# Patient Record
Sex: FEMALE | Race: WHITE | NOT HISPANIC OR LATINO | URBAN - METROPOLITAN AREA
[De-identification: names, ages, dates, MRNs, and addresses within clinical notes are randomized per-mention and may not be internally consistent; named-entity substitution may affect disease eponyms.]

---

## 2019-08-10 ENCOUNTER — INPATIENT (INPATIENT)
Facility: HOSPITAL | Age: 84
LOS: 33 days | Discharge: NURSING, REHAB OR LONG-TERM CARE | DRG: 853 | End: 2019-09-13
Attending: INTERNAL MEDICINE | Admitting: HOSPITALIST
Payer: MEDICARE

## 2019-08-10 VITALS
WEIGHT: 149.91 LBS | OXYGEN SATURATION: 85 % | RESPIRATION RATE: 18 BRPM | HEIGHT: 60 IN | SYSTOLIC BLOOD PRESSURE: 157 MMHG | TEMPERATURE: 101 F | HEART RATE: 98 BPM | DIASTOLIC BLOOD PRESSURE: 80 MMHG

## 2019-08-10 DIAGNOSIS — Z95.2 PRESENCE OF PROSTHETIC HEART VALVE: Chronic | ICD-10-CM

## 2019-08-10 LAB
ALBUMIN SERPL ELPH-MCNC: 4.2 G/DL — SIGNIFICANT CHANGE UP (ref 3.3–5)
ALP SERPL-CCNC: 52 U/L — SIGNIFICANT CHANGE UP (ref 40–120)
ALT FLD-CCNC: 6 U/L — LOW (ref 10–45)
ANION GAP SERPL CALC-SCNC: 15 MMOL/L — SIGNIFICANT CHANGE UP (ref 5–17)
ANION GAP SERPL CALC-SCNC: 15 MMOL/L — SIGNIFICANT CHANGE UP (ref 5–17)
APPEARANCE UR: CLEAR — SIGNIFICANT CHANGE UP
APTT BLD: 29.8 SEC — SIGNIFICANT CHANGE UP (ref 27.5–36.3)
AST SERPL-CCNC: 51 U/L — HIGH (ref 10–40)
BACTERIA # UR AUTO: ABNORMAL
BASOPHILS # BLD AUTO: 0.1 K/UL — SIGNIFICANT CHANGE UP (ref 0–0.2)
BASOPHILS NFR BLD AUTO: 0.5 % — SIGNIFICANT CHANGE UP (ref 0–2)
BILIRUB SERPL-MCNC: 0.4 MG/DL — SIGNIFICANT CHANGE UP (ref 0.2–1.2)
BILIRUB UR-MCNC: NEGATIVE — SIGNIFICANT CHANGE UP
BUN SERPL-MCNC: 21 MG/DL — SIGNIFICANT CHANGE UP (ref 7–23)
BUN SERPL-MCNC: 21 MG/DL — SIGNIFICANT CHANGE UP (ref 7–23)
CALCIUM SERPL-MCNC: 8.9 MG/DL — SIGNIFICANT CHANGE UP (ref 8.4–10.5)
CALCIUM SERPL-MCNC: 9.4 MG/DL — SIGNIFICANT CHANGE UP (ref 8.4–10.5)
CHLORIDE SERPL-SCNC: 88 MMOL/L — LOW (ref 96–108)
CHLORIDE SERPL-SCNC: 89 MMOL/L — LOW (ref 96–108)
CO2 SERPL-SCNC: 28 MMOL/L — SIGNIFICANT CHANGE UP (ref 22–31)
CO2 SERPL-SCNC: 30 MMOL/L — SIGNIFICANT CHANGE UP (ref 22–31)
COLOR SPEC: YELLOW — SIGNIFICANT CHANGE UP
CREAT SERPL-MCNC: 0.8 MG/DL — SIGNIFICANT CHANGE UP (ref 0.5–1.3)
CREAT SERPL-MCNC: 0.9 MG/DL — SIGNIFICANT CHANGE UP (ref 0.5–1.3)
DIFF PNL FLD: NEGATIVE — SIGNIFICANT CHANGE UP
EOSINOPHIL # BLD AUTO: 0 K/UL — SIGNIFICANT CHANGE UP (ref 0–0.5)
EOSINOPHIL NFR BLD AUTO: 0.2 % — SIGNIFICANT CHANGE UP (ref 0–6)
EPI CELLS # UR: 0 /HPF — SIGNIFICANT CHANGE UP
ERYTHROCYTE [SEDIMENTATION RATE] IN BLOOD: 54 MM/HR — HIGH (ref 0–20)
GLUCOSE SERPL-MCNC: 117 MG/DL — HIGH (ref 70–99)
GLUCOSE SERPL-MCNC: 191 MG/DL — HIGH (ref 70–99)
GLUCOSE UR QL: NEGATIVE — SIGNIFICANT CHANGE UP
HCT VFR BLD CALC: 37.4 % — SIGNIFICANT CHANGE UP (ref 34.5–45)
HGB BLD-MCNC: 12.2 G/DL — SIGNIFICANT CHANGE UP (ref 11.5–15.5)
HYALINE CASTS # UR AUTO: 1 /LPF — SIGNIFICANT CHANGE UP (ref 0–2)
INR BLD: 1.12 RATIO — SIGNIFICANT CHANGE UP (ref 0.88–1.16)
KETONES UR-MCNC: NEGATIVE — SIGNIFICANT CHANGE UP
LACTATE BLDV-MCNC: 1.6 MMOL/L — SIGNIFICANT CHANGE UP (ref 0.7–2)
LEUKOCYTE ESTERASE UR-ACNC: ABNORMAL
LYMPHOCYTES # BLD AUTO: 1.4 K/UL — SIGNIFICANT CHANGE UP (ref 1–3.3)
LYMPHOCYTES # BLD AUTO: 12.3 % — LOW (ref 13–44)
MAGNESIUM SERPL-MCNC: 2.2 MG/DL — SIGNIFICANT CHANGE UP (ref 1.6–2.6)
MCHC RBC-ENTMCNC: 29.2 PG — SIGNIFICANT CHANGE UP (ref 27–34)
MCHC RBC-ENTMCNC: 32.6 GM/DL — SIGNIFICANT CHANGE UP (ref 32–36)
MCV RBC AUTO: 89.8 FL — SIGNIFICANT CHANGE UP (ref 80–100)
MONOCYTES # BLD AUTO: 0.6 K/UL — SIGNIFICANT CHANGE UP (ref 0–0.9)
MONOCYTES NFR BLD AUTO: 5.2 % — SIGNIFICANT CHANGE UP (ref 2–14)
NEUTROPHILS # BLD AUTO: 9.5 K/UL — HIGH (ref 1.8–7.4)
NEUTROPHILS NFR BLD AUTO: 81.8 % — HIGH (ref 43–77)
NITRITE UR-MCNC: NEGATIVE — SIGNIFICANT CHANGE UP
OB PNL STL: NEGATIVE — SIGNIFICANT CHANGE UP
PH UR: 7.5 — SIGNIFICANT CHANGE UP (ref 5–8)
PLATELET # BLD AUTO: 286 K/UL — SIGNIFICANT CHANGE UP (ref 150–400)
POTASSIUM SERPL-MCNC: 3.3 MMOL/L — LOW (ref 3.5–5.3)
POTASSIUM SERPL-MCNC: 6 MMOL/L — HIGH (ref 3.5–5.3)
POTASSIUM SERPL-SCNC: 3.3 MMOL/L — LOW (ref 3.5–5.3)
POTASSIUM SERPL-SCNC: 6 MMOL/L — HIGH (ref 3.5–5.3)
PROT SERPL-MCNC: 7.8 G/DL — SIGNIFICANT CHANGE UP (ref 6–8.3)
PROT UR-MCNC: SIGNIFICANT CHANGE UP
PROTHROM AB SERPL-ACNC: 12.9 SEC — SIGNIFICANT CHANGE UP (ref 10–12.9)
RBC # BLD: 4.16 M/UL — SIGNIFICANT CHANGE UP (ref 3.8–5.2)
RBC # FLD: 14.3 % — SIGNIFICANT CHANGE UP (ref 10.3–14.5)
RBC CASTS # UR COMP ASSIST: 1 /HPF — SIGNIFICANT CHANGE UP (ref 0–4)
SODIUM SERPL-SCNC: 131 MMOL/L — LOW (ref 135–145)
SODIUM SERPL-SCNC: 134 MMOL/L — LOW (ref 135–145)
SP GR SPEC: 1.01 — SIGNIFICANT CHANGE UP (ref 1.01–1.02)
UROBILINOGEN FLD QL: NEGATIVE — SIGNIFICANT CHANGE UP
WBC # BLD: 11.6 K/UL — HIGH (ref 3.8–10.5)
WBC # FLD AUTO: 11.6 K/UL — HIGH (ref 3.8–10.5)
WBC UR QL: 39 /HPF — HIGH (ref 0–5)

## 2019-08-10 PROCEDURE — 93010 ELECTROCARDIOGRAM REPORT: CPT

## 2019-08-10 PROCEDURE — 71045 X-RAY EXAM CHEST 1 VIEW: CPT | Mod: 26

## 2019-08-10 PROCEDURE — 74177 CT ABD & PELVIS W/CONTRAST: CPT | Mod: 26

## 2019-08-10 PROCEDURE — 99285 EMERGENCY DEPT VISIT HI MDM: CPT

## 2019-08-10 PROCEDURE — 70450 CT HEAD/BRAIN W/O DYE: CPT | Mod: 26

## 2019-08-10 RX ORDER — ACETAMINOPHEN 500 MG
1000 TABLET ORAL ONCE
Refills: 0 | Status: COMPLETED | OUTPATIENT
Start: 2019-08-10 | End: 2019-08-10

## 2019-08-10 RX ORDER — IPRATROPIUM/ALBUTEROL SULFATE 18-103MCG
3 AEROSOL WITH ADAPTER (GRAM) INHALATION ONCE
Refills: 0 | Status: COMPLETED | OUTPATIENT
Start: 2019-08-10 | End: 2019-08-10

## 2019-08-10 RX ORDER — ACETAMINOPHEN 500 MG
975 TABLET ORAL ONCE
Refills: 0 | Status: COMPLETED | OUTPATIENT
Start: 2019-08-10 | End: 2019-08-10

## 2019-08-10 RX ORDER — AZITHROMYCIN 500 MG/1
500 TABLET, FILM COATED ORAL ONCE
Refills: 0 | Status: COMPLETED | OUTPATIENT
Start: 2019-08-10 | End: 2019-08-10

## 2019-08-10 RX ORDER — POTASSIUM CHLORIDE 20 MEQ
10 PACKET (EA) ORAL ONCE
Refills: 0 | Status: COMPLETED | OUTPATIENT
Start: 2019-08-10 | End: 2019-08-10

## 2019-08-10 RX ORDER — CEFTRIAXONE 500 MG/1
1000 INJECTION, POWDER, FOR SOLUTION INTRAMUSCULAR; INTRAVENOUS ONCE
Refills: 0 | Status: COMPLETED | OUTPATIENT
Start: 2019-08-10 | End: 2019-08-10

## 2019-08-10 RX ADMIN — Medication 3 MILLILITER(S): at 20:48

## 2019-08-10 RX ADMIN — Medication 3 MILLILITER(S): at 20:40

## 2019-08-10 RX ADMIN — CEFTRIAXONE 100 MILLIGRAM(S): 500 INJECTION, POWDER, FOR SOLUTION INTRAMUSCULAR; INTRAVENOUS at 20:49

## 2019-08-10 RX ADMIN — Medication 400 MILLIGRAM(S): at 22:47

## 2019-08-10 RX ADMIN — AZITHROMYCIN 250 MILLIGRAM(S): 500 TABLET, FILM COATED ORAL at 21:55

## 2019-08-10 NOTE — ED ADULT NURSE NOTE - NSIMPLEMENTINTERV_GEN_ALL_ED
Implemented All Fall Risk Interventions:  [place] to call system. Call bell, personal items and telephone within reach. Instruct patient to call for assistance. Room bathroom lighting operational. Non-slip footwear when patient is off stretcher. Physically safe environment: no spills, clutter or unnecessary equipment. Stretcher in lowest position, wheels locked, appropriate side rails in place. Provide visual cue, wrist band, yellow gown, etc. Monitor gait and stability. Monitor for mental status changes and reorient to person, place, and time. Review medications for side effects contributing to fall risk. Reinforce activity limits and safety measures with patient and family.

## 2019-08-10 NOTE — ED PROVIDER NOTE - OBJECTIVE STATEMENT
84 y/o F with PMHx of GERD, HTN, HLD, DM with neuropathy, Fe deficient anemia, Parkinson dz, anxiety/depression, AS s/p TAVR on plavix, overactive bladder, ?CHF (on lasix 20 BID), PMR on Prednisone, ?COPD (dx with PNA at [facility] vague hx of being told she has COPD given breathing treatments but then stopped taking them), poor historian with son at bedside who is also poor historian presents c/o multiple medical complaints cc of LUQ/L flank pain since this morning. Pt has had frequent UTIs as per son for the past year, had sxs 2 weeks ago and was empirically treated with macrobid (finished 8 days ago), son notes pt had fever this morning and maybe last night, pt does not answer when questioned about urinary sxs. Denies h/o kidney stone and pyelo.      Pt and son also notes R sided HA x 1 week, began as an auricular HA but now is R temporoparietal. Patient's son notes she had normal ESR last week and prednisone dose was further decreased, however this week went for a f/u and was told ESR was elevated again, PCP was concerned for GCA so pt instructed to take prednisone 10mg QD. HA si sharp and stabbing every 5 minutes as per son, has improved since increasing the prednisone. Pt states she has macular degeneration in the R eye and chronically has decreased vision OD, no change in baseline. Of notes pt had similar HA in the past although less severe and had temporal artery bx in 04/2019 which was negative for GCA.      Additionally pt has had increased reflux/ "regurgitation" as per son, wet sounding non-productive cough, and sore throat x 1 week. Was seen by PCP for sore throat and told to take lozenges, no abx prescribed. No breathing treatments. Pt denies CP. Pt also notes ?bloody stool, (will alternate confirming or denying when asked).  **Unsure palliative/provoking factors, radiation, severity, or character of listed sxs unless otherwise noted as pt would not answer questioning. 84 y/o F with PMHx of GERD, HTN, HLD, DM with neuropathy, Fe deficient anemia, Parkinson dz, anxiety/depression, AS s/p TAVR on plavix, overactive bladder, ?CHF (on lasix 20 BID), PMR on Prednisone, ?COPD (dx with PNA at [facility] vague hx of being told she has COPD given breathing treatments but then stopped taking them), poor historian with son at bedside who is also poor historian presents c/o multiple medical complaints cc of LUQ/L flank pain since this morning. Pt has had frequent UTIs as per son for the past year, had sxs 2 weeks ago and was empirically treated with macrobid (finished 8 days ago), son notes pt had fever this morning and maybe last night, pt does not answer when questioned about urinary sxs. Denies h/o kidney stone and pyelo.      Pt and son also notes R sided HA x 1 week, began as an auricular HA but now is R temporoparietal. Patient's son notes she had normal ESR last week and prednisone dose was further decreased, however this week went for a f/u and was told ESR was elevated again, PCP was concerned for GCA so pt instructed to take prednisone 10mg QD. HA si sharp and stabbing every 5 minutes as per son, has improved since increasing the prednisone. Pt states she has macular degeneration in the R eye and chronically has decreased vision OD, no change in baseline. Of notes pt had similar HA in the past although less severe and had temporal artery bx in 04/2019 which was negative for GCA.      Additionally pt has had increased reflux/ "regurgitation" as per son, wet sounding non-productive cough, and sore throat x 1 week. Was seen by PCP for sore throat and told to take lozenges, no abx prescribed. No breathing treatments. Pt denies CP. Pt also notes ?bloody stool, (will alternate confirming or denying when asked).  **Unsure palliative/provoking factors, radiation, severity, or character of listed sxs unless otherwise noted as pt would not answer questioning.  Attn - pt seen in mid24 - agree with above. lancing Right temporal pain and left flank pain with poss fever.  hx of frequent UTIs. 84 y/o F with PMHx of GERD, HTN, HLD, DM with neuropathy, Fe deficient anemia, Parkinson dz, anxiety/depression, AS s/p TAVR on plavix, overactive bladder, ?CHF (on lasix 20 BID), PMR on Prednisone, ?COPD (dx with PNA at [facility] vague hx of being told she has COPD given breathing treatments but then stopped taking them), poor historian with son at bedside who is also poor historian presents c/o multiple medical complaints cc of LUQ/L flank pain since this morning. Pt has had frequent UTIs as per son for the past year, had sxs 2 weeks ago and was empirically treated with macrobid (finished 8 days ago), son notes pt had fever this morning and maybe last night, pt does not answer when questioned about urinary sxs. Denies h/o kidney stone and pyelo.      Pt and son also notes R sided HA x 1 week, began as an auricular HA but now is R temporoparietal. Patient's son notes she had normal ESR last week and prednisone dose was further decreased, however this week went for a f/u and was told ESR was elevated again, PCP was concerned for GCA so pt instructed to take prednisone 10mg QD. HA si sharp and stabbing every 5 minutes as per son, has improved since increasing the prednisone. Pt states she has macular degeneration in the R eye and chronically has decreased vision OD, no change in baseline. Of notes pt had similar HA in the past although less severe and had temporal artery bx in 04/2019 which was negative for GCA.      Additionally pt has had increased reflux/ "regurgitation" as per son, wet sounding non-productive cough, and sore throat x 1 week. Was seen by PCP for sore throat and told to take lozenges, no abx prescribed. No breathing treatments. Pt denies CP. Pt also notes ?bloody stool, (will alternate confirming or denying when asked).  **Unsure palliative/provoking factors, radiation, severity, or character of listed sxs unless otherwise noted as pt would not answer questioning.  Attn - pt seen in mid24 - agree with above. lancing Right temporal pain and left flank pain with poss fever.  hx of frequent UTIs.  PCP: Dr. Yakov Dumas (unattached)

## 2019-08-10 NOTE — ED ADULT NURSE NOTE - OBJECTIVE STATEMENT
85y Female PMH parkinson's, htn, hld, Depression, Anxiety, Diabetes, bilateral cataract surgery, iron deficiency anemia, heart failure?, COPD? presents to the ED c/o abd pain. Pt is poor historian and is only answering some questions at this time. Son at bedside states that patient started to complain of LUQ and L. flank pain today. Son states that patient has been getting frequent UTIs this year. Of note, pt also suffers from headaches and is complaining of R. sided headache at this time; pt has hx of polymyalgia rheumatica and had a L. temporal artery Biopsy in April. Son states that patient has had the chills today and has had a difficult time speaking due to shortness of breath. Son concerned for aspiration and states her face turns "very red" when she swallows. Son states that patient has been coughing frequently and that it sounds "wet" but no production. Pt a&oX3, airway intact, breathing spontaneously and unlabored, SpO2 on room air noted to be 83%, pt placed on 4L initially, now on 2L with SpO2 of %, wheezing and rhonchi heard throughout lung fields, oral temperature of 101.3 noted, abd soft, tender to palpation in LUQ and L. flank, pt moving all extremities, no edema noted, cap refill <3 seconds, +peripheral pulses. Pt denies CP, numbness/tingling, n/v/d. MD at bedside for eval. safety maintained. Son at bedside.

## 2019-08-10 NOTE — ED ADULT NURSE NOTE - NS ED NURSE RECORD ANOTHER VITAL SIGN
buprenorphine-naloxone (SUBOXONE) 8-2 MG SUBL SL tablet Place 1 tablet under the tongue 2 times daily .  ferrous sulfate 325 (65 Fe) MG tablet Take 1 tablet by mouth daily      furosemide (LASIX) 20 MG tablet Take 1 tablet by mouth daily      potassium chloride (KLOR-CON M) 10 MEQ extended release tablet Take 1 tablet by mouth daily      gabapentin (NEURONTIN) 400 MG capsule Take 1 capsule by mouth 3 times daily       No current facility-administered medications for this visit. Allergies:  Review of patient's allergies indicates no known allergies. 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 No weight loss. No visual change, eye pain, eye discharge. No oral lesion, sore throat, dysphagia. Denies cough / sputum. Denies chest pain, palpitations. Denies n / v / abd pain. No diarrhea. Denies dysuria or change in urinary function. Denies joint swelling or pain. No myalgia, arthralgia. Denies skin change, rash, itching  Denies focal weakness, sensory change or other neurologic symptom  Denies new depression or other psychiatric problem. No symptoms endocrine disorder. No symptoms hematologic disorder. PHYSICAL EXAM:    Vitals:    BP (!) 100/56   Temp 98.2 °F (36.8 °C) (Oral)   Wt 199 lb (90.3 kg)   BMI 28.55 kg/m²     GENERAL: No apparent distress. HEENT: Membranes moist, no oral lesion  NECK:  Supple  LYMPH: No adenopathy   LUNGS: Clear b/l, no rales, no dullness - L SC joint swelling, non-tender  CARDIAC: RRR, no murmur appreciated  ABD:  + BS, soft / NT  EXT:  No rash, no edema, no lesions  NEURO: No focal neurologic findings  PSYCH: Orientation, sensorium, mood normal    DATA:    See EPIC    11/27 - BC no growth. WBC 6.8, Hbg 11.8, plt 115.   CRP 3.9.  ESR 35. ,     11/27 CXR:  Significant radiographic improvement with persistent nodular   densities and airspace disease in the lung periphery, right   greater than left. IMPRESSION    Hx IVDU   HIV neg  HCV +  MRSA endocarditis  Pul septic emboli  L sternoclavicular septic arthritis - better off antibiotic  Elevated hepatic transaminases [, ]    RECOMMENDATIONS:      - Cont off any antibiotics  - Encouraged continued drug abstinence  - F/u PCP - appt 12/14    Return - as need     - Spent over 40 minutes on visit (including history, physical exam, review of data, development and implementation of treatment plan and coordination of care. - Over 50% of time spent in pt counseling and education.     Ish Peoples MD Yes

## 2019-08-10 NOTE — ED PROVIDER NOTE - UNABLE TO OBTAIN
Pt A&Ox4 but sparingly responds to further questioning making history taking, ROS, and physical exam difficult Dementia

## 2019-08-10 NOTE — ED PROVIDER NOTE - PROGRESS NOTE DETAILS
c/d/w neuro, will see pt. -Lucas Payton PA-C Attending Valerie Norwood: ct shows no evidence of stone. pt  reports cough. febrile. hypoxic upon arrival which improved with duonebs but still requiring supplemental oxygen. concern for possible pna and UTI. will need admission. given abx c/d/w neuro, will see pt. Pt with h/o TAVR and likely HF on lasix 20mg BID with current cough and hypoxia requiring supplemental O2, will hold IV fluids. -Lucas Payton PA-C

## 2019-08-10 NOTE — ED PROVIDER NOTE - PHYSICAL EXAMINATION
Attn - alert, oriented x 3, nad, no pallor or jaundice, no JVD, lungs - course BS bilat, no rales, cor - rr, 2/6 ISAAC, abdo - obese, soft, tender left upper flank and left CVA. no guarding or rebound.  extremities - no edema or calf tenderness.  skin - warm, dry and pink.  Scalp - no tenderness over temporal area. neuro - non focal exam. Attn - alert, oriented x 3, nad, no pallor or jaundice, no JVD, lungs - course BS bilat, no rales, cor - rr, 2/6 ISAAC, abdo - obese, soft, tender left upper flank and left CVA. no guarding or rebound.  extremities - no edema or calf tenderness.  skin - warm, dry and pink.  Scalp - no tenderness over temporal area. neuro - non focal exam.    Lucas Payton PA-C GEN: Pt in NAD, A&O x3.  PSYCH: Pt anxious, cooperative with exam but only answers target questions. +lip smacking and cogwheeling.  EYES: Sclera white w/o injection, PERRLA, EOMI.   ENT: Head NCAT, tenderness over the temporal radion, as well as the posterior auricular and occipital region, no rash or appreciable LAD. Nose w/o deformity. No auricular TTP. MMM, posteiror pharynx erythematous no exudate, no vesicles, uvula midline, no significant tonsillar enlargement. Pt tolerating secretions. Neck supple FROM. No JVD.  RESP: No chest wall tenderness, decreased breath sound b/l, diffuse inspiratory and expiratory wheezes, and sonorous rhonchi. No appreciable rales.  CARDIAC: RRR, clear distinct S1, S2, +3/6 systolic murmur.  ABD: Abdomen soft, ttp LUQ and L flank. No CVAT b/l.  VASC: 2+ radial and dorsalis pedis pulses b/l. No edema or calf tenderness.  NEURO: CN2-12 grossly intact. No focal motor or sensory deficits.  SKIN: No rashes on the trunk or head. Attn - alert, oriented x 3, nad, no pallor or jaundice, no JVD, lungs - course BS bilat, no rales, cor - rr, 2/6 ISAAC, abdo - obese, soft, tender left upper flank and left CVA. no guarding or rebound.  extremities - no edema or calf tenderness.  skin - warm, dry and pink.  Scalp - no tenderness over temporal area. neuro - non focal exam.    Lucas Payton PA-C GEN: Pt in NAD, A&O x3.  PSYCH: Pt anxious, cooperative with exam but only answers target questions. +lip smacking and cogwheeling.  EYES: Sclera white w/o injection, VA unreliable pt does not participate. PERRLA, EOMI.   ENT: Head NCAT, tenderness over the temporal radion, as well as the posterior auricular and occipital region, no rash or appreciable LAD. Nose w/o deformity. No auricular TTP. MMM, posteiror pharynx erythematous no exudate, no vesicles, uvula midline, no significant tonsillar enlargement. Pt tolerating secretions. Neck supple FROM. No JVD.  RESP: No chest wall tenderness, decreased breath sound b/l, diffuse inspiratory and expiratory wheezes, and sonorous rhonchi. No appreciable rales.  CARDIAC: RRR, clear distinct S1, S2, +3/6 systolic murmur.  ABD: Abdomen soft, ttp LUQ and L flank. No CVAT b/l.  Rectal Exam: Chaperone: Emma Frazier RN. Minimal light brown stool in vault, no visible external hemorrhoids or fissures, good rectal tone, no masses, irregularities or internal hemorrhoids palpated, no pain with exam.   VASC: 2+ radial and dorsalis pedis pulses b/l. No edema or calf tenderness.  NEURO: CN2-12 grossly intact. No focal motor or sensory deficits.  SKIN: No rashes on the trunk or head.

## 2019-08-10 NOTE — ED PROVIDER NOTE - CLINICAL SUMMARY MEDICAL DECISION MAKING FREE TEXT BOX
Attn - pt with sharp left sided headache with neg temporal bx in April 19 - temporal arteritis with those findings and no tenderness very unlikely.  left flank and CVA tenderness - UTI/pyelo --> CT abdo/pelvis, cxr, labs.

## 2019-08-10 NOTE — ED ADULT NURSE REASSESSMENT NOTE - NS ED NURSE REASSESS COMMENT FT1
RN attempted to give patient oral medication as ordered by PADMINI Muñoz; pt noted to have difficult time swallowing, her face became red and she was having a hard time breathing. PADMINI Muñoz aware. Pt to be placed on NPO in ED and given a different route of Tylenol for fever. safety maintained. Oxygen saturation % on 2L NC. Breathing spontaneously and unlabored.

## 2019-08-10 NOTE — ED PROVIDER NOTE - PMH
Anxiety    Aortic stenosis    Diabetes    Hyperlipidemia    Hypertension    Insomnia    Iron deficiency anemia    Neuropathy    Parkinson disease    Polymyalgia rheumatica

## 2019-08-11 DIAGNOSIS — Z29.9 ENCOUNTER FOR PROPHYLACTIC MEASURES, UNSPECIFIED: ICD-10-CM

## 2019-08-11 DIAGNOSIS — A41.9 SEPSIS, UNSPECIFIED ORGANISM: ICD-10-CM

## 2019-08-11 DIAGNOSIS — E11.8 TYPE 2 DIABETES MELLITUS WITH UNSPECIFIED COMPLICATIONS: ICD-10-CM

## 2019-08-11 DIAGNOSIS — N39.0 URINARY TRACT INFECTION, SITE NOT SPECIFIED: ICD-10-CM

## 2019-08-11 DIAGNOSIS — J44.9 CHRONIC OBSTRUCTIVE PULMONARY DISEASE, UNSPECIFIED: ICD-10-CM

## 2019-08-11 DIAGNOSIS — I50.9 HEART FAILURE, UNSPECIFIED: ICD-10-CM

## 2019-08-11 DIAGNOSIS — K59.00 CONSTIPATION, UNSPECIFIED: ICD-10-CM

## 2019-08-11 DIAGNOSIS — I10 ESSENTIAL (PRIMARY) HYPERTENSION: ICD-10-CM

## 2019-08-11 DIAGNOSIS — J18.9 PNEUMONIA, UNSPECIFIED ORGANISM: ICD-10-CM

## 2019-08-11 LAB
ALBUMIN SERPL ELPH-MCNC: 4.2 G/DL — SIGNIFICANT CHANGE UP (ref 3.3–5)
ALP SERPL-CCNC: 54 U/L — SIGNIFICANT CHANGE UP (ref 40–120)
ALT FLD-CCNC: <5 U/L — LOW (ref 10–45)
ANION GAP SERPL CALC-SCNC: 16 MMOL/L — SIGNIFICANT CHANGE UP (ref 5–17)
AST SERPL-CCNC: 16 U/L — SIGNIFICANT CHANGE UP (ref 10–40)
BASE EXCESS BLDV CALC-SCNC: 7.7 MMOL/L — HIGH (ref -2–2)
BASE EXCESS BLDV CALC-SCNC: 7.7 MMOL/L — HIGH (ref -2–2)
BASOPHILS # BLD AUTO: 0 K/UL — SIGNIFICANT CHANGE UP (ref 0–0.2)
BASOPHILS NFR BLD AUTO: 0.2 % — SIGNIFICANT CHANGE UP (ref 0–2)
BILIRUB SERPL-MCNC: 0.6 MG/DL — SIGNIFICANT CHANGE UP (ref 0.2–1.2)
BUN SERPL-MCNC: 17 MG/DL — SIGNIFICANT CHANGE UP (ref 7–23)
CA-I SERPL-SCNC: 1.04 MMOL/L — LOW (ref 1.12–1.3)
CA-I SERPL-SCNC: 1.1 MMOL/L — LOW (ref 1.12–1.3)
CALCIUM SERPL-MCNC: 9.7 MG/DL — SIGNIFICANT CHANGE UP (ref 8.4–10.5)
CHLORIDE BLDV-SCNC: 93 MMOL/L — LOW (ref 96–108)
CHLORIDE BLDV-SCNC: 95 MMOL/L — LOW (ref 96–108)
CHLORIDE SERPL-SCNC: 88 MMOL/L — LOW (ref 96–108)
CO2 BLDV-SCNC: 36 MMOL/L — HIGH (ref 22–30)
CO2 BLDV-SCNC: 37 MMOL/L — HIGH (ref 22–30)
CO2 SERPL-SCNC: 29 MMOL/L — SIGNIFICANT CHANGE UP (ref 22–31)
CREAT SERPL-MCNC: 0.78 MG/DL — SIGNIFICANT CHANGE UP (ref 0.5–1.3)
CRP SERPL-MCNC: 1.13 MG/DL — HIGH (ref 0–0.4)
EOSINOPHIL # BLD AUTO: 0 K/UL — SIGNIFICANT CHANGE UP (ref 0–0.5)
EOSINOPHIL NFR BLD AUTO: 0.3 % — SIGNIFICANT CHANGE UP (ref 0–6)
GAS PNL BLDV: 127 MMOL/L — LOW (ref 135–145)
GAS PNL BLDV: 131 MMOL/L — LOW (ref 135–145)
GAS PNL BLDV: SIGNIFICANT CHANGE UP
GLUCOSE BLDV-MCNC: 105 MG/DL — HIGH (ref 70–99)
GLUCOSE BLDV-MCNC: 121 MG/DL — HIGH (ref 70–99)
GLUCOSE SERPL-MCNC: 152 MG/DL — HIGH (ref 70–99)
HCO3 BLDV-SCNC: 34 MMOL/L — HIGH (ref 21–29)
HCO3 BLDV-SCNC: 35 MMOL/L — HIGH (ref 21–29)
HCT VFR BLD CALC: 36 % — SIGNIFICANT CHANGE UP (ref 34.5–45)
HCT VFR BLDA CALC: 31 % — LOW (ref 39–50)
HCT VFR BLDA CALC: 33 % — LOW (ref 39–50)
HGB BLD CALC-MCNC: 10.1 G/DL — LOW (ref 11.5–15.5)
HGB BLD CALC-MCNC: 10.7 G/DL — LOW (ref 11.5–15.5)
HGB BLD-MCNC: 11.8 G/DL — SIGNIFICANT CHANGE UP (ref 11.5–15.5)
HOROWITZ INDEX BLDV+IHG-RTO: SIGNIFICANT CHANGE UP
LACTATE BLDV-MCNC: 1.2 MMOL/L — SIGNIFICANT CHANGE UP (ref 0.7–2)
LACTATE BLDV-MCNC: 1.8 MMOL/L — SIGNIFICANT CHANGE UP (ref 0.7–2)
LYMPHOCYTES # BLD AUTO: 1.4 K/UL — SIGNIFICANT CHANGE UP (ref 1–3.3)
LYMPHOCYTES # BLD AUTO: 9 % — LOW (ref 13–44)
MAGNESIUM SERPL-MCNC: 2.2 MG/DL — SIGNIFICANT CHANGE UP (ref 1.6–2.6)
MCHC RBC-ENTMCNC: 29.6 PG — SIGNIFICANT CHANGE UP (ref 27–34)
MCHC RBC-ENTMCNC: 32.8 GM/DL — SIGNIFICANT CHANGE UP (ref 32–36)
MCV RBC AUTO: 90.1 FL — SIGNIFICANT CHANGE UP (ref 80–100)
MONOCYTES # BLD AUTO: 0.7 K/UL — SIGNIFICANT CHANGE UP (ref 0–0.9)
MONOCYTES NFR BLD AUTO: 4.4 % — SIGNIFICANT CHANGE UP (ref 2–14)
NEUTROPHILS # BLD AUTO: 13.2 K/UL — HIGH (ref 1.8–7.4)
NEUTROPHILS NFR BLD AUTO: 86.1 % — HIGH (ref 43–77)
OTHER CELLS CSF MANUAL: 14 ML/DL — LOW (ref 18–22)
PCO2 BLDV: 58 MMHG — HIGH (ref 35–50)
PCO2 BLDV: 65 MMHG — HIGH (ref 35–50)
PH BLDV: 7.34 — LOW (ref 7.35–7.45)
PH BLDV: 7.38 — SIGNIFICANT CHANGE UP (ref 7.35–7.45)
PHOSPHATE SERPL-MCNC: 4 MG/DL — SIGNIFICANT CHANGE UP (ref 2.5–4.5)
PLATELET # BLD AUTO: 263 K/UL — SIGNIFICANT CHANGE UP (ref 150–400)
PO2 BLDV: 27 MMHG — SIGNIFICANT CHANGE UP (ref 25–45)
PO2 BLDV: 62 MMHG — HIGH (ref 25–45)
POTASSIUM BLDV-SCNC: 3.2 MMOL/L — LOW (ref 3.5–5.3)
POTASSIUM BLDV-SCNC: 5.8 MMOL/L — HIGH (ref 3.5–5.3)
POTASSIUM SERPL-MCNC: 3.6 MMOL/L — SIGNIFICANT CHANGE UP (ref 3.5–5.3)
POTASSIUM SERPL-SCNC: 3.6 MMOL/L — SIGNIFICANT CHANGE UP (ref 3.5–5.3)
PROT SERPL-MCNC: 7.4 G/DL — SIGNIFICANT CHANGE UP (ref 6–8.3)
RAPID RVP RESULT: SIGNIFICANT CHANGE UP
RBC # BLD: 4 M/UL — SIGNIFICANT CHANGE UP (ref 3.8–5.2)
RBC # FLD: 14.2 % — SIGNIFICANT CHANGE UP (ref 10.3–14.5)
SAO2 % BLDV: 38 % — LOW (ref 67–88)
SAO2 % BLDV: 91 % — HIGH (ref 67–88)
SODIUM SERPL-SCNC: 133 MMOL/L — LOW (ref 135–145)
WBC # BLD: 15.3 K/UL — HIGH (ref 3.8–10.5)
WBC # FLD AUTO: 15.3 K/UL — HIGH (ref 3.8–10.5)

## 2019-08-11 PROCEDURE — 99223 1ST HOSP IP/OBS HIGH 75: CPT | Mod: AI,GC

## 2019-08-11 PROCEDURE — 99222 1ST HOSP IP/OBS MODERATE 55: CPT

## 2019-08-11 PROCEDURE — 12345: CPT | Mod: NC,GC

## 2019-08-11 RX ORDER — INSULIN LISPRO 100/ML
VIAL (ML) SUBCUTANEOUS
Refills: 0 | Status: DISCONTINUED | OUTPATIENT
Start: 2019-08-11 | End: 2019-08-16

## 2019-08-11 RX ORDER — INSULIN LISPRO 100/ML
VIAL (ML) SUBCUTANEOUS
Refills: 0 | Status: DISCONTINUED | OUTPATIENT
Start: 2019-08-11 | End: 2019-08-11

## 2019-08-11 RX ORDER — CLOPIDOGREL BISULFATE 75 MG/1
75 TABLET, FILM COATED ORAL DAILY
Refills: 0 | Status: DISCONTINUED | OUTPATIENT
Start: 2019-08-11 | End: 2019-08-14

## 2019-08-11 RX ORDER — DEXTROSE 50 % IN WATER 50 %
25 SYRINGE (ML) INTRAVENOUS ONCE
Refills: 0 | Status: DISCONTINUED | OUTPATIENT
Start: 2019-08-11 | End: 2019-08-30

## 2019-08-11 RX ORDER — DEXTROSE 50 % IN WATER 50 %
12.5 SYRINGE (ML) INTRAVENOUS ONCE
Refills: 0 | Status: DISCONTINUED | OUTPATIENT
Start: 2019-08-11 | End: 2019-08-30

## 2019-08-11 RX ORDER — LOSARTAN POTASSIUM 100 MG/1
50 TABLET, FILM COATED ORAL DAILY
Refills: 0 | Status: DISCONTINUED | OUTPATIENT
Start: 2019-08-11 | End: 2019-08-14

## 2019-08-11 RX ORDER — DEXTROSE 50 % IN WATER 50 %
15 SYRINGE (ML) INTRAVENOUS ONCE
Refills: 0 | Status: DISCONTINUED | OUTPATIENT
Start: 2019-08-11 | End: 2019-08-30

## 2019-08-11 RX ORDER — SODIUM CHLORIDE 9 MG/ML
1000 INJECTION, SOLUTION INTRAVENOUS
Refills: 0 | Status: DISCONTINUED | OUTPATIENT
Start: 2019-08-11 | End: 2019-08-30

## 2019-08-11 RX ORDER — DEXTROSE 50 % IN WATER 50 %
25 SYRINGE (ML) INTRAVENOUS ONCE
Refills: 0 | Status: DISCONTINUED | OUTPATIENT
Start: 2019-08-11 | End: 2019-08-11

## 2019-08-11 RX ORDER — DEXTROSE 50 % IN WATER 50 %
12.5 SYRINGE (ML) INTRAVENOUS ONCE
Refills: 0 | Status: DISCONTINUED | OUTPATIENT
Start: 2019-08-11 | End: 2019-08-11

## 2019-08-11 RX ORDER — DEXTROSE 50 % IN WATER 50 %
15 SYRINGE (ML) INTRAVENOUS ONCE
Refills: 0 | Status: DISCONTINUED | OUTPATIENT
Start: 2019-08-11 | End: 2019-08-11

## 2019-08-11 RX ORDER — CARBIDOPA AND LEVODOPA 25; 100 MG/1; MG/1
1 TABLET ORAL
Qty: 0 | Refills: 0 | DISCHARGE

## 2019-08-11 RX ORDER — POLYETHYLENE GLYCOL 3350 17 G/17G
17 POWDER, FOR SOLUTION ORAL DAILY
Refills: 0 | Status: DISCONTINUED | OUTPATIENT
Start: 2019-08-11 | End: 2019-08-16

## 2019-08-11 RX ORDER — ACETAMINOPHEN 500 MG
650 TABLET ORAL EVERY 6 HOURS
Refills: 0 | Status: DISCONTINUED | OUTPATIENT
Start: 2019-08-11 | End: 2019-08-16

## 2019-08-11 RX ORDER — GLUCAGON INJECTION, SOLUTION 0.5 MG/.1ML
1 INJECTION, SOLUTION SUBCUTANEOUS ONCE
Refills: 0 | Status: DISCONTINUED | OUTPATIENT
Start: 2019-08-11 | End: 2019-08-11

## 2019-08-11 RX ORDER — PANTOPRAZOLE SODIUM 20 MG/1
40 TABLET, DELAYED RELEASE ORAL
Refills: 0 | Status: DISCONTINUED | OUTPATIENT
Start: 2019-08-11 | End: 2019-08-16

## 2019-08-11 RX ORDER — GLUCAGON INJECTION, SOLUTION 0.5 MG/.1ML
1 INJECTION, SOLUTION SUBCUTANEOUS ONCE
Refills: 0 | Status: DISCONTINUED | OUTPATIENT
Start: 2019-08-11 | End: 2019-08-30

## 2019-08-11 RX ORDER — GABAPENTIN 400 MG/1
300 CAPSULE ORAL AT BEDTIME
Refills: 0 | Status: DISCONTINUED | OUTPATIENT
Start: 2019-08-11 | End: 2019-08-16

## 2019-08-11 RX ORDER — CEFTRIAXONE 500 MG/1
1000 INJECTION, POWDER, FOR SOLUTION INTRAMUSCULAR; INTRAVENOUS EVERY 24 HOURS
Refills: 0 | Status: COMPLETED | OUTPATIENT
Start: 2019-08-11 | End: 2019-08-14

## 2019-08-11 RX ORDER — AZITHROMYCIN 500 MG/1
500 TABLET, FILM COATED ORAL EVERY 24 HOURS
Refills: 0 | Status: DISCONTINUED | OUTPATIENT
Start: 2019-08-11 | End: 2019-08-11

## 2019-08-11 RX ORDER — INSULIN LISPRO 100/ML
VIAL (ML) SUBCUTANEOUS AT BEDTIME
Refills: 0 | Status: DISCONTINUED | OUTPATIENT
Start: 2019-08-11 | End: 2019-08-11

## 2019-08-11 RX ORDER — GABAPENTIN 400 MG/1
600 CAPSULE ORAL THREE TIMES A DAY
Refills: 0 | Status: DISCONTINUED | OUTPATIENT
Start: 2019-08-11 | End: 2019-08-11

## 2019-08-11 RX ORDER — CARBIDOPA AND LEVODOPA 25; 100 MG/1; MG/1
2 TABLET ORAL THREE TIMES A DAY
Refills: 0 | Status: DISCONTINUED | OUTPATIENT
Start: 2019-08-11 | End: 2019-08-16

## 2019-08-11 RX ORDER — INSULIN LISPRO 100/ML
VIAL (ML) SUBCUTANEOUS AT BEDTIME
Refills: 0 | Status: DISCONTINUED | OUTPATIENT
Start: 2019-08-11 | End: 2019-08-16

## 2019-08-11 RX ORDER — SODIUM CHLORIDE 9 MG/ML
1000 INJECTION, SOLUTION INTRAVENOUS
Refills: 0 | Status: DISCONTINUED | OUTPATIENT
Start: 2019-08-11 | End: 2019-08-11

## 2019-08-11 RX ORDER — ALPRAZOLAM 0.25 MG
0.5 TABLET ORAL
Refills: 0 | Status: DISCONTINUED | OUTPATIENT
Start: 2019-08-11 | End: 2019-08-16

## 2019-08-11 RX ORDER — DULOXETINE HYDROCHLORIDE 30 MG/1
60 CAPSULE, DELAYED RELEASE ORAL DAILY
Refills: 0 | Status: DISCONTINUED | OUTPATIENT
Start: 2019-08-11 | End: 2019-08-16

## 2019-08-11 RX ORDER — SENNA PLUS 8.6 MG/1
2 TABLET ORAL AT BEDTIME
Refills: 0 | Status: DISCONTINUED | OUTPATIENT
Start: 2019-08-11 | End: 2019-08-16

## 2019-08-11 RX ORDER — HEPARIN SODIUM 5000 [USP'U]/ML
5000 INJECTION INTRAVENOUS; SUBCUTANEOUS EVERY 12 HOURS
Refills: 0 | Status: DISCONTINUED | OUTPATIENT
Start: 2019-08-11 | End: 2019-08-14

## 2019-08-11 RX ORDER — DOCUSATE SODIUM 100 MG
100 CAPSULE ORAL DAILY
Refills: 0 | Status: DISCONTINUED | OUTPATIENT
Start: 2019-08-11 | End: 2019-08-16

## 2019-08-11 RX ADMIN — LOSARTAN POTASSIUM 50 MILLIGRAM(S): 100 TABLET, FILM COATED ORAL at 12:44

## 2019-08-11 RX ADMIN — CLOPIDOGREL BISULFATE 75 MILLIGRAM(S): 75 TABLET, FILM COATED ORAL at 12:44

## 2019-08-11 RX ADMIN — Medication 100 MILLIEQUIVALENT(S): at 00:01

## 2019-08-11 RX ADMIN — DULOXETINE HYDROCHLORIDE 60 MILLIGRAM(S): 30 CAPSULE, DELAYED RELEASE ORAL at 12:44

## 2019-08-11 RX ADMIN — GABAPENTIN 600 MILLIGRAM(S): 400 CAPSULE ORAL at 12:45

## 2019-08-11 RX ADMIN — Medication 100 MILLIGRAM(S): at 12:44

## 2019-08-11 RX ADMIN — Medication 10 MILLIGRAM(S): at 12:44

## 2019-08-11 RX ADMIN — POLYETHYLENE GLYCOL 3350 17 GRAM(S): 17 POWDER, FOR SOLUTION ORAL at 12:52

## 2019-08-11 RX ADMIN — Medication 650 MILLIGRAM(S): at 14:33

## 2019-08-11 RX ADMIN — CARBIDOPA AND LEVODOPA 2 TABLET(S): 25; 100 TABLET ORAL at 12:45

## 2019-08-11 RX ADMIN — HEPARIN SODIUM 5000 UNIT(S): 5000 INJECTION INTRAVENOUS; SUBCUTANEOUS at 18:24

## 2019-08-11 NOTE — H&P ADULT - NSICDXPASTMEDICALHX_GEN_ALL_CORE_FT
PAST MEDICAL HISTORY:  Anxiety     Aortic stenosis     Diabetes     Hyperlipidemia     Hypertension     Insomnia     Iron deficiency anemia     Neuropathy     Parkinson disease     Polymyalgia rheumatica

## 2019-08-11 NOTE — PROGRESS NOTE ADULT - SUBJECTIVE AND OBJECTIVE BOX
Umair Sequeira [place]  PGY-2  Pager: [phone]    Patient is a 85y old  Female who presents with a chief complaint of Abdominal pain (11 Aug 2019 08:49)      SUBJECTIVE / OVERNIGHT EVENTS:    MEDICATIONS  (STANDING):  azithromycin  IVPB 500 milliGRAM(s) IV Intermittent every 24 hours  carbidopa/levodopa  25/100 2 Tablet(s) Oral three times a day  cefTRIAXone   IVPB 1000 milliGRAM(s) IV Intermittent every 24 hours  clopidogrel Tablet 75 milliGRAM(s) Oral daily  dextrose 5%. 1000 milliLiter(s) (50 mL/Hr) IV Continuous <Continuous>  dextrose 50% Injectable 12.5 Gram(s) IV Push once  dextrose 50% Injectable 25 Gram(s) IV Push once  dextrose 50% Injectable 25 Gram(s) IV Push once  docusate sodium 100 milliGRAM(s) Oral daily  DULoxetine 60 milliGRAM(s) Oral daily  gabapentin 600 milliGRAM(s) Oral three times a day  heparin  Injectable 5000 Unit(s) SubCutaneous every 12 hours  insulin lispro (HumaLOG) corrective regimen sliding scale   SubCutaneous three times a day before meals  insulin lispro (HumaLOG) corrective regimen sliding scale   SubCutaneous at bedtime  losartan 50 milliGRAM(s) Oral daily  pantoprazole    Tablet 40 milliGRAM(s) Oral before breakfast  polyethylene glycol 3350 17 Gram(s) Oral daily  predniSONE   Tablet 10 milliGRAM(s) Oral daily  senna 2 Tablet(s) Oral at bedtime    MEDICATIONS  (PRN):  ALPRAZolam 0.5 milliGRAM(s) Oral two times a day PRN anxiety  dextrose 40% Gel 15 Gram(s) Oral once PRN Blood Glucose LESS THAN 70 milliGRAM(s)/deciliter  glucagon  Injectable 1 milliGRAM(s) IntraMuscular once PRN Glucose LESS THAN 70 milligrams/deciliter      T(C): 37.4 (19 @ 09:30), Max: 38.6 (08-10-19 @ 18:50)  HR: 92 (19 @ 09:30) (68 - 98)  BP: 143/87 (19 @ 09:30) (112/68 - 161/86)  RR: 20 (19 @ 09:30) (18 - 20)  SpO2: 93% (19 @ 09:30) (85% - 100%)  CAPILLARY BLOOD GLUCOSE      POCT Blood Glucose.: 121 mg/dL (11 Aug 2019 13:08)  POCT Blood Glucose.: 102 mg/dL (11 Aug 2019 09:48)    I&O's Summary      PHYSICAL EXAM:  GENERAL: NAD, well-developed  HEAD:  Atraumatic, Normocephalic  EYES: EOMI, PERRLA, conjunctiva and sclera clear  NECK: Supple, No JVD  CHEST/LUNG: Clear to auscultation bilaterally; No wheeze  HEART: Regular rate and rhythm; No murmurs, rubs, or gallops  ABDOMEN: Soft, Nontender, Nondistended; Bowel sounds present  EXTREMITIES:  2+ Peripheral Pulses, No clubbing, cyanosis, or edema  PSYCH: AAOx3  NEUROLOGY: non-focal  SKIN: No rashes or lesions    LABS:                        12.2   11.6  )-----------( 286      ( 10 Aug 2019 20:28 )             37.4     08-10    131<L>  |  88<L>  |  21  ----------------------------<  191<H>  3.3<L>   |  28  |  0.90    Ca    8.9      10 Aug 2019 23:03  Mg     1.9     08-10    TPro  7.8  /  Alb  4.2  /  TBili  0.4  /  DBili  x   /  AST  51<H>  /  ALT  6<L>  /  AlkPhos  52  08-10    PT/INR - ( 10 Aug 2019 20:28 )   PT: 12.9 sec;   INR: 1.12 ratio         PTT - ( 10 Aug 2019 20:28 )  PTT:29.8 sec      Urinalysis Basic - ( 10 Aug 2019 20:55 )    Color: Yellow / Appearance: Clear / S.012 / pH: x  Gluc: x / Ketone: Negative  / Bili: Negative / Urobili: Negative   Blood: x / Protein: Trace / Nitrite: Negative   Leuk Esterase: Large / RBC: 1 /hpf / WBC 39 /HPF   Sq Epi: x / Non Sq Epi: 0 /hpf / Bacteria: Many        RADIOLOGY & ADDITIONAL TESTS:    Imaging Personally Reviewed:    Consultant(s) Notes Reviewed:      Care Discussed with Consultants/Other Providers: Umair Sequeira [place]  PGY-2  Pager: [phone]    Patient is a 85y old  Female who presents with a chief complaint of Abdominal pain (11 Aug 2019 08:49)      SUBJECTIVE / OVERNIGHT EVENTS:  No acute events in interim.  Patient awake and answers questions appropriately, yet hypophonic (chronic? per patient).  She reports that she still has SOB and Left lower chest pain on the rib area with coughing. She sounds congested, and has to take breaks between sentences.  Reports subjective fever and chills.  Otherwise, she does not report other complaints. Denies palpitation, new visual disturbance, leg pain.    MEDICATIONS  (STANDING):  azithromycin  IVPB 500 milliGRAM(s) IV Intermittent every 24 hours  carbidopa/levodopa  25/100 2 Tablet(s) Oral three times a day  cefTRIAXone   IVPB 1000 milliGRAM(s) IV Intermittent every 24 hours  clopidogrel Tablet 75 milliGRAM(s) Oral daily  dextrose 5%. 1000 milliLiter(s) (50 mL/Hr) IV Continuous <Continuous>  dextrose 50% Injectable 12.5 Gram(s) IV Push once  dextrose 50% Injectable 25 Gram(s) IV Push once  dextrose 50% Injectable 25 Gram(s) IV Push once  docusate sodium 100 milliGRAM(s) Oral daily  DULoxetine 60 milliGRAM(s) Oral daily  gabapentin 600 milliGRAM(s) Oral three times a day  heparin  Injectable 5000 Unit(s) SubCutaneous every 12 hours  insulin lispro (HumaLOG) corrective regimen sliding scale   SubCutaneous three times a day before meals  insulin lispro (HumaLOG) corrective regimen sliding scale   SubCutaneous at bedtime  losartan 50 milliGRAM(s) Oral daily  pantoprazole    Tablet 40 milliGRAM(s) Oral before breakfast  polyethylene glycol 3350 17 Gram(s) Oral daily  predniSONE   Tablet 10 milliGRAM(s) Oral daily  senna 2 Tablet(s) Oral at bedtime    MEDICATIONS  (PRN):  ALPRAZolam 0.5 milliGRAM(s) Oral two times a day PRN anxiety  dextrose 40% Gel 15 Gram(s) Oral once PRN Blood Glucose LESS THAN 70 milliGRAM(s)/deciliter  glucagon  Injectable 1 milliGRAM(s) IntraMuscular once PRN Glucose LESS THAN 70 milligrams/deciliter      T(C): 37.4 (19 @ 09:30), Max: 38.6 (08-10-19 @ 18:50)  HR: 92 (19 @ 09:30) (68 - 98)  BP: 143/87 (19 @ 09:30) (112/68 - 161/86)  RR: 20 (19 @ 09:30) (18 - 20)  SpO2: 93% (19 @ 09:30) (85% - 100%)  CAPILLARY BLOOD GLUCOSE      POCT Blood Glucose.: 121 mg/dL (11 Aug 2019 13:08)  POCT Blood Glucose.: 102 mg/dL (11 Aug 2019 09:48)    I&O's Summary      PHYSICAL EXAM:    Constitutional: NAD. obese lady lying on bed awake upon entry, on NC.  HEENT: AT/NC, EOMI, MMM, no oropharyngeal lesions other than posterior oropharynx erythema, no exudates, Supple neck;  Respiratory: ronchorous breath sounds especially on expiration. mild bibasilar crackles. equal aeration bilaterally. no wheezing, no perioral cyanosis or nasal flaring.   Cardiovascular: RRR, no M/R/G. 2+ distal pulses. Cap refill ~3 seconds.  Gastrointestinal: soft; NT/ND, +BS, no rebounding tenderness or guarding.  Genitourinary: not examined.  Extremities: no cyanosis; no pedal edema, non-tender to palpation, DP and Radial pulses intact.  Neurological: A&Ox1~2; responds to pain; responds to verbal commands; epicritic and protopathic sensation intact: CN nerves grossly intact. Answers questions appropropriatley   MSK/Back: no deformities. Moving her bilateral UE freely against gravity.   Psychiatric: normal mood/affect.    LABS:                        12.2   11.6  )-----------( 286      ( 10 Aug 2019 20:28 )             37.4     08-10    131<L>  |  88<L>  |  21  ----------------------------<  191<H>  3.3<L>   |  28  |  0.90    Ca    8.9      10 Aug 2019 23:03  Mg     1.9     08-10    TPro  7.8  /  Alb  4.2  /  TBili  0.4  /  DBili  x   /  AST  51<H>  /  ALT  6<L>  /  AlkPhos  52  08-10    PT/INR - ( 10 Aug 2019 20:28 )   PT: 12.9 sec;   INR: 1.12 ratio         PTT - ( 10 Aug 2019 20:28 )  PTT:29.8 sec      Urinalysis Basic - ( 10 Aug 2019 20:55 )    Color: Yellow / Appearance: Clear / S.012 / pH: x  Gluc: x / Ketone: Negative  / Bili: Negative / Urobili: Negative   Blood: x / Protein: Trace / Nitrite: Negative   Leuk Esterase: Large / RBC: 1 /hpf / WBC 39 /HPF   Sq Epi: x / Non Sq Epi: 0 /hpf / Bacteria: Many        RADIOLOGY & ADDITIONAL TESTS:    Imaging Personally Reviewed: DOMONIQUE    Consultant(s) Notes Reviewed:  DOMONIQUE    Care Discussed with Consultants/Other Providers: DOMONIQUE

## 2019-08-11 NOTE — PROGRESS NOTE ADULT - PROBLEM SELECTOR PLAN 5
Patient with large stool burden on imaging, but no SBO.  - Start miralax, colace, senna.  - Will give tap water enema x1. Patient with large stool burden on imaging, but no SBO.  - c/w bowel regimen: miralax, colace, senna.  - monitor BM.

## 2019-08-11 NOTE — H&P ADULT - NSHPLABSRESULTS_GEN_ALL_CORE
CBC Full  -  ( 10 Aug 2019 20:28 )  WBC Count : 11.6 K/uL  RBC Count : 4.16 M/uL  Hemoglobin : 12.2 g/dL  Hematocrit : 37.4 %  Platelet Count - Automated : 286 K/uL  Mean Cell Volume : 89.8 fl  Mean Cell Hemoglobin : 29.2 pg  Mean Cell Hemoglobin Concentration : 32.6 gm/dL  Auto Neutrophil # : 9.5 K/uL  Auto Lymphocyte # : 1.4 K/uL  Auto Monocyte # : 0.6 K/uL  Auto Eosinophil # : 0.0 K/uL  Auto Basophil # : 0.1 K/uL  Auto Neutrophil % : 81.8 %  Auto Lymphocyte % : 12.3 %  Auto Monocyte % : 5.2 %  Auto Eosinophil % : 0.2 %  Auto Basophil % : 0.5 %    08-10    131<L>  |  88<L>  |  21  ----------------------------<  191<H>  3.3<L>   |  28  |  0.90    Ca    8.9      10 Aug 2019 23:03  Mg     1.9     08-10    TPro  7.8  /  Alb  4.2  /  TBili  0.4  /  DBili  x   /  AST  51<H>  /  ALT  6<L>  /  AlkPhos  52  08-10      IMPRESSION:  No bowel obstruction.    Large amount of stool in the proximal colon.  Gaseous distention of the   cecum to 9 cm.  Markedly redundant distal transverse colon with gaseous   distention to 8 cm.  Marked looping of the sigmoid colon without evidence   of sigmoid volvulus.    Nonspecific anterior bladder wall thickening may be related to   underdistention.  Cystitis should be excluded based on clinical symptoms   and laboratory values.      Depending groundglass opacity in both lower lobes probably reflects   atelectasis.  Pneumonia should be excluded clinically. Labs, imging and EKG personally reviewed    CBC Full  -  ( 10 Aug 2019 20:28 )  WBC Count : 11.6 K/uL  RBC Count : 4.16 M/uL  Hemoglobin : 12.2 g/dL  Hematocrit : 37.4 %  Platelet Count - Automated : 286 K/uL  Mean Cell Volume : 89.8 fl  Mean Cell Hemoglobin : 29.2 pg  Mean Cell Hemoglobin Concentration : 32.6 gm/dL  Auto Neutrophil # : 9.5 K/uL  Auto Lymphocyte # : 1.4 K/uL  Auto Monocyte # : 0.6 K/uL  Auto Eosinophil # : 0.0 K/uL  Auto Basophil # : 0.1 K/uL  Auto Neutrophil % : 81.8 %  Auto Lymphocyte % : 12.3 %  Auto Monocyte % : 5.2 %  Auto Eosinophil % : 0.2 %  Auto Basophil % : 0.5 %    08-10    131<L>  |  88<L>  |  21  ----------------------------<  191<H>  3.3<L>   |  28  |  0.90    Ca    8.9      10 Aug 2019 23:03  Mg     1.9     08-10    TPro  7.8  /  Alb  4.2  /  TBili  0.4  /  DBili  x   /  AST  51<H>  /  ALT  6<L>  /  AlkPhos  52  08-10      IMPRESSION:  No bowel obstruction.    Large amount of stool in the proximal colon.  Gaseous distention of the   cecum to 9 cm.  Markedly redundant distal transverse colon with gaseous   distention to 8 cm.  Marked looping of the sigmoid colon without evidence   of sigmoid volvulus.    Nonspecific anterior bladder wall thickening may be related to   underdistention.  Cystitis should be excluded based on clinical symptoms   and laboratory values.      Depending groundglass opacity in both lower lobes probably reflects   atelectasis.  Pneumonia should be excluded clinically.

## 2019-08-11 NOTE — H&P ADULT - PROBLEM SELECTOR PLAN 8
DVT ppx: HSQ  Regular diet SSI and FS q6hrs.  - Patient in not on home anti-hyperglycemic medications.

## 2019-08-11 NOTE — PROGRESS NOTE ADULT - PROBLEM SELECTOR PLAN 7
Patient with CHF per records, but no TTE on file. Currently euvolemic.  - Will f/u TTE to eval valvular heart dz and EF Patient with CHF per records, but no TTE on file. Currently euvolemic.  - fluid assessment daily. will need strict I/O and daily weight if becomes hypervolemic.  - Ordered TTE.  - continue to monitor VS.

## 2019-08-11 NOTE — CONSULT NOTE ADULT - ASSESSMENT
85 F with GERD, HTN, HLD, DM2 with neuropathy, Parkinson's, anxiety/depression, AS s/p TAVR on plavix, overactive bladder, ?CHF (on lasix 20 BID), PMR on Prednisone. Patient's son notes she had R sided headache for 1 week. ESR last week was wnl and prednisone dose was further decreased. This week, ESR was elevated again. Patient's PCP was c/f [facility] and prednisone dose was increased to 10mg qD. Patient states headache is sharp and stabbing, lasts 5 minutes and has improved since increasing prednisone. Patient has decreased vision on R eye d/t macular degeneration. No further worsening in vision. Patient was febrile at home prior to coming and also in ED. She had temporal artery bx in 04/2019, which was negative for [facility]. Neurology consulted to R/O [facility]. Ultimately this cannot be definitely ruled out by physical exam alone and another substantial temporal artery biopsy would be needed to do so. However given patients other myriad of symptoms and constellations (fever, source of infection, metabolic derangement) and in light of prior negative work up, other diagnoses may be worth pursuing with greater interest. Patient appears to currently be encephalopathic and unable to engage fully in neurologic exam.       Plan:    Continue work up for infectious/metabolic/toxic insult

## 2019-08-11 NOTE — H&P ADULT - PROBLEM SELECTOR PLAN 5
Patient with large stool burden on imaging, but no SBO.  - Start miralax, colace, senna.  - Consider enema. Patient with large stool burden on imaging, but no SBO.  - Start miralax, colace, senna.  - Will give tap water enema x1.

## 2019-08-11 NOTE — CONSULT NOTE ADULT - SUBJECTIVE AND OBJECTIVE BOX
HPI:  85 F with GERD, HTN, HLD, DM2 with neuropathy, Parkinson's, anxiety/depression, AS s/p TAVR on plavix, overactive bladder, ?CHF (on lasix 20 BID), PMR on Prednisone, ?COPD (dx with PNA at [facility] vague hx of being told she has COPD given breathing treatments but then stopped taking them), presenting with multiple medical complaints, but especially LUQ and flank pain since yesterday AM. Per son, patient has had multiple UTIs. She had dysuria 2 weeks ago, was empirically treated with macrobid (8 days ago). Patient was febrile at home prior to coming and also in ED. Patient later denied abdominal pain and stated she came to ED for SOB. She's not on oxygen at home, but now requiring 1.5 L NC.  Patient's son notes she had R sided headache for 1 week. ESR last week was wnl and prednisone dose was further decreased. This week, ESR was elevated again. Patient's PCP was c/f GCA and prednisone dose was increased to 10mg qD. Patient states headache is sharp and stabbing, lasts 5 minutes and has improved since increasing prednisone. Patient has decreased vision on R eye d/t macular degeneration. No further worsening in vision. She had temporal artery bx in 2019, which was negative for GCA. Neurology consulted to R/O GCA    A 10-system ROS was performed and is negative except for those items noted above and/or in the HPI.    PAST MEDICAL & SURGICAL HISTORY:  Insomnia  Iron deficiency anemia  Anxiety  Parkinson disease  Aortic stenosis  Neuropathy  Polymyalgia rheumatica  Diabetes  Hyperlipidemia  Hypertension  S/P TAVR (transcatheter aortic valve replacement)    FAMILY HISTORY:  No pertinent family history in first degree relatives    MEDICATIONS (HOME):  Home Medications:  atorvastatin 40 mg oral tablet: 1 tab(s) orally 2 times a week (11 Aug 2019 08:27)  balsalazide 750 mg oral capsule: 1 cap(s) orally 2 times a day (11 Aug 2019 08:27)  clopidogrel 75 mg oral tablet: 1 tab(s) orally once a day (11 Aug 2019 08:27)  DULoxetine 60 mg oral delayed release capsule: 1 cap(s) orally once a day (11 Aug 2019 08:27)  gabapentin 300 mg oral tablet: 2 tab(s) orally 3 times a day (11 Aug 2019 08:27)  Lasix 20 mg oral tablet: 1 cap(s) orally 2 times a day (11 Aug 2019 08:27)  Lopressor: 25 milligram(s) orally 2 times a day (11 Aug 2019 08:27)  losartan 50 mg oral tablet: 1 tab(s) orally once a day (11 Aug 2019 08:27)  pantoprazole 20 mg oral delayed release tablet: 1 tab(s) orally 2 times a day (11 Aug 2019 08:27)  predniSONE 5 mg oral tablet: 2 tab(s) orally once a day (11 Aug 2019 08:27)  Sinemet 25 mg-100 mg oral tablet: 2 tab(s) orally 3 times a day (11 Aug 2019 08:27)  Xanax 0.5 mg oral tablet: 1 tab(s) orally 2 times a day, As Needed (11 Aug 2019 08:)    MEDICATIONS  (STANDING):  azithromycin  IVPB 500 milliGRAM(s) IV Intermittent every 24 hours  carbidopa/levodopa  25/100 2 Tablet(s) Oral three times a day  cefTRIAXone   IVPB 1000 milliGRAM(s) IV Intermittent every 24 hours  clopidogrel Tablet 75 milliGRAM(s) Oral daily  dextrose 5%. 1000 milliLiter(s) (50 mL/Hr) IV Continuous <Continuous>  dextrose 50% Injectable 12.5 Gram(s) IV Push once  dextrose 50% Injectable 25 Gram(s) IV Push once  dextrose 50% Injectable 25 Gram(s) IV Push once  docusate sodium 100 milliGRAM(s) Oral daily  DULoxetine 60 milliGRAM(s) Oral daily  gabapentin 600 milliGRAM(s) Oral three times a day  heparin  Injectable 5000 Unit(s) SubCutaneous every 12 hours  insulin lispro (HumaLOG) corrective regimen sliding scale   SubCutaneous three times a day before meals  insulin lispro (HumaLOG) corrective regimen sliding scale   SubCutaneous at bedtime  losartan 50 milliGRAM(s) Oral daily  pantoprazole    Tablet 40 milliGRAM(s) Oral before breakfast  polyethylene glycol 3350 17 Gram(s) Oral daily  predniSONE   Tablet 10 milliGRAM(s) Oral daily  senna 2 Tablet(s) Oral at bedtime    MEDICATIONS  (PRN):  ALPRAZolam 0.5 milliGRAM(s) Oral two times a day PRN anxiety  dextrose 40% Gel 15 Gram(s) Oral once PRN Blood Glucose LESS THAN 70 milliGRAM(s)/deciliter  glucagon  Injectable 1 milliGRAM(s) IntraMuscular once PRN Glucose LESS THAN 70 milligrams/deciliter    ALLERGIES/INTOLERANCES:  Allergies  penicillin (Unknown)    Intolerances    VITALS & EXAMINATION:  Vital Signs Last 24 Hrs  T(C): 37.9 (11 Aug 2019 08:26), Max: 38.6 (10 Aug 2019 18:50)  T(F): 100.3 (11 Aug 2019 08:26), Max: 101.4 (10 Aug 2019 18:50)  HR: 92 (11 Aug 2019 08:) (68 - 98)  BP: 161/86 (11 Aug 2019 08:26) (112/68 - 161/86)  BP(mean): --  RR: 18 (11 Aug 2019 08:) (18 - 18)  SpO2: 99% (11 Aug 2019 08:) (85% - 100%)    General:  Constitutional: Obese Female, appears stated age, sleeping at the time of the visit  Head: Normocephalic & atraumatic   Neurological (>12):  MS: arousable to heavy stimuli, somnolent  CNs: PERRLA (R = 3mm, L = 3mm). VFF. EOMI, possible disconjugate gaze, blink to threat intact bilaterally, V1-3 intact to LT/pinprick, well developed masseter muscles b/l. No facial asymmetry b/l, full eye closure strength b/l. Hearing grossly normal (rubbing fingers) b/l. Symmetric palate elevation in midline. Gag reflex deferred. Head turning & shoulder shrug intact b/l. Tongue midline, normal movements, no atrophy.  Motor: unable to participate given patients condition   Cortical: Extinction on DSS (neglect): none  Reflexes:              Biceps(C5)       BR(C6)     Triceps(C7)               Patellar(L4)    Achilles(S1)    Plantar Resp  R	2	          2	             2		        2		    2		UP  L	2	          2	             2		        2		    2		Down       LABORATORY:  CBC                       12.2   11.6  )-----------( 286      ( 10 Aug 2019 20:28 )             37.4     Chem 08-10    131<L>  |  88<L>  |  21  ----------------------------<  191<H>  3.3<L>   |  28  |  0.90    Ca    8.9      10 Aug 2019 23:03  Mg     1.9     08-10    TPro  7.8  /  Alb  4.2  /  TBili  0.4  /  DBili  x   /  AST  51<H>  /  ALT  6<L>  /  AlkPhos  52  08-10    LFTs LIVER FUNCTIONS - ( 10 Aug 2019 20:28 )  Alb: 4.2 g/dL / Pro: 7.8 g/dL / ALK PHOS: 52 U/L / ALT: 6 U/L / AST: 51 U/L / GGT: x           Coagulopathy PT/INR - ( 10 Aug 2019 20:28 )   PT: 12.9 sec;   INR: 1.12 ratio         PTT - ( 10 Aug 2019 20:28 )  PTT:29.8 sec  Lipid Panel   A1c   Cardiac enzymes     U/A Urinalysis Basic - ( 10 Aug 2019 20:55 )    Color: Yellow / Appearance: Clear / S.012 / pH: x  Gluc: x / Ketone: Negative  / Bili: Negative / Urobili: Negative   Blood: x / Protein: Trace / Nitrite: Negative   Leuk Esterase: Large / RBC: 1 /hpf / WBC 39 /HPF   Sq Epi: x / Non Sq Epi: 0 /hpf / Bacteria: Many      Radiology (XR, CT, MR, U/S, TTE/JEANNETTE):  < from: CT Head No Cont (08.10.19 @ 22:15) >  IMPRESSION: No CT evidence of acute intracranial hemorrhage, mass effect   or acute territorial infarct.  Follow-up MRI may be obtained as   clinically warranted..     < end of copied text > HPI:  85 F with GERD, HTN, HLD, DM2 with neuropathy, Parkinson's, anxiety/depression, AS s/p TAVR on plavix, overactive bladder, ?CHF (on lasix 20 BID), PMR on Prednisone, ?COPD (dx with PNA at [facility] vague hx of being told she has COPD given breathing treatments but then stopped taking them), presenting with multiple medical complaints, but especially LUQ and flank pain since yesterday AM. Per son, patient has had multiple UTIs. She had dysuria 2 weeks ago, was empirically treated with macrobid (8 days ago). Patient was febrile at home prior to coming and also in ED. Patient later denied abdominal pain and stated she came to ED for SOB. She's not on oxygen at home, but now requiring 1.5 L NC.  Patient's son notes she had R sided headache for 1 week. ESR last week was wnl and prednisone dose was further decreased. This week, ESR was elevated again. Patient's PCP was c/f GCA and prednisone dose was increased to 10mg qD. Patient states headache is sharp and stabbing, lasts 5 minutes and has improved since increasing prednisone. Patient has decreased vision on R eye d/t macular degeneration. No further worsening in vision. She had temporal artery bx in 2019, which was negative for GCA. Neurology consulted to R/O GCA    A 10-system ROS was performed and is negative except for those items noted above and/or in the HPI.    PAST MEDICAL & SURGICAL HISTORY:  Insomnia  Iron deficiency anemia  Anxiety  Parkinson disease  Aortic stenosis  Neuropathy  Polymyalgia rheumatica  Diabetes  Hyperlipidemia  Hypertension  S/P TAVR (transcatheter aortic valve replacement)    FAMILY HISTORY:  No pertinent family history in first degree relatives    MEDICATIONS (HOME):  Home Medications:  atorvastatin 40 mg oral tablet: 1 tab(s) orally 2 times a week (11 Aug 2019 08:27)  balsalazide 750 mg oral capsule: 1 cap(s) orally 2 times a day (11 Aug 2019 08:27)  clopidogrel 75 mg oral tablet: 1 tab(s) orally once a day (11 Aug 2019 08:27)  DULoxetine 60 mg oral delayed release capsule: 1 cap(s) orally once a day (11 Aug 2019 08:27)  gabapentin 300 mg oral tablet: 2 tab(s) orally 3 times a day (11 Aug 2019 08:27)  Lasix 20 mg oral tablet: 1 cap(s) orally 2 times a day (11 Aug 2019 08:27)  Lopressor: 25 milligram(s) orally 2 times a day (11 Aug 2019 08:27)  losartan 50 mg oral tablet: 1 tab(s) orally once a day (11 Aug 2019 08:27)  pantoprazole 20 mg oral delayed release tablet: 1 tab(s) orally 2 times a day (11 Aug 2019 08:27)  predniSONE 5 mg oral tablet: 2 tab(s) orally once a day (11 Aug 2019 08:27)  Sinemet 25 mg-100 mg oral tablet: 2 tab(s) orally 3 times a day (11 Aug 2019 08:27)  Xanax 0.5 mg oral tablet: 1 tab(s) orally 2 times a day, As Needed (11 Aug 2019 08:)    MEDICATIONS  (STANDING):  azithromycin  IVPB 500 milliGRAM(s) IV Intermittent every 24 hours  carbidopa/levodopa  25/100 2 Tablet(s) Oral three times a day  cefTRIAXone   IVPB 1000 milliGRAM(s) IV Intermittent every 24 hours  clopidogrel Tablet 75 milliGRAM(s) Oral daily  dextrose 5%. 1000 milliLiter(s) (50 mL/Hr) IV Continuous <Continuous>  dextrose 50% Injectable 12.5 Gram(s) IV Push once  dextrose 50% Injectable 25 Gram(s) IV Push once  dextrose 50% Injectable 25 Gram(s) IV Push once  docusate sodium 100 milliGRAM(s) Oral daily  DULoxetine 60 milliGRAM(s) Oral daily  gabapentin 600 milliGRAM(s) Oral three times a day  heparin  Injectable 5000 Unit(s) SubCutaneous every 12 hours  insulin lispro (HumaLOG) corrective regimen sliding scale   SubCutaneous three times a day before meals  insulin lispro (HumaLOG) corrective regimen sliding scale   SubCutaneous at bedtime  losartan 50 milliGRAM(s) Oral daily  pantoprazole    Tablet 40 milliGRAM(s) Oral before breakfast  polyethylene glycol 3350 17 Gram(s) Oral daily  predniSONE   Tablet 10 milliGRAM(s) Oral daily  senna 2 Tablet(s) Oral at bedtime    MEDICATIONS  (PRN):  ALPRAZolam 0.5 milliGRAM(s) Oral two times a day PRN anxiety  dextrose 40% Gel 15 Gram(s) Oral once PRN Blood Glucose LESS THAN 70 milliGRAM(s)/deciliter  glucagon  Injectable 1 milliGRAM(s) IntraMuscular once PRN Glucose LESS THAN 70 milligrams/deciliter    ALLERGIES/INTOLERANCES:  Allergies  penicillin (Unknown)    Intolerances    VITALS & EXAMINATION:  Vital Signs Last 24 Hrs  T(C): 37.9 (11 Aug 2019 08:26), Max: 38.6 (10 Aug 2019 18:50)  T(F): 100.3 (11 Aug 2019 08:26), Max: 101.4 (10 Aug 2019 18:50)  HR: 92 (11 Aug 2019 08:) (68 - 98)  BP: 161/86 (11 Aug 2019 08:26) (112/68 - 161/86)  BP(mean): --  RR: 18 (11 Aug 2019 08:) (18 - 18)  SpO2: 99% (11 Aug 2019 08:) (85% - 100%)    General:  Constitutional: Obese Female, appears stated age, sleeping at the time of the visit  Head: Normocephalic & atraumatic Temporal pulses: trace b/l  Neurological (>12):  MS: arousable to heavy stimuli, somnolent  CNs: PERRLA (R = 3mm, L = 3mm). VFF. EOMI, possible disconjugate gaze, blink to threat intact bilaterally, V1-3 intact to LT/pinprick, well developed masseter muscles b/l. No facial asymmetry b/l, full eye closure strength b/l. Hearing grossly normal (rubbing fingers) b/l. Symmetric palate elevation in midline. Gag reflex deferred. Head turning & shoulder shrug intact b/l. Tongue midline, normal movements, no atrophy.  Motor: unable to participate given patients condition   Cortical: Extinction on DSS (neglect): none  Reflexes:              Biceps(C5)       BR(C6)     Triceps(C7)               Patellar(L4)    Achilles(S1)    Plantar Resp  R	2	          2	             2		        2		    2		UP  L	2	          2	             2		        2		    2		Down       LABORATORY:  CBC                       12.2   11.6  )-----------( 286      ( 10 Aug 2019 20:28 )             37.4     Chem 08-10    131<L>  |  88<L>  |  21  ----------------------------<  191<H>  3.3<L>   |  28  |  0.90    Ca    8.9      10 Aug 2019 23:03  Mg     1.9     08-10    TPro  7.8  /  Alb  4.2  /  TBili  0.4  /  DBili  x   /  AST  51<H>  /  ALT  6<L>  /  AlkPhos  52  08-10    LFTs LIVER FUNCTIONS - ( 10 Aug 2019 20:28 )  Alb: 4.2 g/dL / Pro: 7.8 g/dL / ALK PHOS: 52 U/L / ALT: 6 U/L / AST: 51 U/L / GGT: x           Coagulopathy PT/INR - ( 10 Aug 2019 20:28 )   PT: 12.9 sec;   INR: 1.12 ratio         PTT - ( 10 Aug 2019 20:28 )  PTT:29.8 sec  Lipid Panel   A1c   Cardiac enzymes     U/A Urinalysis Basic - ( 10 Aug 2019 20:55 )    Color: Yellow / Appearance: Clear / S.012 / pH: x  Gluc: x / Ketone: Negative  / Bili: Negative / Urobili: Negative   Blood: x / Protein: Trace / Nitrite: Negative   Leuk Esterase: Large / RBC: 1 /hpf / WBC 39 /HPF   Sq Epi: x / Non Sq Epi: 0 /hpf / Bacteria: Many      Radiology (XR, CT, MR, U/S, TTE/JEANNETTE):  < from: CT Head No Cont (08.10.19 @ 22:15) >  IMPRESSION: No CT evidence of acute intracranial hemorrhage, mass effect   or acute territorial infarct.  Follow-up MRI may be obtained as   clinically warranted..     < end of copied text >

## 2019-08-11 NOTE — PROGRESS NOTE ADULT - PROBLEM SELECTOR PLAN 3
- Will continue duonebs prn.  - on Prednisone chronically for PMR, will need stress dose if hypotensive Hx of COPD not on any medication at home.   - c/w duonebs prn for bronchospasm/wheezing.  - c/w home PO prednisone (on Prednisone chronically for PMR, will need stress dose if hypotensive)  - patient with mild elevation of pCO2 on VBG. May be chronic CO2 retainer. Will continue to monitor. If altered, will need ABG stat to assess for hypercapnia.  - continue to monitor VS

## 2019-08-11 NOTE — ED ADULT NURSE REASSESSMENT NOTE - NS ED NURSE REASSESS COMMENT FT1
Pt resting comfortably in bed in no apparent distress. VSS. Pt reporting pain relief at this time. pt appears to be comfortable. Report given to RICH Smith. Pt awaiting admission. safety maintained. call bell in reach

## 2019-08-11 NOTE — PROGRESS NOTE ADULT - ASSESSMENT
Mrs. Mae is a 84 y/o lady with PMH of GERD, HTN, HLD, DM2 with neuropathy, Parkinson's, anxiety/depression, AS s/p TAVR on plavix, overactive bladder, ?CHF, PMR, COPD, who is admitted for hypoxic/hypercarbic respiratory failure in setting of sepsis, possibly COPD exacerbation in setting of URI/PNA. Course c/b UTI and acute metabolic encephalopathy.

## 2019-08-11 NOTE — PROGRESS NOTE ADULT - ATTENDING COMMENTS
acute toxic metabolic encephalopathy in setting of polypharmacy, UTI.  suggest lower gabapentin dose until AMS resolved, if ok with neuro  there is no confirmed evidence of pneumonia and CT only shows atelectasis.  SLP eval, aspiration precautions.  check labs including ammonia level.  continue rocephin; f/up cultures   If continues to spike fever, consider broader abx coverage and ID consult.  patient is poor historian; get collateral info from family.    Rocky Pablo MD, MHA, FACP, [facility]  Pager: [phone]

## 2019-08-11 NOTE — CONSULT NOTE ADULT - ATTENDING COMMENTS
Patient with PD and multiple medical problems who was admitted for SOB, fever and abdominal pain. She was on prednisone for ?GCA with a reported negative right temporal artery biopsy. She now has bitemporal HA when she coughs. Patient with PD and multiple medical problems who was admitted for SOB, fever and abdominal pain. She was on prednisone for ?GCA with reportedly negative right temporal artery biopsy. She now has bitemporal HA when she coughs and has a PNA and UTI requiring IVABs. Her ESR is 54. In the setting of her medical problems and active infection, GCA evaluation is limited. She will need swallow evaluation as she admits to having trouble swallowing at home. Would continue her sinemet regimen and avoid using dopamine blocking agents during hospitalization.

## 2019-08-11 NOTE — H&P ADULT - ASSESSMENT
85 F with GERD, HTN, HLD, DM2 with neuropathy, Parkinson's, anxiety/depression, AS s/p TAVR on plavix, overactive bladder, ?CHF (on lasix 20 BID), PMR on Prednisone, ?COPD (dx with PNA at [facility] vague hx of being told she has COPD given breathing treatments but then stopped taking them), presenting with multiple complaint, notable for SOB and abdominal pain, admitted for acute hypoxic and hypercarbic respiratory in setting of pneumonia vs COPD exacerbation, course c/b UTI. 85 F with GERD, HTN, HLD, DM2 with neuropathy, Parkinson's, anxiety/depression, AS s/p TAVR on plavix, overactive bladder, ?CHF, PMR, COPD p/w SOB and abdominal pain, admitted for acute hypoxic and hypercarbic respiratory in setting of pneumonia and COPD exacerbation, course c/b UTI and acute metabolic encephalopathy.

## 2019-08-11 NOTE — H&P ADULT - PROBLEM SELECTOR PLAN 7
SSI and FS q6hrs. Patient with CHF per records, but no TTE on file. Currently euvolemic.  - Will f/u TTE. Patient with CHF per records, but no TTE on file. Currently euvolemic.  - Will f/u TTE to eval valvular heart dz and EF

## 2019-08-11 NOTE — H&P ADULT - PROBLEM SELECTOR PLAN 4
BP is currently well controlled.  - Continue home medications (will verify with pharmacy) BP is currently well controlled.  - Will restart losartan  - Hold lasix and BB. BP is currently well controlled.  - Will restart losartan  - Hold lasix and BB in the setting of sepsis

## 2019-08-11 NOTE — H&P ADULT - PROBLEM SELECTOR PLAN 3
- Will continue duonebs prn. - Will continue duonebs prn.  - on Prednisone chronically for PMR, will need stress dose if hypotensive

## 2019-08-11 NOTE — H&P ADULT - RS GEN PE MLT RESP DETAILS PC
airway patent/diffuse ronchi/rhonchi/no rales/respirations non-labored/no wheezes diffuse rhonchi/airway patent/respirations non-labored/no wheezes/no rales/rhonchi

## 2019-08-11 NOTE — ED ADULT NURSE REASSESSMENT NOTE - NS ED NURSE REASSESS COMMENT FT1
Report received from RICH Carter. Upon reassessment pt resting quietly on stretcher with 2L of O2 via NC. Pt in no respiratory distress. Safety and comfort measures maintained.

## 2019-08-11 NOTE — H&P ADULT - PROBLEM SELECTOR PLAN 6
UA was positive and patient reports abdominal pain. Will start CTX. UA was positive and abdominal pain, difficult ROS due to encephalopathy  - already on CTX (for CAP)

## 2019-08-11 NOTE — H&P ADULT - PROBLEM SELECTOR PLAN 2
Patient is hypoxic with leukocytosis.  - Did not get any IVF in ED.  - Will hold home lasix for 1 day. Restart lasix on 8/12.  - continue ceftriaxone and azithro. Patient is hypoxic with leukocytosis.  - Did not get any IVF in ED.  - Hx CHF (unclear type), instead of IVF, will hold home lasix for 1 day. Restart lasix on 8/12.  - will treat with ceftriaxone and azithro.

## 2019-08-11 NOTE — PROGRESS NOTE ADULT - PROBLEM SELECTOR PLAN 1
with acute hypoxic and hypercarbic respiratory failure.  - Will treat with CTX and azithromycin for CAP.  - Continue NC for now, taper off when possible, BIPAP prn for hypercarbia  - F/u RVP with acute hypoxic and hypercarbic respiratory failure.  - CT Abdomen showing b/l GGO in lower lung fields.  - c/w CTX and azithromycin for CAP (considering 5 day course, will observe clinical improvement).  - Supp O2 PRN, taper off when possible.  - Given hypercarbia on VBG, may need BIPAP. If patinet becomes lethargic, will get ABG stat and call respiratory for BiPAP.  - F/u RVP and Blood culture. with acute hypoxic and hypercarbic respiratory failure.  - CT Abdomen showing b/l GGO in lower lung fields.  - c/w CTX and azithromycin for CAP (considering 5 day course, will observe clinical improvement).  - Supp O2 PRN, taper off when possible.  - Given hypercarbia on VBG, may need BIPAP. If patinet becomes lethargic, will get ABG stat and call respiratory for BiPAP.  - F/u RVP and Blood culture.  - if continues to spike fevers despite RVP negative and ABx, will broaden the ABx and consider consulting ID.

## 2019-08-11 NOTE — H&P ADULT - NEUROLOGICAL DETAILS
responds to verbal commands/normal strength/A+Ox2 responds to verbal commands/A+Ox2 to person and place, moves all extremities

## 2019-08-11 NOTE — PROGRESS NOTE ADULT - PROBLEM SELECTOR PLAN 2
Patient is hypoxic with leukocytosis.  - Did not get any IVF in ED.  - Hx CHF (unclear type), instead of IVF, will hold home lasix for 1 day. Restart lasix on 8/12.  - will treat with ceftriaxone and azithro. Patient w/ fever and leukocytosis w/ left shift, hypoxic respiratory failure, possibly 2/2 PNA and/or UTI.  - UA positive. RVP, BCx, and UCx pending.   - c/w ABx as above  - Hx of CHF (unknown EF), holding home lasix. No IVF was given.   - will acquire TTE for EF assessment.  - monitor VS

## 2019-08-11 NOTE — H&P ADULT - HISTORY OF PRESENT ILLNESS
85 F with GERD, HTN, HLD, DM2 with neuropathy, Parkinson's, anxiety/depression, AS s/p TAVR on plavix, overactive bladder, ?CHF (on lasix 20 BID), PMR on Prednisone, ?COPD (dx with PNA at [facility] vague hx of being told she has COPD given breathing treatments but then stopped taking them), poor historian with son at bedside who is also poor historian presents c/o multiple medical complaints cc of LUQ/L flank pain since this morning. Pt has had frequent UTIs as per son for the past year, had sxs 2 weeks ago and was empirically treated with macrobid (finished 8 days ago), son notes pt had fever this morning and maybe last night, pt does not answer when questioned about urinary sxs. Denies h/o kidney stone and pyelo.  	    Pt and son also notes R sided HA x 1 week, began as an auricular HA but now is R temporoparietal. Patient's son notes she had normal ESR last week and prednisone dose was further decreased, however this week went for a f/u and was told ESR was elevated again, PCP was concerned for GCA so pt instructed to take prednisone 10mg QD. HA is sharp and stabbing every 5 minutes as per son, has improved since increasing the prednisone. Pt states she has macular degeneration in the R eye and chronically has decreased vision OD, no change in baseline. Of notes pt had similar HA in the past although less severe and had temporal artery bx in 04/2019 which was negative for GCA.  	    Additionally pt has had increased reflux/ "regurgitation" as per son, wet sounding non-productive cough, and sore throat x 1 week. Was seen by PCP for sore throat and told to take lozenges, no abx prescribed. No breathing treatments. Pt denies CP. Pt also notes ?bloody stool, (will alternate confirming or denying when asked). 85 F with GERD, HTN, HLD, DM2 with neuropathy, Parkinson's, anxiety/depression, AS s/p TAVR on plavix, overactive bladder, ?CHF (on lasix 20 BID), PMR on Prednisone, ?COPD (dx with PNA at [facility] vague hx of being told she has COPD given breathing treatments but then stopped taking them), presenting with multiple medical complaints, but especially LUQ and flank pain since yesterday AM. Per son, patient has had multiple UTIs. She had dysuria 2 weeks ago, was empirically treated with macrobid (8 days ago). Patient was febrile at home prior to coming and also in ED. Patient later denied abdominal pain and stated she came to ED for SOB. She's not on oxygen at home, but now requiring 1.5 L NC.  	Patient's son notes she had R sided headache for 1 week. ESR last week was wnl and prednisone dose was further decreased. This week, ESR was elevated again. Patient's PCP was c/f GCA and prednisone dose was increased to 10mg qD. Patient states headache is sharp and stabbing, lasts 5 minutes and has improved since increasing prednisone. Patient has decreased vision on R eye d/t macular degeneration. No further worsening in vision. She had temporal artery bx in 04/2019, which was negative for GCA.  Patient also reports increased non-productive cough. Patient intermittently reports blood in stools to other providers, but recently denied it.  Vitals in ED were: febrile to 101.4F, HR 60-90s, /60s-150/80s, satting 100% on 1.5 L. VBG showed normal pH and pCO2 of 58. CXR shows concern for pna, and UA is positive for leuk esterase. Patient was given albuterol, azithromycin and CTX in the ED.  EKG showed LBBB.

## 2019-08-11 NOTE — H&P ADULT - PROBLEM SELECTOR PLAN 1
Patient with acute hypoxic and hypercarbic respiratory failure.  - Will continue CTX and azithromycin for CAP.  - Continue NC for now, taper off when possible. Patient with acute hypoxic and hypercarbic respiratory failure.  - Will continue CTX and azithromycin for CAP.  - Continue NC for now, taper off when possible.  - F/u RVP with acute hypoxic and hypercarbic respiratory failure.  - Will treat with CTX and azithromycin for CAP.  - Continue NC for now, taper off when possible, BIPAP prn for hypercarbia  - F/u RVP

## 2019-08-11 NOTE — PROGRESS NOTE ADULT - PROBLEM SELECTOR PLAN 8
SSI and FS q6hrs.  - Patient in not on home anti-hyperglycemic medications. SSI and FS qHS/qAC.  - Patient in not on home anti-hyperglycemic medications.  - will acquire A1c. If A1c is < 7.5, will discontinue routine FS for patient's comfort. Will monitor BG on BMP and resume FS checks once needed.

## 2019-08-12 DIAGNOSIS — R41.0 DISORIENTATION, UNSPECIFIED: ICD-10-CM

## 2019-08-12 LAB
-  AMIKACIN: SIGNIFICANT CHANGE UP
-  AMPICILLIN/SULBACTAM: SIGNIFICANT CHANGE UP
-  AMPICILLIN: SIGNIFICANT CHANGE UP
-  AZTREONAM: SIGNIFICANT CHANGE UP
-  CEFAZOLIN: SIGNIFICANT CHANGE UP
-  CEFEPIME: SIGNIFICANT CHANGE UP
-  CEFOXITIN: SIGNIFICANT CHANGE UP
-  CEFTRIAXONE: SIGNIFICANT CHANGE UP
-  CIPROFLOXACIN: SIGNIFICANT CHANGE UP
-  GENTAMICIN: SIGNIFICANT CHANGE UP
-  IMIPENEM: SIGNIFICANT CHANGE UP
-  LEVOFLOXACIN: SIGNIFICANT CHANGE UP
-  MEROPENEM: SIGNIFICANT CHANGE UP
-  NITROFURANTOIN: SIGNIFICANT CHANGE UP
-  PIPERACILLIN/TAZOBACTAM: SIGNIFICANT CHANGE UP
-  TIGECYCLINE: SIGNIFICANT CHANGE UP
-  TOBRAMYCIN: SIGNIFICANT CHANGE UP
-  TRIMETHOPRIM/SULFAMETHOXAZOLE: SIGNIFICANT CHANGE UP
ALBUMIN SERPL ELPH-MCNC: 4.1 G/DL — SIGNIFICANT CHANGE UP (ref 3.3–5)
ALP SERPL-CCNC: 53 U/L — SIGNIFICANT CHANGE UP (ref 40–120)
ALT FLD-CCNC: <5 U/L — LOW (ref 10–45)
ANION GAP SERPL CALC-SCNC: 18 MMOL/L — HIGH (ref 5–17)
AST SERPL-CCNC: 15 U/L — SIGNIFICANT CHANGE UP (ref 10–40)
BILIRUB SERPL-MCNC: 0.7 MG/DL — SIGNIFICANT CHANGE UP (ref 0.2–1.2)
BUN SERPL-MCNC: 18 MG/DL — SIGNIFICANT CHANGE UP (ref 7–23)
CALCIUM SERPL-MCNC: 10 MG/DL — SIGNIFICANT CHANGE UP (ref 8.4–10.5)
CHLORIDE SERPL-SCNC: 86 MMOL/L — LOW (ref 96–108)
CO2 SERPL-SCNC: 27 MMOL/L — SIGNIFICANT CHANGE UP (ref 22–31)
CREAT SERPL-MCNC: 0.87 MG/DL — SIGNIFICANT CHANGE UP (ref 0.5–1.3)
CULTURE RESULTS: SIGNIFICANT CHANGE UP
GAS PNL BLDV: SIGNIFICANT CHANGE UP
GLUCOSE SERPL-MCNC: 111 MG/DL — HIGH (ref 70–99)
HBA1C BLD-MCNC: 6 % — HIGH (ref 4–5.6)
HCT VFR BLD CALC: 38.6 % — SIGNIFICANT CHANGE UP (ref 34.5–45)
HGB BLD-MCNC: 12.5 G/DL — SIGNIFICANT CHANGE UP (ref 11.5–15.5)
MAGNESIUM SERPL-MCNC: 2.1 MG/DL — SIGNIFICANT CHANGE UP (ref 1.6–2.6)
MCHC RBC-ENTMCNC: 29.1 PG — SIGNIFICANT CHANGE UP (ref 27–34)
MCHC RBC-ENTMCNC: 32.4 GM/DL — SIGNIFICANT CHANGE UP (ref 32–36)
MCV RBC AUTO: 89.9 FL — SIGNIFICANT CHANGE UP (ref 80–100)
METHOD TYPE: SIGNIFICANT CHANGE UP
ORGANISM # SPEC MICROSCOPIC CNT: SIGNIFICANT CHANGE UP
ORGANISM # SPEC MICROSCOPIC CNT: SIGNIFICANT CHANGE UP
PHOSPHATE SERPL-MCNC: 3.7 MG/DL — SIGNIFICANT CHANGE UP (ref 2.5–4.5)
PLATELET # BLD AUTO: 296 K/UL — SIGNIFICANT CHANGE UP (ref 150–400)
POTASSIUM SERPL-MCNC: 3.3 MMOL/L — LOW (ref 3.5–5.3)
POTASSIUM SERPL-SCNC: 3.3 MMOL/L — LOW (ref 3.5–5.3)
PROT SERPL-MCNC: 7.6 G/DL — SIGNIFICANT CHANGE UP (ref 6–8.3)
RBC # BLD: 4.29 M/UL — SIGNIFICANT CHANGE UP (ref 3.8–5.2)
RBC # FLD: 14 % — SIGNIFICANT CHANGE UP (ref 10.3–14.5)
SODIUM SERPL-SCNC: 131 MMOL/L — LOW (ref 135–145)
SPECIMEN SOURCE: SIGNIFICANT CHANGE UP
WBC # BLD: 17.2 K/UL — HIGH (ref 3.8–10.5)
WBC # FLD AUTO: 17.2 K/UL — HIGH (ref 3.8–10.5)

## 2019-08-12 PROCEDURE — 93306 TTE W/DOPPLER COMPLETE: CPT | Mod: 26

## 2019-08-12 PROCEDURE — 99233 SBSQ HOSP IP/OBS HIGH 50: CPT | Mod: GC

## 2019-08-12 RX ORDER — HYDRALAZINE HCL 50 MG
25 TABLET ORAL ONCE
Refills: 0 | Status: DISCONTINUED | OUTPATIENT
Start: 2019-08-12 | End: 2019-08-12

## 2019-08-12 RX ORDER — POTASSIUM CHLORIDE 20 MEQ
20 PACKET (EA) ORAL ONCE
Refills: 0 | Status: COMPLETED | OUTPATIENT
Start: 2019-08-12 | End: 2019-08-12

## 2019-08-12 RX ORDER — LANOLIN ALCOHOL/MO/W.PET/CERES
5 CREAM (GRAM) TOPICAL AT BEDTIME
Refills: 0 | Status: DISCONTINUED | OUTPATIENT
Start: 2019-08-12 | End: 2019-08-16

## 2019-08-12 RX ORDER — HYDRALAZINE HCL 50 MG
20 TABLET ORAL ONCE
Refills: 0 | Status: COMPLETED | OUTPATIENT
Start: 2019-08-12 | End: 2019-08-12

## 2019-08-12 RX ORDER — POTASSIUM CHLORIDE 20 MEQ
40 PACKET (EA) ORAL ONCE
Refills: 0 | Status: COMPLETED | OUTPATIENT
Start: 2019-08-12 | End: 2019-08-12

## 2019-08-12 RX ADMIN — CEFTRIAXONE 100 MILLIGRAM(S): 500 INJECTION, POWDER, FOR SOLUTION INTRAMUSCULAR; INTRAVENOUS at 23:45

## 2019-08-12 RX ADMIN — CARBIDOPA AND LEVODOPA 2 TABLET(S): 25; 100 TABLET ORAL at 23:34

## 2019-08-12 RX ADMIN — SENNA PLUS 2 TABLET(S): 8.6 TABLET ORAL at 00:11

## 2019-08-12 RX ADMIN — CLOPIDOGREL BISULFATE 75 MILLIGRAM(S): 75 TABLET, FILM COATED ORAL at 12:45

## 2019-08-12 RX ADMIN — Medication 10 MILLIGRAM(S): at 05:08

## 2019-08-12 RX ADMIN — SENNA PLUS 2 TABLET(S): 8.6 TABLET ORAL at 23:45

## 2019-08-12 RX ADMIN — HEPARIN SODIUM 5000 UNIT(S): 5000 INJECTION INTRAVENOUS; SUBCUTANEOUS at 05:08

## 2019-08-12 RX ADMIN — GABAPENTIN 300 MILLIGRAM(S): 400 CAPSULE ORAL at 23:34

## 2019-08-12 RX ADMIN — Medication 100 MILLIGRAM(S): at 12:45

## 2019-08-12 RX ADMIN — Medication 20 MILLIEQUIVALENT(S): at 23:34

## 2019-08-12 RX ADMIN — CARBIDOPA AND LEVODOPA 2 TABLET(S): 25; 100 TABLET ORAL at 05:03

## 2019-08-12 RX ADMIN — Medication 20 MILLIGRAM(S): at 05:04

## 2019-08-12 RX ADMIN — CEFTRIAXONE 100 MILLIGRAM(S): 500 INJECTION, POWDER, FOR SOLUTION INTRAMUSCULAR; INTRAVENOUS at 00:11

## 2019-08-12 RX ADMIN — Medication 40 MILLIEQUIVALENT(S): at 12:46

## 2019-08-12 RX ADMIN — PANTOPRAZOLE SODIUM 40 MILLIGRAM(S): 20 TABLET, DELAYED RELEASE ORAL at 05:04

## 2019-08-12 RX ADMIN — LOSARTAN POTASSIUM 50 MILLIGRAM(S): 100 TABLET, FILM COATED ORAL at 05:04

## 2019-08-12 RX ADMIN — POLYETHYLENE GLYCOL 3350 17 GRAM(S): 17 POWDER, FOR SOLUTION ORAL at 12:45

## 2019-08-12 RX ADMIN — CARBIDOPA AND LEVODOPA 2 TABLET(S): 25; 100 TABLET ORAL at 00:11

## 2019-08-12 RX ADMIN — CARBIDOPA AND LEVODOPA 2 TABLET(S): 25; 100 TABLET ORAL at 12:45

## 2019-08-12 RX ADMIN — HEPARIN SODIUM 5000 UNIT(S): 5000 INJECTION INTRAVENOUS; SUBCUTANEOUS at 19:26

## 2019-08-12 RX ADMIN — Medication 5 MILLIGRAM(S): at 23:45

## 2019-08-12 RX ADMIN — DULOXETINE HYDROCHLORIDE 60 MILLIGRAM(S): 30 CAPSULE, DELAYED RELEASE ORAL at 12:45

## 2019-08-12 NOTE — PROGRESS NOTE ADULT - ASSESSMENT
85 F with GERD, HTN, HLD, DM2 with neuropathy, Parkinson's, anxiety/depression, AS s/p TAVR on plavix, overactive bladder, ?CHF, PMR, COPD p/w SOB and abdominal pain, admitted for acute hypoxic and hypercarbic respiratory in setting of pneumonia and COPD exacerbation, course c/b UTI and acute metabolic encephalopathy.

## 2019-08-12 NOTE — PROGRESS NOTE ADULT - PROBLEM SELECTOR PLAN 2
-Pt hypoxic w/ leukocytosis and febrile on initial encounter  - Did not get any IVF in ED.  - Hx CHF (unclear type), instead of IVF, will hold home lasix for 1 day. Restart lasix on 8/12.  - will treat with ceftriaxone and azithro. -Pt hypoxic w/ leukocytosis and febrile on initial encounter  -Did not get any IVF in ED.  -Hx CHF (unclear type), instead of IVF, will hold home lasix for 1 day. Restart lasix as tolerated  -c/w ceftriaxone and azithro -Pt hypoxic w/ leukocytosis and febrile on initial encounter  -Did not get any IVF in ED  -Hx CHF (unclear type) holding home lasix for 1 day. Restart lasix as tolerated upon return of TTE  -c/w ceftriaxone and azithro

## 2019-08-12 NOTE — PROGRESS NOTE ADULT - PROBLEM SELECTOR PLAN 6
-UA was positive w/ abd pain, difficult ROS due to encephalopathy  -Already on CTX (for CAP) -Patient with large stool burden on imaging, but no SBO  -C/w Miralax, colace, senna  -Received tap water enema x1

## 2019-08-12 NOTE — PROGRESS NOTE ADULT - PROBLEM SELECTOR PLAN 5
-Patient with large stool burden on imaging, but no SBO  -C/w Miralax, colace, senna  -Received tap water enema x1 -Episode of hypertension to SBP of 200 overnight, administered hydralazine, returned to baseline  -C/w losartan  -Hold lasix and BB in the setting of sepsis

## 2019-08-12 NOTE — PROGRESS NOTE ADULT - PROBLEM SELECTOR PLAN 7
-Patient with CHF per records, but no TTE on file. Currently euvolemic.  -Will f/u TTE to eval valvular heart dz and EF -UA was positive w/ abd pain, difficult ROS due to encephalopathy  -Already on CTX (for CAP)

## 2019-08-12 NOTE — PHYSICAL THERAPY INITIAL EVALUATION ADULT - ADDITIONAL COMMENTS
History obtained from pt's son, Shon Mae, as pt provided questionable history. Pt lives with her  in a home with 1 flight of stairs with a stair lift. Pt's  is with dementia and not able to help. Pt has a live-in HHA 24/7 who assists with all ADLs and functional mobility. PTA pt was able to amb short distances (within the home) with a RW and assistance. Pt owns a RW and WC. Pt has been spending more time in the WC lately due to dec mobility.

## 2019-08-12 NOTE — PHYSICAL THERAPY INITIAL EVALUATION ADULT - BALANCE TRAINING, PT EVAL
GOAL: Pt will improve sitting/standing balance by 1 grade to assist with functional mobility in 2 weeks.

## 2019-08-12 NOTE — PROGRESS NOTE ADULT - PROBLEM SELECTOR PLAN 1
-With acute hypoxic and hypercarbic respiratory failure.  -c/w w/ CTX and azithromycin for CAP  -Taper off NC as possible, BIPAP prn for hypercarbia  -RVP negative -With acute hypoxic and hypercarbic respiratory failure.  -c/w w/ CTX and azithromycin for CAP  -Taper off NC as tolerated, BIPAP prn for hypercarbia  -RVP negative -With acute hypoxic and hypercarbic respiratory failure  -c/w w/ CTX for CAP  -Taper off NC as tolerated, BIPAP prn for hypercarbia  -RVP negative

## 2019-08-12 NOTE — PROGRESS NOTE ADULT - PROBLEM SELECTOR PLAN 4
-Episode of hypertension to SBP of 200 overnight, administered hydralazine, returned to baseline  -C/w losartan  -Hold lasix and BB in the setting of sepsis -Will c/w duonebs PRN  -On Prednisone chronically for PMR, will need stress dose if hypotensive  -Maintain O2 sats between 88-92%

## 2019-08-12 NOTE — PROGRESS NOTE ADULT - SUBJECTIVE AND OBJECTIVE BOX
Adin Yuen MD  Beeper: [phone] ([facility])/ [id number] ([facility])     Subjective:    Patient is a 85y old  Female who presents with a chief complaint of Abdominal pain (11 Aug 2019 13:38)      Overnight events:    MEDICATIONS  (STANDING):  carbidopa/levodopa  25/100 2 Tablet(s) Oral three times a day  cefTRIAXone   IVPB 1000 milliGRAM(s) IV Intermittent every 24 hours  clopidogrel Tablet 75 milliGRAM(s) Oral daily  dextrose 5%. 1000 milliLiter(s) (50 mL/Hr) IV Continuous <Continuous>  dextrose 50% Injectable 12.5 Gram(s) IV Push once  dextrose 50% Injectable 25 Gram(s) IV Push once  dextrose 50% Injectable 25 Gram(s) IV Push once  docusate sodium 100 milliGRAM(s) Oral daily  DULoxetine 60 milliGRAM(s) Oral daily  gabapentin 300 milliGRAM(s) Oral at bedtime  heparin  Injectable 5000 Unit(s) SubCutaneous every 12 hours  insulin lispro (HumaLOG) corrective regimen sliding scale   SubCutaneous three times a day before meals  insulin lispro (HumaLOG) corrective regimen sliding scale   SubCutaneous at bedtime  losartan 50 milliGRAM(s) Oral daily  pantoprazole    Tablet 40 milliGRAM(s) Oral before breakfast  polyethylene glycol 3350 17 Gram(s) Oral daily  predniSONE   Tablet 10 milliGRAM(s) Oral daily  senna 2 Tablet(s) Oral at bedtime    MEDICATIONS  (PRN):  acetaminophen   Tablet .. 650 milliGRAM(s) Oral every 6 hours PRN Temp greater or equal to 38C (100.4F), Mild Pain (1 - 3)  ALPRAZolam 0.5 milliGRAM(s) Oral two times a day PRN anxiety  dextrose 40% Gel 15 Gram(s) Oral once PRN Blood Glucose LESS THAN 70 milliGRAM(s)/deciliter  glucagon  Injectable 1 milliGRAM(s) IntraMuscular once PRN Glucose LESS THAN 70 milligrams/deciliter      Objective:    Vitals: Vital Signs Last 24 Hrs  T(C): 37 (19 @ 04:30), Max: 38.5 (19 @ 15:40)  T(F): 98.6 (19 @ 04:30), Max: 101.3 (19 @ 15:40)  HR: 78 (19 @ 05:43) (78 - 100)  BP: 101/54 (19 @ 05:43) (101/54 - 200/90)  BP(mean): --  RR: 20 (19 @ 04:30) (18 - 20)  SpO2: 97% (19 @ 04:30) (93% - 99%)              I&O's Summary    11 Aug 2019 07:01  -  12 Aug 2019 07:00  --------------------------------------------------------  IN: 0 mL / OUT: 1200 mL / NET: -1200 mL        PHYSICAL EXAM:  GENERAL: NAD, well-groomed, well-developed  HEAD:  Atraumatic, Normocephalic  EYES: EOMI, PERRLA, conjunctiva and sclera clear  ENMT: No tonsillar erythema, exudates, or enlargement; Moist mucous membranes, Good dentition, No lesions  NECK: Supple, No JVD, Normal thyroid  CHEST/LUNG: Clear to percussion bilaterally; No rales, rhonchi, wheezing, or rubs  HEART: Regular rate and rhythm; No murmurs, rubs, or gallops  ABDOMEN: Soft, Nontender, Nondistended; Bowel sounds present  EXTREMITIES:  2+ Peripheral Pulses, No clubbing, cyanosis, or edema  LYMPH: No lymphadenopathy noted  SKIN: No rashes or lesions  NERVOUS SYSTEM:                                             LABS:               11.8   15.3  )-----------( 263      ( 11 Aug 2019 22:11 )             36.0     133<L>  |  88<L>  |  17  ----------------------------<  152<H>  3.6   |  29  |  0.78    Ca    9.7      11 Aug 2019 22:11  Ca    8.9      10 Aug 2019 23:03  Ca    9.4      10 Aug 2019 20:28  Phos  4.0     08-  Mg     2.2     08-    TPro  7.4  /  Alb  4.2  /  TBili  0.6  /  DBili  x   /  AST  16  /  ALT  <5<L>  /  AlkPhos  54    TPro  7.8  /  Alb  4.2  /  TBili  0.4  /  DBili  x   /  AST  51<H>  /  ALT  6<L>  /  AlkPhos  52  08-10    PT/INR - ( 10 Aug 2019 20:28 )   PT: 12.9 sec;   INR: 1.12 ratio    PTT - ( 10 Aug 2019 20:28 )  PTT:29.8 sec                Urinalysis Basic - ( 10 Aug 2019 20:55 )  Color: Yellow / Appearance: Clear / S.012 / pH: x  Gluc: x / Ketone: Negative  / Bili: Negative / Urobili: Negative   Blood: x / Protein: Trace / Nitrite: Negative   Leuk Esterase: Large / RBC: 1 /hpf / WBC 39 /HPF   Sq Epi: x / Non Sq Epi: 0 /hpf / Bacteria: Many             CAPILLARY BLOOD GLUCOSE  POCT Blood Glucose.: 120 mg/dL (11 Aug 2019 23:33)  POCT Blood Glucose.: 153 mg/dL (11 Aug 2019 21:43)  POCT Blood Glucose.: 133 mg/dL (11 Aug 2019 16:42)  POCT Blood Glucose.: 121 mg/dL (11 Aug 2019 13:08)  POCT Blood Glucose.: 102 mg/dL (11 Aug 2019 09:48)    RECENT CULTURES:  08-10 @ 22:24  Urine: >100,000 CFU/ml Gram Negative Rods    08-10 @ 22:04  Blood    No growth to date. Adin Yuen MD  Beeper: [phone] ([facility])/ [id number] ([facility])     Subjective:    Patient is a 85y old  Female who presents with a chief complaint of Abdominal pain (11 Aug 2019 13:38)    Overnight events:    Pt A&Ox0 on encounter. No acute events overnight per chart. Will attempt to contact son to establish pt baseline status.    MEDICATIONS  (STANDING):  carbidopa/levodopa  25/100 2 Tablet(s) Oral three times a day  cefTRIAXone   IVPB 1000 milliGRAM(s) IV Intermittent every 24 hours  clopidogrel Tablet 75 milliGRAM(s) Oral daily  dextrose 5%. 1000 milliLiter(s) (50 mL/Hr) IV Continuous <Continuous>  dextrose 50% Injectable 12.5 Gram(s) IV Push once  dextrose 50% Injectable 25 Gram(s) IV Push once  dextrose 50% Injectable 25 Gram(s) IV Push once  docusate sodium 100 milliGRAM(s) Oral daily  DULoxetine 60 milliGRAM(s) Oral daily  gabapentin 300 milliGRAM(s) Oral at bedtime  heparin  Injectable 5000 Unit(s) SubCutaneous every 12 hours  insulin lispro (HumaLOG) corrective regimen sliding scale   SubCutaneous three times a day before meals  insulin lispro (HumaLOG) corrective regimen sliding scale   SubCutaneous at bedtime  losartan 50 milliGRAM(s) Oral daily  pantoprazole    Tablet 40 milliGRAM(s) Oral before breakfast  polyethylene glycol 3350 17 Gram(s) Oral daily  predniSONE   Tablet 10 milliGRAM(s) Oral daily  senna 2 Tablet(s) Oral at bedtime    MEDICATIONS  (PRN):  acetaminophen   Tablet .. 650 milliGRAM(s) Oral every 6 hours PRN Temp greater or equal to 38C (100.4F), Mild Pain (1 - 3)  ALPRAZolam 0.5 milliGRAM(s) Oral two times a day PRN anxiety  dextrose 40% Gel 15 Gram(s) Oral once PRN Blood Glucose LESS THAN 70 milliGRAM(s)/deciliter  glucagon  Injectable 1 milliGRAM(s) IntraMuscular once PRN Glucose LESS THAN 70 milligrams/deciliter      Objective:    Vitals: Vital Signs Last 24 Hrs  T(C): 37 (19 @ 04:30), Max: 38.5 (19 @ 15:40)  T(F): 98.6 (19 @ 04:30), Max: 101.3 (19 @ 15:40)  HR: 78 (19 @ 05:43) (78 - 100)  BP: 101/54 (19 @ 05:43) (101/54 - 200/90)  BP(mean): --  RR: 20 (19 @ 04:30) (18 - 20)  SpO2: 97% (19 @ 04:30) (93% - 99%)              I&O's Summary    11 Aug 2019 07:01  -  12 Aug 2019 07:00  --------------------------------------------------------  IN: 0 mL / OUT: 1200 mL / NET: -1200 mL      PHYSICAL EXAM:  GENERAL: NAD, well-groomed, well-developed  HEAD:  Atraumatic, Normocephalic  EYES: EOMI, PERRLA, conjunctiva and sclera clear  ENMT: No tonsillar erythema, exudates, or enlargement; Moist mucous membranes, Good dentition, No lesions  NECK: Supple, No JVD, Normal thyroid  CHEST/LUNG: Clear to percussion bilaterally; No rales, rhonchi, wheezing, or rubs  HEART: Regular rate and rhythm; No murmurs, rubs, or gallops  ABDOMEN: Soft, Nontender, Nondistended; Bowel sounds present  EXTREMITIES:  2+ Peripheral Pulses, No clubbing, cyanosis, or edema  LYMPH: No lymphadenopathy noted  SKIN: No rashes or lesions  NERVOUS SYSTEM:                                             LABS:             11.8   15.3  )-----------( 263      ( 11 Aug 2019 22:11 )             36.0     133<L>  |  88<L>  |  17  ----------------------------<  152<H>  3.6   |  29  |  0.78    Ca    9.7      11 Aug 2019 22:11  Ca    8.9      10 Aug 2019 23:03  Ca    9.4      10 Aug 2019 20:28  Phos  4.0       Mg     2.2     08-11    TPro  7.4  /  Alb  4.2  /  TBili  0.6  /  DBili  x   /  AST  16  /  ALT  <5<L>  /  AlkPhos  54  08-    PT/INR - ( 10 Aug 2019 20:28 )   PT: 12.9 sec;   INR: 1.12 ratio    PTT - ( 10 Aug 2019 20:28 )  PTT:29.8 sec                Urinalysis Basic - ( 10 Aug 2019 20:55 )  Color: Yellow / Appearance: Clear / S.012 / pH: x  Gluc: x / Ketone: Negative  / Bili: Negative / Urobili: Negative   Blood: x / Protein: Trace / Nitrite: Negative   Leuk Esterase: Large / RBC: 1 /hpf / WBC 39 /HPF   Sq Epi: x / Non Sq Epi: 0 /hpf / Bacteria: Many             CAPILLARY BLOOD GLUCOSE  POCT Blood Glucose.: 120 mg/dL (11 Aug 2019 23:33)  POCT Blood Glucose.: 153 mg/dL (11 Aug 2019 21:43)  POCT Blood Glucose.: 133 mg/dL (11 Aug 2019 16:42)  POCT Blood Glucose.: 121 mg/dL (11 Aug 2019 13:08)  POCT Blood Glucose.: 102 mg/dL (11 Aug 2019 09:48)    RECENT CULTURES:  08-10 @ 22:24  Urine: >100,000 CFU/ml Gram Negative Rods    08-10 @ 22:04  Blood    No growth to date. Adin Yuen MD  Beeper: [phone] ([facility])/ [id number] ([facility])     Subjective:    Patient is a 85y old  Female who presents with a chief complaint of Abdominal pain (11 Aug 2019 13:38)    Overnight events:    Pt A&Ox0 on encounter. No acute events overnight per chart. Will attempt to contact son to establish pt baseline status.    MEDICATIONS  (STANDING):  carbidopa/levodopa  25/100 2 Tablet(s) Oral three times a day  cefTRIAXone   IVPB 1000 milliGRAM(s) IV Intermittent every 24 hours  clopidogrel Tablet 75 milliGRAM(s) Oral daily  dextrose 5%. 1000 milliLiter(s) (50 mL/Hr) IV Continuous <Continuous>  dextrose 50% Injectable 12.5 Gram(s) IV Push once  dextrose 50% Injectable 25 Gram(s) IV Push once  dextrose 50% Injectable 25 Gram(s) IV Push once  docusate sodium 100 milliGRAM(s) Oral daily  DULoxetine 60 milliGRAM(s) Oral daily  gabapentin 300 milliGRAM(s) Oral at bedtime  heparin  Injectable 5000 Unit(s) SubCutaneous every 12 hours  insulin lispro (HumaLOG) corrective regimen sliding scale   SubCutaneous three times a day before meals  insulin lispro (HumaLOG) corrective regimen sliding scale   SubCutaneous at bedtime  losartan 50 milliGRAM(s) Oral daily  melatonin 5 milliGRAM(s) Oral at bedtime  pantoprazole    Tablet 40 milliGRAM(s) Oral before breakfast  polyethylene glycol 3350 17 Gram(s) Oral daily  predniSONE   Tablet 10 milliGRAM(s) Oral daily  senna 2 Tablet(s) Oral at bedtime    MEDICATIONS  (PRN):  acetaminophen   Tablet .. 650 milliGRAM(s) Oral every 6 hours PRN Temp greater or equal to 38C (100.4F), Mild Pain (1 - 3)  ALPRAZolam 0.5 milliGRAM(s) Oral two times a day PRN anxiety  dextrose 40% Gel 15 Gram(s) Oral once PRN Blood Glucose LESS THAN 70 milliGRAM(s)/deciliter  glucagon  Injectable 1 milliGRAM(s) IntraMuscular once PRN Glucose LESS THAN 70 milligrams/deciliter      Objective:  Vitals: Vital Signs Last 24 Hrs  T(C): 37 (19 @ 04:30), Max: 38.5 (19 @ 15:40)  T(F): 98.6 (19 @ 04:30), Max: 101.3 (19 @ 15:40)  HR: 78 (19 @ 05:43) (78 - 100)  BP: 101/54 (19 @ 05:43) (101/54 - 200/90)  BP(mean): --  RR: 20 (19 @ 04:30) (18 - 20)  SpO2: 97% (19 @ 04:30) (93% - 99%)              I&O's Summary    11 Aug 2019 07:01  -  12 Aug 2019 07:00  --------------------------------------------------------  IN: 0 mL / OUT: 1200 mL / NET: -1200 mL      PHYSICAL EXAM:  GENERAL: NAD, sleeping at bedside encounter. Responsive but delirious   HEAD:  Atraumatic, Normocephalic  EYES: EOMI, conjunctiva and sclera clear  ENMT: Moist mucous membranes, No lesions  NECK: Supple, No JVD  CHEST/LUNG: Rhonchi b/l on anterior chest wall. Was unable to assess posterior. No wheezing, or rubs  HEART: Regular rate and rhythm; No murmurs, rubs, or gallops  ABDOMEN: Soft, Nontender, Nondistended; Bowel sounds present  EXTREMITIES:  2+ Peripheral Pulses, No clubbing, cyanosis, or minimal edema noted  LYMPH: No lymphadenopathy noted  SKIN: No rashes or lesions  NERVOUS SYSTEM: A&Ox1, unable to follow commands upon encounter in AM    LABS:              12.5   17.2  )-----------( 296      ( 12 Aug 2019 10:21 )             38.6     131<L>  |  86<L>  |  18  ----------------------------<  111<H>  3.3<L>   |  27  |  0.87    Ca    10.0      12 Aug 2019 10:21  Phos  3.7     08-12  Mg     2.1     08-12    TPro  7.6  /  Alb  4.1  /  TBili  0.7  /  DBili  x   /  AST  15  /  ALT  <5<L>  /  AlkPhos  53  08-12    LIVER FUNCTIONS - ( 12 Aug 2019 10:21 )  Alb: 4.1 g/dL / Pro: 7.6 g/dL / ALK PHOS: 53 U/L / ALT: <5 U/L / AST: 15 U/L / GGT: x           Urinalysis Basic - ( 10 Aug 2019 20:55 )  Color: Yellow / Appearance: Clear / S.012 / pH: x  Gluc: x / Ketone: Negative  / Bili: Negative / Urobili: Negative   Blood: x / Protein: Trace / Nitrite: Negative   Leuk Esterase: Large / RBC: 1 /hpf / WBC 39 /HPF   Sq Epi: x / Non Sq Epi: 0 /hpf / Bacteria: Many    PT/INR - ( 10 Aug 2019 20:28 )   PT: 12.9 sec;   INR: 1.12 ratio    PTT - ( 10 Aug 2019 20:28 )  PTT:29.8 sec

## 2019-08-12 NOTE — PHYSICAL THERAPY INITIAL EVALUATION ADULT - PERTINENT HX OF CURRENT PROBLEM, REHAB EVAL
Pt is an 86 y/o female who presents with SOB and abdominal pain, admitted for acute hypoxic and hypercarbic respiratory in setting of pneumonia and COPD exacerbation. Also with UTI and metabolic encephalopathy

## 2019-08-12 NOTE — PROGRESS NOTE ADULT - PROBLEM SELECTOR PLAN 8
-Patient not on home anti-hyperglycemics  -SSI and FS q6hrs -Patient with CHF per records, but no TTE on file. Currently euvolemic.  -Will f/u TTE to eval valvular heart dz and EF

## 2019-08-12 NOTE — PROGRESS NOTE ADULT - PROBLEM SELECTOR PLAN 3
-Will c/w duonebs PRN  -On Prednisone chronically for PMR, will need stress dose if hypotensive -Pt A&Ox1 not responding properly to questions  -Usually A&Ox2-3 at baseline, per son  -Will continue to re-orient pt. Will attempt move to window side if possible  -Melatonin 5mg HS

## 2019-08-13 DIAGNOSIS — I05.0 RHEUMATIC MITRAL STENOSIS: ICD-10-CM

## 2019-08-13 LAB
ALBUMIN SERPL ELPH-MCNC: 4.1 G/DL — SIGNIFICANT CHANGE UP (ref 3.3–5)
ALP SERPL-CCNC: 55 U/L — SIGNIFICANT CHANGE UP (ref 40–120)
ALT FLD-CCNC: <5 U/L — LOW (ref 10–45)
ANION GAP SERPL CALC-SCNC: 18 MMOL/L — HIGH (ref 5–17)
AST SERPL-CCNC: 20 U/L — SIGNIFICANT CHANGE UP (ref 10–40)
BASOPHILS # BLD AUTO: 0.1 K/UL — SIGNIFICANT CHANGE UP (ref 0–0.2)
BASOPHILS NFR BLD AUTO: 0.7 % — SIGNIFICANT CHANGE UP (ref 0–2)
BILIRUB SERPL-MCNC: 0.5 MG/DL — SIGNIFICANT CHANGE UP (ref 0.2–1.2)
BUN SERPL-MCNC: 32 MG/DL — HIGH (ref 7–23)
CALCIUM SERPL-MCNC: 10.5 MG/DL — SIGNIFICANT CHANGE UP (ref 8.4–10.5)
CHLORIDE SERPL-SCNC: 92 MMOL/L — LOW (ref 96–108)
CO2 SERPL-SCNC: 27 MMOL/L — SIGNIFICANT CHANGE UP (ref 22–31)
CREAT SERPL-MCNC: 1.04 MG/DL — SIGNIFICANT CHANGE UP (ref 0.5–1.3)
EOSINOPHIL # BLD AUTO: 0.1 K/UL — SIGNIFICANT CHANGE UP (ref 0–0.5)
EOSINOPHIL NFR BLD AUTO: 0.8 % — SIGNIFICANT CHANGE UP (ref 0–6)
GLUCOSE SERPL-MCNC: 115 MG/DL — HIGH (ref 70–99)
HCT VFR BLD CALC: 39.7 % — SIGNIFICANT CHANGE UP (ref 34.5–45)
HGB BLD-MCNC: 12.3 G/DL — SIGNIFICANT CHANGE UP (ref 11.5–15.5)
LYMPHOCYTES # BLD AUTO: 2.6 K/UL — SIGNIFICANT CHANGE UP (ref 1–3.3)
LYMPHOCYTES # BLD AUTO: 20.8 % — SIGNIFICANT CHANGE UP (ref 13–44)
MAGNESIUM SERPL-MCNC: 2.5 MG/DL — SIGNIFICANT CHANGE UP (ref 1.6–2.6)
MCHC RBC-ENTMCNC: 27.6 PG — SIGNIFICANT CHANGE UP (ref 27–34)
MCHC RBC-ENTMCNC: 30.9 GM/DL — LOW (ref 32–36)
MCV RBC AUTO: 89.3 FL — SIGNIFICANT CHANGE UP (ref 80–100)
MONOCYTES # BLD AUTO: 1 K/UL — HIGH (ref 0–0.9)
MONOCYTES NFR BLD AUTO: 8.3 % — SIGNIFICANT CHANGE UP (ref 2–14)
NEUTROPHILS # BLD AUTO: 8.6 K/UL — HIGH (ref 1.8–7.4)
NEUTROPHILS NFR BLD AUTO: 69.4 % — SIGNIFICANT CHANGE UP (ref 43–77)
PHOSPHATE SERPL-MCNC: 4 MG/DL — SIGNIFICANT CHANGE UP (ref 2.5–4.5)
PLATELET # BLD AUTO: 341 K/UL — SIGNIFICANT CHANGE UP (ref 150–400)
POTASSIUM SERPL-MCNC: 4.2 MMOL/L — SIGNIFICANT CHANGE UP (ref 3.5–5.3)
POTASSIUM SERPL-SCNC: 4.2 MMOL/L — SIGNIFICANT CHANGE UP (ref 3.5–5.3)
PROT SERPL-MCNC: 7.7 G/DL — SIGNIFICANT CHANGE UP (ref 6–8.3)
RBC # BLD: 4.45 M/UL — SIGNIFICANT CHANGE UP (ref 3.8–5.2)
RBC # FLD: 14.1 % — SIGNIFICANT CHANGE UP (ref 10.3–14.5)
SODIUM SERPL-SCNC: 137 MMOL/L — SIGNIFICANT CHANGE UP (ref 135–145)
WBC # BLD: 12.3 K/UL — HIGH (ref 3.8–10.5)
WBC # FLD AUTO: 12.3 K/UL — HIGH (ref 3.8–10.5)

## 2019-08-13 PROCEDURE — 99233 SBSQ HOSP IP/OBS HIGH 50: CPT | Mod: GC

## 2019-08-13 RX ORDER — FUROSEMIDE 40 MG
20 TABLET ORAL
Refills: 0 | Status: DISCONTINUED | OUTPATIENT
Start: 2019-08-13 | End: 2019-08-14

## 2019-08-13 RX ADMIN — PANTOPRAZOLE SODIUM 40 MILLIGRAM(S): 20 TABLET, DELAYED RELEASE ORAL at 06:24

## 2019-08-13 RX ADMIN — CARBIDOPA AND LEVODOPA 2 TABLET(S): 25; 100 TABLET ORAL at 21:10

## 2019-08-13 RX ADMIN — POLYETHYLENE GLYCOL 3350 17 GRAM(S): 17 POWDER, FOR SOLUTION ORAL at 13:34

## 2019-08-13 RX ADMIN — HEPARIN SODIUM 5000 UNIT(S): 5000 INJECTION INTRAVENOUS; SUBCUTANEOUS at 06:24

## 2019-08-13 RX ADMIN — Medication 1: at 13:45

## 2019-08-13 RX ADMIN — GABAPENTIN 300 MILLIGRAM(S): 400 CAPSULE ORAL at 21:10

## 2019-08-13 RX ADMIN — Medication 600 MILLIGRAM(S): at 18:53

## 2019-08-13 RX ADMIN — LOSARTAN POTASSIUM 50 MILLIGRAM(S): 100 TABLET, FILM COATED ORAL at 06:24

## 2019-08-13 RX ADMIN — CLOPIDOGREL BISULFATE 75 MILLIGRAM(S): 75 TABLET, FILM COATED ORAL at 13:34

## 2019-08-13 RX ADMIN — DULOXETINE HYDROCHLORIDE 60 MILLIGRAM(S): 30 CAPSULE, DELAYED RELEASE ORAL at 13:34

## 2019-08-13 RX ADMIN — Medication 1: at 18:53

## 2019-08-13 RX ADMIN — Medication 20 MILLIGRAM(S): at 18:53

## 2019-08-13 RX ADMIN — Medication 100 MILLIGRAM(S): at 13:36

## 2019-08-13 RX ADMIN — SENNA PLUS 2 TABLET(S): 8.6 TABLET ORAL at 21:10

## 2019-08-13 RX ADMIN — CARBIDOPA AND LEVODOPA 2 TABLET(S): 25; 100 TABLET ORAL at 13:34

## 2019-08-13 RX ADMIN — Medication 5 MILLIGRAM(S): at 21:10

## 2019-08-13 RX ADMIN — HEPARIN SODIUM 5000 UNIT(S): 5000 INJECTION INTRAVENOUS; SUBCUTANEOUS at 18:53

## 2019-08-13 RX ADMIN — Medication 10 MILLIGRAM(S): at 06:24

## 2019-08-13 RX ADMIN — CARBIDOPA AND LEVODOPA 2 TABLET(S): 25; 100 TABLET ORAL at 06:24

## 2019-08-13 NOTE — PROGRESS NOTE ADULT - SUBJECTIVE AND OBJECTIVE BOX
Adin Yuen MD  Beeper: [phone] ([facility])/ [phone] (J)     Subjective:    Patient is a 85y old  Female who presents with a chief complaint of Abdominal pain (11 Aug 2019 13:38)    Overnight events:    Pt A&Ox1 on encounter this AM, compared to 0 yesterday. Able to respond to questions appropriately. Denies any CP/SOB/N/V/dysuria.      MEDICATIONS  (STANDING):  carbidopa/levodopa  25/100 2 Tablet(s) Oral three times a day  cefTRIAXone   IVPB 1000 milliGRAM(s) IV Intermittent every 24 hours  clopidogrel Tablet 75 milliGRAM(s) Oral daily  dextrose 5%. 1000 milliLiter(s) (50 mL/Hr) IV Continuous <Continuous>  dextrose 50% Injectable 12.5 Gram(s) IV Push once  dextrose 50% Injectable 25 Gram(s) IV Push once  dextrose 50% Injectable 25 Gram(s) IV Push once  docusate sodium 100 milliGRAM(s) Oral daily  DULoxetine 60 milliGRAM(s) Oral daily  gabapentin 300 milliGRAM(s) Oral at bedtime  heparin  Injectable 5000 Unit(s) SubCutaneous every 12 hours  insulin lispro (HumaLOG) corrective regimen sliding scale   SubCutaneous three times a day before meals  insulin lispro (HumaLOG) corrective regimen sliding scale   SubCutaneous at bedtime  losartan 50 milliGRAM(s) Oral daily  melatonin 5 milliGRAM(s) Oral at bedtime  pantoprazole    Tablet 40 milliGRAM(s) Oral before breakfast  polyethylene glycol 3350 17 Gram(s) Oral daily  predniSONE   Tablet 10 milliGRAM(s) Oral daily  senna 2 Tablet(s) Oral at bedtime    MEDICATIONS  (PRN):  acetaminophen   Tablet .. 650 milliGRAM(s) Oral every 6 hours PRN Temp greater or equal to 38C (100.4F), Mild Pain (1 - 3)  ALPRAZolam 0.5 milliGRAM(s) Oral two times a day PRN anxiety  dextrose 40% Gel 15 Gram(s) Oral once PRN Blood Glucose LESS THAN 70 milliGRAM(s)/deciliter  glucagon  Injectable 1 milliGRAM(s) IntraMuscular once PRN Glucose LESS THAN 70 milligrams/deciliter      Objective:  Vital Signs Last 24 Hrs  T(C): 36.6 (13 Aug 2019 04:03), Max: 37.3 (12 Aug 2019 14:29)  T(F): 97.9 (13 Aug 2019 04:03), Max: 99.2 (12 Aug 2019 14:29)  HR: 109 (13 Aug 2019 04:03) (64 - 109)  BP: 159/73 (13 Aug 2019 04:03) (122/79 - 159/73)  BP(mean): --  RR: 18 (13 Aug 2019 04:03) (18 - 20)  SpO2: 93% (13 Aug 2019 04:03) (91% - 97%)  I&O's Summary    11 Aug 2019 07:01  -  12 Aug 2019 07:00  --------------------------------------------------------  IN: 0 mL / OUT: 1200 mL / NET: -1200 mL      PHYSICAL EXAM:  GENERAL: NAD, sleeping at bedside encounter. Responsive but delirious   HEAD:  Atraumatic, Normocephalic  EYES: EOMI, conjunctiva and sclera clear  ENMT: Moist mucous membranes, No lesions  NECK: Supple, No JVD  CHEST/LUNG: Rhonchi on b/l lower lobes. No wheezing, or rubs. Regular work of breathing  HEART: Regular rate and rhythm; No murmurs, rubs, or gallops  ABDOMEN: Soft, Nontender, Nondistended; Bowel sounds present  EXTREMITIES:  2+ Peripheral Pulses, No clubbing, cyanosis, or minimal edema noted  LYMPH: No lymphadenopathy noted  SKIN: No rashes or lesions  NERVOUS SYSTEM: A&Ox1, following commands    LABS:              12.3   12.3  )-----------( 341      ( 13 Aug 2019 06:25 )             39.7     137  |  92<L>  |  32<H>  ----------------------------<  115<H>  4.2   |  27  |  1.04    Ca    10.5      13 Aug 2019 06:25  Phos  4.0     08-13  Mg     2.5     08-13    TPro  7.7  /  Alb  4.1  /  TBili  0.5  /  DBili  x   /  AST  20  /  ALT  <5<L>  /  AlkPhos  55  08-13    LIVER FUNCTIONS - ( 13 Aug 2019 06:25 )  Alb: 4.1 g/dL / Pro: 7.7 g/dL / ALK PHOS: 55 U/L / ALT: <5 U/L / AST: 20 U/L / GGT: x Adin Yuen MD  Beeper: [phone] ([facility])/ [phone] (J)     Subjective:    Patient is a 85y old  Female who presents with a chief complaint of Abdominal pain (11 Aug 2019 13:38)    Overnight events:    Pt A&Ox1 on encounter this AM, compared to 0 yesterday. Able to respond to questions appropriately. Denies any CP/SOB/N/V/dysuria.      MEDICATIONS  (STANDING):  carbidopa/levodopa  25/100 2 Tablet(s) Oral three times a day  cefTRIAXone   IVPB 1000 milliGRAM(s) IV Intermittent every 24 hours  clopidogrel Tablet 75 milliGRAM(s) Oral daily  dextrose 5%. 1000 milliLiter(s) (50 mL/Hr) IV Continuous <Continuous>  dextrose 50% Injectable 12.5 Gram(s) IV Push once  dextrose 50% Injectable 25 Gram(s) IV Push once  dextrose 50% Injectable 25 Gram(s) IV Push once  docusate sodium 100 milliGRAM(s) Oral daily  DULoxetine 60 milliGRAM(s) Oral daily  furosemide    Tablet 20 milliGRAM(s) Oral two times a day  gabapentin 300 milliGRAM(s) Oral at bedtime  heparin  Injectable 5000 Unit(s) SubCutaneous every 12 hours  insulin lispro (HumaLOG) corrective regimen sliding scale   SubCutaneous three times a day before meals  insulin lispro (HumaLOG) corrective regimen sliding scale   SubCutaneous at bedtime  losartan 50 milliGRAM(s) Oral daily  melatonin 5 milliGRAM(s) Oral at bedtime  pantoprazole    Tablet 40 milliGRAM(s) Oral before breakfast  polyethylene glycol 3350 17 Gram(s) Oral daily  predniSONE   Tablet 10 milliGRAM(s) Oral daily  senna 2 Tablet(s) Oral at bedtime    MEDICATIONS  (PRN):  acetaminophen   Tablet .. 650 milliGRAM(s) Oral every 6 hours PRN Temp greater or equal to 38C (100.4F), Mild Pain (1 - 3)  ALPRAZolam 0.5 milliGRAM(s) Oral two times a day PRN anxiety  dextrose 40% Gel 15 Gram(s) Oral once PRN Blood Glucose LESS THAN 70 milliGRAM(s)/deciliter  glucagon  Injectable 1 milliGRAM(s) IntraMuscular once PRN Glucose LESS THAN 70 milligrams/deciliter      Objective:  Vital Signs Last 24 Hrs  T(C): 36.6 (13 Aug 2019 04:03), Max: 37.3 (12 Aug 2019 14:29)  T(F): 97.9 (13 Aug 2019 04:03), Max: 99.2 (12 Aug 2019 14:29)  HR: 109 (13 Aug 2019 04:03) (64 - 109)  BP: 159/73 (13 Aug 2019 04:03) (122/79 - 159/73)  BP(mean): --  RR: 18 (13 Aug 2019 04:03) (18 - 20)  SpO2: 93% (13 Aug 2019 04:03) (91% - 97%)  I&O's Summary    11 Aug 2019 07:01  -  12 Aug 2019 07:00  --------------------------------------------------------  IN: 0 mL / OUT: 1200 mL / NET: -1200 mL      PHYSICAL EXAM:  GENERAL: NAD, sleeping at bedside encounter. Responsive but delirious   HEAD:  Atraumatic, Normocephalic  EYES: EOMI, conjunctiva and sclera clear  ENMT: Moist mucous membranes, No lesions  NECK: Supple, No JVD  CHEST/LUNG: Rhonchi on b/l lower lobes. No wheezing, or rubs. Regular work of breathing  HEART: Regular rate and rhythm; No murmurs, rubs, or gallops  ABDOMEN: Soft, Nontender, Nondistended; Bowel sounds present  EXTREMITIES:  2+ Peripheral Pulses, No clubbing, cyanosis, or minimal edema noted  LYMPH: No lymphadenopathy noted  SKIN: No rashes or lesions  NERVOUS SYSTEM: A&Ox1, following commands    LABS:              12.3   12.3  )-----------( 341      ( 13 Aug 2019 06:25 )             39.7     137  |  92<L>  |  32<H>  ----------------------------<  115<H>  4.2   |  27  |  1.04    Ca    10.5      13 Aug 2019 06:25  Phos  4.0     08-13  Mg     2.5     08-13    TPro  7.7  /  Alb  4.1  /  TBili  0.5  /  DBili  x   /  AST  20  /  ALT  <5<L>  /  AlkPhos  55  08-13    LIVER FUNCTIONS - ( 13 Aug 2019 06:25 )  Alb: 4.1 g/dL / Pro: 7.7 g/dL / ALK PHOS: 55 U/L / ALT: <5 U/L / AST: 20 U/L / GGT: x           TTE with Doppler (w/Cont) (08.12.19 @ 18:25)  Conclusions:  Technically difficult study.  Endocardial visualization  enhanced with intravenous injection of Ultrasonic Enhancing  Agent (Definity).  1. Moderately dilated left atrium.  2. Hyperdynamic left ventricular systolic function. Left  ventricular ejection fraction >70%. No wall motion  abnormality.  3. Elevated left atrial pressure=26mmHG.  4. Normal right atrium.  5. Normal right ventricular size and function.  6. Transcatheter aortic valve replacement. Well seated  valve. Acceptable gradients. No aortic regurgitation.  7. Severe mitral stenosis.  8. Normal pericardium with no pericardial effusion.

## 2019-08-13 NOTE — PROGRESS NOTE ADULT - PROBLEM SELECTOR PLAN 9
-Patient not on home anti-hyperglycemics  -SSI and FS q6hrs -Patient not on home anti-hyperglycemics  - FS acceptable  - c/w Insulin Sliding scale

## 2019-08-13 NOTE — PROGRESS NOTE ADULT - PROBLEM SELECTOR PLAN 1
-With acute hypoxic and hypercarbic respiratory failure  -c/w w/ CTX for CAP  -Taper off NC as tolerated, BIPAP prn for hypercarbia  -RVP negative -With acute hypoxic and hypercarbic respiratory failure  -c/w w/ CTX for CAP  -Taper off NC as tolerated, BIPAP prn for hypercarbia  -RVP negative  - leukocytosis is downtrending.

## 2019-08-13 NOTE — PROGRESS NOTE ADULT - PROBLEM SELECTOR PLAN 7
-UA was positive w/ abd pain, difficult ROS due to encephalopathy  -Already on CTX (for CAP) -UA was positive w/ abd pain, difficult ROS due to encephalopathy  -Already on CTX (for CAP)  - UCx positive for Klebsiella >100k

## 2019-08-13 NOTE — PROGRESS NOTE ADULT - PROBLEM SELECTOR PLAN 4
-Will c/w duonebs PRN  -On Prednisone chronically for PMR, will need stress dose if hypotensive  -Maintain O2 sats between 88-92%

## 2019-08-13 NOTE — PROGRESS NOTE ADULT - PROBLEM SELECTOR PLAN 8
-Patient with CHF per records, but no TTE on file. Currently euvolemic.  -TTE pending -Patient with CHF per records, but no TTE on file. Currently euvolemic.  -TTE --> Normal LF, Mitral stenosis, increased RV pressure and dilated Left atrium, functioning TAVR. Findings c/w valvular heart failure or heart failure with preserved EF.

## 2019-08-13 NOTE — PROGRESS NOTE ADULT - PROBLEM SELECTOR PLAN 6
-Patient with large stool burden on imaging, but no SBO  -C/w Miralax, colace, senna  -Received tap water enema x1

## 2019-08-13 NOTE — PROGRESS NOTE ADULT - PROBLEM SELECTOR PLAN 2
-Pt hypoxic w/ leukocytosis and febrile on initial encounter  -Hx CHF (unclear type) holding home lasix for 1 day. Restart lasix as tolerated upon return of TTE (scheduled for today)  -c/w ceftriaxone as above -Severe on TTE  -Restarted lasix 20mg BID  -Bryce lcontact PMD to determine course of disease  -If new or worsening, will consult cards for workup inpt -Severe on TTE  -Restarted lasix 20mg BID  -Will contact PMD to determine course of disease  -If new or worsening, will consult cards for workup inpt

## 2019-08-13 NOTE — PROGRESS NOTE ADULT - PROBLEM SELECTOR PLAN 3
-Pt A&Ox1 (self)  -Usually A&Ox2-3 at baseline, per son  -Will continue to re-orient pt. Will attempt move to window side if possible  -Melatonin 5mg HS

## 2019-08-13 NOTE — PROGRESS NOTE ADULT - PROBLEM SELECTOR PLAN 5
-C/w losartan  -Hold lasix and BB in the setting of sepsis -C/w losartan  -Hold BB in the setting of sepsis

## 2019-08-13 NOTE — PROVIDER CONTACT NOTE (CHANGE IN STATUS NOTIFICATION) - RECOMMENDATIONS
MD made aware. Order given to keep NC 2L on overnight. F/U POX =97% and was WNL for rest of night. Will continue to monitor closely.

## 2019-08-13 NOTE — PROGRESS NOTE ADULT - PROBLEM SELECTOR PLAN 10
DVT ppx: HSQ  Diet: DASH  Dispo: PT recs subacute rehab DVT ppx: HSQ  Diet: DASH, Consistent Carb  Dispo: PT recs subacute rehab

## 2019-08-14 DIAGNOSIS — I48.91 UNSPECIFIED ATRIAL FIBRILLATION: ICD-10-CM

## 2019-08-14 LAB
ALBUMIN SERPL ELPH-MCNC: 4.1 G/DL — SIGNIFICANT CHANGE UP (ref 3.3–5)
ALP SERPL-CCNC: 47 U/L — SIGNIFICANT CHANGE UP (ref 40–120)
ALT FLD-CCNC: <5 U/L — LOW (ref 10–45)
ANION GAP SERPL CALC-SCNC: 19 MMOL/L — HIGH (ref 5–17)
ANION GAP SERPL CALC-SCNC: 22 MMOL/L — HIGH (ref 5–17)
APTT BLD: 127.2 SEC — CRITICAL HIGH (ref 27.5–36.3)
APTT BLD: 165.3 SEC — CRITICAL HIGH (ref 27.5–36.3)
APTT BLD: 28.4 SEC — SIGNIFICANT CHANGE UP (ref 27.5–36.3)
AST SERPL-CCNC: 28 U/L — SIGNIFICANT CHANGE UP (ref 10–40)
BASOPHILS # BLD AUTO: 0.1 K/UL — SIGNIFICANT CHANGE UP (ref 0–0.2)
BASOPHILS NFR BLD AUTO: 0.4 % — SIGNIFICANT CHANGE UP (ref 0–2)
BILIRUB SERPL-MCNC: 0.4 MG/DL — SIGNIFICANT CHANGE UP (ref 0.2–1.2)
BLD GP AB SCN SERPL QL: NEGATIVE — SIGNIFICANT CHANGE UP
BUN SERPL-MCNC: 42 MG/DL — HIGH (ref 7–23)
BUN SERPL-MCNC: 44 MG/DL — HIGH (ref 7–23)
CALCIUM SERPL-MCNC: 10.2 MG/DL — SIGNIFICANT CHANGE UP (ref 8.4–10.5)
CALCIUM SERPL-MCNC: 9.7 MG/DL — SIGNIFICANT CHANGE UP (ref 8.4–10.5)
CHLORIDE SERPL-SCNC: 95 MMOL/L — LOW (ref 96–108)
CHLORIDE SERPL-SCNC: 97 MMOL/L — SIGNIFICANT CHANGE UP (ref 96–108)
CK MB BLD-MCNC: 11.5 % — HIGH (ref 0–3.5)
CK MB BLD-MCNC: 12.3 % — HIGH (ref 0–3.5)
CK MB CFR SERPL CALC: 12.3 NG/ML — HIGH (ref 0–3.8)
CK MB CFR SERPL CALC: 14.2 NG/ML — HIGH (ref 0–3.8)
CK MB CFR SERPL CALC: 15.7 NG/ML — HIGH (ref 0–3.8)
CK SERPL-CCNC: 100 U/L — SIGNIFICANT CHANGE UP (ref 25–170)
CK SERPL-CCNC: 124 U/L — SIGNIFICANT CHANGE UP (ref 25–170)
CO2 SERPL-SCNC: 23 MMOL/L — SIGNIFICANT CHANGE UP (ref 22–31)
CO2 SERPL-SCNC: 23 MMOL/L — SIGNIFICANT CHANGE UP (ref 22–31)
CREAT SERPL-MCNC: 1.3 MG/DL — SIGNIFICANT CHANGE UP (ref 0.5–1.3)
CREAT SERPL-MCNC: 1.34 MG/DL — HIGH (ref 0.5–1.3)
EOSINOPHIL # BLD AUTO: 0.1 K/UL — SIGNIFICANT CHANGE UP (ref 0–0.5)
EOSINOPHIL NFR BLD AUTO: 0.4 % — SIGNIFICANT CHANGE UP (ref 0–6)
GLUCOSE SERPL-MCNC: 139 MG/DL — HIGH (ref 70–99)
GLUCOSE SERPL-MCNC: 146 MG/DL — HIGH (ref 70–99)
HCT VFR BLD CALC: 36.6 % — SIGNIFICANT CHANGE UP (ref 34.5–45)
HCT VFR BLD CALC: 38.5 % — SIGNIFICANT CHANGE UP (ref 34.5–45)
HCT VFR BLD CALC: 38.6 % — SIGNIFICANT CHANGE UP (ref 34.5–45)
HCT VFR BLD CALC: 38.6 % — SIGNIFICANT CHANGE UP (ref 34.5–45)
HGB BLD-MCNC: 11.7 G/DL — SIGNIFICANT CHANGE UP (ref 11.5–15.5)
HGB BLD-MCNC: 12.3 G/DL — SIGNIFICANT CHANGE UP (ref 11.5–15.5)
HGB BLD-MCNC: 12.3 G/DL — SIGNIFICANT CHANGE UP (ref 11.5–15.5)
HGB BLD-MCNC: 12.4 G/DL — SIGNIFICANT CHANGE UP (ref 11.5–15.5)
INR BLD: 1.16 RATIO — SIGNIFICANT CHANGE UP (ref 0.88–1.16)
LYMPHOCYTES # BLD AUTO: 22.1 % — SIGNIFICANT CHANGE UP (ref 13–44)
LYMPHOCYTES # BLD AUTO: 3.4 K/UL — HIGH (ref 1–3.3)
MAGNESIUM SERPL-MCNC: 2.6 MG/DL — SIGNIFICANT CHANGE UP (ref 1.6–2.6)
MAGNESIUM SERPL-MCNC: 2.6 MG/DL — SIGNIFICANT CHANGE UP (ref 1.6–2.6)
MCHC RBC-ENTMCNC: 28.7 PG — SIGNIFICANT CHANGE UP (ref 27–34)
MCHC RBC-ENTMCNC: 28.8 PG — SIGNIFICANT CHANGE UP (ref 27–34)
MCHC RBC-ENTMCNC: 31.8 GM/DL — LOW (ref 32–36)
MCHC RBC-ENTMCNC: 32 GM/DL — SIGNIFICANT CHANGE UP (ref 32–36)
MCHC RBC-ENTMCNC: 32 GM/DL — SIGNIFICANT CHANGE UP (ref 32–36)
MCHC RBC-ENTMCNC: 32.2 GM/DL — SIGNIFICANT CHANGE UP (ref 32–36)
MCV RBC AUTO: 89.4 FL — SIGNIFICANT CHANGE UP (ref 80–100)
MCV RBC AUTO: 89.8 FL — SIGNIFICANT CHANGE UP (ref 80–100)
MCV RBC AUTO: 89.9 FL — SIGNIFICANT CHANGE UP (ref 80–100)
MCV RBC AUTO: 90.3 FL — SIGNIFICANT CHANGE UP (ref 80–100)
MONOCYTES # BLD AUTO: 1.3 K/UL — HIGH (ref 0–0.9)
MONOCYTES NFR BLD AUTO: 8.3 % — SIGNIFICANT CHANGE UP (ref 2–14)
NEUTROPHILS # BLD AUTO: 10.6 K/UL — HIGH (ref 1.8–7.4)
NEUTROPHILS NFR BLD AUTO: 68.7 % — SIGNIFICANT CHANGE UP (ref 43–77)
PHOSPHATE SERPL-MCNC: 4.7 MG/DL — HIGH (ref 2.5–4.5)
PHOSPHATE SERPL-MCNC: 5.1 MG/DL — HIGH (ref 2.5–4.5)
PLATELET # BLD AUTO: 351 K/UL — SIGNIFICANT CHANGE UP (ref 150–400)
PLATELET # BLD AUTO: 356 K/UL — SIGNIFICANT CHANGE UP (ref 150–400)
PLATELET # BLD AUTO: 373 K/UL — SIGNIFICANT CHANGE UP (ref 150–400)
PLATELET # BLD AUTO: 375 K/UL — SIGNIFICANT CHANGE UP (ref 150–400)
POTASSIUM SERPL-MCNC: 4 MMOL/L — SIGNIFICANT CHANGE UP (ref 3.5–5.3)
POTASSIUM SERPL-MCNC: 4.2 MMOL/L — SIGNIFICANT CHANGE UP (ref 3.5–5.3)
POTASSIUM SERPL-SCNC: 4 MMOL/L — SIGNIFICANT CHANGE UP (ref 3.5–5.3)
POTASSIUM SERPL-SCNC: 4.2 MMOL/L — SIGNIFICANT CHANGE UP (ref 3.5–5.3)
PROCALCITONIN SERPL-MCNC: 0.14 NG/ML — HIGH (ref 0.02–0.1)
PROT SERPL-MCNC: 7.7 G/DL — SIGNIFICANT CHANGE UP (ref 6–8.3)
PROTHROM AB SERPL-ACNC: 13.3 SEC — HIGH (ref 10–12.9)
RBC # BLD: 4.08 M/UL — SIGNIFICANT CHANGE UP (ref 3.8–5.2)
RBC # BLD: 4.28 M/UL — SIGNIFICANT CHANGE UP (ref 3.8–5.2)
RBC # BLD: 4.29 M/UL — SIGNIFICANT CHANGE UP (ref 3.8–5.2)
RBC # BLD: 4.32 M/UL — SIGNIFICANT CHANGE UP (ref 3.8–5.2)
RBC # FLD: 14.2 % — SIGNIFICANT CHANGE UP (ref 10.3–14.5)
RH IG SCN BLD-IMP: POSITIVE — SIGNIFICANT CHANGE UP
SODIUM SERPL-SCNC: 139 MMOL/L — SIGNIFICANT CHANGE UP (ref 135–145)
SODIUM SERPL-SCNC: 140 MMOL/L — SIGNIFICANT CHANGE UP (ref 135–145)
TROPONIN T, HIGH SENSITIVITY RESULT: 391 NG/L — HIGH (ref 0–51)
TROPONIN T, HIGH SENSITIVITY RESULT: 407 NG/L — HIGH (ref 0–51)
TROPONIN T, HIGH SENSITIVITY RESULT: 456 NG/L — HIGH (ref 0–51)
TSH SERPL-MCNC: 0.7 UIU/ML — SIGNIFICANT CHANGE UP (ref 0.27–4.2)
WBC # BLD: 12.7 K/UL — HIGH (ref 3.8–10.5)
WBC # BLD: 12.9 K/UL — HIGH (ref 3.8–10.5)
WBC # BLD: 13.9 K/UL — HIGH (ref 3.8–10.5)
WBC # BLD: 15.4 K/UL — HIGH (ref 3.8–10.5)
WBC # FLD AUTO: 12.7 K/UL — HIGH (ref 3.8–10.5)
WBC # FLD AUTO: 12.9 K/UL — HIGH (ref 3.8–10.5)
WBC # FLD AUTO: 13.9 K/UL — HIGH (ref 3.8–10.5)
WBC # FLD AUTO: 15.4 K/UL — HIGH (ref 3.8–10.5)

## 2019-08-14 PROCEDURE — 99223 1ST HOSP IP/OBS HIGH 75: CPT | Mod: GC

## 2019-08-14 PROCEDURE — 93010 ELECTROCARDIOGRAM REPORT: CPT | Mod: 76,77

## 2019-08-14 PROCEDURE — 93010 ELECTROCARDIOGRAM REPORT: CPT

## 2019-08-14 PROCEDURE — 99233 SBSQ HOSP IP/OBS HIGH 50: CPT | Mod: GC

## 2019-08-14 RX ORDER — ASPIRIN/CALCIUM CARB/MAGNESIUM 324 MG
325 TABLET ORAL DAILY
Refills: 0 | Status: DISCONTINUED | OUTPATIENT
Start: 2019-08-14 | End: 2019-08-14

## 2019-08-14 RX ORDER — AMIODARONE HYDROCHLORIDE 400 MG/1
150 TABLET ORAL ONCE
Refills: 0 | Status: COMPLETED | OUTPATIENT
Start: 2019-08-14 | End: 2019-08-14

## 2019-08-14 RX ORDER — WARFARIN SODIUM 2.5 MG/1
3 TABLET ORAL ONCE
Refills: 0 | Status: COMPLETED | OUTPATIENT
Start: 2019-08-14 | End: 2019-08-14

## 2019-08-14 RX ORDER — HEPARIN SODIUM 5000 [USP'U]/ML
5000 INJECTION INTRAVENOUS; SUBCUTANEOUS EVERY 6 HOURS
Refills: 0 | Status: DISCONTINUED | OUTPATIENT
Start: 2019-08-14 | End: 2019-08-22

## 2019-08-14 RX ORDER — ATORVASTATIN CALCIUM 80 MG/1
40 TABLET, FILM COATED ORAL
Refills: 0 | Status: DISCONTINUED | OUTPATIENT
Start: 2019-08-14 | End: 2019-08-16

## 2019-08-14 RX ORDER — HEPARIN SODIUM 5000 [USP'U]/ML
INJECTION INTRAVENOUS; SUBCUTANEOUS
Qty: 25000 | Refills: 0 | Status: DISCONTINUED | OUTPATIENT
Start: 2019-08-14 | End: 2019-08-22

## 2019-08-14 RX ORDER — METOPROLOL TARTRATE 50 MG
25 TABLET ORAL
Refills: 0 | Status: DISCONTINUED | OUTPATIENT
Start: 2019-08-14 | End: 2019-08-16

## 2019-08-14 RX ORDER — NYSTATIN 500MM UNIT
500000 POWDER (EA) MISCELLANEOUS THREE TIMES A DAY
Refills: 0 | Status: DISCONTINUED | OUTPATIENT
Start: 2019-08-14 | End: 2019-08-15

## 2019-08-14 RX ORDER — HEPARIN SODIUM 5000 [USP'U]/ML
INJECTION INTRAVENOUS; SUBCUTANEOUS
Qty: 25000 | Refills: 0 | Status: DISCONTINUED | OUTPATIENT
Start: 2019-08-14 | End: 2019-08-14

## 2019-08-14 RX ORDER — SODIUM CHLORIDE 9 MG/ML
1000 INJECTION, SOLUTION INTRAVENOUS
Refills: 0 | Status: DISCONTINUED | OUTPATIENT
Start: 2019-08-14 | End: 2019-08-15

## 2019-08-14 RX ORDER — HEPARIN SODIUM 5000 [USP'U]/ML
3800 INJECTION INTRAVENOUS; SUBCUTANEOUS ONCE
Refills: 0 | Status: COMPLETED | OUTPATIENT
Start: 2019-08-14 | End: 2019-08-14

## 2019-08-14 RX ORDER — HEPARIN SODIUM 5000 [USP'U]/ML
2500 INJECTION INTRAVENOUS; SUBCUTANEOUS EVERY 6 HOURS
Refills: 0 | Status: DISCONTINUED | OUTPATIENT
Start: 2019-08-14 | End: 2019-08-22

## 2019-08-14 RX ORDER — HEPARIN SODIUM 5000 [USP'U]/ML
3800 INJECTION INTRAVENOUS; SUBCUTANEOUS EVERY 6 HOURS
Refills: 0 | Status: DISCONTINUED | OUTPATIENT
Start: 2019-08-14 | End: 2019-08-14

## 2019-08-14 RX ADMIN — DULOXETINE HYDROCHLORIDE 60 MILLIGRAM(S): 30 CAPSULE, DELAYED RELEASE ORAL at 15:21

## 2019-08-14 RX ADMIN — Medication 500000 UNIT(S): at 23:42

## 2019-08-14 RX ADMIN — Medication 600 MILLIGRAM(S): at 18:24

## 2019-08-14 RX ADMIN — HEPARIN SODIUM 900 UNIT(S)/HR: 5000 INJECTION INTRAVENOUS; SUBCUTANEOUS at 13:01

## 2019-08-14 RX ADMIN — AMIODARONE HYDROCHLORIDE 600 MILLIGRAM(S): 400 TABLET ORAL at 03:30

## 2019-08-14 RX ADMIN — CARBIDOPA AND LEVODOPA 2 TABLET(S): 25; 100 TABLET ORAL at 06:38

## 2019-08-14 RX ADMIN — CEFTRIAXONE 100 MILLIGRAM(S): 500 INJECTION, POWDER, FOR SOLUTION INTRAMUSCULAR; INTRAVENOUS at 00:08

## 2019-08-14 RX ADMIN — SODIUM CHLORIDE 50 MILLILITER(S): 9 INJECTION, SOLUTION INTRAVENOUS at 18:24

## 2019-08-14 RX ADMIN — Medication 25 MILLIGRAM(S): at 18:24

## 2019-08-14 RX ADMIN — HEPARIN SODIUM 3800 UNIT(S): 5000 INJECTION INTRAVENOUS; SUBCUTANEOUS at 04:15

## 2019-08-14 RX ADMIN — ATORVASTATIN CALCIUM 40 MILLIGRAM(S): 80 TABLET, FILM COATED ORAL at 21:54

## 2019-08-14 RX ADMIN — Medication 5 MILLIGRAM(S): at 21:55

## 2019-08-14 RX ADMIN — HEPARIN SODIUM 1100 UNIT(S)/HR: 5000 INJECTION INTRAVENOUS; SUBCUTANEOUS at 04:29

## 2019-08-14 RX ADMIN — Medication 20 MILLIGRAM(S): at 06:38

## 2019-08-14 RX ADMIN — HEPARIN SODIUM 800 UNIT(S)/HR: 5000 INJECTION INTRAVENOUS; SUBCUTANEOUS at 21:15

## 2019-08-14 RX ADMIN — CARBIDOPA AND LEVODOPA 2 TABLET(S): 25; 100 TABLET ORAL at 15:21

## 2019-08-14 RX ADMIN — WARFARIN SODIUM 3 MILLIGRAM(S): 2.5 TABLET ORAL at 21:56

## 2019-08-14 RX ADMIN — HEPARIN SODIUM 750 UNIT(S)/HR: 5000 INJECTION INTRAVENOUS; SUBCUTANEOUS at 04:15

## 2019-08-14 RX ADMIN — GABAPENTIN 300 MILLIGRAM(S): 400 CAPSULE ORAL at 21:55

## 2019-08-14 RX ADMIN — Medication 600 MILLIGRAM(S): at 06:38

## 2019-08-14 RX ADMIN — PANTOPRAZOLE SODIUM 40 MILLIGRAM(S): 20 TABLET, DELAYED RELEASE ORAL at 06:38

## 2019-08-14 RX ADMIN — CEFTRIAXONE 100 MILLIGRAM(S): 500 INJECTION, POWDER, FOR SOLUTION INTRAMUSCULAR; INTRAVENOUS at 23:43

## 2019-08-14 RX ADMIN — Medication 1: at 09:00

## 2019-08-14 RX ADMIN — HEPARIN SODIUM 0 UNIT(S)/HR: 5000 INJECTION INTRAVENOUS; SUBCUTANEOUS at 11:43

## 2019-08-14 RX ADMIN — SENNA PLUS 2 TABLET(S): 8.6 TABLET ORAL at 21:56

## 2019-08-14 RX ADMIN — Medication 10 MILLIGRAM(S): at 18:24

## 2019-08-14 RX ADMIN — CARBIDOPA AND LEVODOPA 2 TABLET(S): 25; 100 TABLET ORAL at 23:43

## 2019-08-14 RX ADMIN — Medication 10 MILLIGRAM(S): at 06:38

## 2019-08-14 NOTE — PROGRESS NOTE ADULT - PROBLEM SELECTOR PLAN 3
-Pt A&Ox1 (self)  -Usually A&Ox2-3 at baseline, per son  -Will continue to re-orient pt. Will attempt move to window side if possible  -Melatonin 5mg HS -Severe on TTE  - given patient is volume down on exam, will hold lasix for today. Reassess and consider restarting tomorrow -Severe on TTE  - given patient is volume down on exam with some MARY, will hold lasix for today. Reassess and consider restarting tomorrow

## 2019-08-14 NOTE — PROGRESS NOTE ADULT - PROBLEM SELECTOR PLAN 6
-Patient with large stool burden on imaging, but no SBO  -C/w Miralax, colace, senna  -Received tap water enema x1 -hold home dose losartan until BP stablizes  -c/w metoprolol for rate control and BP -hold home dose losartan until BP stabilizes  -c/w metoprolol for rate control and BP

## 2019-08-14 NOTE — CONSULT NOTE ADULT - SUBJECTIVE AND OBJECTIVE BOX
Patient seen and evaluated at bedside    Chief Complaint:    HPI:  Ms. Mae is a 86 yo F with GERD, HTN, HLD, DM2 with neuropathy, Parkinson's, anxiety/depression, dementia, AS s/p TAVR on Plavix, MS, ?CHF (on lasix 20 BID at home), PMR on prednisone, ?COPD who cardiology is being consulted for new onset a. fib with RVR.  Patient has been admitted for treatment of respiratory failure 2/2 COPD exacerbation/pneumonia, developed acute encephalopathy thought due to symptomatic UTI.  Rapid response called overnight for tachycardia to 130-140s, EKG with evidence of atrial fibrillation with RVR, minimally responsive to IV metoprolol 5mg x 2. Prior EKG on file showed LBBB.    PMHx:   Insomnia  Iron deficiency anemia  Anxiety  Parkinson disease  Aortic stenosis  Neuropathy  Polymyalgia rheumatica  Diabetes  Hyperlipidemia  Hypertension      PSHx:   S/P TAVR (transcatheter aortic valve replacement)      Allergies:  penicillin (Unknown)      Home Meds:    Current Medications:   acetaminophen   Tablet .. 650 milliGRAM(s) Oral every 6 hours PRN  ALPRAZolam 0.5 milliGRAM(s) Oral two times a day PRN  carbidopa/levodopa  25/100 2 Tablet(s) Oral three times a day  cefTRIAXone   IVPB 1000 milliGRAM(s) IV Intermittent every 24 hours  clopidogrel Tablet 75 milliGRAM(s) Oral daily  dextrose 40% Gel 15 Gram(s) Oral once PRN  dextrose 5%. 1000 milliLiter(s) IV Continuous <Continuous>  dextrose 50% Injectable 12.5 Gram(s) IV Push once  dextrose 50% Injectable 25 Gram(s) IV Push once  dextrose 50% Injectable 25 Gram(s) IV Push once  docusate sodium 100 milliGRAM(s) Oral daily  DULoxetine 60 milliGRAM(s) Oral daily  furosemide    Tablet 20 milliGRAM(s) Oral two times a day  gabapentin 300 milliGRAM(s) Oral at bedtime  glucagon  Injectable 1 milliGRAM(s) IntraMuscular once PRN  guaiFENesin  milliGRAM(s) Oral every 12 hours  heparin  Infusion.  Unit(s)/Hr IV Continuous <Continuous>  heparin  Injectable 5000 Unit(s) IV Push every 6 hours PRN  heparin  Injectable 2500 Unit(s) IV Push every 6 hours PRN  insulin lispro (HumaLOG) corrective regimen sliding scale   SubCutaneous three times a day before meals  insulin lispro (HumaLOG) corrective regimen sliding scale   SubCutaneous at bedtime  melatonin 5 milliGRAM(s) Oral at bedtime  pantoprazole    Tablet 40 milliGRAM(s) Oral before breakfast  polyethylene glycol 3350 17 Gram(s) Oral daily  predniSONE   Tablet 10 milliGRAM(s) Oral daily  senna 2 Tablet(s) Oral at bedtime      FAMILY HISTORY:  No pertinent family history in first degree relatives      Social History: Unable to obtain  Smoking History: Unable to obtain  Alcohol Use: Unable to obtain  Drug Use: Unable to obtain    REVIEW OF SYSTEMS:  Unable to opi  Physical Exam:  T(F): 98.3 (08-14), Max: 99.2 (08-13)  HR: 140 (08-14) (67 - 140)  BP: 117/63 (08-14) (110/76 - 130/73)  RR: 22 (08-14)  SpO2: 99% (08-14)  GENERAL: AxO x 1, not responsive on exam, does not appear to be in pain  HEAD:  Atraumatic, Normocephalic  ENT: EOMI, conjunctiva and sclera clear, Neck supple, No JVD, moist mucosa  CHEST/LUNG: Clear to auscultation bilaterally with decreased airway over bases;; No wheeze, equal breath sounds bilaterally   HEART: Tachycardic and ireegularly irregularl. No discernable murmurs.< from: TTE with Doppler (w/Cont) (08.12.19 @ 18:25) >    ABDOMEN: Soft, Nontender  EXTREMITIES:  No clubbing, cyanosis, or edema  SKIN: Normal color, No rashes or lesions    Cardiovascular Diagnostic Testing:    ECG: Personally reviewed:  Atrial fibrillation with RVR. nonspecific ST elevations in precordial leads, left bundle branch block    Echo: Personally reviewed:  Conclusions:  Technically difficult study.  Endocardial visualization  enhanced with intravenous injection of Ultrasonic Enhancing  Agent (Definity).  1. Moderately dilated left atrium.  2. Hyperdynamic left ventricular systolic function. Left  ventricular ejection fraction >70%. No wall motion  abnormality.  3. Elevated left atrial pressure=26mmHG.  4. Normal right atrium.  5. Normal right ventricular size and function.  6. Transcatheter aortic valve replacement. Well seated  valve. Acceptable gradients. No aortic regurgitation.  7. Severe mitral stenosis.  8. Normal pericardium with no pericardial effusion.    Stress Testing:    Cath: none    Imaging:    CXR: Personally reviewed    Labs: Personally reviewed                        12.3   15.4  )-----------( 375      ( 14 Aug 2019 07:13 )             38.5     08-14    140  |  95<L>  |  44<H>  ----------------------------<  146<H>  4.0   |  23  |  1.34<H>    Ca    10.2      14 Aug 2019 07:13  Phos  5.1     08-14  Mg     2.6     08-14    TPro  7.7  /  Alb  4.1  /  TBili  0.4  /  DBili  x   /  AST  28  /  ALT  <5<L>  /  AlkPhos  47  08-14    PT/INR - ( 14 Aug 2019 03:53 )   PT: 13.3 sec;   INR: 1.16 ratio         PTT - ( 14 Aug 2019 03:53 )  PTT:28.4 sec    CARDIAC MARKERS ( 14 Aug 2019 07:13 )  456 ng/L / x     / x     / 124 U/L / x     / 14.2 ng/mL  CARDIAC MARKERS ( 14 Aug 2019 03:53 )  407 ng/L / x     / x     / x     / x     / 15.7 ng/mL        Serum Pro-Brain Natriuretic Peptide: 4086 pg/mL (08-10 @ 23:03)      Hemoglobin A1C, Whole Blood: 6.0 % (08-12 @ 13:33) Patient seen and evaluated at bedside    Chief Complaint:    HPI:  Ms. Mae is a 84 yo F with GERD, HTN, HLD, DM2 with neuropathy, Parkinson's, anxiety/depression, dementia, AS s/p TAVR on Plavix, MS, ?CHF (on Lasix 20 BID at home), PMR on prednisone, ?COPD.  Cardiology is being consulted for new onset a. fib with RVR.  Patient was been admitted for treatment of respiratory failure 2/2 COPD exacerbation/pneumonia and developed acute encephalopathy thought due to symptomatic UTI.  Rapid response called overnight for tachycardia to 130-140s, EKG with evidence of atrial fibrillation with RVR, minimally responsive to IV metoprolol 5mg x 2. Prior EKG on file showed LBBB.      PMHx:   Insomnia  Iron deficiency anemia  Anxiety  Parkinson disease  Aortic stenosis  Neuropathy  Polymyalgia rheumatica  Diabetes  Hyperlipidemia  Hypertension      PSHx:   S/P TAVR (transcatheter aortic valve replacement)      Allergies:  penicillin (Unknown)      Current Medications:   acetaminophen   Tablet .. 650 milliGRAM(s) Oral every 6 hours PRN  ALPRAZolam 0.5 milliGRAM(s) Oral two times a day PRN  carbidopa/levodopa  25/100 2 Tablet(s) Oral three times a day  cefTRIAXone   IVPB 1000 milliGRAM(s) IV Intermittent every 24 hours  clopidogrel Tablet 75 milliGRAM(s) Oral daily  dextrose 40% Gel 15 Gram(s) Oral once PRN  dextrose 5%. 1000 milliLiter(s) IV Continuous <Continuous>  dextrose 50% Injectable 12.5 Gram(s) IV Push once  dextrose 50% Injectable 25 Gram(s) IV Push once  dextrose 50% Injectable 25 Gram(s) IV Push once  docusate sodium 100 milliGRAM(s) Oral daily  DULoxetine 60 milliGRAM(s) Oral daily  furosemide    Tablet 20 milliGRAM(s) Oral two times a day  gabapentin 300 milliGRAM(s) Oral at bedtime  glucagon  Injectable 1 milliGRAM(s) IntraMuscular once PRN  guaiFENesin  milliGRAM(s) Oral every 12 hours  heparin  Infusion.  Unit(s)/Hr IV Continuous <Continuous>  heparin  Injectable 5000 Unit(s) IV Push every 6 hours PRN  heparin  Injectable 2500 Unit(s) IV Push every 6 hours PRN  insulin lispro (HumaLOG) corrective regimen sliding scale   SubCutaneous three times a day before meals  insulin lispro (HumaLOG) corrective regimen sliding scale   SubCutaneous at bedtime  melatonin 5 milliGRAM(s) Oral at bedtime  pantoprazole    Tablet 40 milliGRAM(s) Oral before breakfast  polyethylene glycol 3350 17 Gram(s) Oral daily  predniSONE   Tablet 10 milliGRAM(s) Oral daily  senna 2 Tablet(s) Oral at bedtime      FAMILY HISTORY:  No pertinent cardiac history in first degree relatives      Social History: Unable to obtain  Smoking History: Unable to obtain  Alcohol Use: Unable to obtain  Drug Use: Unable to obtain    REVIEW OF SYSTEMS:  Unable to obtain      Physical Exam:  T(F): 98.3 (08-14), Max: 99.2 (08-13)  HR: 140 (08-14) (67 - 140)  BP: 117/63 (08-14) (110/76 - 130/73)  RR: 22 (08-14)  SpO2: 99% (08-14)    GENERAL: AxO x 1, not responsive on exam, does not appear to be in pain  HEAD:  Atraumatic, Normocephalic  ENT: EOMI, conjunctiva and sclera clear, Neck supple, No JVD, moist mucosa  CHEST/LUNG: Clear to auscultation bilaterally with decreased airway over bases;; No wheeze, equal breath sounds bilaterally   HEART: Tachycardic and irregularly irregular. No discernable murmurs.  ABDOMEN: Soft, Nontender  EXTREMITIES:  No clubbing, cyanosis, or edema  SKIN: Normal color, No rashes or lesions      EKG:  Atrial fibrillation with RVR.  LBBB, non-specific ST changes in precordial leads.      Echo:   Conclusions:  Technically difficult study.  Endocardial visualization enhanced with intravenous injection of Ultrasonic Enhancing Agent (Definity).  1. Moderately dilated left atrium.  2. Hyperdynamic left ventricular systolic function. Left ventricular ejection fraction >70%. No wall motion abnormality.  3. Elevated left atrial pressure=26mmHG.  4. Normal right atrium.  5. Normal right ventricular size and function.  6. Transcatheter aortic valve replacement. Well seated valve. Acceptable gradients. No aortic regurgitation.  7. Severe mitral stenosis.  8. Normal pericardium with no pericardial effusion.        Labs:                       12.3   15.4  )-----------( 375      ( 14 Aug 2019 07:13 )             38.5     08-14  140  |  95<L>  |  44<H>  ----------------------------<  146<H>  4.0   |  23  |  1.34<H>    Ca    10.2      14 Aug 2019 07:13  Phos  5.1     08-14  Mg     2.6     08-14    TPro  7.7  /  Alb  4.1  /  TBili  0.4  /  DBili  x   /  AST  28  /  ALT  <5<L>  /  AlkPhos  47  08-14    PT/INR - ( 14 Aug 2019 03:53 )   PT: 13.3 sec;   INR: 1.16 ratio    PTT - ( 14 Aug 2019 03:53 )  PTT:28.4 sec      CARDIAC MARKERS ( 14 Aug 2019 07:13 )  456 ng/L / x     / x     / 124 U/L / x     / 14.2 ng/mL  CARDIAC MARKERS ( 14 Aug 2019 03:53 )  407 ng/L / x     / x     / x     / x     / 15.7 ng/mL    Serum Pro-Brain Natriuretic Peptide: 4086 pg/mL (08-10 @ 23:03)    Hemoglobin A1C, Whole Blood: 6.0 % (08-12 @ 13:33)

## 2019-08-14 NOTE — DISCHARGE NOTE PROVIDER - HOSPITAL COURSE
Mrs. Mae is a 86 y/o lady with PMH of GERD, HTN, HLD, DM2 with neuropathy, Parkinson's, anxiety/depression, AS s/p TAVR on plavix, overactive bladder, ?CHF, PMR, COPD, who is admitted for hypoxic/hypercarbic respiratory failure in setting of sepsis, possibly COPD exacerbation in setting of URI/PNA. Course c/b UTI and acute metabolic encephalopathy.        8/11: A1c ordered. SSI changed to low dose (patinet on prednisone, should continue to monitor BG), TTE, c/w CTX, PT ordered, holding lasix in setting of sepsis.    8/12: continued abx    08/13: Mental status improving, continue abx. Restarted lasix after TTE showed severe MS    8/14: afib this early AM and rapid was called, was given metoprolol and amio. Now started on standing metoprolol Mrs. Mae is a 84 y/o lady with PMH of GERD, HTN, HLD, DM2 with neuropathy, Parkinson's, anxiety/depression, AS s/p TAVR on plavix, overactive bladder, ?CHF, PMR, COPD, who is admitted for hypoxic/hypercarbic respiratory failure in setting of sepsis, possibly COPD exacerbation in setting of URI/PNA. Course c/b UTI, acute metabolic encephalopathy and dysphagia. Lasix was held in setting of sepsis. Mental status improved and continue ceftriaxone. Lasix was restarted after TTE showed severe MS. Patient developed a new onset of afib on 8/14 AM and rapid was called. Patient was given metoprolol and amio. Patient converted back to sinus later in the morning and started on standing metoprolol. Given concerns of dysphagia, patient underwent S+S and subsequently failed MBS. ENT consulted. Patient was started on diflucan in addition to nystation for oral thrush and CT neck pending for vocal cord paralysis and CT neck was concerned for mass/zenker diverticulum. NG tube placed while waiting for ENT vocal cord injection. Patient was restarted lasix. Patient 8/17: 3mg coumadin ordered for tonight, neuro consulted for dysphagia eval as per GI recs, started on flagyl 500TID for suspected aspiration PNA given incomplete anaerobic coverage with ceftriaxone, pt does not appear fluid overloaded so no lasix given    8/18: will not dose coumadin at this time, continue heparin drip, will speak with cards for clearance for VC injection in AM, no scanning of head per neuro recs     8/19: WBC uptrending, currently on flagyl (last day tomorrow)    8/20: discontinue flagyl after today, ENT to do botox for vocal cord on 8/22 (Thursday), okay with leukocytosis as it does not appear to be infectious     8/21: vocal cord procesure planned for tomorrow    8/22: s/p VC injection, restarted tube feeds, call ENT tomorrow to find out when to restart heparin     8/23: failed MBS, wants PEG, plan for Monday 8/24: c/w heparin drip and pending PEG on Monday 8/25: continue heparin drip, pending PEG monday, Mrs. Mae is a 84 y/o lady with PMH of GERD, HTN, HLD, DM2 with neuropathy, Parkinson's, anxiety/depression, AS s/p TAVR on plavix, overactive bladder, ?CHF, PMR, COPD, who is admitted for hypoxic/hypercarbic respiratory failure in setting of sepsis, possibly COPD exacerbation in setting of URI/PNA. Course c/b UTI, acute metabolic encephalopathy and dysphagia. Lasix was held in setting of sepsis. Mental status improved and continue ceftriaxone. Lasix was restarted after TTE showed severe MS. Patient developed a new onset of afib on 8/14 AM and rapid was called. Patient was given metoprolol and amio. Patient converted back to sinus later in the morning and started on standing metoprolol. Given concerns of dysphagia, patient underwent S+S and subsequently failed MBS. ENT consulted. Patient was started on diflucan in addition to nystation for oral thrush and CT neck pending for vocal cord paralysis and CT neck was concerned for mass/zenker diverticulum. NG tube placed while waiting for ENT vocal cord injection. Patient was restarted lasix. Patient was initially started on coumad bridge but due to ENT procedure, bridging was held. Cardiology suggested NOAC instead after all the procedures are done. Neuro consulted for dysphagia eval as per GI recs. Patient started on 3 day course of flagyl 500TID for suspected aspiration PNA given incomplete anaerobic coverage with ceftriaxone. s/p VC injection on 8/22, patient failed repeat MBS on 8/23, a PEG tube placement is planned for Monday 8/26. Mrs. Mae is a 86 y/o lady with PMH of GERD, HTN, HLD, DM2 with neuropathy, Parkinson's, anxiety/depression, AS s/p TAVR on plavix, overactive bladder, ?CHF, PMR, COPD, who is admitted for hypoxic/hypercarbic respiratory failure in setting of sepsis, possibly COPD exacerbation in setting of URI/PNA. Course c/b UTI, acute metabolic encephalopathy and dysphagia. Lasix was held in setting of sepsis. Mental status improved and continue ceftriaxone. Lasix was restarted after TTE showed severe MS. Patient developed a new onset of afib on 8/14 AM and rapid was called. Patient was given metoprolol and amio. Patient converted back to sinus later in the morning and started on standing metoprolol. Given concerns of dysphagia, patient underwent S+S and subsequently failed MBS. ENT consulted. Patient was started on diflucan in addition to nystation for oral thrush and CT neck pending for vocal cord paralysis and CT neck was concerned for mass/zenker diverticulum. NG tube placed while waiting for ENT vocal cord injection. Patient was restarted lasix. Patient was initially started on coumad bridge but due to ENT procedure, bridging was held. Cardiology suggested NOAC instead after all the procedures are done. Neuro consulted for dysphagia eval as per GI recs. Patient started on 3 day course of flagyl 500TID for suspected aspiration PNA given incomplete anaerobic coverage with ceftriaxone. s/p VC injection on 8/22, patient failed repeat MBS on 8/23, a PEG tube placement was planned. However, patient was transferred to MICU after a RRT for hypoxic respiratory failure 2/2 aspiration pneumonia. Patient was intubated on 08/30 with extubation on 08/31. Repeat cultures were positive for MSSA bacteremia. As per ID, patient requires cefazolin for 6 weeks. PEG tube was successfully placed on 09/10. CTAP showed adequate placement of PEG tube. Patient was deemed safe for discharge to [facility] on 09/13.

## 2019-08-14 NOTE — RAPID RESPONSE TEAM SUMMARY - NSADDTLFINDINGSRRT_GEN_ALL_CORE
BP stable in 130s. Given Metoprolol 5mg IV x 1. SBP running low 100s after both doses. HR remaining 140s.  500cc IVF as patient appearing dry and with UTI.  Then given  with 150 IV x 1 due to relative hypotension sp Metoprolol.  HR fluctuating AF 80s-120s.  BP stable in low 100s. Placed on 2L NC for O2 Sat 90% on room air. Improvement to high 90s.  Denies chest pain, any other complaints.  Repeat EKG with AF in 90s. Some concern for questionable ST elevation in leads V1-V3.  Plan to start heparin gtt for AF.  PHONG Kam reviewed EKG with cardiology.  Per cardiology, not concerned for STEMI at this time.  Agree with starting Heparin and following up cardiac enzymes. Full set of labs drawn, currently pending. Will see patient at some point as patient currently stable. Transferred to tele for monitoring.     Recommendations:  1) Telemetry monitoring  2) Consider low dose metoprolol as tolerated by BP (discontinued losartan for now)  3) Heparin gtt for AC in setting of new AF  4) Trend cardiac enzymes  5) Follow up lab work and optimize electrolytes  6) Cardiology consult/follow up recommendations  7) VS q4h  8) Consider new TTE

## 2019-08-14 NOTE — DISCHARGE NOTE PROVIDER - NSDCCPCAREPLAN_GEN_ALL_CORE_FT
PRINCIPAL DISCHARGE DIAGNOSIS  Diagnosis: UTI (urinary tract infection)  Assessment and Plan of Treatment: You were treated with antibiotics and your symptoms improved.      SECONDARY DISCHARGE DIAGNOSES  Diagnosis: Oral thrush  Assessment and Plan of Treatment: You were treated with nystatin and diflucan.    Diagnosis: Vocal cord paralysis  Assessment and Plan of Treatment: You were treated with vocal cord injection by ENT.    Diagnosis: Afib  Assessment and Plan of Treatment: You were started on a heparin drip for atrial fibrilation. You will transition to a NOAC after discharge. PRINCIPAL DISCHARGE DIAGNOSIS  Diagnosis: MSSA bacteremia  Assessment and Plan of Treatment: You were found to have an infection of the blood requiring intravenous antibiotics for an extended period of time. Thus, it was necessary to place a line in your veins in order to administer antibiotics. You are now being discharged with home cefazolin for a total course of 6 weeks.      SECONDARY DISCHARGE DIAGNOSES  Diagnosis: Dysphagia  Assessment and Plan of Treatment: It was deemed dangerous for you to take in food and medications by mouth. Therefore, a PEG tube was placed into your stomach, through which you receive feeds. You are being discharged to [facility] where you will continue to receive food through the tube.    Diagnosis: Oral thrush  Assessment and Plan of Treatment: You were treated with nystatin and diflucan.    Diagnosis: Vocal cord paralysis  Assessment and Plan of Treatment: You were treated with vocal cord injection by ENT.    Diagnosis: Afib  Assessment and Plan of Treatment: You were started on a heparin drip for atrial fibrilation. You will transition to a NOAC after discharge.

## 2019-08-14 NOTE — PROGRESS NOTE ADULT - PROBLEM SELECTOR PLAN 1
-With acute hypoxic and hypercarbic respiratory failure  -c/w w/ CTX for CAP  -Taper off NC as tolerated, BIPAP prn for hypercarbia  -RVP negative  - leukocytosis is downtrending. - new onset Afib, likely 2/2 over diuresis vs infection   - converted back to sinus this am at around 9 am  - now on heparin gtt, will dc plavix  - start metoprolol 25mg BID for rate control per cardiology  - given patient is volume down on exam, will hold lasix for today. Reassess and consider restarting tomorrow  - f/u cardiology recs  - f/u repeat ekg - new onset Afib, likely 2/2 over diuresis vs infection   - converted back to sinus this am at around 9 am  - now on heparin gtt, will dc plavix  - TSH wnl  - start metoprolol 25mg BID for rate control per cardiology  - given patient is volume down on exam, will hold lasix for today. Reassess and consider restarting tomorrow  - f/u cardiology recs  - f/u repeat ekg - new onset Afib, likely 2/2 over diuresis vs infection   - converted back to sinus this am at around 9 am  - now on heparin gtt, will dc plavix  -Will start bridging to coumadin 3mg QHS tonight.   - TSH wnl  - start metoprolol 25mg BID for rate control per cardiology  - given patient is volume down on exam, will hold lasix for today. Reassess and consider restarting tomorrow  - f/u cardiology recs  - f/u repeat ekg  -C/w telemetry.  -Troponins increased, likely due to demand ischemia from Afib with RVR. Continue to trend. F/u cardio recs. On heparin drip.

## 2019-08-14 NOTE — PROGRESS NOTE ADULT - ASSESSMENT
85 F with GERD, HTN, HLD, DM2 with neuropathy, Parkinson's, anxiety/depression, AS s/p TAVR on plavix, overactive bladder, ?CHF, PMR, COPD p/w SOB and abdominal pain, admitted for acute hypoxic and hypercarbic respiratory in setting of pneumonia and COPD exacerbation, course c/b UTI and acute metabolic encephalopathy. 85 F with GERD, HTN, HLD, DM2 with neuropathy, Parkinson's, anxiety/depression, AS s/p TAVR on plavix, overactive bladder, ?CHF, PMR, COPD p/w SOB and abdominal pain, admitted for acute hypoxic and hypercarbic respiratory failure in setting of pneumonia and COPD exacerbation, course c/b UTI and acute metabolic encephalopathy.

## 2019-08-14 NOTE — PROGRESS NOTE ADULT - PROBLEM SELECTOR PLAN 2
-Severe on TTE  -Restarted lasix 20mg BID  -Will contact PMD to determine course of disease  -If new or worsening, will consult cards for workup inpt -With acute hypoxic and hypercarbic respiratory failure  -c/w w/ CTX for CAP  -Taper off NC as tolerated, BIPAP prn for hypercarbia  -RVP negative  - leukocytosis is downtrending. -With acute hypoxic and hypercarbic respiratory failure  -c/w CTX for CAP  -Taper off NC as tolerated, BIPAP prn for hypercarbia  -RVP negative  - leukocytosis is downtrending.  -Guaifenesin for cough.

## 2019-08-14 NOTE — PROGRESS NOTE ADULT - PROBLEM SELECTOR PLAN 10
DVT ppx: HSQ  Diet: DASH, Consistent Carb  Dispo: PT recs subacute rehab -Patient not on home anti-hyperglycemics  - FS acceptable  - c/w Insulin sliding scale    Oral thrush  -nystatin suspension    Prophylaxis  DVT ppx: on heparin gtt  Diet: NPO and pending formal speech and swallow eval given per nursing patient failed bedside speech and swallow  Dispo: PT recs subacute rehab -Patient not on home anti-hyperglycemics  - FS acceptable  - c/w Insulin sliding scale    Oral thrush  -nystatin suspension    Prophylaxis  DVT ppx: on heparin gtt  Diet: NPO except meds and pending formal speech and swallow eval given per nursing patient failed bedside speech and swallow  Dispo: PT recs subacute rehab

## 2019-08-14 NOTE — PROVIDER CONTACT NOTE (OTHER) - SITUATION
I was giving the patient her miralax with apple juice. Pt. took a sip of the apple juice and she began to cough.

## 2019-08-14 NOTE — PROGRESS NOTE ADULT - PROBLEM SELECTOR PLAN 9
-Patient not on home anti-hyperglycemics  - FS acceptable  - c/w Insulin Sliding scale -TTE --> Normal LF, Mitral stenosis, increased RV pressure and dilated Left atrium, functioning TAVR. Findings c/w valvular heart failure or heart failure with preserved EF  - holding lasix given patient appears slightly hypovolemic  - reassess and consider restarting lasix tomorrow -TTE --> Normal LF, Mitral stenosis, increased RV pressure and dilated Left atrium, functioning TAVR. Findings c/w valvular heart failure or heart failure with preserved EF  - holding lasix given patient appears slightly hypovolemic with some MARY.   - reassess and consider restarting lasix tomorrow  -Strict I's/O's and daily weights.

## 2019-08-14 NOTE — CONSULT NOTE ADULT - ASSESSMENT
86 yo F with HTN, HLD, AS s/p TAVR on Plavix, MS, ?CHF (on lasix 20 BID at home), and other history as above who cardiology is being consulted for new onset a. fib with RVR. Unclear etiology, can consider ischemia vs. HF induced vs. infection vs. age. Patient's recent echo with evidence of hyperdynamic function, severe MS and dilated LA which may be substrate for developing a. fib. Patient otherwise hemodynamically stable without report of active chest pain, unable to comment on ischemic changes on ECG due to known LBBB. As patient with dementia, not suitable candidate for catheterization if concern for STEMI/NSTEMI.  - Would treat a. fib and potential NSTEMI with heparin gtt  - Recommend repeat TTE to follow up on function given recent acute change  - Continue rate management for a. fib with RVR; can give additional dose of digoxin, continue beta blocker or nondihydropyridine calcium channel blocker if low suspicion for heart failure exacerbation  - Will discuss with general cardiology in AM.    Jacqueline Mock MD  Cardiology Fellow  [phone]  All Cardiology service information can be found 24/7 on [email or web address], password: [email or web address] 86 yo F with HTN, HLD, AS s/p TAVR on Plavix, MS, ?CHF (on Lasix 20 BID at home).  Cardiology consulted for new onset A. fib with RVR.   Likely provoke by current illness with underlying severe mitral stenosis; doubt ischemia as cause.   TTE with evidence of hyperdynamic LV function, severe MS and dilated LA, which may be substrate for developing a. fib.   Patient appears hemodynamically stable.  No report of active chest pain, unable to comment on ST/T changes on ECG due to known LBBB. As patient with dementia, not suitable candidate for catheterization if concern for STEMI/NSTEMI.      REC:  1.  A. fib  - transfer to tele bed  - Treat a. fib with heparin gtt  - continue rate management for a. fib with RVR; can give additional dose of digoxin, continue beta blocker or diltiazem  - continue Lasix    2.  HLD  - resume usual atorvastatin 40 mg 2x per week    3.  HTN  - BP in normal range at present.  Would not resume usual losartan at this time.    4.  Hx. of TAVR  - may hold clopidogrel while on heparin      Jacqueline Mock MD  Cardiology Fellow  [phone]  All Cardiology service information can be found 24/7 on [email or web address], password: [email or web address].  Cardiology Attending, Consult Service  [phone] 84 yo F with HTN, HLD, AS s/p TAVR on Plavix, MS, ?CHF (on Lasix 20 BID at home).  Cardiology consulted for new onset A. fib with RVR.   Likely provoke by current illness with underlying severe mitral stenosis; doubt ischemia as cause.   TTE with evidence of hyperdynamic LV function, severe MS and dilated LA, which may be substrate for developing a. fib.   Patient appears hemodynamically stable.  No report of active chest pain, unable to comment on ST/T changes on ECG due to known LBBB. As patient with dementia, not suitable candidate for catheterization if concern for STEMI/NSTEMI.      REC:  1.  A. fib  - transfer to tele bed  - Treat a. fib with heparin gtt  - continue rate management for a. fib with RVR; can give additional dose of digoxin, continue beta blocker or diltiazem  - continue Lasix    2.  HLD  - resume usual atorvastatin 40 mg 2x per week    3.  HTN  - BP in normal range at present.  Would not resume usual losartan at this time.    4.  Hx. of TAVR  - may hold clopidogrel while on heparin      ADDENDUM  Converted back to SR at approx. 9 am; currently at 80 bpm.  Would add Lopressor 25 mg twice daily to help maintain SR.  Continue heparin for now.      Jacqueline Mock MD  Cardiology Fellow  [phone]  All Cardiology service information can be found 24/7 on [email or web address], password: [email or web address]    Lucio He M.D.  Cardiology Attending, Consult Service  [phone]

## 2019-08-14 NOTE — PROGRESS NOTE ADULT - PROBLEM SELECTOR PLAN 8
-Patient with CHF per records, but no TTE on file. Currently euvolemic.  -TTE --> Normal LF, Mitral stenosis, increased RV pressure and dilated Left atrium, functioning TAVR. Findings c/w valvular heart failure or heart failure with preserved EF. - UA was positive w/ abd pain, hx of recurrent UTIs  - UCx positive for Klebsiella >100k  - c/w CTX (for CAP)

## 2019-08-14 NOTE — DISCHARGE NOTE PROVIDER - CARE PROVIDER_API CALL
Rita Vivar)  Gastroenterology; Internal Medicine  [address] [facility] [address] [address] [facility] [address]  [place], [place] [id number]  Phone: [phone]  Fax: [phone]  Follow Up Time:

## 2019-08-14 NOTE — PROGRESS NOTE ADULT - SUBJECTIVE AND OBJECTIVE BOX
Michaela Mock  Internal Medicine  PGY-1  Pager: [phone]    Patient is a 85y old  Female who presents with a chief complaint of Abdominal pain (13 Aug 2019 08:25)      SUBJECTIVE / OVERNIGHT EVENTS: No acute event ON. L abd pain. Denies HA/CP/SOB/abd pain/n/v/d/fever/chills.    MEDICATIONS  (STANDING):  carbidopa/levodopa  25/100 2 Tablet(s) Oral three times a day  cefTRIAXone   IVPB 1000 milliGRAM(s) IV Intermittent every 24 hours  clopidogrel Tablet 75 milliGRAM(s) Oral daily  dextrose 5%. 1000 milliLiter(s) (50 mL/Hr) IV Continuous <Continuous>  dextrose 50% Injectable 12.5 Gram(s) IV Push once  dextrose 50% Injectable 25 Gram(s) IV Push once  dextrose 50% Injectable 25 Gram(s) IV Push once  docusate sodium 100 milliGRAM(s) Oral daily  DULoxetine 60 milliGRAM(s) Oral daily  furosemide    Tablet 20 milliGRAM(s) Oral two times a day  gabapentin 300 milliGRAM(s) Oral at bedtime  guaiFENesin  milliGRAM(s) Oral every 12 hours  heparin  Infusion.  Unit(s)/Hr (11 mL/Hr) IV Continuous <Continuous>  insulin lispro (HumaLOG) corrective regimen sliding scale   SubCutaneous three times a day before meals  insulin lispro (HumaLOG) corrective regimen sliding scale   SubCutaneous at bedtime  melatonin 5 milliGRAM(s) Oral at bedtime  pantoprazole    Tablet 40 milliGRAM(s) Oral before breakfast  polyethylene glycol 3350 17 Gram(s) Oral daily  predniSONE   Tablet 10 milliGRAM(s) Oral daily  senna 2 Tablet(s) Oral at bedtime    MEDICATIONS  (PRN):  acetaminophen   Tablet .. 650 milliGRAM(s) Oral every 6 hours PRN Temp greater or equal to 38C (100.4F), Mild Pain (1 - 3)  ALPRAZolam 0.5 milliGRAM(s) Oral two times a day PRN anxiety  dextrose 40% Gel 15 Gram(s) Oral once PRN Blood Glucose LESS THAN 70 milliGRAM(s)/deciliter  glucagon  Injectable 1 milliGRAM(s) IntraMuscular once PRN Glucose LESS THAN 70 milligrams/deciliter  heparin  Injectable 5000 Unit(s) IV Push every 6 hours PRN For aPTT less than 40  heparin  Injectable 2500 Unit(s) IV Push every 6 hours PRN For aPTT between 40 - 57        OBJECTIVE:    Vital Signs Last 24 Hrs  T(C): 36.8 (14 Aug 2019 04:05), Max: 37.3 (13 Aug 2019 22:35)  T(F): 98.3 (14 Aug 2019 04:05), Max: 99.2 (13 Aug 2019 22:35)  HR: 140 (14 Aug 2019 09:12) (67 - 140)  BP: 117/63 (14 Aug 2019 09:12) (110/76 - 130/73)  BP(mean): --  RR: 22 (14 Aug 2019 04:05) (18 - 22)  SpO2: 99% (14 Aug 2019 04:05) (94% - 99%)    PHYSICAL EXAM:  GENERAL: NAD, well-developed  HEAD:  Atraumatic, Normocephalic  EYES: EOMI, PERRLA, conjunctiva and sclera clear  NECK: Supple, No JVD  CHEST/LUNG: Clear to auscultation bilaterally; No wheeze  HEART: Regular rate and rhythm; No murmurs, rubs, or gallops  ABDOMEN: Soft, Nontender, Nondistended; Bowel sounds present  EXTREMITIES:  2+ Peripheral Pulses, No clubbing, cyanosis, or edema  PSYCH: AAOx3  NEUROLOGY: non-focal  SKIN: No rashes or lesions    CAPILLARY BLOOD GLUCOSE      POCT Blood Glucose.: 156 mg/dL (14 Aug 2019 08:12)  POCT Blood Glucose.: 134 mg/dL (14 Aug 2019 03:06)  POCT Blood Glucose.: 120 mg/dL (13 Aug 2019 22:22)  POCT Blood Glucose.: 188 mg/dL (13 Aug 2019 18:52)  POCT Blood Glucose.: 163 mg/dL (13 Aug 2019 17:14)  POCT Blood Glucose.: 169 mg/dL (13 Aug 2019 13:31)    I&O's Summary    13 Aug 2019 07:01  -  14 Aug 2019 07:00  --------------------------------------------------------  IN: 960 mL / OUT: 850 mL / NET: 110 mL        LABS:                        12.3   15.4  )-----------( 375      ( 14 Aug 2019 07:13 )             38.5     08-14    140  |  95<L>  |  44<H>  ----------------------------<  146<H>  4.0   |  23  |  1.34<H>    Ca    10.2      14 Aug 2019 07:13  Phos  5.1     08-14  Mg     2.6     08-14    TPro  7.7  /  Alb  4.1  /  TBili  0.4  /  DBili  x   /  AST  28  /  ALT  <5<L>  /  AlkPhos  47  08-14    PT/INR - ( 14 Aug 2019 03:53 )   PT: 13.3 sec;   INR: 1.16 ratio         PTT - ( 14 Aug 2019 03:53 )  PTT:28.4 sec  CARDIAC MARKERS ( 14 Aug 2019 07:13 )  x     / x     / 124 U/L / x     / 14.2 ng/mL  CARDIAC MARKERS ( 14 Aug 2019 03:53 )  x     / x     / x     / x     / 15.7 ng/mL              RADIOLOGY & ADDITIONAL TESTS: Michaela Mock  Internal Medicine  PGY-1  Pager: [phone]    Patient is a 85y old  Female who presents with a chief complaint of Abdominal pain (13 Aug 2019 08:25)      SUBJECTIVE / OVERNIGHT EVENTS: Developed Afib with RVR ON, a rapid was called. Was given metoprolol 5mg x2 and amiodarone x1 and was started on heparin gtt. This am, patient c/o L abd pain. Last BM was yesterday. Reports having recurrent UTIs in the past and have been having burning sensation during urination before coming into the hospital. Denies HA/CP/SOB/n/v/d/fever/chills.    MEDICATIONS  (STANDING):  carbidopa/levodopa  25/100 2 Tablet(s) Oral three times a day  cefTRIAXone   IVPB 1000 milliGRAM(s) IV Intermittent every 24 hours  clopidogrel Tablet 75 milliGRAM(s) Oral daily  dextrose 5%. 1000 milliLiter(s) (50 mL/Hr) IV Continuous <Continuous>  dextrose 50% Injectable 12.5 Gram(s) IV Push once  dextrose 50% Injectable 25 Gram(s) IV Push once  dextrose 50% Injectable 25 Gram(s) IV Push once  docusate sodium 100 milliGRAM(s) Oral daily  DULoxetine 60 milliGRAM(s) Oral daily  furosemide    Tablet 20 milliGRAM(s) Oral two times a day  gabapentin 300 milliGRAM(s) Oral at bedtime  guaiFENesin  milliGRAM(s) Oral every 12 hours  heparin  Infusion.  Unit(s)/Hr (11 mL/Hr) IV Continuous <Continuous>  insulin lispro (HumaLOG) corrective regimen sliding scale   SubCutaneous three times a day before meals  insulin lispro (HumaLOG) corrective regimen sliding scale   SubCutaneous at bedtime  melatonin 5 milliGRAM(s) Oral at bedtime  pantoprazole    Tablet 40 milliGRAM(s) Oral before breakfast  polyethylene glycol 3350 17 Gram(s) Oral daily  predniSONE   Tablet 10 milliGRAM(s) Oral daily  senna 2 Tablet(s) Oral at bedtime    MEDICATIONS  (PRN):  acetaminophen   Tablet .. 650 milliGRAM(s) Oral every 6 hours PRN Temp greater or equal to 38C (100.4F), Mild Pain (1 - 3)  ALPRAZolam 0.5 milliGRAM(s) Oral two times a day PRN anxiety  dextrose 40% Gel 15 Gram(s) Oral once PRN Blood Glucose LESS THAN 70 milliGRAM(s)/deciliter  glucagon  Injectable 1 milliGRAM(s) IntraMuscular once PRN Glucose LESS THAN 70 milligrams/deciliter  heparin  Injectable 5000 Unit(s) IV Push every 6 hours PRN For aPTT less than 40  heparin  Injectable 2500 Unit(s) IV Push every 6 hours PRN For aPTT between 40 - 57        OBJECTIVE:    Vital Signs Last 24 Hrs  T(C): 36.8 (14 Aug 2019 04:05), Max: 37.3 (13 Aug 2019 22:35)  T(F): 98.3 (14 Aug 2019 04:05), Max: 99.2 (13 Aug 2019 22:35)  HR: 140 (14 Aug 2019 09:12) (67 - 140)  BP: 117/63 (14 Aug 2019 09:12) (110/76 - 130/73)  BP(mean): --  RR: 22 (14 Aug 2019 04:05) (18 - 22)  SpO2: 99% (14 Aug 2019 04:05) (94% - 99%)    PHYSICAL EXAM:  GENERAL: NAD, resting comfortably in bed  HEAD:  Atraumatic, Normocephalic  EYES: EOMI, PERRLA, conjunctiva and sclera clear  ENMT: dry mucous membranes, white-yellows thrush on tongue  NECK: Supple, No JVD  CHEST/LUNG: Rhonchi on L lower lobe. No wheezing, or rubs. No accessory muscle use.  HEART: irregularly irregular; No murmurs appreciated on exam  ABDOMEN: Soft, tender to palpation to L upper and lower quadrants, Nondistended; Bowel sounds present  EXTREMITIES: No clubbing, cyanosis, or peripheral edema   LYMPH: No lymphadenopathy noted  SKIN: No rashes or lesions  NERVOUS SYSTEM: AOx2, conversive and answers questions appropriately.    CAPILLARY BLOOD GLUCOSE      POCT Blood Glucose.: 156 mg/dL (14 Aug 2019 08:12)  POCT Blood Glucose.: 134 mg/dL (14 Aug 2019 03:06)  POCT Blood Glucose.: 120 mg/dL (13 Aug 2019 22:22)  POCT Blood Glucose.: 188 mg/dL (13 Aug 2019 18:52)  POCT Blood Glucose.: 163 mg/dL (13 Aug 2019 17:14)  POCT Blood Glucose.: 169 mg/dL (13 Aug 2019 13:31)    I&O's Summary    13 Aug 2019 07:01  -  14 Aug 2019 07:00  --------------------------------------------------------  IN: 960 mL / OUT: 850 mL / NET: 110 mL        LABS:                        12.3   15.4  )-----------( 375      ( 14 Aug 2019 07:13 )             38.5     08-14    140  |  95<L>  |  44<H>  ----------------------------<  146<H>  4.0   |  23  |  1.34<H>    Ca    10.2      14 Aug 2019 07:13  Phos  5.1     08-14  Mg     2.6     08-14    TPro  7.7  /  Alb  4.1  /  TBili  0.4  /  DBili  x   /  AST  28  /  ALT  <5<L>  /  AlkPhos  47  08-14    PT/INR - ( 14 Aug 2019 03:53 )   PT: 13.3 sec;   INR: 1.16 ratio         PTT - ( 14 Aug 2019 03:53 )  PTT:28.4 sec  CARDIAC MARKERS ( 14 Aug 2019 07:13 )  x     / x     / 124 U/L / x     / 14.2 ng/mL  CARDIAC MARKERS ( 14 Aug 2019 03:53 )  x     / x     / x     / x     / 15.7 ng/mL              RADIOLOGY & ADDITIONAL TESTS: Michaela Mock  Internal Medicine  PGY-1  Pager: [phone]    Patient is a 85y old  Female who presents with a chief complaint of Abdominal pain (13 Aug 2019 08:25)      SUBJECTIVE / OVERNIGHT EVENTS: Developed Afib with RVR ON, a rapid was called. Was given metoprolol 5mg x2 and amiodarone x1 and was started on heparin gtt. This am, patient c/o L abd pain. Last BM was yesterday. Reports having recurrent UTIs in the past and have been having burning sensation during urination before coming into the hospital. Denies HA/CP/SOB/n/v/d/fever/chills.    MEDICATIONS  (STANDING):  carbidopa/levodopa  25/100 2 Tablet(s) Oral three times a day  cefTRIAXone   IVPB 1000 milliGRAM(s) IV Intermittent every 24 hours  clopidogrel Tablet 75 milliGRAM(s) Oral daily  dextrose 5%. 1000 milliLiter(s) (50 mL/Hr) IV Continuous <Continuous>  dextrose 50% Injectable 12.5 Gram(s) IV Push once  dextrose 50% Injectable 25 Gram(s) IV Push once  dextrose 50% Injectable 25 Gram(s) IV Push once  docusate sodium 100 milliGRAM(s) Oral daily  DULoxetine 60 milliGRAM(s) Oral daily  furosemide    Tablet 20 milliGRAM(s) Oral two times a day  gabapentin 300 milliGRAM(s) Oral at bedtime  guaiFENesin  milliGRAM(s) Oral every 12 hours  heparin  Infusion.  Unit(s)/Hr (11 mL/Hr) IV Continuous <Continuous>  insulin lispro (HumaLOG) corrective regimen sliding scale   SubCutaneous three times a day before meals  insulin lispro (HumaLOG) corrective regimen sliding scale   SubCutaneous at bedtime  melatonin 5 milliGRAM(s) Oral at bedtime  pantoprazole    Tablet 40 milliGRAM(s) Oral before breakfast  polyethylene glycol 3350 17 Gram(s) Oral daily  predniSONE   Tablet 10 milliGRAM(s) Oral daily  senna 2 Tablet(s) Oral at bedtime    MEDICATIONS  (PRN):  acetaminophen   Tablet .. 650 milliGRAM(s) Oral every 6 hours PRN Temp greater or equal to 38C (100.4F), Mild Pain (1 - 3)  ALPRAZolam 0.5 milliGRAM(s) Oral two times a day PRN anxiety  dextrose 40% Gel 15 Gram(s) Oral once PRN Blood Glucose LESS THAN 70 milliGRAM(s)/deciliter  glucagon  Injectable 1 milliGRAM(s) IntraMuscular once PRN Glucose LESS THAN 70 milligrams/deciliter  heparin  Injectable 5000 Unit(s) IV Push every 6 hours PRN For aPTT less than 40  heparin  Injectable 2500 Unit(s) IV Push every 6 hours PRN For aPTT between 40 - 57        OBJECTIVE:    Vital Signs Last 24 Hrs  T(C): 36.8 (14 Aug 2019 04:05), Max: 37.3 (13 Aug 2019 22:35)  T(F): 98.3 (14 Aug 2019 04:05), Max: 99.2 (13 Aug 2019 22:35)  HR: 140 (14 Aug 2019 09:12) (67 - 140)  BP: 117/63 (14 Aug 2019 09:12) (110/76 - 130/73)  BP(mean): --  RR: 22 (14 Aug 2019 04:05) (18 - 22)  SpO2: 99% (14 Aug 2019 04:05) (94% - 99%)    PHYSICAL EXAM:  GENERAL: NAD, resting comfortably in bed  HEAD:  Atraumatic, Normocephalic  EYES: EOMI, PERRLA, conjunctiva and sclera clear  ENMT: dry mucous membranes, white-yellows thrush on tongue  NECK: Supple, No JVD  CHEST/LUNG: Rhonchi on L lower lobe. No wheezing, or rubs. No accessory muscle use.  HEART: irregularly irregular; No murmurs appreciated on exam  ABDOMEN: Soft, tender to palpation to L upper and lower quadrants, Nondistended; Bowel sounds present  EXTREMITIES: No clubbing, cyanosis, or peripheral edema   LYMPH: No lymphadenopathy noted  SKIN: No rashes or lesions  NERVOUS SYSTEM: AOx2, conversive and answers questions appropriately.    CAPILLARY BLOOD GLUCOSE      POCT Blood Glucose.: 156 mg/dL (14 Aug 2019 08:12)  POCT Blood Glucose.: 134 mg/dL (14 Aug 2019 03:06)  POCT Blood Glucose.: 120 mg/dL (13 Aug 2019 22:22)  POCT Blood Glucose.: 188 mg/dL (13 Aug 2019 18:52)  POCT Blood Glucose.: 163 mg/dL (13 Aug 2019 17:14)  POCT Blood Glucose.: 169 mg/dL (13 Aug 2019 13:31)    I&O's Summary    13 Aug 2019 07:01  -  14 Aug 2019 07:00  --------------------------------------------------------  IN: 960 mL / OUT: 850 mL / NET: 110 mL        LABS:                        12.3   15.4  )-----------( 375      ( 14 Aug 2019 07:13 )             38.5     08-14    140  |  95<L>  |  44<H>  ----------------------------<  146<H>  4.0   |  23  |  1.34<H>    Ca    10.2      14 Aug 2019 07:13  Phos  5.1     08-14  Mg     2.6     08-14    TPro  7.7  /  Alb  4.1  /  TBili  0.4  /  DBili  x   /  AST  28  /  ALT  <5<L>  /  AlkPhos  47  08-14    PT/INR - ( 14 Aug 2019 03:53 )   PT: 13.3 sec;   INR: 1.16 ratio         PTT - ( 14 Aug 2019 03:53 )  PTT:28.4 sec  CARDIAC MARKERS ( 14 Aug 2019 07:13 )  x     / x     / 124 U/L / x     / 14.2 ng/mL  CARDIAC MARKERS ( 14 Aug 2019 03:53 )  x     / x     / x     / x     / 15.7 ng/mL          RADIOLOGY & ADDITIONAL TESTS:

## 2019-08-14 NOTE — PROGRESS NOTE ADULT - ATTENDING COMMENTS
-Patient seen/examined on 8/14/19. Case/plan discussed with the intern and resident as reviewed/edited by me above and in any comments below.

## 2019-08-14 NOTE — PROGRESS NOTE ADULT - PROBLEM SELECTOR PLAN 4
-Will c/w duonebs PRN  -On Prednisone chronically for PMR, will need stress dose if hypotensive  -Maintain O2 sats between 88-92% - was delirious, now back to baseline  -Melatonin 5mg HS - was delirious, now back to baseline  -Melatonin 5mg HS  -C/w home Sinemet for Parkinson's.

## 2019-08-14 NOTE — PROGRESS NOTE ADULT - PROBLEM SELECTOR PLAN 7
-UA was positive w/ abd pain, difficult ROS due to encephalopathy  -Already on CTX (for CAP)  - UCx positive for Klebsiella >100k -Patient with large stool burden on imaging, but no SBO  -c/w Miralax, colace, senna  -Received tap water enema x1 -Patient with large stool burden on imaging, but no SBO  -c/w Miralax, colace, senna  -Received tap water enema x1  -L quadrant pain--chronic from a hernia per patient, continue to monitor

## 2019-08-14 NOTE — PROGRESS NOTE ADULT - PROBLEM SELECTOR PLAN 5
-C/w losartan  -Hold BB in the setting of sepsis - breathing comfortably now  - c/w home dose prednisone chronically for PMR  - Maintain O2 sats between 88-92% - breathing comfortably now  - c/w home dose prednisone chronically for PMR/?GCA. Right temporal artery biopsy reportedly negative, but she gets right sided headaches. -F/u repeat ESR/CRP, though non-specific and likely to be elevated in setting of infections.   - Maintain O2 sats between 88-92%

## 2019-08-15 DIAGNOSIS — R13.10 DYSPHAGIA, UNSPECIFIED: ICD-10-CM

## 2019-08-15 DIAGNOSIS — B37.0 CANDIDAL STOMATITIS: ICD-10-CM

## 2019-08-15 DIAGNOSIS — R49.8 OTHER VOICE AND RESONANCE DISORDERS: ICD-10-CM

## 2019-08-15 LAB
ANION GAP SERPL CALC-SCNC: 15 MMOL/L — SIGNIFICANT CHANGE UP (ref 5–17)
APTT BLD: 66.7 SEC — HIGH (ref 27.5–36.3)
APTT BLD: 88.9 SEC — HIGH (ref 27.5–36.3)
BUN SERPL-MCNC: 42 MG/DL — HIGH (ref 7–23)
CALCIUM SERPL-MCNC: 9.9 MG/DL — SIGNIFICANT CHANGE UP (ref 8.4–10.5)
CHLORIDE SERPL-SCNC: 96 MMOL/L — SIGNIFICANT CHANGE UP (ref 96–108)
CO2 SERPL-SCNC: 29 MMOL/L — SIGNIFICANT CHANGE UP (ref 22–31)
CREAT SERPL-MCNC: 0.95 MG/DL — SIGNIFICANT CHANGE UP (ref 0.5–1.3)
CRP SERPL-MCNC: 3.87 MG/DL — HIGH (ref 0–0.4)
CULTURE RESULTS: SIGNIFICANT CHANGE UP
ERYTHROCYTE [SEDIMENTATION RATE] IN BLOOD: 88 MM/HR — HIGH (ref 0–20)
GLUCOSE SERPL-MCNC: 121 MG/DL — HIGH (ref 70–99)
HCT VFR BLD CALC: 37.9 % — SIGNIFICANT CHANGE UP (ref 34.5–45)
HGB BLD-MCNC: 12.1 G/DL — SIGNIFICANT CHANGE UP (ref 11.5–15.5)
INR BLD: 1.17 RATIO — HIGH (ref 0.88–1.16)
MCHC RBC-ENTMCNC: 28.9 PG — SIGNIFICANT CHANGE UP (ref 27–34)
MCHC RBC-ENTMCNC: 31.9 GM/DL — LOW (ref 32–36)
MCV RBC AUTO: 90.5 FL — SIGNIFICANT CHANGE UP (ref 80–100)
PLATELET # BLD AUTO: 360 K/UL — SIGNIFICANT CHANGE UP (ref 150–400)
POTASSIUM SERPL-MCNC: 3.2 MMOL/L — LOW (ref 3.5–5.3)
POTASSIUM SERPL-SCNC: 3.2 MMOL/L — LOW (ref 3.5–5.3)
PROTHROM AB SERPL-ACNC: 13.4 SEC — HIGH (ref 10–13.1)
RBC # BLD: 4.19 M/UL — SIGNIFICANT CHANGE UP (ref 3.8–5.2)
RBC # FLD: 15.6 % — HIGH (ref 10.3–14.5)
SODIUM SERPL-SCNC: 140 MMOL/L — SIGNIFICANT CHANGE UP (ref 135–145)
SPECIMEN SOURCE: SIGNIFICANT CHANGE UP
WBC # BLD: 12.42 K/UL — HIGH (ref 3.8–10.5)
WBC # FLD AUTO: 12.42 K/UL — HIGH (ref 3.8–10.5)

## 2019-08-15 PROCEDURE — 70491 CT SOFT TISSUE NECK W/DYE: CPT | Mod: 26

## 2019-08-15 PROCEDURE — 31575 DIAGNOSTIC LARYNGOSCOPY: CPT

## 2019-08-15 PROCEDURE — 99222 1ST HOSP IP/OBS MODERATE 55: CPT | Mod: 25

## 2019-08-15 PROCEDURE — 93308 TTE F-UP OR LMTD: CPT | Mod: 26

## 2019-08-15 PROCEDURE — 99233 SBSQ HOSP IP/OBS HIGH 50: CPT | Mod: GC

## 2019-08-15 RX ORDER — CEFTRIAXONE 500 MG/1
1000 INJECTION, POWDER, FOR SOLUTION INTRAMUSCULAR; INTRAVENOUS EVERY 24 HOURS
Refills: 0 | Status: COMPLETED | OUTPATIENT
Start: 2019-08-15 | End: 2019-08-18

## 2019-08-15 RX ORDER — LOSARTAN POTASSIUM 100 MG/1
25 TABLET, FILM COATED ORAL DAILY
Refills: 0 | Status: DISCONTINUED | OUTPATIENT
Start: 2019-08-16 | End: 2019-08-16

## 2019-08-15 RX ORDER — LABETALOL HCL 100 MG
10 TABLET ORAL ONCE
Refills: 0 | Status: DISCONTINUED | OUTPATIENT
Start: 2019-08-15 | End: 2019-08-15

## 2019-08-15 RX ORDER — AMLODIPINE BESYLATE 2.5 MG/1
5 TABLET ORAL DAILY
Refills: 0 | Status: DISCONTINUED | OUTPATIENT
Start: 2019-08-15 | End: 2019-08-16

## 2019-08-15 RX ORDER — BUDESONIDE, MICRONIZED 100 %
0.25 POWDER (GRAM) MISCELLANEOUS
Refills: 0 | Status: DISCONTINUED | OUTPATIENT
Start: 2019-08-15 | End: 2019-08-30

## 2019-08-15 RX ORDER — LOSARTAN POTASSIUM 100 MG/1
25 TABLET, FILM COATED ORAL DAILY
Refills: 0 | Status: DISCONTINUED | OUTPATIENT
Start: 2019-08-15 | End: 2019-08-15

## 2019-08-15 RX ORDER — LOSARTAN POTASSIUM 100 MG/1
50 TABLET, FILM COATED ORAL DAILY
Refills: 0 | Status: DISCONTINUED | OUTPATIENT
Start: 2019-08-15 | End: 2019-08-15

## 2019-08-15 RX ORDER — LABETALOL HCL 100 MG
10 TABLET ORAL ONCE
Refills: 0 | Status: COMPLETED | OUTPATIENT
Start: 2019-08-15 | End: 2019-08-15

## 2019-08-15 RX ORDER — NYSTATIN 500MM UNIT
500000 POWDER (EA) MISCELLANEOUS
Refills: 0 | Status: DISCONTINUED | OUTPATIENT
Start: 2019-08-15 | End: 2019-08-16

## 2019-08-15 RX ORDER — FLUCONAZOLE 150 MG/1
100 TABLET ORAL ONCE
Refills: 0 | Status: COMPLETED | OUTPATIENT
Start: 2019-08-15 | End: 2019-08-15

## 2019-08-15 RX ORDER — FLUCONAZOLE 150 MG/1
TABLET ORAL
Refills: 0 | Status: DISCONTINUED | OUTPATIENT
Start: 2019-08-15 | End: 2019-08-27

## 2019-08-15 RX ORDER — POTASSIUM CHLORIDE 20 MEQ
40 PACKET (EA) ORAL EVERY 4 HOURS
Refills: 0 | Status: COMPLETED | OUTPATIENT
Start: 2019-08-15 | End: 2019-08-15

## 2019-08-15 RX ORDER — FLUCONAZOLE 150 MG/1
100 TABLET ORAL EVERY 24 HOURS
Refills: 0 | Status: DISCONTINUED | OUTPATIENT
Start: 2019-08-16 | End: 2019-08-27

## 2019-08-15 RX ORDER — WARFARIN SODIUM 2.5 MG/1
3 TABLET ORAL ONCE
Refills: 0 | Status: COMPLETED | OUTPATIENT
Start: 2019-08-15 | End: 2019-08-15

## 2019-08-15 RX ADMIN — CEFTRIAXONE 100 MILLIGRAM(S): 500 INJECTION, POWDER, FOR SOLUTION INTRAMUSCULAR; INTRAVENOUS at 22:55

## 2019-08-15 RX ADMIN — Medication 650 MILLIGRAM(S): at 02:00

## 2019-08-15 RX ADMIN — Medication 10 MILLIGRAM(S): at 16:20

## 2019-08-15 RX ADMIN — GABAPENTIN 300 MILLIGRAM(S): 400 CAPSULE ORAL at 21:52

## 2019-08-15 RX ADMIN — Medication 25 MILLIGRAM(S): at 18:14

## 2019-08-15 RX ADMIN — Medication 40 MILLIEQUIVALENT(S): at 21:52

## 2019-08-15 RX ADMIN — Medication 600 MILLIGRAM(S): at 18:14

## 2019-08-15 RX ADMIN — Medication 500000 UNIT(S): at 14:12

## 2019-08-15 RX ADMIN — CARBIDOPA AND LEVODOPA 2 TABLET(S): 25; 100 TABLET ORAL at 05:47

## 2019-08-15 RX ADMIN — SENNA PLUS 2 TABLET(S): 8.6 TABLET ORAL at 21:52

## 2019-08-15 RX ADMIN — Medication 5 MILLIGRAM(S): at 21:52

## 2019-08-15 RX ADMIN — CARBIDOPA AND LEVODOPA 2 TABLET(S): 25; 100 TABLET ORAL at 21:52

## 2019-08-15 RX ADMIN — Medication 0.25 MILLIGRAM(S): at 18:14

## 2019-08-15 RX ADMIN — LOSARTAN POTASSIUM 50 MILLIGRAM(S): 100 TABLET, FILM COATED ORAL at 09:23

## 2019-08-15 RX ADMIN — HEPARIN SODIUM 800 UNIT(S)/HR: 5000 INJECTION INTRAVENOUS; SUBCUTANEOUS at 11:06

## 2019-08-15 RX ADMIN — Medication 600 MILLIGRAM(S): at 05:47

## 2019-08-15 RX ADMIN — Medication 650 MILLIGRAM(S): at 16:20

## 2019-08-15 RX ADMIN — CARBIDOPA AND LEVODOPA 2 TABLET(S): 25; 100 TABLET ORAL at 14:12

## 2019-08-15 RX ADMIN — WARFARIN SODIUM 3 MILLIGRAM(S): 2.5 TABLET ORAL at 21:52

## 2019-08-15 RX ADMIN — FLUCONAZOLE 50 MILLIGRAM(S): 150 TABLET ORAL at 22:02

## 2019-08-15 RX ADMIN — Medication 40 MILLIEQUIVALENT(S): at 18:14

## 2019-08-15 RX ADMIN — Medication 20 MILLIGRAM(S): at 16:20

## 2019-08-15 RX ADMIN — Medication 10 MILLIGRAM(S): at 05:48

## 2019-08-15 RX ADMIN — DULOXETINE HYDROCHLORIDE 60 MILLIGRAM(S): 30 CAPSULE, DELAYED RELEASE ORAL at 14:12

## 2019-08-15 RX ADMIN — Medication 500000 UNIT(S): at 18:15

## 2019-08-15 RX ADMIN — HEPARIN SODIUM 800 UNIT(S)/HR: 5000 INJECTION INTRAVENOUS; SUBCUTANEOUS at 03:29

## 2019-08-15 RX ADMIN — AMLODIPINE BESYLATE 5 MILLIGRAM(S): 2.5 TABLET ORAL at 18:14

## 2019-08-15 RX ADMIN — Medication 25 MILLIGRAM(S): at 05:47

## 2019-08-15 RX ADMIN — PANTOPRAZOLE SODIUM 40 MILLIGRAM(S): 20 TABLET, DELAYED RELEASE ORAL at 05:48

## 2019-08-15 RX ADMIN — Medication 650 MILLIGRAM(S): at 16:50

## 2019-08-15 RX ADMIN — Medication 100 MILLIGRAM(S): at 14:12

## 2019-08-15 RX ADMIN — Medication 650 MILLIGRAM(S): at 01:21

## 2019-08-15 NOTE — PROGRESS NOTE ADULT - ATTENDING COMMENTS
-Patient seen/examined on 8/15/19. Case/plan discussed with the intern and resident as reviewed/edited by me above and in any comments below.   -Discussed with bedside RN that we need vitals Q4H as ordered so we can better manage/evaluate elevated BP. -Added losartan and amlodipine today. Gave IV push Labetalol. Consider resuming Lasix 20mg PO daily.   -ENT estrella appreciated. MBS tomorrow.   -Transitioning heparin to coumadin for valvular Afib (severe mitral stenosis).   -Consider broadening abx coverage if not improving.   -Will give patient increased dose of prednisone 20mg x 1 (in addition to the 10mg she got in the am) for right temporal HA (?temporal arteritis) and respiratory symptoms and then monitor response.

## 2019-08-15 NOTE — SWALLOW BEDSIDE ASSESSMENT ADULT - SLP PERTINENT HISTORY OF CURRENT PROBLEM
84 y/o F with PMH of GERD, HTN, HLD, DM2 with neuropathy, Parkinson's, anxiety/depression, AS s/p TAVR on plavix, overactive bladder, ?CHF, PMR, COPD, who is admitted for hypoxic/hypercarbic respiratory failure in setting of sepsis, possibly COPD exacerbation in setting of URI/PNA. Course c/b UTI and acute metabolic encephalopathy.

## 2019-08-15 NOTE — PROGRESS NOTE ADULT - PROBLEM SELECTOR PLAN 10
-Patient not on home anti-hyperglycemics  - FS acceptable  - c/w Insulin sliding scale    Oral thrush  -nystatin suspension    Prophylaxis  DVT ppx: on heparin gtt  Diet: NPO except meds and pending formal speech and swallow eval given per nursing patient failed bedside speech and swallow  Dispo: PT recs subacute rehab -Patient not on home anti-hyperglycemics  - FS acceptable  - c/w Insulin sliding scale    Oral thrush  -nystatin suspension    Constipation  -Patient with large stool burden on imaging, but no SBO  -c/w Miralax, colace, senna  -Received tap water enema x1 and dulcolax suppository x1  -L quadrant pain--resolved  -continue to monitor  Prophylaxis  DVT ppx: on heparin gtt  Diet: NPO except meds, discussed with son  Dispo: PT recs subacute rehab -Patient not on home anti-hyperglycemics  - FS acceptable  - c/w Insulin sliding scale    Oral thrush:  -nystatin suspension  -ENT eval appreciated; will add fluconazole and get CT neck C+.     Constipation  -Patient with large stool burden on imaging, but no SBO  -c/w Miralax, colace, senna  -Received tap water enema x1 and dulcolax suppository x1  -L quadrant pain--resolved  -continue to monitor    Prophylaxis:  DVT ppx: on heparin gtt  Diet: NPO except meds, discussed with son  Dispo: PT recs subacute rehab

## 2019-08-15 NOTE — CONSULT NOTE ADULT - ASSESSMENT
85y with ? dementia here for sepsis uti and asp pna, now with dysphagia and hypophonia. Found to be grossly aspirating, decreased sensation, right VC paralyzed and a left lateral pharyngeal bulge, looks pulsatile, likely carotid on indirect laryngoscopy. Pending CT with con to determine etiology of VC paralysis

## 2019-08-15 NOTE — PROGRESS NOTE ADULT - PROBLEM SELECTOR PLAN 9
-TTE --> Normal LF, Mitral stenosis, increased RV pressure and dilated Left atrium, functioning TAVR. Findings c/w valvular heart failure or heart failure with preserved EF  - holding lasix given patient appears slightly hypovolemic with some MARY.   - reassess and consider restarting lasix tomorrow  - Strict I's/O's and daily weights. -TTE --> Normal LF, Mitral stenosis, increased RV pressure and dilated Left atrium, functioning TAVR. Findings c/w valvular heart failure or heart failure with preserved EF  - holding lasix given patient appears euvolemic and will be NPO tonight   - reassess and consider restarting lasix tomorrow  - Strict I's/O's and daily weights. -TTE --> Normal LF, Mitral stenosis, increased RV pressure and dilated Left atrium, functioning TAVR. Findings c/w valvular heart failure or heart failure with preserved EF  - holding lasix given patient appears euvolemic and will be NPO tonight   - reassess and consider restarting lasix tomorrow/tonight  - Strict I's/O's and daily weights.

## 2019-08-15 NOTE — SWALLOW BEDSIDE ASSESSMENT ADULT - PHARYNGEAL PHASE
Delayed pharyngeal swallow/Cough post oral intake/Throat clear post oral intake/Multiple swallows/decreased laryngeal excursion/Decreased laryngeal elevation Delayed pharyngeal swallow/Decreased laryngeal elevation/Cough post oral intake/Throat clear post oral intake/Multiple swallows/Wet vocal quality post oral intake/decreased laryngeal excursion

## 2019-08-15 NOTE — SWALLOW BEDSIDE ASSESSMENT ADULT - SLP GENERAL OBSERVATIONS
Pt seated upright in bed. AA+Ox4. Pt following commands and making needs known during the assessment.

## 2019-08-15 NOTE — PROGRESS NOTE ADULT - PROBLEM SELECTOR PLAN 6
-hypertensive to 180s/90s ON  - restart home dose losartan  -c/w metoprolol for rate control and BP - breathing comfortably now  - Per son, no formal diagnosis of COPD  - c/w home dose prednisone chronically for PMR/?GCA. Right temporal artery biopsy reportedly negative, but she gets right sided headaches. She was never put on high dose steroid with a taper in the past per PCP office.  -Repeat ESR/CRP elevated, though non-specific and likely to be elevated in setting of infections  - Maintain O2 sats between 88-92% - breathing comfortably now  - Per son, no formal diagnosis of COPD  - c/w home dose prednisone chronically for PMR/?GCA. Right temporal artery biopsy reportedly negative, but she gets right sided headaches. She was never put on high dose steroid with a taper in the past per PCP office.  -Repeat ESR/CRP elevated, though non-specific and likely to be elevated in setting of infections  -Will give additional dose of prednisone 20mg x 1 today to see if it helps with right-sided HA's.   - Maintain O2 sats between 88-92%

## 2019-08-15 NOTE — PROGRESS NOTE ADULT - PROBLEM SELECTOR PLAN 3
-Severe on TTE  - given patient is volume down on exam with some MARY, will hold lasix for today. Reassess and consider restarting tomorrow -Severe on TTE  - given patient is euvolemic on exam today, continue to hold lasix for today. Reassess and consider restarting tomorrow -Severe on TTE  - given patient is euvolemic on exam today, continue to hold lasix for today. Reassess and consider restarting tomorrow/tonight.

## 2019-08-15 NOTE — PROGRESS NOTE ADULT - SUBJECTIVE AND OBJECTIVE BOX
Michaela Mock  Internal Medicine  PGY-1  Pager: [phone]    Patient is a 85y old  Female who presents with a chief complaint of Abdominal pain (14 Aug 2019 21:23)      SUBJECTIVE / OVERNIGHT EVENTS: No acute event ON. Denies HA/CP/SOB/abd pain/n/v/d/fever/chills.    MEDICATIONS  (STANDING):  atorvastatin 40 milliGRAM(s) Oral <User Schedule>  carbidopa/levodopa  25/100 2 Tablet(s) Oral three times a day  dextrose 5%. 1000 milliLiter(s) (50 mL/Hr) IV Continuous <Continuous>  dextrose 5%. 1000 milliLiter(s) (50 mL/Hr) IV Continuous <Continuous>  dextrose 50% Injectable 12.5 Gram(s) IV Push once  dextrose 50% Injectable 25 Gram(s) IV Push once  dextrose 50% Injectable 25 Gram(s) IV Push once  docusate sodium 100 milliGRAM(s) Oral daily  DULoxetine 60 milliGRAM(s) Oral daily  gabapentin 300 milliGRAM(s) Oral at bedtime  guaiFENesin  milliGRAM(s) Oral every 12 hours  heparin  Infusion.  Unit(s)/Hr (11 mL/Hr) IV Continuous <Continuous>  insulin lispro (HumaLOG) corrective regimen sliding scale   SubCutaneous three times a day before meals  insulin lispro (HumaLOG) corrective regimen sliding scale   SubCutaneous at bedtime  losartan 50 milliGRAM(s) Oral daily  melatonin 5 milliGRAM(s) Oral at bedtime  metoprolol tartrate 25 milliGRAM(s) Oral two times a day  nystatin    Suspension 997867 Unit(s) Oral three times a day  pantoprazole    Tablet 40 milliGRAM(s) Oral before breakfast  polyethylene glycol 3350 17 Gram(s) Oral daily  predniSONE   Tablet 10 milliGRAM(s) Oral daily  senna 2 Tablet(s) Oral at bedtime    MEDICATIONS  (PRN):  acetaminophen   Tablet .. 650 milliGRAM(s) Oral every 6 hours PRN Temp greater or equal to 38C (100.4F), Mild Pain (1 - 3)  ALPRAZolam 0.5 milliGRAM(s) Oral two times a day PRN anxiety  dextrose 40% Gel 15 Gram(s) Oral once PRN Blood Glucose LESS THAN 70 milliGRAM(s)/deciliter  glucagon  Injectable 1 milliGRAM(s) IntraMuscular once PRN Glucose LESS THAN 70 milligrams/deciliter  heparin  Injectable 5000 Unit(s) IV Push every 6 hours PRN For aPTT less than 40  heparin  Injectable 2500 Unit(s) IV Push every 6 hours PRN For aPTT between 40 - 57        OBJECTIVE:    Vital Signs Last 24 Hrs  T(C): 36.6 (15 Aug 2019 02:32), Max: 37.3 (14 Aug 2019 11:36)  T(F): 97.8 (15 Aug 2019 02:32), Max: 99.2 (14 Aug 2019 11:36)  HR: 59 (15 Aug 2019 05:37) (59 - 140)  BP: 167/85 (15 Aug 2019 05:37) (117/63 - 184/92)  BP(mean): --  RR: 18 (15 Aug 2019 02:32) (18 - 18)  SpO2: 96% (15 Aug 2019 02:32) (96% - 100%)    PHYSICAL EXAM:  GENERAL: NAD, resting comfortably in bed  HEAD:  Atraumatic, Normocephalic  EYES: EOMI, PERRLA, conjunctiva and sclera clear  ENMT: dry mucous membranes, white-yellows thrush on tongue  NECK: Supple, No JVD  CHEST/LUNG: Rhonchi on L lower lobe. No wheezing, or rubs. No accessory muscle use.  HEART: irregularly irregular; No murmurs appreciated on exam  ABDOMEN: Soft, tender to palpation to L upper and lower quadrants, Nondistended; Bowel sounds present  EXTREMITIES: No clubbing, cyanosis, or peripheral edema   LYMPH: No lymphadenopathy noted  SKIN: No rashes or lesions  NERVOUS SYSTEM: AOx2, conversive and answers questions appropriately.    CAPILLARY BLOOD GLUCOSE      POCT Blood Glucose.: 135 mg/dL (15 Aug 2019 05:10)  POCT Blood Glucose.: 152 mg/dL (14 Aug 2019 21:35)  POCT Blood Glucose.: 141 mg/dL (14 Aug 2019 18:29)  POCT Blood Glucose.: 146 mg/dL (14 Aug 2019 16:52)  POCT Blood Glucose.: 136 mg/dL (14 Aug 2019 11:59)  POCT Blood Glucose.: 156 mg/dL (14 Aug 2019 08:12)    I&O's Summary    13 Aug 2019 07:01  -  14 Aug 2019 07:00  --------------------------------------------------------  IN: 960 mL / OUT: 850 mL / NET: 110 mL    14 Aug 2019 07:01  -  15 Aug 2019 06:41  --------------------------------------------------------  IN: 0 mL / OUT: 950 mL / NET: -950 mL        LABS:                        12.3   12.9  )-----------( 351      ( 14 Aug 2019 10:53 )             38.6     08-14    140  |  95<L>  |  44<H>  ----------------------------<  146<H>  4.0   |  23  |  1.34<H>    Ca    10.2      14 Aug 2019 07:13  Phos  5.1     08-14  Mg     2.6     08-14    TPro  7.7  /  Alb  4.1  /  TBili  0.4  /  DBili  x   /  AST  28  /  ALT  <5<L>  /  AlkPhos  47  08-14    PT/INR - ( 14 Aug 2019 03:53 )   PT: 13.3 sec;   INR: 1.16 ratio         PTT - ( 15 Aug 2019 03:11 )  PTT:66.7 sec  CARDIAC MARKERS ( 14 Aug 2019 20:18 )  x     / x     / 100 U/L / x     / 12.3 ng/mL  CARDIAC MARKERS ( 14 Aug 2019 07:13 )  x     / x     / 124 U/L / x     / 14.2 ng/mL  CARDIAC MARKERS ( 14 Aug 2019 03:53 )  x     / x     / x     / x     / 15.7 ng/mL              RADIOLOGY & ADDITIONAL TESTS: Michaela Mock  Internal Medicine  PGY-1  Pager: [phone]    Patient is a 85y old  Female who presents with a chief complaint of Abdominal pain (14 Aug 2019 21:23)      SUBJECTIVE / OVERNIGHT EVENTS: No acute event ON. Reports having something stuck in throat and wanting to cough it up but couldn't. Had right sided HA ON. Had a BM yesterday. Denies HA/CP/SOB/abd pain/n/v/d/fever/chills.    MEDICATIONS  (STANDING):  atorvastatin 40 milliGRAM(s) Oral <User Schedule>  carbidopa/levodopa  25/100 2 Tablet(s) Oral three times a day  dextrose 5%. 1000 milliLiter(s) (50 mL/Hr) IV Continuous <Continuous>  dextrose 5%. 1000 milliLiter(s) (50 mL/Hr) IV Continuous <Continuous>  dextrose 50% Injectable 12.5 Gram(s) IV Push once  dextrose 50% Injectable 25 Gram(s) IV Push once  dextrose 50% Injectable 25 Gram(s) IV Push once  docusate sodium 100 milliGRAM(s) Oral daily  DULoxetine 60 milliGRAM(s) Oral daily  gabapentin 300 milliGRAM(s) Oral at bedtime  guaiFENesin  milliGRAM(s) Oral every 12 hours  heparin  Infusion.  Unit(s)/Hr (11 mL/Hr) IV Continuous <Continuous>  insulin lispro (HumaLOG) corrective regimen sliding scale   SubCutaneous three times a day before meals  insulin lispro (HumaLOG) corrective regimen sliding scale   SubCutaneous at bedtime  losartan 50 milliGRAM(s) Oral daily  melatonin 5 milliGRAM(s) Oral at bedtime  metoprolol tartrate 25 milliGRAM(s) Oral two times a day  nystatin    Suspension 966301 Unit(s) Oral three times a day  pantoprazole    Tablet 40 milliGRAM(s) Oral before breakfast  polyethylene glycol 3350 17 Gram(s) Oral daily  predniSONE   Tablet 10 milliGRAM(s) Oral daily  senna 2 Tablet(s) Oral at bedtime    MEDICATIONS  (PRN):  acetaminophen   Tablet .. 650 milliGRAM(s) Oral every 6 hours PRN Temp greater or equal to 38C (100.4F), Mild Pain (1 - 3)  ALPRAZolam 0.5 milliGRAM(s) Oral two times a day PRN anxiety  dextrose 40% Gel 15 Gram(s) Oral once PRN Blood Glucose LESS THAN 70 milliGRAM(s)/deciliter  glucagon  Injectable 1 milliGRAM(s) IntraMuscular once PRN Glucose LESS THAN 70 milligrams/deciliter  heparin  Injectable 5000 Unit(s) IV Push every 6 hours PRN For aPTT less than 40  heparin  Injectable 2500 Unit(s) IV Push every 6 hours PRN For aPTT between 40 - 57        OBJECTIVE:    Vital Signs Last 24 Hrs  T(C): 36.6 (15 Aug 2019 02:32), Max: 37.3 (14 Aug 2019 11:36)  T(F): 97.8 (15 Aug 2019 02:32), Max: 99.2 (14 Aug 2019 11:36)  HR: 59 (15 Aug 2019 05:37) (59 - 140)  BP: 167/85 (15 Aug 2019 05:37) (117/63 - 184/92)  BP(mean): --  RR: 18 (15 Aug 2019 02:32) (18 - 18)  SpO2: 96% (15 Aug 2019 02:32) (96% - 100%)    PHYSICAL EXAM:  GENERAL: NAD, resting comfortably in bed  HEAD:  Atraumatic, Normocephalic  EYES: EOMI, PERRLA, conjunctiva and sclera clear  ENMT: dry mucous membranes, white-yellows thrush on tongue  NECK: Supple, No JVD  CHEST/LUNG: Rhonchi on L lower lobe. No wheezing, or rubs. No accessory muscle use.  HEART: irregularly irregular; No murmurs appreciated on exam  ABDOMEN: Soft, nontender, Nondistended; Bowel sounds present  EXTREMITIES: No clubbing, cyanosis, or peripheral edema   LYMPH: No lymphadenopathy noted  SKIN: No rashes or lesions  NERVOUS SYSTEM: AOx2, conversive and answers questions appropriately.    CAPILLARY BLOOD GLUCOSE      POCT Blood Glucose.: 135 mg/dL (15 Aug 2019 05:10)  POCT Blood Glucose.: 152 mg/dL (14 Aug 2019 21:35)  POCT Blood Glucose.: 141 mg/dL (14 Aug 2019 18:29)  POCT Blood Glucose.: 146 mg/dL (14 Aug 2019 16:52)  POCT Blood Glucose.: 136 mg/dL (14 Aug 2019 11:59)  POCT Blood Glucose.: 156 mg/dL (14 Aug 2019 08:12)    I&O's Summary    13 Aug 2019 07:01  -  14 Aug 2019 07:00  --------------------------------------------------------  IN: 960 mL / OUT: 850 mL / NET: 110 mL    14 Aug 2019 07:01  -  15 Aug 2019 06:41  --------------------------------------------------------  IN: 0 mL / OUT: 950 mL / NET: -950 mL        LABS:                        12.3   12.9  )-----------( 351      ( 14 Aug 2019 10:53 )             38.6     08-14    140  |  95<L>  |  44<H>  ----------------------------<  146<H>  4.0   |  23  |  1.34<H>    Ca    10.2      14 Aug 2019 07:13  Phos  5.1     08-14  Mg     2.6     08-14    TPro  7.7  /  Alb  4.1  /  TBili  0.4  /  DBili  x   /  AST  28  /  ALT  <5<L>  /  AlkPhos  47  08-14    PT/INR - ( 14 Aug 2019 03:53 )   PT: 13.3 sec;   INR: 1.16 ratio         PTT - ( 15 Aug 2019 03:11 )  PTT:66.7 sec  CARDIAC MARKERS ( 14 Aug 2019 20:18 )  x     / x     / 100 U/L / x     / 12.3 ng/mL  CARDIAC MARKERS ( 14 Aug 2019 07:13 )  x     / x     / 124 U/L / x     / 14.2 ng/mL  CARDIAC MARKERS ( 14 Aug 2019 03:53 )  x     / x     / x     / x     / 15.7 ng/mL

## 2019-08-15 NOTE — PROGRESS NOTE ADULT - PROBLEM SELECTOR PLAN 2
-With acute hypoxic and hypercarbic respiratory failure  -c/w CTX for CAP  -Taper off NC as tolerated, BIPAP prn for hypercarbia  -RVP negative  - leukocytosis is downtrending.  -Guaifenesin for cough. -With acute hypoxic and hypercarbic respiratory failure  - patient failed speech and swallow today  -In the setting of concerns for aspiration PNA, will switch from CTX to zosyn for broader coverage  -Taper off NC as tolerated, BIPAP prn for hypercarbia  -RVP negative  - leukocytosis stable.  -Guaifenesin for cough  -encourage to use incentive spirometer -With acute hypoxic and hypercarbic respiratory failure  - patient failed speech and swallow today  -In the setting of concerns for aspiration PNA, will consider switching from CTX to broader coverage, but limited by penicillin allergy. Will continue abx tentatively for ~7 days.   -Taper off NC as tolerated, BIPAP prn for hypercarbia  -RVP negative  - leukocytosis stable.  -Guaifenesin for cough  -encourage to use incentive spirometer  -Will start Pulmicort inhaler.

## 2019-08-15 NOTE — SWALLOW BEDSIDE ASSESSMENT ADULT - SWALLOW EVAL: DIAGNOSIS
Pt with dx of Parkinson's (Pt denies formal dx of Parkinson's, states her  says she has Parkinson's-like symptoms) presents with evidence of oropharyngeal dysphagia notable for s/s laryngeal penetration/aspiration with small trials of ice chips and honey-thick liquids via teaspoon. Pt also presents with evidence of significant dysphonia, near aphonia c/w hoarseness from cough and possible underlying Parkinsonian-like component of hypophonia. Pt with dx of Parkinson's (Pt denies formal dx of Parkinson's, states her  says she has Parkinson's-like symptoms) presents with evidence of oropharyngeal dysphagia notable for s/s laryngeal penetration/aspiration with small trials of ice chips and honey-thick liquids via teaspoon. Pt also presents with evidence of significant dysphonia, near aphonia c/w hoarseness from cough and possible underlying Parkinsonian-like component of hypophonia. All other consistencies deferred to the objective swallow study.

## 2019-08-15 NOTE — PROGRESS NOTE ADULT - PROBLEM SELECTOR PLAN 5
- breathing comfortably now  - c/w home dose prednisone chronically for PMR/?GCA. Right temporal artery biopsy reportedly negative, but she gets right sided headaches. -F/u repeat ESR/CRP, though non-specific and likely to be elevated in setting of infections.   - Maintain O2 sats between 88-92% - was delirious, now back to baseline  -Melatonin 5mg HS  -C/w home Sinemet for Parkinson-like symptom. Per son, patient never has a diagnosis of parkinson's disease. She is started on sinemet to see if it helps with her leg shaking symptoms

## 2019-08-15 NOTE — PROGRESS NOTE ADULT - PROBLEM SELECTOR PLAN 7
-Patient with large stool burden on imaging, but no SBO  -c/w Miralax, colace, senna  -Received tap water enema x1 and dulcolax suppository x1  -L quadrant pain--chronic from a hernia per patient, continue to monitor -hypertensive to 180s/90s ON  - restart losartan 25 QD (home dose 50 QD)  - give one dose of labetolol 10   - start amlodipine 5 QD  - c/w metoprolol for rate control and BP  - continue to monitor -hypertensive to 180s/90s ON  - restarted losartan 25mg QD (home dose 50mg QD)  - give one dose of labetolol 10mg IV push   - start amlodipine 5mg QD  - c/w metoprolol for rate control and BP  - continue to monitor. -Vitals Q4H.

## 2019-08-15 NOTE — PROGRESS NOTE ADULT - ASSESSMENT
85 F with GERD, HTN, HLD, DM2 with neuropathy, Parkinson's, anxiety/depression, AS s/p TAVR on plavix, overactive bladder, ?CHF, PMR, COPD p/w SOB and abdominal pain, admitted for acute hypoxic and hypercarbic respiratory failure in setting of pneumonia and COPD exacerbation, course c/b UTI and acute metabolic encephalopathy. 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

## 2019-08-15 NOTE — CONSULT NOTE ADULT - SUBJECTIVE AND OBJECTIVE BOX
CC:    HPI:  Patient is a 85y old  Female who presents with a chief complaint of Abdominal pain (15 Aug 2019 06:41)  . We are asked to evaluate the patient for _____. Modifying factors. Fevers/chills. Dysphagia/odynophagia/hemoptysis/unintentional weight loss. Any other relevant history/symptoms.  **Include at least 4 modifiers (Onset? Duration? Quality? Radiation? Severity? Laterality? What makes it better or worse?)**      PAST MEDICAL & SURGICAL HISTORY:  Insomnia  Iron deficiency anemia  Anxiety  Parkinson disease  Aortic stenosis  Neuropathy  Polymyalgia rheumatica  Diabetes  Hyperlipidemia  Hypertension  S/P TAVR (transcatheter aortic valve replacement)    Allergies    penicillin (Unknown)    Intolerances      MEDICATIONS  (STANDING):  amLODIPine   Tablet 5 milliGRAM(s) Oral daily  atorvastatin 40 milliGRAM(s) Oral <User Schedule>  buDESOnide    Inhalation Suspension 0.25 milliGRAM(s) Inhalation two times a day  carbidopa/levodopa  25/100 2 Tablet(s) Oral three times a day  dextrose 5%. 1000 milliLiter(s) (50 mL/Hr) IV Continuous <Continuous>  dextrose 50% Injectable 12.5 Gram(s) IV Push once  dextrose 50% Injectable 25 Gram(s) IV Push once  dextrose 50% Injectable 25 Gram(s) IV Push once  docusate sodium 100 milliGRAM(s) Oral daily  DULoxetine 60 milliGRAM(s) Oral daily  gabapentin 300 milliGRAM(s) Oral at bedtime  guaiFENesin  milliGRAM(s) Oral every 12 hours  heparin  Infusion.  Unit(s)/Hr (11 mL/Hr) IV Continuous <Continuous>  insulin lispro (HumaLOG) corrective regimen sliding scale   SubCutaneous three times a day before meals  insulin lispro (HumaLOG) corrective regimen sliding scale   SubCutaneous at bedtime  melatonin 5 milliGRAM(s) Oral at bedtime  metoprolol tartrate 25 milliGRAM(s) Oral two times a day  nystatin    Suspension 664060 Unit(s) Swish and Swallow four times a day  pantoprazole    Tablet 40 milliGRAM(s) Oral before breakfast  polyethylene glycol 3350 17 Gram(s) Oral daily  potassium chloride    Tablet ER 40 milliEquivalent(s) Oral every 4 hours  predniSONE   Tablet 10 milliGRAM(s) Oral daily  senna 2 Tablet(s) Oral at bedtime  warfarin 3 milliGRAM(s) Oral once    MEDICATIONS  (PRN):  acetaminophen   Tablet .. 650 milliGRAM(s) Oral every 6 hours PRN Temp greater or equal to 38C (100.4F), Mild Pain (1 - 3)  ALPRAZolam 0.5 milliGRAM(s) Oral two times a day PRN anxiety  dextrose 40% Gel 15 Gram(s) Oral once PRN Blood Glucose LESS THAN 70 milliGRAM(s)/deciliter  glucagon  Injectable 1 milliGRAM(s) IntraMuscular once PRN Glucose LESS THAN 70 milligrams/deciliter  heparin  Injectable 5000 Unit(s) IV Push every 6 hours PRN For aPTT less than 40  heparin  Injectable 2500 Unit(s) IV Push every 6 hours PRN For aPTT between 40 - 57      Social History: **??**    Family history: Pt denies any sign FHx    ROS:   ENT: all negative except as noted in HPI   CV: denies palpitations  Pulm: denies SOB, cough, hemoptysis  GI: denies change in apetite, indigestion, n/v  : denies pertinent urinary symptoms, urgency  Neuro: denies numbness/tingling, loss of sensation  Psych: denies anxiety  MS: denies muscle weakness, instability  Heme: denies easy bruising or bleeding  Endo: denies heat/cold intolerance, excessive sweating  Vascular: denies LE edema    Vital Signs Last 24 Hrs  T(C): 36.7 (15 Aug 2019 11:43), Max: 36.8 (14 Aug 2019 21:29)  T(F): 98.1 (15 Aug 2019 11:43), Max: 98.3 (14 Aug 2019 21:29)  HR: 63 (15 Aug 2019 16:18) (55 - 82)  BP: 170/77 (15 Aug 2019 16:18) (167/85 - 185/84)  BP(mean): --  RR: 18 (15 Aug 2019 16:18) (18 - 18)  SpO2: 95% (15 Aug 2019 16:18) (95% - 100%)                          12.1   12.42 )-----------( 360      ( 15 Aug 2019 09:45 )             37.9    08-15    140  |  96  |  42<H>  ----------------------------<  121<H>  3.2<L>   |  29  |  0.95    Ca    9.9      15 Aug 2019 06:45  Phos  5.1     08-14  Mg     2.6     08-14    TPro  7.7  /  Alb  4.1  /  TBili  0.4  /  DBili  x   /  AST  28  /  ALT  <5<L>  /  AlkPhos  47  08-14   PT/INR - ( 15 Aug 2019 09:33 )   PT: 13.4 sec;   INR: 1.17 ratio         PTT - ( 15 Aug 2019 10:25 )  PTT:88.9 sec    PHYSICAL EXAM:  Gen: NAD  Skin: No rashes, bruises, or lesions  Head: Normocephalic, Atraumatic  Face: no edema, erythema, or fluctuance. Parotid glands soft without mass  Eyes: no scleral injection  Nose: Nares bilaterally patent, no discharge  Mouth: No Stridor / Drooling / Trismus.  Mucosa moist, tongue/uvula midline, oropharynx clear  Neck: Flat, supple, no lymphadenopathy, trachea midline, no masses  Lymphatic: No lymphadenopathy  Resp: breathing easily, no stridor  CV: no peripheral edema/cyanosis  GI: nondistended   Peripheral vascular: no JVD or edema      Fiberoptic Indirect laryngoscopy:  (Scope #2 used) Reason for Laryngoscopy:    Patient was unable to cooperate with mirror.  Nasopharynx, oropharynx, and hypopharynx clear, no bleeding. Tongue base, posterior pharyngeal wall, vallecula, epiglottis, and subglottis appear normal. No erythema, edema, grossly aspiration of secretions, masses or lesions. Airway patent, no foreign body visualized. No glottic/supraglottic edema. , arytenoids, vestibular folds, ventricles, pyriform sinuses, and aryepiglottic folds appear normal bilaterally. right VC paralyzed with a left lateral pharyngeal bulge, looks pulsatile, likely carotid.                IMAGING/ADDITIONAL STUDIES: CC: hypophonia, dysphagia     HPI: 85 F with GERD, HTN, HLD, DM2 with neuropathy, Parkinson's, anxiety/depression, AS s/p TAVR on plavix, overactive bladder, ?CHF, PMR, COPD p/w SOB and abdominal pain, admitted for acute hypoxic and hypercarbic respiratory failure in setting of pneumonia and COPD exacerbation, course c/b UTI and acute metabolic encephalopathy. ENT called to eval for thrush, pt with dyphagia seen by [facility] sounds tahirargly, also hypophonia. as per pt denies any symptoms.     PAST MEDICAL & SURGICAL HISTORY:  Insomnia  Iron deficiency anemia  Anxiety  Parkinson disease  Aortic stenosis  Neuropathy  Polymyalgia rheumatica  Diabetes  Hyperlipidemia  Hypertension  S/P TAVR (transcatheter aortic valve replacement)    Allergies    penicillin (Unknown)    Intolerances      MEDICATIONS  (STANDING):  amLODIPine   Tablet 5 milliGRAM(s) Oral daily  atorvastatin 40 milliGRAM(s) Oral <User Schedule>  buDESOnide    Inhalation Suspension 0.25 milliGRAM(s) Inhalation two times a day  carbidopa/levodopa  25/100 2 Tablet(s) Oral three times a day  dextrose 5%. 1000 milliLiter(s) (50 mL/Hr) IV Continuous <Continuous>  dextrose 50% Injectable 12.5 Gram(s) IV Push once  dextrose 50% Injectable 25 Gram(s) IV Push once  dextrose 50% Injectable 25 Gram(s) IV Push once  docusate sodium 100 milliGRAM(s) Oral daily  DULoxetine 60 milliGRAM(s) Oral daily  gabapentin 300 milliGRAM(s) Oral at bedtime  guaiFENesin  milliGRAM(s) Oral every 12 hours  heparin  Infusion.  Unit(s)/Hr (11 mL/Hr) IV Continuous <Continuous>  insulin lispro (HumaLOG) corrective regimen sliding scale   SubCutaneous three times a day before meals  insulin lispro (HumaLOG) corrective regimen sliding scale   SubCutaneous at bedtime  melatonin 5 milliGRAM(s) Oral at bedtime  metoprolol tartrate 25 milliGRAM(s) Oral two times a day  nystatin    Suspension [id number] Unit(s) Swish and Swallow four times a day  pantoprazole    Tablet 40 milliGRAM(s) Oral before breakfast  polyethylene glycol 3350 17 Gram(s) Oral daily  potassium chloride    Tablet ER 40 milliEquivalent(s) Oral every 4 hours  predniSONE   Tablet 10 milliGRAM(s) Oral daily  senna 2 Tablet(s) Oral at bedtime  warfarin 3 milliGRAM(s) Oral once    MEDICATIONS  (PRN):  acetaminophen   Tablet .. 650 milliGRAM(s) Oral every 6 hours PRN Temp greater or equal to 38C (100.4F), Mild Pain (1 - 3)  ALPRAZolam 0.5 milliGRAM(s) Oral two times a day PRN anxiety  dextrose 40% Gel 15 Gram(s) Oral once PRN Blood Glucose LESS THAN 70 milliGRAM(s)/deciliter  glucagon  Injectable 1 milliGRAM(s) IntraMuscular once PRN Glucose LESS THAN 70 milligrams/deciliter  heparin  Injectable 5000 Unit(s) IV Push every 6 hours PRN For aPTT less than 40  heparin  Injectable 2500 Unit(s) IV Push every 6 hours PRN For aPTT between 40 - 57      Social History: **??**    Family history: Pt denies any sign FHx    ROS:   ENT: all negative except as noted in HPI   CV: denies palpitations  Pulm: denies SOB, cough, hemoptysis  GI: denies change in apetite, indigestion, n/v  : denies pertinent urinary symptoms, urgency  Neuro: denies numbness/tingling, loss of sensation  Psych: denies anxiety  MS: denies muscle weakness, instability  Heme: denies easy bruising or bleeding  Endo: denies heat/cold intolerance, excessive sweating  Vascular: denies LE edema    Vital Signs Last 24 Hrs  T(C): 36.7 (15 Aug 2019 11:43), Max: 36.8 (14 Aug 2019 21:29)  T(F): 98.1 (15 Aug 2019 11:43), Max: 98.3 (14 Aug 2019 21:29)  HR: 63 (15 Aug 2019 16:18) (55 - 82)  BP: 170/77 (15 Aug 2019 16:18) (167/85 - 185/84)  BP(mean): --  RR: 18 (15 Aug 2019 16:18) (18 - 18)  SpO2: 95% (15 Aug 2019 16:18) (95% - 100%)                          12.1   12.42 )-----------( 360      ( 15 Aug 2019 09:45 )             37.9    08-15    140  |  96  |  42<H>  ----------------------------<  121<H>  3.2<L>   |  29  |  0.95    Ca    9.9      15 Aug 2019 06:45  Phos  5.1     08-14  Mg     2.6     08-14    TPro  7.7  /  Alb  4.1  /  TBili  0.4  /  DBili  x   /  AST  28  /  ALT  <5<L>  /  AlkPhos  47  08-14   PT/INR - ( 15 Aug 2019 09:33 )   PT: 13.4 sec;   INR: 1.17 ratio         PTT - ( 15 Aug 2019 10:25 )  PTT:88.9 sec    PHYSICAL EXAM:  Gen: NAD  Skin: No rashes, bruises, or lesions  Head: Normocephalic, Atraumatic  Face: no edema, erythema, or fluctuance. Parotid glands soft without mass  Eyes: no scleral injection  Nose: Nares bilaterally patent, no discharge  Mouth: No Stridor / Drooling / Trismus.  Mucosa moist, tongue/uvula midline, oropharynx clear  Neck: Flat, supple, no lymphadenopathy, trachea midline, no masses  Lymphatic: No lymphadenopathy  Resp: breathing easily, no stridor  CV: no peripheral edema/cyanosis  GI: nondistended   Peripheral vascular: no JVD or edema      Fiberoptic Indirect laryngoscopy:  (Scope #2 used) Reason for Laryngoscopy:    Patient was unable to cooperate with mirror.  Nasopharynx, oropharynx, and hypopharynx clear, no bleeding. Tongue base, posterior pharyngeal wall, vallecula, epiglottis, and subglottis appear normal. No erythema, edema, grossly aspiration of secretions, masses or lesions. Airway patent, no foreign body visualized. No glottic/supraglottic edema. , arytenoids, vestibular folds, ventricles, pyriform sinuses, and aryepiglottic folds appear normal bilaterally. right VC paralyzed with a left lateral pharyngeal bulge, looks pulsatile, likely carotid.                IMAGING/ADDITIONAL STUDIES:

## 2019-08-15 NOTE — PROGRESS NOTE ADULT - PROBLEM SELECTOR PLAN 4
- was delirious, now back to baseline  -Melatonin 5mg HS  -C/w home Sinemet for Parkinson's. - failed speech and swallow study today  - MBS planned for tomorrow  - ENT consulted, f/u recs  - NPO except meds per patient and family's request  - Aspiration precaution   - Elevated head of bed - failed speech and swallow study today  - MBS planned for tomorrow  - ENT consulted, f/u recs  - NPO except meds per patient and family's request  - Aspiration precautions  - Elevate head of bed

## 2019-08-15 NOTE — PROGRESS NOTE ADULT - PROBLEM SELECTOR PLAN 1
- new onset Afib, likely 2/2 over diuresis vs infection   - converted back to sinus at around 9 am 8/14  - now on heparin gtt, will dc plavix  - continue to bridge to coumadin 3mg QHS   - TSH wnl  - start metoprolol 25mg BID for rate control per cardiology  - given patient is volume down on exam, will hold lasix for today. Reassess and consider restarting tomorrow  - f/u cardiology recs  - f/u repeat ekg  -C/w telemetry.  -Troponins increased, likely due to demand ischemia from Afib with RVR. Continue to trend. F/u cardio recs. On heparin drip. - new onset Afib yesterday, likely 2/2 over diuresis vs infection   - converted back to sinus at around 9 am 8/14  - now on heparin gtt, plavix discontinued  - continue to bridge to coumadin 3mg QHS   - TSH wnl  - c/w metoprolol 25mg BID for rate control per cardiology  - given patient is euvolemic on exam and will be NPO tonight, will continue to hold lasix for today. Reassess and consider restarting tomorrow  - f/u cardiology recs  - Repeat ekg unchanged from pior EKG  - Telemetry.: sinus 50-80s  - Troponins downstrended, likely due to demand ischemia from Afib with RVR - new onset Afib yesterday, likely 2/2 over diuresis vs infection   - converted back to sinus at around 9 am 8/14  - now on heparin gtt, plavix discontinued  - continue to bridge to coumadin 3mg QHS for valvular Afib in setting of mitral stenosis.    - TSH wnl  - c/w metoprolol 25mg BID for rate control per cardiology  - given patient is euvolemic on exam and will be NPO tonight, will continue to hold lasix for today. Reassess and consider restarting tomorrow/tonight  - f/u cardiology recs  - Repeat ekg unchanged from pior EKG  - Telemetry: sinus 50-80s  - Troponins downtrended, likely was due to demand ischemia from Afib with RVR

## 2019-08-16 LAB
ANION GAP SERPL CALC-SCNC: 17 MMOL/L — SIGNIFICANT CHANGE UP (ref 5–17)
APTT BLD: 130.1 SEC — CRITICAL HIGH (ref 27.5–36.3)
APTT BLD: 56.7 SEC — HIGH (ref 27.5–36.3)
BUN SERPL-MCNC: 35 MG/DL — HIGH (ref 7–23)
CALCIUM SERPL-MCNC: 10.6 MG/DL — HIGH (ref 8.4–10.5)
CHLORIDE SERPL-SCNC: 100 MMOL/L — SIGNIFICANT CHANGE UP (ref 96–108)
CO2 SERPL-SCNC: 25 MMOL/L — SIGNIFICANT CHANGE UP (ref 22–31)
CREAT SERPL-MCNC: 0.81 MG/DL — SIGNIFICANT CHANGE UP (ref 0.5–1.3)
CULTURE RESULTS: SIGNIFICANT CHANGE UP
GLUCOSE SERPL-MCNC: 125 MG/DL — HIGH (ref 70–99)
HCT VFR BLD CALC: 39.9 % — SIGNIFICANT CHANGE UP (ref 34.5–45)
HGB BLD-MCNC: 12.8 G/DL — SIGNIFICANT CHANGE UP (ref 11.5–15.5)
INR BLD: 1.19 RATIO — HIGH (ref 0.88–1.16)
MAGNESIUM SERPL-MCNC: 2.4 MG/DL — SIGNIFICANT CHANGE UP (ref 1.6–2.6)
MCHC RBC-ENTMCNC: 28.5 PG — SIGNIFICANT CHANGE UP (ref 27–34)
MCHC RBC-ENTMCNC: 32.1 GM/DL — SIGNIFICANT CHANGE UP (ref 32–36)
MCV RBC AUTO: 88.9 FL — SIGNIFICANT CHANGE UP (ref 80–100)
PHOSPHATE SERPL-MCNC: 3.6 MG/DL — SIGNIFICANT CHANGE UP (ref 2.5–4.5)
PLATELET # BLD AUTO: 414 K/UL — HIGH (ref 150–400)
POTASSIUM SERPL-MCNC: 4.9 MMOL/L — SIGNIFICANT CHANGE UP (ref 3.5–5.3)
POTASSIUM SERPL-SCNC: 4.9 MMOL/L — SIGNIFICANT CHANGE UP (ref 3.5–5.3)
PROTHROM AB SERPL-ACNC: 13.5 SEC — HIGH (ref 10–13.1)
RBC # BLD: 4.49 M/UL — SIGNIFICANT CHANGE UP (ref 3.8–5.2)
RBC # FLD: 15.5 % — HIGH (ref 10.3–14.5)
SODIUM SERPL-SCNC: 142 MMOL/L — SIGNIFICANT CHANGE UP (ref 135–145)
SPECIMEN SOURCE: SIGNIFICANT CHANGE UP
WBC # BLD: 11.46 K/UL — HIGH (ref 3.8–10.5)
WBC # FLD AUTO: 11.46 K/UL — HIGH (ref 3.8–10.5)

## 2019-08-16 PROCEDURE — 71250 CT THORAX DX C-: CPT | Mod: 26

## 2019-08-16 PROCEDURE — 71045 X-RAY EXAM CHEST 1 VIEW: CPT | Mod: 26

## 2019-08-16 PROCEDURE — 99233 SBSQ HOSP IP/OBS HIGH 50: CPT | Mod: GC

## 2019-08-16 PROCEDURE — 99232 SBSQ HOSP IP/OBS MODERATE 35: CPT | Mod: 25

## 2019-08-16 PROCEDURE — 74230 X-RAY XM SWLNG FUNCJ C+: CPT | Mod: 26

## 2019-08-16 RX ORDER — METOPROLOL TARTRATE 50 MG
25 TABLET ORAL
Refills: 0 | Status: DISCONTINUED | OUTPATIENT
Start: 2019-08-16 | End: 2019-08-28

## 2019-08-16 RX ORDER — POLYETHYLENE GLYCOL 3350 17 G/17G
17 POWDER, FOR SOLUTION ORAL DAILY
Refills: 0 | Status: DISCONTINUED | OUTPATIENT
Start: 2019-08-16 | End: 2019-08-30

## 2019-08-16 RX ORDER — FUROSEMIDE 40 MG
20 TABLET ORAL
Refills: 0 | Status: DISCONTINUED | OUTPATIENT
Start: 2019-08-16 | End: 2019-08-16

## 2019-08-16 RX ORDER — GABAPENTIN 400 MG/1
300 CAPSULE ORAL AT BEDTIME
Refills: 0 | Status: DISCONTINUED | OUTPATIENT
Start: 2019-08-16 | End: 2019-08-30

## 2019-08-16 RX ORDER — DOCUSATE SODIUM 100 MG
100 CAPSULE ORAL DAILY
Refills: 0 | Status: DISCONTINUED | OUTPATIENT
Start: 2019-08-16 | End: 2019-08-18

## 2019-08-16 RX ORDER — CARBIDOPA AND LEVODOPA 25; 100 MG/1; MG/1
2 TABLET ORAL THREE TIMES A DAY
Refills: 0 | Status: DISCONTINUED | OUTPATIENT
Start: 2019-08-16 | End: 2019-08-30

## 2019-08-16 RX ORDER — PANTOPRAZOLE SODIUM 20 MG/1
40 TABLET, DELAYED RELEASE ORAL DAILY
Refills: 0 | Status: DISCONTINUED | OUTPATIENT
Start: 2019-08-16 | End: 2019-08-30

## 2019-08-16 RX ORDER — INSULIN LISPRO 100/ML
VIAL (ML) SUBCUTANEOUS EVERY 6 HOURS
Refills: 0 | Status: DISCONTINUED | OUTPATIENT
Start: 2019-08-16 | End: 2019-08-30

## 2019-08-16 RX ORDER — WARFARIN SODIUM 2.5 MG/1
3 TABLET ORAL ONCE
Refills: 0 | Status: COMPLETED | OUTPATIENT
Start: 2019-08-16 | End: 2019-08-16

## 2019-08-16 RX ORDER — ATORVASTATIN CALCIUM 80 MG/1
40 TABLET, FILM COATED ORAL
Refills: 0 | Status: DISCONTINUED | OUTPATIENT
Start: 2019-08-16 | End: 2019-08-30

## 2019-08-16 RX ORDER — ALPRAZOLAM 0.25 MG
0.5 TABLET ORAL
Refills: 0 | Status: DISCONTINUED | OUTPATIENT
Start: 2019-08-16 | End: 2019-08-23

## 2019-08-16 RX ORDER — ACETAMINOPHEN 500 MG
650 TABLET ORAL EVERY 6 HOURS
Refills: 0 | Status: DISCONTINUED | OUTPATIENT
Start: 2019-08-16 | End: 2019-08-17

## 2019-08-16 RX ORDER — LANOLIN ALCOHOL/MO/W.PET/CERES
5 CREAM (GRAM) TOPICAL AT BEDTIME
Refills: 0 | Status: DISCONTINUED | OUTPATIENT
Start: 2019-08-16 | End: 2019-08-30

## 2019-08-16 RX ORDER — FUROSEMIDE 40 MG
20 TABLET ORAL DAILY
Refills: 0 | Status: DISCONTINUED | OUTPATIENT
Start: 2019-08-16 | End: 2019-08-16

## 2019-08-16 RX ORDER — AMLODIPINE BESYLATE 2.5 MG/1
5 TABLET ORAL DAILY
Refills: 0 | Status: DISCONTINUED | OUTPATIENT
Start: 2019-08-16 | End: 2019-08-30

## 2019-08-16 RX ORDER — DULOXETINE HYDROCHLORIDE 30 MG/1
60 CAPSULE, DELAYED RELEASE ORAL DAILY
Refills: 0 | Status: DISCONTINUED | OUTPATIENT
Start: 2019-08-16 | End: 2019-08-16

## 2019-08-16 RX ORDER — VENLAFAXINE HCL 75 MG
37.5 CAPSULE, EXT RELEASE 24 HR ORAL EVERY 12 HOURS
Refills: 0 | Status: DISCONTINUED | OUTPATIENT
Start: 2019-08-16 | End: 2019-08-30

## 2019-08-16 RX ORDER — GABAPENTIN 400 MG/1
300 CAPSULE ORAL AT BEDTIME
Refills: 0 | Status: DISCONTINUED | OUTPATIENT
Start: 2019-08-16 | End: 2019-08-16

## 2019-08-16 RX ORDER — FUROSEMIDE 40 MG
20 TABLET ORAL DAILY
Refills: 0 | Status: DISCONTINUED | OUTPATIENT
Start: 2019-08-17 | End: 2019-08-26

## 2019-08-16 RX ORDER — SENNA PLUS 8.6 MG/1
2 TABLET ORAL AT BEDTIME
Refills: 0 | Status: DISCONTINUED | OUTPATIENT
Start: 2019-08-16 | End: 2019-08-30

## 2019-08-16 RX ORDER — LOSARTAN POTASSIUM 100 MG/1
25 TABLET, FILM COATED ORAL DAILY
Refills: 0 | Status: DISCONTINUED | OUTPATIENT
Start: 2019-08-16 | End: 2019-08-19

## 2019-08-16 RX ORDER — NYSTATIN 500MM UNIT
500000 POWDER (EA) MISCELLANEOUS
Refills: 0 | Status: DISCONTINUED | OUTPATIENT
Start: 2019-08-16 | End: 2019-08-30

## 2019-08-16 RX ADMIN — FLUCONAZOLE 50 MILLIGRAM(S): 150 TABLET ORAL at 20:38

## 2019-08-16 RX ADMIN — SENNA PLUS 2 TABLET(S): 8.6 TABLET ORAL at 22:48

## 2019-08-16 RX ADMIN — GABAPENTIN 300 MILLIGRAM(S): 400 CAPSULE ORAL at 22:48

## 2019-08-16 RX ADMIN — CARBIDOPA AND LEVODOPA 2 TABLET(S): 25; 100 TABLET ORAL at 22:47

## 2019-08-16 RX ADMIN — Medication 500000 UNIT(S): at 22:48

## 2019-08-16 RX ADMIN — LOSARTAN POTASSIUM 25 MILLIGRAM(S): 100 TABLET, FILM COATED ORAL at 06:07

## 2019-08-16 RX ADMIN — Medication 500000 UNIT(S): at 06:07

## 2019-08-16 RX ADMIN — Medication 600 MILLIGRAM(S): at 06:06

## 2019-08-16 RX ADMIN — Medication 25 MILLIGRAM(S): at 22:47

## 2019-08-16 RX ADMIN — WARFARIN SODIUM 3 MILLIGRAM(S): 2.5 TABLET ORAL at 22:47

## 2019-08-16 RX ADMIN — Medication 10 MILLIGRAM(S): at 06:06

## 2019-08-16 RX ADMIN — PANTOPRAZOLE SODIUM 40 MILLIGRAM(S): 20 TABLET, DELAYED RELEASE ORAL at 06:06

## 2019-08-16 RX ADMIN — CARBIDOPA AND LEVODOPA 2 TABLET(S): 25; 100 TABLET ORAL at 06:06

## 2019-08-16 RX ADMIN — HEPARIN SODIUM 2500 UNIT(S): 5000 INJECTION INTRAVENOUS; SUBCUTANEOUS at 10:48

## 2019-08-16 RX ADMIN — Medication 25 MILLIGRAM(S): at 06:06

## 2019-08-16 RX ADMIN — HEPARIN SODIUM 0 UNIT(S)/HR: 5000 INJECTION INTRAVENOUS; SUBCUTANEOUS at 18:40

## 2019-08-16 RX ADMIN — Medication 0.25 MILLIGRAM(S): at 22:49

## 2019-08-16 RX ADMIN — Medication 0.25 MILLIGRAM(S): at 06:15

## 2019-08-16 RX ADMIN — Medication 5 MILLIGRAM(S): at 22:48

## 2019-08-16 RX ADMIN — AMLODIPINE BESYLATE 5 MILLIGRAM(S): 2.5 TABLET ORAL at 06:06

## 2019-08-16 RX ADMIN — CEFTRIAXONE 100 MILLIGRAM(S): 500 INJECTION, POWDER, FOR SOLUTION INTRAMUSCULAR; INTRAVENOUS at 22:48

## 2019-08-16 RX ADMIN — HEPARIN SODIUM 900 UNIT(S)/HR: 5000 INJECTION INTRAVENOUS; SUBCUTANEOUS at 10:35

## 2019-08-16 RX ADMIN — Medication 0.25 MILLIGRAM(S): at 13:55

## 2019-08-16 RX ADMIN — HEPARIN SODIUM 700 UNIT(S)/HR: 5000 INJECTION INTRAVENOUS; SUBCUTANEOUS at 19:47

## 2019-08-16 NOTE — CHART NOTE - NSCHARTNOTEFT_GEN_A_CORE
Pt failed MBS. ENT highly recommends VC injection if pt is medically stable. Tetatively booked for 8/22 at 3:30 pm. WIll need to discuss with family, obtain cardiac clearance, updated labs, T&S, CXray, EKG, NPO wed night at midnight.    ENT will be on touch w medicine team 3    spectra [phone]

## 2019-08-16 NOTE — CONSULT NOTE ADULT - SUBJECTIVE AND OBJECTIVE BOX
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     She was admitted for acute hypoxic and hypercarbic respiratory failure in the setting of pneumonia and COPD exacerbation. Her course has been complicated by UTI and acute metabolic encephalopathy which are improving.     GI consulted for workup of dysphagia. Patient unable to provide detailed history about the dysphagia. She had a modified barium swallow on 2019 with laryngeal penetration with aspiration and thus an NG tube was placed for feeding. She also was evaluated by ENT for hypophonia and was found to have oral thrush, right vocal cord paralysis. Her neck CT did not show a cause of the VC paralysis, and she is scheduled for a VC injection by ENT. She had a CT neck done which shows enlarged lower cervical esophagus above the level of the thoracic inlet.    Patient had an endoscopy one year ago, report unknown.     Allergies:  penicillin (Unknown)      Home Medications:  * No Current Medications as of 11-Aug-2019 10:13 documented in Structured Notes    Hospital Medications:  acetaminophen   Tablet .. 650 milliGRAM(s) Oral every 6 hours PRN  ALPRAZolam 0.5 milliGRAM(s) Oral two times a day PRN  atorvastatin 40 milliGRAM(s) Oral <User Schedule>  buDESOnide    Inhalation Suspension 0.25 milliGRAM(s) Inhalation two times a day  carbidopa/levodopa  25/100 2 Tablet(s) Oral three times a day  cefTRIAXone   IVPB 1000 milliGRAM(s) IV Intermittent every 24 hours  dextrose 40% Gel 15 Gram(s) Oral once PRN  dextrose 5%. 1000 milliLiter(s) IV Continuous <Continuous>  dextrose 50% Injectable 12.5 Gram(s) IV Push once  dextrose 50% Injectable 25 Gram(s) IV Push once  dextrose 50% Injectable 25 Gram(s) IV Push once  docusate sodium 100 milliGRAM(s) Oral daily  DULoxetine 60 milliGRAM(s) Oral daily  fluconAZOLE IVPB      fluconAZOLE IVPB 100 milliGRAM(s) IV Intermittent every 24 hours  gabapentin 300 milliGRAM(s) Oral at bedtime  glucagon  Injectable 1 milliGRAM(s) IntraMuscular once PRN  guaiFENesin  milliGRAM(s) Oral every 12 hours  heparin  Infusion.  Unit(s)/Hr IV Continuous <Continuous>  heparin  Injectable 5000 Unit(s) IV Push every 6 hours PRN  heparin  Injectable 2500 Unit(s) IV Push every 6 hours PRN  insulin lispro (HumaLOG) corrective regimen sliding scale   SubCutaneous three times a day before meals  insulin lispro (HumaLOG) corrective regimen sliding scale   SubCutaneous at bedtime  losartan 25 milliGRAM(s) Oral daily  melatonin 5 milliGRAM(s) Oral at bedtime  metoprolol tartrate 25 milliGRAM(s) Oral two times a day  nystatin    Suspension 168662 Unit(s) Swish and Swallow four times a day  pantoprazole    Tablet 40 milliGRAM(s) Oral before breakfast  polyethylene glycol 3350 17 Gram(s) Oral daily  predniSONE   Tablet 10 milliGRAM(s) Oral daily  senna 2 Tablet(s) Oral at bedtime      PMHX/PSHX:  Insomnia  Iron deficiency anemia  Anxiety  Parkinson disease  Aortic stenosis  Neuropathy  Polymyalgia rheumatica  Diabetes  Hyperlipidemia  Hypertension  S/P TAVR (transcatheter aortic valve replacement)      Family history:  No pertinent family history in first degree relatives      Social History:   Lives at home with . Has HHAs at home    ROS:     General:  No wt loss, fevers, chills, night sweats, fatigue,   Eyes:  Good vision, no reported pain  ENT:  No sore throat, pain, runny nose, dysphagia  CV:  No pain, palpitations, hypo/hypertension  Resp:  No dyspnea, cough, tachypnea, wheezing  GI:  See HPI      PHYSICAL EXAM:     GENERAL:  Appears stated age  HEENT:  NC/AT  CHEST:  Full & symmetric excursion  HEART:  Regular rhythm, S1, S2  ABDOMEN:  Soft, non-tender, non-distended  EXTREMITIES:  no edema  SKIN:  No rash  NEURO:  Alert, oriented    Vital Signs:  Vital Signs Last 24 Hrs  T(C): 37.1 (16 Aug 2019 12:06), Max: 37.1 (16 Aug 2019 12:06)  T(F): 98.8 (16 Aug 2019 12:06), Max: 98.8 (16 Aug 2019 12:06)  HR: 69 (16 Aug 2019 12:06) (59 - 69)  BP: 165/93 (16 Aug 2019 12:06) (162/76 - 186/92)  BP(mean): --  RR: 18 (16 Aug 2019 12:06) (18 - 20)  SpO2: 96% (16 Aug 2019 12:06) (96% - 97%)  Daily     Daily Weight in k.3 (16 Aug 2019 12:06)    LABS:                        12.8   11.46 )-----------( 414      ( 16 Aug 2019 08:43 )             39.9     08-16    142  |  100  |  35<H>  ----------------------------<  125<H>  4.9   |  25  |  0.81    Ca    10.6<H>      16 Aug 2019 06:08  Phos  3.6     16  Mg     2.4     08-16        PT/INR - ( 16 Aug 2019 08:03 )   PT: 13.5 sec;   INR: 1.19 ratio         PTT - ( 16 Aug 2019 17:22 )  PTT:130.1 sec    Imaging:    < from: Xray Modified Barium Swallow (19 @ 09:22) >  IMPRESSION:     Laryngeal penetration with aspiration.  For further information and recommendations, please [facility] [place]'s final report, which is available for review in the electronic medical record.      < end of copied text >      < from: CT Abdomen and Pelvis w/ IV Cont (08.10.19 @ 22:21) >  NTERPRETATION:  CLINICAL INFORMATION: Abdominal pain and fever.    COMPARISON: None.  PROCEDURE:   CT of the Abdomen and Pelvis was performed with intravenous contrast.   Intravenous contrast: 90 ml Omnipaque 350. 10 ml discarded.  Oral contrast: None.  Sagittal and coronal reformats were performed.    FINDINGS:    LOWER CHEST: Dependent groundglass opacity in both lower lobes probably reflects atelectasis.    LIVER: Within normal limits.  BILE DUCTS: Normal caliber.  GALLBLADDER: Within normal limits.  SPLEEN: Within normal limits.  PANCREAS: Within normal limits.  ADRENALS: Within normal limits.  KIDNEYS/URETERS: Two left upper pole renal cyst measure approximately 2.3 cm and 1.3 cm.    BLADDER: Underdistended bladder with nonspecific anterior bladder wall thickening.  REPRODUCTIVE ORGANS: Atrophic uterus.  No adnexal mass.    BOWEL: Moderate hiatal hernia.  No bowel obstruction. Normal appendix.  The cecum is located in the midline of the upper abdomen, adjacent to the abdominal wall.  There is gaseous distention of the cecum to approximately 9 cm (5:17).  There is large amount of stool within the ascending and proximal transverse colon.  The distal transverse colon: Is markedly redundant and demonstrates gaseous distention to approximately 8 cm (5:24).  The descending colon is largely collapsed.  There is marked looping of the sigmoid colon without evidence of a sigmoid volvulus.  The distal sigmoid and rectum are collapsed.  PERITONEUM: No ascites, free air or abscess.  VESSELS: Heavy atherosclerotic calcification of the aortoiliac tree.  RETROPERITONEUM/LYMPH NODES: No retroperitoneal hemorrhage.No lymphadenopathy.    ABDOMINAL WALL: Diastases of the rectus abdominis muscles without evidence of hernia formation.   BONES: There is marked scoliosis of the thoracolumbar spine.  There is diffuse bony ankylosis of the posterior elements in the visualized spine from the T8 level to the sacrum.  The patient is status post posterior   spinal fusion with Dorman rods from approximately the L2-L3 level to the L5-S1 level.    IMPRESSION:  No bowel obstruction.  Large amount of stool in the proximal colon.  Gaseous distention of the cecum to 9 cm.  Markedly redundant distal transverse colon with gaseous distention to 8 cm.  Marked looping of the sigmoid colon without evidence of sigmoid volvulus.  Nonspecific anterior bladder wall thickening may be related to underdistention.  Cystitis should be excluded based on clinical symptoms and laboratory values.    Depending groundglass opacity in both lower lobes probably reflects atelectasis.  Pneumonia should be excluded clinically.      < end of copied text >        < from: CT Neck Soft Tissue w/ IV Cont (08.15.19 @ 22:57) >  IMPRESSION:  Asymmetry of the vocal cords is noted consistent with the history of vocal cord paralysis. No focal mass lesion is appreciated involving the  larynx.    The lower cervical esophagusappears enlarged just above the level of the thoracic inlet. An esophagitis, underlying mass, Zenker's diverticulum, or esophageal contour changes secondary to esophageal dysmotility can't be excluded. Consider correlation with endoscopy for further evaluation if clinically warranted.  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     She was admitted for acute hypoxic and hypercarbic respiratory failure in the setting of pneumonia and COPD exacerbation. Her course has been complicated by UTI and acute metabolic encephalopathy which are improving.     GI consulted for workup of dysphagia. Patient has had coughing with swallowing for several months. She denies food getting stuck in chest. She has had 3 endoscopies recently, last in [facility].    She also has worsening hoarsness d/t vocal cord paralysis    Allergies:  penicillin (Unknown)      Home Medications:  * No Current Medications as of 11-Aug-2019 10:13 documented in Structured Notes    Hospital Medications:  acetaminophen   Tablet .. 650 milliGRAM(s) Oral every 6 hours PRN  ALPRAZolam 0.5 milliGRAM(s) Oral two times a day PRN  atorvastatin 40 milliGRAM(s) Oral <User Schedule>  buDESOnide    Inhalation Suspension 0.25 milliGRAM(s) Inhalation two times a day  carbidopa/levodopa  25/100 2 Tablet(s) Oral three times a day  cefTRIAXone   IVPB 1000 milliGRAM(s) IV Intermittent every 24 hours  dextrose 40% Gel 15 Gram(s) Oral once PRN  dextrose 5%. 1000 milliLiter(s) IV Continuous <Continuous>  dextrose 50% Injectable 12.5 Gram(s) IV Push once  dextrose 50% Injectable 25 Gram(s) IV Push once  dextrose 50% Injectable 25 Gram(s) IV Push once  docusate sodium 100 milliGRAM(s) Oral daily  DULoxetine 60 milliGRAM(s) Oral daily  fluconAZOLE IVPB      fluconAZOLE IVPB 100 milliGRAM(s) IV Intermittent every 24 hours  gabapentin 300 milliGRAM(s) Oral at bedtime  glucagon  Injectable 1 milliGRAM(s) IntraMuscular once PRN  guaiFENesin  milliGRAM(s) Oral every 12 hours  heparin  Infusion.  Unit(s)/Hr IV Continuous <Continuous>  heparin  Injectable 5000 Unit(s) IV Push every 6 hours PRN  heparin  Injectable 2500 Unit(s) IV Push every 6 hours PRN  insulin lispro (HumaLOG) corrective regimen sliding scale   SubCutaneous three times a day before meals  insulin lispro (HumaLOG) corrective regimen sliding scale   SubCutaneous at bedtime  losartan 25 milliGRAM(s) Oral daily  melatonin 5 milliGRAM(s) Oral at bedtime  metoprolol tartrate 25 milliGRAM(s) Oral two times a day  nystatin    Suspension 730792 Unit(s) Swish and Swallow four times a day  pantoprazole    Tablet 40 milliGRAM(s) Oral before breakfast  polyethylene glycol 3350 17 Gram(s) Oral daily  predniSONE   Tablet 10 milliGRAM(s) Oral daily  senna 2 Tablet(s) Oral at bedtime      PMHX/PSHX:  Insomnia  Iron deficiency anemia  Anxiety  Parkinson disease  Aortic stenosis  Neuropathy  Polymyalgia rheumatica  Diabetes  Hyperlipidemia  Hypertension  S/P TAVR (transcatheter aortic valve replacement)      Family history:  No pertinent family history in first degree relatives      Social History:   Lives at home with . Has HHAs at home    ROS:     General:  No wt loss, fevers, chills, night sweats, fatigue,   Eyes:  Good vision, no reported pain  ENT:  No sore throat, pain, runny nose, dysphagia  CV:  No pain, palpitations, hypo/hypertension  Resp:  No dyspnea, cough, tachypnea, wheezing  GI:  See HPI      PHYSICAL EXAM:     GENERAL:  Appears stated age  HEENT:  NC/AT  CHEST:  Full & symmetric excursion  HEART:  Regular rhythm, S1, S2  ABDOMEN:  Soft, non-tender, non-distended  EXTREMITIES:  no edema  SKIN:  No rash  NEURO:  Alert, oriented, lip smacking/tremor    Vital Signs:  Vital Signs Last 24 Hrs  T(C): 37.1 (16 Aug 2019 12:06), Max: 37.1 (16 Aug 2019 12:06)  T(F): 98.8 (16 Aug 2019 12:06), Max: 98.8 (16 Aug 2019 12:06)  HR: 69 (16 Aug 2019 12:06) (59 - 69)  BP: 165/93 (16 Aug 2019 12:06) (162/76 - 186/92)  BP(mean): --  RR: 18 (16 Aug 2019 12:06) (18 - 20)  SpO2: 96% (16 Aug 2019 12:06) (96% - 97%)  Daily     Daily Weight in k.3 (16 Aug 2019 12:06)    LABS:                        12.8   11.46 )-----------( 414      ( 16 Aug 2019 08:43 )             39.9     08-16    142  |  100  |  35<H>  ----------------------------<  125<H>  4.9   |  25  |  0.81    Ca    10.6<H>      16 Aug 2019 06:08  Phos  3.6       Mg     2.4             PT/INR - ( 16 Aug 2019 08:03 )   PT: 13.5 sec;   INR: 1.19 ratio         PTT - ( 16 Aug 2019 17:22 )  PTT:130.1 sec    Imaging:    < from: Xray Modified Barium Swallow (19 @ 09:22) >  IMPRESSION:     Laryngeal penetration with aspiration.  For further information and recommendations, please refer [facility]'s final report, which is available for review in the electronic medical record.      < end of copied text >      < from: CT Abdomen and Pelvis w/ IV Cont (08.10.19 @ 22:21) >  NTERPRETATION:  CLINICAL INFORMATION: Abdominal pain and fever.    COMPARISON: None.  PROCEDURE:   CT of the Abdomen and Pelvis was performed with intravenous contrast.   Intravenous contrast: 90 ml Omnipaque 350. 10 ml discarded.  Oral contrast: None.  Sagittal and coronal reformats were performed.    FINDINGS:    LOWER CHEST: Dependent groundglass opacity in both lower lobes probably reflects atelectasis.    LIVER: Within normal limits.  BILE DUCTS: Normal caliber.  GALLBLADDER: Within normal limits.  SPLEEN: Within normal limits.  PANCREAS: Within normal limits.  ADRENALS: Within normal limits.  KIDNEYS/URETERS: Two left upper pole renal cyst measure approximately 2.3 cm and 1.3 cm.    BLADDER: Underdistended bladder with nonspecific anterior bladder wall thickening.  REPRODUCTIVE ORGANS: Atrophic uterus.  No adnexal mass.    BOWEL: Moderate hiatal hernia.  No bowel obstruction. Normal appendix.  The cecum is located in the midline of the upper abdomen, adjacent to the abdominal wall.  There is gaseous distention of the cecum to approximately 9 cm (5:17).  There is large amount of stool within the ascending and proximal transverse colon.  The distal transverse colon: Is markedly redundant and demonstrates gaseous distention to approximately 8 cm (5:24).  The descending colon is largely collapsed.  There is marked looping of the sigmoid colon without evidence of a sigmoid volvulus.  The distal sigmoid and rectum are collapsed.  PERITONEUM: No ascites, free air or abscess.  VESSELS: Heavy atherosclerotic calcification of the aortoiliac tree.  RETROPERITONEUM/LYMPH NODES: No retroperitoneal hemorrhage.No lymphadenopathy.    ABDOMINAL WALL: Diastases of the rectus abdominis muscles without evidence of hernia formation.   BONES: There is marked scoliosis of the thoracolumbar spine.  There is diffuse bony ankylosis of the posterior elements in the visualized spine from the T8 level to the sacrum.  The patient is status post posterior   spinal fusion with Droman rods from approximately the L2-L3 level to the L5-S1 level.    IMPRESSION:  No bowel obstruction.  Large amount of stool in the proximal colon.  Gaseous distention of the cecum to 9 cm.  Markedly redundant distal transverse colon with gaseous distention to 8 cm.  Marked looping of the sigmoid colon without evidence of sigmoid volvulus.  Nonspecific anterior bladder wall thickening may be related to underdistention.  Cystitis should be excluded based on clinical symptoms and laboratory values.    Depending groundglass opacity in both lower lobes probably reflects atelectasis.  Pneumonia should be excluded clinically.      < end of copied text >        < from: CT Neck Soft Tissue w/ IV Cont (08.15.19 @ 22:57) >  IMPRESSION:  Asymmetry of the vocal cords is noted consistent with the history of vocal cord paralysis. No focal mass lesion is appreciated involving the  larynx.    The lower cervical esophagusappears enlarged just above the level of the thoracic inlet. An esophagitis, underlying mass, Zenker's diverticulum, or esophageal contour changes secondary to esophageal dysmotility can't be excluded. Consider correlation with endoscopy for further evaluation if clinically warranted.  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     She was admitted for acute hypoxic and hypercarbic respiratory failure in the setting of pneumonia and COPD exacerbation. Her course has been complicated by UTI and acute metabolic encephalopathy which are improving.     GI consulted for workup of dysphagia. Patient has had coughing with swallowing for several months. She denies food getting stuck in chest. She has had 3 endoscopies recently, last in [facility].    She also has worsening hoarsness d/t vocal cord paralysis    Allergies:  penicillin (Unknown)    Home Medications:  atorvastatin 40 mg oral tablet: 1 tab(s) orally 2 times a week (11 Aug 2019 08:)  balsalazide 750 mg oral capsule: 1 cap(s) orally 2 times a day (11 Aug 2019 08:)  clopidogrel 75 mg oral tablet: 1 tab(s) orally once a day (11 Aug 2019 08:)  DULoxetine 60 mg oral delayed release capsule: 1 cap(s) orally once a day (11 Aug 2019 08:)  gabapentin 300 mg oral tablet: 2 tab(s) orally 3 times a day (11 Aug 2019 08:27)  Lasix 20 mg oral tablet: 1 cap(s) orally 2 times a day (11 Aug 2019 08:)  Lopressor: 25 milligram(s) orally 2 times a day (11 Aug 2019 08:)  losartan 50 mg oral tablet: 1 tab(s) orally once a day (11 Aug 2019 08:)  pantoprazole 20 mg oral delayed release tablet: 1 tab(s) orally 2 times a day (11 Aug 2019 08:)  predniSONE 5 mg oral tablet: 2 tab(s) orally once a day (11 Aug 2019 08:)  Sinemet 25 mg-100 mg oral tablet: 2 tab(s) orally 3 times a day (11 Aug 2019 08:)  Xanax 0.5 mg oral tablet: 1 tab(s) orally 2 times a day, As Needed (11 Aug 2019 08:)    Hospital Medications:  acetaminophen   Tablet .. 650 milliGRAM(s) Oral every 6 hours PRN  ALPRAZolam 0.5 milliGRAM(s) Oral two times a day PRN  atorvastatin 40 milliGRAM(s) Oral <User Schedule>  buDESOnide    Inhalation Suspension 0.25 milliGRAM(s) Inhalation two times a day  carbidopa/levodopa  25/100 2 Tablet(s) Oral three times a day  cefTRIAXone   IVPB 1000 milliGRAM(s) IV Intermittent every 24 hours  dextrose 40% Gel 15 Gram(s) Oral once PRN  dextrose 5%. 1000 milliLiter(s) IV Continuous <Continuous>  dextrose 50% Injectable 12.5 Gram(s) IV Push once  dextrose 50% Injectable 25 Gram(s) IV Push once  dextrose 50% Injectable 25 Gram(s) IV Push once  docusate sodium 100 milliGRAM(s) Oral daily  DULoxetine 60 milliGRAM(s) Oral daily  fluconAZOLE IVPB      fluconAZOLE IVPB 100 milliGRAM(s) IV Intermittent every 24 hours  gabapentin 300 milliGRAM(s) Oral at bedtime  glucagon  Injectable 1 milliGRAM(s) IntraMuscular once PRN  guaiFENesin  milliGRAM(s) Oral every 12 hours  heparin  Infusion.  Unit(s)/Hr IV Continuous <Continuous>  heparin  Injectable 5000 Unit(s) IV Push every 6 hours PRN  heparin  Injectable 2500 Unit(s) IV Push every 6 hours PRN  insulin lispro (HumaLOG) corrective regimen sliding scale   SubCutaneous three times a day before meals  insulin lispro (HumaLOG) corrective regimen sliding scale   SubCutaneous at bedtime  losartan 25 milliGRAM(s) Oral daily  melatonin 5 milliGRAM(s) Oral at bedtime  metoprolol tartrate 25 milliGRAM(s) Oral two times a day  nystatin    Suspension 322869 Unit(s) Swish and Swallow four times a day  pantoprazole    Tablet 40 milliGRAM(s) Oral before breakfast  polyethylene glycol 3350 17 Gram(s) Oral daily  predniSONE   Tablet 10 milliGRAM(s) Oral daily  senna 2 Tablet(s) Oral at bedtime      PMHX/PSHX:    Insomnia  Iron deficiency anemia  Anxiety  Parkinson disease  Aortic stenosis  Neuropathy  Polymyalgia rheumatica  Diabetes  Hyperlipidemia  Hypertension  S/P TAVR (transcatheter aortic valve replacement)    Family history:  No pertinent family history in first degree relatives      Social History:   Lives at home with . Has HHAs at home. Denies smoking.    ROS:   Pertinent ROS as per HPI, otherwise unremarkable.  General:  No wt loss, fevers, chills, night sweats, fatigue,   Eyes:  Good vision, no reported pain  ENT:  No sore throat, pain, runny nose, dysphagia  CV:  No pain, palpitations, hypo/hypertension  Resp:  No dyspnea, cough, tachypnea, wheezing  GI:  See HPI    Vital Signs:  Vital Signs Last 24 Hrs  T(C): 37.1 (16 Aug 2019 12:06), Max: 37.1 (16 Aug 2019 12:06)  T(F): 98.8 (16 Aug 2019 12:06), Max: 98.8 (16 Aug 2019 12:06)  HR: 69 (16 Aug 2019 12:) (59 - 69)  BP: 165/93 (16 Aug 2019 12:06) (162/76 - 186/92)  BP(mean): --  RR: 18 (16 Aug 2019 12:06) (18 - 20)  SpO2: 96% (16 Aug 2019 12:) (96% - 97%)  Daily     Daily Weight in k.3 (16 Aug 2019 12:06)    PHYSICAL EXAM:   GENERAL:  Appears stated age, fraile  HEENT:  NC/AT, anicteric, raspy voice  NECK: supple  CHEST:  Full & symmetric excursion, mild wheeze, mild bilbasilar crackles  HEART:  Regular rhythm, S1, S2  ABDOMEN:  Soft, non-tender, non-distended  EXTREMITIES:  no edema, no cyanosis  SKIN:  No rash, normal turgor  NEURO:  Alert, oriented, lip smacking/tremor  PSYCH: Normal affect        LABS:                        12.8   11.46 )-----------( 414      ( 16 Aug 2019 08:43 )             39.9     08-16    142  |  100  |  35<H>  ----------------------------<  125<H>  4.9   |  25  |  0.81    Ca    10.6<H>      16 Aug 2019 06:08  Phos  3.6     08-16  Mg     2.4     08-16        PT/INR - ( 16 Aug 2019 08:03 )   PT: 13.5 sec;   INR: 1.19 ratio         PTT - ( 16 Aug 2019 17:22 )  PTT:130.1 sec    Imaging:    < from: Xray Modified Barium Swallow (19 @ 09:22) >  IMPRESSION:     Laryngeal penetration with aspiration.  For further information and recommendations, please refer to the speech pathologist's final report, which is available for review in the electronic medical record.        < from: CT Abdomen and Pelvis w/ IV Cont (08.10.19 @ 22:21) >  INTERPRETATION:  CLINICAL INFORMATION: Abdominal pain and fever.    COMPARISON: None.  PROCEDURE:   CT of the Abdomen and Pelvis was performed with intravenous contrast.   Intravenous contrast: 90 ml Omnipaque 350. 10 ml discarded.  Oral contrast: None.  Sagittal and coronal reformats were performed.    FINDINGS:    LOWER CHEST: Dependent groundglass opacity in both lower lobes probably reflects atelectasis.    LIVER: Within normal limits.  BILE DUCTS: Normal caliber.  GALLBLADDER: Within normal limits.  SPLEEN: Within normal limits.  PANCREAS: Within normal limits.  ADRENALS: Within normal limits.  KIDNEYS/URETERS: Two left upper pole renal cyst measure approximately 2.3 cm and 1.3 cm.    BLADDER: Underdistended bladder with nonspecific anterior bladder wall thickening.  REPRODUCTIVE ORGANS: Atrophic uterus.  No adnexal mass.    BOWEL: Moderate hiatal hernia.  No bowel obstruction. Normal appendix.  The cecum is located in the midline of the upper abdomen, adjacent to the abdominal wall.  There is gaseous distention of the cecum to approximately 9 cm (5:17).  There is large amount of stool within the ascending and proximal transverse colon.  The distal transverse colon: Is markedly redundant and demonstrates gaseous distention to approximately 8 cm (5:24).  The descending colon is largely collapsed.  There is marked looping of the sigmoid colon without evidence of a sigmoid volvulus.  The distal sigmoid and rectum are collapsed.  PERITONEUM: No ascites, free air or abscess.  VESSELS: Heavy atherosclerotic calcification of the aortoiliac tree.  RETROPERITONEUM/LYMPH NODES: No retroperitoneal hemorrhage.No lymphadenopathy.    ABDOMINAL WALL: Diastases of the rectus abdominis muscles without evidence of hernia formation.   BONES: There is marked scoliosis of the thoracolumbar spine.  There is diffuse bony ankylosis of the posterior elements in the visualized spine from the T8 level to the sacrum.  The patient is status post posterior   spinal fusion with Dorman rods from approximately the L2-L3 level to the L5-S1 level.    IMPRESSION:  No bowel obstruction.  Large amount of stool in the proximal colon.  Gaseous distention of the cecum to 9 cm.  Markedly redundant distal transverse colon with gaseous distention to 8 cm.  Marked looping of the sigmoid colon without evidence of sigmoid volvulus.  Nonspecific anterior bladder wall thickening may be related to underdistention.  Cystitis should be excluded based on clinical symptoms and laboratory values.    Depending groundglass opacity in both lower lobes probably reflects atelectasis.  Pneumonia should be excluded clinically.      < end of copied text >        < from: CT Neck Soft Tissue w/ IV Cont (08.15.19 @ 22:57) >  IMPRESSION:  Asymmetry of the vocal cords is noted consistent with the history of vocal cord paralysis. No focal mass lesion is appreciated involving the  larynx.    The lower cervical esophagus appears enlarged just above the level of the thoracic inlet. An esophagitis, underlying mass, Zenker's diverticulum, or esophageal contour changes secondary to esophageal dysmotility can't be excluded. Consider correlation with endoscopy for further evaluation if clinically warranted.  < end of copied text >

## 2019-08-16 NOTE — PROGRESS NOTE ADULT - PROBLEM SELECTOR PLAN 7
-hypertensive to 180s/90s ON  - restarted losartan 25mg QD (home dose 50mg QD)  - give one dose of labetolol 10mg IV push   - start amlodipine 5mg QD  - c/w metoprolol for rate control and BP  - continue to monitor.   -Vitals Q4H. - stable at 160s/70s   - c/w losartan 25mg QD (home dose 50mg QD)   - c/w amlodipine 5mg QD  - c/w metoprolol for rate control and BP  - continue to monitor.   - Vitals Q4H.

## 2019-08-16 NOTE — SWALLOW VFSS/MBS ASSESSMENT ADULT - ADDITIONAL INFORMATION
+ multi-level C-spine changes with large cervical osteophytes at C2-4, which impedes flow of the bolus + calcification of laryngeal structures  + multi-level C-spine changes with large cervical osteophytes at C2-4, which impedes flow of the bolus

## 2019-08-16 NOTE — PROGRESS NOTE ADULT - ASSESSMENT
86yo female with oral thrush, right VC paralysis, and dysphagia. Pending Neck CT official results. 84yo female with oral thrush, right VC paralysis, and dysphagia. Pt failed MBS today. Neck CT with no obvious etiology for VC paralysis, report pending. Consider VC injection if no contraindications.

## 2019-08-16 NOTE — SWALLOW VFSS/MBS ASSESSMENT ADULT - RESIDUE IN VALLECULAE
with spillage to the pyriforms, residue mildly reduced, however not cleared on repeat swallow/Severe with progression to the pyriforms/Severe flows to the pyriforms, residue mildly reduced, however not cleared on repeat swallow/Severe only minimal material passes the level of the valleculae on initial swallow; flows to the pyriforms post swallow/Severe

## 2019-08-16 NOTE — SWALLOW VFSS/MBS ASSESSMENT ADULT - RECOMMENDED CONSISTENCY
NPO, with non-oral nutrition/hydration/medications.   Strict aspiration precautions. NPO, with non-oral nutrition/hydration/medications.

## 2019-08-16 NOTE — PROGRESS NOTE ADULT - PROBLEM SELECTOR PLAN 10
-Patient not on home anti-hyperglycemics  - FS acceptable  - c/w Insulin sliding scale    Oral thrush:  -nystatin suspension  -ENT eval appreciated; will add fluconazole and get CT neck C+.     Constipation  -Patient with large stool burden on imaging, but no SBO  -c/w Miralax, colace, senna  -Received tap water enema x1 and dulcolax suppository x1  -L quadrant pain--resolved  -continue to monitor    Prophylaxis:  DVT ppx: on heparin gtt  Diet: NPO except meds, discussed with son  Dispo: PT recs subacute rehab -Patient not on home anti-hyperglycemics  - FS acceptable  - c/w Insulin sliding scale    Oral thrush:  -nystatin suspension  -ENT eval appreciated; c/w fluconazole and CT neck--vocal cord paralysis, also concerning for esophagitis/mass/zenker's diverticulum.     Constipation  -Patient with large stool burden on imaging, but no SBO  -c/w Miralax, colace, senna  -suppository as needed  -continue to monitor    Prophylaxis:  DVT ppx: on heparin gtt  Diet: NPO. s/p NG tube placement. Will start feeds after NG tube placement confirmed  Dispo: PT recs subacute rehab -Patient not on home anti-hyperglycemics  - FS acceptable  - c/w Insulin sliding scale    Oral thrush:  -nystatin suspension  -ENT eval appreciated; c/w fluconazole and CT neck--vocal cord paralysis, also concerning for esophagitis/mass/zenker's diverticulum.     Constipation  -Patient with large stool burden on imaging, but no SBO  -c/w Miralax, colace, senna  -suppository as needed  -continue to monitor    Prophylaxis:  DVT ppx: on heparin gtt to coumadin bridge  Diet: NPO. s/p NG tube placement. Will start feeds after NG tube placement confirmed  Dispo: PT recs subacute rehab

## 2019-08-16 NOTE — PROGRESS NOTE ADULT - ATTENDING COMMENTS
-Patient seen/examined on 8/16/19. Case/plan discussed with the intern and resident as reviewed/edited by me above and in any comments below.  -Patient failed MBS. -ENT and GI recs appreciated. -CT neck with ?esophagitis, mass, Zenker's diverticulum.   -Discussed with patient and extensively with son. -Initially patient herself seemed to be leaning more towards pleasure feeds, but not fully decided. Said she wanted to discuss with her son/family first.  -Discussed with son who would like us to place an NGT for now until GI/ENT work up can be done to further elucidate etiology of dysphagia. We discussed possibility of PEG tube if patient can't take PO feeds, but patient and son seem reluctant initially, but want further work up first.   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   -CT chest to eval if ?mass involved in chest and also lung parenchyma in setting of PNA treatment.   -Right temporal pain and patient also today reports this is associated with maxillary/jaw tenderness. ?Trigeminal neuralgia on the differential.  -Now that NGT placed, change to tube feeds and meds via NGT and others to IV if necessary.

## 2019-08-16 NOTE — SWALLOW VFSS/MBS ASSESSMENT ADULT - COMMENTS
8/10- Abdomen/Pelvis CT- dependent groundglass opacity in both lower lobes reflective of atelectasis   8/14- s/p RRT for new onset afib with RVR to 150s.  8/14- Transferred from [address] [place] [address] to [address] [place] [address]  8/14- Failed Swallow Screen- RN reports wet gurgly voice, coughing and throat clearing  8/15- Pt presented with a BP of 182/94  8/15- Per MD->Problem: Pneumonia.  Plan: -With acute hypoxic and hypercarbic respiratory failure, c/w CTX for CAP, Taper off NC as tolerated, BIPAP prn for hypercarbia, RVP negative, leukocytosis is downtrending.  8/15- ENT consulted for hypophonia, dysphagia->grossly aspiration of secretions, right VC paralyzed with a left lateral pharyngeal bulge, looks pulsatile, likely carotid. 8/10- Abdomen/Pelvis CT- dependent groundglass opacity in both lower lobes reflective of atelectasis   8/14- s/p RRT for new onset afib with RVR to 150s.  8/14- Transferred from [address] [place] [address] to [address] [place] [address]  8/14- Failed Swallow Screen- RN reports wet gurgly voice, coughing and throat clearing  8/15- Pt presented with a BP of 182/94  8/15- Per MD->Problem: Pneumonia.  Plan: -With acute hypoxic and hypercarbic respiratory failure, c/w CTX for CAP, Taper off NC as tolerated, BIPAP prn for hypercarbia, RVP negative, leukocytosis is downtrending.  8/15- ENT consulted for hypophonia, dysphagia->grossly aspiration of secretions, decreased sensation, right VC paralyzed with a left lateral pharyngeal bulge, looks pulsatile, likely carotid. 8/10- Abdomen/Pelvis CT- dependent groundglass opacity in both lower lobes reflective of atelectasis   8/14- s/p RRT for new onset afib with RVR to 150s.  8/14- Transferred from [address] [place] [address] to [address] [place] [address]  8/14- Failed Swallow Screen- RN reports wet gurgly voice, coughing and throat clearing  8/15- Pt presented with a BP of 182/94  8/15- Per MD->Problem: Pneumonia.  Plan: -With acute hypoxic and hypercarbic respiratory failure, c/w CTX for CAP, Taper off NC as tolerated, BIPAP prn for hypercarbia, RVP negative, leukocytosis is downtrending.  8/15- ENT consulted for hypophonia, dysphagia->grossly aspirating secretions, decreased sensation, right VC paralyzed with a left lateral pharyngeal bulge, looks pulsatile, likely carotid.

## 2019-08-16 NOTE — SWALLOW VFSS/MBS ASSESSMENT ADULT - DIAGNOSTIC IMPRESSIONS
Pt presents with a severe oropharyngeal dysphagia characterized by gross silent aspiration of honey-thickened liquids. Right head turn and chin down with a right head turn do not improve airway protection.     Swallowing disorders:  reduced lingual strength/ROM/Rate of motion, reduced BOT to posterior pharyngeal wall contact, delayed swallow trigger, reduced hyolaryngeal excursion, reduced laryngeal closure, reduced pharyngeal contractility, reduced supraglottic sensation, reduced subglottic sensation. Pt presents with a severe oropharyngeal dysphagia characterized by gross silent aspiration of honey-thickened liquids and purees. Right head turn and chin down with a right head turn do not improve airway protection.     Swallowing disorders:  reduced lingual strength/ROM/Rate of motion, reduced BOT to posterior pharyngeal wall contact, delayed swallow trigger, reduced hyolaryngeal excursion/elevation, reduced laryngeal closure, reduced pharyngeal contractility, reduced supraglottic sensation, reduced subglottic sensation. Pt presents with a severe oropharyngeal dysphagia characterized by gross silent aspiration of honey-thickened liquids and purees. Right head turn and chin down with a right head turn do not improve airway protection. Pt is a high aspiration risk for all oral intake.     Swallowing disorders:  reduced lingual strength/ROM/Rate of motion, reduced BOT to posterior pharyngeal wall contact, delayed swallow trigger, reduced hyolaryngeal excursion/elevation, reduced laryngeal closure, reduced pharyngeal contractility, reduced supraglottic sensation, reduced subglottic sensation. Pt presents with a severe oropharyngeal dysphagia characterized by gross silent aspiration of honey-thickened liquids and purees. Right head turn and chin down with a right head turn do not improve airway protection. Pt is a high aspiration risk for all oral intake.     Swallowing disorders:  reduced lingual strength/ROM/Rate of motion, reduced BOT to posterior pharyngeal wall contact, delayed swallow trigger, reduced hyolaryngeal excursion/elevation, incomplete epiglottic retroflection, reduced laryngeal closure, reduced pharyngeal contractility, reduced supraglottic sensation, reduced subglottic sensation. Pt presents with a severe oropharyngeal dysphagia characterized by silent aspiration of honey-thickened liquids and purees. Right head turn and chin down with a right head turn do not improve airway protection. Pt is a high aspiration risk for all oral intake.     Swallowing disorders:  reduced lingual strength/ROM/Rate of motion, reduced BOT to posterior pharyngeal wall contact, delayed swallow trigger, reduced hyolaryngeal excursion/elevation, incomplete epiglottic retroflection, reduced laryngeal closure, reduced pharyngeal contractility, reduced supraglottic sensation, reduced subglottic sensation. Pt presents with a severe oropharyngeal dysphagia characterized by silent aspiration of honey-thickened liquids and thin purees. Use of postural swallow strategies including Right head turn and chin tuck postures were not effective for improving airway protection. Pt is a high aspiration risk for all oral intake.     Swallowing disorders: reduced lingual strength/ROM/Rate of motion, reduced tongue to palate contact, reduced BOT to posterior pharyngeal wall contact, delayed swallow trigger, reduced hyolaryngeal excursion/elevation, incomplete epiglottic retroflection, reduced laryngeal closure, reduced pharyngeal contractility, reduced supraglottic sensation, reduced subglottic sensation. Pt presents with a severe oropharyngeal dysphagia characterized by bolus propulsion through the pharynx with severe pharyngeal retention, with silent aspiration of honey-thickened liquids and thin purees. Use of postural swallow strategies including Right head turn and chin tuck postures were not effective for improving airway protection. Presence of cervical osteophyte in conjunction with disorders of muscular function contributed to severity of dysphagia. Pt is a high aspiration risk for all oral intake.     Swallowing disorders: reduced lingual strength/ROM/Rate of motion, reduced tongue to palate contact, reduced BOT to posterior pharyngeal wall contact, delayed swallow trigger, reduced hyolaryngeal excursion/elevation, incomplete epiglottic retroflection, reduced laryngeal closure, reduced pharyngeal contractility, reduced supraglottic sensation, reduced subglottic sensation, presence of cervical osteophyte.

## 2019-08-16 NOTE — SWALLOW VFSS/MBS ASSESSMENT ADULT - NS SWALLOW VFSS REC ASPIR MON
change of breathing pattern/pneumonia/throat clearing/upper respiratory infection/fever/cough/gurgly voice

## 2019-08-16 NOTE — PROGRESS NOTE ADULT - SUBJECTIVE AND OBJECTIVE BOX
ENT ISSUE/POD: Right VC paralysis, oral thrush    HPI: 86yo female with lingual thrush and hypophonia, found to have right VC paralysis on laryngoscopy yesterday. Pt seen and examined at bedside. No acute events overnight. Pt denies fever, chills, n/v, HA, SOB, dysphagia, odynophagia, hemoptysis. Pending neck CT results and MBS.         PAST MEDICAL & SURGICAL HISTORY:  Insomnia  Iron deficiency anemia  Anxiety  Parkinson disease  Aortic stenosis  Neuropathy  Polymyalgia rheumatica  Diabetes  Hyperlipidemia  Hypertension  S/P TAVR (transcatheter aortic valve replacement)    Allergies    penicillin (Unknown)    Intolerances      MEDICATIONS  (STANDING):  amLODIPine   Tablet 5 milliGRAM(s) Oral daily  atorvastatin 40 milliGRAM(s) Oral <User Schedule>  buDESOnide    Inhalation Suspension 0.25 milliGRAM(s) Inhalation two times a day  carbidopa/levodopa  25/100 2 Tablet(s) Oral three times a day  cefTRIAXone   IVPB 1000 milliGRAM(s) IV Intermittent every 24 hours  dextrose 5%. 1000 milliLiter(s) (50 mL/Hr) IV Continuous <Continuous>  dextrose 50% Injectable 12.5 Gram(s) IV Push once  dextrose 50% Injectable 25 Gram(s) IV Push once  dextrose 50% Injectable 25 Gram(s) IV Push once  docusate sodium 100 milliGRAM(s) Oral daily  DULoxetine 60 milliGRAM(s) Oral daily  fluconAZOLE IVPB      fluconAZOLE IVPB 100 milliGRAM(s) IV Intermittent every 24 hours  gabapentin 300 milliGRAM(s) Oral at bedtime  guaiFENesin  milliGRAM(s) Oral every 12 hours  heparin  Infusion.  Unit(s)/Hr (11 mL/Hr) IV Continuous <Continuous>  insulin lispro (HumaLOG) corrective regimen sliding scale   SubCutaneous three times a day before meals  insulin lispro (HumaLOG) corrective regimen sliding scale   SubCutaneous at bedtime  losartan 25 milliGRAM(s) Oral daily  melatonin 5 milliGRAM(s) Oral at bedtime  metoprolol tartrate 25 milliGRAM(s) Oral two times a day  nystatin    Suspension 311955 Unit(s) Swish and Swallow four times a day  pantoprazole    Tablet 40 milliGRAM(s) Oral before breakfast  polyethylene glycol 3350 17 Gram(s) Oral daily  predniSONE   Tablet 10 milliGRAM(s) Oral daily  senna 2 Tablet(s) Oral at bedtime    MEDICATIONS  (PRN):  acetaminophen   Tablet .. 650 milliGRAM(s) Oral every 6 hours PRN Temp greater or equal to 38C (100.4F), Mild Pain (1 - 3)  ALPRAZolam 0.5 milliGRAM(s) Oral two times a day PRN anxiety  dextrose 40% Gel 15 Gram(s) Oral once PRN Blood Glucose LESS THAN 70 milliGRAM(s)/deciliter  glucagon  Injectable 1 milliGRAM(s) IntraMuscular once PRN Glucose LESS THAN 70 milligrams/deciliter  heparin  Injectable 5000 Unit(s) IV Push every 6 hours PRN For aPTT less than 40  heparin  Injectable 2500 Unit(s) IV Push every 6 hours PRN For aPTT between 40 - 57      Social History: see consult note    Family history: see consult note    ROS:   ENT: all negative except as noted in HPI   Pulm: denies SOB, cough, hemoptysis  Neuro: denies numbness/tingling, loss of sensation  Endo: denies heat/cold intolerance, excessive sweating      Vital Signs Last 24 Hrs  T(C): 36.8 (16 Aug 2019 04:03), Max: 37 (15 Aug 2019 21:34)  T(F): 98.2 (16 Aug 2019 04:03), Max: 98.6 (15 Aug 2019 21:34)  HR: 64 (16 Aug 2019 04:03) (55 - 65)  BP: 162/76 (16 Aug 2019 04:03) (162/76 - 186/92)  BP(mean): --  RR: 19 (16 Aug 2019 04:03) (18 - 20)  SpO2: 96% (16 Aug 2019 04:03) (95% - 100%)                          12.1   12.42 )-----------( 360      ( 15 Aug 2019 09:45 )             37.9    08-16    142  |  100  |  35<H>  ----------------------------<  125<H>  4.9   |  25  |  0.81    Ca    10.6<H>      16 Aug 2019 06:08  Phos  3.6     08-16  Mg     2.4     08-16     PT/INR - ( 15 Aug 2019 09:33 )   PT: 13.4 sec;   INR: 1.17 ratio         PTT - ( 15 Aug 2019 10:25 )  PTT:88.9 sec      PHYSICAL EXAM:  Gen: NAD  Skin: No rashes, bruises, or lesions  Head: Normocephalic, Atraumatic  Face: no edema, erythema, or fluctuance. Parotid glands soft without mass  Eyes: no scleral injection  Nose: Nares bilaterally patent, no discharge  Mouth: + lingual thrush. No Stridor / Drooling / Trismus.  Mucosa moist, tongue/uvula midline, oropharynx clear  Neck: Flat, supple, no lymphadenopathy, trachea midline, no masses  Lymphatic: No lymphadenopathy  Resp: breathing easily, no stridor ENT ISSUE/POD: Right VC paralysis, oral thrush    HPI: 86yo female with lingual thrush and hypophonia, found to have right VC paralysis on laryngoscopy yesterday. Pt seen and examined at bedside. No acute events overnight. Pt denies fever, chills, n/v, HA, SOB, dysphagia, odynophagia, hemoptysis. Pt failed MBS today. Neck CT report pending.          PAST MEDICAL & SURGICAL HISTORY:  Insomnia  Iron deficiency anemia  Anxiety  Parkinson disease  Aortic stenosis  Neuropathy  Polymyalgia rheumatica  Diabetes  Hyperlipidemia  Hypertension  S/P TAVR (transcatheter aortic valve replacement)    Allergies    penicillin (Unknown)    Intolerances      MEDICATIONS  (STANDING):  amLODIPine   Tablet 5 milliGRAM(s) Oral daily  atorvastatin 40 milliGRAM(s) Oral <User Schedule>  buDESOnide    Inhalation Suspension 0.25 milliGRAM(s) Inhalation two times a day  carbidopa/levodopa  25/100 2 Tablet(s) Oral three times a day  cefTRIAXone   IVPB 1000 milliGRAM(s) IV Intermittent every 24 hours  dextrose 5%. 1000 milliLiter(s) (50 mL/Hr) IV Continuous <Continuous>  dextrose 50% Injectable 12.5 Gram(s) IV Push once  dextrose 50% Injectable 25 Gram(s) IV Push once  dextrose 50% Injectable 25 Gram(s) IV Push once  docusate sodium 100 milliGRAM(s) Oral daily  DULoxetine 60 milliGRAM(s) Oral daily  fluconAZOLE IVPB      fluconAZOLE IVPB 100 milliGRAM(s) IV Intermittent every 24 hours  gabapentin 300 milliGRAM(s) Oral at bedtime  guaiFENesin  milliGRAM(s) Oral every 12 hours  heparin  Infusion.  Unit(s)/Hr (11 mL/Hr) IV Continuous <Continuous>  insulin lispro (HumaLOG) corrective regimen sliding scale   SubCutaneous three times a day before meals  insulin lispro (HumaLOG) corrective regimen sliding scale   SubCutaneous at bedtime  losartan 25 milliGRAM(s) Oral daily  melatonin 5 milliGRAM(s) Oral at bedtime  metoprolol tartrate 25 milliGRAM(s) Oral two times a day  nystatin    Suspension 482707 Unit(s) Swish and Swallow four times a day  pantoprazole    Tablet 40 milliGRAM(s) Oral before breakfast  polyethylene glycol 3350 17 Gram(s) Oral daily  predniSONE   Tablet 10 milliGRAM(s) Oral daily  senna 2 Tablet(s) Oral at bedtime    MEDICATIONS  (PRN):  acetaminophen   Tablet .. 650 milliGRAM(s) Oral every 6 hours PRN Temp greater or equal to 38C (100.4F), Mild Pain (1 - 3)  ALPRAZolam 0.5 milliGRAM(s) Oral two times a day PRN anxiety  dextrose 40% Gel 15 Gram(s) Oral once PRN Blood Glucose LESS THAN 70 milliGRAM(s)/deciliter  glucagon  Injectable 1 milliGRAM(s) IntraMuscular once PRN Glucose LESS THAN 70 milligrams/deciliter  heparin  Injectable 5000 Unit(s) IV Push every 6 hours PRN For aPTT less than 40  heparin  Injectable 2500 Unit(s) IV Push every 6 hours PRN For aPTT between 40 - 57      Social History: see consult note    Family history: see consult note    ROS:   ENT: all negative except as noted in HPI   Pulm: denies SOB, cough, hemoptysis  Neuro: denies numbness/tingling, loss of sensation  Endo: denies heat/cold intolerance, excessive sweating      Vital Signs Last 24 Hrs  T(C): 36.8 (16 Aug 2019 04:03), Max: 37 (15 Aug 2019 21:34)  T(F): 98.2 (16 Aug 2019 04:03), Max: 98.6 (15 Aug 2019 21:34)  HR: 64 (16 Aug 2019 04:03) (55 - 65)  BP: 162/76 (16 Aug 2019 04:03) (162/76 - 186/92)  BP(mean): --  RR: 19 (16 Aug 2019 04:03) (18 - 20)  SpO2: 96% (16 Aug 2019 04:03) (95% - 100%)                          12.1   12.42 )-----------( 360      ( 15 Aug 2019 09:45 )             37.9    08-16    142  |  100  |  35<H>  ----------------------------<  125<H>  4.9   |  25  |  0.81    Ca    10.6<H>      16 Aug 2019 06:08  Phos  3.6     08-16  Mg     2.4     08-16     PT/INR - ( 15 Aug 2019 09:33 )   PT: 13.4 sec;   INR: 1.17 ratio         PTT - ( 15 Aug 2019 10:25 )  PTT:88.9 sec      PHYSICAL EXAM:  Gen: NAD  Skin: No rashes, bruises, or lesions  Head: Normocephalic, Atraumatic  Face: no edema, erythema, or fluctuance. Parotid glands soft without mass  Eyes: no scleral injection  Nose: Nares bilaterally patent, no discharge  Mouth: + lingual thrush. No Stridor / Drooling / Trismus.  Mucosa moist, tongue/uvula midline, oropharynx clear  Neck: Flat, supple, no lymphadenopathy, trachea midline, no masses  Lymphatic: No lymphadenopathy  Resp: breathing easily, no stridor

## 2019-08-16 NOTE — CONSULT NOTE ADULT - ASSESSMENT
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 GI consulted for dysphagia.     IMPRESSION  - Dysphagia: Dysphagia: Ddx oropharyngeal (neurological, Parkinson's medications, muscular weakness etc) vs esophageal (infection, stenosis, stricture, ring, esophageal cancer)    She had a modified barium swallow on 8/16/2019 with laryngeal penetration with aspiration and thus an NG tube was placed for feeding. She also was evaluated by ENT for hypophonia and was found to have oral thrush, right vocal cord paralysis. Her neck CT did not show a cause of the VC paralysis, and she is scheduled for a VC injection by ENT. She had a CT neck done which shows enlarged lower cervical esophagus above the level of the thoracic inlet.    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 GI consulted for dysphagia.     IMPRESSION  - Dysphagia: Ddx oropharyngeal (neurological, Parkinson's medications, muscular weakness etc) vs esophageal (infection, stenosis, stricture, ring, esophageal cancer), CT neck done which shows enlarged lower cervical esophagus above the level of the thoracic inlet.  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 GI consulted for dysphagia.     IMPRESSION  - Oropharyngeal dysphagia in the setting of other neurologic symptoms including vocal cord paralysis and tremor/lip smacking concern for systemic neurological condition. Patient has had numerous endoscopies recently which did not elucidate the cause of her problems.  -Distended cervical esophagus on imaging. Presumably cannot get barium esophagram d/t oropharyngeal dysphagia    RECOMMENDATION  -obtain most recent endoscopy report from [facility], would not perform 4th EGD at this time. 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 GI consulted for dysphagia.     IMPRESSION  - Oropharyngeal dysphagia in the setting of other neurologic symptoms including vocal cord paralysis and tremor/lip smacking concern for systemic neurological condition. Patient has had numerous endoscopies recently which did not elucidate the cause of her problems.  -Distended cervical esophagus on abnormal CT. Presumably cannot get barium esophagram d/t oropharyngeal dysphagia  -Aspiration seen on MBS    RECOMMENDATION  -obtain most recent endoscopy report from [facility], would not perform 4th EGD at this time. Patient is also high risk for anesthesia given vocal cord paralysis, COPD exacerbation  -follow up neuro recs to determine underlying process causing all of these issues (Tremor, vocal cord paralysis, dysphagia)  -pending clinical course, consider repeating MBS when medically optimized.  If patient continues to have signs of aspiration, goals of care should be addressed regarding nutrition

## 2019-08-16 NOTE — CHART NOTE - NSCHARTNOTEFT_GEN_A_CORE
Discussed with son regarding risk and benefits of NG tube in the setting of dysphagia, failed MBS test and possible esophageal mass/zenker's diverticulum concerning for aspiration. Both son and patient are agreeable to having NG tube placed for medication and feeds today while pending further workup.

## 2019-08-16 NOTE — PROGRESS NOTE ADULT - PROBLEM SELECTOR PLAN 2
-With acute hypoxic and hypercarbic respiratory failure  - patient failed speech and swallow today  -In the setting of concerns for aspiration PNA, will consider switching from CTX to broader coverage, but limited by penicillin allergy. Will continue abx tentatively for ~7 days.   -Taper off NC as tolerated, BIPAP prn for hypercarbia  -RVP negative  - leukocytosis stable.  -Guaifenesin for cough  -encourage to use incentive spirometer  -Will start Pulmicort inhaler. -With acute hypoxic and hypercarbic respiratory failure  - patient failed speech and swallow and MBS  -In the setting of concerns for aspiration PNA, will consider switching from CTX to broader coverage, but limited by penicillin allergy. Will continue abx tentatively for ~7 days.   -Taper off NC as tolerated, BIPAP prn for hypercarbia  -RVP negative  - leukocytosis stable.  -Guaifenesin for cough  -encourage to use incentive spirometer  -c/w Pulmicort inhaler  - chest PT

## 2019-08-16 NOTE — PROGRESS NOTE ADULT - PROBLEM SELECTOR PLAN 9
-TTE --> Normal LF, Mitral stenosis, increased RV pressure and dilated Left atrium, functioning TAVR. Findings c/w valvular heart failure or heart failure with preserved EF  - holding lasix given patient appears euvolemic and will be NPO tonight   - reassess and consider restarting lasix tomorrow/tonight  - Strict I's/O's and daily weights. -TTE --> Normal LF, Mitral stenosis, increased RV pressure and dilated Left atrium, functioning TAVR. Findings c/w valvular heart failure or heart failure with preserved EF  - restart lasix today  - Strict I's/O's and daily weights.

## 2019-08-16 NOTE — SWALLOW VFSS/MBS ASSESSMENT ADULT - SLP GENERAL OBSERVATIONS
Pt encountered in radiology, secured in JOSE chair. AA+Ox1-2. Pt cooperative and follows directions for purposes of exam.

## 2019-08-16 NOTE — PROGRESS NOTE ADULT - SUBJECTIVE AND OBJECTIVE BOX
Michaela Mock  Internal Medicine  PGY-1  Pager: [phone]    Patient is a 85y old  Female who presents with a chief complaint of Abdominal pain (15 Aug 2019 16:55)      SUBJECTIVE / OVERNIGHT EVENTS: No acute event ON. Denies HA/CP/SOB/abd pain/n/v/d/fever/chills.    MEDICATIONS  (STANDING):  amLODIPine   Tablet 5 milliGRAM(s) Oral daily  atorvastatin 40 milliGRAM(s) Oral <User Schedule>  buDESOnide    Inhalation Suspension 0.25 milliGRAM(s) Inhalation two times a day  carbidopa/levodopa  25/100 2 Tablet(s) Oral three times a day  cefTRIAXone   IVPB 1000 milliGRAM(s) IV Intermittent every 24 hours  dextrose 5%. 1000 milliLiter(s) (50 mL/Hr) IV Continuous <Continuous>  dextrose 50% Injectable 12.5 Gram(s) IV Push once  dextrose 50% Injectable 25 Gram(s) IV Push once  dextrose 50% Injectable 25 Gram(s) IV Push once  docusate sodium 100 milliGRAM(s) Oral daily  DULoxetine 60 milliGRAM(s) Oral daily  fluconAZOLE IVPB      fluconAZOLE IVPB 100 milliGRAM(s) IV Intermittent every 24 hours  gabapentin 300 milliGRAM(s) Oral at bedtime  guaiFENesin  milliGRAM(s) Oral every 12 hours  heparin  Infusion.  Unit(s)/Hr (11 mL/Hr) IV Continuous <Continuous>  insulin lispro (HumaLOG) corrective regimen sliding scale   SubCutaneous three times a day before meals  insulin lispro (HumaLOG) corrective regimen sliding scale   SubCutaneous at bedtime  losartan 25 milliGRAM(s) Oral daily  melatonin 5 milliGRAM(s) Oral at bedtime  metoprolol tartrate 25 milliGRAM(s) Oral two times a day  nystatin    Suspension 684246 Unit(s) Swish and Swallow four times a day  pantoprazole    Tablet 40 milliGRAM(s) Oral before breakfast  polyethylene glycol 3350 17 Gram(s) Oral daily  predniSONE   Tablet 10 milliGRAM(s) Oral daily  senna 2 Tablet(s) Oral at bedtime    MEDICATIONS  (PRN):  acetaminophen   Tablet .. 650 milliGRAM(s) Oral every 6 hours PRN Temp greater or equal to 38C (100.4F), Mild Pain (1 - 3)  ALPRAZolam 0.5 milliGRAM(s) Oral two times a day PRN anxiety  dextrose 40% Gel 15 Gram(s) Oral once PRN Blood Glucose LESS THAN 70 milliGRAM(s)/deciliter  glucagon  Injectable 1 milliGRAM(s) IntraMuscular once PRN Glucose LESS THAN 70 milligrams/deciliter  heparin  Injectable 5000 Unit(s) IV Push every 6 hours PRN For aPTT less than 40  heparin  Injectable 2500 Unit(s) IV Push every 6 hours PRN For aPTT between 40 - 57        OBJECTIVE:    Vital Signs Last 24 Hrs  T(C): 36.8 (16 Aug 2019 04:03), Max: 37 (15 Aug 2019 21:34)  T(F): 98.2 (16 Aug 2019 04:03), Max: 98.6 (15 Aug 2019 21:34)  HR: 64 (16 Aug 2019 04:03) (55 - 65)  BP: 162/76 (16 Aug 2019 04:03) (162/76 - 186/92)  BP(mean): --  RR: 19 (16 Aug 2019 04:03) (18 - 20)  SpO2: 96% (16 Aug 2019 04:03) (95% - 100%)    PHYSICAL EXAM:  GENERAL: NAD, resting comfortably in bed  HEAD:  Atraumatic, Normocephalic  EYES: EOMI, PERRLA, conjunctiva and sclera clear  ENMT: dry mucous membranes, white-yellows thrush on tongue  NECK: Supple, No JVD  CHEST/LUNG: Rhonchi on L lower lobe. No wheezing, or rubs. No accessory muscle use.  HEART: irregularly irregular; No murmurs appreciated on exam  ABDOMEN: Soft, tender to palpation to L upper and lower quadrants, Nondistended; Bowel sounds present  EXTREMITIES: No clubbing, cyanosis, or peripheral edema   LYMPH: No lymphadenopathy noted  SKIN: No rashes or lesions  NERVOUS SYSTEM: AOx2, conversive and answers questions appropriately.    CAPILLARY BLOOD GLUCOSE      POCT Blood Glucose.: 130 mg/dL (16 Aug 2019 06:15)  POCT Blood Glucose.: 142 mg/dL (16 Aug 2019 00:10)  POCT Blood Glucose.: 131 mg/dL (15 Aug 2019 18:05)  POCT Blood Glucose.: 137 mg/dL (15 Aug 2019 13:19)    I&O's Summary    15 Aug 2019 07:01  -  16 Aug 2019 07:00  --------------------------------------------------------  IN: 146 mL / OUT: 800 mL / NET: -654 mL        LABS:                        12.1   12.42 )-----------( 360      ( 15 Aug 2019 09:45 )             37.9     08-16    142  |  100  |  35<H>  ----------------------------<  125<H>  4.9   |  25  |  0.81    Ca    10.6<H>      16 Aug 2019 06:08  Phos  3.6     08-16  Mg     2.4     08-16      PT/INR - ( 15 Aug 2019 09:33 )   PT: 13.4 sec;   INR: 1.17 ratio         PTT - ( 15 Aug 2019 10:25 )  PTT:88.9 sec  CARDIAC MARKERS ( 14 Aug 2019 20:18 )  x     / x     / 100 U/L / x     / 12.3 ng/mL              RADIOLOGY & ADDITIONAL TESTS: Michaela Mock  Internal Medicine  PGY-1  Pager: [phone]    Patient is a 85y old  Female who presents with a chief complaint of Abdominal pain (15 Aug 2019 16:55)      SUBJECTIVE / OVERNIGHT EVENTS: No acute event ON. Reports having HA at the right temporal and R sided neck region ON which relieves with massage. Still feels having mucus stuck in her throat and she is unable to bring it up. Denies CP/SOB/abd pain/n/v/d/fever/chills.    MEDICATIONS  (STANDING):  amLODIPine   Tablet 5 milliGRAM(s) Oral daily  atorvastatin 40 milliGRAM(s) Oral <User Schedule>  buDESOnide    Inhalation Suspension 0.25 milliGRAM(s) Inhalation two times a day  carbidopa/levodopa  25/100 2 Tablet(s) Oral three times a day  cefTRIAXone   IVPB 1000 milliGRAM(s) IV Intermittent every 24 hours  dextrose 5%. 1000 milliLiter(s) (50 mL/Hr) IV Continuous <Continuous>  dextrose 50% Injectable 12.5 Gram(s) IV Push once  dextrose 50% Injectable 25 Gram(s) IV Push once  dextrose 50% Injectable 25 Gram(s) IV Push once  docusate sodium 100 milliGRAM(s) Oral daily  DULoxetine 60 milliGRAM(s) Oral daily  fluconAZOLE IVPB      fluconAZOLE IVPB 100 milliGRAM(s) IV Intermittent every 24 hours  gabapentin 300 milliGRAM(s) Oral at bedtime  guaiFENesin  milliGRAM(s) Oral every 12 hours  heparin  Infusion.  Unit(s)/Hr (11 mL/Hr) IV Continuous <Continuous>  insulin lispro (HumaLOG) corrective regimen sliding scale   SubCutaneous three times a day before meals  insulin lispro (HumaLOG) corrective regimen sliding scale   SubCutaneous at bedtime  losartan 25 milliGRAM(s) Oral daily  melatonin 5 milliGRAM(s) Oral at bedtime  metoprolol tartrate 25 milliGRAM(s) Oral two times a day  nystatin    Suspension 138607 Unit(s) Swish and Swallow four times a day  pantoprazole    Tablet 40 milliGRAM(s) Oral before breakfast  polyethylene glycol 3350 17 Gram(s) Oral daily  predniSONE   Tablet 10 milliGRAM(s) Oral daily  senna 2 Tablet(s) Oral at bedtime    MEDICATIONS  (PRN):  acetaminophen   Tablet .. 650 milliGRAM(s) Oral every 6 hours PRN Temp greater or equal to 38C (100.4F), Mild Pain (1 - 3)  ALPRAZolam 0.5 milliGRAM(s) Oral two times a day PRN anxiety  dextrose 40% Gel 15 Gram(s) Oral once PRN Blood Glucose LESS THAN 70 milliGRAM(s)/deciliter  glucagon  Injectable 1 milliGRAM(s) IntraMuscular once PRN Glucose LESS THAN 70 milligrams/deciliter  heparin  Injectable 5000 Unit(s) IV Push every 6 hours PRN For aPTT less than 40  heparin  Injectable 2500 Unit(s) IV Push every 6 hours PRN For aPTT between 40 - 57        OBJECTIVE:    Vital Signs Last 24 Hrs  T(C): 36.8 (16 Aug 2019 04:03), Max: 37 (15 Aug 2019 21:34)  T(F): 98.2 (16 Aug 2019 04:03), Max: 98.6 (15 Aug 2019 21:34)  HR: 64 (16 Aug 2019 04:03) (55 - 65)  BP: 162/76 (16 Aug 2019 04:03) (162/76 - 186/92)  BP(mean): --  RR: 19 (16 Aug 2019 04:03) (18 - 20)  SpO2: 96% (16 Aug 2019 04:03) (95% - 100%)    PHYSICAL EXAM:  GENERAL: NAD, intermittently gargling   HEAD:  Atraumatic, Normocephalic  EYES: EOMI, PERRLA, conjunctiva and sclera clear  ENMT: moist mucous membranes, white-yellows thrush on tongue and on the lips  NECK: Supple, No JVD  CHEST/LUNG: diffuse hoarse breath sound . No accessory muscle use.  HEART: regular rate and rhythm; No murmurs appreciated on exam  ABDOMEN: Soft, mildly tender to deep palpation to L upper and lower quadrants, Nondistended; Bowel sounds present  EXTREMITIES: No clubbing, cyanosis, or peripheral edema   LYMPH: No lymphadenopathy noted  SKIN: No rashes or lesions  NERVOUS SYSTEM: AOx2, conversive and answers questions appropriately.    CAPILLARY BLOOD GLUCOSE      POCT Blood Glucose.: 130 mg/dL (16 Aug 2019 06:15)  POCT Blood Glucose.: 142 mg/dL (16 Aug 2019 00:10)  POCT Blood Glucose.: 131 mg/dL (15 Aug 2019 18:05)  POCT Blood Glucose.: 137 mg/dL (15 Aug 2019 13:19)    I&O's Summary    15 Aug 2019 07:01  -  16 Aug 2019 07:00  --------------------------------------------------------  IN: 146 mL / OUT: 800 mL / NET: -654 mL        LABS:                        12.1   12.42 )-----------( 360      ( 15 Aug 2019 09:45 )             37.9     08-16    142  |  100  |  35<H>  ----------------------------<  125<H>  4.9   |  25  |  0.81    Ca    10.6<H>      16 Aug 2019 06:08  Phos  3.6     08-16  Mg     2.4     08-16      PT/INR - ( 15 Aug 2019 09:33 )   PT: 13.4 sec;   INR: 1.17 ratio         PTT - ( 15 Aug 2019 10:25 )  PTT:88.9 sec  CARDIAC MARKERS ( 14 Aug 2019 20:18 )  x     / x     / 100 U/L / x     / 12.3 ng/mL              RADIOLOGY & ADDITIONAL TESTS: Michaela Mock  Internal Medicine  PGY-1  Pager: [phone]    Patient is a 85y old  Female who presents with a chief complaint of Abdominal pain (15 Aug 2019 16:55)      SUBJECTIVE / OVERNIGHT EVENTS: No acute event ON. Reports having HA at the right temporal and R sided neck region ON which relieves with massage. Still feels having mucus stuck in her throat and she is unable to bring it up. Denies CP/SOB/abd pain/n/v/d/fever/chills.    MEDICATIONS  (STANDING):  amLODIPine   Tablet 5 milliGRAM(s) Oral daily  atorvastatin 40 milliGRAM(s) Oral <User Schedule>  buDESOnide    Inhalation Suspension 0.25 milliGRAM(s) Inhalation two times a day  carbidopa/levodopa  25/100 2 Tablet(s) Oral three times a day  cefTRIAXone   IVPB 1000 milliGRAM(s) IV Intermittent every 24 hours  dextrose 5%. 1000 milliLiter(s) (50 mL/Hr) IV Continuous <Continuous>  dextrose 50% Injectable 12.5 Gram(s) IV Push once  dextrose 50% Injectable 25 Gram(s) IV Push once  dextrose 50% Injectable 25 Gram(s) IV Push once  docusate sodium 100 milliGRAM(s) Oral daily  DULoxetine 60 milliGRAM(s) Oral daily  fluconAZOLE IVPB      fluconAZOLE IVPB 100 milliGRAM(s) IV Intermittent every 24 hours  gabapentin 300 milliGRAM(s) Oral at bedtime  guaiFENesin  milliGRAM(s) Oral every 12 hours  heparin  Infusion.  Unit(s)/Hr (11 mL/Hr) IV Continuous <Continuous>  insulin lispro (HumaLOG) corrective regimen sliding scale   SubCutaneous three times a day before meals  insulin lispro (HumaLOG) corrective regimen sliding scale   SubCutaneous at bedtime  losartan 25 milliGRAM(s) Oral daily  melatonin 5 milliGRAM(s) Oral at bedtime  metoprolol tartrate 25 milliGRAM(s) Oral two times a day  nystatin    Suspension 286427 Unit(s) Swish and Swallow four times a day  pantoprazole    Tablet 40 milliGRAM(s) Oral before breakfast  polyethylene glycol 3350 17 Gram(s) Oral daily  predniSONE   Tablet 10 milliGRAM(s) Oral daily  senna 2 Tablet(s) Oral at bedtime    MEDICATIONS  (PRN):  acetaminophen   Tablet .. 650 milliGRAM(s) Oral every 6 hours PRN Temp greater or equal to 38C (100.4F), Mild Pain (1 - 3)  ALPRAZolam 0.5 milliGRAM(s) Oral two times a day PRN anxiety  dextrose 40% Gel 15 Gram(s) Oral once PRN Blood Glucose LESS THAN 70 milliGRAM(s)/deciliter  glucagon  Injectable 1 milliGRAM(s) IntraMuscular once PRN Glucose LESS THAN 70 milligrams/deciliter  heparin  Injectable 5000 Unit(s) IV Push every 6 hours PRN For aPTT less than 40  heparin  Injectable 2500 Unit(s) IV Push every 6 hours PRN For aPTT between 40 - 57        OBJECTIVE:    Vital Signs Last 24 Hrs  T(C): 36.8 (16 Aug 2019 04:03), Max: 37 (15 Aug 2019 21:34)  T(F): 98.2 (16 Aug 2019 04:03), Max: 98.6 (15 Aug 2019 21:34)  HR: 64 (16 Aug 2019 04:03) (55 - 65)  BP: 162/76 (16 Aug 2019 04:03) (162/76 - 186/92)  BP(mean): --  RR: 19 (16 Aug 2019 04:03) (18 - 20)  SpO2: 96% (16 Aug 2019 04:03) (95% - 100%)    PHYSICAL EXAM:  GENERAL: NAD, intermittently gargling   HEAD:  Atraumatic, Normocephalic  EYES: EOMI, PERRLA, conjunctiva and sclera clear  ENMT: moist mucous membranes, white-yellows thrush on tongue and on the lips  NECK: Supple, No JVD  CHEST/LUNG: diffuse hoarse breath sound . No accessory muscle use.  HEART: regular rate and rhythm; No murmurs appreciated on exam  ABDOMEN: Soft, mildly tender to deep palpation to L upper and lower quadrants, Nondistended; Bowel sounds present  EXTREMITIES: No clubbing, cyanosis, or peripheral edema   LYMPH: No lymphadenopathy noted  SKIN: No rashes or lesions  NERVOUS SYSTEM: AOx2, conversive and answers questions appropriately.    CAPILLARY BLOOD GLUCOSE      POCT Blood Glucose.: 130 mg/dL (16 Aug 2019 06:15)  POCT Blood Glucose.: 142 mg/dL (16 Aug 2019 00:10)  POCT Blood Glucose.: 131 mg/dL (15 Aug 2019 18:05)  POCT Blood Glucose.: 137 mg/dL (15 Aug 2019 13:19)    I&O's Summary    15 Aug 2019 07:01  -  16 Aug 2019 07:00  --------------------------------------------------------  IN: 146 mL / OUT: 800 mL / NET: -654 mL        LABS:                        12.1   12.42 )-----------( 360      ( 15 Aug 2019 09:45 )             37.9     08-16    142  |  100  |  35<H>  ----------------------------<  125<H>  4.9   |  25  |  0.81    Ca    10.6<H>      16 Aug 2019 06:08  Phos  3.6     08-16  Mg     2.4     08-16      PT/INR - ( 15 Aug 2019 09:33 )   PT: 13.4 sec;   INR: 1.17 ratio         PTT - ( 15 Aug 2019 10:25 )  PTT:88.9 sec  CARDIAC MARKERS ( 14 Aug 2019 20:18 )  x     / x     / 100 U/L / x     / 12.3 ng/mL      < from: CT Neck Soft Tissue w/ IV Cont (08.15.19 @ 22:57) >  IMPRESSION:    Asymmetry of the vocal cords is noted consistent with the history of   vocal cord paralysis. No focal mass lesion is appreciated involving the   larynx.    The lower cervical esophagusappears enlarged just above the level of the   thoracic inlet. An esophagitis, underlying mass, Zenker's diverticulum,   or esophageal contour changes secondary to esophageal dysmotility can't   be excluded. Consider correlation with endoscopy for further evaluation   if clinically warranted.    < end of copied text >      RADIOLOGY & ADDITIONAL TESTS:

## 2019-08-16 NOTE — PROGRESS NOTE ADULT - ASSESSMENT
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

## 2019-08-16 NOTE — PROGRESS NOTE ADULT - PROBLEM SELECTOR PLAN 6
- breathing comfortably now  - Per son, no formal diagnosis of COPD  - c/w home dose prednisone chronically for PMR/?GCA. Right temporal artery biopsy reportedly negative, but she gets right sided headaches. She was never put on high dose steroid with a taper in the past per PCP office.  -Repeat ESR/CRP elevated, though non-specific and likely to be elevated in setting of infections  -Will give additional dose of prednisone 20mg x 1 today to see if it helps with right-sided HA's.   - Maintain O2 sats between 88-92% - breathing comfortably now  - Per son, no formal diagnosis of COPD  - c/w home dose prednisone chronically for PMR/?GCA. Right temporal artery biopsy reportedly negative, but she gets right sided headaches. She was never put on high dose steroid with a taper in the past per PCP office.  -Repeat ESR/CRP elevated, though non-specific and likely to be elevated in setting of infections  - Maintain O2 sats between 88-92% - breathing more comfortably now  - Per son, no formal diagnosis of COPD  - c/w home dose prednisone 10mg daily chronically for PMR/?GCA. Right temporal artery biopsy reportedly negative, but she gets right sided headaches. She was never put on high dose steroid with a taper in the past per PCP office.  -Repeat ESR/CRP elevated, though non-specific and likely to be elevated in setting of infections  -S/p additional prednisone 20mg x 1 on 8/15 for ?temporal arteritis HA with temporary improvement. -Trigeminal neuralgia is on the differential given right HA associated with maxillary/jaw pain.   - Maintain O2 sats between 88-92%

## 2019-08-16 NOTE — PROGRESS NOTE ADULT - ATTENDING COMMENTS
vocal fold injection may help mitigate silent aspiration, d/w SLP finds that likely aspiration from multiple factors so we are not realistically expecting her to be able to eat following the procedure, but might help prevent aspiration of her saliva.

## 2019-08-16 NOTE — PROGRESS NOTE ADULT - PROBLEM SELECTOR PLAN 3
-Severe on TTE  - given patient is euvolemic on exam today, continue to hold lasix for today. Reassess and consider restarting tomorrow/tonight. -Severe on TTE  -will restart lasix today

## 2019-08-16 NOTE — PROGRESS NOTE ADULT - PROBLEM SELECTOR PLAN 8
- UA was positive w/ abd pain, hx of recurrent UTIs  - UCx positive for Klebsiella >100k  - C/w CTX for now but consider broader coverage for possible aspiration PNA. Limited somewhat by penicillin allergy.

## 2019-08-16 NOTE — SWALLOW VFSS/MBS ASSESSMENT ADULT - LARYNGEAL PENETRATION AFTER THE SWALLOW - SILENT
over laryngeal surface of the epiglottis and arytenoids from vallecular and pyriform residue/Moderate Moderate/over the laryngeal surfaces of the epiglottis and arytenoids, from vallecular and pyriform residue

## 2019-08-16 NOTE — PROGRESS NOTE ADULT - PROBLEM SELECTOR PLAN 4
- failed speech and swallow study today  - MBS planned for today  - ENT consulted: CT neck w/ con, added diflucan  - NPO except meds per patient and family's request  - Aspiration precautions  - Elevate head of bed - failed speech and swallow study and MBS  - ENT consulted: CT neck w/ con--vocal cord paralysis, also concerns for esophagitis/esophgeal mass/zenker's diverticulum. Planned to get vocal inject next week  - CT chest for further evaulation  - c/w diflucan  - discussed with son and patient regarding risk and benefits of NG tube for dysphagia and aspiration risk, both are agreeable to NG tube placement for meds and feeds   - Aspiration precautions  - Elevate head of bed - failed speech and swallow study and MBS  - ENT consulted: CT neck w/ con--vocal cord paralysis, also concerns for esophagitis/esophogeal mass/Zenker's diverticulum. Planned to get vocal inject next week  -F/u GI recs.   - CT chest for further evaulation  - c/w diflucan  - discussed with son and patient regarding risk and benefits of NG tube for dysphagia and aspiration risk, both are agreeable to NG tube placement for meds and feeds   - Aspiration precautions and Elevate head of bed

## 2019-08-16 NOTE — PROGRESS NOTE ADULT - PROBLEM SELECTOR PLAN 5
- was delirious, now back to baseline  -Melatonin 5mg HS  -C/w home Sinemet for Parkinson-like symptom. Per son, patient never has a diagnosis of parkinson's disease. She is started on sinemet to see if it helps with her leg shaking symptoms - was delirious, now back to baseline  -Melatonin 5mg HS  -C/w home Sinemet for Parkinson-like symptom. Per son, patient never has a diagnosis of parkinson's disease. She was started on sinemet to see if it helps with her leg shaking symptoms - was delirious, now back to baseline  -Melatonin 5mg HS  -C/w home Sinemet for Parkinson-like symptom. Per son, patient never has a diagnosis of parkinson's disease. She was started on sinemet to see if it helps with her leg shaking symptoms  - switch from home duloxetine to venlafaxine given duloxetine cannot be crushed - was delirious, now back to baseline  -Melatonin 5mg HS  -C/w home Sinemet for Parkinson-like symptom. Per son, patient never has a diagnosis of parkinson's disease. She was started on sinemet to see if it helps with her leg shaking symptoms  - switch from home duloxetine to venlafaxine given duloxetine cannot be crushed and put via NGT.

## 2019-08-16 NOTE — SWALLOW VFSS/MBS ASSESSMENT ADULT - ORAL PHASE
Residue in oral cavity/Incomplete tongue to palate contact/Uncontrolled bolus / spillover in hypopharynx spillage to the pyriforms/Incomplete tongue to palate contact/Uncontrolled bolus / spillover in hypopharynx spillage to the pyriforms/Residue in oral cavity/Uncontrolled bolus / spillover in hypopharynx/Incomplete tongue to palate contact max premature spillage to the pyriforms/Uncontrolled bolus / spillover in hypopharynx/Incomplete tongue to palate contact

## 2019-08-16 NOTE — PROGRESS NOTE ADULT - PROBLEM SELECTOR PLAN 1
- new onset Afib yesterday, likely 2/2 over diuresis vs infection   - converted back to sinus at around 9 am 8/14  - now on heparin gtt, plavix discontinued  - continue to bridge to coumadin 3mg QHS for valvular Afib in setting of mitral stenosis.    - TSH wnl  - c/w metoprolol 25mg BID for rate control per cardiology  - given patient is euvolemic on exam and will be NPO tonight, will continue to hold lasix for today. Reassess and consider restarting tomorrow/tonight  - f/u cardiology recs  - Repeat ekg unchanged from pior EKG  - Telemetry: sinus 50-80s  - Troponins downtrended, likely was due to demand ischemia from Afib with RVR - new onset Afib , likely 2/2 over diuresis vs infection   - converted back to sinus at around 9 am 8/14  - on heparin gtt, plavix discontinued  - continue to bridge to coumadin 3mg QHS for valvular Afib in setting of mitral stenosis.    - TSH wnl  - c/w metoprolol 25mg BID for rate control per cardiology  - will restart standing lasix 20 mg BID   - f/u cardiology recs  - Repeat ekg unchanged from pior EKG  - Telemetry: sinus 60-70s  - Troponins downtrended, likely was due to demand ischemia from Afib with RVR - new onset Afib , likely 2/2 over diuresis vs infection   - converted back to sinus at around 9 am 8/14  - on heparin gtt, plavix discontinued  - continue to bridge to coumadin 3mg QHS for valvular Afib in setting of mitral stenosis.    - TSH wnl  - c/w metoprolol 25mg BID for rate control per cardiology  - will restart standing lasix 20 mg daily  - f/u cardiology recs  - Repeat ekg unchanged from pior EKG  - Telemetry: sinus 60-70s  - Troponins downtrended, likely was due to demand ischemia from Afib with RVR

## 2019-08-16 NOTE — SWALLOW VFSS/MBS ASSESSMENT ADULT - PHARYNGEAL PHASE COMMENTS
repeat swallows and positional strategies are not pharyngeal residue is gradually reduced, however not cleared on multiple repeat swallows

## 2019-08-17 LAB
ALBUMIN SERPL ELPH-MCNC: 4.7 G/DL — SIGNIFICANT CHANGE UP (ref 3.3–5)
ALP SERPL-CCNC: 54 U/L — SIGNIFICANT CHANGE UP (ref 40–120)
ALT FLD-CCNC: <5 U/L — LOW (ref 10–45)
ANION GAP SERPL CALC-SCNC: 17 MMOL/L — SIGNIFICANT CHANGE UP (ref 5–17)
APTT BLD: 58 SEC — HIGH (ref 27.5–36.3)
APTT BLD: 69.7 SEC — HIGH (ref 27.5–36.3)
AST SERPL-CCNC: 22 U/L — SIGNIFICANT CHANGE UP (ref 10–40)
BILIRUB SERPL-MCNC: 0.4 MG/DL — SIGNIFICANT CHANGE UP (ref 0.2–1.2)
BUN SERPL-MCNC: 43 MG/DL — HIGH (ref 7–23)
CALCIUM SERPL-MCNC: 10.6 MG/DL — HIGH (ref 8.4–10.5)
CHLORIDE SERPL-SCNC: 102 MMOL/L — SIGNIFICANT CHANGE UP (ref 96–108)
CO2 SERPL-SCNC: 25 MMOL/L — SIGNIFICANT CHANGE UP (ref 22–31)
CREAT SERPL-MCNC: 0.96 MG/DL — SIGNIFICANT CHANGE UP (ref 0.5–1.3)
GLUCOSE SERPL-MCNC: 146 MG/DL — HIGH (ref 70–99)
HCT VFR BLD CALC: 45.2 % — HIGH (ref 34.5–45)
HGB BLD-MCNC: 13.9 G/DL — SIGNIFICANT CHANGE UP (ref 11.5–15.5)
MAGNESIUM SERPL-MCNC: 2.7 MG/DL — HIGH (ref 1.6–2.6)
MCHC RBC-ENTMCNC: 27.7 PG — SIGNIFICANT CHANGE UP (ref 27–34)
MCHC RBC-ENTMCNC: 30.8 GM/DL — LOW (ref 32–36)
MCV RBC AUTO: 90 FL — SIGNIFICANT CHANGE UP (ref 80–100)
PHOSPHATE SERPL-MCNC: 4.6 MG/DL — HIGH (ref 2.5–4.5)
PLATELET # BLD AUTO: 428 K/UL — HIGH (ref 150–400)
POTASSIUM SERPL-MCNC: 4.2 MMOL/L — SIGNIFICANT CHANGE UP (ref 3.5–5.3)
POTASSIUM SERPL-SCNC: 4.2 MMOL/L — SIGNIFICANT CHANGE UP (ref 3.5–5.3)
PROT SERPL-MCNC: 8.7 G/DL — HIGH (ref 6–8.3)
RBC # BLD: 5.02 M/UL — SIGNIFICANT CHANGE UP (ref 3.8–5.2)
RBC # FLD: 16.1 % — HIGH (ref 10.3–14.5)
SODIUM SERPL-SCNC: 144 MMOL/L — SIGNIFICANT CHANGE UP (ref 135–145)
WBC # BLD: 12 K/UL — HIGH (ref 3.8–10.5)
WBC # FLD AUTO: 12 K/UL — HIGH (ref 3.8–10.5)

## 2019-08-17 PROCEDURE — 99222 1ST HOSP IP/OBS MODERATE 55: CPT | Mod: GC

## 2019-08-17 PROCEDURE — 99233 SBSQ HOSP IP/OBS HIGH 50: CPT | Mod: GC

## 2019-08-17 RX ORDER — WARFARIN SODIUM 2.5 MG/1
3 TABLET ORAL ONCE
Refills: 0 | Status: COMPLETED | OUTPATIENT
Start: 2019-08-17 | End: 2019-08-17

## 2019-08-17 RX ORDER — METRONIDAZOLE 500 MG
TABLET ORAL
Refills: 0 | Status: DISCONTINUED | OUTPATIENT
Start: 2019-08-17 | End: 2019-08-21

## 2019-08-17 RX ORDER — METRONIDAZOLE 500 MG
500 TABLET ORAL EVERY 8 HOURS
Refills: 0 | Status: DISCONTINUED | OUTPATIENT
Start: 2019-08-17 | End: 2019-08-21

## 2019-08-17 RX ORDER — METRONIDAZOLE 500 MG
500 TABLET ORAL ONCE
Refills: 0 | Status: COMPLETED | OUTPATIENT
Start: 2019-08-17 | End: 2019-08-17

## 2019-08-17 RX ORDER — ACETAMINOPHEN 500 MG
650 TABLET ORAL EVERY 6 HOURS
Refills: 0 | Status: DISCONTINUED | OUTPATIENT
Start: 2019-08-17 | End: 2019-08-20

## 2019-08-17 RX ADMIN — Medication 500000 UNIT(S): at 13:03

## 2019-08-17 RX ADMIN — Medication 500000 UNIT(S): at 23:37

## 2019-08-17 RX ADMIN — FLUCONAZOLE 50 MILLIGRAM(S): 150 TABLET ORAL at 20:19

## 2019-08-17 RX ADMIN — SENNA PLUS 2 TABLET(S): 8.6 TABLET ORAL at 22:55

## 2019-08-17 RX ADMIN — Medication 500000 UNIT(S): at 17:38

## 2019-08-17 RX ADMIN — Medication 0.25 MILLIGRAM(S): at 13:03

## 2019-08-17 RX ADMIN — Medication 1: at 23:43

## 2019-08-17 RX ADMIN — Medication 20 MILLIGRAM(S): at 05:01

## 2019-08-17 RX ADMIN — CARBIDOPA AND LEVODOPA 2 TABLET(S): 25; 100 TABLET ORAL at 05:00

## 2019-08-17 RX ADMIN — Medication 100 MILLIGRAM(S): at 12:30

## 2019-08-17 RX ADMIN — AMLODIPINE BESYLATE 5 MILLIGRAM(S): 2.5 TABLET ORAL at 05:03

## 2019-08-17 RX ADMIN — PANTOPRAZOLE SODIUM 40 MILLIGRAM(S): 20 TABLET, DELAYED RELEASE ORAL at 12:42

## 2019-08-17 RX ADMIN — Medication 25 MILLIGRAM(S): at 05:00

## 2019-08-17 RX ADMIN — CARBIDOPA AND LEVODOPA 2 TABLET(S): 25; 100 TABLET ORAL at 13:03

## 2019-08-17 RX ADMIN — HEPARIN SODIUM 700 UNIT(S)/HR: 5000 INJECTION INTRAVENOUS; SUBCUTANEOUS at 10:40

## 2019-08-17 RX ADMIN — Medication 10 MILLIGRAM(S): at 05:00

## 2019-08-17 RX ADMIN — Medication 100 MILLIGRAM(S): at 23:37

## 2019-08-17 RX ADMIN — ATORVASTATIN CALCIUM 40 MILLIGRAM(S): 80 TABLET, FILM COATED ORAL at 22:53

## 2019-08-17 RX ADMIN — Medication 37.5 MILLIGRAM(S): at 05:01

## 2019-08-17 RX ADMIN — CEFTRIAXONE 100 MILLIGRAM(S): 500 INJECTION, POWDER, FOR SOLUTION INTRAMUSCULAR; INTRAVENOUS at 22:50

## 2019-08-17 RX ADMIN — Medication 25 MILLIGRAM(S): at 17:38

## 2019-08-17 RX ADMIN — Medication 5 MILLIGRAM(S): at 22:53

## 2019-08-17 RX ADMIN — Medication 0.25 MILLIGRAM(S): at 21:00

## 2019-08-17 RX ADMIN — HEPARIN SODIUM 700 UNIT(S)/HR: 5000 INJECTION INTRAVENOUS; SUBCUTANEOUS at 02:51

## 2019-08-17 RX ADMIN — Medication 100 MILLIGRAM(S): at 16:52

## 2019-08-17 RX ADMIN — LOSARTAN POTASSIUM 25 MILLIGRAM(S): 100 TABLET, FILM COATED ORAL at 05:00

## 2019-08-17 RX ADMIN — GABAPENTIN 300 MILLIGRAM(S): 400 CAPSULE ORAL at 22:53

## 2019-08-17 RX ADMIN — Medication 500000 UNIT(S): at 05:01

## 2019-08-17 RX ADMIN — Medication 37.5 MILLIGRAM(S): at 17:38

## 2019-08-17 RX ADMIN — WARFARIN SODIUM 3 MILLIGRAM(S): 2.5 TABLET ORAL at 22:53

## 2019-08-17 RX ADMIN — CARBIDOPA AND LEVODOPA 2 TABLET(S): 25; 100 TABLET ORAL at 22:54

## 2019-08-17 RX ADMIN — POLYETHYLENE GLYCOL 3350 17 GRAM(S): 17 POWDER, FOR SOLUTION ORAL at 12:31

## 2019-08-17 NOTE — PROGRESS NOTE ADULT - PROBLEM SELECTOR PLAN 4
- failed speech and swallow study and MBS  - ENT consulted: CT neck w/ con--vocal cord paralysis, also concerns for esophagitis/esophogeal mass/Zenker's diverticulum. Planned to get vocal inject next week  -F/u GI recs.   - CT chest for further evaulation  - c/w diflucan  - discussed with son and patient regarding risk and benefits of NG tube for dysphagia and aspiration risk, both are agreeable to NG tube placement for meds and feeds   - Aspiration precautions and Elevate head of bed - failed speech and swallow study and MBS  - ENT consulted: CT neck w/ con--vocal cord paralysis, also concerns for esophagitis/esophogeal mass/Zenker's diverticulum. Planned to get vocal inject next week  -GI recs appreciated; GI recommending  neuro consult to eval for dysphagia  - CT chest for further evaluation  - c/w diflucan  - discussed with son and patient regarding risk and benefits of NG tube for dysphagia and aspiration risk, both are agreeable to NG tube placement for meds and feeds   - Aspiration precautions and Elevate head of bed - failed speech and swallow study and MBS  - ENT consulted: CT neck w/ con--vocal cord paralysis, also concerns for esophagitis/esophogeal mass/Zenker's diverticulum. Planned to get vocal inject next week  -GI recs appreciated; GI recommending  neuro consult to eval for dysphagia  - c/w diflucan  - discussed with son and patient regarding risk and benefits of NG tube for dysphagia and aspiration risk, both are agreeable to NG tube placement for meds and feeds   - Aspiration precautions and Elevate head of bed - failed speech and swallow study and MBS  - ENT consulted: CT neck w/ con--vocal cord paralysis, also concerns for esophagitis/esophogeal mass/Zenker's diverticulum. Planned to get vocal inject next week  -GI recs appreciated; GI recommending  neuro consult to eval for dysphagia; neuro consulted  - c/w diflucan  - discussed with son and patient regarding risk and benefits of NG tube for dysphagia and aspiration risk, both are agreeable to NG tube placement for meds and feeds   - Aspiration precautions and Elevate head of bed

## 2019-08-17 NOTE — DIETITIAN INITIAL EVALUATION ADULT. - PHYSICAL APPEARANCE
Unable to obtain pt's consent to perform nutrition focused physical exam due to AMS; however no visual signs of muscle/fat loss noted/well nourished/other (specify) Ht: 62 inches Wt: 131.1 pounds BMI: 23.9 kg/m2 IBW: 110 (+/-10%) 119.1 %IBW  Noted +1 generalized edema as per flow sheets (previously +1 mini. leg).   Skin: no noted pressure injuries as per documentation.

## 2019-08-17 NOTE — PROGRESS NOTE ADULT - SUBJECTIVE AND OBJECTIVE BOX
Patient is a 85y old  Female who presents with a chief complaint of Abdominal pain (17 Aug 2019 06:45)      SUBJECTIVE / OVERNIGHT EVENTS:  Pt started on feeds overnight as NG tube was confirmed to be in proper position based on imaging.     MEDICATIONS  (STANDING):  amLODIPine   Tablet 5 milliGRAM(s) Oral daily  atorvastatin 40 milliGRAM(s) Oral <User Schedule>  buDESOnide    Inhalation Suspension 0.25 milliGRAM(s) Inhalation two times a day  carbidopa/levodopa  25/100 2 Tablet(s) Oral three times a day  cefTRIAXone   IVPB 1000 milliGRAM(s) IV Intermittent every 24 hours  dextrose 5%. 1000 milliLiter(s) (50 mL/Hr) IV Continuous <Continuous>  dextrose 50% Injectable 12.5 Gram(s) IV Push once  dextrose 50% Injectable 25 Gram(s) IV Push once  dextrose 50% Injectable 25 Gram(s) IV Push once  docusate sodium 100 milliGRAM(s) Oral daily  fluconAZOLE IVPB      fluconAZOLE IVPB 100 milliGRAM(s) IV Intermittent every 24 hours  furosemide    Tablet 20 milliGRAM(s) Oral daily  gabapentin   Solution 300 milliGRAM(s) Oral at bedtime  heparin  Infusion.  Unit(s)/Hr (11 mL/Hr) IV Continuous <Continuous>  insulin lispro (HumaLOG) corrective regimen sliding scale   SubCutaneous every 6 hours  losartan 25 milliGRAM(s) Oral daily  melatonin 5 milliGRAM(s) Oral at bedtime  metoprolol tartrate 25 milliGRAM(s) Oral two times a day  nystatin    Suspension 877879 Unit(s) Oral four times a day  pantoprazole  Injectable 40 milliGRAM(s) IV Push daily  polyethylene glycol 3350 17 Gram(s) Oral daily  predniSONE   Tablet 10 milliGRAM(s) Oral daily  senna 2 Tablet(s) Oral at bedtime  venlafaxine 37.5 milliGRAM(s) Oral every 12 hours    MEDICATIONS  (PRN):  acetaminophen   Tablet .. 650 milliGRAM(s) Oral every 6 hours PRN Temp greater or equal to 38C (100.4F), Mild Pain (1 - 3)  ALPRAZolam 0.5 milliGRAM(s) Oral two times a day PRN anxiety  dextrose 40% Gel 15 Gram(s) Oral once PRN Blood Glucose LESS THAN 70 milliGRAM(s)/deciliter  glucagon  Injectable 1 milliGRAM(s) IntraMuscular once PRN Glucose LESS THAN 70 milligrams/deciliter  guaiFENesin    Syrup 200 milliGRAM(s) Oral every 6 hours PRN Cough  heparin  Injectable 5000 Unit(s) IV Push every 6 hours PRN For aPTT less than 40  heparin  Injectable 2500 Unit(s) IV Push every 6 hours PRN For aPTT between 40 - 57      Vital Signs Last 24 Hrs  T(C): 36.3 (17 Aug 2019 11:25), Max: 37.1 (16 Aug 2019 12:06)  T(F): 97.4 (17 Aug 2019 11:25), Max: 98.8 (16 Aug 2019 12:06)  HR: 69 (17 Aug 2019 11:25) (69 - 95)  BP: 135/84 (17 Aug 2019 11:25) (135/84 - 168/87)  BP(mean): --  RR: 17 (17 Aug 2019 11:25) (17 - 18)  SpO2: 93% (17 Aug 2019 11:25) (93% - 97%)    CAPILLARY BLOOD GLUCOSE      POCT Blood Glucose.: 126 mg/dL (17 Aug 2019 06:04)  POCT Blood Glucose.: 137 mg/dL (17 Aug 2019 00:00)  POCT Blood Glucose.: 122 mg/dL (16 Aug 2019 16:54)  POCT Blood Glucose.: 125 mg/dL (16 Aug 2019 12:04)    I&O's Summary    16 Aug 2019 07:01  -  17 Aug 2019 07:00  --------------------------------------------------------  IN: 182 mL / OUT: 275 mL / NET: -93 mL    17 Aug 2019 07:01  -  17 Aug 2019 11:25  --------------------------------------------------------  IN: 0 mL / OUT: 800 mL / NET: -800 mL        PHYSICAL EXAM:  GENERAL: NAD, intermittently gargling   HEAD:  Atraumatic, Normocephalic  ENMT: moist mucous membranes, white-yellows thrush on tongue and on the lips  NECK: Supple, No JVD  CHEST/LUNG: diffuse coarse breath sounds b/l . No accessory muscle use.  HEART: regular rate and rhythm; No murmurs appreciated on exam  ABDOMEN: Soft, non-tender, Nondistended; Bowel sounds present  EXTREMITIES: No clubbing, cyanosis, or peripheral edema   LYMPH: No lymphadenopathy noted  SKIN: No rashes or lesions  NERVOUS SYSTEM: AOx2, conversive and answers questions appropriately.  LABS:                        12.8   11.46 )-----------( 414      ( 16 Aug 2019 08:43 )             39.9     Auto Eosinophil # x     / Auto Eosinophil % x     / Auto Neutrophil # x     / Auto Neutrophil % x     / BANDS % x        08-17    144  |  102  |  43<H>  ----------------------------<  146<H>  4.2   |  25  |  0.96  08-16    142  |  100  |  35<H>  ----------------------------<  125<H>  4.9   |  25  |  0.81    Ca    10.6<H>      17 Aug 2019 09:27  Mg     2.7     08-17  Phos  4.6     08-17  TPro  8.7<H>  /  Alb  4.7  /  TBili  0.4  /  DBili  x   /  AST  22  /  ALT  <5<L>  /  AlkPhos  54  08-17    PT/INR - ( 17 Aug 2019 09:27 )   PT: 14.8 sec;   INR: 1.29 ratio         PTT - ( 17 Aug 2019 09:27 )  PTT:58.0 sec            RESPIRATORY  VENT:    ABG:     VBG:     RADIOLOGY & ADDITIONAL TESTS:  (Imaging Personally Reviewed): XRAY chest     Consultant(s) Notes Reviewed:  GI, ENT    Care Discussed with Consultants/Other Providers: NA Patient is a 85y old  Female who presents with a chief complaint of Abdominal pain (17 Aug 2019 06:45)      SUBJECTIVE / OVERNIGHT EVENTS:  Pt started on feeds overnight as NG tube was confirmed to be in proper position based on imaging. Pt denies f/c/n/v/d/CP/SOB or other issues.    MEDICATIONS  (STANDING):  amLODIPine   Tablet 5 milliGRAM(s) Oral daily  atorvastatin 40 milliGRAM(s) Oral <User Schedule>  buDESOnide    Inhalation Suspension 0.25 milliGRAM(s) Inhalation two times a day  carbidopa/levodopa  25/100 2 Tablet(s) Oral three times a day  cefTRIAXone   IVPB 1000 milliGRAM(s) IV Intermittent every 24 hours  dextrose 5%. 1000 milliLiter(s) (50 mL/Hr) IV Continuous <Continuous>  dextrose 50% Injectable 12.5 Gram(s) IV Push once  dextrose 50% Injectable 25 Gram(s) IV Push once  dextrose 50% Injectable 25 Gram(s) IV Push once  docusate sodium 100 milliGRAM(s) Oral daily  fluconAZOLE IVPB      fluconAZOLE IVPB 100 milliGRAM(s) IV Intermittent every 24 hours  furosemide    Tablet 20 milliGRAM(s) Oral daily  gabapentin   Solution 300 milliGRAM(s) Oral at bedtime  heparin  Infusion.  Unit(s)/Hr (11 mL/Hr) IV Continuous <Continuous>  insulin lispro (HumaLOG) corrective regimen sliding scale   SubCutaneous every 6 hours  losartan 25 milliGRAM(s) Oral daily  melatonin 5 milliGRAM(s) Oral at bedtime  metoprolol tartrate 25 milliGRAM(s) Oral two times a day  nystatin    Suspension 973302 Unit(s) Oral four times a day  pantoprazole  Injectable 40 milliGRAM(s) IV Push daily  polyethylene glycol 3350 17 Gram(s) Oral daily  predniSONE   Tablet 10 milliGRAM(s) Oral daily  senna 2 Tablet(s) Oral at bedtime  venlafaxine 37.5 milliGRAM(s) Oral every 12 hours    MEDICATIONS  (PRN):  acetaminophen   Tablet .. 650 milliGRAM(s) Oral every 6 hours PRN Temp greater or equal to 38C (100.4F), Mild Pain (1 - 3)  ALPRAZolam 0.5 milliGRAM(s) Oral two times a day PRN anxiety  dextrose 40% Gel 15 Gram(s) Oral once PRN Blood Glucose LESS THAN 70 milliGRAM(s)/deciliter  glucagon  Injectable 1 milliGRAM(s) IntraMuscular once PRN Glucose LESS THAN 70 milligrams/deciliter  guaiFENesin    Syrup 200 milliGRAM(s) Oral every 6 hours PRN Cough  heparin  Injectable 5000 Unit(s) IV Push every 6 hours PRN For aPTT less than 40  heparin  Injectable 2500 Unit(s) IV Push every 6 hours PRN For aPTT between 40 - 57      Vital Signs Last 24 Hrs  T(C): 36.3 (17 Aug 2019 11:25), Max: 37.1 (16 Aug 2019 12:06)  T(F): 97.4 (17 Aug 2019 11:25), Max: 98.8 (16 Aug 2019 12:06)  HR: 69 (17 Aug 2019 11:25) (69 - 95)  BP: 135/84 (17 Aug 2019 11:25) (135/84 - 168/87)  BP(mean): --  RR: 17 (17 Aug 2019 11:25) (17 - 18)  SpO2: 93% (17 Aug 2019 11:25) (93% - 97%)    CAPILLARY BLOOD GLUCOSE      POCT Blood Glucose.: 126 mg/dL (17 Aug 2019 06:04)  POCT Blood Glucose.: 137 mg/dL (17 Aug 2019 00:00)  POCT Blood Glucose.: 122 mg/dL (16 Aug 2019 16:54)  POCT Blood Glucose.: 125 mg/dL (16 Aug 2019 12:04)    I&O's Summary    16 Aug 2019 07:01  -  17 Aug 2019 07:00  --------------------------------------------------------  IN: 182 mL / OUT: 275 mL / NET: -93 mL    17 Aug 2019 07:01  -  17 Aug 2019 11:25  --------------------------------------------------------  IN: 0 mL / OUT: 800 mL / NET: -800 mL        PHYSICAL EXAM:  GENERAL: NAD, intermittently gargling   HEAD:  Atraumatic, Normocephalic  ENMT: moist mucous membranes, white-yellows thrush on tongue and on the lips  NECK: Supple, No JVD  CHEST/LUNG: diffuse coarse breath sounds b/l . No accessory muscle use.  HEART: regular rate and rhythm; No murmurs appreciated on exam  ABDOMEN: Soft, non-tender, Nondistended; Bowel sounds present  EXTREMITIES: No clubbing, cyanosis, or peripheral edema   LYMPH: No lymphadenopathy noted  SKIN: No rashes or lesions  NERVOUS SYSTEM: AOx2, conversive and answers questions appropriately.  LABS:                        12.8   11.46 )-----------( 414      ( 16 Aug 2019 08:43 )             39.9     Auto Eosinophil # x     / Auto Eosinophil % x     / Auto Neutrophil # x     / Auto Neutrophil % x     / BANDS % x        08-17    144  |  102  |  43<H>  ----------------------------<  146<H>  4.2   |  25  |  0.96  08-16    142  |  100  |  35<H>  ----------------------------<  125<H>  4.9   |  25  |  0.81    Ca    10.6<H>      17 Aug 2019 09:27  Mg     2.7     08-17  Phos  4.6     08-17  TPro  8.7<H>  /  Alb  4.7  /  TBili  0.4  /  DBili  x   /  AST  22  /  ALT  <5<L>  /  AlkPhos  54  08-17    PT/INR - ( 17 Aug 2019 09:27 )   PT: 14.8 sec;   INR: 1.29 ratio         PTT - ( 17 Aug 2019 09:27 )  PTT:58.0 sec            RESPIRATORY  VENT:    ABG:     VBG:     RADIOLOGY & ADDITIONAL TESTS:  (Imaging Personally Reviewed): XRAY chest     Consultant(s) Notes Reviewed:  GI, ENT    Care Discussed with Consultants/Other Providers: NA

## 2019-08-17 NOTE — DIETITIAN INITIAL EVALUATION ADULT. - PROBLEM SELECTOR PLAN 7
Patient with CHF per records, but no TTE on file. Currently euvolemic.  - Will f/u TTE to eval valvular heart dz and EF

## 2019-08-17 NOTE — PROGRESS NOTE ADULT - PROBLEM SELECTOR PLAN 6
- breathing more comfortably now  - Per son, no formal diagnosis of COPD  - c/w home dose prednisone 10mg daily chronically for PMR/?GCA. Right temporal artery biopsy reportedly negative, but she gets right sided headaches. She was never put on high dose steroid with a taper in the past per PCP office.  -Repeat ESR/CRP elevated, though non-specific and likely to be elevated in setting of infections  -S/p additional prednisone 20mg x 1 on 8/15 for ?temporal arteritis HA with temporary improvement. -Trigeminal neuralgia is on the differential given right HA associated with maxillary/jaw pain.   - Maintain O2 sats between 88-92% - breathing more comfortably now  - Per son, no formal diagnosis of COPD  - c/w home dose prednisone 10mg daily chronically for PMR/?GCA. Right temporal artery biopsy reportedly negative, but she gets right sided headaches. She was never put on high dose steroid with a taper in the past per PCP office.  -Repeat ESR/CRP elevated, though non-specific and likely to be elevated in setting of infections  -S/p additional prednisone 20mg x 1 on 8/15 for ?temporal arteritis HA with temporary improvement.   -Trigeminal neuralgia is on the differential given right HA associated with maxillary/jaw pain.   - Maintain O2 sats between 88-92%

## 2019-08-17 NOTE — DIETITIAN INITIAL EVALUATION ADULT. - DIET TYPE
Glucerna 1.5 through continuous feedings via NGT starting at 10 ml/hr and increase 5 ml/hr every 4 hours to goal of 20 ml/hr x 24 hours to provide 480 ml, 720 kcal, 40 g protein, and 364 ml water (12 kcal/kg and 0.7 g/kg protein based on lowest weight since admission 59.5 kg).

## 2019-08-17 NOTE — DIETITIAN INITIAL EVALUATION ADULT. - PROBLEM SELECTOR PLAN 5
Patient with large stool burden on imaging, but no SBO.  - Start miralax, colace, senna.  - Will give tap water enema x1.

## 2019-08-17 NOTE — DIETITIAN INITIAL EVALUATION ADULT. - ADD RECOMMEND
1. Will attempt to obtain subjective information PTA if feasible. 2. Will continue to monitor EN provision/tolerance, weight, labs, skin, GI status. 3. Malnutrition notification placed in chart - spoke to provider.

## 2019-08-17 NOTE — PROGRESS NOTE ADULT - PROBLEM SELECTOR PLAN 10
-Patient not on home anti-hyperglycemics  - FS acceptable  - c/w Insulin sliding scale  Oral thrush:  -nystatin suspension  -ENT eval appreciated; c/w fluconazole and CT neck--vocal cord paralysis, also concerning for esophagitis/mass/zenker's diverticulum.   Constipation  -Patient with large stool burden on imaging, but no SBO  -c/w Miralax, colace, senna  -suppository as needed  -continue to monitor    Prophylaxis:  DVT ppx: on heparin gtt to coumadin bridge  Diet: NPO. s/p NG tube placement. Feeds started  Dispo: PT recs subacute rehab

## 2019-08-17 NOTE — PROGRESS NOTE ADULT - PROBLEM SELECTOR PLAN 7
- stable at 160s/70s   - c/w losartan 25mg QD (home dose 50mg QD)   - c/w amlodipine 5mg QD  - c/w metoprolol for rate control and BP  - continue to monitor.   - Vitals Q4H. - stable at 130-160s/70s-80s  - c/w losartan 25mg QD (home dose 50mg QD)   - c/w amlodipine 5mg QD  - c/w metoprolol for rate control and BP  - continue to monitor.   - Vitals Q4H.

## 2019-08-17 NOTE — DIETITIAN INITIAL EVALUATION ADULT. - PROBLEM SELECTOR PLAN 3
- Will continue duonebs prn.  - on Prednisone chronically for PMR, will need stress dose if hypotensive

## 2019-08-17 NOTE — DIETITIAN INITIAL EVALUATION ADULT. - PROBLEM SELECTOR PLAN 4
BP is currently well controlled.  - Will restart losartan  - Hold lasix and BB in the setting of sepsis

## 2019-08-17 NOTE — DIETITIAN INITIAL EVALUATION ADULT. - ENTERAL
Recommend Glucerna 1.2 starting at 20 ml/hr and increase 10 ml/hr every 4 hours to goal of 50 ml/hr x 24 hours to provide 1200 ml, 1440 kcal, 72 g protein, and 966 ml water (26 kcal/kg and 1.3 g/kg protein based on upper IBW 55 kg); will continue to monitor electrolytes and adjust as needed; defer fluids to team. Spoke to provider.

## 2019-08-17 NOTE — CHART NOTE - NSCHARTNOTEFT_GEN_A_CORE
Upon Nutritional Assessment by the Registered Dietitian your patient was determined to meet criteria / has evidence of the following diagnosis/diagnoses:          [x]  Mild Protein Calorie Malnutrition        [ ]  Moderate Protein Calorie Malnutrition        [ ] Severe Protein Calorie Malnutrition        [ ] Unspecified Protein Calorie Malnutrition        [ ] Underweight / BMI <19        [ ] Morbid Obesity / BMI > 40      Findings as based on:  [x] Comprehensive nutrition assessment   [ ] Nutrition Focused Physical Exam  [x] Other: NPO x 3 days; possible weight loss; fluid accumulation      Nutrition Plan/Recommendations:    Recommend Glucerna 1.2 starting at 20 ml/hr and increase 10 ml/hr every 4 hours to goal of 50 ml/hr x 24 hours to provide 1200 ml, 1440 kcal, 72 g protein, and 966 ml water (26 kcal/kg and 1.3 g/kg protein based on upper IBW 55 kg); will continue to monitor electrolytes and adjust as needed; defer fluids to team.    RD remains available,   Dang Ya MS, RDN, #[phone]     PROVIDER Section:     By signing this assessment you are acknowledging and agree with the diagnosis/diagnoses assigned by the Registered Dietitian    Comments:

## 2019-08-17 NOTE — PROGRESS NOTE ADULT - PROBLEM SELECTOR PLAN 1
- new onset Afib , likely 2/2 over diuresis vs infection   - converted back to sinus at around 9 am 8/14  - on heparin gtt, plavix discontinued  - continue to bridge to coumadin 3mg QHS for valvular Afib in setting of mitral stenosis.    - TSH wnl  - c/w metoprolol 25mg BID for rate control per cardiology  -restarted standing lasix 20 mg daily  - f/u cardiology recs  - Repeat ekg unchanged from pior EKG  - Telemetry: sinus 60-70s  - Troponins downtrended, likely was due to demand ischemia from Afib with RVR - new onset Afib , likely 2/2 over diuresis vs infection   - converted back to sinus at around 9 am 8/14  - on heparin gtt, plavix discontinued  - continue to bridge to coumadin 3mg QHS for valvular Afib in setting of mitral stenosis.    - TSH wnl  - c/w metoprolol 25mg BID for rate control per cardiology  -restarted standing lasix 20 mg daily  - f/u cardiology recs  - Repeat ekg unchanged from pior EKG  - Telemetry: sinus 60-70s with PATs up to 130 for 2.6 secs  - Troponins downtrended, likely was due to demand ischemia from Afib with RVR

## 2019-08-17 NOTE — PROGRESS NOTE ADULT - PROBLEM SELECTOR PLAN 2
-With acute hypoxic and hypercarbic respiratory failure  - patient failed speech and swallow and MBS  -In the setting of concerns for aspiration PNA, will consider switching from CTX to broader coverage, but limited by penicillin allergy. Will continue abx tentatively for ~7 days.   -Taper off NC as tolerated, BIPAP prn for hypercarbia  -RVP negative  - leukocytosis stable.  -Guaifenesin for cough  -encourage to use incentive spirometer  -c/w Pulmicort inhaler  - chest PT -With acute hypoxic and hypercarbic respiratory failure  - patient failed speech and swallow and MBS  -In the setting of concerns for aspiration PNA, will consider switching from CTX to broader coverage, but limited by penicillin allergy. Will continue abx tentatively for ~7 days.   -CT chest showing no evidence of pneumonia. Moderate hiatal hernia.  -Taper off NC as tolerated, BIPAP prn for hypercarbia  -RVP negative  - leukocytosis stable.  -Guaifenesin for cough  -encourage to use incentive spirometer  -c/w Pulmicort inhaler  - chest PT -With acute hypoxic and hypercarbic respiratory failure  - patient failed speech and swallow and MBS  -In the setting of concerns for aspiration PNA, will consider switching from CTX to broader coverage, but limited by penicillin allergy.  -Added flagyl due to concern for aspiration PNA for anaerobic coverage. Will continue abx tentatively for ~7 days.   -CT chest showing no evidence of pneumonia. Moderate hiatal hernia.  -Taper off NC as tolerated, BIPAP prn for hypercarbia  -RVP negative  - leukocytosis stable.  -Guaifenesin for cough  -encourage to use incentive spirometer  -c/w Pulmicort inhaler  - chest PT

## 2019-08-17 NOTE — DIETITIAN INITIAL EVALUATION ADULT. - PERTINENT LABORATORY DATA
(08/12) HbA1c 6.0%; Finger sticks: (08/17) 126 - 139 (08/16) 122 - 142; (08/17) BUN 43, , Mg 2.7, Phos 4.6

## 2019-08-17 NOTE — DIETITIAN INITIAL EVALUATION ADULT. - PROBLEM SELECTOR PLAN 2
Patient is hypoxic with leukocytosis.  - Did not get any IVF in ED.  - Hx CHF (unclear type), instead of IVF, will hold home lasix for 1 day. Restart lasix on 8/12.  - will treat with ceftriaxone and azithro.

## 2019-08-17 NOTE — PROGRESS NOTE ADULT - PROBLEM SELECTOR PLAN 9
-TTE --> Normal LF, Mitral stenosis, increased RV pressure and dilated Left atrium, functioning TAVR. Findings c/w valvular heart failure or heart failure with preserved EF  - c/w lasix  - Strict I's/O's and daily weights.

## 2019-08-17 NOTE — DIETITIAN INITIAL EVALUATION ADULT. - ENERGY NEEDS
Pertinent information as per chart: Pt 84 y/o F with PMH: GERD, HTN, HLD, DM with neuropathy, Parkinson's, anxiety, depression, AS S/P TAVR, overactive bladder, CHF?, PMR on Prednisone, COPD, multiple UTI's, admitted with SOB and abdominal pain, found with acute hypoxic/hypercapnic respiratory failure in setting of PNA and COPD exacerbation, UTI, metabolic encephalopathy, sepsis, rapid response team (08/14), dysphagia S/P MBS (08/16) recommending NPO, S/P NGT placement and feeding starts (08/16).

## 2019-08-17 NOTE — DIETITIAN INITIAL EVALUATION ADULT. - PROBLEM SELECTOR PLAN 1
with acute hypoxic and hypercarbic respiratory failure.  - Will treat with CTX and azithromycin for CAP.  - Continue NC for now, taper off when possible, BIPAP prn for hypercarbia  - F/u RVP

## 2019-08-17 NOTE — PROGRESS NOTE ADULT - PROBLEM SELECTOR PLAN 5
- was delirious, now back to baseline  -Melatonin 5mg HS  -C/w home Sinemet for Parkinson-like symptom. Per son, patient never has a diagnosis of parkinson's disease. She was started on sinemet to see if it helps with her leg shaking symptoms  - switch from home duloxetine to venlafaxine given duloxetine cannot be crushed and put via NGT.

## 2019-08-17 NOTE — DIETITIAN INITIAL EVALUATION ADULT. - OTHER INFO
Unable to obtain subjective information PTA at this time.     Pt started tube feedings last night (08/16), previously NPO x 3 days. As per RN, pt tolerating tube feedings at goal of 20 ml/hr, denies pt with nausea or vomiting. Last BM 7 days ago (08/10) as per flow sheets, RN denies pt with BM today - pt on bowel regimen as per chart.    Unable to obtain weight history/changes PTA. Weight as per flow sheets (08/11) 140.4 pounds -> (08/15) 131.8 pounds -> (08/17) 131.1 pounds -?accuracy of weight fluctuations likely due to fluid shifts and poor PO intake, will continue to monitor.

## 2019-08-17 NOTE — DIETITIAN INITIAL EVALUATION ADULT. - REASON INDICATOR FOR ASSESSMENT
Nutrition consult received for assessment and tube feedings.   Information obtained from: medical record and RN.   Pt confused/disoriented as per documentation, sleeping, no family at bedside - limited information obtained.

## 2019-08-18 LAB
ALBUMIN SERPL ELPH-MCNC: 4.4 G/DL — SIGNIFICANT CHANGE UP (ref 3.3–5)
ALP SERPL-CCNC: 50 U/L — SIGNIFICANT CHANGE UP (ref 40–120)
ALT FLD-CCNC: 6 U/L — LOW (ref 10–45)
ANION GAP SERPL CALC-SCNC: 17 MMOL/L — SIGNIFICANT CHANGE UP (ref 5–17)
APTT BLD: 49.8 SEC — HIGH (ref 27.5–36.3)
APTT BLD: 88.7 SEC — HIGH (ref 27.5–36.3)
AST SERPL-CCNC: 20 U/L — SIGNIFICANT CHANGE UP (ref 10–40)
BILIRUB SERPL-MCNC: 0.3 MG/DL — SIGNIFICANT CHANGE UP (ref 0.2–1.2)
BUN SERPL-MCNC: 52 MG/DL — HIGH (ref 7–23)
CALCIUM SERPL-MCNC: 10.1 MG/DL — SIGNIFICANT CHANGE UP (ref 8.4–10.5)
CHLORIDE SERPL-SCNC: 106 MMOL/L — SIGNIFICANT CHANGE UP (ref 96–108)
CO2 SERPL-SCNC: 24 MMOL/L — SIGNIFICANT CHANGE UP (ref 22–31)
CREAT SERPL-MCNC: 1.03 MG/DL — SIGNIFICANT CHANGE UP (ref 0.5–1.3)
GLUCOSE SERPL-MCNC: 126 MG/DL — HIGH (ref 70–99)
HCT VFR BLD CALC: 44.2 % — SIGNIFICANT CHANGE UP (ref 34.5–45)
HGB BLD-MCNC: 14 G/DL — SIGNIFICANT CHANGE UP (ref 11.5–15.5)
INR BLD: 1.67 RATIO — HIGH (ref 0.88–1.16)
MAGNESIUM SERPL-MCNC: 2.7 MG/DL — HIGH (ref 1.6–2.6)
MCHC RBC-ENTMCNC: 28.9 PG — SIGNIFICANT CHANGE UP (ref 27–34)
MCHC RBC-ENTMCNC: 31.7 GM/DL — LOW (ref 32–36)
MCV RBC AUTO: 91.1 FL — SIGNIFICANT CHANGE UP (ref 80–100)
PHOSPHATE SERPL-MCNC: 4.3 MG/DL — SIGNIFICANT CHANGE UP (ref 2.5–4.5)
PLATELET # BLD AUTO: 403 K/UL — HIGH (ref 150–400)
POTASSIUM SERPL-MCNC: 3.9 MMOL/L — SIGNIFICANT CHANGE UP (ref 3.5–5.3)
POTASSIUM SERPL-SCNC: 3.9 MMOL/L — SIGNIFICANT CHANGE UP (ref 3.5–5.3)
PROT SERPL-MCNC: 7.9 G/DL — SIGNIFICANT CHANGE UP (ref 6–8.3)
PROTHROM AB SERPL-ACNC: 19.5 SEC — HIGH (ref 10–13.1)
RBC # BLD: 4.85 M/UL — SIGNIFICANT CHANGE UP (ref 3.8–5.2)
RBC # FLD: 16.2 % — HIGH (ref 10.3–14.5)
SODIUM SERPL-SCNC: 147 MMOL/L — HIGH (ref 135–145)
WBC # BLD: 13.81 K/UL — HIGH (ref 3.8–10.5)
WBC # FLD AUTO: 13.81 K/UL — HIGH (ref 3.8–10.5)

## 2019-08-18 PROCEDURE — 99233 SBSQ HOSP IP/OBS HIGH 50: CPT | Mod: GC

## 2019-08-18 RX ORDER — DOCUSATE SODIUM 100 MG
100 CAPSULE ORAL
Refills: 0 | Status: DISCONTINUED | OUTPATIENT
Start: 2019-08-18 | End: 2019-08-30

## 2019-08-18 RX ADMIN — CARBIDOPA AND LEVODOPA 2 TABLET(S): 25; 100 TABLET ORAL at 23:35

## 2019-08-18 RX ADMIN — AMLODIPINE BESYLATE 5 MILLIGRAM(S): 2.5 TABLET ORAL at 05:06

## 2019-08-18 RX ADMIN — CARBIDOPA AND LEVODOPA 2 TABLET(S): 25; 100 TABLET ORAL at 05:01

## 2019-08-18 RX ADMIN — CARBIDOPA AND LEVODOPA 2 TABLET(S): 25; 100 TABLET ORAL at 13:02

## 2019-08-18 RX ADMIN — Medication 500000 UNIT(S): at 17:07

## 2019-08-18 RX ADMIN — Medication 650 MILLIGRAM(S): at 20:45

## 2019-08-18 RX ADMIN — Medication 200 MILLIGRAM(S): at 12:39

## 2019-08-18 RX ADMIN — GABAPENTIN 300 MILLIGRAM(S): 400 CAPSULE ORAL at 23:36

## 2019-08-18 RX ADMIN — POLYETHYLENE GLYCOL 3350 17 GRAM(S): 17 POWDER, FOR SOLUTION ORAL at 12:43

## 2019-08-18 RX ADMIN — HEPARIN SODIUM 800 UNIT(S)/HR: 5000 INJECTION INTRAVENOUS; SUBCUTANEOUS at 19:22

## 2019-08-18 RX ADMIN — Medication 650 MILLIGRAM(S): at 21:15

## 2019-08-18 RX ADMIN — Medication 500000 UNIT(S): at 12:39

## 2019-08-18 RX ADMIN — Medication 25 MILLIGRAM(S): at 05:01

## 2019-08-18 RX ADMIN — Medication 20 MILLIGRAM(S): at 05:01

## 2019-08-18 RX ADMIN — Medication 37.5 MILLIGRAM(S): at 05:01

## 2019-08-18 RX ADMIN — Medication 500000 UNIT(S): at 05:01

## 2019-08-18 RX ADMIN — Medication 0.25 MILLIGRAM(S): at 09:03

## 2019-08-18 RX ADMIN — FLUCONAZOLE 50 MILLIGRAM(S): 150 TABLET ORAL at 20:44

## 2019-08-18 RX ADMIN — Medication 100 MILLIGRAM(S): at 13:03

## 2019-08-18 RX ADMIN — Medication 10 MILLIGRAM(S): at 05:01

## 2019-08-18 RX ADMIN — CEFTRIAXONE 100 MILLIGRAM(S): 500 INJECTION, POWDER, FOR SOLUTION INTRAMUSCULAR; INTRAVENOUS at 23:35

## 2019-08-18 RX ADMIN — Medication 650 MILLIGRAM(S): at 13:45

## 2019-08-18 RX ADMIN — HEPARIN SODIUM 800 UNIT(S)/HR: 5000 INJECTION INTRAVENOUS; SUBCUTANEOUS at 12:40

## 2019-08-18 RX ADMIN — Medication 200 MILLIGRAM(S): at 17:07

## 2019-08-18 RX ADMIN — Medication 0.25 MILLIGRAM(S): at 20:44

## 2019-08-18 RX ADMIN — PANTOPRAZOLE SODIUM 40 MILLIGRAM(S): 20 TABLET, DELAYED RELEASE ORAL at 12:44

## 2019-08-18 RX ADMIN — Medication 650 MILLIGRAM(S): at 12:39

## 2019-08-18 RX ADMIN — HEPARIN SODIUM 2500 UNIT(S): 5000 INJECTION INTRAVENOUS; SUBCUTANEOUS at 12:43

## 2019-08-18 RX ADMIN — SENNA PLUS 2 TABLET(S): 8.6 TABLET ORAL at 23:36

## 2019-08-18 RX ADMIN — Medication 25 MILLIGRAM(S): at 17:07

## 2019-08-18 RX ADMIN — Medication 5 MILLIGRAM(S): at 23:35

## 2019-08-18 RX ADMIN — Medication 100 MILLIGRAM(S): at 05:02

## 2019-08-18 RX ADMIN — Medication 200 MILLIGRAM(S): at 23:36

## 2019-08-18 RX ADMIN — LOSARTAN POTASSIUM 25 MILLIGRAM(S): 100 TABLET, FILM COATED ORAL at 05:01

## 2019-08-18 RX ADMIN — Medication 37.5 MILLIGRAM(S): at 17:08

## 2019-08-18 RX ADMIN — Medication 100 MILLIGRAM(S): at 17:07

## 2019-08-18 NOTE — PROGRESS NOTE ADULT - PROBLEM SELECTOR PLAN 4
- failed speech and swallow study and MBS  - ENT consulted: CT neck w/ con--vocal cord paralysis, also concerns for esophagitis/esophogeal mass/Zenker's diverticulum. Planned to get vocal inject next week  -GI recs appreciated; GI recommending  neuro consult to eval for dysphagia; neuro consulted  - c/w diflucan  - discussed with son and patient regarding risk and benefits of NG tube for dysphagia and aspiration risk, both are agreeable to NG tube placement for meds and feeds   - Aspiration precautions and Elevate head of bed - failed speech and swallow study and MBS  - ENT consulted: CT neck w/ con--vocal cord paralysis, also concerns for esophagitis/esophogeal mass/Zenker's diverticulum. Planned to get vocal inject on 8/22. f/u pre-op recs and hold heparin drip 6 hrs prior  -GI recs appreciated; GI recommending  neuro consult to eval for dysphagia; neuro  recs appreciated.   - c/w diflucan  - discussed with son and patient regarding risk and benefits of NG tube for dysphagia and aspiration risk, both are agreeable to NG tube placement for meds and feeds   - Aspiration precautions and Elevate head of bed - failed speech and swallow study and MBS  - ENT consulted: CT neck w/ con--vocal cord paralysis, also concerns for esophagitis/esophogeal mass/Zenker's diverticulum. Planned to get vocal inject on 8/22. f/u pre-op recs (cardiac clearance and labs) and hold heparin drip 6 hrs prior  - repeat MBS after VC injection. If swallow improve, consider dc NG tube. If dysphagia not improve, need to discuss goal of care and possible PEG tube  -GI recs appreciated; GI recommending  neuro consult to eval for dysphagia; neuro  recs appreciated. Given that imaging of head would not , would defer them at this time  - c/w diflucan  - discussed with son and patient regarding risk and benefits of NG tube for dysphagia and aspiration risk, both are agreeable to NG tube placement for meds and feeds   - Aspiration precautions and Elevate head of bed

## 2019-08-18 NOTE — PROGRESS NOTE ADULT - SUBJECTIVE AND OBJECTIVE BOX
Michaela Mock  Internal Medicine  PGY-1  Pager: [phone]    Patient is a 85y old  Female who presents with a chief complaint of Abdominal pain (18 Aug 2019 06:47)      SUBJECTIVE / OVERNIGHT EVENTS: No acute event ON. Denies HA/CP/SOB/abd pain/n/v/d/fever/chills.    MEDICATIONS  (STANDING):  amLODIPine   Tablet 5 milliGRAM(s) Oral daily  atorvastatin 40 milliGRAM(s) Oral <User Schedule>  buDESOnide    Inhalation Suspension 0.25 milliGRAM(s) Inhalation two times a day  carbidopa/levodopa  25/100 2 Tablet(s) Oral three times a day  cefTRIAXone   IVPB 1000 milliGRAM(s) IV Intermittent every 24 hours  dextrose 5%. 1000 milliLiter(s) (50 mL/Hr) IV Continuous <Continuous>  dextrose 50% Injectable 12.5 Gram(s) IV Push once  dextrose 50% Injectable 25 Gram(s) IV Push once  dextrose 50% Injectable 25 Gram(s) IV Push once  docusate sodium 100 milliGRAM(s) Oral daily  fluconAZOLE IVPB      fluconAZOLE IVPB 100 milliGRAM(s) IV Intermittent every 24 hours  furosemide    Tablet 20 milliGRAM(s) Oral daily  gabapentin   Solution 300 milliGRAM(s) Oral at bedtime  heparin  Infusion.  Unit(s)/Hr (11 mL/Hr) IV Continuous <Continuous>  insulin lispro (HumaLOG) corrective regimen sliding scale   SubCutaneous every 6 hours  losartan 25 milliGRAM(s) Oral daily  melatonin 5 milliGRAM(s) Oral at bedtime  metoprolol tartrate 25 milliGRAM(s) Oral two times a day  metroNIDAZOLE  IVPB      metroNIDAZOLE  IVPB 500 milliGRAM(s) IV Intermittent every 8 hours  nystatin    Suspension 277173 Unit(s) Oral four times a day  pantoprazole  Injectable 40 milliGRAM(s) IV Push daily  polyethylene glycol 3350 17 Gram(s) Oral daily  predniSONE   Tablet 10 milliGRAM(s) Oral daily  senna 2 Tablet(s) Oral at bedtime  venlafaxine 37.5 milliGRAM(s) Oral every 12 hours    MEDICATIONS  (PRN):  acetaminophen    Suspension .. 650 milliGRAM(s) Oral every 6 hours PRN Temp greater or equal to 38C (100.4F), Mild Pain (1 - 3)  ALPRAZolam 0.5 milliGRAM(s) Oral two times a day PRN anxiety  dextrose 40% Gel 15 Gram(s) Oral once PRN Blood Glucose LESS THAN 70 milliGRAM(s)/deciliter  glucagon  Injectable 1 milliGRAM(s) IntraMuscular once PRN Glucose LESS THAN 70 milligrams/deciliter  guaiFENesin    Syrup 200 milliGRAM(s) Oral every 6 hours PRN Cough  heparin  Injectable 5000 Unit(s) IV Push every 6 hours PRN For aPTT less than 40  heparin  Injectable 2500 Unit(s) IV Push every 6 hours PRN For aPTT between 40 - 57        OBJECTIVE:    Vital Signs Last 24 Hrs  T(C): 36.8 (18 Aug 2019 04:23), Max: 36.9 (17 Aug 2019 20:47)  T(F): 98.3 (18 Aug 2019 04:23), Max: 98.5 (17 Aug 2019 20:47)  HR: 78 (18 Aug 2019 04:23) (69 - 78)  BP: 131/81 (18 Aug 2019 04:23) (131/81 - 151/90)  BP(mean): --  RR: 18 (18 Aug 2019 04:23) (17 - 18)  SpO2: 98% (18 Aug 2019 04:23) (93% - 98%)    PHYSICAL EXAM:  GENERAL: NAD, well-developed  HEAD:  Atraumatic, Normocephalic  EYES: EOMI, PERRLA, conjunctiva and sclera clear  NECK: Supple, No JVD  CHEST/LUNG: Clear to auscultation bilaterally; No wheeze  HEART: Regular rate and rhythm; No murmurs, rubs, or gallops  ABDOMEN: Soft, Nontender, Nondistended; Bowel sounds present  EXTREMITIES:  2+ Peripheral Pulses, No clubbing, cyanosis, or edema  PSYCH: AAOx3  NEUROLOGY: non-focal  SKIN: No rashes or lesions    CAPILLARY BLOOD GLUCOSE      POCT Blood Glucose.: 144 mg/dL (18 Aug 2019 05:59)  POCT Blood Glucose.: 151 mg/dL (17 Aug 2019 23:40)  POCT Blood Glucose.: 123 mg/dL (17 Aug 2019 17:41)  POCT Blood Glucose.: 139 mg/dL (17 Aug 2019 11:53)    I&O's Summary    17 Aug 2019 07:01  -  18 Aug 2019 07:00  --------------------------------------------------------  IN: 0 mL / OUT: 1400 mL / NET: -1400 mL        LABS:                        13.9   12.00 )-----------( 428      ( 17 Aug 2019 12:38 )             45.2     08-18    147<H>  |  106  |  52<H>  ----------------------------<  126<H>  3.9   |  24  |  1.03    Ca    10.1      18 Aug 2019 05:29  Phos  4.3     08-18  Mg     2.7     08-18    TPro  7.9  /  Alb  4.4  /  TBili  0.3  /  DBili  x   /  AST  20  /  ALT  6<L>  /  AlkPhos  50  08-18    PT/INR - ( 17 Aug 2019 09:27 )   PT: 14.8 sec;   INR: 1.29 ratio         PTT - ( 17 Aug 2019 09:27 )  PTT:58.0 sec              RADIOLOGY & ADDITIONAL TESTS: Michaela Mock  Internal Medicine  PGY-1  Pager: [phone]    Patient is a 85y old  Female who presents with a chief complaint of Abdominal pain (18 Aug 2019 06:47)      SUBJECTIVE / OVERNIGHT EVENTS: No acute event ON. Reports having HA last night. Mouth feels dry and patient wants to eat by mouth. Denies HA/CP/SOB/abd pain/n/v/d/fever/chills.    MEDICATIONS  (STANDING):  amLODIPine   Tablet 5 milliGRAM(s) Oral daily  atorvastatin 40 milliGRAM(s) Oral <User Schedule>  buDESOnide    Inhalation Suspension 0.25 milliGRAM(s) Inhalation two times a day  carbidopa/levodopa  25/100 2 Tablet(s) Oral three times a day  cefTRIAXone   IVPB 1000 milliGRAM(s) IV Intermittent every 24 hours  dextrose 5%. 1000 milliLiter(s) (50 mL/Hr) IV Continuous <Continuous>  dextrose 50% Injectable 12.5 Gram(s) IV Push once  dextrose 50% Injectable 25 Gram(s) IV Push once  dextrose 50% Injectable 25 Gram(s) IV Push once  docusate sodium 100 milliGRAM(s) Oral daily  fluconAZOLE IVPB      fluconAZOLE IVPB 100 milliGRAM(s) IV Intermittent every 24 hours  furosemide    Tablet 20 milliGRAM(s) Oral daily  gabapentin   Solution 300 milliGRAM(s) Oral at bedtime  heparin  Infusion.  Unit(s)/Hr (11 mL/Hr) IV Continuous <Continuous>  insulin lispro (HumaLOG) corrective regimen sliding scale   SubCutaneous every 6 hours  losartan 25 milliGRAM(s) Oral daily  melatonin 5 milliGRAM(s) Oral at bedtime  metoprolol tartrate 25 milliGRAM(s) Oral two times a day  metroNIDAZOLE  IVPB      metroNIDAZOLE  IVPB 500 milliGRAM(s) IV Intermittent every 8 hours  nystatin    Suspension 205187 Unit(s) Oral four times a day  pantoprazole  Injectable 40 milliGRAM(s) IV Push daily  polyethylene glycol 3350 17 Gram(s) Oral daily  predniSONE   Tablet 10 milliGRAM(s) Oral daily  senna 2 Tablet(s) Oral at bedtime  venlafaxine 37.5 milliGRAM(s) Oral every 12 hours    MEDICATIONS  (PRN):  acetaminophen    Suspension .. 650 milliGRAM(s) Oral every 6 hours PRN Temp greater or equal to 38C (100.4F), Mild Pain (1 - 3)  ALPRAZolam 0.5 milliGRAM(s) Oral two times a day PRN anxiety  dextrose 40% Gel 15 Gram(s) Oral once PRN Blood Glucose LESS THAN 70 milliGRAM(s)/deciliter  glucagon  Injectable 1 milliGRAM(s) IntraMuscular once PRN Glucose LESS THAN 70 milligrams/deciliter  guaiFENesin    Syrup 200 milliGRAM(s) Oral every 6 hours PRN Cough  heparin  Injectable 5000 Unit(s) IV Push every 6 hours PRN For aPTT less than 40  heparin  Injectable 2500 Unit(s) IV Push every 6 hours PRN For aPTT between 40 - 57        OBJECTIVE:    Vital Signs Last 24 Hrs  T(C): 36.8 (18 Aug 2019 04:23), Max: 36.9 (17 Aug 2019 20:47)  T(F): 98.3 (18 Aug 2019 04:23), Max: 98.5 (17 Aug 2019 20:47)  HR: 78 (18 Aug 2019 04:23) (69 - 78)  BP: 131/81 (18 Aug 2019 04:23) (131/81 - 151/90)  BP(mean): --  RR: 18 (18 Aug 2019 04:23) (17 - 18)  SpO2: 98% (18 Aug 2019 04:23) (93% - 98%)    PHYSICAL EXAM:  GENERAL: NAD, well-developed  HEAD:  Atraumatic, Normocephalic  EYES: EOMI, PERRLA, conjunctiva and sclera clear  NECK: Supple, No JVD  CHEST/LUNG: Clear to auscultation bilaterally; No wheeze  HEART: Regular rate and rhythm; No murmurs, rubs, or gallops  ABDOMEN: Soft, Nontender, Nondistended; Bowel sounds present  EXTREMITIES:  2+ Peripheral Pulses, No clubbing, cyanosis, or edema  PSYCH: AAOx3  NEUROLOGY: non-focal  SKIN: No rashes or lesions    CAPILLARY BLOOD GLUCOSE      POCT Blood Glucose.: 144 mg/dL (18 Aug 2019 05:59)  POCT Blood Glucose.: 151 mg/dL (17 Aug 2019 23:40)  POCT Blood Glucose.: 123 mg/dL (17 Aug 2019 17:41)  POCT Blood Glucose.: 139 mg/dL (17 Aug 2019 11:53)    I&O's Summary    17 Aug 2019 07:01  -  18 Aug 2019 07:00  --------------------------------------------------------  IN: 0 mL / OUT: 1400 mL / NET: -1400 mL        LABS:                        13.9   12.00 )-----------( 428      ( 17 Aug 2019 12:38 )             45.2     08-18    147<H>  |  106  |  52<H>  ----------------------------<  126<H>  3.9   |  24  |  1.03    Ca    10.1      18 Aug 2019 05:29  Phos  4.3     08-18  Mg     2.7     08-18    TPro  7.9  /  Alb  4.4  /  TBili  0.3  /  DBili  x   /  AST  20  /  ALT  6<L>  /  AlkPhos  50  08-18    PT/INR - ( 17 Aug 2019 09:27 )   PT: 14.8 sec;   INR: 1.29 ratio         PTT - ( 17 Aug 2019 09:27 )  PTT:58.0 sec              RADIOLOGY & ADDITIONAL TESTS: Michaela Mock  Internal Medicine  PGY-1  Pager: [phone]    Patient is a 85y old  Female who presents with a chief complaint of Abdominal pain (18 Aug 2019 06:47)      SUBJECTIVE / OVERNIGHT EVENTS: No acute event ON. Reports having HA last night. Cough is better with mucinex. Mouth feels dry and patient wants to eat by mouth. Denies HA/CP/SOB/abd pain/n/v/d/fever/chills.    MEDICATIONS  (STANDING):  amLODIPine   Tablet 5 milliGRAM(s) Oral daily  atorvastatin 40 milliGRAM(s) Oral <User Schedule>  buDESOnide    Inhalation Suspension 0.25 milliGRAM(s) Inhalation two times a day  carbidopa/levodopa  25/100 2 Tablet(s) Oral three times a day  cefTRIAXone   IVPB 1000 milliGRAM(s) IV Intermittent every 24 hours  dextrose 5%. 1000 milliLiter(s) (50 mL/Hr) IV Continuous <Continuous>  dextrose 50% Injectable 12.5 Gram(s) IV Push once  dextrose 50% Injectable 25 Gram(s) IV Push once  dextrose 50% Injectable 25 Gram(s) IV Push once  docusate sodium 100 milliGRAM(s) Oral daily  fluconAZOLE IVPB      fluconAZOLE IVPB 100 milliGRAM(s) IV Intermittent every 24 hours  furosemide    Tablet 20 milliGRAM(s) Oral daily  gabapentin   Solution 300 milliGRAM(s) Oral at bedtime  heparin  Infusion.  Unit(s)/Hr (11 mL/Hr) IV Continuous <Continuous>  insulin lispro (HumaLOG) corrective regimen sliding scale   SubCutaneous every 6 hours  losartan 25 milliGRAM(s) Oral daily  melatonin 5 milliGRAM(s) Oral at bedtime  metoprolol tartrate 25 milliGRAM(s) Oral two times a day  metroNIDAZOLE  IVPB      metroNIDAZOLE  IVPB 500 milliGRAM(s) IV Intermittent every 8 hours  nystatin    Suspension 128482 Unit(s) Oral four times a day  pantoprazole  Injectable 40 milliGRAM(s) IV Push daily  polyethylene glycol 3350 17 Gram(s) Oral daily  predniSONE   Tablet 10 milliGRAM(s) Oral daily  senna 2 Tablet(s) Oral at bedtime  venlafaxine 37.5 milliGRAM(s) Oral every 12 hours    MEDICATIONS  (PRN):  acetaminophen    Suspension .. 650 milliGRAM(s) Oral every 6 hours PRN Temp greater or equal to 38C (100.4F), Mild Pain (1 - 3)  ALPRAZolam 0.5 milliGRAM(s) Oral two times a day PRN anxiety  dextrose 40% Gel 15 Gram(s) Oral once PRN Blood Glucose LESS THAN 70 milliGRAM(s)/deciliter  glucagon  Injectable 1 milliGRAM(s) IntraMuscular once PRN Glucose LESS THAN 70 milligrams/deciliter  guaiFENesin    Syrup 200 milliGRAM(s) Oral every 6 hours PRN Cough  heparin  Injectable 5000 Unit(s) IV Push every 6 hours PRN For aPTT less than 40  heparin  Injectable 2500 Unit(s) IV Push every 6 hours PRN For aPTT between 40 - 57        OBJECTIVE:    Vital Signs Last 24 Hrs  T(C): 36.8 (18 Aug 2019 04:23), Max: 36.9 (17 Aug 2019 20:47)  T(F): 98.3 (18 Aug 2019 04:23), Max: 98.5 (17 Aug 2019 20:47)  HR: 78 (18 Aug 2019 04:23) (69 - 78)  BP: 131/81 (18 Aug 2019 04:23) (131/81 - 151/90)  BP(mean): --  RR: 18 (18 Aug 2019 04:23) (17 - 18)  SpO2: 98% (18 Aug 2019 04:23) (93% - 98%)    PHYSICAL EXAM:  GENERAL: NAD, intermittently coughing   HEAD:  Atraumatic, Normocephalic  ENMT: dry mucous membranes, white-yellows thrush on tongue and on the lips  NECK: Supple, No JVD  CHEST/LUNG: diffuse coarse breath sounds b/l slightly better than yesterday . No accessory muscle use.  HEART: regular rate and rhythm; No murmurs appreciated on exam  ABDOMEN: Soft, non-tender, Nondistended; Bowel sounds present  EXTREMITIES: No clubbing, cyanosis, or peripheral edema   LYMPH: No lymphadenopathy noted  SKIN: No rashes or lesions  NERVOUS SYSTEM: AOx2, conversive and answers questions appropriately.    CAPILLARY BLOOD GLUCOSE      POCT Blood Glucose.: 144 mg/dL (18 Aug 2019 05:59)  POCT Blood Glucose.: 151 mg/dL (17 Aug 2019 23:40)  POCT Blood Glucose.: 123 mg/dL (17 Aug 2019 17:41)  POCT Blood Glucose.: 139 mg/dL (17 Aug 2019 11:53)    I&O's Summary    17 Aug 2019 07:01  -  18 Aug 2019 07:00  --------------------------------------------------------  IN: 0 mL / OUT: 1400 mL / NET: -1400 mL        LABS:                        13.9   12.00 )-----------( 428      ( 17 Aug 2019 12:38 )             45.2     08-18    147<H>  |  106  |  52<H>  ----------------------------<  126<H>  3.9   |  24  |  1.03    Ca    10.1      18 Aug 2019 05:29  Phos  4.3     08-18  Mg     2.7     08-18    TPro  7.9  /  Alb  4.4  /  TBili  0.3  /  DBili  x   /  AST  20  /  ALT  6<L>  /  AlkPhos  50  08-18    PT/INR - ( 17 Aug 2019 09:27 )   PT: 14.8 sec;   INR: 1.29 ratio         PTT - ( 17 Aug 2019 09:27 )  PTT:58.0 sec

## 2019-08-18 NOTE — OCCUPATIONAL THERAPY INITIAL EVALUATION ADULT - LIVES WITH, PROFILE
spouse/As per chart review, pt lives with  in 2 story private home with chairlift, +1 step to enter. 24/7 live in home health aide.  has dementia, Pt has tub shower

## 2019-08-18 NOTE — OCCUPATIONAL THERAPY INITIAL EVALUATION ADULT - THERAPY FREQUENCY, OT EVAL
Pt resting on cot. NAD noted. Awaiting POC from Dr. Sergio Long.       Castillo Mckeon RN  09/26/18 3310 1-2x/week

## 2019-08-18 NOTE — OCCUPATIONAL THERAPY INITIAL EVALUATION ADULT - DIAGNOSIS, OT EVAL
Pt currently presents with decreased endurance, balance, fine motor coordination/dexterity/manipulation and strength limiting independence with ADLs and functional mobility.

## 2019-08-18 NOTE — PROGRESS NOTE ADULT - ASSESSMENT
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

## 2019-08-18 NOTE — PROGRESS NOTE ADULT - PROBLEM SELECTOR PLAN 1
- new onset Afib , likely 2/2 over diuresis vs infection   - converted back to sinus at around 9 am 8/14  - on heparin gtt, plavix discontinued  - continue to bridge to coumadin 3mg QHS for valvular Afib in setting of mitral stenosis.    - TSH wnl  - c/w metoprolol 25mg BID for rate control per cardiology  -restarted standing lasix 20 mg daily  - f/u cardiology recs  - Repeat ekg unchanged from pior EKG  - Telemetry: sinus 60-70s with PATs up to 130 for 2.6 secs  - Troponins downtrended, likely was due to demand ischemia from Afib with RVR - new onset Afib , likely 2/2 over diuresis vs infection   - converted back to sinus at around 9 am 8/14  - on heparin gtt, plavix discontinued  - holding coumadin in anticipation of VC injection on 8/22    - TSH wnl  - c/w metoprolol 25mg BID for rate control per cardiology  - c/w standing lasix 20 mg daily  - f/u cardiology recs  - Repeat ekg unchanged from pior EKG  - Telemetry: sinus 60-80s with PVCs  - Troponins downtrended, likely was due to demand ischemia from Afib with RVR - new onset Afib , likely 2/2 over diuresis vs infection   - converted back to sinus at around 9 am 8/14  - c/w heparin gtt, plavix discontinued  - holding coumadin in anticipation of VC injection on 8/22    - TSH wnl  - c/w metoprolol 25mg BID for rate control per cardiology  - c/w standing lasix 20 mg daily  - f/u cardiology recs  - Repeat ekg unchanged from pior EKG  - Telemetry: sinus 60-80s with PVCs  - Troponins downtrended, likely was due to demand ischemia from Afib with RVR

## 2019-08-18 NOTE — OCCUPATIONAL THERAPY INITIAL EVALUATION ADULT - PERTINENT HX OF CURRENT PROBLEM, REHAB EVAL
84yo F with GERD, HTN, HLD, DM2 with neuropathy, Parkinson's, anxiety/depression, AS s/p TAVR on plavix, overactive bladder, ?CHF (on lasix 20 BID), PMR on Prednisone, ?COPD (dx with [facility] at [facility] vague hx of being told she has COPD given breathing treatments but then stopped taking them), presenting with multiple medical complaints, but especially LUQ and flank pain since yesterday AM. Cont below...

## 2019-08-18 NOTE — PROGRESS NOTE ADULT - PROBLEM SELECTOR PLAN 9
-TTE --> Normal LF, Mitral stenosis, increased RV pressure and dilated Left atrium, functioning TAVR. Findings c/w valvular heart failure or heart failure with preserved EF  - c/w lasix  - Strict I's/O's and daily weights. -TTE --> Normal LF, Mitral stenosis, increased RV pressure and dilated Left atrium, functioning TAVR. Findings c/w valvular heart failure or heart failure with preserved EF  - c/w lasix  - Strict I's/O's and daily weights

## 2019-08-18 NOTE — PROGRESS NOTE ADULT - PROBLEM SELECTOR PLAN 2
-With acute hypoxic and hypercarbic respiratory failure  - patient failed speech and swallow and MBS  -In the setting of concerns for aspiration PNA, will consider switching from CTX to broader coverage, but limited by penicillin allergy.  -Added flagyl due to concern for aspiration PNA for anaerobic coverage. Will continue abx tentatively for ~7 days.   -CT chest showing no evidence of pneumonia. Moderate hiatal hernia.  -Taper off NC as tolerated, BIPAP prn for hypercarbia  -RVP negative  - leukocytosis stable.  -Guaifenesin for cough  -encourage to use incentive spirometer  -c/w Pulmicort inhaler  - chest PT -With acute hypoxic and hypercarbic respiratory failure  - patient failed speech and swallow and MBS  -c/w flagyl and CTX due to concern for aspiration PNA for anaerobic coverage. Will continue abx tentatively for ~5 days.   -CT chest showing no evidence of pneumonia. Moderate hiatal hernia.  -Taper off NC as tolerated, BIPAP prn for hypercarbia  -RVP negative  - leukocytosis stable.  -Guaifenesin for cough  -encourage to use incentive spirometer  -c/w Pulmicort inhaler  - chest PT

## 2019-08-18 NOTE — PROGRESS NOTE ADULT - PROBLEM SELECTOR PLAN 8
- UA was positive w/ abd pain, hx of recurrent UTIs  - UCx positive for Klebsiella >100k  - C/w CTX for now but consider broader coverage for possible aspiration PNA. Limited somewhat by penicillin allergy. - UA was positive w/ abd pain, hx of recurrent UTIs  - UCx positive for Klebsiella >100k  - C/w CTX (and flagyl for possible aspiration PNA)

## 2019-08-18 NOTE — CONSULT NOTE ADULT - ASSESSMENT
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     Impression: Dysarthria, dysphagia, and R. 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 However, her CT neck findings demonstrating an enlarged level of thoracic inlet suggesting esophageal dysmotility and possible underlying mass lesion are concerning and could not be explained by her stroke. Rather, patient could potentially have a mass compressing on the distal branches of the vagus nerve (recurrent laryngeal and deep pharyngeal nerves) which would cause dysarthria (unilateral vocal cord paralysis) as well as dysphagia (deep pharyngeal nerve). Given here reported history of 3 negative EGDs, this is unlikely to be the case, though should be ruled out w/ imaging as recommended by ENT. Lastly, an iatrogenic recurrent laryngeal nerve injury 2/2 to several Endoscopies is possible.     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     Impression: Dysarthria, dysphagia, and R. 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 However, her CT neck findings demonstrating an enlarged level of thoracic inlet suggesting esophageal dysmotility and possible underlying mass lesion are concerning and could not be explained by her stroke. Rather, patient could potentially have a mass compressing on the distal branches of the vagus nerve (recurrent laryngeal and deep pharyngeal nerves) which would cause dysarthria (unilateral vocal cord paralysis) as well as dysphagia (deep pharyngeal nerve). Given here reported history of 3 negative EGDs, this is unlikely to be the case, though should be ruled out w/ imaging as recommended by ENT. Lastly, an iatrogenic recurrent laryngeal nerve injury 2/2 to several Endoscopies is possible.     Plan:   [] Defer decision to repeat Endoscopy to eval for mass lesions to GI   [] Agree w/ ENT rec for VC injection   [] MRI brain w/o contrast  [] MRA head w/o contrast  [] MRA neck w/ contrast   [] TTE w/ bubble   [] PT/OT

## 2019-08-18 NOTE — OCCUPATIONAL THERAPY INITIAL EVALUATION ADULT - ADL RETRAINING, OT EVAL
Goal: Pt will perform UB/LB dressing with Min A within 4 weeks. Goal: Pt will perform bathing with Min A and appropriate AD within 4 weeks.

## 2019-08-18 NOTE — PROGRESS NOTE ADULT - PROBLEM SELECTOR PLAN 6
- breathing more comfortably now  - Per son, no formal diagnosis of COPD  - c/w home dose prednisone 10mg daily chronically for PMR/?GCA. Right temporal artery biopsy reportedly negative, but she gets right sided headaches. She was never put on high dose steroid with a taper in the past per PCP office.  -Repeat ESR/CRP elevated, though non-specific and likely to be elevated in setting of infections  -S/p additional prednisone 20mg x 1 on 8/15 for ?temporal arteritis HA with temporary improvement.   -Trigeminal neuralgia is on the differential given right HA associated with maxillary/jaw pain.   - Maintain O2 sats between 88-92% - breathing more comfortably and cough is better now  - Per son, no formal diagnosis of COPD  - c/w home dose prednisone 10mg daily chronically for PMR/?GCA. Right temporal artery biopsy reportedly negative, but she gets right sided headaches. She was never put on high dose steroid with a taper in the past per PCP office.  -Repeat ESR/CRP elevated, though non-specific and likely to be elevated in setting of infections  -S/p additional prednisone 20mg x 1 on 8/15 for ?temporal arteritis HA with temporary improvement.   -Trigeminal neuralgia is on the differential given right HA associated with maxillary/jaw pain.   - Maintain O2 sats between 88-92%

## 2019-08-18 NOTE — CONSULT NOTE ADULT - SUBJECTIVE AND OBJECTIVE BOX
HPI:  85F w/ PMH of HTN, HLD, DM2, ?PD, ?memory loss, AS s/p TAVR on Plavix, PMR who was initially admitted for hypercarbic RF in setting of COPD exacerbation 2/2 PNA. Patient also noted to have dysarthria and hypophonia for the past year, and dysphagia for the past couple weeks. Neurology consulted for the dysphagia.     Patient w/ previous history of 3 endoscopies which failed to demonstrate cause of dysphagia. GI consulted in house who rec not to do an additional endoscopy, but to obtain records from [facility]. Patient failed MBS study here. ENT consulted who performed Laryngoscopy which demonstrated R. vocal cord paralysis, w/ CT neck negative for any structural etiology.     Patient evaluated at bedside. Speaking in full, fluent sentences thought appers hypophonic. Follows commands. Denies any visual changes, hearing changes, numbness/tingling, weakness, imbalance.     A 10-system ROS was performed and is negative except for those items noted above and/or in the HPI.    SOCIAL HISTORY:   No smoke, drink, drugs     MEDICATIONS:   Home Medications:  atorvastatin 40 mg oral tablet (08-11)  balsalazide 750 mg oral capsule (08-11)  clopidogrel 75 mg oral tablet (08-11)  DULoxetine 60 mg oral delayed release capsule (08-11)  gabapentin 300 mg oral tablet (08-11)  Lasix 20 mg oral tablet (08-11)  Lopressor (08-11)  losartan 50 mg oral tablet (08-11)  pantoprazole 20 mg oral delayed release tablet (08-11)  predniSONE 5 mg oral tablet (08-11)  Sinemet 25 mg-100 mg oral tablet (08-11)  Xanax 0.5 mg oral tablet (08-11)    VITALS & EXAMINATION:  T(F): 98.5  HR: 70  BP: 151/90  RR: 18  SpO2: 96%    Physical Exam:   General appearance: No acute distress    Cardiac: RRR. No carotid bruits.   Pulm: CTAB              Neurologic:  - MS:  A&O to herself, [facility], August 2019. Names watch, badge, pen. Fluent, comprehensive, w/ intact repitition. Hypophonic and dysarthric speech.   - CN II-XII:  VFF, Conjugate Horizontal Gaze limitation on Rightward gaze on  PERRLA, V1-V3 intact, no facial asymmetry, t/p midline, SCM/trap intact.  - Motor:  Strength is 5/5 bilaterally throughout.  Normal muscle bulk and tone throughout. No myoclonus or tremor.  - Reflexes:  2+ and symmetric bilaterally at the biceps, triceps, brachioradialis, knees, and ankles.  Plantar responses flexor.  - Sensory:  Intact to light touch, pin prick, vibration, and joint-position sense throughout.  - Coordination:  Finger-nose-finger and heel-knee-shin intact without dysmetria.  Rapid alternating hand movements intact.  - Gait:   Deferred     LABS:  08-17 Na 144  08-17 K 4.2  08-17 P 4.6<H>  08-17 Mg 2.7<H>  08-17 Ca 10.6<H>  08-17 Cr 0.96    08-17 WBC 12.00  08-17 HgB 13.9; Hct 45.2; MCV 90.0  08-17 Plt 428    08-17 PT 14.8   08-17 INR 1.29     08-17 aPTT 58.0     08-12 [id number] 6.0    IMAGING: HPI:  85F w/ PMH of HTN, HLD, DM2, ?PD, ?memory loss, AS s/p TAVR on Plavix, PMR who was initially admitted for hypercarbic RF in setting of COPD exacerbation 2/2 PNA. Patient also noted to have dysarthria and hypophonia for the past year, and dysphagia for the past couple weeks. Neurology consulted for the dysphagia.     Patient w/ previous history of 3 endoscopies which failed to demonstrate cause of dysphagia. GI consulted in house who rec not to do an additional endoscopy, but to obtain records from [facility]. Patient failed MBS study here. ENT consulted who performed Laryngoscopy which demonstrated R. vocal cord paralysis, w/ CT neck negative for any structural etiology.     Patient evaluated at bedside. Speaking in full, fluent sentences thought appers hypophonic. Follows commands. Denies any visual changes, hearing changes, numbness/tingling, weakness, imbalance.     A 10-system ROS was performed and is negative except for those items noted above and/or in the HPI.    SOCIAL HISTORY:   No smoke, drink, drugs     MEDICATIONS:   Home Medications:  atorvastatin 40 mg oral tablet (08-11)  balsalazide 750 mg oral capsule (08-11)  clopidogrel 75 mg oral tablet (08-11)  DULoxetine 60 mg oral delayed release capsule (08-11)  gabapentin 300 mg oral tablet (08-11)  Lasix 20 mg oral tablet (08-11)  Lopressor (08-11)  losartan 50 mg oral tablet (08-11)  pantoprazole 20 mg oral delayed release tablet (08-11)  predniSONE 5 mg oral tablet (08-11)  Sinemet 25 mg-100 mg oral tablet (08-11)  Xanax 0.5 mg oral tablet (08-11)    VITALS & EXAMINATION:  T(F): 98.5  HR: 70  BP: 151/90  RR: 18  SpO2: 96%    Physical Exam:   General appearance: No acute distress    Cardiac: RRR. No carotid bruits.   Pulm: CTAB              Neurologic:  - MS:  A&O to herself, [facility], August 2019. Names watch, badge, pen. Fluent, comprehensive, w/ intact repitition. Hypophonic and dysarthric speech.   - CN II-XII:  VFF, Conjugate Horizontal Gaze limitation on Rightward gaze on  PERRLA, V1-V3 intact, no facial asymmetry, t/p midline, SCM/trap intact.  - Motor:  Strength is 5/5 bilaterally throughout.  Normal muscle bulk and tone throughout. No myoclonus or tremor.  - Reflexes:  2+ and symmetric bilaterally at the biceps, triceps, brachioradialis, knees, and ankles.  Plantar responses flexor.  - Sensory:  Intact to light touch, pin prick, vibration, and joint-position sense throughout.  - Coordination:  Finger-nose-finger and heel-knee-shin intact without dysmetria.  Rapid alternating hand movements intact.  - Gait:   Deferred     LABS:  08-17 Na 144  08-17 K 4.2  08-17 P 4.6<H>  08-17 Mg 2.7<H>  08-17 Ca 10.6<H>  08-17 Cr 0.96    08-17 WBC 12.00  08-17 HgB 13.9; Hct 45.2; MCV 90.0  08-17 Plt 428    08-17 PT 14.8   08-17 INR 1.29     08-17 aPTT 58.0     08-12 [id number] 6.0    IMAGING:   CTH demonstrates lacunar infarct in body of R. Caudate nucleus and small chronic appearing infarct in right cerebellar hemisphere.

## 2019-08-18 NOTE — OCCUPATIONAL THERAPY INITIAL EVALUATION ADULT - PRECAUTIONS/LIMITATIONS, REHAB EVAL
Per son, pt has had multiple UTIs. She had dysuria 2 wks ago, was empirically treated with macrobid (8 days ago). Pt was febrile at home prior to coming and also in ED. Pt later denied abdominal pain and stated she came to ED for SOB. Pt not on oxygen at home, but now requiring 1.5 L NC. Pt's son notes she had R sided headache for 1 wk. ESR last week was wnl and prednisone dose was further decreased. This week, ESR was elevated again. Pt's PCP was c/f GCA and prednisone dose was increased to 10mg qD.Pt states headache is sharp and stabbing, lasts 5 min and has improved since increasing prednisone. Pt has decreased vision on R eye d/t macular degeneration. No further worsening in vision. She had temporal artery bx in 04/2019, which was negative for GCA./fall precautions

## 2019-08-18 NOTE — PROGRESS NOTE ADULT - PROBLEM SELECTOR PLAN 10
-Patient not on home anti-hyperglycemics  - FS acceptable  - c/w Insulin sliding scale  Oral thrush:  -nystatin suspension  -ENT eval appreciated; c/w fluconazole and CT neck--vocal cord paralysis, also concerning for esophagitis/mass/zenker's diverticulum.   Constipation  -Patient with large stool burden on imaging, but no SBO  -c/w Miralax, colace, senna  -suppository as needed  -continue to monitor    Hypernaremia  -start free water 100cc via NG tube TID    Prophylaxis:  DVT ppx: on heparin gtt to coumadin bridge  Diet: NPO. s/p NG tube placement. Feeds started  Dispo: PT recs subacute rehab -Patient not on home anti-hyperglycemics  - FS acceptable  - c/w Insulin sliding scale    Oral thrush:  -nystatin suspension  -ENT eval appreciated; c/w fluconazole and CT neck--vocal cord paralysis, also concerning for esophagitis/mass/zenker's diverticulum.     Constipation  -Patient with large stool burden on imaging, but no SBO  -c/w Miralax, colace, senna  -continue to monitor    Hypernaremia  -start free water 200cc via NG tube TID    Prophylaxis:  DVT ppx: on heparin gtt, holding coumadin bridge now  Diet: NPO. s/p NG tube placement. c/w Feeds per nutrition recs  Dispo: PT recs subacute rehab -Patient not on home anti-hyperglycemics  - FS acceptable  - c/w Insulin sliding scale    Oral thrush:  -nystatin suspension  -ENT eval appreciated; c/w fluconazole and CT neck--vocal cord paralysis, also concerning for esophagitis/mass/zenker's diverticulum.     Constipation  -Patient with large stool burden on imaging, but no SBO  -c/w Miralax, colace, senna  -continue to monitor    Hypernaremia  -start free water 200cc via NG tube TID    Prophylaxis:  DVT ppx: on heparin gtt, holding coumadin bridge now  Diet: NPO with NG tube feed per nutrition recs  Dispo: PT recs subacute rehab

## 2019-08-18 NOTE — OCCUPATIONAL THERAPY INITIAL EVALUATION ADULT - BALANCE TRAINING, PT EVAL
Goal: GOAL: Patient will increase static/dynamic standing balance by 1 grade to facilitate increased ability to perform ADLs and functional mobility

## 2019-08-18 NOTE — PROGRESS NOTE ADULT - PROBLEM SELECTOR PLAN 7
- stable at 130-160s/70s-80s  - c/w losartan 25mg QD (home dose 50mg QD)   - c/w amlodipine 5mg QD  - c/w metoprolol for rate control and BP  - continue to monitor.   - Vitals Q4H. - stable at 130-150s/80-90  - c/w losartan 25mg QD (home dose 50mg QD), can uptitrate to home dose if continue to be high  - c/w amlodipine 5mg QD  - c/w metoprolol for rate control and BP  - continue to monitor.   - Vitals Q4H.

## 2019-08-18 NOTE — CONSULT NOTE ADULT - ATTENDING COMMENTS
VASCULAR NEUROLOGY ATTENDING  The patient is seen and examined the history and imaging are reviewed. I agree with the resident note unless otherwise noted. Given CT head findings of R PICA infarct it is certainly possible that patient may have had a concurrent medullary infarct explaining her dysarthria/dysphagia and vocal cord paralysis. Would obtain MRI/A brain if patient is able although unlikely to . Would continue AC. Supportive care. Outpatient neurology follow-up.

## 2019-08-19 LAB
ANION GAP SERPL CALC-SCNC: 17 MMOL/L — SIGNIFICANT CHANGE UP (ref 5–17)
APTT BLD: 118.7 SEC — HIGH (ref 27.5–36.3)
APTT BLD: 124.5 SEC — CRITICAL HIGH (ref 27.5–36.3)
APTT BLD: 95.7 SEC — HIGH (ref 27.5–36.3)
BUN SERPL-MCNC: 61 MG/DL — HIGH (ref 7–23)
CALCIUM SERPL-MCNC: 10.2 MG/DL — SIGNIFICANT CHANGE UP (ref 8.4–10.5)
CHLORIDE SERPL-SCNC: 103 MMOL/L — SIGNIFICANT CHANGE UP (ref 96–108)
CO2 SERPL-SCNC: 24 MMOL/L — SIGNIFICANT CHANGE UP (ref 22–31)
CREAT SERPL-MCNC: 1.01 MG/DL — SIGNIFICANT CHANGE UP (ref 0.5–1.3)
GLUCOSE SERPL-MCNC: 162 MG/DL — HIGH (ref 70–99)
HCT VFR BLD CALC: 43.7 % — SIGNIFICANT CHANGE UP (ref 34.5–45)
HGB BLD-MCNC: 14.1 G/DL — SIGNIFICANT CHANGE UP (ref 11.5–15.5)
MCHC RBC-ENTMCNC: 29.1 PG — SIGNIFICANT CHANGE UP (ref 27–34)
MCHC RBC-ENTMCNC: 32.3 GM/DL — SIGNIFICANT CHANGE UP (ref 32–36)
MCV RBC AUTO: 90.1 FL — SIGNIFICANT CHANGE UP (ref 80–100)
PLATELET # BLD AUTO: 408 K/UL — HIGH (ref 150–400)
POTASSIUM SERPL-MCNC: 3.6 MMOL/L — SIGNIFICANT CHANGE UP (ref 3.5–5.3)
POTASSIUM SERPL-SCNC: 3.6 MMOL/L — SIGNIFICANT CHANGE UP (ref 3.5–5.3)
RBC # BLD: 4.85 M/UL — SIGNIFICANT CHANGE UP (ref 3.8–5.2)
RBC # FLD: 16.3 % — HIGH (ref 10.3–14.5)
SODIUM SERPL-SCNC: 144 MMOL/L — SIGNIFICANT CHANGE UP (ref 135–145)
WBC # BLD: 16.2 K/UL — HIGH (ref 3.8–10.5)
WBC # FLD AUTO: 16.2 K/UL — HIGH (ref 3.8–10.5)

## 2019-08-19 PROCEDURE — 99233 SBSQ HOSP IP/OBS HIGH 50: CPT | Mod: GC

## 2019-08-19 RX ORDER — LOSARTAN POTASSIUM 100 MG/1
50 TABLET, FILM COATED ORAL DAILY
Refills: 0 | Status: DISCONTINUED | OUTPATIENT
Start: 2019-08-20 | End: 2019-08-30

## 2019-08-19 RX ADMIN — Medication 37.5 MILLIGRAM(S): at 05:09

## 2019-08-19 RX ADMIN — Medication 500000 UNIT(S): at 05:10

## 2019-08-19 RX ADMIN — Medication 650 MILLIGRAM(S): at 05:45

## 2019-08-19 RX ADMIN — SENNA PLUS 2 TABLET(S): 8.6 TABLET ORAL at 23:25

## 2019-08-19 RX ADMIN — Medication 200 MILLIGRAM(S): at 13:42

## 2019-08-19 RX ADMIN — Medication 100 MILLIGRAM(S): at 17:33

## 2019-08-19 RX ADMIN — Medication 5 MILLIGRAM(S): at 23:25

## 2019-08-19 RX ADMIN — Medication 20 MILLIGRAM(S): at 05:09

## 2019-08-19 RX ADMIN — Medication 1: at 18:46

## 2019-08-19 RX ADMIN — Medication 100 MILLIGRAM(S): at 23:26

## 2019-08-19 RX ADMIN — Medication 0.25 MILLIGRAM(S): at 11:42

## 2019-08-19 RX ADMIN — Medication 100 MILLIGRAM(S): at 13:48

## 2019-08-19 RX ADMIN — Medication 200 MILLIGRAM(S): at 23:26

## 2019-08-19 RX ADMIN — PANTOPRAZOLE SODIUM 40 MILLIGRAM(S): 20 TABLET, DELAYED RELEASE ORAL at 13:42

## 2019-08-19 RX ADMIN — HEPARIN SODIUM 700 UNIT(S)/HR: 5000 INJECTION INTRAVENOUS; SUBCUTANEOUS at 10:55

## 2019-08-19 RX ADMIN — GABAPENTIN 300 MILLIGRAM(S): 400 CAPSULE ORAL at 23:24

## 2019-08-19 RX ADMIN — Medication 1: at 12:34

## 2019-08-19 RX ADMIN — Medication 650 MILLIGRAM(S): at 05:15

## 2019-08-19 RX ADMIN — Medication 25 MILLIGRAM(S): at 05:09

## 2019-08-19 RX ADMIN — HEPARIN SODIUM 700 UNIT(S)/HR: 5000 INJECTION INTRAVENOUS; SUBCUTANEOUS at 02:26

## 2019-08-19 RX ADMIN — Medication 10 MILLIGRAM(S): at 05:09

## 2019-08-19 RX ADMIN — Medication 25 MILLIGRAM(S): at 17:33

## 2019-08-19 RX ADMIN — HEPARIN SODIUM 600 UNIT(S)/HR: 5000 INJECTION INTRAVENOUS; SUBCUTANEOUS at 17:33

## 2019-08-19 RX ADMIN — Medication 200 MILLIGRAM(S): at 05:09

## 2019-08-19 RX ADMIN — Medication 100 MILLIGRAM(S): at 05:09

## 2019-08-19 RX ADMIN — POLYETHYLENE GLYCOL 3350 17 GRAM(S): 17 POWDER, FOR SOLUTION ORAL at 13:43

## 2019-08-19 RX ADMIN — Medication 37.5 MILLIGRAM(S): at 17:33

## 2019-08-19 RX ADMIN — Medication 500000 UNIT(S): at 00:34

## 2019-08-19 RX ADMIN — FLUCONAZOLE 50 MILLIGRAM(S): 150 TABLET ORAL at 21:19

## 2019-08-19 RX ADMIN — AMLODIPINE BESYLATE 5 MILLIGRAM(S): 2.5 TABLET ORAL at 05:09

## 2019-08-19 RX ADMIN — Medication 500000 UNIT(S): at 23:25

## 2019-08-19 RX ADMIN — Medication 650 MILLIGRAM(S): at 23:55

## 2019-08-19 RX ADMIN — CARBIDOPA AND LEVODOPA 2 TABLET(S): 25; 100 TABLET ORAL at 23:25

## 2019-08-19 RX ADMIN — Medication 200 MILLIGRAM(S): at 17:33

## 2019-08-19 RX ADMIN — LOSARTAN POTASSIUM 25 MILLIGRAM(S): 100 TABLET, FILM COATED ORAL at 05:10

## 2019-08-19 RX ADMIN — Medication 0.25 MILLIGRAM(S): at 23:25

## 2019-08-19 RX ADMIN — CARBIDOPA AND LEVODOPA 2 TABLET(S): 25; 100 TABLET ORAL at 05:09

## 2019-08-19 RX ADMIN — CARBIDOPA AND LEVODOPA 2 TABLET(S): 25; 100 TABLET ORAL at 13:42

## 2019-08-19 RX ADMIN — Medication 500000 UNIT(S): at 13:43

## 2019-08-19 RX ADMIN — Medication 100 MILLIGRAM(S): at 00:32

## 2019-08-19 RX ADMIN — Medication 500000 UNIT(S): at 17:33

## 2019-08-19 RX ADMIN — Medication 100 MILLIGRAM(S): at 05:12

## 2019-08-19 RX ADMIN — Medication 1: at 00:33

## 2019-08-19 NOTE — PROGRESS NOTE ADULT - SUBJECTIVE AND OBJECTIVE BOX
Patient seen and examined at bedside.    Overnight Events:       REVIEW OF SYSTEMS:  Constitutional:     [ ] negative [ ] fevers [ ] chills [ ] weight loss [ ] weight gain  HEENT:                  [ ] negative [ ] dry eyes [ ] eye irritation [ ] postnasal drip [ ] nasal congestion  CV:                         [ ] negative  [ ] chest pain [ ] orthopnea [ ] palpitations [ ] murmur  Resp:                     [ ] negative [ ] cough [ ] shortness of breath [ ] dyspnea [ ] wheezing [ ] sputum [ ]hemoptysis  GI:                          [ ] negative [ ] nausea [ ] vomiting [ ] diarrhea [ ] constipation [ ] abd pain [ ] dysphagia   :                        [ ] negative [ ] dysuria [ ] nocturia [ ] hematuria [ ] increased urinary frequency  Musculoskeletal: [ ] negative [ ] back pain [ ] myalgias [ ] arthralgias [ ] fracture  Skin:                       [ ] negative [ ] rash [ ] itch  Neurological:        [ ] negative [ ] headache [ ] dizziness [ ] syncope [ ] weakness [ ] numbness  Psychiatric:           [ ] negative [ ] anxiety [ ] depression  Endocrine:            [ ] negative [ ] diabetes [ ] thyroid problem  Heme/Lymph:      [ ] negative [ ] anemia [ ] bleeding problem  Allergic/Immune: [ ] negative [ ] itchy eyes [ ] nasal discharge [ ] hives [ ] angioedema    [ ] All other systems negative  [ ] Unable to assess ROS due to    Current Meds:  acetaminophen    Suspension .. 650 milliGRAM(s) Oral every 6 hours PRN  ALPRAZolam 0.5 milliGRAM(s) Oral two times a day PRN  amLODIPine   Tablet 5 milliGRAM(s) Oral daily  atorvastatin 40 milliGRAM(s) Oral <User Schedule>  buDESOnide    Inhalation Suspension 0.25 milliGRAM(s) Inhalation two times a day  carbidopa/levodopa  25/100 2 Tablet(s) Oral three times a day  dextrose 40% Gel 15 Gram(s) Oral once PRN  dextrose 5%. 1000 milliLiter(s) IV Continuous <Continuous>  dextrose 50% Injectable 12.5 Gram(s) IV Push once  dextrose 50% Injectable 25 Gram(s) IV Push once  dextrose 50% Injectable 25 Gram(s) IV Push once  docusate sodium Liquid 100 milliGRAM(s) Oral two times a day  fluconAZOLE IVPB      fluconAZOLE IVPB 100 milliGRAM(s) IV Intermittent every 24 hours  furosemide    Tablet 20 milliGRAM(s) Oral daily  gabapentin   Solution 300 milliGRAM(s) Oral at bedtime  glucagon  Injectable 1 milliGRAM(s) IntraMuscular once PRN  guaiFENesin    Syrup 200 milliGRAM(s) Oral every 6 hours  heparin  Infusion.  Unit(s)/Hr IV Continuous <Continuous>  heparin  Injectable 5000 Unit(s) IV Push every 6 hours PRN  heparin  Injectable 2500 Unit(s) IV Push every 6 hours PRN  insulin lispro (HumaLOG) corrective regimen sliding scale   SubCutaneous every 6 hours  losartan 25 milliGRAM(s) Oral daily  melatonin 5 milliGRAM(s) Oral at bedtime  metoprolol tartrate 25 milliGRAM(s) Oral two times a day  metroNIDAZOLE  IVPB      metroNIDAZOLE  IVPB 500 milliGRAM(s) IV Intermittent every 8 hours  nystatin    Suspension 303198 Unit(s) Oral four times a day  pantoprazole  Injectable 40 milliGRAM(s) IV Push daily  polyethylene glycol 3350 17 Gram(s) Oral daily  predniSONE   Tablet 10 milliGRAM(s) Oral daily  senna 2 Tablet(s) Oral at bedtime  venlafaxine 37.5 milliGRAM(s) Oral every 12 hours      PAST MEDICAL & SURGICAL HISTORY:  Insomnia  Iron deficiency anemia  Anxiety  Parkinson disease  Aortic stenosis  Neuropathy  Polymyalgia rheumatica  Diabetes  Hyperlipidemia  Hypertension  S/P TAVR (transcatheter aortic valve replacement)      Vitals:  T(F): 98.8 (08-19), Max: 98.8 (08-19)  HR: 79 (08-19) (74 - 81)  BP: 158/84 (08-19) (141/90 - 183/82)  RR: 17 (08-19)  SpO2: 96% (08-19)  I&O's Summary    18 Aug 2019 07:01  -  19 Aug 2019 07:00  --------------------------------------------------------  IN: 1469 mL / OUT: 800 mL / NET: 669 mL        Physical Exam:  Appearance: No acute distress; well appearing  Eyes: PERRL, EOMI, pink conjunctiva  HENT: Normal oral mucosa  Cardiovascular: RRR, S1, S2, no murmurs, rubs, or gallops; no edema; no JVD  Respiratory: Clear to auscultation bilaterally  Gastrointestinal: soft, non-tender, non-distended with normal bowel sounds  Musculoskeletal: No clubbing; no joint deformity   Neurologic: Non-focal  Lymphatic: No lymphadenopathy  Psychiatry: AAOx3, mood & affect appropriate  Skin: No rashes, ecchymoses, or cyanosis                          14.1   16.20 )-----------( 408      ( 19 Aug 2019 11:40 )             43.7     08-19    144  |  103  |  61<H>  ----------------------------<  162<H>  3.6   |  24  |  1.01    Ca    10.2      19 Aug 2019 08:45  Phos  4.3     08-18  Mg     2.7     08-18    TPro  7.9  /  Alb  4.4  /  TBili  0.3  /  DBili  x   /  AST  20  /  ALT  6<L>  /  AlkPhos  50  08-18    PT/INR - ( 18 Aug 2019 08:04 )   PT: 19.5 sec;   INR: 1.67 ratio         PTT - ( 19 Aug 2019 08:45 )  PTT:95.7 sec        New ECG(s): Personally reviewed    Echo:  < from: TTE with Doppler (w/Cont) (08.12.19 @ 18:25) >  Technically difficult study.  Endocardial visualization  enhanced with intravenous injection of Ultrasonic Enhancing  Agent (Definity).  1. Moderately dilated left atrium.  2. Hyperdynamic left ventricular systolic function. Left  ventricular ejection fraction >70%. No wall motion  abnormality.  3. Elevated left atrial pressure=26mmHG.  4. Normal right atrium.  5. Normal right ventricular size and function.  6. Transcatheter aortic valve replacement. Well seated  valve. Acceptable gradients. No aortic regurgitation.  7. Severe mitral stenosis.  8. Normal pericardium with no pericardial effusion.    < end of copied text >      Imaging:  < from: CT Chest No Cont (08.16.19 @ 18:17) >  No pneumonia.  Moderate hiatal hernia.    < end of copied text >    Interpretation of Telemetry: Patient seen and examined at bedside.    Overnight Events: Pt states that she has a head ache. Otherwise stats that she feels well. No chest pain.       REVIEW OF SYSTEMS:  Constitutional:     [ ] negative [ ] fevers [ ] chills [ ] weight loss [ ] weight gain  HEENT:                  [ ] negative [ ] dry eyes [ ] eye irritation [ ] postnasal drip [ ] nasal congestion  CV:                         [ ] negative  [ ] chest pain [ ] orthopnea [ ] palpitations [ ] murmur  Resp:                     [ ] negative [ ] cough [ ] shortness of breath [ ] dyspnea [ ] wheezing [ ] sputum [ ]hemoptysis  GI:                          [ ] negative [ ] nausea [ ] vomiting [ ] diarrhea [ ] constipation [ ] abd pain [ ] dysphagia   :                        [ ] negative [ ] dysuria [ ] nocturia [ ] hematuria [ ] increased urinary frequency  Musculoskeletal: [ ] negative [ ] back pain [ ] myalgias [ ] arthralgias [ ] fracture  Skin:                       [ ] negative [ ] rash [ ] itch  Neurological:        [ ] negative [ ] headache [ ] dizziness [ ] syncope [ ] weakness [ ] numbness  Psychiatric:           [ ] negative [ ] anxiety [ ] depression  Endocrine:            [ ] negative [ ] diabetes [ ] thyroid problem  Heme/Lymph:      [ ] negative [ ] anemia [ ] bleeding problem  Allergic/Immune: [ ] negative [ ] itchy eyes [ ] nasal discharge [ ] hives [ ] angioedema    [x ] All other systems negative  [ ] Unable to assess ROS due to    Current Meds:  acetaminophen    Suspension .. 650 milliGRAM(s) Oral every 6 hours PRN  ALPRAZolam 0.5 milliGRAM(s) Oral two times a day PRN  amLODIPine   Tablet 5 milliGRAM(s) Oral daily  atorvastatin 40 milliGRAM(s) Oral <User Schedule>  buDESOnide    Inhalation Suspension 0.25 milliGRAM(s) Inhalation two times a day  carbidopa/levodopa  25/100 2 Tablet(s) Oral three times a day  dextrose 40% Gel 15 Gram(s) Oral once PRN  dextrose 5%. 1000 milliLiter(s) IV Continuous <Continuous>  dextrose 50% Injectable 12.5 Gram(s) IV Push once  dextrose 50% Injectable 25 Gram(s) IV Push once  dextrose 50% Injectable 25 Gram(s) IV Push once  docusate sodium Liquid 100 milliGRAM(s) Oral two times a day  fluconAZOLE IVPB      fluconAZOLE IVPB 100 milliGRAM(s) IV Intermittent every 24 hours  furosemide    Tablet 20 milliGRAM(s) Oral daily  gabapentin   Solution 300 milliGRAM(s) Oral at bedtime  glucagon  Injectable 1 milliGRAM(s) IntraMuscular once PRN  guaiFENesin    Syrup 200 milliGRAM(s) Oral every 6 hours  heparin  Infusion.  Unit(s)/Hr IV Continuous <Continuous>  heparin  Injectable 5000 Unit(s) IV Push every 6 hours PRN  heparin  Injectable 2500 Unit(s) IV Push every 6 hours PRN  insulin lispro (HumaLOG) corrective regimen sliding scale   SubCutaneous every 6 hours  losartan 25 milliGRAM(s) Oral daily  melatonin 5 milliGRAM(s) Oral at bedtime  metoprolol tartrate 25 milliGRAM(s) Oral two times a day  metroNIDAZOLE  IVPB      metroNIDAZOLE  IVPB 500 milliGRAM(s) IV Intermittent every 8 hours  nystatin    Suspension 817321 Unit(s) Oral four times a day  pantoprazole  Injectable 40 milliGRAM(s) IV Push daily  polyethylene glycol 3350 17 Gram(s) Oral daily  predniSONE   Tablet 10 milliGRAM(s) Oral daily  senna 2 Tablet(s) Oral at bedtime  venlafaxine 37.5 milliGRAM(s) Oral every 12 hours      PAST MEDICAL & SURGICAL HISTORY:  Insomnia  Iron deficiency anemia  Anxiety  Parkinson disease  Aortic stenosis  Neuropathy  Polymyalgia rheumatica  Diabetes  Hyperlipidemia  Hypertension  S/P TAVR (transcatheter aortic valve replacement)      Vitals:  T(F): 98.8 (08-19), Max: 98.8 (08-19)  HR: 79 (08-19) (74 - 81)  BP: 158/84 (08-19) (141/90 - 183/82)  RR: 17 (08-19)  SpO2: 96% (08-19)  I&O's Summary    18 Aug 2019 07:01  -  19 Aug 2019 07:00  --------------------------------------------------------  IN: 1469 mL / OUT: 800 mL / NET: 669 mL        Physical Exam:  Appearance: No acute distress; well appearing  Eyes: PERRL, EOMI, pink conjunctiva  HENT: NGT in place.   Cardiovascular: RRR, S1, S2, no murmurs, rubs, or gallops; no edema; no JVD  Respiratory: b/l exp wheezing   Gastrointestinal: soft, non-tender, non-distended with normal bowel sounds  Musculoskeletal: No clubbing; no joint deformity   Neurologic: Non-focal  Lymphatic: No lymphadenopathy  Psychiatry: AAOx3, mood & affect appropriate  Skin: No rashes, ecchymoses, or cyanosis                          14.1   16.20 )-----------( 408      ( 19 Aug 2019 11:40 )             43.7     08-19    144  |  103  |  61<H>  ----------------------------<  162<H>  3.6   |  24  |  1.01    Ca    10.2      19 Aug 2019 08:45  Phos  4.3     08-18  Mg     2.7     08-18    TPro  7.9  /  Alb  4.4  /  TBili  0.3  /  DBili  x   /  AST  20  /  ALT  6<L>  /  AlkPhos  50  08-18    PT/INR - ( 18 Aug 2019 08:04 )   PT: 19.5 sec;   INR: 1.67 ratio         PTT - ( 19 Aug 2019 08:45 )  PTT:95.7 sec        New ECG(s): Personally reviewed    Echo:  < from: TTE with Doppler (w/Cont) (08.12.19 @ 18:25) >  Technically difficult study.  Endocardial visualization  enhanced with intravenous injection of Ultrasonic Enhancing  Agent (Definity).  1. Moderately dilated left atrium.  2. Hyperdynamic left ventricular systolic function. Left  ventricular ejection fraction >70%. No wall motion  abnormality.  3. Elevated left atrial pressure=26mmHG.  4. Normal right atrium.  5. Normal right ventricular size and function.  6. Transcatheter aortic valve replacement. Well seated  valve. Acceptable gradients. No aortic regurgitation.  7. Severe mitral stenosis.  8. Normal pericardium with no pericardial effusion.    < end of copied text >      Imaging:  < from: CT Chest No Cont (08.16.19 @ 18:17) >  No pneumonia.  Moderate hiatal hernia.    < end of copied text >    Interpretation of Telemetry: Sinus 70-80

## 2019-08-19 NOTE — PROGRESS NOTE ADULT - PROBLEM SELECTOR PLAN 5
- was delirious, now back to baseline  -Melatonin 5mg HS  -C/w home Sinemet for Parkinson-like symptom. Per son, patient never has a diagnosis of parkinson's disease. She was started on sinemet to see if it helps with her leg shaking symptoms  - switch from home duloxetine to venlafaxine given duloxetine cannot be crushed and put via NGT. - was delirious, now back to baseline  -Melatonin 5mg HS  -C/w home Sinemet 50/200 TID for Parkinson-like symptom. Per son, patient never has a diagnosis of parkinson's disease. She was started on sinemet to see if it helps with her leg shaking symptoms  - c/w venlafaxine 37.5 q12 (was on duloxetine at home) given duloxetine cannot be crushed and put via NGT - was delirious, now back to baseline  -Melatonin 5mg HS  -C/w home Sinemet 50/200 TID for Parkinson-like symptom. Per son, patient never has a diagnosis of parkinson's disease. She was started on sinemet to see if it helps with her leg shaking symptoms  - c/w venlafaxine 37.5mg q12h (was on duloxetine at home) given duloxetine cannot be crushed and put via NGT

## 2019-08-19 NOTE — PROGRESS NOTE ADULT - PROBLEM SELECTOR PLAN 8
- UA was positive w/ abd pain, hx of recurrent UTIs  - UCx positive for Klebsiella >100k  - C/w CTX (and flagyl for possible aspiration PNA) - UA was positive w/ abd pain, hx of recurrent UTIs  - UCx positive for Klebsiella >100k, s/p 7-day course of CTX  - resolved

## 2019-08-19 NOTE — PROGRESS NOTE ADULT - PROBLEM SELECTOR PLAN 6
- breathing more comfortably and cough is better now  - Per son, no formal diagnosis of COPD  - c/w home dose prednisone 10mg daily chronically for PMR/?GCA. Right temporal artery biopsy reportedly negative, but she gets right sided headaches. She was never put on high dose steroid with a taper in the past per PCP office.  -Repeat ESR/CRP elevated, though non-specific and likely to be elevated in setting of infections  -S/p additional prednisone 20mg x 1 on 8/15 for ?temporal arteritis HA with temporary improvement.   -Trigeminal neuralgia is on the differential given right HA associated with maxillary/jaw pain.   - Maintain O2 sats between 88-92%

## 2019-08-19 NOTE — PROGRESS NOTE ADULT - PROBLEM SELECTOR PLAN 1
- new onset Afib , likely 2/2 over diuresis vs infection   - converted back to sinus at around 9 am 8/14  - c/w heparin gtt, plavix discontinued  - holding coumadin in anticipation of VC injection on 8/22    - TSH wnl  - c/w metoprolol 25mg BID for rate control per cardiology  - c/w standing lasix 20 mg daily  - f/u cardiology recs  - Repeat ekg unchanged from pior EKG  - Telemetry: sinus 60-80s with PVCs  - Troponins downtrended, likely was due to demand ischemia from Afib with RVR - new onset Afib , likely 2/2 over diuresis vs infection   - converted back to sinus at around 9 am 8/14  - c/w heparin gtt, plavix discontinued  - holding coumadin in anticipation of VC injection on 8/22    - TSH wnl  - c/w metoprolol 25mg BID for rate control per cardiology  - c/w standing lasix 20 mg daily  - f/u cardiology recs  - Repeat ekg unchanged from pior EKG  - Telemetry: yesterday had a burst of PATs up to 180s lasted for 1.7 secs. ON, sinus 60-80s  - Troponins downtrended, likely was due to demand ischemia from Afib with RVR

## 2019-08-19 NOTE — PROGRESS NOTE ADULT - ATTENDING COMMENTS
Patient interviewed and examined. I was physically present for the essential portions of the E/M service provided.  Chart reviewed and note edited where appropriate.  Case discussed with fellow.  Agree w/ Assessment and Plan as outlined.  GIVEN THAT MS IS NON-RHEUMATIC, a NOAC is an acceptable choice.    Anup Meléndez MD [facility]  Spectra:  [phone]  Office: [phone]

## 2019-08-19 NOTE — PROGRESS NOTE ADULT - PROBLEM SELECTOR PLAN 4
- failed speech and swallow study and MBS  - ENT consulted: CT neck w/ con--vocal cord paralysis, also concerns for esophagitis/esophogeal mass/Zenker's diverticulum. Planned to get vocal inject on 8/22. f/u pre-op recs (cardiac clearance and labs) and hold heparin drip 6 hrs prior  - repeat MBS after VC injection. If swallow improve, consider dc NG tube. If dysphagia not improve, need to discuss goal of care and possible PEG tube  -GI recs appreciated; GI recommending  neuro consult to eval for dysphagia; neuro  recs appreciated. Given that imaging of head would not , would defer them at this time  - c/w diflucan  - discussed with son and patient regarding risk and benefits of NG tube for dysphagia and aspiration risk, both are agreeable to NG tube placement for meds and feeds   - Aspiration precautions and Elevate head of bed - failed speech and swallow study and MBS  - ENT consulted: CT neck w/ con--vocal cord paralysis, also concerns for esophagitis/esophogeal mass/Zenker's diverticulum. Planned to get vocal inject on 8/22. f/u pre-op recs (cardiac clearance obtained and labs) and hold heparin drip 6 hrs prior  - repeat MBS after VC injection. If swallow improve, consider dc NG tube. If dysphagia not improve, need to discuss goal of care and possible PEG tube  -GI recs appreciated; GI recommending  neuro consult to eval for dysphagia; neuro  recs appreciated. Given that imaging of head would not , would defer them at this time  - c/w diflucan  - discussed with son and patient regarding risk and benefits of NG tube for dysphagia and aspiration risk, both are agreeable to NG tube placement for meds and feeds   - Aspiration precautions and Elevate head of bed - failed speech and swallow study and MBS  - ENT consulted: CT neck w/ con--vocal cord paralysis, also concerns for esophagitis/esophogeal mass/Zenker's diverticulum. Planned to get vocal inject on 8/22. f/u pre-op recs (cardiac clearance obtained and labs) and hold heparin drip 6 hrs prior  - repeat MBS after VC injection. If swallow improve, consider dc NG tube. If dysphagia does not improve, need to discuss goal of care and possible PEG tube  -GI recs appreciated; GI recommending  neuro consult to eval for dysphagia; neuro  recs appreciated. Will get MRI/MRA head and neck to further eval.   - c/w diflucan for candidiasis.   - discussed with son and patient regarding risk and benefits of NG tube for dysphagia and aspiration risk, both are agreeable to NG tube placement for meds and feeds   - Aspiration precautions and Elevate head of bed

## 2019-08-19 NOTE — PROGRESS NOTE ADULT - PROBLEM SELECTOR PLAN 10
-Patient not on home anti-hyperglycemics  - FS acceptable  - c/w Insulin sliding scale    Oral thrush:  -nystatin suspension  -ENT eval appreciated; c/w fluconazole and CT neck--vocal cord paralysis, also concerning for esophagitis/mass/zenker's diverticulum.     Constipation  -Patient with large stool burden on imaging, but no SBO  -c/w Miralax, colace, senna  -continue to monitor    Hypernaremia  -start free water 200cc via NG tube TID    Prophylaxis:  DVT ppx: on heparin gtt, holding coumadin bridge now  Diet: NPO with NG tube feed per nutrition recs  Dispo: PT recs subacute rehab - Patient not on home anti-hyperglycemics  - FS acceptable  - c/w Insulin sliding scale    Oral thrush:  -nystatin suspension  -ENT eval appreciated; c/w fluconazole and CT neck--vocal cord paralysis, also concerning for esophagitis/mass/zenker's diverticulum.     Constipation  -Patient with large stool burden on imaging, but no SBO  -c/w Miralax, colace, senna  -continue to monitor    Hypernaremia  -resolved on free water 200cc via NG tube TID    Prophylaxis:  DVT ppx: on heparin gtt, holding coumadin bridge now  Diet: NPO with NG tube feed per nutrition recs  Dispo: PT recs subacute rehab - Patient not on home anti-hyperglycemics  - FS acceptable  - c/w Insulin sliding scale    Oral thrush:  -nystatin suspension  -ENT eval appreciated; c/w fluconazole and CT neck--vocal cord paralysis, also concerning for esophagitis/mass/zenker's diverticulum.     Constipation  -Patient with large stool burden on imaging, but no SBO  -c/w Miralax, colace, senna  -continue to monitor    Hypernatremia  -resolved on free water 200cc via NG tube TID    Prophylaxis:  DVT ppx: on heparin gtt, holding coumadin bridge now  Diet: NPO with NG tube feed per nutrition recs  Dispo: PT recs subacute rehab

## 2019-08-19 NOTE — PROGRESS NOTE ADULT - PROBLEM SELECTOR PLAN 9
-TTE --> Normal LF, Mitral stenosis, increased RV pressure and dilated Left atrium, functioning TAVR. Findings c/w valvular heart failure or heart failure with preserved EF  - c/w lasix  - Strict I's/O's and daily weights -TTE --> Normal LF, Mitral stenosis, increased RV pressure and dilated Left atrium, functioning TAVR. Findings c/w valvular heart failure or heart failure with preserved EF  - c/w lasix 20 qd  - Strict I's/O's and daily weights -TTE --> Normal LF, Mitral stenosis, increased RV pressure and dilated Left atrium, functioning TAVR. Findings c/w valvular heart failure or heart failure with preserved EF  - c/w lasix 20mg qd  - Strict I's/O's and daily weights

## 2019-08-19 NOTE — PROGRESS NOTE ADULT - PROBLEM SELECTOR PLAN 2
-With acute hypoxic and hypercarbic respiratory failure  - patient failed speech and swallow and MBS  -c/w flagyl and CTX due to concern for aspiration PNA for anaerobic coverage. Will continue abx tentatively for ~5 days.   -CT chest showing no evidence of pneumonia. Moderate hiatal hernia.  -Taper off NC as tolerated, BIPAP prn for hypercarbia  -RVP negative  - leukocytosis stable.  -Guaifenesin for cough  -encourage to use incentive spirometer  -c/w Pulmicort inhaler  - chest PT - With acute hypoxic and hypercarbic respiratory failure  - patient failed speech and swallow and MBS  -s/p 7-day course of CTX  -c/w 3-day course flagyl due to concern for aspiration PNA for anaerobic coverage (8/18--8/20)  -CT chest showing no evidence of pneumonia. Moderate hiatal hernia.  - RVP negative  - leukocytosis uptrending, possible infection vs steroid use. Continue to monitor  - Guaifenesin for cough  - Encourage to use incentive spirometer  - c/w Pulmicort inhaler  - chest PT

## 2019-08-19 NOTE — PROGRESS NOTE ADULT - ASSESSMENT
84 yo F with HTN, HLD, AS s/p TAVR on Plavix, MS, ?CHF (on Lasix 20 BID at home).  Cardiology consulted for new onset A. fib with RVR.   Likely provoke by current illness with underlying severe mitral stenosis; doubt ischemia as cause.   TTE with evidence of hyperdynamic LV function, severe MS and dilated LA, which may be substrate for developing a. fib.   Patient appears hemodynamically stable.  No report of active chest pain, unable to comment on ST/T changes on ECG due to known LBBB. As patient with dementia, not suitable candidate for catheterization if concern for STEMI/NSTEMI. 86 yo F with HTN, HLD, AS s/p TAVR on Plavix, MS, HFpEF likely 2/2 MS (on Lasix 20 BID at home).  Cardiology consulted for A. fib with RVR initially   Likely which provoked by current illness with underlying severe mitral stenosis. TTE with evidence of hyperdynamic LV function, severe MS and dilated LA, which may be substrate for developing a. fib. She has now converted to sinus, and has been in sinus for several days. Cardiology called back, because pt is having possible vocal cord injections with ENT.     #1. Preop eval  - Pt has Hx of TAVR, but no reported Hx of PCI.   - She does have a history of a-fib that is likely valvular, but is currently in sinus   - She has no signs on physical exam of ADHF.   - She is optimized for her ENT procedure.     #2. Atrial Fib  - Pt had pAF in ED that has been in sinus.   - She has MS, and should ideally be on warfarin for AC  - Her [id number] is 4.   - C/w metoprolol 25 BID    #3. Mitral stenosis   - Pt deos not seem volume overloaded on exam  - C/w lasix and metoprolol     Jean Jung MD  Cardiology Fellow - PGY 5  Text or Call: [phone]  For all New Consults and Questions:  [email or web address]   Login: [email or web address] 86 yo F with HTN, HLD, AS s/p TAVR on Plavix, MS, HFpEF likely 2/2 MS (on Lasix 20 BID at home).  Cardiology consulted for A. fib with RVR initially   Likely which provoked by current illness with underlying severe mitral stenosis. TTE with evidence of hyperdynamic LV function, severe MS and dilated LA, which may be substrate for developing a. fib. She has now converted to sinus, and has been in sinus for several days. Cardiology called back, because pt is having possible vocal cord injections with ENT.     #1. Preop eval  - Pt has Hx of TAVR, but no reported Hx of PCI.   - She does have a history of a-fib that is likely valvular, but is currently in sinus   - She has no signs on physical exam of ADHF.   - She is optimized for her ENT procedure.     #2. Atrial Fib  - Pt had pAF in ED that has been in sinus.   - She has MS, and should ideally be on warfarin for AC  - Her [id number] is 4.   - C/w metoprolol 25 BID    #3. Mitral stenosis   - Pt does not seem volume overloaded on exam  - C/w lasix and metoprolol     Jean Jung MD  Cardiology Fellow - PGY 5  Text or Call: [phone]  For all New Consults and Questions:  [email or web address]   Login: [email or web address]

## 2019-08-19 NOTE — PROGRESS NOTE ADULT - ASSESSMENT
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

## 2019-08-19 NOTE — PROGRESS NOTE ADULT - PROBLEM SELECTOR PLAN 7
- stable at 130-150s/80-90  - c/w losartan 25mg QD (home dose 50mg QD), can uptitrate to home dose if continue to be high  - c/w amlodipine 5mg QD  - c/w metoprolol for rate control and BP  - continue to monitor.   - Vitals Q4H. - stable at 141-180s/80-90s  - uptitrate to home dose 50mg QD  - c/w amlodipine 5mg QD  - c/w metoprolol 25 BID for rate control and BP  - continue to monitor  - Vitals Q4H - stable at 141-180s/80-90s  - uptitrate to home dose 50mg QD of losartan.   - c/w amlodipine 5mg QD  - c/w metoprolol 25 BID for rate control and BP  - continue to monitor  - Vitals Q4H

## 2019-08-19 NOTE — PROGRESS NOTE ADULT - ATTENDING COMMENTS
-Patient seen/examined on 8/19/19. Case/plan discussed with the intern and resident as reviewed/edited by me above and in any comments below.  -Discussed with patient's PMD - Dr. Dumas.   -Discussed with patient's son.

## 2019-08-19 NOTE — INPATIENT CERTIFICATION FOR MEDICARE PATIENTS - RISKS OF ADVERSE EVENTS
Concern for cardiopulmonary deterioration/Concern for neurologic deterioration/Concern for worsening infectious process

## 2019-08-19 NOTE — PROGRESS NOTE ADULT - SUBJECTIVE AND OBJECTIVE BOX
Michaela Mock  Internal Medicine  PGY-1  Pager: [phone]    Patient is a 85y old  Female who presents with a chief complaint of Abdominal pain (18 Aug 2019 06:47)      SUBJECTIVE / OVERNIGHT EVENTS: No acute event ON. Denies HA/CP/SOB/abd pain/n/v/d/fever/chills.    MEDICATIONS  (STANDING):  amLODIPine   Tablet 5 milliGRAM(s) Oral daily  atorvastatin 40 milliGRAM(s) Oral <User Schedule>  buDESOnide    Inhalation Suspension 0.25 milliGRAM(s) Inhalation two times a day  carbidopa/levodopa  25/100 2 Tablet(s) Oral three times a day  dextrose 5%. 1000 milliLiter(s) (50 mL/Hr) IV Continuous <Continuous>  dextrose 50% Injectable 12.5 Gram(s) IV Push once  dextrose 50% Injectable 25 Gram(s) IV Push once  dextrose 50% Injectable 25 Gram(s) IV Push once  docusate sodium Liquid 100 milliGRAM(s) Oral two times a day  fluconAZOLE IVPB      fluconAZOLE IVPB 100 milliGRAM(s) IV Intermittent every 24 hours  furosemide    Tablet 20 milliGRAM(s) Oral daily  gabapentin   Solution 300 milliGRAM(s) Oral at bedtime  guaiFENesin    Syrup 200 milliGRAM(s) Oral every 6 hours  heparin  Infusion.  Unit(s)/Hr (11 mL/Hr) IV Continuous <Continuous>  insulin lispro (HumaLOG) corrective regimen sliding scale   SubCutaneous every 6 hours  losartan 25 milliGRAM(s) Oral daily  melatonin 5 milliGRAM(s) Oral at bedtime  metoprolol tartrate 25 milliGRAM(s) Oral two times a day  metroNIDAZOLE  IVPB      metroNIDAZOLE  IVPB 500 milliGRAM(s) IV Intermittent every 8 hours  nystatin    Suspension 005385 Unit(s) Oral four times a day  pantoprazole  Injectable 40 milliGRAM(s) IV Push daily  polyethylene glycol 3350 17 Gram(s) Oral daily  predniSONE   Tablet 10 milliGRAM(s) Oral daily  senna 2 Tablet(s) Oral at bedtime  venlafaxine 37.5 milliGRAM(s) Oral every 12 hours    MEDICATIONS  (PRN):  acetaminophen    Suspension .. 650 milliGRAM(s) Oral every 6 hours PRN Temp greater or equal to 38C (100.4F), Mild Pain (1 - 3)  ALPRAZolam 0.5 milliGRAM(s) Oral two times a day PRN anxiety  dextrose 40% Gel 15 Gram(s) Oral once PRN Blood Glucose LESS THAN 70 milliGRAM(s)/deciliter  glucagon  Injectable 1 milliGRAM(s) IntraMuscular once PRN Glucose LESS THAN 70 milligrams/deciliter  heparin  Injectable 5000 Unit(s) IV Push every 6 hours PRN For aPTT less than 40  heparin  Injectable 2500 Unit(s) IV Push every 6 hours PRN For aPTT between 40 - 57        OBJECTIVE:    Vital Signs Last 24 Hrs  T(C): 36.8 (19 Aug 2019 04:02), Max: 36.9 (18 Aug 2019 12:20)  T(F): 98.3 (19 Aug 2019 04:02), Max: 98.5 (18 Aug 2019 20:51)  HR: 77 (19 Aug 2019 04:02) (74 - 81)  BP: 163/83 (19 Aug 2019 04:15) (141/90 - 183/82)  BP(mean): --  RR: 18 (19 Aug 2019 04:02) (16 - 18)  SpO2: 96% (19 Aug 2019 04:02) (96% - 98%)    PHYSICAL EXAM:  GENERAL: NAD, well-developed  HEAD:  Atraumatic, Normocephalic  EYES: EOMI, PERRLA, conjunctiva and sclera clear  NECK: Supple, No JVD  CHEST/LUNG: Clear to auscultation bilaterally; No wheeze  HEART: Regular rate and rhythm; No murmurs, rubs, or gallops  ABDOMEN: Soft, Nontender, Nondistended; Bowel sounds present  EXTREMITIES:  2+ Peripheral Pulses, No clubbing, cyanosis, or edema  PSYCH: AAOx3  NEUROLOGY: non-focal  SKIN: No rashes or lesions    CAPILLARY BLOOD GLUCOSE      POCT Blood Glucose.: 141 mg/dL (19 Aug 2019 06:05)  POCT Blood Glucose.: 155 mg/dL (19 Aug 2019 00:31)  POCT Blood Glucose.: 131 mg/dL (18 Aug 2019 18:14)  POCT Blood Glucose.: 139 mg/dL (18 Aug 2019 12:57)    I&O's Summary    18 Aug 2019 07:01  -  19 Aug 2019 07:00  --------------------------------------------------------  IN: 1469 mL / OUT: 800 mL / NET: 669 mL        LABS:                        14.0   13.81 )-----------( 403      ( 18 Aug 2019 08:33 )             44.2     08-18    147<H>  |  106  |  52<H>  ----------------------------<  126<H>  3.9   |  24  |  1.03    Ca    10.1      18 Aug 2019 05:29  Phos  4.3     08-18  Mg     2.7     08-18    TPro  7.9  /  Alb  4.4  /  TBili  0.3  /  DBili  x   /  AST  20  /  ALT  6<L>  /  AlkPhos  50  08-18    PT/INR - ( 18 Aug 2019 08:04 )   PT: 19.5 sec;   INR: 1.67 ratio         PTT - ( 19 Aug 2019 01:27 )  PTT:118.7 sec              RADIOLOGY & ADDITIONAL TESTS: Michaela Mock  Internal Medicine  PGY-1  Pager: [phone]    Patient is a 85y old  Female who presents with a chief complaint of Abdominal pain (18 Aug 2019 06:47)      SUBJECTIVE / OVERNIGHT EVENTS: No acute event ON. Reports having HA yesterday but not currently this am. Had a BM yesterday. Feels that her voice is clearer today. Still has the cough and unable to bring up the phlegm but she feels that the cough is better. Wants something to eat and drink but understands the plan for VC injection and agrees to wait. Denies HA/CP/palpitation/SOB/abd pain/n/v/d/fever/chills.    MEDICATIONS  (STANDING):  amLODIPine   Tablet 5 milliGRAM(s) Oral daily  atorvastatin 40 milliGRAM(s) Oral <User Schedule>  buDESOnide    Inhalation Suspension 0.25 milliGRAM(s) Inhalation two times a day  carbidopa/levodopa  25/100 2 Tablet(s) Oral three times a day  dextrose 5%. 1000 milliLiter(s) (50 mL/Hr) IV Continuous <Continuous>  dextrose 50% Injectable 12.5 Gram(s) IV Push once  dextrose 50% Injectable 25 Gram(s) IV Push once  dextrose 50% Injectable 25 Gram(s) IV Push once  docusate sodium Liquid 100 milliGRAM(s) Oral two times a day  fluconAZOLE IVPB      fluconAZOLE IVPB 100 milliGRAM(s) IV Intermittent every 24 hours  furosemide    Tablet 20 milliGRAM(s) Oral daily  gabapentin   Solution 300 milliGRAM(s) Oral at bedtime  guaiFENesin    Syrup 200 milliGRAM(s) Oral every 6 hours  heparin  Infusion.  Unit(s)/Hr (11 mL/Hr) IV Continuous <Continuous>  insulin lispro (HumaLOG) corrective regimen sliding scale   SubCutaneous every 6 hours  losartan 25 milliGRAM(s) Oral daily  melatonin 5 milliGRAM(s) Oral at bedtime  metoprolol tartrate 25 milliGRAM(s) Oral two times a day  metroNIDAZOLE  IVPB      metroNIDAZOLE  IVPB 500 milliGRAM(s) IV Intermittent every 8 hours  nystatin    Suspension 675938 Unit(s) Oral four times a day  pantoprazole  Injectable 40 milliGRAM(s) IV Push daily  polyethylene glycol 3350 17 Gram(s) Oral daily  predniSONE   Tablet 10 milliGRAM(s) Oral daily  senna 2 Tablet(s) Oral at bedtime  venlafaxine 37.5 milliGRAM(s) Oral every 12 hours    MEDICATIONS  (PRN):  acetaminophen    Suspension .. 650 milliGRAM(s) Oral every 6 hours PRN Temp greater or equal to 38C (100.4F), Mild Pain (1 - 3)  ALPRAZolam 0.5 milliGRAM(s) Oral two times a day PRN anxiety  dextrose 40% Gel 15 Gram(s) Oral once PRN Blood Glucose LESS THAN 70 milliGRAM(s)/deciliter  glucagon  Injectable 1 milliGRAM(s) IntraMuscular once PRN Glucose LESS THAN 70 milligrams/deciliter  heparin  Injectable 5000 Unit(s) IV Push every 6 hours PRN For aPTT less than 40  heparin  Injectable 2500 Unit(s) IV Push every 6 hours PRN For aPTT between 40 - 57        OBJECTIVE:    Vital Signs Last 24 Hrs  T(C): 36.8 (19 Aug 2019 04:02), Max: 36.9 (18 Aug 2019 12:20)  T(F): 98.3 (19 Aug 2019 04:02), Max: 98.5 (18 Aug 2019 20:51)  HR: 77 (19 Aug 2019 04:02) (74 - 81)  BP: 163/83 (19 Aug 2019 04:15) (141/90 - 183/82)  BP(mean): --  RR: 18 (19 Aug 2019 04:02) (16 - 18)  SpO2: 96% (19 Aug 2019 04:02) (96% - 98%)    PHYSICAL EXAM:  GENERAL: NAD, intermittently coughing   HEAD:  Atraumatic, Normocephalic  ENMT: moist mucous membranes, improved white-yellows thrush on tongue  NECK: Supple, No JVD  CHEST/LUNG: diffuse coarse upper airway breath sounds b/l. No accessory muscle use.  HEART: regular rate and rhythm; No murmurs appreciated on exam  ABDOMEN: Soft, non-tender, Nondistended; Bowel sounds present  EXTREMITIES: No clubbing, cyanosis, or peripheral edema   LYMPH: No lymphadenopathy noted  SKIN: No rashes or lesions  NERVOUS SYSTEM: AOx2, conversive and answers questions appropriately.    CAPILLARY BLOOD GLUCOSE      POCT Blood Glucose.: 141 mg/dL (19 Aug 2019 06:05)  POCT Blood Glucose.: 155 mg/dL (19 Aug 2019 00:31)  POCT Blood Glucose.: 131 mg/dL (18 Aug 2019 18:14)  POCT Blood Glucose.: 139 mg/dL (18 Aug 2019 12:57)    I&O's Summary    18 Aug 2019 07:01  -  19 Aug 2019 07:00  --------------------------------------------------------  IN: 1469 mL / OUT: 800 mL / NET: 669 mL        LABS:                        14.0   13.81 )-----------( 403      ( 18 Aug 2019 08:33 )             44.2     08-18    147<H>  |  106  |  52<H>  ----------------------------<  126<H>  3.9   |  24  |  1.03    Ca    10.1      18 Aug 2019 05:29  Phos  4.3     08-18  Mg     2.7     08-18    TPro  7.9  /  Alb  4.4  /  TBili  0.3  /  DBili  x   /  AST  20  /  ALT  6<L>  /  AlkPhos  50  08-18    PT/INR - ( 18 Aug 2019 08:04 )   PT: 19.5 sec;   INR: 1.67 ratio         PTT - ( 19 Aug 2019 01:27 )  PTT:118.7 sec              RADIOLOGY & ADDITIONAL TESTS:

## 2019-08-19 NOTE — PROGRESS NOTE ADULT - PROBLEM SELECTOR PLAN 3
-Severe on TTE  -c/w lasix -Severe on TTE  -c/w standing lasix 20 qd -Severe on TTE  -c/w standing lasix 20 qd  -Outpatient CTS eval.

## 2019-08-20 LAB
ANION GAP SERPL CALC-SCNC: 16 MMOL/L — SIGNIFICANT CHANGE UP (ref 5–17)
APTT BLD: 75 SEC — HIGH (ref 27.5–36.3)
APTT BLD: 81 SEC — HIGH (ref 27.5–36.3)
BUN SERPL-MCNC: 62 MG/DL — HIGH (ref 7–23)
CALCIUM SERPL-MCNC: 9.7 MG/DL — SIGNIFICANT CHANGE UP (ref 8.4–10.5)
CHLORIDE SERPL-SCNC: 104 MMOL/L — SIGNIFICANT CHANGE UP (ref 96–108)
CO2 SERPL-SCNC: 25 MMOL/L — SIGNIFICANT CHANGE UP (ref 22–31)
CREAT SERPL-MCNC: 0.74 MG/DL — SIGNIFICANT CHANGE UP (ref 0.5–1.3)
GLUCOSE SERPL-MCNC: 149 MG/DL — HIGH (ref 70–99)
HCT VFR BLD CALC: 45.4 % — HIGH (ref 34.5–45)
HGB BLD-MCNC: 14.7 G/DL — SIGNIFICANT CHANGE UP (ref 11.5–15.5)
MAGNESIUM SERPL-MCNC: 2.9 MG/DL — HIGH (ref 1.6–2.6)
MCHC RBC-ENTMCNC: 29.5 PG — SIGNIFICANT CHANGE UP (ref 27–34)
MCHC RBC-ENTMCNC: 32.4 GM/DL — SIGNIFICANT CHANGE UP (ref 32–36)
MCV RBC AUTO: 91 FL — SIGNIFICANT CHANGE UP (ref 80–100)
PHOSPHATE SERPL-MCNC: 2.7 MG/DL — SIGNIFICANT CHANGE UP (ref 2.5–4.5)
PLATELET # BLD AUTO: 375 K/UL — SIGNIFICANT CHANGE UP (ref 150–400)
POTASSIUM SERPL-MCNC: 3.9 MMOL/L — SIGNIFICANT CHANGE UP (ref 3.5–5.3)
POTASSIUM SERPL-SCNC: 3.9 MMOL/L — SIGNIFICANT CHANGE UP (ref 3.5–5.3)
RBC # BLD: 4.99 M/UL — SIGNIFICANT CHANGE UP (ref 3.8–5.2)
RBC # FLD: 16.9 % — HIGH (ref 10.3–14.5)
SODIUM SERPL-SCNC: 145 MMOL/L — SIGNIFICANT CHANGE UP (ref 135–145)
WBC # BLD: 17.56 K/UL — HIGH (ref 3.8–10.5)
WBC # FLD AUTO: 17.56 K/UL — HIGH (ref 3.8–10.5)

## 2019-08-20 PROCEDURE — 70551 MRI BRAIN STEM W/O DYE: CPT | Mod: 26

## 2019-08-20 PROCEDURE — 70549 MR ANGIOGRAPH NECK W/O&W/DYE: CPT | Mod: 26

## 2019-08-20 PROCEDURE — 99232 SBSQ HOSP IP/OBS MODERATE 35: CPT | Mod: GC

## 2019-08-20 RX ORDER — ACETAMINOPHEN 500 MG
1000 TABLET ORAL EVERY 6 HOURS
Refills: 0 | Status: DISCONTINUED | OUTPATIENT
Start: 2019-08-20 | End: 2019-08-30

## 2019-08-20 RX ADMIN — Medication 500000 UNIT(S): at 05:28

## 2019-08-20 RX ADMIN — Medication 1000 MILLIGRAM(S): at 23:28

## 2019-08-20 RX ADMIN — PANTOPRAZOLE SODIUM 40 MILLIGRAM(S): 20 TABLET, DELAYED RELEASE ORAL at 12:04

## 2019-08-20 RX ADMIN — Medication 20 MILLIGRAM(S): at 05:28

## 2019-08-20 RX ADMIN — Medication 37.5 MILLIGRAM(S): at 17:51

## 2019-08-20 RX ADMIN — CARBIDOPA AND LEVODOPA 2 TABLET(S): 25; 100 TABLET ORAL at 05:28

## 2019-08-20 RX ADMIN — POLYETHYLENE GLYCOL 3350 17 GRAM(S): 17 POWDER, FOR SOLUTION ORAL at 12:04

## 2019-08-20 RX ADMIN — Medication 0.25 MILLIGRAM(S): at 20:50

## 2019-08-20 RX ADMIN — Medication 25 MILLIGRAM(S): at 17:51

## 2019-08-20 RX ADMIN — Medication 500000 UNIT(S): at 17:50

## 2019-08-20 RX ADMIN — Medication 650 MILLIGRAM(S): at 01:50

## 2019-08-20 RX ADMIN — Medication 5 MILLIGRAM(S): at 21:59

## 2019-08-20 RX ADMIN — CARBIDOPA AND LEVODOPA 2 TABLET(S): 25; 100 TABLET ORAL at 21:59

## 2019-08-20 RX ADMIN — Medication 200 MILLIGRAM(S): at 21:59

## 2019-08-20 RX ADMIN — FLUCONAZOLE 50 MILLIGRAM(S): 150 TABLET ORAL at 20:49

## 2019-08-20 RX ADMIN — Medication 200 MILLIGRAM(S): at 12:05

## 2019-08-20 RX ADMIN — Medication 500000 UNIT(S): at 21:59

## 2019-08-20 RX ADMIN — Medication 0.25 MILLIGRAM(S): at 11:55

## 2019-08-20 RX ADMIN — CARBIDOPA AND LEVODOPA 2 TABLET(S): 25; 100 TABLET ORAL at 13:19

## 2019-08-20 RX ADMIN — Medication 100 MILLIGRAM(S): at 17:43

## 2019-08-20 RX ADMIN — Medication 10 MILLIGRAM(S): at 05:29

## 2019-08-20 RX ADMIN — Medication 500000 UNIT(S): at 12:04

## 2019-08-20 RX ADMIN — GABAPENTIN 300 MILLIGRAM(S): 400 CAPSULE ORAL at 22:00

## 2019-08-20 RX ADMIN — Medication 100 MILLIGRAM(S): at 05:29

## 2019-08-20 RX ADMIN — Medication 1000 MILLIGRAM(S): at 21:58

## 2019-08-20 RX ADMIN — Medication 100 MILLIGRAM(S): at 13:19

## 2019-08-20 RX ADMIN — HEPARIN SODIUM 600 UNIT(S)/HR: 5000 INJECTION INTRAVENOUS; SUBCUTANEOUS at 07:48

## 2019-08-20 RX ADMIN — Medication 100 MILLIGRAM(S): at 21:59

## 2019-08-20 RX ADMIN — LOSARTAN POTASSIUM 50 MILLIGRAM(S): 100 TABLET, FILM COATED ORAL at 05:28

## 2019-08-20 RX ADMIN — Medication 200 MILLIGRAM(S): at 05:28

## 2019-08-20 RX ADMIN — AMLODIPINE BESYLATE 5 MILLIGRAM(S): 2.5 TABLET ORAL at 05:28

## 2019-08-20 RX ADMIN — SENNA PLUS 2 TABLET(S): 8.6 TABLET ORAL at 21:59

## 2019-08-20 RX ADMIN — Medication 37.5 MILLIGRAM(S): at 05:29

## 2019-08-20 RX ADMIN — Medication 200 MILLIGRAM(S): at 17:50

## 2019-08-20 RX ADMIN — HEPARIN SODIUM 600 UNIT(S)/HR: 5000 INJECTION INTRAVENOUS; SUBCUTANEOUS at 00:27

## 2019-08-20 RX ADMIN — Medication 25 MILLIGRAM(S): at 05:28

## 2019-08-20 NOTE — PROGRESS NOTE ADULT - SUBJECTIVE AND OBJECTIVE BOX
Michaela Mock  Internal Medicine  PGY-1  Pager: [phone]    Patient is a 85y old  Female who presents with a chief complaint of Abdominal pain (19 Aug 2019 12:59)      SUBJECTIVE / OVERNIGHT EVENTS: No acute event ON. Denies HA/CP/SOB/abd pain/n/v/d/fever/chills.    MEDICATIONS  (STANDING):  amLODIPine   Tablet 5 milliGRAM(s) Oral daily  atorvastatin 40 milliGRAM(s) Oral <User Schedule>  buDESOnide    Inhalation Suspension 0.25 milliGRAM(s) Inhalation two times a day  carbidopa/levodopa  25/100 2 Tablet(s) Oral three times a day  dextrose 5%. 1000 milliLiter(s) (50 mL/Hr) IV Continuous <Continuous>  dextrose 50% Injectable 12.5 Gram(s) IV Push once  dextrose 50% Injectable 25 Gram(s) IV Push once  dextrose 50% Injectable 25 Gram(s) IV Push once  docusate sodium Liquid 100 milliGRAM(s) Oral two times a day  fluconAZOLE IVPB      fluconAZOLE IVPB 100 milliGRAM(s) IV Intermittent every 24 hours  furosemide    Tablet 20 milliGRAM(s) Oral daily  gabapentin   Solution 300 milliGRAM(s) Oral at bedtime  guaiFENesin    Syrup 200 milliGRAM(s) Oral every 6 hours  heparin  Infusion.  Unit(s)/Hr (11 mL/Hr) IV Continuous <Continuous>  insulin lispro (HumaLOG) corrective regimen sliding scale   SubCutaneous every 6 hours  losartan 50 milliGRAM(s) Oral daily  melatonin 5 milliGRAM(s) Oral at bedtime  metoprolol tartrate 25 milliGRAM(s) Oral two times a day  metroNIDAZOLE  IVPB      metroNIDAZOLE  IVPB 500 milliGRAM(s) IV Intermittent every 8 hours  nystatin    Suspension 517169 Unit(s) Oral four times a day  pantoprazole  Injectable 40 milliGRAM(s) IV Push daily  polyethylene glycol 3350 17 Gram(s) Oral daily  predniSONE   Tablet 10 milliGRAM(s) Oral daily  senna 2 Tablet(s) Oral at bedtime  venlafaxine 37.5 milliGRAM(s) Oral every 12 hours    MEDICATIONS  (PRN):  acetaminophen    Suspension .. 650 milliGRAM(s) Oral every 6 hours PRN Temp greater or equal to 38C (100.4F), Mild Pain (1 - 3)  ALPRAZolam 0.5 milliGRAM(s) Oral two times a day PRN anxiety  dextrose 40% Gel 15 Gram(s) Oral once PRN Blood Glucose LESS THAN 70 milliGRAM(s)/deciliter  glucagon  Injectable 1 milliGRAM(s) IntraMuscular once PRN Glucose LESS THAN 70 milligrams/deciliter  heparin  Injectable 5000 Unit(s) IV Push every 6 hours PRN For aPTT less than 40  heparin  Injectable 2500 Unit(s) IV Push every 6 hours PRN For aPTT between 40 - 57        OBJECTIVE:    Vital Signs Last 24 Hrs  T(C): 36.8 (20 Aug 2019 04:23), Max: 37.1 (19 Aug 2019 11:51)  T(F): 98.3 (20 Aug 2019 04:23), Max: 98.8 (19 Aug 2019 11:51)  HR: 80 (20 Aug 2019 04:23) (70 - 87)  BP: 167/90 (20 Aug 2019 04:23) (136/85 - 167/90)  BP(mean): --  RR: 18 (20 Aug 2019 04:23) (17 - 18)  SpO2: 98% (20 Aug 2019 04:23) (94% - 98%)    PHYSICAL EXAM:  GENERAL: NAD, well-developed  HEAD:  Atraumatic, Normocephalic  EYES: EOMI, PERRLA, conjunctiva and sclera clear  NECK: Supple, No JVD  CHEST/LUNG: Clear to auscultation bilaterally; No wheeze  HEART: Regular rate and rhythm; No murmurs, rubs, or gallops  ABDOMEN: Soft, Nontender, Nondistended; Bowel sounds present  EXTREMITIES:  2+ Peripheral Pulses, No clubbing, cyanosis, or edema  PSYCH: AAOx3  NEUROLOGY: non-focal  SKIN: No rashes or lesions    CAPILLARY BLOOD GLUCOSE      POCT Blood Glucose.: 138 mg/dL (20 Aug 2019 06:14)  POCT Blood Glucose.: 150 mg/dL (20 Aug 2019 00:23)  POCT Blood Glucose.: 154 mg/dL (19 Aug 2019 18:44)  POCT Blood Glucose.: 176 mg/dL (19 Aug 2019 11:58)    I&O's Summary    19 Aug 2019 07:01  -  20 Aug 2019 07:00  --------------------------------------------------------  IN: 472 mL / OUT: 1250 mL / NET: -778 mL        LABS:                        14.1   16.20 )-----------( 408      ( 19 Aug 2019 11:40 )             43.7     08-19    144  |  103  |  61<H>  ----------------------------<  162<H>  3.6   |  24  |  1.01    Ca    10.2      19 Aug 2019 08:45      PT/INR - ( 18 Aug 2019 08:04 )   PT: 19.5 sec;   INR: 1.67 ratio         PTT - ( 20 Aug 2019 00:01 )  PTT:81.0 sec              RADIOLOGY & ADDITIONAL TESTS: Michaela Mock  Internal Medicine  PGY-1  Pager: [phone]    Patient is a 85y old  Female who presents with a chief complaint of Abdominal pain (19 Aug 2019 12:59)      SUBJECTIVE / OVERNIGHT EVENTS: No acute event ON. Reports having a bilateral HA last night and this morning. Denies HA/CP/SOB/abd pain/n/v/d/fever/chills.    MEDICATIONS  (STANDING):  amLODIPine   Tablet 5 milliGRAM(s) Oral daily  atorvastatin 40 milliGRAM(s) Oral <User Schedule>  buDESOnide    Inhalation Suspension 0.25 milliGRAM(s) Inhalation two times a day  carbidopa/levodopa  25/100 2 Tablet(s) Oral three times a day  dextrose 5%. 1000 milliLiter(s) (50 mL/Hr) IV Continuous <Continuous>  dextrose 50% Injectable 12.5 Gram(s) IV Push once  dextrose 50% Injectable 25 Gram(s) IV Push once  dextrose 50% Injectable 25 Gram(s) IV Push once  docusate sodium Liquid 100 milliGRAM(s) Oral two times a day  fluconAZOLE IVPB      fluconAZOLE IVPB 100 milliGRAM(s) IV Intermittent every 24 hours  furosemide    Tablet 20 milliGRAM(s) Oral daily  gabapentin   Solution 300 milliGRAM(s) Oral at bedtime  guaiFENesin    Syrup 200 milliGRAM(s) Oral every 6 hours  heparin  Infusion.  Unit(s)/Hr (11 mL/Hr) IV Continuous <Continuous>  insulin lispro (HumaLOG) corrective regimen sliding scale   SubCutaneous every 6 hours  losartan 50 milliGRAM(s) Oral daily  melatonin 5 milliGRAM(s) Oral at bedtime  metoprolol tartrate 25 milliGRAM(s) Oral two times a day  metroNIDAZOLE  IVPB      metroNIDAZOLE  IVPB 500 milliGRAM(s) IV Intermittent every 8 hours  nystatin    Suspension 059240 Unit(s) Oral four times a day  pantoprazole  Injectable 40 milliGRAM(s) IV Push daily  polyethylene glycol 3350 17 Gram(s) Oral daily  predniSONE   Tablet 10 milliGRAM(s) Oral daily  senna 2 Tablet(s) Oral at bedtime  venlafaxine 37.5 milliGRAM(s) Oral every 12 hours    MEDICATIONS  (PRN):  acetaminophen    Suspension .. 650 milliGRAM(s) Oral every 6 hours PRN Temp greater or equal to 38C (100.4F), Mild Pain (1 - 3)  ALPRAZolam 0.5 milliGRAM(s) Oral two times a day PRN anxiety  dextrose 40% Gel 15 Gram(s) Oral once PRN Blood Glucose LESS THAN 70 milliGRAM(s)/deciliter  glucagon  Injectable 1 milliGRAM(s) IntraMuscular once PRN Glucose LESS THAN 70 milligrams/deciliter  heparin  Injectable 5000 Unit(s) IV Push every 6 hours PRN For aPTT less than 40  heparin  Injectable 2500 Unit(s) IV Push every 6 hours PRN For aPTT between 40 - 57        OBJECTIVE:    Vital Signs Last 24 Hrs  T(C): 36.8 (20 Aug 2019 04:23), Max: 37.1 (19 Aug 2019 11:51)  T(F): 98.3 (20 Aug 2019 04:23), Max: 98.8 (19 Aug 2019 11:51)  HR: 80 (20 Aug 2019 04:23) (70 - 87)  BP: 167/90 (20 Aug 2019 04:23) (136/85 - 167/90)  BP(mean): --  RR: 18 (20 Aug 2019 04:23) (17 - 18)  SpO2: 98% (20 Aug 2019 04:23) (94% - 98%)    PHYSICAL EXAM:  GENERAL: NAD, well-developed  HEAD:  Atraumatic, Normocephalic  EYES: EOMI, PERRLA, conjunctiva and sclera clear  NECK: Supple, No JVD  CHEST/LUNG: Clear to auscultation bilaterally; No wheeze  HEART: Regular rate and rhythm; No murmurs, rubs, or gallops  ABDOMEN: Soft, Nontender, Nondistended; Bowel sounds present  EXTREMITIES:  2+ Peripheral Pulses, No clubbing, cyanosis, or edema  PSYCH: AAOx3  NEUROLOGY: non-focal  SKIN: No rashes or lesions    CAPILLARY BLOOD GLUCOSE      POCT Blood Glucose.: 138 mg/dL (20 Aug 2019 06:14)  POCT Blood Glucose.: 150 mg/dL (20 Aug 2019 00:23)  POCT Blood Glucose.: 154 mg/dL (19 Aug 2019 18:44)  POCT Blood Glucose.: 176 mg/dL (19 Aug 2019 11:58)    I&O's Summary    19 Aug 2019 07:01  -  20 Aug 2019 07:00  --------------------------------------------------------  IN: 472 mL / OUT: 1250 mL / NET: -778 mL        LABS:                        14.1   16.20 )-----------( 408      ( 19 Aug 2019 11:40 )             43.7     08-19    144  |  103  |  61<H>  ----------------------------<  162<H>  3.6   |  24  |  1.01    Ca    10.2      19 Aug 2019 08:45      PT/INR - ( 18 Aug 2019 08:04 )   PT: 19.5 sec;   INR: 1.67 ratio         PTT - ( 20 Aug 2019 00:01 )  PTT:81.0 sec              RADIOLOGY & ADDITIONAL TESTS: Michaela Mock  Internal Medicine  PGY-1  Pager: [phone]    Patient is a 85y old  Female who presents with a chief complaint of Abdominal pain (19 Aug 2019 12:59)      SUBJECTIVE / OVERNIGHT EVENTS: No acute event ON. Reports having a bilateral HA on forehead last night and this morning. Light seems to worsen the headache. She also has jaw pain b/l with the headache. Denies CP/SOB/abd pain/n/v/d/fever/chills.    MEDICATIONS  (STANDING):  amLODIPine   Tablet 5 milliGRAM(s) Oral daily  atorvastatin 40 milliGRAM(s) Oral <User Schedule>  buDESOnide    Inhalation Suspension 0.25 milliGRAM(s) Inhalation two times a day  carbidopa/levodopa  25/100 2 Tablet(s) Oral three times a day  dextrose 5%. 1000 milliLiter(s) (50 mL/Hr) IV Continuous <Continuous>  dextrose 50% Injectable 12.5 Gram(s) IV Push once  dextrose 50% Injectable 25 Gram(s) IV Push once  dextrose 50% Injectable 25 Gram(s) IV Push once  docusate sodium Liquid 100 milliGRAM(s) Oral two times a day  fluconAZOLE IVPB      fluconAZOLE IVPB 100 milliGRAM(s) IV Intermittent every 24 hours  furosemide    Tablet 20 milliGRAM(s) Oral daily  gabapentin   Solution 300 milliGRAM(s) Oral at bedtime  guaiFENesin    Syrup 200 milliGRAM(s) Oral every 6 hours  heparin  Infusion.  Unit(s)/Hr (11 mL/Hr) IV Continuous <Continuous>  insulin lispro (HumaLOG) corrective regimen sliding scale   SubCutaneous every 6 hours  losartan 50 milliGRAM(s) Oral daily  melatonin 5 milliGRAM(s) Oral at bedtime  metoprolol tartrate 25 milliGRAM(s) Oral two times a day  metroNIDAZOLE  IVPB      metroNIDAZOLE  IVPB 500 milliGRAM(s) IV Intermittent every 8 hours  nystatin    Suspension 436781 Unit(s) Oral four times a day  pantoprazole  Injectable 40 milliGRAM(s) IV Push daily  polyethylene glycol 3350 17 Gram(s) Oral daily  predniSONE   Tablet 10 milliGRAM(s) Oral daily  senna 2 Tablet(s) Oral at bedtime  venlafaxine 37.5 milliGRAM(s) Oral every 12 hours    MEDICATIONS  (PRN):  acetaminophen    Suspension .. 650 milliGRAM(s) Oral every 6 hours PRN Temp greater or equal to 38C (100.4F), Mild Pain (1 - 3)  ALPRAZolam 0.5 milliGRAM(s) Oral two times a day PRN anxiety  dextrose 40% Gel 15 Gram(s) Oral once PRN Blood Glucose LESS THAN 70 milliGRAM(s)/deciliter  glucagon  Injectable 1 milliGRAM(s) IntraMuscular once PRN Glucose LESS THAN 70 milligrams/deciliter  heparin  Injectable 5000 Unit(s) IV Push every 6 hours PRN For aPTT less than 40  heparin  Injectable 2500 Unit(s) IV Push every 6 hours PRN For aPTT between 40 - 57        OBJECTIVE:    Vital Signs Last 24 Hrs  T(C): 36.8 (20 Aug 2019 04:23), Max: 37.1 (19 Aug 2019 11:51)  T(F): 98.3 (20 Aug 2019 04:23), Max: 98.8 (19 Aug 2019 11:51)  HR: 80 (20 Aug 2019 04:23) (70 - 87)  BP: 167/90 (20 Aug 2019 04:23) (136/85 - 167/90)  BP(mean): --  RR: 18 (20 Aug 2019 04:23) (17 - 18)  SpO2: 98% (20 Aug 2019 04:23) (94% - 98%)    PHYSICAL EXAM:  GENERAL: NAD, intermittently coughing   HEAD:  Atraumatic, Normocephalic  ENMT: moist mucous membranes, improved white-yellows thrush on tongue  NECK: Supple, No JVD  CHEST/LUNG: some coarse upper airway breath sounds b/l. No accessory muscle use.  HEART: regular rate and rhythm; No murmurs appreciated on exam  ABDOMEN: Soft, non-tender, Nondistended; Bowel sounds present  EXTREMITIES: No clubbing, cyanosis, or peripheral edema   LYMPH: No lymphadenopathy noted  SKIN: No rashes or lesions  NERVOUS SYSTEM: AOx2, conversive and answers questions appropriately.    CAPILLARY BLOOD GLUCOSE    POCT Blood Glucose.: 138 mg/dL (20 Aug 2019 06:14)  POCT Blood Glucose.: 150 mg/dL (20 Aug 2019 00:23)  POCT Blood Glucose.: 154 mg/dL (19 Aug 2019 18:44)  POCT Blood Glucose.: 176 mg/dL (19 Aug 2019 11:58)    I&O's Summary    19 Aug 2019 07:01  -  20 Aug 2019 07:00  --------------------------------------------------------  IN: 472 mL / OUT: 1250 mL / NET: -778 mL        LABS:                        14.1   16.20 )-----------( 408      ( 19 Aug 2019 11:40 )             43.7     08-19    144  |  103  |  61<H>  ----------------------------<  162<H>  3.6   |  24  |  1.01    Ca    10.2      19 Aug 2019 08:45      PT/INR - ( 18 Aug 2019 08:04 )   PT: 19.5 sec;   INR: 1.67 ratio         PTT - ( 20 Aug 2019 00:01 )  PTT:81.0 sec

## 2019-08-20 NOTE — PROGRESS NOTE ADULT - PROBLEM SELECTOR PLAN 4
- failed speech and swallow study and MBS  - ENT consulted: CT neck w/ con--vocal cord paralysis, also concerns for esophagitis/esophogeal mass/Zenker's diverticulum. Planned to get vocal inject on 8/22. f/u pre-op recs (cardiac clearance obtained and labs) and hold heparin drip 6 hrs prior  - repeat MBS after VC injection. If swallow improve, consider dc NG tube. If dysphagia does not improve, need to discuss goal of care and possible PEG tube  -GI recs appreciated; GI recommending  neuro consult to eval for dysphagia; neuro  recs appreciated. Will get MRI/MRA head and neck to further eval.   - c/w diflucan for candidiasis.   - discussed with son and patient regarding risk and benefits of NG tube for dysphagia and aspiration risk, both are agreeable to NG tube placement for meds and feeds   - Aspiration precautions and Elevate head of bed - failed speech and swallow study and MBS  - ENT consulted: CT neck w/ con--vocal cord paralysis, also concerns for esophagitis/esophogeal mass/Zenker's diverticulum. Planned to get vocal inject on 8/22. f/u pre-op recs (cardiac clearance obtained and labs) and hold heparin drip 6 hrs prior  - repeat MBS after VC injection. If swallow improve, consider dc NG tube. If dysphagia does not improve, need to discuss goal of care and possible PEG tube  -GI recs appreciated; GI recommending  neuro consult to eval for dysphagia; neuro  recs appreciated. f/u MRI/MRA head and neck    - c/w diflucan for candidiasis.   - discussed with son and patient regarding risk and benefits of NG tube for dysphagia and aspiration risk, both are agreeable to NG tube placement for meds and feeds   - Aspiration precautions and Elevate head of bed - failed speech and swallow study and MBS  - ENT consulted: CT neck w/ con--vocal cord paralysis, also concerns for esophagitis/esophogeal mass/Zenker's diverticulum. Planned to get vocal inject on 8/22. f/u pre-op recs (cardiac clearance obtained and labs) and hold heparin drip 6 hrs prior  - repeat MBS after VC injection. If swallow improves, consider dc NG tube. If dysphagia does not improve, need to discuss goal of care and possible PEG tube  -GI recs appreciated; GI recommended neuro consult to eval for dysphagia; neuro  recs appreciated. f/u MRI/MRA head and neck    - c/w diflucan and nystatin for candidiasis.   - discussed with son and patient regarding risk and benefits of NG tube for dysphagia and aspiration risk, both are agreeable to NG tube placement for meds and feeds   - Aspiration precautions and Elevate head of bed

## 2019-08-20 NOTE — PROGRESS NOTE ADULT - PROBLEM SELECTOR PLAN 7
- stable at 141-180s/80-90s  - uptitrate to home dose 50mg QD of losartan.   - c/w amlodipine 5mg QD  - c/w metoprolol 25 BID for rate control and BP  - continue to monitor  - Vitals Q4H - stable at 140s-160ss/80-90s  - c/w home dose 50mg QD of losartan.   - c/w amlodipine 5mg QD  - c/w metoprolol 25 BID for rate control and BP  - continue to monitor  - Vitals Q4H

## 2019-08-20 NOTE — PROGRESS NOTE ADULT - PROBLEM SELECTOR PLAN 5
- was delirious, now back to baseline  -Melatonin 5mg HS  -C/w home Sinemet 50/200 TID for Parkinson-like symptom. Per son, patient never has a diagnosis of parkinson's disease. She was started on sinemet to see if it helps with her leg shaking symptoms  - c/w venlafaxine 37.5mg q12h (was on duloxetine at home) given duloxetine cannot be crushed and put via NGT

## 2019-08-20 NOTE — PROGRESS NOTE ADULT - PROBLEM SELECTOR PLAN 9
-TTE --> Normal LF, Mitral stenosis, increased RV pressure and dilated Left atrium, functioning TAVR. Findings c/w valvular heart failure or heart failure with preserved EF  - c/w lasix 20mg qd  - Strict I's/O's and daily weights

## 2019-08-20 NOTE — PROGRESS NOTE ADULT - PROBLEM SELECTOR PLAN 2
- With acute hypoxic and hypercarbic respiratory failure  - patient failed speech and swallow and MBS  -s/p 7-day course of CTX  -c/w 3-day course flagyl due to concern for aspiration PNA for anaerobic coverage (8/18--8/20)  -CT chest showing no evidence of pneumonia. Moderate hiatal hernia.  - RVP negative  - leukocytosis uptrending, possible infection vs steroid use. Continue to monitor  - Guaifenesin for cough  - Encourage to use incentive spirometer  - c/w Pulmicort inhaler  - chest PT - With acute hypoxic and hypercarbic respiratory failure  - patient failed speech and swallow and MBS  -s/p 7-day course of CTX  -c/w 3-day course flagyl due to concern for aspiration PNA for anaerobic coverage (8/18--8/20)  -CT chest showing no evidence of pneumonia. Moderate hiatal hernia.  - RVP negative  - leukocytosis uptrending, possible infection vs steroid use. Pt has been hemodynamically stable and cough improving, active PNA is unlikely.  - Guaifenesin for cough  - Encourage to use incentive spirometer  - c/w Pulmicort inhaler  - chest PT

## 2019-08-20 NOTE — PROGRESS NOTE ADULT - PROBLEM SELECTOR PLAN 8
- UA was positive w/ abd pain, hx of recurrent UTIs  - UCx positive for Klebsiella >100k, s/p 7-day course of CTX  - resolved

## 2019-08-20 NOTE — PROGRESS NOTE ADULT - ATTENDING COMMENTS
-Patient seen/examined on 8/20/19. Case/plan discussed with the intern and resident as reviewed/edited by me above and in any comments below.

## 2019-08-20 NOTE — PROGRESS NOTE ADULT - PROBLEM SELECTOR PLAN 6
- breathing more comfortably and cough is better now  - Per son, no formal diagnosis of COPD  - c/w home dose prednisone 10mg daily chronically for PMR/?GCA. Right temporal artery biopsy reportedly negative, but she gets right sided headaches. She was never put on high dose steroid with a taper in the past per PCP office.  -Repeat ESR/CRP elevated, though non-specific and likely to be elevated in setting of infections  -S/p additional prednisone 20mg x 1 on 8/15 for ?temporal arteritis HA with temporary improvement.   -Trigeminal neuralgia is on the differential given right HA associated with maxillary/jaw pain.   - Maintain O2 sats between 88-92% - breathing more comfortably and cough is better now  - Per son, no formal diagnosis of COPD  - c/w home dose prednisone 10mg daily chronically for PMR/?GCA. Right temporal artery biopsy reportedly negative, but she gets right sided headaches. She was never put on high dose steroid with a taper in the past per PCP office.  -Repeat ESR/CRP elevated, though non-specific and likely to be elevated in setting of infections  -S/p additional prednisone 20mg x 1 on 8/15 for ?temporal arteritis HA with temporary improvement.   -Trigeminal neuralgia and migraine on the differential given right HA associated with maxillary/jaw pain and light sensitivity--will increase tylenol to 1g q6 PRN given her comorbities and planned procedure  - Maintain O2 sats between 88-92% - breathing more comfortably and cough is better now  - Per son, no formal diagnosis of COPD  - c/w home dose prednisone 10mg daily chronically for PMR/?GCA. Right temporal artery biopsy reportedly negative, but she gets right sided headaches. She was never put on high dose steroid with a taper in the past per PCP office.  -Repeat ESR/CRP elevated, though non-specific and likely to be elevated in setting of infections  -S/p additional prednisone 20mg x 1 on 8/15 for ?temporal arteritis HA with temporary improvement.   -Trigeminal neuralgia and migraine on the differential given right HA associated with maxillary/jaw pain and light sensitivity--will increase tylenol to 1g q6h PRN given her comorbidities and planned procedure  - Maintain O2 sats between 88-92%

## 2019-08-20 NOTE — CHART NOTE - NSCHARTNOTEFT_GEN_A_CORE
Nutrition Follow Up Note  Patient seen for: Malnutrition follow up assessment. Chart reviewed and events noted. Per chart, pt with large stool burden on imaging, but no SBO, continues on bowel regimen.    Source: EMR, RN    Diet : NPO with enteral feed    Patient reports: no acute GI distress, but no BM still - RN aware and continues on bowel regimen       Enteral /Parenteral Nutrition: Observed pt receiving Glucerna 1.2 @ 50cc/hr x 24hrs      Daily Weight in k.5 (-20), Weight in k.4 (-19), Weight in k.3 (-18), Weight in k.8 (-17), Weight in k.5 (-17), Weight in k.3 (-16), Weight in k.8 (-15)  % Weight Change    Pertinent Medications: MEDICATIONS  (STANDING):  amLODIPine   Tablet 5 milliGRAM(s) Oral daily  atorvastatin 40 milliGRAM(s) Oral <User Schedule>  buDESOnide    Inhalation Suspension 0.25 milliGRAM(s) Inhalation two times a day  carbidopa/levodopa  25/100 2 Tablet(s) Oral three times a day  dextrose 5%. 1000 milliLiter(s) (50 mL/Hr) IV Continuous <Continuous>  dextrose 50% Injectable 12.5 Gram(s) IV Push once  dextrose 50% Injectable 25 Gram(s) IV Push once  dextrose 50% Injectable 25 Gram(s) IV Push once  docusate sodium Liquid 100 milliGRAM(s) Oral two times a day  fluconAZOLE IVPB      fluconAZOLE IVPB 100 milliGRAM(s) IV Intermittent every 24 hours  furosemide    Tablet 20 milliGRAM(s) Oral daily  gabapentin   Solution 300 milliGRAM(s) Oral at bedtime  guaiFENesin    Syrup 200 milliGRAM(s) Oral every 6 hours  heparin  Infusion.  Unit(s)/Hr (11 mL/Hr) IV Continuous <Continuous>  insulin lispro (HumaLOG) corrective regimen sliding scale   SubCutaneous every 6 hours  losartan 50 milliGRAM(s) Oral daily  melatonin 5 milliGRAM(s) Oral at bedtime  metoprolol tartrate 25 milliGRAM(s) Oral two times a day  metroNIDAZOLE  IVPB      metroNIDAZOLE  IVPB 500 milliGRAM(s) IV Intermittent every 8 hours  nystatin    Suspension 022605 Unit(s) Oral four times a day  pantoprazole  Injectable 40 milliGRAM(s) IV Push daily  polyethylene glycol 3350 17 Gram(s) Oral daily  predniSONE   Tablet 10 milliGRAM(s) Oral daily  senna 2 Tablet(s) Oral at bedtime  venlafaxine 37.5 milliGRAM(s) Oral every 12 hours    MEDICATIONS  (PRN):  acetaminophen    Suspension .. 650 milliGRAM(s) Oral every 6 hours PRN Temp greater or equal to 38C (100.4F), Mild Pain (1 - 3)  ALPRAZolam 0.5 milliGRAM(s) Oral two times a day PRN anxiety  dextrose 40% Gel 15 Gram(s) Oral once PRN Blood Glucose LESS THAN 70 milliGRAM(s)/deciliter  glucagon  Injectable 1 milliGRAM(s) IntraMuscular once PRN Glucose LESS THAN 70 milligrams/deciliter  heparin  Injectable 5000 Unit(s) IV Push every 6 hours PRN For aPTT less than 40  heparin  Injectable 2500 Unit(s) IV Push every 6 hours PRN For aPTT between 40 - 57    08 @ 07:06: Na 145, BUN 62<H>, Cr 0.74, <H>, K+ 3.9, Phos 2.7, Mg 2.9<H>, Alk Phos --, ALT/SGPT --, AST/SGOT --, HbA1c --    Finger Sticks:  POCT Blood Glucose.: 138 mg/dL ( @ 06:14)  POCT Blood Glucose.: 150 mg/dL ( @ 00:23)  POCT Blood Glucose.: 154 mg/dL ( @ 18:44)  POCT Blood Glucose.: 176 mg/dL ( @ 11:58)      Skin per nursing documentation: n/a  Edema: +1 generalized edema    Estimated Needs:   [x ] no change since previous assessment  [ ] recalculated:     Previous Nutrition Diagnosis: mild malnutrition  Nutrition Diagnosis is: being addressed with current enteral feed regimen    New Nutrition Diagnosis: n/a       Interventions:     Recommend  1) Continue to monitor weight, lab values, and enteral feed tolerance.   2) Continue Glucerna 1.2 @ goal of 50 ml/hr x 24 hours to provide 1200 ml, 1440 kcal, 72 g protein, and 966 ml water (26 kcal/kg and 1.3 g/kg protein based on upper IBW 55 kg)    Monitoring and Evaluation:     Continue to monitor Nutritional intake, Tolerance to diet prescription, weights, labs, skin integrity    RD remains available upon request and will follow up per protocol

## 2019-08-20 NOTE — PROGRESS NOTE ADULT - PROBLEM SELECTOR PLAN 10
- Patient not on home anti-hyperglycemics  - FS acceptable  - c/w Insulin sliding scale    Oral thrush:  -nystatin suspension  -ENT eval appreciated; c/w fluconazole and CT neck--vocal cord paralysis, also concerning for esophagitis/mass/zenker's diverticulum.     Constipation  -Patient with large stool burden on imaging, but no SBO  -c/w Miralax, colace, senna  -continue to monitor    Hypernatremia  -resolved on free water 200cc via NG tube TID    Prophylaxis:  DVT ppx: on heparin gtt, holding coumadin bridge now  Diet: NPO with NG tube feed per nutrition recs  Dispo: PT recs subacute rehab - Patient not on home anti-hyperglycemics  - FS acceptable  - c/w Insulin sliding scale    Oral thrush:  -nystatin suspension  -ENT eval appreciated; c/w fluconazole and CT neck--vocal cord paralysis, also concerning for esophagitis/mass/zenker's diverticulum.     Constipation  -Patient with large stool burden on imaging, but no SBO  -c/w Miralax, colace, senna  -continue to monitor    Prophylaxis:  DVT ppx: on heparin gtt, holding coumadin bridge now  Diet: NPO with NG tube feed per nutrition recs  Dispo: PT recs subacute rehab

## 2019-08-20 NOTE — PROGRESS NOTE ADULT - PROBLEM SELECTOR PLAN 1
- new onset Afib , likely 2/2 over diuresis vs infection   - converted back to sinus at around 9 am 8/14  - c/w heparin gtt, plavix discontinued  - holding coumadin in anticipation of VC injection on 8/22    - TSH wnl  - c/w metoprolol 25mg BID for rate control per cardiology  - c/w standing lasix 20 mg daily  - f/u cardiology recs  - Repeat ekg unchanged from pior EKG  - Telemetry: yesterday had a burst of PATs up to 180s lasted for 1.7 secs. ON, sinus 60-80s  - Troponins downtrended, likely was due to demand ischemia from Afib with RVR - new onset Afib , likely 2/2 over diuresis vs infection   - converted back to sinus at around 9 am 8/14  - c/w heparin gtt, plavix discontinued  - holding coumadin in anticipation of VC injection on 8/22    - TSH wnl  - c/w metoprolol 25mg BID for rate control per cardiology  - c/w standing lasix 20 mg daily  - f/u cardiology recs  - Repeat ekg unchanged from pior EKG  - Telemetry: 60-70 with PVCs ON  - Troponins downtrended, likely was due to demand ischemia from Afib with RVR - new onset Afib , likely 2/2 over diuresis vs infection   - converted back to sinus at around 9 am 8/14  - c/w heparin gtt, plavix discontinued  - holding coumadin in anticipation of VC injection on 8/22    - TSH wnl  - c/w metoprolol 25mg BID for rate control per cardiology  - c/w standing lasix 20 mg daily  - f/u cardiology recs  - Repeat ekg unchanged from pior EKG  - Telemetry: 60-70 with PVCs ON  - Troponins downtrended, likely was due to demand ischemia from Afib with RVR  -Consider NOAC, per cardio recs.

## 2019-08-21 LAB
ANION GAP SERPL CALC-SCNC: 15 MMOL/L — SIGNIFICANT CHANGE UP (ref 5–17)
APTT BLD: 104.4 SEC — HIGH (ref 27.5–36.3)
APTT BLD: 56.3 SEC — HIGH (ref 27.5–36.3)
BUN SERPL-MCNC: 58 MG/DL — HIGH (ref 7–23)
CALCIUM SERPL-MCNC: 9.6 MG/DL — SIGNIFICANT CHANGE UP (ref 8.4–10.5)
CHLORIDE SERPL-SCNC: 105 MMOL/L — SIGNIFICANT CHANGE UP (ref 96–108)
CO2 SERPL-SCNC: 26 MMOL/L — SIGNIFICANT CHANGE UP (ref 22–31)
CREAT SERPL-MCNC: 0.95 MG/DL — SIGNIFICANT CHANGE UP (ref 0.5–1.3)
GLUCOSE SERPL-MCNC: 142 MG/DL — HIGH (ref 70–99)
HCT VFR BLD CALC: 45.8 % — HIGH (ref 34.5–45)
HGB BLD-MCNC: 14.1 G/DL — SIGNIFICANT CHANGE UP (ref 11.5–15.5)
MAGNESIUM SERPL-MCNC: 2.9 MG/DL — HIGH (ref 1.6–2.6)
MCHC RBC-ENTMCNC: 27.9 PG — SIGNIFICANT CHANGE UP (ref 27–34)
MCHC RBC-ENTMCNC: 30.8 GM/DL — LOW (ref 32–36)
MCV RBC AUTO: 90.7 FL — SIGNIFICANT CHANGE UP (ref 80–100)
PHOSPHATE SERPL-MCNC: 3.1 MG/DL — SIGNIFICANT CHANGE UP (ref 2.5–4.5)
PLATELET # BLD AUTO: 321 K/UL — SIGNIFICANT CHANGE UP (ref 150–400)
POTASSIUM SERPL-MCNC: 4 MMOL/L — SIGNIFICANT CHANGE UP (ref 3.5–5.3)
POTASSIUM SERPL-SCNC: 4 MMOL/L — SIGNIFICANT CHANGE UP (ref 3.5–5.3)
RBC # BLD: 5.05 M/UL — SIGNIFICANT CHANGE UP (ref 3.8–5.2)
RBC # FLD: 17.1 % — HIGH (ref 10.3–14.5)
SODIUM SERPL-SCNC: 146 MMOL/L — HIGH (ref 135–145)
WBC # BLD: 16.89 K/UL — HIGH (ref 3.8–10.5)
WBC # FLD AUTO: 16.89 K/UL — HIGH (ref 3.8–10.5)

## 2019-08-21 PROCEDURE — 99232 SBSQ HOSP IP/OBS MODERATE 35: CPT | Mod: GC

## 2019-08-21 RX ADMIN — Medication 37.5 MILLIGRAM(S): at 17:47

## 2019-08-21 RX ADMIN — Medication 200 MILLIGRAM(S): at 17:48

## 2019-08-21 RX ADMIN — Medication 200 MILLIGRAM(S): at 11:49

## 2019-08-21 RX ADMIN — HEPARIN SODIUM 600 UNIT(S)/HR: 5000 INJECTION INTRAVENOUS; SUBCUTANEOUS at 17:48

## 2019-08-21 RX ADMIN — Medication 200 MILLIGRAM(S): at 23:18

## 2019-08-21 RX ADMIN — Medication 0.25 MILLIGRAM(S): at 10:55

## 2019-08-21 RX ADMIN — AMLODIPINE BESYLATE 5 MILLIGRAM(S): 2.5 TABLET ORAL at 05:26

## 2019-08-21 RX ADMIN — Medication 100 MILLIGRAM(S): at 05:26

## 2019-08-21 RX ADMIN — Medication 20 MILLIGRAM(S): at 05:26

## 2019-08-21 RX ADMIN — Medication 25 MILLIGRAM(S): at 05:26

## 2019-08-21 RX ADMIN — Medication 500000 UNIT(S): at 11:49

## 2019-08-21 RX ADMIN — SENNA PLUS 2 TABLET(S): 8.6 TABLET ORAL at 23:17

## 2019-08-21 RX ADMIN — Medication 1000 MILLIGRAM(S): at 12:20

## 2019-08-21 RX ADMIN — CARBIDOPA AND LEVODOPA 2 TABLET(S): 25; 100 TABLET ORAL at 05:26

## 2019-08-21 RX ADMIN — HEPARIN SODIUM 2500 UNIT(S): 5000 INJECTION INTRAVENOUS; SUBCUTANEOUS at 11:01

## 2019-08-21 RX ADMIN — Medication 200 MILLIGRAM(S): at 05:26

## 2019-08-21 RX ADMIN — Medication 0.25 MILLIGRAM(S): at 21:10

## 2019-08-21 RX ADMIN — Medication 1: at 06:18

## 2019-08-21 RX ADMIN — CARBIDOPA AND LEVODOPA 2 TABLET(S): 25; 100 TABLET ORAL at 16:05

## 2019-08-21 RX ADMIN — HEPARIN SODIUM 700 UNIT(S)/HR: 5000 INJECTION INTRAVENOUS; SUBCUTANEOUS at 10:55

## 2019-08-21 RX ADMIN — Medication 25 MILLIGRAM(S): at 17:48

## 2019-08-21 RX ADMIN — Medication 5 MILLIGRAM(S): at 23:17

## 2019-08-21 RX ADMIN — POLYETHYLENE GLYCOL 3350 17 GRAM(S): 17 POWDER, FOR SOLUTION ORAL at 11:49

## 2019-08-21 RX ADMIN — Medication 500000 UNIT(S): at 05:26

## 2019-08-21 RX ADMIN — Medication 500000 UNIT(S): at 23:20

## 2019-08-21 RX ADMIN — Medication 0.5 MILLIGRAM(S): at 03:15

## 2019-08-21 RX ADMIN — FLUCONAZOLE 50 MILLIGRAM(S): 150 TABLET ORAL at 21:09

## 2019-08-21 RX ADMIN — CARBIDOPA AND LEVODOPA 2 TABLET(S): 25; 100 TABLET ORAL at 23:18

## 2019-08-21 RX ADMIN — Medication 500000 UNIT(S): at 17:47

## 2019-08-21 RX ADMIN — GABAPENTIN 300 MILLIGRAM(S): 400 CAPSULE ORAL at 23:18

## 2019-08-21 RX ADMIN — Medication 100 MILLIGRAM(S): at 17:47

## 2019-08-21 RX ADMIN — LOSARTAN POTASSIUM 50 MILLIGRAM(S): 100 TABLET, FILM COATED ORAL at 05:26

## 2019-08-21 RX ADMIN — Medication 37.5 MILLIGRAM(S): at 05:26

## 2019-08-21 RX ADMIN — PANTOPRAZOLE SODIUM 40 MILLIGRAM(S): 20 TABLET, DELAYED RELEASE ORAL at 11:49

## 2019-08-21 RX ADMIN — Medication 1000 MILLIGRAM(S): at 05:27

## 2019-08-21 RX ADMIN — Medication 10 MILLIGRAM(S): at 05:26

## 2019-08-21 RX ADMIN — ATORVASTATIN CALCIUM 40 MILLIGRAM(S): 80 TABLET, FILM COATED ORAL at 22:43

## 2019-08-21 RX ADMIN — Medication 1000 MILLIGRAM(S): at 11:50

## 2019-08-21 NOTE — PROGRESS NOTE ADULT - PROBLEM SELECTOR PLAN 1
- new onset Afib , likely 2/2 over diuresis vs infection   - converted back to sinus at around 9 am 8/14  - c/w heparin gtt, plavix discontinued  - holding coumadin in anticipation of VC injection on 8/22    - TSH wnl  - c/w metoprolol 25mg BID for rate control per cardiology  - c/w standing lasix 20 mg daily  - f/u cardiology recs  - Repeat ekg unchanged from pior EKG  - Telemetry: 60-70 with PVCs ON  - Troponins downtrended, likely was due to demand ischemia from Afib with RVR  -Consider NOAC, per cardio recs. - new onset Afib , likely 2/2 over diuresis vs infection   - converted back to sinus at around 9 am 8/14  - c/w heparin gtt, plavix discontinued  - holding coumadin in anticipation of VC injection on 8/22. Per cardiology, NOAC is a possibility given patient's MS is not rheumatic etiology. f/u recs on AC choice    - TSH wnl  - c/w metoprolol 25mg BID for rate control per cardiology  - c/w standing lasix 20 mg daily  - f/u cardiology recs  - Repeat ekg unchanged from pior EKG  - Telemetry:    - Troponins downtrended, likely was due to demand ischemia from Afib with RVR - new onset Afib , likely 2/2 over diuresis vs infection   - converted back to sinus at around 9 am 8/14  - c/w heparin gtt, plavix discontinued  - holding coumadin in anticipation of VC injection on 8/22. Per cardiology, NOAC is a possibility given patient's MS is not rheumatic etiology. f/u recs on AC choice    - TSH wnl  - c/w metoprolol 25mg BID for rate control per cardiology  - c/w standing lasix 20 mg daily  - f/u cardiology recs  - Repeat ekg unchanged from pior EKG  - Telemetry: Sinus    - Troponins downtrended, likely was due to demand ischemia from Afib with RVR

## 2019-08-21 NOTE — PROGRESS NOTE ADULT - ATTENDING COMMENTS
VASCULAR NEUROLOGY ATTENDING  The patient is seen and examined the history and imaging are reviewed. I agree with the resident note unless otherwise noted. 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 Outpatient neurology follow-up.

## 2019-08-21 NOTE — PROGRESS NOTE ADULT - SUBJECTIVE AND OBJECTIVE BOX
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 GI,ENT following, currently on heparin drip.     PAST MEDICAL & SURGICAL HISTORY:  Insomnia  Iron deficiency anemia  Anxiety  Parkinson disease  Aortic stenosis  Neuropathy  Polymyalgia rheumatica  Diabetes  Hyperlipidemia  Hypertension  S/P TAVR (transcatheter aortic valve replacement)    FAMILY HISTORY:  No pertinent family history in first degree relatives    MEDICATIONS  (STANDING):  amLODIPine   Tablet 5 milliGRAM(s) Oral daily  atorvastatin 40 milliGRAM(s) Oral <User Schedule>  buDESOnide    Inhalation Suspension 0.25 milliGRAM(s) Inhalation two times a day  carbidopa/levodopa  25/100 2 Tablet(s) Oral three times a day  dextrose 5%. 1000 milliLiter(s) (50 mL/Hr) IV Continuous <Continuous>  dextrose 50% Injectable 12.5 Gram(s) IV Push once  dextrose 50% Injectable 25 Gram(s) IV Push once  dextrose 50% Injectable 25 Gram(s) IV Push once  docusate sodium Liquid 100 milliGRAM(s) Oral two times a day  fluconAZOLE IVPB      fluconAZOLE IVPB 100 milliGRAM(s) IV Intermittent every 24 hours  furosemide    Tablet 20 milliGRAM(s) Oral daily  gabapentin   Solution 300 milliGRAM(s) Oral at bedtime  guaiFENesin    Syrup 200 milliGRAM(s) Oral every 6 hours  heparin  Infusion.  Unit(s)/Hr (11 mL/Hr) IV Continuous <Continuous>  insulin lispro (HumaLOG) corrective regimen sliding scale   SubCutaneous every 6 hours  losartan 50 milliGRAM(s) Oral daily  melatonin 5 milliGRAM(s) Oral at bedtime  metoprolol tartrate 25 milliGRAM(s) Oral two times a day  nystatin    Suspension 868915 Unit(s) Oral four times a day  pantoprazole  Injectable 40 milliGRAM(s) IV Push daily  polyethylene glycol 3350 17 Gram(s) Oral daily  predniSONE   Tablet 10 milliGRAM(s) Oral daily  senna 2 Tablet(s) Oral at bedtime  venlafaxine 37.5 milliGRAM(s) Oral every 12 hours    MEDICATIONS  (PRN):  acetaminophen    Suspension .. 1000 milliGRAM(s) Oral every 6 hours PRN Mild Pain (1 - 3), Moderate Pain (4 - 6)  ALPRAZolam 0.5 milliGRAM(s) Oral two times a day PRN anxiety  dextrose 40% Gel 15 Gram(s) Oral once PRN Blood Glucose LESS THAN 70 milliGRAM(s)/deciliter  glucagon  Injectable 1 milliGRAM(s) IntraMuscular once PRN Glucose LESS THAN 70 milligrams/deciliter  heparin  Injectable 5000 Unit(s) IV Push every 6 hours PRN For aPTT less than 40  heparin  Injectable 2500 Unit(s) IV Push every 6 hours PRN For aPTT between 40 - 57    Allergies  penicillin (Unknown)    Vital Signs Last 24 Hrs  T(C): 36.9 (21 Aug 2019 11:28), Max: 36.9 (21 Aug 2019 11:28)  T(F): 98.5 (21 Aug 2019 11:28), Max: 98.5 (21 Aug 2019 11:28)  HR: 79 (21 Aug 2019 11:28) (70 - 94)  BP: 148/85 (21 Aug 2019 11:28) (123/82 - 148/85)  BP(mean): --  RR: 17 (21 Aug 2019 11:28) (17 - 18)  SpO2: 97% (21 Aug 2019 11:28) (94% - 97%)    NEUROLOGICAL EXAM:  MS: AAOX3, fluent, attends b/l; recent and remote memory intact; normal attention, language and fund of knowledge.   CN: VFF, EOMI, PERRL, no JOSE, no APD,  V1-3 intact, no facial asymmetry, t/p midline, SCM/trap intact.  Eyes-Fundi: no papilledema.  Motor: Strength: 5/5 4x. Tone: normal. Bulk: normal. DTR 2+ symm.  Plantar flex b/l. Sensation: intact to LT/PP/Vibration/Position/Temperature 4x.   Coordination: intact 4x.   Gait:  Romberg negative, pull test negative; walks with narrow base, pivots in 2 steps.    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 GI,ENT following, currently on heparin drip.     PAST MEDICAL & SURGICAL HISTORY:  Insomnia  Iron deficiency anemia  Anxiety  Parkinson disease  Aortic stenosis  Neuropathy  Polymyalgia rheumatica  Diabetes  Hyperlipidemia  Hypertension  S/P TAVR (transcatheter aortic valve replacement)    FAMILY HISTORY:  No pertinent family history in first degree relatives    MEDICATIONS  (STANDING):  amLODIPine   Tablet 5 milliGRAM(s) Oral daily  atorvastatin 40 milliGRAM(s) Oral <User Schedule>  buDESOnide    Inhalation Suspension 0.25 milliGRAM(s) Inhalation two times a day  carbidopa/levodopa  25/100 2 Tablet(s) Oral three times a day  dextrose 5%. 1000 milliLiter(s) (50 mL/Hr) IV Continuous <Continuous>  dextrose 50% Injectable 12.5 Gram(s) IV Push once  dextrose 50% Injectable 25 Gram(s) IV Push once  dextrose 50% Injectable 25 Gram(s) IV Push once  docusate sodium Liquid 100 milliGRAM(s) Oral two times a day  fluconAZOLE IVPB      fluconAZOLE IVPB 100 milliGRAM(s) IV Intermittent every 24 hours  furosemide    Tablet 20 milliGRAM(s) Oral daily  gabapentin   Solution 300 milliGRAM(s) Oral at bedtime  guaiFENesin    Syrup 200 milliGRAM(s) Oral every 6 hours  heparin  Infusion.  Unit(s)/Hr (11 mL/Hr) IV Continuous <Continuous>  insulin lispro (HumaLOG) corrective regimen sliding scale   SubCutaneous every 6 hours  losartan 50 milliGRAM(s) Oral daily  melatonin 5 milliGRAM(s) Oral at bedtime  metoprolol tartrate 25 milliGRAM(s) Oral two times a day  nystatin    Suspension 614264 Unit(s) Oral four times a day  pantoprazole  Injectable 40 milliGRAM(s) IV Push daily  polyethylene glycol 3350 17 Gram(s) Oral daily  predniSONE   Tablet 10 milliGRAM(s) Oral daily  senna 2 Tablet(s) Oral at bedtime  venlafaxine 37.5 milliGRAM(s) Oral every 12 hours    MEDICATIONS  (PRN):  acetaminophen    Suspension .. 1000 milliGRAM(s) Oral every 6 hours PRN Mild Pain (1 - 3), Moderate Pain (4 - 6)  ALPRAZolam 0.5 milliGRAM(s) Oral two times a day PRN anxiety  dextrose 40% Gel 15 Gram(s) Oral once PRN Blood Glucose LESS THAN 70 milliGRAM(s)/deciliter  glucagon  Injectable 1 milliGRAM(s) IntraMuscular once PRN Glucose LESS THAN 70 milligrams/deciliter  heparin  Injectable 5000 Unit(s) IV Push every 6 hours PRN For aPTT less than 40  heparin  Injectable 2500 Unit(s) IV Push every 6 hours PRN For aPTT between 40 - 57    Allergies  penicillin (Unknown)    Vital Signs Last 24 Hrs  T(C): 36.9 (21 Aug 2019 11:28), Max: 36.9 (21 Aug 2019 11:28)  T(F): 98.5 (21 Aug 2019 11:28), Max: 98.5 (21 Aug 2019 11:28)  HR: 79 (21 Aug 2019 11:28) (70 - 94)  BP: 148/85 (21 Aug 2019 11:28) (123/82 - 148/85)  BP(mean): --  RR: 17 (21 Aug 2019 11:28) (17 - 18)  SpO2: 97% (21 Aug 2019 11:28) (94% - 97%)    General appearance: No acute distress        Neurologic:  - MS:  A&O to self and place, not to year. Able to name watch, pen. Fluent, comprehensive, w/ intact repitition. Hypophonic speech  - CN II-XII:  + BTT bilaterally, not cooperating with VF testing. V1-V3 intact, no facial asymmetry,  - Motor:  Strength is 5/5 bilaterally throughout.  Normal muscle bulk and tone throughout. No myoclonus or tremor.  - Reflexes:  2+ and symmetric bilaterally at the biceps, triceps, brachioradialis, knees, and ankles.  flexor plantars  - Sensory:  Intact to light touch throughout  - Coordination:  Finger-nose-finger intact  - Gait:   Deferred     Labs:   cbc                      14.1   16.89 )-----------( 321      ( 21 Aug 2019 08:33 )             45.8     [id number]    146<H>  |  105  |  58<H>  ----------------------------<  142<H>  4.0   |  26  |  0.95    Ca    9.6      21 Aug 2019 05:51  Phos  3.1     08-21  Mg     2.9     08-21

## 2019-08-21 NOTE — PROGRESS NOTE ADULT - PROBLEM SELECTOR PLAN 2
- With acute hypoxic and hypercarbic respiratory failure  - patient failed speech and swallow and MBS  -s/p 7-day course of CTX  -c/w 3-day course flagyl due to concern for aspiration PNA for anaerobic coverage (8/18--8/20)  -CT chest showing no evidence of pneumonia. Moderate hiatal hernia.  - RVP negative  - leukocytosis uptrending, possible infection vs steroid use. Pt has been hemodynamically stable and cough improving, active PNA is unlikely.  - Guaifenesin for cough  - Encourage to use incentive spirometer  - c/w Pulmicort inhaler  - chest PT - With acute hypoxic and hypercarbic respiratory failure  - patient failed speech and swallow and MBS  - s/p 7-day course of CTX and 3-day course flagyl  - CT chest showing no evidence of pneumonia. Moderate hiatal hernia.  - RVP negative  - leukocytosis stable, likely 2/2 steroid use. Pt has been hemodynamically stable and cough improving, active PNA is unlikely.  - Guaifenesin for cough  - Encourage to use incentive spirometer  - c/w Pulmicort inhaler  - chest PT - With acute hypoxic and hypercarbic respiratory failure; now improved.   - patient failed speech and swallow and MBS  - s/p 7-day course of CTX and 3-day course flagyl  - CT chest showing no evidence of pneumonia. Moderate hiatal hernia.  - RVP negative  - leukocytosis stable, likely 2/2 steroid use. Pt has been hemodynamically stable and cough improving, active PNA is unlikely.  - Guaifenesin for cough  - Encourage to use incentive spirometer  - c/w Pulmicort inhaler  - chest PT

## 2019-08-21 NOTE — CHART NOTE - NSCHARTNOTEFT_GEN_A_CORE
Preop Dx: R VC paralysis   Surgeon: Dr. Lopez  Procedure: VC injection     Vital Signs Last 24 Hrs  T(C): 36.9 (21 Aug 2019 21:01), Max: 36.9 (21 Aug 2019 11:28)  T(F): 98.4 (21 Aug 2019 21:01), Max: 98.5 (21 Aug 2019 11:28)  HR: 74 (21 Aug 2019 21:) (74 - 94)  BP: 142/78 (21 Aug 2019 21:01) (123/82 - 148/85)  BP(mean): --  RR: 18 (21 Aug 2019 21:01) (17 - 18)  SpO2: 94% (21 Aug 2019 21:) (94% - 97%)                        14.1   16.89 )-----------( 321      ( 21 Aug 2019 08:33 )             45.8         146<H>  |  105  |  58<H>  ----------------------------<  142<H>  4.0   |  26  |  0.95    Ca    9.6      21 Aug 2019 05:51  Phos  3.1       Mg     2.9           PTT - ( 21 Aug 2019 17:12 )  PTT:104.4 sec  Daily     Daily Weight in k.7 (21 Aug 2019 07:33)    EKG: < from: 12 Lead ECG (19 @ 10:35) >        < end of copied text >      CXR: < from: Xray Chest 1 View- PORTABLE-Urgent (19 @ 18:24) >      Impression:    NG tube in place with tip in the stomach.    < end of copied text >      Type and Screen: Type + Screen (19 @ 03:56)    ABO Interpretation: A    Rh Interpretation: Positive    Antibody Screen: Negative       Plan:  - OR 19 for VC injection with Dr. lopez   - NPO after midnight except meds  - IVF while NPO  - Consent done  - Medical clearance for OR

## 2019-08-21 NOTE — PROGRESS NOTE ADULT - PROBLEM SELECTOR PLAN 3
-Severe on TTE  -c/w standing lasix 20 qd  -Outpatient CTS eval. -Severe on TTE  -c/w standing lasix 20mg qd  -Outpatient CTS eval.

## 2019-08-21 NOTE — PROGRESS NOTE ADULT - PROBLEM SELECTOR PLAN 4
- failed speech and swallow study and MBS  - ENT consulted: CT neck w/ con--vocal cord paralysis, also concerns for esophagitis/esophogeal mass/Zenker's diverticulum. Planned to get vocal inject on 8/22. f/u pre-op recs (cardiac clearance obtained and labs) and hold heparin drip 6 hrs prior  - repeat MBS after VC injection. If swallow improves, consider dc NG tube. If dysphagia does not improve, need to discuss goal of care and possible PEG tube  -GI recs appreciated; GI recommended neuro consult to eval for dysphagia; neuro  recs appreciated. f/u MRI/MRA head and neck    - c/w diflucan and nystatin for candidiasis.   - discussed with son and patient regarding risk and benefits of NG tube for dysphagia and aspiration risk, both are agreeable to NG tube placement for meds and feeds   - Aspiration precautions and Elevate head of bed - failed speech and swallow study and MBS  - ENT consulted: CT neck w/ con--vocal cord paralysis, also concerns for esophagitis/esophogeal mass/Zenker's diverticulum. Planned to get vocal inject on 8/22. f/u pre-op recs (cardiac clearance obtained and will order preop labs) and hold heparin drip 6 hrs prior  - repeat MBS after VC injection, now on schedule for Friday. If swallow improves, consider dc NG tube. 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 Atrophy and extensive small vessel white matter ischemic changes. No acute changes    - c/w diflucan and nystatin for oral candidiasis. Will need an extended course of 14 days (8/16--8/30)   - now with NG tube for meds and feeds   - Aspiration precautions and Elevate head of bed - failed speech and swallow study and MBS  - ENT consulted: CT neck w/ con--vocal cord paralysis, also concerns for esophagitis/esophogeal mass/Zenker's diverticulum. Planned to get vocal injection on 8/22. f/u pre-op recs (cardiac clearance obtained and will order preop labs) and hold heparin drip 6 hrs prior, NPO at MN.   - repeat MBS after VC injection, now on schedule for Friday. If swallow improves, consider dc NG tube. 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 Atrophy and extensive small vessel white matter ischemic changes. No acute changes    - c/w diflucan and nystatin for oral candidiasis. Will need an extended course of at least 14 days (8/16--8/30)   - now with NG tube for meds and feeds   - Aspiration precautions and Elevate head of bed

## 2019-08-21 NOTE — PROGRESS NOTE ADULT - ATTENDING COMMENTS
-Patient seen/examined on 8/21/19. Case/plan discussed with the intern and resident as reviewed/edited by me above and in any comments below.  -Spoke with CM.   -Discussed with patient's son over the phone.  -Planned for ENT procedure tomorrow.

## 2019-08-21 NOTE — PROGRESS NOTE ADULT - SUBJECTIVE AND OBJECTIVE BOX
Michaela Mock  Internal Medicine  PGY-1  Pager: [phone]    Patient is a 85y old  Female who presents with a chief complaint of Abdominal pain (20 Aug 2019 07:21)      SUBJECTIVE / OVERNIGHT EVENTS: No acute event ON. Denies HA/CP/SOB/abd pain/n/v/d/fever/chills.    MEDICATIONS  (STANDING):  amLODIPine   Tablet 5 milliGRAM(s) Oral daily  atorvastatin 40 milliGRAM(s) Oral <User Schedule>  buDESOnide    Inhalation Suspension 0.25 milliGRAM(s) Inhalation two times a day  carbidopa/levodopa  25/100 2 Tablet(s) Oral three times a day  dextrose 5%. 1000 milliLiter(s) (50 mL/Hr) IV Continuous <Continuous>  dextrose 50% Injectable 12.5 Gram(s) IV Push once  dextrose 50% Injectable 25 Gram(s) IV Push once  dextrose 50% Injectable 25 Gram(s) IV Push once  docusate sodium Liquid 100 milliGRAM(s) Oral two times a day  fluconAZOLE IVPB      fluconAZOLE IVPB 100 milliGRAM(s) IV Intermittent every 24 hours  furosemide    Tablet 20 milliGRAM(s) Oral daily  gabapentin   Solution 300 milliGRAM(s) Oral at bedtime  guaiFENesin    Syrup 200 milliGRAM(s) Oral every 6 hours  heparin  Infusion.  Unit(s)/Hr (11 mL/Hr) IV Continuous <Continuous>  insulin lispro (HumaLOG) corrective regimen sliding scale   SubCutaneous every 6 hours  losartan 50 milliGRAM(s) Oral daily  melatonin 5 milliGRAM(s) Oral at bedtime  metoprolol tartrate 25 milliGRAM(s) Oral two times a day  metroNIDAZOLE  IVPB      metroNIDAZOLE  IVPB 500 milliGRAM(s) IV Intermittent every 8 hours  nystatin    Suspension 131089 Unit(s) Oral four times a day  pantoprazole  Injectable 40 milliGRAM(s) IV Push daily  polyethylene glycol 3350 17 Gram(s) Oral daily  predniSONE   Tablet 10 milliGRAM(s) Oral daily  senna 2 Tablet(s) Oral at bedtime  venlafaxine 37.5 milliGRAM(s) Oral every 12 hours    MEDICATIONS  (PRN):  acetaminophen    Suspension .. 1000 milliGRAM(s) Oral every 6 hours PRN Mild Pain (1 - 3), Moderate Pain (4 - 6)  ALPRAZolam 0.5 milliGRAM(s) Oral two times a day PRN anxiety  dextrose 40% Gel 15 Gram(s) Oral once PRN Blood Glucose LESS THAN 70 milliGRAM(s)/deciliter  glucagon  Injectable 1 milliGRAM(s) IntraMuscular once PRN Glucose LESS THAN 70 milligrams/deciliter  heparin  Injectable 5000 Unit(s) IV Push every 6 hours PRN For aPTT less than 40  heparin  Injectable 2500 Unit(s) IV Push every 6 hours PRN For aPTT between 40 - 57        OBJECTIVE:    Vital Signs Last 24 Hrs  T(C): 36.6 (21 Aug 2019 04:28), Max: 37.2 (20 Aug 2019 11:49)  T(F): 97.8 (21 Aug 2019 04:28), Max: 98.9 (20 Aug 2019 11:49)  HR: 81 (21 Aug 2019 04:28) (70 - 94)  BP: 142/86 (21 Aug 2019 04:28) (123/82 - 164/100)  BP(mean): --  RR: 18 (21 Aug 2019 04:28) (18 - 18)  SpO2: 94% (21 Aug 2019 04:28) (93% - 96%)    PHYSICAL EXAM:  GENERAL: NAD, intermittently coughing   HEAD:  Atraumatic, Normocephalic  ENMT: moist mucous membranes, improved white-yellows thrush on tongue  NECK: Supple, No JVD  CHEST/LUNG: some coarse upper airway breath sounds b/l. No accessory muscle use.  HEART: regular rate and rhythm; No murmurs appreciated on exam  ABDOMEN: Soft, non-tender, Nondistended; Bowel sounds present  EXTREMITIES: No clubbing, cyanosis, or peripheral edema   LYMPH: No lymphadenopathy noted  SKIN: No rashes or lesions  NERVOUS SYSTEM: AOx2, conversive and answers questions appropriately.      CAPILLARY BLOOD GLUCOSE      POCT Blood Glucose.: 154 mg/dL (21 Aug 2019 06:07)  POCT Blood Glucose.: 134 mg/dL (21 Aug 2019 00:09)  POCT Blood Glucose.: 119 mg/dL (20 Aug 2019 17:46)  POCT Blood Glucose.: 149 mg/dL (20 Aug 2019 11:52)    I&O's Summary    20 Aug 2019 07:01  -  21 Aug 2019 07:00  --------------------------------------------------------  IN: 522 mL / OUT: 1400 mL / NET: -878 mL        LABS:                        14.7   17.56 )-----------( 375      ( 20 Aug 2019 08:05 )             45.4     08-21    146<H>  |  105  |  58<H>  ----------------------------<  142<H>  4.0   |  26  |  0.95    Ca    9.6      21 Aug 2019 05:51  Phos  3.1     08-21  Mg     2.9     08-21      PTT - ( 20 Aug 2019 07:07 )  PTT:75.0 sec              RADIOLOGY & ADDITIONAL TESTS: Michaela Mock  Internal Medicine  PGY-1  Pager: [phone]    Patient is a 85y old  Female who presents with a chief complaint of Abdominal pain (20 Aug 2019 07:21)      SUBJECTIVE / OVERNIGHT EVENTS: No acute event ON. Reports HA is intermittent and tylenol is not really helping. Patient wants something stronger than tylenol. Feels voice and cough are better. Denies CP/SOB/abd pain/n/v/d/fever/chills.    MEDICATIONS  (STANDING):  amLODIPine   Tablet 5 milliGRAM(s) Oral daily  atorvastatin 40 milliGRAM(s) Oral <User Schedule>  buDESOnide    Inhalation Suspension 0.25 milliGRAM(s) Inhalation two times a day  carbidopa/levodopa  25/100 2 Tablet(s) Oral three times a day  dextrose 5%. 1000 milliLiter(s) (50 mL/Hr) IV Continuous <Continuous>  dextrose 50% Injectable 12.5 Gram(s) IV Push once  dextrose 50% Injectable 25 Gram(s) IV Push once  dextrose 50% Injectable 25 Gram(s) IV Push once  docusate sodium Liquid 100 milliGRAM(s) Oral two times a day  fluconAZOLE IVPB      fluconAZOLE IVPB 100 milliGRAM(s) IV Intermittent every 24 hours  furosemide    Tablet 20 milliGRAM(s) Oral daily  gabapentin   Solution 300 milliGRAM(s) Oral at bedtime  guaiFENesin    Syrup 200 milliGRAM(s) Oral every 6 hours  heparin  Infusion.  Unit(s)/Hr (11 mL/Hr) IV Continuous <Continuous>  insulin lispro (HumaLOG) corrective regimen sliding scale   SubCutaneous every 6 hours  losartan 50 milliGRAM(s) Oral daily  melatonin 5 milliGRAM(s) Oral at bedtime  metoprolol tartrate 25 milliGRAM(s) Oral two times a day  metroNIDAZOLE  IVPB      metroNIDAZOLE  IVPB 500 milliGRAM(s) IV Intermittent every 8 hours  nystatin    Suspension 759169 Unit(s) Oral four times a day  pantoprazole  Injectable 40 milliGRAM(s) IV Push daily  polyethylene glycol 3350 17 Gram(s) Oral daily  predniSONE   Tablet 10 milliGRAM(s) Oral daily  senna 2 Tablet(s) Oral at bedtime  venlafaxine 37.5 milliGRAM(s) Oral every 12 hours    MEDICATIONS  (PRN):  acetaminophen    Suspension .. 1000 milliGRAM(s) Oral every 6 hours PRN Mild Pain (1 - 3), Moderate Pain (4 - 6)  ALPRAZolam 0.5 milliGRAM(s) Oral two times a day PRN anxiety  dextrose 40% Gel 15 Gram(s) Oral once PRN Blood Glucose LESS THAN 70 milliGRAM(s)/deciliter  glucagon  Injectable 1 milliGRAM(s) IntraMuscular once PRN Glucose LESS THAN 70 milligrams/deciliter  heparin  Injectable 5000 Unit(s) IV Push every 6 hours PRN For aPTT less than 40  heparin  Injectable 2500 Unit(s) IV Push every 6 hours PRN For aPTT between 40 - 57        OBJECTIVE:    Vital Signs Last 24 Hrs  T(C): 36.6 (21 Aug 2019 04:28), Max: 37.2 (20 Aug 2019 11:49)  T(F): 97.8 (21 Aug 2019 04:28), Max: 98.9 (20 Aug 2019 11:49)  HR: 81 (21 Aug 2019 04:28) (70 - 94)  BP: 142/86 (21 Aug 2019 04:28) (123/82 - 164/100)  BP(mean): --  RR: 18 (21 Aug 2019 04:28) (18 - 18)  SpO2: 94% (21 Aug 2019 04:28) (93% - 96%)    PHYSICAL EXAM:  GENERAL: NAD, intermittently coughing   HEAD:  Atraumatic, Normocephalic  ENMT: moist mucous membranes, improved white-yellows thrush on tongue  NECK: Supple, No JVD  CHEST/LUNG: coarse upper airway breath sounds b/l. No accessory muscle use.  HEART: regular rate and rhythm; No murmurs appreciated on exam  ABDOMEN: Soft, non-tender, Nondistended; Bowel sounds present  EXTREMITIES: No clubbing, cyanosis, or peripheral edema   LYMPH: No lymphadenopathy noted  SKIN: No rashes or lesions  NERVOUS SYSTEM: AOx2, conversive and answers questions appropriately.      CAPILLARY BLOOD GLUCOSE      POCT Blood Glucose.: 154 mg/dL (21 Aug 2019 06:07)  POCT Blood Glucose.: 134 mg/dL (21 Aug 2019 00:09)  POCT Blood Glucose.: 119 mg/dL (20 Aug 2019 17:46)  POCT Blood Glucose.: 149 mg/dL (20 Aug 2019 11:52)    I&O's Summary    20 Aug 2019 07:01  -  21 Aug 2019 07:00  --------------------------------------------------------  IN: 522 mL / OUT: 1400 mL / NET: -878 mL        LABS:                        14.7   17.56 )-----------( 375      ( 20 Aug 2019 08:05 )             45.4     08-21    146<H>  |  105  |  58<H>  ----------------------------<  142<H>  4.0   |  26  |  0.95    Ca    9.6      21 Aug 2019 05:51  Phos  3.1     08-21  Mg     2.9     08-21      PTT - ( 20 Aug 2019 07:07 )  PTT:75.0 sec              RADIOLOGY & ADDITIONAL TESTS: Michaela Mock  Internal Medicine  PGY-1  Pager: [phone]    Patient is a 85y old  Female who presents with a chief complaint of Abdominal pain (20 Aug 2019 07:21)      SUBJECTIVE / OVERNIGHT EVENTS: No acute event ON. Reports HA is intermittent and tylenol doesn't help much. Patient wants something stronger than tylenol. Feels voice and cough are better. Denies CP/SOB/abd pain/n/v/d/fever/chills.    MEDICATIONS  (STANDING):  amLODIPine   Tablet 5 milliGRAM(s) Oral daily  atorvastatin 40 milliGRAM(s) Oral <User Schedule>  buDESOnide    Inhalation Suspension 0.25 milliGRAM(s) Inhalation two times a day  carbidopa/levodopa  25/100 2 Tablet(s) Oral three times a day  dextrose 5%. 1000 milliLiter(s) (50 mL/Hr) IV Continuous <Continuous>  dextrose 50% Injectable 12.5 Gram(s) IV Push once  dextrose 50% Injectable 25 Gram(s) IV Push once  dextrose 50% Injectable 25 Gram(s) IV Push once  docusate sodium Liquid 100 milliGRAM(s) Oral two times a day  fluconAZOLE IVPB      fluconAZOLE IVPB 100 milliGRAM(s) IV Intermittent every 24 hours  furosemide    Tablet 20 milliGRAM(s) Oral daily  gabapentin   Solution 300 milliGRAM(s) Oral at bedtime  guaiFENesin    Syrup 200 milliGRAM(s) Oral every 6 hours  heparin  Infusion.  Unit(s)/Hr (11 mL/Hr) IV Continuous <Continuous>  insulin lispro (HumaLOG) corrective regimen sliding scale   SubCutaneous every 6 hours  losartan 50 milliGRAM(s) Oral daily  melatonin 5 milliGRAM(s) Oral at bedtime  metoprolol tartrate 25 milliGRAM(s) Oral two times a day  metroNIDAZOLE  IVPB      metroNIDAZOLE  IVPB 500 milliGRAM(s) IV Intermittent every 8 hours  nystatin    Suspension 533185 Unit(s) Oral four times a day  pantoprazole  Injectable 40 milliGRAM(s) IV Push daily  polyethylene glycol 3350 17 Gram(s) Oral daily  predniSONE   Tablet 10 milliGRAM(s) Oral daily  senna 2 Tablet(s) Oral at bedtime  venlafaxine 37.5 milliGRAM(s) Oral every 12 hours    MEDICATIONS  (PRN):  acetaminophen    Suspension .. 1000 milliGRAM(s) Oral every 6 hours PRN Mild Pain (1 - 3), Moderate Pain (4 - 6)  ALPRAZolam 0.5 milliGRAM(s) Oral two times a day PRN anxiety  dextrose 40% Gel 15 Gram(s) Oral once PRN Blood Glucose LESS THAN 70 milliGRAM(s)/deciliter  glucagon  Injectable 1 milliGRAM(s) IntraMuscular once PRN Glucose LESS THAN 70 milligrams/deciliter  heparin  Injectable 5000 Unit(s) IV Push every 6 hours PRN For aPTT less than 40  heparin  Injectable 2500 Unit(s) IV Push every 6 hours PRN For aPTT between 40 - 57        OBJECTIVE:    Vital Signs Last 24 Hrs  T(C): 36.6 (21 Aug 2019 04:28), Max: 37.2 (20 Aug 2019 11:49)  T(F): 97.8 (21 Aug 2019 04:28), Max: 98.9 (20 Aug 2019 11:49)  HR: 81 (21 Aug 2019 04:28) (70 - 94)  BP: 142/86 (21 Aug 2019 04:28) (123/82 - 164/100)  BP(mean): --  RR: 18 (21 Aug 2019 04:28) (18 - 18)  SpO2: 94% (21 Aug 2019 04:28) (93% - 96%)    PHYSICAL EXAM:  GENERAL: NAD, obese, intermittently coughing   HEAD:  Atraumatic, Normocephalic  ENMT: moist mucous membranes, improved white-yellows thrush on tongue  NECK: Supple, No JVD  CHEST/LUNG: coarse upper airway breath sounds b/l. No accessory muscle use.  HEART: regular rate and rhythm; No murmurs appreciated on exam  ABDOMEN: Soft, non-tender, Nondistended; Bowel sounds present  EXTREMITIES: No clubbing, cyanosis, or peripheral edema   LYMPH: No lymphadenopathy noted  SKIN: No rashes or lesions  NERVOUS SYSTEM: AOx2, conversive and answers questions appropriately.      CAPILLARY BLOOD GLUCOSE      POCT Blood Glucose.: 154 mg/dL (21 Aug 2019 06:07)  POCT Blood Glucose.: 134 mg/dL (21 Aug 2019 00:09)  POCT Blood Glucose.: 119 mg/dL (20 Aug 2019 17:46)  POCT Blood Glucose.: 149 mg/dL (20 Aug 2019 11:52)    I&O's Summary    20 Aug 2019 07:01  -  21 Aug 2019 07:00  --------------------------------------------------------  IN: 522 mL / OUT: 1400 mL / NET: -878 mL        LABS:                        14.7   17.56 )-----------( 375      ( 20 Aug 2019 08:05 )             45.4     08-21    146<H>  |  105  |  58<H>  ----------------------------<  142<H>  4.0   |  26  |  0.95    Ca    9.6      21 Aug 2019 05:51  Phos  3.1     08-21  Mg     2.9     08-21      PTT - ( 20 Aug 2019 07:07 )  PTT:75.0 sec              RADIOLOGY & ADDITIONAL TESTS:

## 2019-08-21 NOTE — PROGRESS NOTE ADULT - PROBLEM SELECTOR PLAN 6
- breathing more comfortably and cough is better now  - Per son, no formal diagnosis of COPD  - c/w home dose prednisone 10mg daily chronically for PMR/?GCA. Right temporal artery biopsy reportedly negative, but she gets right sided headaches. She was never put on high dose steroid with a taper in the past per PCP office.  -Repeat ESR/CRP elevated, though non-specific and likely to be elevated in setting of infections  -S/p additional prednisone 20mg x 1 on 8/15 for ?temporal arteritis HA with temporary improvement.   -Trigeminal neuralgia and migraine on the differential given right HA associated with maxillary/jaw pain and light sensitivity--will increase tylenol to 1g q6h PRN given her comorbidities and planned procedure  - Maintain O2 sats between 88-92% - breathing more comfortably and cough is better now  - Per son, no formal diagnosis of COPD  - c/w home dose prednisone 10mg daily chronically for PMR/?GCA. Right temporal artery biopsy reportedly negative, but she gets right sided headaches. She was never put on high dose steroid with a taper in the past per PCP office.  -Repeat ESR/CRP elevated, though non-specific and likely to be elevated in setting of infections  -S/p additional prednisone 20mg x 1 on 8/15 for ?temporal arteritis HA with temporary improvement.   -Trigeminal neuralgia and migraine on the differential given right HA associated with maxillary/jaw pain and light sensitivity--c/w tylenol 1g q6h PRN given her comorbidities and planned procedure. Consider giving additional prednisone 10mg if HA is severe and not improved with tylenol  - Maintain O2 sats between 88-92%

## 2019-08-21 NOTE — PROGRESS NOTE ADULT - ASSESSMENT
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

## 2019-08-21 NOTE — PROGRESS NOTE ADULT - ASSESSMENT
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  Neurological examination is unchanged.     Plan  [] findings on imaging can explain the current dysarthria and hypophonic speech in addition to failed MBS.  [] would continue secondary stroke prevention with statin, and BP control (keep normotensive)  [] management of vocal cord paralysis per ENT, GI  [] continue rest of management per primary team.

## 2019-08-21 NOTE — PROGRESS NOTE ADULT - PROBLEM SELECTOR PLAN 10
- Patient not on home anti-hyperglycemics  - FS acceptable  - c/w Insulin sliding scale    Oral thrush:  -nystatin suspension  -ENT eval appreciated; c/w fluconazole and CT neck--vocal cord paralysis, also concerning for esophagitis/mass/zenker's diverticulum.     Constipation  -Patient with large stool burden on imaging, but no SBO  -c/w Miralax, colace, senna  -continue to monitor    Prophylaxis:  DVT ppx: on heparin gtt, holding coumadin bridge now  Diet: NPO with NG tube feed per nutrition recs  Dispo: PT recs subacute rehab - Patient not on home anti-hyperglycemics  - FS acceptable  - c/w Insulin sliding scale    Hypernatremia  -increase free water to 250cc TID    Constipation  -Patient with large stool burden on imaging, but no SBO  -c/w Miralax, colace, senna  -continue to monitor    Prophylaxis:  DVT ppx: on heparin gtt, holding coumadin now. Consider NOAC per cardiology  Diet: NPO with NG tube feed per nutrition recs  Dispo: PT recs subacute rehab - Patient not on home anti-hyperglycemics  - FS acceptable  - c/w Insulin sliding scale    Hypernatremia  -increase free water to 250cc TID    Constipation  -Patient with large stool burden on imaging, but no SBO  -c/w Miralax, colace, senna  -continue to monitor    Prophylaxis:  DVT ppx: on heparin gtt, holding coumadin now. Hold heparin drip 6hrs prior to procedure. Consider NOAC per cardiology  Diet: NPO with NG tube feed per nutrition recs. NPO after midnight for procedure on 8/22  Dispo: PT recs subacute rehab - Patient not on home anti-hyperglycemics  - FS acceptable  - c/w Insulin sliding scale    Hypernatremia  -increase free water to 250cc TID for now    Constipation  -Patient with large stool burden on imaging, but no SBO  -c/w Miralax, colace, senna  -continue to monitor    Prophylaxis:  DVT ppx: on heparin gtt, holding coumadin now. Hold heparin drip 6hrs prior to procedure. Consider NOAC per cardiology  Diet: NPO with NG tube feed per nutrition recs. NPO after midnight for procedure on 8/22  Dispo: PT recs subacute rehab

## 2019-08-21 NOTE — PROGRESS NOTE ADULT - PROBLEM SELECTOR PLAN 7
- stable at 140s-160ss/80-90s  - c/w home dose 50mg QD of losartan.   - c/w amlodipine 5mg QD  - c/w metoprolol 25 BID for rate control and BP  - continue to monitor  - Vitals Q4H - stable at 120s-140ss/80s  - c/w home dose 50mg QD of losartan.   - c/w amlodipine 5mg QD  - c/w metoprolol 25 BID for rate control and BP  - continue to monitor  - Vitals Q4H - stable at 120s-140ss/80s  - c/w home dose 50mg QD of losartan. Consider uptitrating meds further.   - c/w amlodipine 5mg QD  - c/w metoprolol 25 BID for rate control and BP  - continue to monitor  - Vitals Q4H

## 2019-08-22 DIAGNOSIS — J38.00 PARALYSIS OF VOCAL CORDS AND LARYNX, UNSPECIFIED: ICD-10-CM

## 2019-08-22 LAB
ANION GAP SERPL CALC-SCNC: 14 MMOL/L — SIGNIFICANT CHANGE UP (ref 5–17)
APTT BLD: 54.9 SEC — HIGH (ref 27.5–36.3)
APTT BLD: 77.3 SEC — HIGH (ref 27.5–36.3)
APTT BLD: 95.8 SEC — HIGH (ref 27.5–36.3)
BLD GP AB SCN SERPL QL: NEGATIVE — SIGNIFICANT CHANGE UP
BUN SERPL-MCNC: 51 MG/DL — HIGH (ref 7–23)
CALCIUM SERPL-MCNC: 9.6 MG/DL — SIGNIFICANT CHANGE UP (ref 8.4–10.5)
CHLORIDE SERPL-SCNC: 101 MMOL/L — SIGNIFICANT CHANGE UP (ref 96–108)
CO2 SERPL-SCNC: 27 MMOL/L — SIGNIFICANT CHANGE UP (ref 22–31)
CREAT SERPL-MCNC: 0.89 MG/DL — SIGNIFICANT CHANGE UP (ref 0.5–1.3)
GLUCOSE SERPL-MCNC: 141 MG/DL — HIGH (ref 70–99)
HCT VFR BLD CALC: 43 % — SIGNIFICANT CHANGE UP (ref 34.5–45)
HGB BLD-MCNC: 13.8 G/DL — SIGNIFICANT CHANGE UP (ref 11.5–15.5)
INR BLD: 2.09 RATIO — HIGH (ref 0.88–1.16)
MAGNESIUM SERPL-MCNC: 2.8 MG/DL — HIGH (ref 1.6–2.6)
MCHC RBC-ENTMCNC: 29.3 PG — SIGNIFICANT CHANGE UP (ref 27–34)
MCHC RBC-ENTMCNC: 32.1 GM/DL — SIGNIFICANT CHANGE UP (ref 32–36)
MCV RBC AUTO: 91.3 FL — SIGNIFICANT CHANGE UP (ref 80–100)
PHOSPHATE SERPL-MCNC: 3 MG/DL — SIGNIFICANT CHANGE UP (ref 2.5–4.5)
PLATELET # BLD AUTO: 273 K/UL — SIGNIFICANT CHANGE UP (ref 150–400)
POTASSIUM SERPL-MCNC: 4.1 MMOL/L — SIGNIFICANT CHANGE UP (ref 3.5–5.3)
POTASSIUM SERPL-SCNC: 4.1 MMOL/L — SIGNIFICANT CHANGE UP (ref 3.5–5.3)
PROTHROM AB SERPL-ACNC: 24.4 SEC — HIGH (ref 10–13.1)
RBC # BLD: 4.71 M/UL — SIGNIFICANT CHANGE UP (ref 3.8–5.2)
RBC # FLD: 17.2 % — HIGH (ref 10.3–14.5)
RH IG SCN BLD-IMP: POSITIVE — SIGNIFICANT CHANGE UP
SODIUM SERPL-SCNC: 142 MMOL/L — SIGNIFICANT CHANGE UP (ref 135–145)
WBC # BLD: 16.53 K/UL — HIGH (ref 3.8–10.5)
WBC # FLD AUTO: 16.53 K/UL — HIGH (ref 3.8–10.5)

## 2019-08-22 PROCEDURE — 93010 ELECTROCARDIOGRAM REPORT: CPT

## 2019-08-22 PROCEDURE — 31513 INJECTION INTO VOCAL CORD: CPT | Mod: RT

## 2019-08-22 PROCEDURE — 99233 SBSQ HOSP IP/OBS HIGH 50: CPT | Mod: GC

## 2019-08-22 PROCEDURE — 99233 SBSQ HOSP IP/OBS HIGH 50: CPT

## 2019-08-22 PROCEDURE — 71045 X-RAY EXAM CHEST 1 VIEW: CPT | Mod: 26

## 2019-08-22 RX ORDER — ACETAMINOPHEN 500 MG
1000 TABLET ORAL ONCE
Refills: 0 | Status: DISCONTINUED | OUTPATIENT
Start: 2019-08-22 | End: 2019-08-22

## 2019-08-22 RX ORDER — ONDANSETRON 8 MG/1
4 TABLET, FILM COATED ORAL ONCE
Refills: 0 | Status: DISCONTINUED | OUTPATIENT
Start: 2019-08-22 | End: 2019-08-22

## 2019-08-22 RX ORDER — SODIUM CHLORIDE 9 MG/ML
500 INJECTION, SOLUTION INTRAVENOUS
Refills: 0 | Status: DISCONTINUED | OUTPATIENT
Start: 2019-08-22 | End: 2019-08-23

## 2019-08-22 RX ORDER — HYDROMORPHONE HYDROCHLORIDE 2 MG/ML
0.25 INJECTION INTRAMUSCULAR; INTRAVENOUS; SUBCUTANEOUS
Refills: 0 | Status: DISCONTINUED | OUTPATIENT
Start: 2019-08-22 | End: 2019-08-22

## 2019-08-22 RX ADMIN — Medication 25 MILLIGRAM(S): at 06:46

## 2019-08-22 RX ADMIN — Medication 37.5 MILLIGRAM(S): at 18:26

## 2019-08-22 RX ADMIN — AMLODIPINE BESYLATE 5 MILLIGRAM(S): 2.5 TABLET ORAL at 06:46

## 2019-08-22 RX ADMIN — Medication 0.25 MILLIGRAM(S): at 20:40

## 2019-08-22 RX ADMIN — Medication 200 MILLIGRAM(S): at 06:46

## 2019-08-22 RX ADMIN — CARBIDOPA AND LEVODOPA 2 TABLET(S): 25; 100 TABLET ORAL at 23:14

## 2019-08-22 RX ADMIN — CARBIDOPA AND LEVODOPA 2 TABLET(S): 25; 100 TABLET ORAL at 06:46

## 2019-08-22 RX ADMIN — Medication 37.5 MILLIGRAM(S): at 06:46

## 2019-08-22 RX ADMIN — Medication 10 MILLIGRAM(S): at 06:46

## 2019-08-22 RX ADMIN — Medication 5 MILLIGRAM(S): at 23:14

## 2019-08-22 RX ADMIN — GABAPENTIN 300 MILLIGRAM(S): 400 CAPSULE ORAL at 23:22

## 2019-08-22 RX ADMIN — SODIUM CHLORIDE 50 MILLILITER(S): 9 INJECTION, SOLUTION INTRAVENOUS at 15:45

## 2019-08-22 RX ADMIN — Medication 200 MILLIGRAM(S): at 23:14

## 2019-08-22 RX ADMIN — HEPARIN SODIUM 600 UNIT(S)/HR: 5000 INJECTION INTRAVENOUS; SUBCUTANEOUS at 07:22

## 2019-08-22 RX ADMIN — Medication 500000 UNIT(S): at 18:25

## 2019-08-22 RX ADMIN — SENNA PLUS 2 TABLET(S): 8.6 TABLET ORAL at 23:14

## 2019-08-22 RX ADMIN — Medication 20 MILLIGRAM(S): at 06:46

## 2019-08-22 RX ADMIN — LOSARTAN POTASSIUM 50 MILLIGRAM(S): 100 TABLET, FILM COATED ORAL at 06:46

## 2019-08-22 RX ADMIN — Medication 200 MILLIGRAM(S): at 18:26

## 2019-08-22 RX ADMIN — FLUCONAZOLE 50 MILLIGRAM(S): 150 TABLET ORAL at 20:39

## 2019-08-22 RX ADMIN — Medication 100 MILLIGRAM(S): at 06:46

## 2019-08-22 RX ADMIN — CARBIDOPA AND LEVODOPA 2 TABLET(S): 25; 100 TABLET ORAL at 15:07

## 2019-08-22 RX ADMIN — Medication 100 MILLIGRAM(S): at 18:26

## 2019-08-22 RX ADMIN — Medication 25 MILLIGRAM(S): at 18:25

## 2019-08-22 RX ADMIN — Medication 500000 UNIT(S): at 06:46

## 2019-08-22 RX ADMIN — Medication 0.25 MILLIGRAM(S): at 08:58

## 2019-08-22 RX ADMIN — Medication 500000 UNIT(S): at 23:20

## 2019-08-22 RX ADMIN — HEPARIN SODIUM 600 UNIT(S)/HR: 5000 INJECTION INTRAVENOUS; SUBCUTANEOUS at 00:15

## 2019-08-22 NOTE — PROGRESS NOTE ADULT - PROBLEM SELECTOR PLAN 1
- new onset Afib , likely 2/2 over diuresis vs infection   - converted back to sinus at around 9 am 8/14  - c/w heparin gtt, plavix discontinued  - holding coumadin in anticipation of VC injection on 8/22. Per cardiology, NOAC is a possibility given patient's MS is not rheumatic etiology. f/u recs on AC choice    - TSH wnl  - c/w metoprolol 25mg BID for rate control per cardiology  - c/w standing lasix 20 mg daily  - f/u cardiology recs  - Repeat ekg unchanged from pior EKG  - Telemetry: Sinus    - Troponins downtrended, likely was due to demand ischemia from Afib with RVR - new onset Afib , likely 2/2 over diuresis vs infection   - converted back to sinus at around 9 am 8/14  - heparin gtt held for OR today, plavix discontinued. F/u ENT regarding when to restart heparin gtt after procedure.   - holding coumadin in anticipation of VC injection on 8/22. Per cardiology, NOAC is a possibility given patient's MS is not rheumatic etiology. f/u recs on AC choice    - TSH wnl  - c/w metoprolol 25mg BID for rate control per cardiology  - c/w standing lasix 20 mg daily  - f/u cardiology recs  - Repeat ekg unchanged from pior EKG  - Telemetry: Sinus    - Troponins downtrended, likely was due to demand ischemia from Afib with RVR

## 2019-08-22 NOTE — PROGRESS NOTE ADULT - PROBLEM SELECTOR PLAN 2
- With acute hypoxic and hypercarbic respiratory failure; now improved.   - patient failed speech and swallow and MBS  - s/p 7-day course of CTX and 3-day course flagyl  - CT chest showing no evidence of pneumonia. Moderate hiatal hernia.  - RVP negative  - leukocytosis stable, likely 2/2 steroid use. Pt has been hemodynamically stable and cough improving, active PNA is unlikely.  - Guaifenesin for cough  - Encourage to use incentive spirometer  - c/w Pulmicort inhaler  - chest PT - With acute hypoxic and hypercarbic respiratory failure; now improved.   - patient failed speech and swallow and MBS. Pt to go for another MBS 8/23.   - s/p 7-day course of CTX and 3-day course flagyl  - CT chest showing no evidence of pneumonia. Moderate hiatal hernia.  - RVP negative  - leukocytosis stable, likely 2/2 steroid use. Pt has been hemodynamically stable and cough improving, active PNA is unlikely.  - Guaifenesin for cough  - Encourage to use incentive spirometer  - c/w Pulmicort inhaler  - chest PT

## 2019-08-22 NOTE — PROGRESS NOTE ADULT - ASSESSMENT
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

## 2019-08-22 NOTE — DISCHARGE NOTE NURSING/CASE MANAGEMENT/SOCIAL WORK - NSDCDPATPORTLINK_GEN_ALL_CORE
You can access the [facility] Patient Portal, offered by [facility], by registering with the following website: [email or web address]

## 2019-08-22 NOTE — DISCHARGE NOTE NURSING/CASE MANAGEMENT/SOCIAL WORK - COMPLETE THE FOLLOWING IF THE PATIENT REFUSES THE INFLUENZA VACCINE:
Vaccine Information Sheet (VIS) provided-VIS date: 8/15/19/Risks/benefits discussed with patient/surrogate

## 2019-08-22 NOTE — DISCHARGE NOTE NURSING/CASE MANAGEMENT/SOCIAL WORK - NSDCPEWEB_GEN_ALL_CORE
[facility] for [facility] website --- [email or web address] --- [email or web address]

## 2019-08-22 NOTE — PROGRESS NOTE ADULT - SUBJECTIVE AND OBJECTIVE BOX
ENT ISSUE/POD: S/P Right VC Cord Injection POD # 0.     HPI: 86 YO F with PMH of GERD, HTN, HLD, DM2 with neuropathy, Parkinson's, anxiety/depression, AS s/p TAVR on plavix, overactive bladder, ?CHF, PMR, COPD p/w SOB and abdominal pain, admitted for acute hypoxic and hypercarbic respiratory failure in setting of pneumonia and COPD exacerbation, course c/b UTI and acute metabolic encephalopathy. ENT called to eval for thrush and Hypophonia. FOE showed Right VC Paralysis. Now S/P Right VC Cord Injection POD # 0.  0.6 ccs of Prolaryn plus injected in to R vocal cord. As per pt, hoarseness improving. Denies SOB/cough/Hemoptysis, CP/Palpitations/ Diaphoresis, HA/Dizziness/ Blurry vision/syncope, fever/ chills, N/V.     PAST MEDICAL & SURGICAL HISTORY:  Insomnia  Iron deficiency anemia  Anxiety  Parkinson disease  Aortic stenosis  Neuropathy  Polymyalgia rheumatica  Diabetes  Hyperlipidemia  Hypertension  S/P TAVR (transcatheter aortic valve replacement)    Allergies.   penicillin (Unknown)    Intolerances.   MEDICATIONS  (STANDING):  amLODIPine   Tablet 5 milliGRAM(s) Oral daily  atorvastatin 40 milliGRAM(s) Oral <User Schedule>  buDESOnide    Inhalation Suspension 0.25 milliGRAM(s) Inhalation two times a day  carbidopa/levodopa  25/100 2 Tablet(s) Oral three times a day  dextrose 5%. 1000 milliLiter(s) (50 mL/Hr) IV Continuous <Continuous>  dextrose 50% Injectable 12.5 Gram(s) IV Push once  dextrose 50% Injectable 25 Gram(s) IV Push once  dextrose 50% Injectable 25 Gram(s) IV Push once  docusate sodium Liquid 100 milliGRAM(s) Oral two times a day  fluconAZOLE IVPB      fluconAZOLE IVPB 100 milliGRAM(s) IV Intermittent every 24 hours  furosemide    Tablet 20 milliGRAM(s) Oral daily  gabapentin   Solution 300 milliGRAM(s) Oral at bedtime  guaiFENesin    Syrup 200 milliGRAM(s) Oral every 6 hours  insulin lispro (HumaLOG) corrective regimen sliding scale   SubCutaneous every 6 hours  lactated ringers. 500 milliLiter(s) (50 mL/Hr) IV Continuous <Continuous>  losartan 50 milliGRAM(s) Oral daily  melatonin 5 milliGRAM(s) Oral at bedtime  metoprolol tartrate 25 milliGRAM(s) Oral two times a day  nystatin    Suspension 170676 Unit(s) Oral four times a day  pantoprazole  Injectable 40 milliGRAM(s) IV Push daily  polyethylene glycol 3350 17 Gram(s) Oral daily  predniSONE   Tablet 10 milliGRAM(s) Oral daily  senna 2 Tablet(s) Oral at bedtime  venlafaxine 37.5 milliGRAM(s) Oral every 12 hours    MEDICATIONS  (PRN):  acetaminophen    Suspension .. 1000 milliGRAM(s) Oral every 6 hours PRN Mild Pain (1 - 3), Moderate Pain (4 - 6)  ALPRAZolam 0.5 milliGRAM(s) Oral two times a day PRN anxiety  dextrose 40% Gel 15 Gram(s) Oral once PRN Blood Glucose LESS THAN 70 milliGRAM(s)/deciliter  glucagon  Injectable 1 milliGRAM(s) IntraMuscular once PRN Glucose LESS THAN 70 milligrams/deciliter.     Social History: SEE HPI.   Family history: SEE HPI.   ROS:   ENT: all negative except as noted in HPI   Pulm: denies SOB, cough, hemoptysis  Neuro: denies numbness/tingling, loss of sensation  Endo: denies heat/cold intolerance, excessive sweating.     Vital Signs Last 24 Hrs  T(C): 36.7 (22 Aug 2019 20:34), Max: 36.8 (22 Aug 2019 00:10)  T(F): 98 (22 Aug 2019 20:34), Max: 98.2 (22 Aug 2019 00:10)  HR: 67 (22 Aug 2019 20:34) (67 - 88)  BP: 166/84 (22 Aug 2019 20:34) (136/61 - 169/81)  BP(mean): 108 (22 Aug 2019 15:30) (88 - 117)  RR: 18 (22 Aug 2019 20:34) (15 - 19)  SpO2: 94% (22 Aug 2019 20:34) (94% - 100%)                          13.8   16.53 )-----------( 273      ( 22 Aug 2019 08:08 )             43.0    08-22    142  |  101  |  51<H>  ----------------------------<  141<H>  4.1   |  27  |  0.89    Ca    9.6      22 Aug 2019 06:36  Phos  3.0     08-22  Mg     2.8     08-22     PT/INR - ( 22 Aug 2019 09:38 )   PT: 24.4 sec;   INR: 2.09 ratio         PTT - ( 22 Aug 2019 09:38 )  PTT:54.9 sec    PHYSICAL EXAM:  Gen: NAD, + Hoarseness Improving.   Skin: No rashes, bruises, or lesions.   Head: Normocephalic, Atraumatic.   Face: no edema, erythema, or fluctuance. Parotid glands soft without mass.   Eyes: no scleral injection.   Nose: Nares bilaterally patent, no discharge, NG Tube in Left Nare.   Mouth: No Stridor / Drooling / Trismus.  Mucosa moist, tongue/uvula midline, oropharynx clear.  Neck: Flat, supple, no lymphadenopathy, trachea midline, no masses.  Lymphatic: No lymphadenopathy.  Resp: breathing easily, no stridor.  Neuro: facial nerve intact, no facial droop.

## 2019-08-22 NOTE — PROGRESS NOTE ADULT - SUBJECTIVE AND OBJECTIVE BOX
***************************************************************  Dang Carvalho, PGY1  Internal Medicine   pager: [phone]  ***************************************************************    COLEMAN MARCH  85y  MRN: [id number]    Patient is a 85y old  Female who presents with a chief complaint of Abdominal pain (21 Aug 2019 14:42)      Subjective: no events ON. Denies fever, CP, SOB, abn pain, N/V, dysuria. Tolerating diet.      MEDICATIONS  (STANDING):  amLODIPine   Tablet 5 milliGRAM(s) Oral daily  atorvastatin 40 milliGRAM(s) Oral <User Schedule>  buDESOnide    Inhalation Suspension 0.25 milliGRAM(s) Inhalation two times a day  carbidopa/levodopa  25/100 2 Tablet(s) Oral three times a day  dextrose 5%. 1000 milliLiter(s) (50 mL/Hr) IV Continuous <Continuous>  dextrose 50% Injectable 12.5 Gram(s) IV Push once  dextrose 50% Injectable 25 Gram(s) IV Push once  dextrose 50% Injectable 25 Gram(s) IV Push once  docusate sodium Liquid 100 milliGRAM(s) Oral two times a day  fluconAZOLE IVPB      fluconAZOLE IVPB 100 milliGRAM(s) IV Intermittent every 24 hours  furosemide    Tablet 20 milliGRAM(s) Oral daily  gabapentin   Solution 300 milliGRAM(s) Oral at bedtime  guaiFENesin    Syrup 200 milliGRAM(s) Oral every 6 hours  heparin  Infusion.  Unit(s)/Hr (11 mL/Hr) IV Continuous <Continuous>  insulin lispro (HumaLOG) corrective regimen sliding scale   SubCutaneous every 6 hours  losartan 50 milliGRAM(s) Oral daily  melatonin 5 milliGRAM(s) Oral at bedtime  metoprolol tartrate 25 milliGRAM(s) Oral two times a day  nystatin    Suspension 560328 Unit(s) Oral four times a day  pantoprazole  Injectable 40 milliGRAM(s) IV Push daily  polyethylene glycol 3350 17 Gram(s) Oral daily  predniSONE   Tablet 10 milliGRAM(s) Oral daily  senna 2 Tablet(s) Oral at bedtime  venlafaxine 37.5 milliGRAM(s) Oral every 12 hours    MEDICATIONS  (PRN):  acetaminophen    Suspension .. 1000 milliGRAM(s) Oral every 6 hours PRN Mild Pain (1 - 3), Moderate Pain (4 - 6)  ALPRAZolam 0.5 milliGRAM(s) Oral two times a day PRN anxiety  dextrose 40% Gel 15 Gram(s) Oral once PRN Blood Glucose LESS THAN 70 milliGRAM(s)/deciliter  glucagon  Injectable 1 milliGRAM(s) IntraMuscular once PRN Glucose LESS THAN 70 milligrams/deciliter  heparin  Injectable 5000 Unit(s) IV Push every 6 hours PRN For aPTT less than 40  heparin  Injectable 2500 Unit(s) IV Push every 6 hours PRN For aPTT between 40 - 57      Objective:    Vitals: Vital Signs Last 24 Hrs  T(C): 36.7 (08-22-19 @ 04:22), Max: 36.9 (08-21-19 @ 11:28)  T(F): 98.1 (08-22-19 @ 04:22), Max: 98.5 (08-21-19 @ 11:28)  HR: 82 (08-22-19 @ 04:22) (74 - 83)  BP: 152/91 (08-22-19 @ 04:22) (142/78 - 157/84)  BP(mean): --  RR: 19 (08-22-19 @ 04:22) (17 - 19)  SpO2: 96% (08-22-19 @ 04:22) (94% - 97%)            I&O's Summary    20 Aug 2019 07:01  -  21 Aug 2019 07:00  --------------------------------------------------------  IN: 522 mL / OUT: 1400 mL / NET: -878 mL    21 Aug 2019 07:01  -  22 Aug 2019 06:48  --------------------------------------------------------  IN: 200 mL / OUT: 875 mL / NET: -675 mL        PHYSICAL EXAM:  GENERAL: NAD  HEAD:  Atraumatic, Normocephalic  EYES: EOMI, conjunctiva and sclera clear  CHEST/LUNG: Clear to percussion bilaterally; No rales, rhonchi, wheezing, or rubs  HEART: Regular rate and rhythm; No murmurs, rubs, or gallops  ABDOMEN: Soft, Nontender, Nondistended;   SKIN: No rashes or lesions  NERVOUS SYSTEM:  Alert & Oriented X3, no focal deficit    LABS:  08-21    146<H>  |  105  |  58<H>  ----------------------------<  142<H>  4.0   |  26  |  0.95  08-20    145  |  104  |  62<H>  ----------------------------<  149<H>  3.9   |  25  |  0.74  08-19    144  |  103  |  61<H>  ----------------------------<  162<H>  3.6   |  24  |  1.01    Ca    9.6      21 Aug 2019 05:51  Ca    9.7      20 Aug 2019 07:06  Ca    10.2      19 Aug 2019 08:45  Phos  3.1     08-21  Mg     2.9     08-21        PTT - ( 21 Aug 2019 23:49 )  PTT:95.8 sec                                        14.1   16.89 )-----------( 321      ( 21 Aug 2019 08:33 )             45.8                         14.7   17.56 )-----------( 375      ( 20 Aug 2019 08:05 )             45.4                         14.1   16.20 )-----------( 408      ( 19 Aug 2019 11:40 )             43.7     CAPILLARY BLOOD GLUCOSE      POCT Blood Glucose.: 121 mg/dL (21 Aug 2019 23:30)  POCT Blood Glucose.: 134 mg/dL (21 Aug 2019 18:02)  POCT Blood Glucose.: 142 mg/dL (21 Aug 2019 12:06)      RADIOLOGY & ADDITIONAL TESTS:    Imaging Personally Reviewed:  [x ] YES  [ ] NO    Consultants involved in case:   Consultant(s) Notes Reviewed:  [ x] YES  [ ] NO:   Care Discussed with Consultants/Other Providers [x ] YES  [ ] NO ***************************************************************  Dang Carvalho, PGY1  Internal Medicine   pager: [phone]  ***************************************************************    COLEMAN MARCH  85y  MRN: [id number]    Patient is a 85y old  Female who presents with a chief complaint of Abdominal pain (21 Aug 2019 14:42)      Subjective: no events ON. Sinus 70-80s on tele w/ occasional PVCs. Reports mild b/l temporal headache. Denies fever, CP, SOB, abn pain, N/V, dysuria. NG tube in place. NPO since midnight. Heparin discontinued for OR today.      MEDICATIONS  (STANDING):  amLODIPine   Tablet 5 milliGRAM(s) Oral daily  atorvastatin 40 milliGRAM(s) Oral <User Schedule>  buDESOnide    Inhalation Suspension 0.25 milliGRAM(s) Inhalation two times a day  carbidopa/levodopa  25/100 2 Tablet(s) Oral three times a day  dextrose 5%. 1000 milliLiter(s) (50 mL/Hr) IV Continuous <Continuous>  dextrose 50% Injectable 12.5 Gram(s) IV Push once  dextrose 50% Injectable 25 Gram(s) IV Push once  dextrose 50% Injectable 25 Gram(s) IV Push once  docusate sodium Liquid 100 milliGRAM(s) Oral two times a day  fluconAZOLE IVPB      fluconAZOLE IVPB 100 milliGRAM(s) IV Intermittent every 24 hours  furosemide    Tablet 20 milliGRAM(s) Oral daily  gabapentin   Solution 300 milliGRAM(s) Oral at bedtime  guaiFENesin    Syrup 200 milliGRAM(s) Oral every 6 hours  heparin  Infusion.  Unit(s)/Hr (11 mL/Hr) IV Continuous <Continuous>  insulin lispro (HumaLOG) corrective regimen sliding scale   SubCutaneous every 6 hours  losartan 50 milliGRAM(s) Oral daily  melatonin 5 milliGRAM(s) Oral at bedtime  metoprolol tartrate 25 milliGRAM(s) Oral two times a day  nystatin    Suspension 666322 Unit(s) Oral four times a day  pantoprazole  Injectable 40 milliGRAM(s) IV Push daily  polyethylene glycol 3350 17 Gram(s) Oral daily  predniSONE   Tablet 10 milliGRAM(s) Oral daily  senna 2 Tablet(s) Oral at bedtime  venlafaxine 37.5 milliGRAM(s) Oral every 12 hours    MEDICATIONS  (PRN):  acetaminophen    Suspension .. 1000 milliGRAM(s) Oral every 6 hours PRN Mild Pain (1 - 3), Moderate Pain (4 - 6)  ALPRAZolam 0.5 milliGRAM(s) Oral two times a day PRN anxiety  dextrose 40% Gel 15 Gram(s) Oral once PRN Blood Glucose LESS THAN 70 milliGRAM(s)/deciliter  glucagon  Injectable 1 milliGRAM(s) IntraMuscular once PRN Glucose LESS THAN 70 milligrams/deciliter  heparin  Injectable 5000 Unit(s) IV Push every 6 hours PRN For aPTT less than 40  heparin  Injectable 2500 Unit(s) IV Push every 6 hours PRN For aPTT between 40 - 57      Objective:    Vitals: Vital Signs Last 24 Hrs  T(C): 36.7 (08-22-19 @ 04:22), Max: 36.9 (08-21-19 @ 11:28)  T(F): 98.1 (08-22-19 @ 04:22), Max: 98.5 (08-21-19 @ 11:28)  HR: 82 (08-22-19 @ 04:22) (74 - 83)  BP: 152/91 (08-22-19 @ 04:22) (142/78 - 157/84)  BP(mean): --  RR: 19 (08-22-19 @ 04:22) (17 - 19)  SpO2: 96% (08-22-19 @ 04:22) (94% - 97%)            I&O's Summary    20 Aug 2019 07:01  -  21 Aug 2019 07:00  --------------------------------------------------------  IN: 522 mL / OUT: 1400 mL / NET: -878 mL    21 Aug 2019 07:01  -  22 Aug 2019 06:48  --------------------------------------------------------  IN: 200 mL / OUT: 875 mL / NET: -675 mL        PHYSICAL EXAM:  GENERAL: NAD, obese, intermittently coughing   HEAD:  Atraumatic, Normocephalic  ENMT: moist mucous membranes, improved white-yellows thrush on tongue  NECK: Supple, No JVD  CHEST/LUNG: coarse upper airway breath sounds b/l. No accessory muscle use.  HEART: regular rate and rhythm; No murmurs appreciated on exam  ABDOMEN: Soft, non-tender, Nondistended; Bowel sounds present  EXTREMITIES: No clubbing, cyanosis, or peripheral edema   LYMPH: No lymphadenopathy noted  SKIN: No rashes or lesions  NERVOUS SYSTEM: AOx2, conversive and answers questions appropriately.    LABS:  08-21    146<H>  |  105  |  58<H>  ----------------------------<  142<H>  4.0   |  26  |  0.95  08-20    145  |  104  |  62<H>  ----------------------------<  149<H>  3.9   |  25  |  0.74  08-19    144  |  103  |  61<H>  ----------------------------<  162<H>  3.6   |  24  |  1.01    Ca    9.6      21 Aug 2019 05:51  Ca    9.7      20 Aug 2019 07:06  Ca    10.2      19 Aug 2019 08:45  Phos  3.1     08-21  Mg     2.9     08-21        PTT - ( 21 Aug 2019 23:49 )  PTT:95.8 sec                                        14.1   16.89 )-----------( 321      ( 21 Aug 2019 08:33 )             45.8                         14.7   17.56 )-----------( 375      ( 20 Aug 2019 08:05 )             45.4                         14.1   16.20 )-----------( 408      ( 19 Aug 2019 11:40 )             43.7     CAPILLARY BLOOD GLUCOSE      POCT Blood Glucose.: 121 mg/dL (21 Aug 2019 23:30)  POCT Blood Glucose.: 134 mg/dL (21 Aug 2019 18:02)  POCT Blood Glucose.: 142 mg/dL (21 Aug 2019 12:06)      RADIOLOGY & ADDITIONAL TESTS:    Imaging Personally Reviewed:  [x ] YES  [ ] NO    Consultants involved in case:   Consultant(s) Notes Reviewed:  [ x] YES  [ ] NO:   Care Discussed with Consultants/Other Providers [x ] YES  [ ] NO

## 2019-08-22 NOTE — BRIEF OPERATIVE NOTE - NSICDXBRIEFPROCEDURE_GEN_ALL_CORE_FT
PROCEDURES:  Direct laryngoscopy with injection of vocal cord 22-Aug-2019 15:08:20 .6 ccs of Prolaryn plus injected in to R vocal cord Yasmin Nunez

## 2019-08-22 NOTE — PROGRESS NOTE ADULT - PROBLEM SELECTOR PLAN 1
- S/P Right VC Cord Injection POD # 0.  - As per pt, Hoarseness improving.   - Monitor SOB.   - ENT will follow.   - Call with questions.     SEFERINO RENTERIA PA-C.   # [id number]  ENT. - S/P Right VC Cord Injection POD # 0.  - As per pt, Hoarseness improving.   - Monitor SOB.   - Aspiration Precautions.   - c/w NGT feeds.   - f/u S&S recs.   - c/w Fluconazole IV and Nystatin.   - ENT will follow.   - Call with questions.     SEFERINO RENTERIA PA-C.   # [id number]  ENT.

## 2019-08-22 NOTE — PROGRESS NOTE ADULT - PROBLEM SELECTOR PLAN 7
- stable at 120s-140ss/80s  - c/w home dose 50mg QD of losartan. Consider uptitrating meds further.   - c/w amlodipine 5mg QD  - c/w metoprolol 25 BID for rate control and BP  - continue to monitor  - Vitals Q4H

## 2019-08-22 NOTE — PROGRESS NOTE ADULT - PROBLEM SELECTOR PLAN 6
- breathing more comfortably and cough is better now  - Per son, no formal diagnosis of COPD  - c/w home dose prednisone 10mg daily chronically for PMR/?GCA. Right temporal artery biopsy reportedly negative, but she gets right sided headaches. She was never put on high dose steroid with a taper in the past per PCP office.  -Repeat ESR/CRP elevated, though non-specific and likely to be elevated in setting of infections  -S/p additional prednisone 20mg x 1 on 8/15 for ?temporal arteritis HA with temporary improvement.   -Trigeminal neuralgia and migraine on the differential given right HA associated with maxillary/jaw pain and light sensitivity--c/w tylenol 1g q6h PRN given her comorbidities and planned procedure. Consider giving additional prednisone 10mg if HA is severe and not improved with tylenol  - Maintain O2 sats between 88-92%

## 2019-08-22 NOTE — DISCHARGE NOTE NURSING/CASE MANAGEMENT/SOCIAL WORK - NSDCPEPTSTRK_GEN_ALL_CORE
Stroke warning signs and symptoms/Signs and symptoms of stroke/Call 911 for stroke/Prescribed medications/Risk factors for stroke/Stroke support groups for patients, families, and friends/Need for follow up after discharge/Stroke education booklet

## 2019-08-22 NOTE — PROGRESS NOTE ADULT - ASSESSMENT
84 YO F admitted for acute hypoxic and hypercarbic respiratory failure in setting of pneumonia and COPD exacerbation. ENT called to eval for thrush and Hypophonia. FOE showed Right VC Paralysis. Now S/P Right VC Cord Injection POD # 0.  0.6 ccs of Prolaryn plus injected in to R vocal cord. As per pt, hoarseness improving.

## 2019-08-22 NOTE — PROGRESS NOTE ADULT - PROBLEM SELECTOR PLAN 4
- failed speech and swallow study and MBS  - ENT consulted: CT neck w/ con--vocal cord paralysis, also concerns for esophagitis/esophogeal mass/Zenker's diverticulum. Planned to get vocal injection on 8/22. f/u pre-op recs (cardiac clearance obtained and will order preop labs) and hold heparin drip 6 hrs prior, NPO at MN.   - repeat MBS after VC injection, now on schedule for Friday. If swallow improves, consider dc NG tube. 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 Atrophy and extensive small vessel white matter ischemic changes. No acute changes    - c/w diflucan and nystatin for oral candidiasis. Will need an extended course of at least 14 days (8/16--8/30)   - now with NG tube for meds and feeds   - Aspiration precautions and Elevate head of bed - failed speech and swallow study and MBS  - ENT consulted: CT neck w/ con--vocal cord paralysis, also concerns for esophagitis/esophogeal mass/Zenker's diverticulum. Planned to get vocal injection today. Pre-op labs completed. Heparin drip held 6 hrs prior, NPO at MN.   - repeat MBS after VC injection, now on schedule for Friday. If swallow improves, consider dc NG tube. 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 Atrophy and extensive small vessel white matter ischemic changes. No acute changes    - c/w diflucan and nystatin for oral candidiasis. Will need an extended course of at least 14 days (8/16--8/30)   - now with NG tube for meds and feeds   - Aspiration precautions and Elevate head of bed - failed speech and swallow study and MBS  - ENT consulted: CT neck w/ con--vocal cord paralysis, also concerns for esophagitis/esophogeal mass/Zenker's diverticulum. Planned to get vocal injection today. Pre-op labs completed. Heparin drip held 6 hrs prior, NPO at MN.   - repeat MBS after VC injection, now on schedule for Friday. If swallow improves, consider dc NG tube. 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 Atrophy and extensive small vessel white matter ischemic changes. No acute changes    - c/w diflucan and nystatin for oral candidiasis. Will need an extended course of at least 14 days (8/16--8/30), however will consider 21 day course based on severity.    - now with NG tube for meds and feeds   - Aspiration precautions and Elevate head of bed

## 2019-08-22 NOTE — DISCHARGE NOTE NURSING/CASE MANAGEMENT/SOCIAL WORK - NSDCPEPT PROEDHF_GEN_ALL_CORE
Monitor weight daily/Call primary care provider for follow up after discharge/Report signs and symptoms to primary care provider/Low salt diet/Activities as tolerated

## 2019-08-22 NOTE — PROGRESS NOTE ADULT - PROBLEM SELECTOR PLAN 10
- Patient not on home anti-hyperglycemics  - FS acceptable  - c/w Insulin sliding scale    Hypernatremia  -increase free water to 250cc TID for now    Constipation  -Patient with large stool burden on imaging, but no SBO  -c/w Miralax, colace, senna  -continue to monitor    Prophylaxis:  DVT ppx: on heparin gtt, holding coumadin now. Hold heparin drip 6hrs prior to procedure. Consider NOAC per cardiology  Diet: NPO with NG tube feed per nutrition recs. NPO after midnight for procedure on 8/22  Dispo: PT recs subacute rehab - Patient not on home anti-hyperglycemics  - FS acceptable  - c/w Insulin sliding scale    Hypernatremia  -increase free water to 250cc TID for now    Constipation  -Patient with large stool burden on imaging, but no SBO  -c/w Miralax, colace, senna  -continue to monitor    Prophylaxis:  DVT ppx: on heparin gtt, holding coumadin now. Hold heparin drip 6hrs prior to procedure. Consider NOAC per cardiology  Diet: Will f/u w/ ENT about when to restart NG tube feeds after OR.   Dispo: PT recs subacute rehab - Patient not on home anti-hyperglycemics  - FS acceptable  - c/w Insulin sliding scale    Hypernatremia  -increased free water to 250cc TID for now    Constipation  -Patient with large stool burden on imaging, but no SBO  -c/w Miralax, colace, senna  -continue to monitor    Prophylaxis:  DVT ppx: on heparin gtt, holding coumadin now. Hold heparin drip 6hrs prior to procedure. Consider NOAC per cardiology  Diet: Will f/u w/ ENT about when to restart NG tube feeds after OR.   Dispo: PT recs subacute rehab

## 2019-08-22 NOTE — PRE-ANESTHESIA EVALUATION ADULT - NSANTHOSAYNRD_GEN_A_CORE
No. TAPAN screening performed.  STOP BANG Legend: 0-2 = LOW Risk; 3-4 = INTERMEDIATE Risk; 5-8 = HIGH Risk

## 2019-08-22 NOTE — DISCHARGE NOTE NURSING/CASE MANAGEMENT/SOCIAL WORK - NSDCPEEMAIL_GEN_ALL_CORE
[facility] for Tobacco Control email [email or web address]

## 2019-08-22 NOTE — PROGRESS NOTE ADULT - ATTENDING COMMENTS
-Patient seen/examined on 8/22/19. Case/plan discussed with the intern and resident as reviewed/edited by me above and in any comments below. -Patient seen/examined on 8/22/19. Case/plan discussed with the intern and resident as reviewed/edited by me above and in any comments below.  -Patient seen in PACU. Voice seems somewhat improved after the procedure.   -Will attempt repeat MBS tomorrow.   -Discussed with cardio. NOAC would be an acceptable option for AC for this patient.

## 2019-08-23 LAB
ANION GAP SERPL CALC-SCNC: 14 MMOL/L — SIGNIFICANT CHANGE UP (ref 5–17)
APTT BLD: 140.5 SEC — CRITICAL HIGH (ref 27.5–36.3)
APTT BLD: 29.6 SEC — SIGNIFICANT CHANGE UP (ref 27.5–36.3)
BUN SERPL-MCNC: 42 MG/DL — HIGH (ref 7–23)
CALCIUM SERPL-MCNC: 9.5 MG/DL — SIGNIFICANT CHANGE UP (ref 8.4–10.5)
CHLORIDE SERPL-SCNC: 103 MMOL/L — SIGNIFICANT CHANGE UP (ref 96–108)
CHOLEST SERPL-MCNC: 265 MG/DL — HIGH (ref 10–199)
CO2 SERPL-SCNC: 27 MMOL/L — SIGNIFICANT CHANGE UP (ref 22–31)
CREAT SERPL-MCNC: 0.73 MG/DL — SIGNIFICANT CHANGE UP (ref 0.5–1.3)
GLUCOSE SERPL-MCNC: 120 MG/DL — HIGH (ref 70–99)
HCT VFR BLD CALC: 43.4 % — SIGNIFICANT CHANGE UP (ref 34.5–45)
HCT VFR BLD CALC: 43.4 % — SIGNIFICANT CHANGE UP (ref 34.5–45)
HDLC SERPL-MCNC: 45 MG/DL — LOW
HGB BLD-MCNC: 13.4 G/DL — SIGNIFICANT CHANGE UP (ref 11.5–15.5)
HGB BLD-MCNC: 13.9 G/DL — SIGNIFICANT CHANGE UP (ref 11.5–15.5)
INR BLD: 1.25 RATIO — HIGH (ref 0.88–1.16)
LIPID PNL WITH DIRECT LDL SERPL: SIGNIFICANT CHANGE UP MG/DL
MAGNESIUM SERPL-MCNC: 2.6 MG/DL — SIGNIFICANT CHANGE UP (ref 1.6–2.6)
MCHC RBC-ENTMCNC: 27.7 PG — SIGNIFICANT CHANGE UP (ref 27–34)
MCHC RBC-ENTMCNC: 29.1 PG — SIGNIFICANT CHANGE UP (ref 27–34)
MCHC RBC-ENTMCNC: 30.9 GM/DL — LOW (ref 32–36)
MCHC RBC-ENTMCNC: 32.1 GM/DL — SIGNIFICANT CHANGE UP (ref 32–36)
MCV RBC AUTO: 89.7 FL — SIGNIFICANT CHANGE UP (ref 80–100)
MCV RBC AUTO: 90.8 FL — SIGNIFICANT CHANGE UP (ref 80–100)
PHOSPHATE SERPL-MCNC: 4.2 MG/DL — SIGNIFICANT CHANGE UP (ref 2.5–4.5)
PLATELET # BLD AUTO: 252 K/UL — SIGNIFICANT CHANGE UP (ref 150–400)
PLATELET # BLD AUTO: 263 K/UL — SIGNIFICANT CHANGE UP (ref 150–400)
POTASSIUM SERPL-MCNC: 4.4 MMOL/L — SIGNIFICANT CHANGE UP (ref 3.5–5.3)
POTASSIUM SERPL-SCNC: 4.4 MMOL/L — SIGNIFICANT CHANGE UP (ref 3.5–5.3)
PROTHROM AB SERPL-ACNC: 14.2 SEC — HIGH (ref 10–13.1)
RBC # BLD: 4.78 M/UL — SIGNIFICANT CHANGE UP (ref 3.8–5.2)
RBC # BLD: 4.84 M/UL — SIGNIFICANT CHANGE UP (ref 3.8–5.2)
RBC # FLD: 14.8 % — HIGH (ref 10.3–14.5)
RBC # FLD: 16.8 % — HIGH (ref 10.3–14.5)
SODIUM SERPL-SCNC: 144 MMOL/L — SIGNIFICANT CHANGE UP (ref 135–145)
TOTAL CHOLESTEROL/HDL RATIO MEASUREMENT: 5.9 RATIO — SIGNIFICANT CHANGE UP (ref 3.3–7.1)
TRIGL SERPL-MCNC: 442 MG/DL — HIGH (ref 10–149)
WBC # BLD: 15.49 K/UL — HIGH (ref 3.8–10.5)
WBC # BLD: 20.6 K/UL — HIGH (ref 3.8–10.5)
WBC # FLD AUTO: 15.49 K/UL — HIGH (ref 3.8–10.5)
WBC # FLD AUTO: 20.6 K/UL — HIGH (ref 3.8–10.5)

## 2019-08-23 PROCEDURE — 99233 SBSQ HOSP IP/OBS HIGH 50: CPT | Mod: GC

## 2019-08-23 PROCEDURE — 71045 X-RAY EXAM CHEST 1 VIEW: CPT | Mod: 26

## 2019-08-23 PROCEDURE — 74230 X-RAY XM SWLNG FUNCJ C+: CPT | Mod: 26

## 2019-08-23 PROCEDURE — 99231 SBSQ HOSP IP/OBS SF/LOW 25: CPT

## 2019-08-23 RX ORDER — HEPARIN SODIUM 5000 [USP'U]/ML
2500 INJECTION INTRAVENOUS; SUBCUTANEOUS EVERY 6 HOURS
Refills: 0 | Status: DISCONTINUED | OUTPATIENT
Start: 2019-08-23 | End: 2019-08-26

## 2019-08-23 RX ORDER — HEPARIN SODIUM 5000 [USP'U]/ML
INJECTION INTRAVENOUS; SUBCUTANEOUS
Qty: 25000 | Refills: 0 | Status: DISCONTINUED | OUTPATIENT
Start: 2019-08-23 | End: 2019-08-26

## 2019-08-23 RX ORDER — ALPRAZOLAM 0.25 MG
0.5 TABLET ORAL
Refills: 0 | Status: DISCONTINUED | OUTPATIENT
Start: 2019-08-23 | End: 2019-08-30

## 2019-08-23 RX ORDER — HEPARIN SODIUM 5000 [USP'U]/ML
5000 INJECTION INTRAVENOUS; SUBCUTANEOUS EVERY 6 HOURS
Refills: 0 | Status: DISCONTINUED | OUTPATIENT
Start: 2019-08-23 | End: 2019-08-26

## 2019-08-23 RX ADMIN — HEPARIN SODIUM 0 UNIT(S)/HR: 5000 INJECTION INTRAVENOUS; SUBCUTANEOUS at 19:54

## 2019-08-23 RX ADMIN — Medication 25 MILLIGRAM(S): at 05:59

## 2019-08-23 RX ADMIN — Medication 10 MILLIGRAM(S): at 06:00

## 2019-08-23 RX ADMIN — Medication 200 MILLIGRAM(S): at 11:44

## 2019-08-23 RX ADMIN — CARBIDOPA AND LEVODOPA 2 TABLET(S): 25; 100 TABLET ORAL at 22:18

## 2019-08-23 RX ADMIN — GABAPENTIN 300 MILLIGRAM(S): 400 CAPSULE ORAL at 22:21

## 2019-08-23 RX ADMIN — Medication 20 MILLIGRAM(S): at 06:00

## 2019-08-23 RX ADMIN — PANTOPRAZOLE SODIUM 40 MILLIGRAM(S): 20 TABLET, DELAYED RELEASE ORAL at 11:44

## 2019-08-23 RX ADMIN — Medication 5 MILLIGRAM(S): at 22:18

## 2019-08-23 RX ADMIN — Medication 0.25 MILLIGRAM(S): at 11:45

## 2019-08-23 RX ADMIN — Medication 500000 UNIT(S): at 11:44

## 2019-08-23 RX ADMIN — Medication 1000 MILLIGRAM(S): at 23:42

## 2019-08-23 RX ADMIN — Medication 500000 UNIT(S): at 17:32

## 2019-08-23 RX ADMIN — AMLODIPINE BESYLATE 5 MILLIGRAM(S): 2.5 TABLET ORAL at 06:00

## 2019-08-23 RX ADMIN — Medication 25 MILLIGRAM(S): at 17:35

## 2019-08-23 RX ADMIN — Medication 100 MILLIGRAM(S): at 05:59

## 2019-08-23 RX ADMIN — Medication 37.5 MILLIGRAM(S): at 06:00

## 2019-08-23 RX ADMIN — LOSARTAN POTASSIUM 50 MILLIGRAM(S): 100 TABLET, FILM COATED ORAL at 05:59

## 2019-08-23 RX ADMIN — POLYETHYLENE GLYCOL 3350 17 GRAM(S): 17 POWDER, FOR SOLUTION ORAL at 11:45

## 2019-08-23 RX ADMIN — Medication 1000 MILLIGRAM(S): at 22:18

## 2019-08-23 RX ADMIN — Medication 500000 UNIT(S): at 05:59

## 2019-08-23 RX ADMIN — SENNA PLUS 2 TABLET(S): 8.6 TABLET ORAL at 22:18

## 2019-08-23 RX ADMIN — Medication 200 MILLIGRAM(S): at 05:59

## 2019-08-23 RX ADMIN — Medication 37.5 MILLIGRAM(S): at 17:33

## 2019-08-23 RX ADMIN — CARBIDOPA AND LEVODOPA 2 TABLET(S): 25; 100 TABLET ORAL at 13:16

## 2019-08-23 RX ADMIN — HEPARIN SODIUM 1100 UNIT(S)/HR: 5000 INJECTION INTRAVENOUS; SUBCUTANEOUS at 13:14

## 2019-08-23 RX ADMIN — Medication 200 MILLIGRAM(S): at 17:32

## 2019-08-23 RX ADMIN — CARBIDOPA AND LEVODOPA 2 TABLET(S): 25; 100 TABLET ORAL at 06:00

## 2019-08-23 RX ADMIN — Medication 100 MILLIGRAM(S): at 17:32

## 2019-08-23 RX ADMIN — HEPARIN SODIUM 900 UNIT(S)/HR: 5000 INJECTION INTRAVENOUS; SUBCUTANEOUS at 20:59

## 2019-08-23 RX ADMIN — Medication 0.25 MILLIGRAM(S): at 22:18

## 2019-08-23 RX ADMIN — FLUCONAZOLE 50 MILLIGRAM(S): 150 TABLET ORAL at 21:32

## 2019-08-23 NOTE — PROGRESS NOTE ADULT - PROBLEM SELECTOR PLAN 1
- Monitor SOB.   - Aspiration Precautions.   - c/w NGT feeds.   - f/u S&S recs.   - no further ent intervention at this time

## 2019-08-23 NOTE — PROGRESS NOTE ADULT - ASSESSMENT
84 YO S/P Right VC Cord Injection POD # 1.  0.6 ccs of Prolaryn plus injected in to R vocal cord. As per pt, hoarseness improving.

## 2019-08-23 NOTE — SWALLOW VFSS/MBS ASSESSMENT ADULT - ADDITIONAL INFORMATION
+ multi-level C-spine changes with anterior cervical osteophytes + calcification of laryngeal structures  + multi-level C-spine changes with anterior cervical osteophytes at C2-4 which impede bolus flow

## 2019-08-23 NOTE — SWALLOW VFSS/MBS ASSESSMENT ADULT - COMMENTS
8/10- Abdomen/Pelvis CT- dependent groundglass opacity in both lower lobes reflective of atelectasis   8/14- s/p RRT for new onset afib with RVR to 150s.  8/14- Transferred from [address] [place] [address] to [address] [place] [address]  8/14- Failed Swallow Screen- RN reports wet gurgly voice, coughing and throat clearing  8/15- Pt presented with a BP of 182/94  8/15- Per MD->Problem: Pneumonia.  Plan: -With acute hypoxic and hypercarbic respiratory failure, c/w CTX for CAP, Taper off NC as tolerated, BIPAP prn for hypercarbia, RVP negative, leukocytosis is downtrending.  8/15- ENT consulted for hypophonia, dysphagia->grossly aspirating secretions, decreased sensation, right VC paralyzed with a left lateral pharyngeal bulge, looks pulsatile, likely carotid.

## 2019-08-23 NOTE — PROGRESS NOTE ADULT - PROBLEM SELECTOR PLAN 1
- new onset Afib , likely 2/2 over diuresis vs infection   - converted back to sinus at around 9 am 8/14  - heparin gtt held for OR today, plavix discontinued. F/u ENT regarding when to restart heparin gtt after procedure.   - holding coumadin in anticipation of VC injection on 8/22. Per cardiology, NOAC is a possibility given patient's MS is not rheumatic etiology. f/u recs on AC choice    - TSH wnl  - c/w metoprolol 25mg BID for rate control per cardiology  - c/w standing lasix 20 mg daily  - f/u cardiology recs  - Repeat ekg unchanged from pior EKG  - Telemetry: Sinus    - Troponins downtrended, likely was due to demand ischemia from Afib with RVR - new onset Afib , likely 2/2 over diuresis vs infection   - converted back to sinus at around 9 am 8/14  - resume heparin gtt, f/u GI recs on holding heparin for anticipation of PEG tube placement  - holding coumadin in anticipation of VC injection on 8/22. Per cardiology, NOAC is a possibility given patient's MS is not rheumatic etiology. f/u recs on AC choice    - TSH wnl  - c/w metoprolol 25mg BID for rate control per cardiology  - c/w standing lasix 20 mg daily  - f/u cardiology recs  - Repeat ekg unchanged from pior EKG  - Telemetry: Sinus 70-90 with PACs ON   - Troponins downtrended, likely was due to demand ischemia from Afib with RVR - new onset Afib , likely 2/2 over diuresis vs infection   - converted back to sinus at around 9 am 8/14  - resume heparin gtt, f/u GI recs on holding heparin for anticipation of PEG tube placement on Monday morning.   - holding coumadin in anticipation of VC injection on 8/22. Per cardiology, NOAC is a possibility given patient's MS is not rheumatic etiology. f/u recs on AC choice    - TSH wnl  - c/w metoprolol 25mg BID for rate control per cardiology  - c/w standing lasix 20 mg daily  - f/u cardiology recs  - Repeat ekg unchanged from pior EKG  - Telemetry: Sinus 70-90 with PACs ON   - Troponins downtrended, likely was due to demand ischemia from Afib with RVR

## 2019-08-23 NOTE — SWALLOW VFSS/MBS ASSESSMENT ADULT - LARYNGEAL PENETRATION AFTER THE SWALLOW - SILENT
over laryngeal surface of the epiglottis and arytenoids from vallecular and pyriform residue/Moderate over the laryngeal surfaces of the epiglottis and arytenoids, from vallecular and pyriform residue/Moderate

## 2019-08-23 NOTE — SWALLOW VFSS/MBS ASSESSMENT ADULT - ORAL PHASE
+ Tongue pumping; max spillover to the valleculae; mild lingual residue/Incomplete tongue to palate contact/Uncontrolled bolus / spillover in hypopharynx spillage to the pyriforms/Residue in oral cavity/Incomplete tongue to palate contact/Uncontrolled bolus / spillover in hypopharynx

## 2019-08-23 NOTE — PROGRESS NOTE ADULT - ATTENDING COMMENTS
-Patient seen/examined on 8/23/19. Case/plan discussed with the intern and resident as reviewed/edited by me above and in any comments below.  -Updated patient's son over the phone. -Patient seen/examined on 8/23/19. Case/plan discussed with the intern and resident as reviewed/edited by me above and in any comments below.  -Updated patient's son over the phone.  -Patient and son agreeable to PEG tube at this time.

## 2019-08-23 NOTE — PROGRESS NOTE ADULT - ASSESSMENT
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 She was admitted for acute hypoxic and hypercarbic respiratory failure in setting of pneumonia and COPD exacerbation, course c/b UTI, acute metabolic encephalopathy, new onset Afib. 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

## 2019-08-23 NOTE — PROGRESS NOTE ADULT - PROBLEM SELECTOR PLAN 4
- failed speech and swallow study and MBS  - ENT consulted: CT neck w/ con--vocal cord paralysis, also concerns for esophagitis/esophogeal mass/Zenker's diverticulum. Planned to get vocal injection today. Pre-op labs completed. Heparin drip held 6 hrs prior, NPO at [facility].   - repeat MBS after VC injection, now on schedule for Friday. If swallow improves, consider dc NG tube. 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 Atrophy and extensive small vessel white matter ischemic changes. No acute changes    - c/w diflucan and nystatin for oral candidiasis. Will need an extended course of at least 14 days (8/16--8/30), however will consider 21 day course based on severity.    - now with NG tube for meds and feeds   - Aspiration precautions and Elevate head of bed - failed speech and swallow study and MBS  - ENT consulted: CT neck w/ con--vocal cord paralysis, also concerns for esophagitis/esophogeal mass/Zenker's diverticulum. Now s/p vocal injection. Pt failed repeat MBS, GI consulted for PEG tube placement. 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 Atrophy and extensive small vessel white matter ischemic changes. No acute changes    - c/w diflucan and nystatin for oral candidiasis. Will need an extended course of at least 14 days (8/16--8/30), however will consider 21 day course based on severity.    - now with NG tube for meds and feeds   - Aspiration precautions and Elevate head of bed - failed speech and swallow study and MBS  - ENT consulted: CT neck w/ con--vocal cord paralysis, also concerns for esophagitis/esophogeal mass/Zenker's diverticulum. Now s/p vocal injection. Pt failed repeat MBS, GI consulted for PEG tube placement. Discussed with patient who is agreeable at this time.   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 Atrophy and extensive small vessel white matter ischemic changes. No acute changes    - c/w diflucan and nystatin for oral candidiasis. Will need an extended course of at least 14 days (8/16--8/30), however will likely need 21 day course based on severity.    - now with NG tube for meds and feeds   - Aspiration precautions and Elevate head of bed

## 2019-08-23 NOTE — SWALLOW VFSS/MBS ASSESSMENT ADULT - PHARYNGEAL PHASE COMMENTS
pharyngeal residue mildly reduced, however not cleared on repeat swallows pharyngeal residue is gradually reduced, however not cleared on multiple repeat swallows

## 2019-08-23 NOTE — SWALLOW VFSS/MBS ASSESSMENT ADULT - RESIDUE OF LATERAL PHARYNGEAL WALLS
residue mildly reduced, however not cleared on repeat swallow/Mild
residue mildly reduced, however not cleared on repeat swallow/Moderate

## 2019-08-23 NOTE — PROGRESS NOTE ADULT - SUBJECTIVE AND OBJECTIVE BOX
ENT ISSUE/POD: S/P Right VC Cord Injection POD # 1.     HPI: 85y S/P Right VC Cord Injection POD # 1.  0.6 ccs of Prolaryn plus injected in to R vocal cord. As per pt, hoarseness improving. Denies SOB/cough/Hemoptysis, CP/Palpitations/ Diaphoresis, HA/Dizziness/ Blurry vision/syncope, fever/ chills, N/V.     PAST MEDICAL & SURGICAL HISTORY:  Insomnia  Iron deficiency anemia  Anxiety  Parkinson disease  Aortic stenosis  Neuropathy  Polymyalgia rheumatica  Diabetes  Hyperlipidemia  Hypertension  S/P TAVR (transcatheter aortic valve replacement)    Allergies    penicillin (Unknown)    Intolerances      MEDICATIONS  (STANDING):  amLODIPine   Tablet 5 milliGRAM(s) Oral daily  atorvastatin 40 milliGRAM(s) Oral <User Schedule>  buDESOnide    Inhalation Suspension 0.25 milliGRAM(s) Inhalation two times a day  carbidopa/levodopa  25/100 2 Tablet(s) Oral three times a day  dextrose 5%. 1000 milliLiter(s) (50 mL/Hr) IV Continuous <Continuous>  dextrose 50% Injectable 12.5 Gram(s) IV Push once  dextrose 50% Injectable 25 Gram(s) IV Push once  dextrose 50% Injectable 25 Gram(s) IV Push once  docusate sodium Liquid 100 milliGRAM(s) Oral two times a day  fluconAZOLE IVPB      fluconAZOLE IVPB 100 milliGRAM(s) IV Intermittent every 24 hours  furosemide    Tablet 20 milliGRAM(s) Oral daily  gabapentin   Solution 300 milliGRAM(s) Oral at bedtime  guaiFENesin    Syrup 200 milliGRAM(s) Oral every 6 hours  insulin lispro (HumaLOG) corrective regimen sliding scale   SubCutaneous every 6 hours  lactated ringers. 500 milliLiter(s) (50 mL/Hr) IV Continuous <Continuous>  losartan 50 milliGRAM(s) Oral daily  melatonin 5 milliGRAM(s) Oral at bedtime  metoprolol tartrate 25 milliGRAM(s) Oral two times a day  nystatin    Suspension 489431 Unit(s) Oral four times a day  pantoprazole  Injectable 40 milliGRAM(s) IV Push daily  polyethylene glycol 3350 17 Gram(s) Oral daily  predniSONE   Tablet 10 milliGRAM(s) Oral daily  senna 2 Tablet(s) Oral at bedtime  venlafaxine 37.5 milliGRAM(s) Oral every 12 hours    MEDICATIONS  (PRN):  acetaminophen    Suspension .. 1000 milliGRAM(s) Oral every 6 hours PRN Mild Pain (1 - 3), Moderate Pain (4 - 6)  ALPRAZolam 0.5 milliGRAM(s) Oral two times a day PRN anxiety  dextrose 40% Gel 15 Gram(s) Oral once PRN Blood Glucose LESS THAN 70 milliGRAM(s)/deciliter  glucagon  Injectable 1 milliGRAM(s) IntraMuscular once PRN Glucose LESS THAN 70 milligrams/deciliter      social history: see consult     family history: see consult     ROS:   ENT: all negative except as noted in HPI   Pulm: denies SOB, cough, hemoptysis  Neuro: denies numbness/tingling, loss of sensation  Endo: denies heat/cold intolerance, excessive sweating      Vital Signs Last 24 Hrs  T(C): 36.8 (23 Aug 2019 04:26), Max: 36.9 (23 Aug 2019 00:29)  T(F): 98.3 (23 Aug 2019 04:26), Max: 98.5 (23 Aug 2019 00:29)  HR: 78 (23 Aug 2019 04:26) (67 - 88)  BP: 163/90 (23 Aug 2019 04:26) (136/61 - 169/81)  BP(mean): 108 (22 Aug 2019 15:30) (88 - 117)  RR: 18 (23 Aug 2019 04:26) (15 - 18)  SpO2: 97% (23 Aug 2019 04:26) (94% - 100%)                          13.8   16.53 )-----------( 273      ( 22 Aug 2019 08:08 )             43.0    08-22    142  |  101  |  51<H>  ----------------------------<  141<H>  4.1   |  27  |  0.89    Ca    9.6      22 Aug 2019 06:36  Phos  3.0     08-22  Mg     2.8     08-22     PT/INR - ( 22 Aug 2019 09:38 )   PT: 24.4 sec;   INR: 2.09 ratio         PTT - ( 22 Aug 2019 09:38 )  PTT:54.9 sec    PHYSICAL EXAM:  Gen: NAD, + Hoarseness Improving.   Skin: No rashes, bruises, or lesions.   Head: Normocephalic, Atraumatic.   Face: no edema, erythema, or fluctuance. Parotid glands soft without mass.   Eyes: no scleral injection.   Nose: Nares bilaterally patent, no discharge, NG Tube in Left Nare.   Mouth: No Stridor / Drooling / Trismus.  Mucosa moist, tongue/uvula midline, oropharynx clear.  Neck: Flat, supple, no lymphadenopathy, trachea midline, no masses.  Lymphatic: No lymphadenopathy.  Resp: breathing easily, no stridor.  Neuro: facial nerve intact, no facial droop.

## 2019-08-23 NOTE — PROGRESS NOTE ADULT - SUBJECTIVE AND OBJECTIVE BOX
Michaela Mock  Internal Medicine  PGY-1  Pager: [phone]    Patient is a 85y old  Female who presents with a chief complaint of Abdominal pain (23 Aug 2019 06:45)      SUBJECTIVE / OVERNIGHT EVENTS: No acute event ON. Denies HA/CP/SOB/abd pain/n/v/d/fever/chills.    MEDICATIONS  (STANDING):  amLODIPine   Tablet 5 milliGRAM(s) Oral daily  atorvastatin 40 milliGRAM(s) Oral <User Schedule>  buDESOnide    Inhalation Suspension 0.25 milliGRAM(s) Inhalation two times a day  carbidopa/levodopa  25/100 2 Tablet(s) Oral three times a day  dextrose 5%. 1000 milliLiter(s) (50 mL/Hr) IV Continuous <Continuous>  dextrose 50% Injectable 12.5 Gram(s) IV Push once  dextrose 50% Injectable 25 Gram(s) IV Push once  dextrose 50% Injectable 25 Gram(s) IV Push once  docusate sodium Liquid 100 milliGRAM(s) Oral two times a day  fluconAZOLE IVPB      fluconAZOLE IVPB 100 milliGRAM(s) IV Intermittent every 24 hours  furosemide    Tablet 20 milliGRAM(s) Oral daily  gabapentin   Solution 300 milliGRAM(s) Oral at bedtime  guaiFENesin    Syrup 200 milliGRAM(s) Oral every 6 hours  insulin lispro (HumaLOG) corrective regimen sliding scale   SubCutaneous every 6 hours  lactated ringers. 500 milliLiter(s) (50 mL/Hr) IV Continuous <Continuous>  losartan 50 milliGRAM(s) Oral daily  melatonin 5 milliGRAM(s) Oral at bedtime  metoprolol tartrate 25 milliGRAM(s) Oral two times a day  nystatin    Suspension 117468 Unit(s) Oral four times a day  pantoprazole  Injectable 40 milliGRAM(s) IV Push daily  polyethylene glycol 3350 17 Gram(s) Oral daily  predniSONE   Tablet 10 milliGRAM(s) Oral daily  senna 2 Tablet(s) Oral at bedtime  venlafaxine 37.5 milliGRAM(s) Oral every 12 hours    MEDICATIONS  (PRN):  acetaminophen    Suspension .. 1000 milliGRAM(s) Oral every 6 hours PRN Mild Pain (1 - 3), Moderate Pain (4 - 6)  ALPRAZolam 0.5 milliGRAM(s) Oral two times a day PRN anxiety  dextrose 40% Gel 15 Gram(s) Oral once PRN Blood Glucose LESS THAN 70 milliGRAM(s)/deciliter  glucagon  Injectable 1 milliGRAM(s) IntraMuscular once PRN Glucose LESS THAN 70 milligrams/deciliter        OBJECTIVE:    Vital Signs Last 24 Hrs  T(C): 36.8 (23 Aug 2019 04:26), Max: 36.9 (23 Aug 2019 00:29)  T(F): 98.3 (23 Aug 2019 04:26), Max: 98.5 (23 Aug 2019 00:29)  HR: 78 (23 Aug 2019 04:26) (67 - 88)  BP: 163/90 (23 Aug 2019 04:26) (136/61 - 169/81)  BP(mean): 108 (22 Aug 2019 15:30) (88 - 117)  RR: 18 (23 Aug 2019 04:26) (15 - 18)  SpO2: 97% (23 Aug 2019 04:26) (94% - 100%)    PHYSICAL EXAM:  GENERAL: NAD, obese, intermittently coughing   HEAD:  Atraumatic, Normocephalic  ENMT: moist mucous membranes, improved white-yellows thrush on tongue  NECK: Supple, No JVD  CHEST/LUNG: coarse upper airway breath sounds b/l. No accessory muscle use.  HEART: regular rate and rhythm; No murmurs appreciated on exam  ABDOMEN: Soft, non-tender, Nondistended; Bowel sounds present  EXTREMITIES: No clubbing, cyanosis, or peripheral edema   LYMPH: No lymphadenopathy noted  SKIN: No rashes or lesions  NERVOUS SYSTEM: AOx2, conversive and answers questions appropriately.    CAPILLARY BLOOD GLUCOSE      POCT Blood Glucose.: 121 mg/dL (23 Aug 2019 05:57)  POCT Blood Glucose.: 144 mg/dL (22 Aug 2019 23:19)  POCT Blood Glucose.: 146 mg/dL (22 Aug 2019 17:33)  POCT Blood Glucose.: 135 mg/dL (22 Aug 2019 11:48)    I&O's Summary    22 Aug 2019 07:01  -  23 Aug 2019 07:00  --------------------------------------------------------  IN: 50 mL / OUT: 1100 mL / NET: -1050 mL        LABS:                        13.8   16.53 )-----------( 273      ( 22 Aug 2019 08:08 )             43.0     08-23    144  |  103  |  42<H>  ----------------------------<  120<H>  4.4   |  27  |  0.73    Ca    9.5      23 Aug 2019 06:19  Phos  4.2     08-23  Mg     2.6     08-23      PT/INR - ( 22 Aug 2019 09:38 )   PT: 24.4 sec;   INR: 2.09 ratio         PTT - ( 22 Aug 2019 09:38 )  PTT:54.9 sec              RADIOLOGY & ADDITIONAL TESTS: Michaela Mock  Internal Medicine  PGY-1  Pager: [phone]    Patient is a 85y old  Female who presents with a chief complaint of Abdominal pain (23 Aug 2019 06:45)      SUBJECTIVE / OVERNIGHT EVENTS: No acute event ON. Intermittent mild HA. Voice is better. Denies CP/SOB/abd pain/n/v/d/fever/chills.    MEDICATIONS  (STANDING):  amLODIPine   Tablet 5 milliGRAM(s) Oral daily  atorvastatin 40 milliGRAM(s) Oral <User Schedule>  buDESOnide    Inhalation Suspension 0.25 milliGRAM(s) Inhalation two times a day  carbidopa/levodopa  25/100 2 Tablet(s) Oral three times a day  dextrose 5%. 1000 milliLiter(s) (50 mL/Hr) IV Continuous <Continuous>  dextrose 50% Injectable 12.5 Gram(s) IV Push once  dextrose 50% Injectable 25 Gram(s) IV Push once  dextrose 50% Injectable 25 Gram(s) IV Push once  docusate sodium Liquid 100 milliGRAM(s) Oral two times a day  fluconAZOLE IVPB      fluconAZOLE IVPB 100 milliGRAM(s) IV Intermittent every 24 hours  furosemide    Tablet 20 milliGRAM(s) Oral daily  gabapentin   Solution 300 milliGRAM(s) Oral at bedtime  guaiFENesin    Syrup 200 milliGRAM(s) Oral every 6 hours  insulin lispro (HumaLOG) corrective regimen sliding scale   SubCutaneous every 6 hours  lactated ringers. 500 milliLiter(s) (50 mL/Hr) IV Continuous <Continuous>  losartan 50 milliGRAM(s) Oral daily  melatonin 5 milliGRAM(s) Oral at bedtime  metoprolol tartrate 25 milliGRAM(s) Oral two times a day  nystatin    Suspension 894328 Unit(s) Oral four times a day  pantoprazole  Injectable 40 milliGRAM(s) IV Push daily  polyethylene glycol 3350 17 Gram(s) Oral daily  predniSONE   Tablet 10 milliGRAM(s) Oral daily  senna 2 Tablet(s) Oral at bedtime  venlafaxine 37.5 milliGRAM(s) Oral every 12 hours    MEDICATIONS  (PRN):  acetaminophen    Suspension .. 1000 milliGRAM(s) Oral every 6 hours PRN Mild Pain (1 - 3), Moderate Pain (4 - 6)  ALPRAZolam 0.5 milliGRAM(s) Oral two times a day PRN anxiety  dextrose 40% Gel 15 Gram(s) Oral once PRN Blood Glucose LESS THAN 70 milliGRAM(s)/deciliter  glucagon  Injectable 1 milliGRAM(s) IntraMuscular once PRN Glucose LESS THAN 70 milligrams/deciliter        OBJECTIVE:    Vital Signs Last 24 Hrs  T(C): 36.8 (23 Aug 2019 04:26), Max: 36.9 (23 Aug 2019 00:29)  T(F): 98.3 (23 Aug 2019 04:26), Max: 98.5 (23 Aug 2019 00:29)  HR: 78 (23 Aug 2019 04:26) (67 - 88)  BP: 163/90 (23 Aug 2019 04:26) (136/61 - 169/81)  BP(mean): 108 (22 Aug 2019 15:30) (88 - 117)  RR: 18 (23 Aug 2019 04:26) (15 - 18)  SpO2: 97% (23 Aug 2019 04:26) (94% - 100%)    PHYSICAL EXAM:  GENERAL: NAD, obese, intermittently coughing, stronger voice  HEAD:  Atraumatic, Normocephalic  ENMT: moist mucous membranes, improved white-yellows thrush on tongue  NECK: Supple, No JVD  CHEST/LUNG: coarse upper airway breath sounds b/l. No accessory muscle use.  HEART: regular rate and rhythm; No murmurs appreciated on exam  ABDOMEN: Soft, non-tender, Nondistended; Bowel sounds present  EXTREMITIES: No clubbing, cyanosis, or peripheral edema   LYMPH: No lymphadenopathy noted  SKIN: No rashes or lesions  NERVOUS SYSTEM: AOx2, conversive and answers questions appropriately.    CAPILLARY BLOOD GLUCOSE      POCT Blood Glucose.: 121 mg/dL (23 Aug 2019 05:57)  POCT Blood Glucose.: 144 mg/dL (22 Aug 2019 23:19)  POCT Blood Glucose.: 146 mg/dL (22 Aug 2019 17:33)  POCT Blood Glucose.: 135 mg/dL (22 Aug 2019 11:48)    I&O's Summary    22 Aug 2019 07:01  -  23 Aug 2019 07:00  --------------------------------------------------------  IN: 50 mL / OUT: 1100 mL / NET: -1050 mL        LABS:                        13.8   16.53 )-----------( 273      ( 22 Aug 2019 08:08 )             43.0     08-23    144  |  103  |  42<H>  ----------------------------<  120<H>  4.4   |  27  |  0.73    Ca    9.5      23 Aug 2019 06:19  Phos  4.2     08-23  Mg     2.6     08-23      PT/INR - ( 22 Aug 2019 09:38 )   PT: 24.4 sec;   INR: 2.09 ratio         PTT - ( 22 Aug 2019 09:38 )  PTT:54.9 sec              RADIOLOGY & ADDITIONAL TESTS:

## 2019-08-23 NOTE — SWALLOW VFSS/MBS ASSESSMENT ADULT - SLP PERTINENT HISTORY OF CURRENT PROBLEM
86 y/o F with PMH of GERD, HTN, HLD, DM2 with neuropathy, Parkinson's, anxiety/depression, AS s/p TAVR on plavix, overactive bladder, ?CHF, PMR, COPD, who is admitted for hypoxic/hypercarbic respiratory failure in setting of sepsis, possibly COPD exacerbation in setting of URI/PNA. Course c/b UTI and acute metabolic encephalopathy.
86 y/o F with PMH of GERD, HTN, HLD, DM2 with neuropathy, Parkinson's, anxiety/depression, AS s/p TAVR on plavix, overactive bladder, ?CHF, PMR, COPD, who is admitted for hypoxic/hypercarbic respiratory failure in setting of sepsis, possibly COPD exacerbation in setting of URI/PNA. Course c/b UTI and acute metabolic encephalopathy.

## 2019-08-23 NOTE — SWALLOW VFSS/MBS ASSESSMENT ADULT - RESIDUE IN VALLECULAE
gradually to the pyriforms, residue mildly reduced, however not cleared on repeat swallow/Severe with progression to the pyriforms/Severe minimal material passes the level of the valleculae on initial swallow, it gradually flows to the pyriforms/Severe gradually flows to the pyriforms/Severe

## 2019-08-23 NOTE — PROGRESS NOTE ADULT - SUBJECTIVE AND OBJECTIVE BOX
Chief Complaint:  Patient is a 85y old  Female who presents with a chief complaint of Abdominal pain (23 Aug 2019 07:12)      Interval Events:   Reconsult for PEG tube placement.   Patient failed speech and swallow evaluation and thus GI consulted for PEG tube placement.     Allergies:  penicillin (Unknown)      Hospital Medications:  acetaminophen    Suspension .. 1000 milliGRAM(s) Oral every 6 hours PRN  ALPRAZolam 0.5 milliGRAM(s) Oral two times a day PRN  amLODIPine   Tablet 5 milliGRAM(s) Oral daily  atorvastatin 40 milliGRAM(s) Oral <User Schedule>  buDESOnide    Inhalation Suspension 0.25 milliGRAM(s) Inhalation two times a day  carbidopa/levodopa  25/100 2 Tablet(s) Oral three times a day  dextrose 40% Gel 15 Gram(s) Oral once PRN  dextrose 5%. 1000 milliLiter(s) IV Continuous <Continuous>  dextrose 50% Injectable 12.5 Gram(s) IV Push once  dextrose 50% Injectable 25 Gram(s) IV Push once  dextrose 50% Injectable 25 Gram(s) IV Push once  docusate sodium Liquid 100 milliGRAM(s) Oral two times a day  fluconAZOLE IVPB      fluconAZOLE IVPB 100 milliGRAM(s) IV Intermittent every 24 hours  furosemide    Tablet 20 milliGRAM(s) Oral daily  gabapentin   Solution 300 milliGRAM(s) Oral at bedtime  glucagon  Injectable 1 milliGRAM(s) IntraMuscular once PRN  guaiFENesin    Syrup 200 milliGRAM(s) Oral every 6 hours  heparin  Infusion.  Unit(s)/Hr IV Continuous <Continuous>  heparin  Injectable 5000 Unit(s) IV Push every 6 hours PRN  heparin  Injectable 2500 Unit(s) IV Push every 6 hours PRN  insulin lispro (HumaLOG) corrective regimen sliding scale   SubCutaneous every 6 hours  losartan 50 milliGRAM(s) Oral daily  melatonin 5 milliGRAM(s) Oral at bedtime  metoprolol tartrate 25 milliGRAM(s) Oral two times a day  nystatin    Suspension 369628 Unit(s) Oral four times a day  pantoprazole  Injectable 40 milliGRAM(s) IV Push daily  polyethylene glycol 3350 17 Gram(s) Oral daily  predniSONE   Tablet 10 milliGRAM(s) Oral daily  senna 2 Tablet(s) Oral at bedtime  venlafaxine 37.5 milliGRAM(s) Oral every 12 hours      PMHX/PSHX:  Insomnia  Iron deficiency anemia  Anxiety  Parkinson disease  Aortic stenosis  Neuropathy  Polymyalgia rheumatica  Diabetes  Hyperlipidemia  Hypertension  S/P TAVR (transcatheter aortic valve replacement)      Family history:  No pertinent family history in first degree relatives      ROS:     General:  No wt loss, fevers, chills, night sweats, fatigue,   Eyes:  Good vision, no reported pain  CV:  No pain, palpitations, hypo/hypertension  Resp:  No dyspnea, cough, tachypnea, wheezing  GI:  See HPI  :  No pain, bleeding, incontinence, nocturia  Muscle: No pain, weakness  Heme:  No petechiae, ecchymosis, easy bruisability  Skin:  No rash, edema      PHYSICAL EXAM:     PHYSICAL EXAM:   GENERAL:  Appears stated age, fraile  HEENT:  NC/AT, anicteric, raspy voice  NECK: supple  CHEST:  Full & symmetric excursion, mild wheeze, mild bilbasilar crackles  HEART:  Regular rhythm, S1, S2  ABDOMEN:  Soft, non-tender, non-distended  EXTREMITIES:  no edema, no cyanosis  SKIN:  No rash, normal turgor  NEURO:  Alert, oriented, lip smacking/tremor  PSYCH: Normal affect      Vital Signs:  Vital Signs Last 24 Hrs  T(C): 36.8 (23 Aug 2019 11:46), Max: 36.9 (23 Aug 2019 00:29)  T(F): 98.3 (23 Aug 2019 11:46), Max: 98.5 (23 Aug 2019 00:29)  HR: 77 (23 Aug 2019 11:46) (67 - 88)  BP: 147/88 (23 Aug 2019 11:46) (147/88 - 168/90)  BP(mean): --  RR: 18 (23 Aug 2019 11:46) (18 - 18)  SpO2: 97% (23 Aug 2019 11:46) (94% - 97%)  Daily     Daily     LABS:                        13.4   15.49 )-----------( 263      ( 23 Aug 2019 08:44 )             43.4     08-23    144  |  103  |  42<H>  ----------------------------<  120<H>  4.4   |  27  |  0.73    Ca    9.5      23 Aug 2019 06:19  Phos  4.2     08-23  Mg     2.6     08-23    PT/INR - ( 23 Aug 2019 09:44 )   PT: 14.2 sec;   INR: 1.25 ratio    PTT - ( 23 Aug 2019 09:44 )  PTT:29.6 sec    Imaging:  Pt presents with a severe oropharyngeal dysphagia characterized by silent aspiration of honey-thickened liquids and thin purees. Use of postural swallow strategies including Right head turn and chin tuck postures were not effective for improving airway protection. Pt is a high aspiration risk for all oral intake.   Swallowing disorders: reduced lingual strength/ROM/Rate of motion, reduced tongue to palate contact, reduced BOT to posterior pharyngeal wall contact, delayed swallow trigger, reduced hyolaryngeal excursion/elevation, incomplete epiglottic retroflection, reduced laryngeal closure, reduced pharyngeal contractility, reduced supraglottic sensation, reduced subglottic sensation.	  NPO, with non-oral nutrition/hydration/medications.	  yes  Strict aspiration and reflux precautions on secretions and enteral feeds! Chief Complaint:  Patient is a 85y old  Female who presents with a chief complaint of Abdominal pain (23 Aug 2019 07:12)      Interval Events:   Reconsult for PEG tube placement.   Patient failed speech and swallow evaluation and thus GI consulted for PEG tube placement. Dysphonia somewhat better now after ENT injection.    Allergies:  penicillin (Unknown)      Hospital Medications:  acetaminophen    Suspension .. 1000 milliGRAM(s) Oral every 6 hours PRN  ALPRAZolam 0.5 milliGRAM(s) Oral two times a day PRN  amLODIPine   Tablet 5 milliGRAM(s) Oral daily  atorvastatin 40 milliGRAM(s) Oral <User Schedule>  buDESOnide    Inhalation Suspension 0.25 milliGRAM(s) Inhalation two times a day  carbidopa/levodopa  25/100 2 Tablet(s) Oral three times a day  dextrose 40% Gel 15 Gram(s) Oral once PRN  dextrose 5%. 1000 milliLiter(s) IV Continuous <Continuous>  dextrose 50% Injectable 12.5 Gram(s) IV Push once  dextrose 50% Injectable 25 Gram(s) IV Push once  dextrose 50% Injectable 25 Gram(s) IV Push once  docusate sodium Liquid 100 milliGRAM(s) Oral two times a day  fluconAZOLE IVPB      fluconAZOLE IVPB 100 milliGRAM(s) IV Intermittent every 24 hours  furosemide    Tablet 20 milliGRAM(s) Oral daily  gabapentin   Solution 300 milliGRAM(s) Oral at bedtime  glucagon  Injectable 1 milliGRAM(s) IntraMuscular once PRN  guaiFENesin    Syrup 200 milliGRAM(s) Oral every 6 hours  heparin  Infusion.  Unit(s)/Hr IV Continuous <Continuous>  heparin  Injectable 5000 Unit(s) IV Push every 6 hours PRN  heparin  Injectable 2500 Unit(s) IV Push every 6 hours PRN  insulin lispro (HumaLOG) corrective regimen sliding scale   SubCutaneous every 6 hours  losartan 50 milliGRAM(s) Oral daily  melatonin 5 milliGRAM(s) Oral at bedtime  metoprolol tartrate 25 milliGRAM(s) Oral two times a day  nystatin    Suspension 293986 Unit(s) Oral four times a day  pantoprazole  Injectable 40 milliGRAM(s) IV Push daily  polyethylene glycol 3350 17 Gram(s) Oral daily  predniSONE   Tablet 10 milliGRAM(s) Oral daily  senna 2 Tablet(s) Oral at bedtime  venlafaxine 37.5 milliGRAM(s) Oral every 12 hours      PMHX/PSHX:  Insomnia  Iron deficiency anemia  Anxiety  Parkinson disease  Aortic stenosis  Neuropathy  Polymyalgia rheumatica  Diabetes  Hyperlipidemia  Hypertension  S/P TAVR (transcatheter aortic valve replacement)      Family history:  No pertinent family history in first degree relatives      ROS:     General:  No wt loss, fevers, chills, night sweats, fatigue,   Eyes:  Good vision, no reported pain  CV:  No pain, palpitations, hypo/hypertension  Resp:  No dyspnea, cough, tachypnea, wheezing  GI:  See HPI  :  No pain, bleeding, incontinence, nocturia  Muscle: No pain, weakness  Heme:  No petechiae, ecchymosis, easy bruisability  Skin:  No rash, edema      PHYSICAL EXAM:     PHYSICAL EXAM:   GENERAL:  Appears stated age, fraile  HEENT:  NC/AT, anicteric, raspy voice  NECK: supple  CHEST:  Full & symmetric excursion, mild wheeze, mild bilbasilar crackles  HEART:  Regular rhythm, S1, S2  ABDOMEN:  Soft, non-tender, non-distended  EXTREMITIES:  no edema, no cyanosis  SKIN:  No rash, normal turgor  NEURO:  Alert, oriented, lip smacking/tremor  PSYCH: Normal affect      Vital Signs:  Vital Signs Last 24 Hrs  T(C): 36.8 (23 Aug 2019 11:46), Max: 36.9 (23 Aug 2019 00:29)  T(F): 98.3 (23 Aug 2019 11:46), Max: 98.5 (23 Aug 2019 00:29)  HR: 77 (23 Aug 2019 11:46) (67 - 88)  BP: 147/88 (23 Aug 2019 11:46) (147/88 - 168/90)  BP(mean): --  RR: 18 (23 Aug 2019 11:46) (18 - 18)  SpO2: 97% (23 Aug 2019 11:46) (94% - 97%)  Daily     Daily     LABS:                        13.4   15.49 )-----------( 263      ( 23 Aug 2019 08:44 )             43.4     08-23    144  |  103  |  42<H>  ----------------------------<  120<H>  4.4   |  27  |  0.73    Ca    9.5      23 Aug 2019 06:19  Phos  4.2     08-23  Mg     2.6     08-23    PT/INR - ( 23 Aug 2019 09:44 )   PT: 14.2 sec;   INR: 1.25 ratio    PTT - ( 23 Aug 2019 09:44 )  PTT:29.6 sec    Imaging:  Pt presents with a severe oropharyngeal dysphagia characterized by silent aspiration of honey-thickened liquids and thin purees. Use of postural swallow strategies including Right head turn and chin tuck postures were not effective for improving airway protection. Pt is a high aspiration risk for all oral intake.   Swallowing disorders: reduced lingual strength/ROM/Rate of motion, reduced tongue to palate contact, reduced BOT to posterior pharyngeal wall contact, delayed swallow trigger, reduced hyolaryngeal excursion/elevation, incomplete epiglottic retroflection, reduced laryngeal closure, reduced pharyngeal contractility, reduced supraglottic sensation, reduced subglottic sensation.	  NPO, with non-oral nutrition/hydration/medications.	  yes  Strict aspiration and reflux precautions on secretions and enteral feeds!

## 2019-08-23 NOTE — SWALLOW VFSS/MBS ASSESSMENT ADULT - ASPIRATION PRECAUTIONS
Strict aspiration and reflux precautions on secretions and enteral feeds!/yes
Strict aspiration and reflux precautions on secretions and enteral feeds!/yes

## 2019-08-23 NOTE — PROGRESS NOTE ADULT - PROBLEM SELECTOR PLAN 2
- With acute hypoxic and hypercarbic respiratory failure; now improved.   - patient failed speech and swallow and MBS. Pt to go for another MBS 8/23.   - s/p 7-day course of CTX and 3-day course flagyl  - CT chest showing no evidence of pneumonia. Moderate hiatal hernia.  - RVP negative  - leukocytosis stable, likely 2/2 steroid use. Pt has been hemodynamically stable and cough improving, active PNA is unlikely.  - Guaifenesin for cough  - Encourage to use incentive spirometer  - c/w Pulmicort inhaler  - chest PT - With acute hypoxic and hypercarbic respiratory failure; now improved.   - patient failed speech and swallow and MBS as well as repeat MBS afte VC injection   - s/p 7-day course of CTX and 3-day course flagyl  - CT chest showing no evidence of pneumonia. Moderate hiatal hernia.  - RVP negative  - leukocytosis stable, likely 2/2 steroid use. Pt has been hemodynamically stable and cough improving, active PNA is unlikely.  - Guaifenesin for cough  - Encourage to use incentive spirometer  - c/w Pulmicort inhaler  - chest PT - With acute hypoxic and hypercarbic respiratory failure; now improved.   - patient failed speech and swallow and MBS as well as repeat MBS after VC injection   - s/p 7-day course of CTX and 3-day course flagyl  - CT chest showing no evidence of pneumonia. Moderate hiatal hernia.  - RVP negative  - leukocytosis stable, likely 2/2 steroid use. Pt has been hemodynamically stable and cough improving, active PNA is unlikely.  - Guaifenesin for cough  - Encourage to use incentive spirometer  - c/w Pulmicort inhaler  - chest PT

## 2019-08-23 NOTE — SWALLOW VFSS/MBS ASSESSMENT ADULT - DIAGNOSTIC IMPRESSIONS
Pt presents with a severe oropharyngeal dysphagia characterized by silent aspiration of honey-thickened liquids and thin purees. Use of postural swallow strategies including Right head turn and chin tuck postures were not effective for improving airway protection. Pt is a high aspiration risk for all oral intake.     Swallowing disorders: reduced lingual strength/ROM/Rate of motion, reduced tongue to palate contact, reduced BOT to posterior pharyngeal wall contact, delayed swallow trigger, reduced hyolaryngeal excursion/elevation, incomplete epiglottic retroflection, reduced laryngeal closure, reduced pharyngeal contractility, reduced supraglottic sensation, reduced subglottic sensation. Pt continues to present with a severe oropharyngeal dysphagia characterized by bolus propulsion through the pharynx with severe pharyngeal retention, with silent aspiration of honey-thickened liquids and thin purees. Use of postural swallow strategies including Right head turn and chin tuck postures were not effective for improving airway protection. Presence of cervical osteophyte in conjunction with disorders of muscular function contributed to severity of dysphagia. Pt is a high aspiration risk for all oral intake.     Swallowing disorders: reduced lingual strength/ROM/Rate of motion, reduced tongue to palate contact, reduced BOT to posterior pharyngeal wall contact, delayed swallow trigger, reduced hyolaryngeal excursion/elevation, incomplete epiglottic retroflection, reduced laryngeal closure, reduced pharyngeal contractility, reduced supraglottic sensation, reduced subglottic sensation, presence of cervical osteophytes.

## 2019-08-23 NOTE — PROGRESS NOTE ADULT - PROBLEM SELECTOR PLAN 10
- Patient not on home anti-hyperglycemics  - FS acceptable  - c/w Insulin sliding scale    Hypernatremia  -increased free water to 250cc TID for now    Constipation  -Patient with large stool burden on imaging, but no SBO  -c/w Miralax, colace, senna  -continue to monitor    Prophylaxis:  DVT ppx: on heparin gtt, holding coumadin now. Hold heparin drip 6hrs prior to procedure. Consider NOAC per cardiology  Diet: Will f/u w/ ENT about when to restart NG tube feeds after OR.   Dispo: PT recs subacute rehab - Patient not on home anti-hyperglycemics  - FS acceptable  - c/w Insulin sliding scale    Hypernatremia  -resolved  - c/w free water to 250cc TID for now    Constipation  -Patient with large stool burden on imaging, but no SBO  -c/w Miralax, colace, senna  -continue to monitor    Prophylaxis:  DVT ppx: on heparin gtt, holding coumadin now. Hold heparin drip at 6am on 8/26 for PEG tube placement. Consider NOAC per cardiology  Diet: NG tube feed   Dispo: PT recs subacute rehab - Patient not on home anti-hyperglycemics  - FS acceptable  - c/w Insulin sliding scale    Hypernatremia  -resolved  - c/w free water 250cc TID for now    Constipation  -Patient with large stool burden on imaging, but no SBO  -c/w Miralax, colace, senna  -continue to monitor    Prophylaxis:  DVT ppx: on heparin gtt, holding coumadin now. Hold heparin drip at 6am on 8/26 for PEG tube placement. Consider NOAC per cardiology  Diet: NG tube feed   Dispo: PT recs subacute rehab

## 2019-08-23 NOTE — SWALLOW VFSS/MBS ASSESSMENT ADULT - ROSENBEK'S PENETRATION ASPIRATION SCALE
very latent congested-sounding cough/(8) contrast passes glottis, visible subglottic residue remains, absent patient response (aspiration) (8) contrast passes glottis, visible subglottic residue remains, absent patient response (aspiration)

## 2019-08-23 NOTE — PROGRESS NOTE ADULT - ATTENDING COMMENTS
Patient seen and examined. Agree with above. Patient has VFSS evidence of severe oropharyngeal dysphagia. Despite ENT/neurology workup, no reversible cause is found. Would plan for PEG placement - discussed with patient the risks/benefits, she is agreeable to proceed.

## 2019-08-23 NOTE — SWALLOW VFSS/MBS ASSESSMENT ADULT - SPECIFY REASON(S)
objective assessment of the oropharyngeal swallow mechanism; r/o dysphagia.
objective assessment of the oropharyngeal swallow mechanism; r/o dysphagia.

## 2019-08-23 NOTE — PROGRESS NOTE ADULT - PROBLEM SELECTOR PLAN 7
- stable at 120s-140ss/80s  - c/w home dose 50mg QD of losartan. Consider uptitrating meds further.   - c/w amlodipine 5mg QD  - c/w metoprolol 25 BID for rate control and BP  - continue to monitor  - Vitals Q4H - stable  - c/w home dose 50mg QD of losartan. Consider uptitrating meds further.   - c/w amlodipine 5mg QD  - c/w metoprolol 25 BID for rate control and BP  - continue to monitor  - Vitals Q4H

## 2019-08-23 NOTE — SWALLOW VFSS/MBS ASSESSMENT ADULT - INTEGRITY OF CRICOPHARYNGEAL OPENING
reduced opening with minimal passage of bolus during initial swallow
reduced opening with minimal passage of bolus during initial swallow

## 2019-08-23 NOTE — SWALLOW VFSS/MBS ASSESSMENT ADULT - DEMONSTRATES NEED FOR REFERRAL TO ANOTHER SERVICE
ENT f/u as clinically indicated for vocal cord paralysis
ENT f/u as clinically indicated for vocal cord paralysis; consider Neuro w/u to further determine etiology of relatively new onset dysphagia

## 2019-08-24 LAB
ANION GAP SERPL CALC-SCNC: 20 MMOL/L — HIGH (ref 5–17)
APTT BLD: 101.6 SEC — HIGH (ref 27.5–36.3)
APTT BLD: 159.5 SEC — CRITICAL HIGH (ref 27.5–36.3)
APTT BLD: 98 SEC — HIGH (ref 27.5–36.3)
BUN SERPL-MCNC: 58 MG/DL — HIGH (ref 7–23)
CALCIUM SERPL-MCNC: 10 MG/DL — SIGNIFICANT CHANGE UP (ref 8.4–10.5)
CHLORIDE SERPL-SCNC: 100 MMOL/L — SIGNIFICANT CHANGE UP (ref 96–108)
CO2 SERPL-SCNC: 23 MMOL/L — SIGNIFICANT CHANGE UP (ref 22–31)
CREAT SERPL-MCNC: 1.17 MG/DL — SIGNIFICANT CHANGE UP (ref 0.5–1.3)
GLUCOSE SERPL-MCNC: 153 MG/DL — HIGH (ref 70–99)
HCT VFR BLD CALC: 43 % — SIGNIFICANT CHANGE UP (ref 34.5–45)
HGB BLD-MCNC: 13.6 G/DL — SIGNIFICANT CHANGE UP (ref 11.5–15.5)
MAGNESIUM SERPL-MCNC: 2.9 MG/DL — HIGH (ref 1.6–2.6)
MCHC RBC-ENTMCNC: 28.7 PG — SIGNIFICANT CHANGE UP (ref 27–34)
MCHC RBC-ENTMCNC: 31.6 GM/DL — LOW (ref 32–36)
MCV RBC AUTO: 90.7 FL — SIGNIFICANT CHANGE UP (ref 80–100)
PHOSPHATE SERPL-MCNC: 5.3 MG/DL — HIGH (ref 2.5–4.5)
PLATELET # BLD AUTO: 242 K/UL — SIGNIFICANT CHANGE UP (ref 150–400)
POTASSIUM SERPL-MCNC: 4.2 MMOL/L — SIGNIFICANT CHANGE UP (ref 3.5–5.3)
POTASSIUM SERPL-SCNC: 4.2 MMOL/L — SIGNIFICANT CHANGE UP (ref 3.5–5.3)
RBC # BLD: 4.74 M/UL — SIGNIFICANT CHANGE UP (ref 3.8–5.2)
RBC # FLD: 17 % — HIGH (ref 10.3–14.5)
SODIUM SERPL-SCNC: 143 MMOL/L — SIGNIFICANT CHANGE UP (ref 135–145)
WBC # BLD: 17.62 K/UL — HIGH (ref 3.8–10.5)
WBC # FLD AUTO: 17.62 K/UL — HIGH (ref 3.8–10.5)

## 2019-08-24 PROCEDURE — 99233 SBSQ HOSP IP/OBS HIGH 50: CPT

## 2019-08-24 RX ADMIN — Medication 10 MILLIGRAM(S): at 05:04

## 2019-08-24 RX ADMIN — Medication 500000 UNIT(S): at 18:00

## 2019-08-24 RX ADMIN — AMLODIPINE BESYLATE 5 MILLIGRAM(S): 2.5 TABLET ORAL at 05:04

## 2019-08-24 RX ADMIN — Medication 500000 UNIT(S): at 00:27

## 2019-08-24 RX ADMIN — Medication 5 MILLIGRAM(S): at 21:31

## 2019-08-24 RX ADMIN — Medication 1: at 12:09

## 2019-08-24 RX ADMIN — HEPARIN SODIUM 700 UNIT(S)/HR: 5000 INJECTION INTRAVENOUS; SUBCUTANEOUS at 04:59

## 2019-08-24 RX ADMIN — HEPARIN SODIUM 600 UNIT(S)/HR: 5000 INJECTION INTRAVENOUS; SUBCUTANEOUS at 19:26

## 2019-08-24 RX ADMIN — Medication 100 MILLIGRAM(S): at 05:04

## 2019-08-24 RX ADMIN — Medication 25 MILLIGRAM(S): at 05:04

## 2019-08-24 RX ADMIN — Medication 200 MILLIGRAM(S): at 18:01

## 2019-08-24 RX ADMIN — LOSARTAN POTASSIUM 50 MILLIGRAM(S): 100 TABLET, FILM COATED ORAL at 05:04

## 2019-08-24 RX ADMIN — CARBIDOPA AND LEVODOPA 2 TABLET(S): 25; 100 TABLET ORAL at 13:33

## 2019-08-24 RX ADMIN — FLUCONAZOLE 50 MILLIGRAM(S): 150 TABLET ORAL at 21:31

## 2019-08-24 RX ADMIN — HEPARIN SODIUM 0 UNIT(S)/HR: 5000 INJECTION INTRAVENOUS; SUBCUTANEOUS at 03:56

## 2019-08-24 RX ADMIN — ATORVASTATIN CALCIUM 40 MILLIGRAM(S): 80 TABLET, FILM COATED ORAL at 21:31

## 2019-08-24 RX ADMIN — HEPARIN SODIUM 700 UNIT(S)/HR: 5000 INJECTION INTRAVENOUS; SUBCUTANEOUS at 12:10

## 2019-08-24 RX ADMIN — Medication 200 MILLIGRAM(S): at 05:05

## 2019-08-24 RX ADMIN — Medication 200 MILLIGRAM(S): at 00:27

## 2019-08-24 RX ADMIN — Medication 200 MILLIGRAM(S): at 12:09

## 2019-08-24 RX ADMIN — PANTOPRAZOLE SODIUM 40 MILLIGRAM(S): 20 TABLET, DELAYED RELEASE ORAL at 12:09

## 2019-08-24 RX ADMIN — Medication 37.5 MILLIGRAM(S): at 05:04

## 2019-08-24 RX ADMIN — Medication 37.5 MILLIGRAM(S): at 18:00

## 2019-08-24 RX ADMIN — CARBIDOPA AND LEVODOPA 2 TABLET(S): 25; 100 TABLET ORAL at 05:04

## 2019-08-24 RX ADMIN — Medication 0.25 MILLIGRAM(S): at 21:31

## 2019-08-24 RX ADMIN — Medication 0.25 MILLIGRAM(S): at 12:09

## 2019-08-24 RX ADMIN — Medication 1: at 06:46

## 2019-08-24 RX ADMIN — SENNA PLUS 2 TABLET(S): 8.6 TABLET ORAL at 21:31

## 2019-08-24 RX ADMIN — Medication 20 MILLIGRAM(S): at 05:04

## 2019-08-24 RX ADMIN — Medication 500000 UNIT(S): at 05:04

## 2019-08-24 RX ADMIN — Medication 25 MILLIGRAM(S): at 18:01

## 2019-08-24 RX ADMIN — GABAPENTIN 300 MILLIGRAM(S): 400 CAPSULE ORAL at 21:48

## 2019-08-24 RX ADMIN — CARBIDOPA AND LEVODOPA 2 TABLET(S): 25; 100 TABLET ORAL at 21:31

## 2019-08-24 RX ADMIN — Medication 500000 UNIT(S): at 12:09

## 2019-08-24 NOTE — PROGRESS NOTE ADULT - PROBLEM SELECTOR PLAN 4
- failed speech and swallow study and MBS  - ENT consulted: CT neck w/ con--vocal cord paralysis, also concerns for esophagitis/esophogeal mass/Zenker's diverticulum. Now s/p vocal injection. Pt failed repeat MBS, GI consulted for PEG tube placement. Discussed with patient who is agreeable at this time.   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 Atrophy and extensive small vessel white matter ischemic changes. No acute changes    - c/w diflucan and nystatin for oral candidiasis. Will need an extended course of at least 14 days (8/16--8/30), however will likely need 21 day course based on severity.    - now with NG tube for meds and feeds   - Aspiration precautions and Elevate head of bed

## 2019-08-24 NOTE — PROGRESS NOTE ADULT - PROBLEM SELECTOR PLAN 1
- new onset Afib , likely 2/2 over diuresis vs infection   - converted back to sinus at around 9 am 8/14  - resume heparin gtt, f/u GI recs on holding heparin for anticipation of PEG tube placement on Monday morning.   - holding coumadin in anticipation of VC injection on 8/22. Per cardiology, NOAC is a possibility given patient's MS is not rheumatic etiology. f/u recs on AC choice    - TSH wnl  - c/w metoprolol 25mg BID for rate control per cardiology  - c/w standing lasix 20 mg daily  - f/u cardiology recs  - Repeat ekg unchanged from pior EKG  - Telemetry: Sinus 70-90 with PACs ON   - Troponins downtrended, likely was due to demand ischemia from Afib with RVR - new onset Afib , likely 2/2 over diuresis vs infection   - converted back to sinus at around 9 am 8/14  - resume heparin gtt, f/u GI recs on holding heparin for anticipation of PEG tube placement on Monday morning.   - holding coumadin in anticipation of VC injection on 8/22. Per cardiology, NOAC is a possibility given patient's MS is not rheumatic etiology. f/u recs on AC choice    - TSH wnl  - c/w metoprolol 25mg BID for rate control per cardiology  - c/w standing lasix 20 mg daily  - f/u cardiology recs  - Repeat ekg unchanged from pior EKG  - Telemetry: Sinus 60-90. ON had 6.5 secs of PSVT to , sating at 96, pt asymptomatic and was sleeping   - Troponins downtrended, likely was due to demand ischemia from Afib with RVR - new onset Afib , likely 2/2 over diuresis vs infection   - converted back to sinus at around 9 am 8/14  - c/w heparin gtt, f/u GI recs on holding heparin for anticipation of PEG tube placement on Monday morning.   - holding coumadin in anticipation of VC injection on 8/22. Per cardiology, NOAC is a possibility given patient's MS is not rheumatic etiology. f/u recs on AC choice    - TSH wnl  - c/w metoprolol 25mg BID for rate control per cardiology  - c/w standing lasix 20 mg daily  - f/u cardiology recs  - Repeat ekg unchanged from pior EKG  - Telemetry: Sinus 60-90. ON had 6.5 secs of PSVT to , sating at 96, pt asymptomatic and was sleeping   - Troponins downtrended, likely was due to demand ischemia from Afib with RVR

## 2019-08-24 NOTE — PROGRESS NOTE ADULT - PROBLEM SELECTOR PLAN 2
- With acute hypoxic and hypercarbic respiratory failure; now improved.   - patient failed speech and swallow and MBS as well as repeat MBS after VC injection   - s/p 7-day course of CTX and 3-day course flagyl  - CT chest showing no evidence of pneumonia. Moderate hiatal hernia.  - RVP negative  - leukocytosis stable, likely 2/2 steroid use. Pt has been hemodynamically stable and cough improving, active PNA is unlikely.  - Guaifenesin for cough  - Encourage to use incentive spirometer  - c/w Pulmicort inhaler  - chest PT

## 2019-08-24 NOTE — PROGRESS NOTE ADULT - SUBJECTIVE AND OBJECTIVE BOX
Michaela Mock  Internal Medicine  PGY-1  Pager: [phone]    Patient is a 85y old  Female who presents with a chief complaint of Abdominal pain (23 Aug 2019 16:08)      SUBJECTIVE / OVERNIGHT EVENTS: No acute event ON. Denies HA/CP/SOB/abd pain/n/v/d/fever/chills.    MEDICATIONS  (STANDING):  amLODIPine   Tablet 5 milliGRAM(s) Oral daily  atorvastatin 40 milliGRAM(s) Oral <User Schedule>  buDESOnide    Inhalation Suspension 0.25 milliGRAM(s) Inhalation two times a day  carbidopa/levodopa  25/100 2 Tablet(s) Oral three times a day  dextrose 5%. 1000 milliLiter(s) (50 mL/Hr) IV Continuous <Continuous>  dextrose 50% Injectable 12.5 Gram(s) IV Push once  dextrose 50% Injectable 25 Gram(s) IV Push once  dextrose 50% Injectable 25 Gram(s) IV Push once  docusate sodium Liquid 100 milliGRAM(s) Oral two times a day  fluconAZOLE IVPB      fluconAZOLE IVPB 100 milliGRAM(s) IV Intermittent every 24 hours  furosemide    Tablet 20 milliGRAM(s) Oral daily  gabapentin   Solution 300 milliGRAM(s) Oral at bedtime  guaiFENesin    Syrup 200 milliGRAM(s) Oral every 6 hours  heparin  Infusion.  Unit(s)/Hr (11 mL/Hr) IV Continuous <Continuous>  insulin lispro (HumaLOG) corrective regimen sliding scale   SubCutaneous every 6 hours  losartan 50 milliGRAM(s) Oral daily  melatonin 5 milliGRAM(s) Oral at bedtime  metoprolol tartrate 25 milliGRAM(s) Oral two times a day  nystatin    Suspension 405660 Unit(s) Oral four times a day  pantoprazole  Injectable 40 milliGRAM(s) IV Push daily  polyethylene glycol 3350 17 Gram(s) Oral daily  predniSONE   Tablet 10 milliGRAM(s) Oral daily  senna 2 Tablet(s) Oral at bedtime  venlafaxine 37.5 milliGRAM(s) Oral every 12 hours    MEDICATIONS  (PRN):  acetaminophen    Suspension .. 1000 milliGRAM(s) Oral every 6 hours PRN Mild Pain (1 - 3), Moderate Pain (4 - 6)  ALPRAZolam 0.5 milliGRAM(s) Oral two times a day PRN anxiety  dextrose 40% Gel 15 Gram(s) Oral once PRN Blood Glucose LESS THAN 70 milliGRAM(s)/deciliter  glucagon  Injectable 1 milliGRAM(s) IntraMuscular once PRN Glucose LESS THAN 70 milligrams/deciliter  heparin  Injectable 5000 Unit(s) IV Push every 6 hours PRN For aPTT less than 40  heparin  Injectable 2500 Unit(s) IV Push every 6 hours PRN For aPTT between 40 - 57        OBJECTIVE:    Vital Signs Last 24 Hrs  T(C): 36.6 (24 Aug 2019 04:36), Max: 36.8 (23 Aug 2019 11:46)  T(F): 97.8 (24 Aug 2019 04:36), Max: 98.3 (23 Aug 2019 11:46)  HR: 82 (24 Aug 2019 04:36) (71 - 82)  BP: 137/86 (24 Aug 2019 04:36) (123/78 - 147/88)  BP(mean): --  RR: 19 (24 Aug 2019 04:36) (18 - 20)  SpO2: 94% (24 Aug 2019 04:36) (94% - 97%)    PHYSICAL EXAM:  GENERAL: NAD, well-developed  HEAD:  Atraumatic, Normocephalic  EYES: EOMI, PERRLA, conjunctiva and sclera clear  NECK: Supple, No JVD  CHEST/LUNG: Clear to auscultation bilaterally; No wheeze  HEART: Regular rate and rhythm; No murmurs, rubs, or gallops  ABDOMEN: Soft, Nontender, Nondistended; Bowel sounds present  EXTREMITIES:  2+ Peripheral Pulses, No clubbing, cyanosis, or edema  PSYCH: AAOx3  NEUROLOGY: non-focal  SKIN: No rashes or lesions    CAPILLARY BLOOD GLUCOSE      POCT Blood Glucose.: 154 mg/dL (24 Aug 2019 06:26)  POCT Blood Glucose.: 136 mg/dL (24 Aug 2019 00:39)  POCT Blood Glucose.: 143 mg/dL (23 Aug 2019 17:24)  POCT Blood Glucose.: 134 mg/dL (23 Aug 2019 11:45)    I&O's Summary    23 Aug 2019 07:01  -  24 Aug 2019 07:00  --------------------------------------------------------  IN: 477 mL / OUT: 0 mL / NET: 477 mL        LABS:                        13.9   20.6  )-----------( 252      ( 23 Aug 2019 19:07 )             43.4     08-24    143  |  100  |  58<H>  ----------------------------<  153<H>  4.2   |  23  |  1.17    Ca    10.0      24 Aug 2019 06:55  Phos  5.3     08-24  Mg     2.9     08-24      PT/INR - ( 23 Aug 2019 09:44 )   PT: 14.2 sec;   INR: 1.25 ratio         PTT - ( 24 Aug 2019 03:15 )  PTT:159.5 sec              RADIOLOGY & ADDITIONAL TESTS: Michaela Mock  Internal Medicine  PGY-1  Pager: [phone]    Patient is a 85y old  Female who presents with a chief complaint of Abdominal pain (23 Aug 2019 16:08)      SUBJECTIVE / OVERNIGHT EVENTS: No acute event ON. Reports feeling better. Cough is better but still feels the mucus in her throat. HA still there, intermittent. Denies CP/SOB/abd pain/n/v/d/fever/chills.    MEDICATIONS  (STANDING):  amLODIPine   Tablet 5 milliGRAM(s) Oral daily  atorvastatin 40 milliGRAM(s) Oral <User Schedule>  buDESOnide    Inhalation Suspension 0.25 milliGRAM(s) Inhalation two times a day  carbidopa/levodopa  25/100 2 Tablet(s) Oral three times a day  dextrose 5%. 1000 milliLiter(s) (50 mL/Hr) IV Continuous <Continuous>  dextrose 50% Injectable 12.5 Gram(s) IV Push once  dextrose 50% Injectable 25 Gram(s) IV Push once  dextrose 50% Injectable 25 Gram(s) IV Push once  docusate sodium Liquid 100 milliGRAM(s) Oral two times a day  fluconAZOLE IVPB      fluconAZOLE IVPB 100 milliGRAM(s) IV Intermittent every 24 hours  furosemide    Tablet 20 milliGRAM(s) Oral daily  gabapentin   Solution 300 milliGRAM(s) Oral at bedtime  guaiFENesin    Syrup 200 milliGRAM(s) Oral every 6 hours  heparin  Infusion.  Unit(s)/Hr (11 mL/Hr) IV Continuous <Continuous>  insulin lispro (HumaLOG) corrective regimen sliding scale   SubCutaneous every 6 hours  losartan 50 milliGRAM(s) Oral daily  melatonin 5 milliGRAM(s) Oral at bedtime  metoprolol tartrate 25 milliGRAM(s) Oral two times a day  nystatin    Suspension 724791 Unit(s) Oral four times a day  pantoprazole  Injectable 40 milliGRAM(s) IV Push daily  polyethylene glycol 3350 17 Gram(s) Oral daily  predniSONE   Tablet 10 milliGRAM(s) Oral daily  senna 2 Tablet(s) Oral at bedtime  venlafaxine 37.5 milliGRAM(s) Oral every 12 hours    MEDICATIONS  (PRN):  acetaminophen    Suspension .. 1000 milliGRAM(s) Oral every 6 hours PRN Mild Pain (1 - 3), Moderate Pain (4 - 6)  ALPRAZolam 0.5 milliGRAM(s) Oral two times a day PRN anxiety  dextrose 40% Gel 15 Gram(s) Oral once PRN Blood Glucose LESS THAN 70 milliGRAM(s)/deciliter  glucagon  Injectable 1 milliGRAM(s) IntraMuscular once PRN Glucose LESS THAN 70 milligrams/deciliter  heparin  Injectable 5000 Unit(s) IV Push every 6 hours PRN For aPTT less than 40  heparin  Injectable 2500 Unit(s) IV Push every 6 hours PRN For aPTT between 40 - 57        OBJECTIVE:    Vital Signs Last 24 Hrs  T(C): 36.6 (24 Aug 2019 04:36), Max: 36.8 (23 Aug 2019 11:46)  T(F): 97.8 (24 Aug 2019 04:36), Max: 98.3 (23 Aug 2019 11:46)  HR: 82 (24 Aug 2019 04:36) (71 - 82)  BP: 137/86 (24 Aug 2019 04:36) (123/78 - 147/88)  BP(mean): --  RR: 19 (24 Aug 2019 04:36) (18 - 20)  SpO2: 94% (24 Aug 2019 04:36) (94% - 97%)    PHYSICAL EXAM:  GENERAL: NAD, obese, intermittently coughing, stronger voice  HEAD:  Atraumatic, Normocephalic  ENMT: moist mucous membranes, improved white-yellows thrush on tongue  NECK: Supple, No JVD  CHEST/LUNG: coarse upper airway breath sounds b/l. No accessory muscle use.  HEART: regular rate and rhythm; No murmurs appreciated on exam  ABDOMEN: Soft, non-tender, Nondistended; Bowel sounds present  EXTREMITIES: No clubbing, cyanosis, or peripheral edema   LYMPH: No lymphadenopathy noted  SKIN: No rashes or lesions  NERVOUS SYSTEM: conversive and answers questions appropriately.    CAPILLARY BLOOD GLUCOSE      POCT Blood Glucose.: 154 mg/dL (24 Aug 2019 06:26)  POCT Blood Glucose.: 136 mg/dL (24 Aug 2019 00:39)  POCT Blood Glucose.: 143 mg/dL (23 Aug 2019 17:24)  POCT Blood Glucose.: 134 mg/dL (23 Aug 2019 11:45)    I&O's Summary    23 Aug 2019 07:01  -  24 Aug 2019 07:00  --------------------------------------------------------  IN: 477 mL / OUT: 0 mL / NET: 477 mL        LABS:                        13.9   20.6  )-----------( 252      ( 23 Aug 2019 19:07 )             43.4     08-24    143  |  100  |  58<H>  ----------------------------<  153<H>  4.2   |  23  |  1.17    Ca    10.0      24 Aug 2019 06:55  Phos  5.3     08-24  Mg     2.9     08-24      PT/INR - ( 23 Aug 2019 09:44 )   PT: 14.2 sec;   INR: 1.25 ratio         PTT - ( 24 Aug 2019 03:15 )  PTT:159.5 sec              RADIOLOGY & ADDITIONAL TESTS:

## 2019-08-24 NOTE — PROGRESS NOTE ADULT - PROBLEM SELECTOR PLAN 10
- Patient not on home anti-hyperglycemics  - FS acceptable  - c/w Insulin sliding scale    Hypernatremia  -resolved  - c/w free water 250cc TID for now    Constipation  -Patient with large stool burden on imaging, but no SBO  -c/w Miralax, colace, senna  -continue to monitor    Prophylaxis:  DVT ppx: on heparin gtt, holding coumadin now. Hold heparin drip at 6am on 8/26 for PEG tube placement. Consider NOAC per cardiology  Diet: NG tube feed   Dispo: PT recs subacute rehab

## 2019-08-24 NOTE — PROVIDER CONTACT NOTE (CRITICAL VALUE NOTIFICATION) - RECOMMENDATIONS
follow nomogram, hold heparin for 60 min. restart at 7ml/hr
I followed the nomogram. It stated to decrease the heparin drip from 11 to 9 ml/hr, and to hold the heparin drip for one hour.
follow nomogram, hold heparin x 1 hr and restart at  9ml/hr.
Notify MD as per orders, follow Nomogram protocols.

## 2019-08-25 LAB
ANION GAP SERPL CALC-SCNC: 18 MMOL/L — HIGH (ref 5–17)
APTT BLD: 81.7 SEC — HIGH (ref 27.5–36.3)
APTT BLD: 89.5 SEC — HIGH (ref 27.5–36.3)
BUN SERPL-MCNC: 56 MG/DL — HIGH (ref 7–23)
CALCIUM SERPL-MCNC: 9.7 MG/DL — SIGNIFICANT CHANGE UP (ref 8.4–10.5)
CHLORIDE SERPL-SCNC: 102 MMOL/L — SIGNIFICANT CHANGE UP (ref 96–108)
CO2 SERPL-SCNC: 23 MMOL/L — SIGNIFICANT CHANGE UP (ref 22–31)
CREAT SERPL-MCNC: 0.96 MG/DL — SIGNIFICANT CHANGE UP (ref 0.5–1.3)
GLUCOSE SERPL-MCNC: 179 MG/DL — HIGH (ref 70–99)
HCT VFR BLD CALC: 43.5 % — SIGNIFICANT CHANGE UP (ref 34.5–45)
HGB BLD-MCNC: 14 G/DL — SIGNIFICANT CHANGE UP (ref 11.5–15.5)
MAGNESIUM SERPL-MCNC: 3 MG/DL — HIGH (ref 1.6–2.6)
MCHC RBC-ENTMCNC: 28.9 PG — SIGNIFICANT CHANGE UP (ref 27–34)
MCHC RBC-ENTMCNC: 32.2 GM/DL — SIGNIFICANT CHANGE UP (ref 32–36)
MCV RBC AUTO: 89.9 FL — SIGNIFICANT CHANGE UP (ref 80–100)
PHOSPHATE SERPL-MCNC: 4.1 MG/DL — SIGNIFICANT CHANGE UP (ref 2.5–4.5)
PLATELET # BLD AUTO: 268 K/UL — SIGNIFICANT CHANGE UP (ref 150–400)
POTASSIUM SERPL-MCNC: 4 MMOL/L — SIGNIFICANT CHANGE UP (ref 3.5–5.3)
POTASSIUM SERPL-SCNC: 4 MMOL/L — SIGNIFICANT CHANGE UP (ref 3.5–5.3)
RBC # BLD: 4.84 M/UL — SIGNIFICANT CHANGE UP (ref 3.8–5.2)
RBC # FLD: 16.6 % — HIGH (ref 10.3–14.5)
SODIUM SERPL-SCNC: 143 MMOL/L — SIGNIFICANT CHANGE UP (ref 135–145)
WBC # BLD: 16.71 K/UL — HIGH (ref 3.8–10.5)
WBC # FLD AUTO: 16.71 K/UL — HIGH (ref 3.8–10.5)

## 2019-08-25 PROCEDURE — 99233 SBSQ HOSP IP/OBS HIGH 50: CPT | Mod: GC

## 2019-08-25 RX ADMIN — AMLODIPINE BESYLATE 5 MILLIGRAM(S): 2.5 TABLET ORAL at 05:23

## 2019-08-25 RX ADMIN — Medication 500000 UNIT(S): at 05:25

## 2019-08-25 RX ADMIN — Medication 500000 UNIT(S): at 18:39

## 2019-08-25 RX ADMIN — GABAPENTIN 300 MILLIGRAM(S): 400 CAPSULE ORAL at 21:54

## 2019-08-25 RX ADMIN — Medication 10 MILLIGRAM(S): at 05:24

## 2019-08-25 RX ADMIN — FLUCONAZOLE 50 MILLIGRAM(S): 150 TABLET ORAL at 21:46

## 2019-08-25 RX ADMIN — Medication 500000 UNIT(S): at 13:14

## 2019-08-25 RX ADMIN — Medication 100 MILLIGRAM(S): at 05:24

## 2019-08-25 RX ADMIN — Medication 25 MILLIGRAM(S): at 05:24

## 2019-08-25 RX ADMIN — Medication 25 MILLIGRAM(S): at 18:39

## 2019-08-25 RX ADMIN — POLYETHYLENE GLYCOL 3350 17 GRAM(S): 17 POWDER, FOR SOLUTION ORAL at 13:14

## 2019-08-25 RX ADMIN — Medication 100 MILLIGRAM(S): at 18:39

## 2019-08-25 RX ADMIN — CARBIDOPA AND LEVODOPA 2 TABLET(S): 25; 100 TABLET ORAL at 21:46

## 2019-08-25 RX ADMIN — Medication 37.5 MILLIGRAM(S): at 18:39

## 2019-08-25 RX ADMIN — Medication 200 MILLIGRAM(S): at 13:12

## 2019-08-25 RX ADMIN — LOSARTAN POTASSIUM 50 MILLIGRAM(S): 100 TABLET, FILM COATED ORAL at 05:24

## 2019-08-25 RX ADMIN — Medication 200 MILLIGRAM(S): at 05:24

## 2019-08-25 RX ADMIN — CARBIDOPA AND LEVODOPA 2 TABLET(S): 25; 100 TABLET ORAL at 05:24

## 2019-08-25 RX ADMIN — Medication 5 MILLIGRAM(S): at 21:46

## 2019-08-25 RX ADMIN — Medication 20 MILLIGRAM(S): at 05:24

## 2019-08-25 RX ADMIN — Medication 200 MILLIGRAM(S): at 01:34

## 2019-08-25 RX ADMIN — SENNA PLUS 2 TABLET(S): 8.6 TABLET ORAL at 21:46

## 2019-08-25 RX ADMIN — Medication 1: at 13:13

## 2019-08-25 RX ADMIN — Medication 1: at 05:27

## 2019-08-25 RX ADMIN — Medication 37.5 MILLIGRAM(S): at 05:24

## 2019-08-25 RX ADMIN — Medication 0.25 MILLIGRAM(S): at 21:56

## 2019-08-25 RX ADMIN — HEPARIN SODIUM 600 UNIT(S)/HR: 5000 INJECTION INTRAVENOUS; SUBCUTANEOUS at 08:16

## 2019-08-25 RX ADMIN — Medication 200 MILLIGRAM(S): at 18:39

## 2019-08-25 RX ADMIN — CARBIDOPA AND LEVODOPA 2 TABLET(S): 25; 100 TABLET ORAL at 13:13

## 2019-08-25 RX ADMIN — Medication 0.25 MILLIGRAM(S): at 08:17

## 2019-08-25 RX ADMIN — PANTOPRAZOLE SODIUM 40 MILLIGRAM(S): 20 TABLET, DELAYED RELEASE ORAL at 13:14

## 2019-08-25 RX ADMIN — Medication 500000 UNIT(S): at 01:34

## 2019-08-25 RX ADMIN — HEPARIN SODIUM 600 UNIT(S)/HR: 5000 INJECTION INTRAVENOUS; SUBCUTANEOUS at 01:32

## 2019-08-25 NOTE — PROGRESS NOTE ADULT - PROBLEM SELECTOR PLAN 9
- Patient not on home anti-hyperglycemics  - FS acceptable  - c/w Insulin sliding scale      Prophylaxis:  DVT ppx: on heparin gtt, holding coumadin now. Hold heparin drip at 6am on 8/26 for PEG tube placement. Consider NOAC per cardiology  Diet: NG tube feed   Dispo: PT recs subacute rehab

## 2019-08-25 NOTE — PROGRESS NOTE ADULT - PROBLEM SELECTOR PLAN 7
- stable  - c/w home dose 50mg QD of losartan. Consider uptitrating meds further.   - c/w amlodipine 5mg QD  - c/w metoprolol 25 BID for rate control and BP  - continue to monitor  - Vitals Q4H

## 2019-08-25 NOTE — PROGRESS NOTE ADULT - SUBJECTIVE AND OBJECTIVE BOX
Sloane Glover MD  Internal Medicine PGY 3  Team 3  Pager Number [phone]      Chief Complaint: abdominal pain        Subjective: No events overnight. This morning complains of mild headache. Denies CP, SOB, nausea, vomiting, or abdominal pain. Tolerating tube feeds.        VITAL SIGNS:  Vital Signs Last 24 Hrs  T(C): 36.9 (25 Aug 2019 04:27), Max: 36.9 (25 Aug 2019 04:27)  T(F): 98.4 (25 Aug 2019 04:27), Max: 98.4 (25 Aug 2019 04:27)  HR: 88 (25 Aug 2019 04:27) (76 - 88)  BP: 146/94 (25 Aug 2019 04:27) (117/75 - 146/94)  BP(mean): --  RR: 18 (25 Aug 2019 04:27) (18 - 18)  SpO2: 94% (25 Aug 2019 04:27) (94% - 97%)      PHYSICAL EXAM:     GENERAL: no acute distress, NGT in place  HEENT: PERRLA, EOMI, moist oropharynx   RESPIRATORY: grossly CTAB   CARDIOVASCULAR: RRR, no murmurs  ABDOMINAL: +BS, soft, non tender, non distended   EXTREMITIES: no clubbing, cyanosis, or edema  NEUROLOGICAL: alert and oriented x 3, non-focal  SKIN: no rashes or lesions   MUSCULOSKELETAL: no gross joint deformity                          14.0   16.71 )-----------( 268      ( 25 Aug 2019 09:12 )             43.5     08-25    143  |  102  |  56<H>  ----------------------------<  179<H>  4.0   |  23  |  0.96    Ca    9.7      25 Aug 2019 07:47  Phos  4.1     08-25  Mg     3.0     08-25        CAPILLARY BLOOD GLUCOSE      POCT Blood Glucose.: 179 mg/dL (25 Aug 2019 05:22)  POCT Blood Glucose.: 127 mg/dL (24 Aug 2019 23:57)  POCT Blood Glucose.: 141 mg/dL (24 Aug 2019 17:32)  POCT Blood Glucose.: 153 mg/dL (24 Aug 2019 11:36)      MEDICATIONS  (STANDING):  amLODIPine   Tablet 5 milliGRAM(s) Oral daily  atorvastatin 40 milliGRAM(s) Oral <User Schedule>  buDESOnide    Inhalation Suspension 0.25 milliGRAM(s) Inhalation two times a day  carbidopa/levodopa  25/100 2 Tablet(s) Oral three times a day  dextrose 5%. 1000 milliLiter(s) (50 mL/Hr) IV Continuous <Continuous>  dextrose 50% Injectable 12.5 Gram(s) IV Push once  dextrose 50% Injectable 25 Gram(s) IV Push once  dextrose 50% Injectable 25 Gram(s) IV Push once  docusate sodium Liquid 100 milliGRAM(s) Oral two times a day  fluconAZOLE IVPB      fluconAZOLE IVPB 100 milliGRAM(s) IV Intermittent every 24 hours  furosemide    Tablet 20 milliGRAM(s) Oral daily  gabapentin   Solution 300 milliGRAM(s) Oral at bedtime  guaiFENesin    Syrup 200 milliGRAM(s) Oral every 6 hours  heparin  Infusion.  Unit(s)/Hr (11 mL/Hr) IV Continuous <Continuous>  insulin lispro (HumaLOG) corrective regimen sliding scale   SubCutaneous every 6 hours  losartan 50 milliGRAM(s) Oral daily  melatonin 5 milliGRAM(s) Oral at bedtime  metoprolol tartrate 25 milliGRAM(s) Oral two times a day  nystatin    Suspension 846697 Unit(s) Oral four times a day  pantoprazole  Injectable 40 milliGRAM(s) IV Push daily  polyethylene glycol 3350 17 Gram(s) Oral daily  predniSONE   Tablet 10 milliGRAM(s) Oral daily  senna 2 Tablet(s) Oral at bedtime  venlafaxine 37.5 milliGRAM(s) Oral every 12 hours    MEDICATIONS  (PRN):  acetaminophen    Suspension .. 1000 milliGRAM(s) Oral every 6 hours PRN Mild Pain (1 - 3), Moderate Pain (4 - 6)  ALPRAZolam 0.5 milliGRAM(s) Oral two times a day PRN anxiety  dextrose 40% Gel 15 Gram(s) Oral once PRN Blood Glucose LESS THAN 70 milliGRAM(s)/deciliter  glucagon  Injectable 1 milliGRAM(s) IntraMuscular once PRN Glucose LESS THAN 70 milligrams/deciliter  heparin  Injectable 5000 Unit(s) IV Push every 6 hours PRN For aPTT less than 40  heparin  Injectable 2500 Unit(s) IV Push every 6 hours PRN For aPTT between 40 - 57

## 2019-08-25 NOTE — PROGRESS NOTE ADULT - PROBLEM SELECTOR PLAN 6
- breathing more comfortably and cough is better now  - Per son, no formal diagnosis of COPD  - c/w home dose prednisone 10mg daily chronically for PMR/?GCA. Right temporal artery biopsy reportedly negative, but she gets right sided headaches.  -Trigeminal neuralgia and migraine on the differential given right HA associated with maxillary/jaw pain and light sensitivity--c/w tylenol 1g q6h PRN given her comorbidities and planned procedure. Consider giving additional prednisone 10mg if HA is severe and not improved with tylenol  - Maintain O2 sats between 88-92%

## 2019-08-25 NOTE — PROGRESS NOTE ADULT - PROBLEM SELECTOR PLAN 1
- new onset Afib , likely 2/2 over diuresis vs infection but back in sinus since 8/14  - c/w heparin gtt as patient will likely get PEG tube on Monday   - per cards, can use DOAC give patient's MS is not rheumatic in etiology   - c/w metoprolol 25mg BID for rate control per cardiology  - c/w standing lasix 20 mg daily

## 2019-08-25 NOTE — PROGRESS NOTE ADULT - PROBLEM SELECTOR PLAN 4
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 Discussed with patient who is agreeable at this time.   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 Atrophy and extensive small vessel white matter ischemic changes. No acute changes    - c/w diflucan and nystatin for oral candidiasis. Will need an extended course of at least 14 days (8/16--8/30), however will likely need 21 day course based on severity.    - now with NG tube for meds and feeds   - Aspiration precautions and Elevate head of bed

## 2019-08-26 DIAGNOSIS — N17.9 ACUTE KIDNEY FAILURE, UNSPECIFIED: ICD-10-CM

## 2019-08-26 LAB
ALBUMIN SERPL ELPH-MCNC: 4.3 G/DL — SIGNIFICANT CHANGE UP (ref 3.3–5)
ALP SERPL-CCNC: 50 U/L — SIGNIFICANT CHANGE UP (ref 40–120)
ALT FLD-CCNC: 30 U/L — SIGNIFICANT CHANGE UP (ref 10–45)
ANION GAP SERPL CALC-SCNC: 19 MMOL/L — HIGH (ref 5–17)
APTT BLD: 46.1 SEC — HIGH (ref 27.5–36.3)
AST SERPL-CCNC: 30 U/L — SIGNIFICANT CHANGE UP (ref 10–40)
BILIRUB SERPL-MCNC: 0.6 MG/DL — SIGNIFICANT CHANGE UP (ref 0.2–1.2)
BLD GP AB SCN SERPL QL: NEGATIVE — SIGNIFICANT CHANGE UP
BUN SERPL-MCNC: 63 MG/DL — HIGH (ref 7–23)
CALCIUM SERPL-MCNC: 9.3 MG/DL — SIGNIFICANT CHANGE UP (ref 8.4–10.5)
CHLORIDE SERPL-SCNC: 105 MMOL/L — SIGNIFICANT CHANGE UP (ref 96–108)
CO2 SERPL-SCNC: 25 MMOL/L — SIGNIFICANT CHANGE UP (ref 22–31)
CREAT SERPL-MCNC: 1.31 MG/DL — HIGH (ref 0.5–1.3)
GAS PNL BLDA: SIGNIFICANT CHANGE UP
GLUCOSE SERPL-MCNC: 153 MG/DL — HIGH (ref 70–99)
HCT VFR BLD CALC: 40.4 % — SIGNIFICANT CHANGE UP (ref 34.5–45)
HCT VFR BLD CALC: 43.6 % — SIGNIFICANT CHANGE UP (ref 34.5–45)
HGB BLD-MCNC: 12.9 G/DL — SIGNIFICANT CHANGE UP (ref 11.5–15.5)
HGB BLD-MCNC: 14.1 G/DL — SIGNIFICANT CHANGE UP (ref 11.5–15.5)
INR BLD: 1.09 RATIO — SIGNIFICANT CHANGE UP (ref 0.88–1.16)
MAGNESIUM SERPL-MCNC: 3.4 MG/DL — HIGH (ref 1.6–2.6)
MCHC RBC-ENTMCNC: 28.8 PG — SIGNIFICANT CHANGE UP (ref 27–34)
MCHC RBC-ENTMCNC: 29.1 PG — SIGNIFICANT CHANGE UP (ref 27–34)
MCHC RBC-ENTMCNC: 32 GM/DL — SIGNIFICANT CHANGE UP (ref 32–36)
MCHC RBC-ENTMCNC: 32.3 GM/DL — SIGNIFICANT CHANGE UP (ref 32–36)
MCV RBC AUTO: 89 FL — SIGNIFICANT CHANGE UP (ref 80–100)
MCV RBC AUTO: 91.1 FL — SIGNIFICANT CHANGE UP (ref 80–100)
PHOSPHATE SERPL-MCNC: 4.5 MG/DL — SIGNIFICANT CHANGE UP (ref 2.5–4.5)
PLATELET # BLD AUTO: 221 K/UL — SIGNIFICANT CHANGE UP (ref 150–400)
PLATELET # BLD AUTO: 250 K/UL — SIGNIFICANT CHANGE UP (ref 150–400)
POTASSIUM SERPL-MCNC: 4 MMOL/L — SIGNIFICANT CHANGE UP (ref 3.5–5.3)
POTASSIUM SERPL-SCNC: 4 MMOL/L — SIGNIFICANT CHANGE UP (ref 3.5–5.3)
PROT SERPL-MCNC: 7.4 G/DL — SIGNIFICANT CHANGE UP (ref 6–8.3)
PROTHROM AB SERPL-ACNC: 12.5 SEC — SIGNIFICANT CHANGE UP (ref 10–13.1)
RBC # BLD: 4.43 M/UL — SIGNIFICANT CHANGE UP (ref 3.8–5.2)
RBC # BLD: 4.9 M/UL — SIGNIFICANT CHANGE UP (ref 3.8–5.2)
RBC # FLD: 14.8 % — HIGH (ref 10.3–14.5)
RBC # FLD: 16.8 % — HIGH (ref 10.3–14.5)
RH IG SCN BLD-IMP: POSITIVE — SIGNIFICANT CHANGE UP
SODIUM SERPL-SCNC: 149 MMOL/L — HIGH (ref 135–145)
WBC # BLD: 21.6 K/UL — HIGH (ref 3.8–10.5)
WBC # BLD: 22.29 K/UL — HIGH (ref 3.8–10.5)
WBC # FLD AUTO: 21.6 K/UL — HIGH (ref 3.8–10.5)
WBC # FLD AUTO: 22.29 K/UL — HIGH (ref 3.8–10.5)

## 2019-08-26 PROCEDURE — 99233 SBSQ HOSP IP/OBS HIGH 50: CPT

## 2019-08-26 PROCEDURE — 99233 SBSQ HOSP IP/OBS HIGH 50: CPT | Mod: GC

## 2019-08-26 PROCEDURE — 71045 X-RAY EXAM CHEST 1 VIEW: CPT | Mod: 26

## 2019-08-26 PROCEDURE — 93010 ELECTROCARDIOGRAM REPORT: CPT

## 2019-08-26 RX ORDER — HEPARIN SODIUM 5000 [USP'U]/ML
600 INJECTION INTRAVENOUS; SUBCUTANEOUS
Qty: 25000 | Refills: 0 | Status: DISCONTINUED | OUTPATIENT
Start: 2019-08-26 | End: 2019-08-28

## 2019-08-26 RX ORDER — SODIUM CHLORIDE 9 MG/ML
1000 INJECTION INTRAMUSCULAR; INTRAVENOUS; SUBCUTANEOUS
Refills: 0 | Status: DISCONTINUED | OUTPATIENT
Start: 2019-08-26 | End: 2019-08-26

## 2019-08-26 RX ORDER — METOPROLOL TARTRATE 50 MG
2.5 TABLET ORAL ONCE
Refills: 0 | Status: COMPLETED | OUTPATIENT
Start: 2019-08-26 | End: 2019-08-26

## 2019-08-26 RX ORDER — FUROSEMIDE 40 MG
20 TABLET ORAL ONCE
Refills: 0 | Status: COMPLETED | OUTPATIENT
Start: 2019-08-26 | End: 2019-08-26

## 2019-08-26 RX ORDER — SODIUM CHLORIDE 9 MG/ML
1000 INJECTION INTRAMUSCULAR; INTRAVENOUS; SUBCUTANEOUS
Refills: 0 | Status: DISCONTINUED | OUTPATIENT
Start: 2019-08-26 | End: 2019-08-27

## 2019-08-26 RX ORDER — HEPARIN SODIUM 5000 [USP'U]/ML
5000 INJECTION INTRAVENOUS; SUBCUTANEOUS EVERY 6 HOURS
Refills: 0 | Status: DISCONTINUED | OUTPATIENT
Start: 2019-08-26 | End: 2019-08-28

## 2019-08-26 RX ORDER — METOPROLOL TARTRATE 50 MG
5 TABLET ORAL ONCE
Refills: 0 | Status: DISCONTINUED | OUTPATIENT
Start: 2019-08-26 | End: 2019-08-26

## 2019-08-26 RX ORDER — HEPARIN SODIUM 5000 [USP'U]/ML
2500 INJECTION INTRAVENOUS; SUBCUTANEOUS EVERY 6 HOURS
Refills: 0 | Status: DISCONTINUED | OUTPATIENT
Start: 2019-08-26 | End: 2019-08-28

## 2019-08-26 RX ORDER — METOPROLOL TARTRATE 50 MG
5 TABLET ORAL ONCE
Refills: 0 | Status: COMPLETED | OUTPATIENT
Start: 2019-08-26 | End: 2019-08-26

## 2019-08-26 RX ADMIN — Medication 37.5 MILLIGRAM(S): at 05:21

## 2019-08-26 RX ADMIN — Medication 20 MILLIGRAM(S): at 05:21

## 2019-08-26 RX ADMIN — Medication 200 MILLIGRAM(S): at 02:00

## 2019-08-26 RX ADMIN — HEPARIN SODIUM 600 UNIT(S)/HR: 5000 INJECTION INTRAVENOUS; SUBCUTANEOUS at 17:32

## 2019-08-26 RX ADMIN — Medication 0.25 MILLIGRAM(S): at 21:43

## 2019-08-26 RX ADMIN — CARBIDOPA AND LEVODOPA 2 TABLET(S): 25; 100 TABLET ORAL at 05:21

## 2019-08-26 RX ADMIN — PANTOPRAZOLE SODIUM 40 MILLIGRAM(S): 20 TABLET, DELAYED RELEASE ORAL at 12:59

## 2019-08-26 RX ADMIN — SENNA PLUS 2 TABLET(S): 8.6 TABLET ORAL at 21:43

## 2019-08-26 RX ADMIN — Medication 500000 UNIT(S): at 05:21

## 2019-08-26 RX ADMIN — Medication 37.5 MILLIGRAM(S): at 18:48

## 2019-08-26 RX ADMIN — CARBIDOPA AND LEVODOPA 2 TABLET(S): 25; 100 TABLET ORAL at 15:06

## 2019-08-26 RX ADMIN — Medication 25 MILLIGRAM(S): at 05:21

## 2019-08-26 RX ADMIN — FLUCONAZOLE 50 MILLIGRAM(S): 150 TABLET ORAL at 21:40

## 2019-08-26 RX ADMIN — Medication 10 MILLIGRAM(S): at 05:21

## 2019-08-26 RX ADMIN — Medication 2.5 MILLIGRAM(S): at 23:40

## 2019-08-26 RX ADMIN — AMLODIPINE BESYLATE 5 MILLIGRAM(S): 2.5 TABLET ORAL at 05:21

## 2019-08-26 RX ADMIN — LOSARTAN POTASSIUM 50 MILLIGRAM(S): 100 TABLET, FILM COATED ORAL at 05:21

## 2019-08-26 RX ADMIN — CARBIDOPA AND LEVODOPA 2 TABLET(S): 25; 100 TABLET ORAL at 21:39

## 2019-08-26 RX ADMIN — Medication 500000 UNIT(S): at 02:00

## 2019-08-26 RX ADMIN — Medication 5 MILLIGRAM(S): at 23:40

## 2019-08-26 RX ADMIN — Medication 1000 MILLIGRAM(S): at 18:48

## 2019-08-26 RX ADMIN — Medication 0.25 MILLIGRAM(S): at 11:22

## 2019-08-26 RX ADMIN — Medication 500000 UNIT(S): at 21:42

## 2019-08-26 RX ADMIN — Medication 25 MILLIGRAM(S): at 21:44

## 2019-08-26 RX ADMIN — Medication 5 MILLIGRAM(S): at 21:42

## 2019-08-26 RX ADMIN — Medication 200 MILLIGRAM(S): at 05:21

## 2019-08-26 RX ADMIN — Medication 500000 UNIT(S): at 12:58

## 2019-08-26 NOTE — PROGRESS NOTE ADULT - ASSESSMENT
84 yo F with HTN, HLD, AS s/p TAVR on Plavix, MS, HFpEF likely 2/2 MS (on Lasix 20 BID at home) a/w multiple metabolic derangements w/ new encephalopathy w/ newly dx pAfib. Pt w/ worsening leukocytosis and hypernatremia today, afib w/ RVR likely stress response to underlying metabolic issues.    #pAfib  -continue current medications, would not up-titrate metoprolol at this time.  -Defer to primary team for management of underlying infections, copd exacerbation, and dehydration.    Jomar Salvador PGY4  [phone]  All Cardiology service information can be found 24/7 on [email or web address], password: [email or web address]

## 2019-08-26 NOTE — PHARMACOTHERAPY INTERVENTION NOTE - COMMENTS
Patient on fluconazole 100 mg ivpb q 24h (8/15- ) and nystatin 500,000 units oral suspension swish and spit four times a day. Today is Day 12 of therapy for oropharyngeal candidiasis. Usual duration is 7-14 days.     Recommend completing 14 days of therapy (through 8/28). Can consider switching to oral fluconazole 100 mg by mouth daily (oral bioavailability > 90%). Can also consider discontinuing nystatin.

## 2019-08-26 NOTE — PROGRESS NOTE ADULT - PROBLEM SELECTOR PLAN 10
- Patient not on home anti-hyperglycemics  - FS acceptable  - c/w Insulin sliding scale      Prophylaxis:  DVT ppx: Hold heparin drip today for PEG tube placement. will start pt on NOAC per cardiology tomorrow   Diet: NG tube feed   Dispo: PT recs subacute rehab

## 2019-08-26 NOTE — PROGRESS NOTE ADULT - PROBLEM SELECTOR PLAN 5
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 Discussed with patient who is agreeable at this time.   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 Atrophy and extensive small vessel white matter ischemic changes. No acute changes    - c/w diflucan and nystatin for oral candidiasis. Will need an extended course of at least 14 days (8/15->8/28) improving   - now with NG tube for meds and feeds   - Aspiration precautions and Elevate head of bed

## 2019-08-26 NOTE — PROGRESS NOTE ADULT - PROBLEM SELECTOR PLAN 8
- stable  - c/w home dose 50mg QD of losartan (monitor Cr closely if cr still elevated tomorrow consider discontinuing)  - c/w amlodipine 5mg QD  - c/w metoprolol 25 BID for rate control and BP  - continue to monitor  - Vitals Q4H

## 2019-08-26 NOTE — PROGRESS NOTE ADULT - PROBLEM SELECTOR PLAN 9
-TTE --> Normal LF, Mitral stenosis, increased RV pressure and dilated Left atrium, functioning TAVR. Findings c/w valvular heart failure or heart failure with preserved EF  - lasix 20mg qd on hold today as above  - Strict I's/O's and daily weights

## 2019-08-26 NOTE — PROGRESS NOTE ADULT - SUBJECTIVE AND OBJECTIVE BOX
Jomar Salvador  [phone]  All Cardiology service information can be found 24/7 on [email or web address], password: [email or web address]    Patient seen and examined at bedside.    Overnight Events:     Review Of Systems: No chest pain, shortness of breath, or palpitations            Current Meds:  acetaminophen    Suspension .. 1000 milliGRAM(s) Oral every 6 hours PRN  ALPRAZolam 0.5 milliGRAM(s) Oral two times a day PRN  amLODIPine   Tablet 5 milliGRAM(s) Oral daily  atorvastatin 40 milliGRAM(s) Oral <User Schedule>  buDESOnide    Inhalation Suspension 0.25 milliGRAM(s) Inhalation two times a day  carbidopa/levodopa  25/100 2 Tablet(s) Oral three times a day  dextrose 40% Gel 15 Gram(s) Oral once PRN  dextrose 5%. 1000 milliLiter(s) IV Continuous <Continuous>  dextrose 50% Injectable 12.5 Gram(s) IV Push once  dextrose 50% Injectable 25 Gram(s) IV Push once  dextrose 50% Injectable 25 Gram(s) IV Push once  docusate sodium Liquid 100 milliGRAM(s) Oral two times a day  fluconAZOLE IVPB      fluconAZOLE IVPB 100 milliGRAM(s) IV Intermittent every 24 hours  gabapentin   Solution 300 milliGRAM(s) Oral at bedtime  glucagon  Injectable 1 milliGRAM(s) IntraMuscular once PRN  guaiFENesin    Syrup 200 milliGRAM(s) Oral every 6 hours  heparin  Infusion.  Unit(s)/Hr IV Continuous <Continuous>  heparin  Injectable 5000 Unit(s) IV Push every 6 hours PRN  heparin  Injectable 2500 Unit(s) IV Push every 6 hours PRN  insulin lispro (HumaLOG) corrective regimen sliding scale   SubCutaneous every 6 hours  losartan 50 milliGRAM(s) Oral daily  melatonin 5 milliGRAM(s) Oral at bedtime  metoprolol tartrate 25 milliGRAM(s) Oral two times a day  nystatin    Suspension 950116 Unit(s) Oral four times a day  pantoprazole  Injectable 40 milliGRAM(s) IV Push daily  polyethylene glycol 3350 17 Gram(s) Oral daily  predniSONE   Tablet 10 milliGRAM(s) Oral daily  senna 2 Tablet(s) Oral at bedtime  sodium chloride 0.9%. 1000 milliLiter(s) IV Continuous <Continuous>  venlafaxine 37.5 milliGRAM(s) Oral every 12 hours      Vitals:  T(F): 98.9 (08-26), Max: 98.9 (08-26)  HR: 87 (08-26) (84 - 92)  BP: 144/83 (08-26) (126/75 - 147/76)  RR: 18 (08-26)  SpO2: 97% (08-26)  I&O's Summary    25 Aug 2019 07:01  -  26 Aug 2019 07:00  --------------------------------------------------------  IN: 812 mL / OUT: 600 mL / NET: 212 mL    26 Aug 2019 07:01  -  26 Aug 2019 16:45  --------------------------------------------------------  IN: 0 mL / OUT: 0 mL / NET: 0 mL        Physical Exam:  Appearance: No acute distress; well appearing  Eyes: PERRL, EOMI, pink conjunctiva  HEENT: Normal oral mucosa  Cardiovascular: RRR, S1, S2, no murmurs, rubs, or gallops; no edema; no JVD  Respiratory: Clear to auscultation bilaterally  Gastrointestinal: soft, non-tender, non-distended with normal bowel sounds  Musculoskeletal: No clubbing; no joint deformity   Neurologic: Non-focal  Lymphatic: No lymphadenopathy  Psychiatry: AAOx3, mood & affect appropriate  Skin: No rashes, ecchymoses, or cyanosis                          14.1   22.29 )-----------( 250      ( 26 Aug 2019 08:49 )             43.6     08-26    149<H>  |  105  |  63<H>  ----------------------------<  153<H>  4.0   |  25  |  1.31<H>    Ca    9.3      26 Aug 2019 06:07  Phos  4.5     08-26  Mg     3.4     08-26    TPro  7.4  /  Alb  4.3  /  TBili  0.6  /  DBili  x   /  AST  30  /  ALT  30  /  AlkPhos  50  08-26    PT/INR - ( 26 Aug 2019 09:43 )   PT: 12.5 sec;   INR: 1.09 ratio         PTT - ( 26 Aug 2019 09:43 )  PTT:46.1 sec              New ECG(s): Personally reviewed    Echo:    Stress Testing:     Cath:    Imaging:    Interpretation of Telemetry: Jomar Salvador  [phone]  All Cardiology service information can be found 24/7 on [email or web address], password: [email or web address]    Patient seen and examined at bedside.    Pt admitted for multiple metabolic issues including COPD exacerbation, pneumonia, UTI, and hypercapnic resp failure w/ newly dx pAF for which pt was started on Metoprolol. Pt also w/ newly dx dysphagia now requiring PEG tube and while in endoscopy suite pt developed afib w/ RVR to 170s w/ stable BP and responded well to Lopressor 5mg IV x2. Pt at time of eval had converted back to NSR, denies any chest pain, sob, or palpitations.     Review Of Systems: No chest pain, shortness of breath, or palpitations            Current Meds:  acetaminophen    Suspension .. 1000 milliGRAM(s) Oral every 6 hours PRN  ALPRAZolam 0.5 milliGRAM(s) Oral two times a day PRN  amLODIPine   Tablet 5 milliGRAM(s) Oral daily  atorvastatin 40 milliGRAM(s) Oral <User Schedule>  buDESOnide    Inhalation Suspension 0.25 milliGRAM(s) Inhalation two times a day  carbidopa/levodopa  25/100 2 Tablet(s) Oral three times a day  dextrose 40% Gel 15 Gram(s) Oral once PRN  dextrose 5%. 1000 milliLiter(s) IV Continuous <Continuous>  dextrose 50% Injectable 12.5 Gram(s) IV Push once  dextrose 50% Injectable 25 Gram(s) IV Push once  dextrose 50% Injectable 25 Gram(s) IV Push once  docusate sodium Liquid 100 milliGRAM(s) Oral two times a day  fluconAZOLE IVPB      fluconAZOLE IVPB 100 milliGRAM(s) IV Intermittent every 24 hours  gabapentin   Solution 300 milliGRAM(s) Oral at bedtime  glucagon  Injectable 1 milliGRAM(s) IntraMuscular once PRN  guaiFENesin    Syrup 200 milliGRAM(s) Oral every 6 hours  heparin  Infusion.  Unit(s)/Hr IV Continuous <Continuous>  heparin  Injectable 5000 Unit(s) IV Push every 6 hours PRN  heparin  Injectable 2500 Unit(s) IV Push every 6 hours PRN  insulin lispro (HumaLOG) corrective regimen sliding scale   SubCutaneous every 6 hours  losartan 50 milliGRAM(s) Oral daily  melatonin 5 milliGRAM(s) Oral at bedtime  metoprolol tartrate 25 milliGRAM(s) Oral two times a day  nystatin    Suspension 804899 Unit(s) Oral four times a day  pantoprazole  Injectable 40 milliGRAM(s) IV Push daily  polyethylene glycol 3350 17 Gram(s) Oral daily  predniSONE   Tablet 10 milliGRAM(s) Oral daily  senna 2 Tablet(s) Oral at bedtime  sodium chloride 0.9%. 1000 milliLiter(s) IV Continuous <Continuous>  venlafaxine 37.5 milliGRAM(s) Oral every 12 hours      Vitals:  T(F): 98.9 (08-26), Max: 98.9 (08-26)  HR: 87 (08-26) (84 - 92)  BP: 144/83 (08-26) (126/75 - 147/76)  RR: 18 (08-26)  SpO2: 97% (08-26)  I&O's Summary    25 Aug 2019 07:01  -  26 Aug 2019 07:00  --------------------------------------------------------  IN: 812 mL / OUT: 600 mL / NET: 212 mL    26 Aug 2019 07:01  -  26 Aug 2019 16:45  --------------------------------------------------------  IN: 0 mL / OUT: 0 mL / NET: 0 mL        Physical Exam:  Appearance: No acute distress  Eyes: PERRL, EOMI, pink conjunctiva  HEENT: Normal oral mucosa  Cardiovascular: RRR, S1, S2, no edema   Respiratory: scattered wheezes b/l   Gastrointestinal: soft, non-tender, non-distended with normal bowel sounds  Musculoskeletal: No clubbing; no joint deformity   Neurologic: Non-focal  Lymphatic: No lymphadenopathy  Psychiatry: lethargic but arousable   Skin: No rashes, ecchymoses, or cyanosis                          14.1   22.29 )-----------( 250      ( 26 Aug 2019 08:49 )             43.6     08-26    149<H>  |  105  |  63<H>  ----------------------------<  153<H>  4.0   |  25  |  1.31<H>    Ca    9.3      26 Aug 2019 06:07  Phos  4.5     08-26  Mg     3.4     08-26    TPro  7.4  /  Alb  4.3  /  TBili  0.6  /  DBili  x   /  AST  30  /  ALT  30  /  AlkPhos  50  08-26    PT/INR - ( 26 Aug 2019 09:43 )   PT: 12.5 sec;   INR: 1.09 ratio         PTT - ( 26 Aug 2019 09:43 )  PTT:46.1 sec Jomar Salvador  [phone]  All Cardiology service information can be found 24/7 on [email or web address], password: [email or web address]    Patient seen and examined at bedside.    Pt admitted for multiple metabolic issues including COPD exacerbation, pneumonia, UTI, and hypercapnic resp failure w/ newly dx pAF for which pt was started on Metoprolol. Pt also w/ newly dx dysphagia now requiring PEG tube and while in endoscopy suite pt developed afib w/ RVR to 170s w/ stable BP and responded well to Lopressor 5mg IV x2. Pt at time of eval had converted back to NSR, denies any chest pain, sob, or palpitations.     Review Of Systems:   CONSTITUTIONAL: no fevers or chills  EYES/ENT: No visual changes;  No vertigo or throat pain   NECK: No pain or stiffness  RESPIRATORY: No cough, wheezing and shortness of breath  CARDIOVASCULAR: No chest pain or palpitations  GASTROINTESTINAL: abdominal and epigastric pain. No nausea, vomiting. No diarrhea. No melena.  GENITOURINARY: No dysuria, frequency or hematuria  NEUROLOGICAL: No numbness or weakness  SKIN: No itching, burning, rashes, or lesions   All other review of systems is negative unless indicated above.           Current Meds:  acetaminophen    Suspension .. 1000 milliGRAM(s) Oral every 6 hours PRN  ALPRAZolam 0.5 milliGRAM(s) Oral two times a day PRN  amLODIPine   Tablet 5 milliGRAM(s) Oral daily  atorvastatin 40 milliGRAM(s) Oral <User Schedule>  buDESOnide    Inhalation Suspension 0.25 milliGRAM(s) Inhalation two times a day  carbidopa/levodopa  25/100 2 Tablet(s) Oral three times a day  dextrose 40% Gel 15 Gram(s) Oral once PRN  dextrose 5%. 1000 milliLiter(s) IV Continuous <Continuous>  dextrose 50% Injectable 12.5 Gram(s) IV Push once  dextrose 50% Injectable 25 Gram(s) IV Push once  dextrose 50% Injectable 25 Gram(s) IV Push once  docusate sodium Liquid 100 milliGRAM(s) Oral two times a day  fluconAZOLE IVPB      fluconAZOLE IVPB 100 milliGRAM(s) IV Intermittent every 24 hours  gabapentin   Solution 300 milliGRAM(s) Oral at bedtime  glucagon  Injectable 1 milliGRAM(s) IntraMuscular once PRN  guaiFENesin    Syrup 200 milliGRAM(s) Oral every 6 hours  heparin  Infusion.  Unit(s)/Hr IV Continuous <Continuous>  heparin  Injectable 5000 Unit(s) IV Push every 6 hours PRN  heparin  Injectable 2500 Unit(s) IV Push every 6 hours PRN  insulin lispro (HumaLOG) corrective regimen sliding scale   SubCutaneous every 6 hours  losartan 50 milliGRAM(s) Oral daily  melatonin 5 milliGRAM(s) Oral at bedtime  metoprolol tartrate 25 milliGRAM(s) Oral two times a day  nystatin    Suspension 009760 Unit(s) Oral four times a day  pantoprazole  Injectable 40 milliGRAM(s) IV Push daily  polyethylene glycol 3350 17 Gram(s) Oral daily  predniSONE   Tablet 10 milliGRAM(s) Oral daily  senna 2 Tablet(s) Oral at bedtime  sodium chloride 0.9%. 1000 milliLiter(s) IV Continuous <Continuous>  venlafaxine 37.5 milliGRAM(s) Oral every 12 hours      Vitals:  T(F): 98.9 (08-26), Max: 98.9 (08-26)  HR: 87 (08-26) (84 - 92)  BP: 144/83 (08-26) (126/75 - 147/76)  RR: 18 (08-26)  SpO2: 97% (08-26)  I&O's Summary    25 Aug 2019 07:01  -  26 Aug 2019 07:00  --------------------------------------------------------  IN: 812 mL / OUT: 600 mL / NET: 212 mL    26 Aug 2019 07:01  -  26 Aug 2019 16:45  --------------------------------------------------------  IN: 0 mL / OUT: 0 mL / NET: 0 mL        Physical Exam:  Appearance: No acute distress  Eyes: PERRL, EOMI, pink conjunctiva  HEENT: Normal oral mucosa  Cardiovascular: RRR, S1, S2, no edema   Respiratory: scattered wheezes b/l   Gastrointestinal: soft, non-tender, non-distended with normal bowel sounds  Musculoskeletal: No clubbing; no joint deformity   Neurologic: Non-focal  Lymphatic: No lymphadenopathy  Psychiatry: lethargic but arousable   Skin: No rashes, ecchymoses, or cyanosis                          14.1   22.29 )-----------( 250      ( 26 Aug 2019 08:49 )             43.6     08-26    149<H>  |  105  |  63<H>  ----------------------------<  153<H>  4.0   |  25  |  1.31<H>    Ca    9.3      26 Aug 2019 06:07  Phos  4.5     08-26  Mg     3.4     08-26    TPro  7.4  /  Alb  4.3  /  TBili  0.6  /  DBili  x   /  AST  30  /  ALT  30  /  AlkPhos  50  08-26    PT/INR - ( 26 Aug 2019 09:43 )   PT: 12.5 sec;   INR: 1.09 ratio         PTT - ( 26 Aug 2019 09:43 )  PTT:46.1 sec

## 2019-08-26 NOTE — PROGRESS NOTE ADULT - PROBLEM SELECTOR PLAN 2
noted to have hypernatremia, and hypermagnesemia with elevation in Cr. This is likely due to poor PO intake and hydration   - will start gentle IV hydration today   - f/u BMP in am   - hold Lasix today reassess tomorrow

## 2019-08-26 NOTE — CHART NOTE - NSCHARTNOTEFT_GEN_A_CORE
GI follow up    Patient was seen in the endoscopy unit for EGD+PEG tube placement.   She was in A fib with RVR, a RRT was called and the procedure was cancelled.    Recommendations:    - monitor HR, please obtain cardiology clearance for the upper endoscopy  - okay to start heparin infusion now and hold it again tomorrow AM 8/27/2019 at 6am   - will plan for upper endoscopy +PEG tube placement tomorrow  - please keep NPO after midnight       Avril Lowe, PGY-6  GI fellow  B- [id number]/ [phone]  Please call GI fellow on call after 5pm and on weekends

## 2019-08-26 NOTE — PROGRESS NOTE ADULT - PROBLEM SELECTOR PLAN 1
- new onset Afib , likely 2/2 over diuresis vs infection but back in sinus since 8/14  - heparin gtt on hold today for PEG tube placement    - per cards, can use DOAC give patient's MS is not rheumatic in etiology   - c/w metoprolol 25mg BID for rate control per cardiology  - hold lasix 20 mg daily today due to new MARY

## 2019-08-26 NOTE — PROGRESS NOTE ADULT - ATTENDING COMMENTS
85 year old woman with known paroxysmal atrial fibrillation, COPD exacerbation had episode of rapid atrial fibrillation after placement of feeding tube and has spontaneously reverted to sinus rhythm. There is no need to .

## 2019-08-26 NOTE — PROGRESS NOTE ADULT - SUBJECTIVE AND OBJECTIVE BOX
Patient is a 85y old  Female who presents with a chief complaint of Abdominal pain (25 Aug 2019 11:25)      SUBJECTIVE / OVERNIGHT EVENTS: Pt seen and examined at bedside. She denies any CP, SOB, abd pain and n/v. no acute events overnight, pt silvestre fro PEG placement today.     ROS:  All other review of systems negative    Allergies    penicillin (Unknown)    Intolerances        MEDICATIONS  (STANDING):  amLODIPine   Tablet 5 milliGRAM(s) Oral daily  atorvastatin 40 milliGRAM(s) Oral <User Schedule>  buDESOnide    Inhalation Suspension 0.25 milliGRAM(s) Inhalation two times a day  carbidopa/levodopa  25/100 2 Tablet(s) Oral three times a day  dextrose 5%. 1000 milliLiter(s) (50 mL/Hr) IV Continuous <Continuous>  dextrose 50% Injectable 12.5 Gram(s) IV Push once  dextrose 50% Injectable 25 Gram(s) IV Push once  dextrose 50% Injectable 25 Gram(s) IV Push once  docusate sodium Liquid 100 milliGRAM(s) Oral two times a day  fluconAZOLE IVPB      fluconAZOLE IVPB 100 milliGRAM(s) IV Intermittent every 24 hours  gabapentin   Solution 300 milliGRAM(s) Oral at bedtime  guaiFENesin    Syrup 200 milliGRAM(s) Oral every 6 hours  heparin  Infusion.  Unit(s)/Hr (11 mL/Hr) IV Continuous <Continuous>  insulin lispro (HumaLOG) corrective regimen sliding scale   SubCutaneous every 6 hours  losartan 50 milliGRAM(s) Oral daily  melatonin 5 milliGRAM(s) Oral at bedtime  metoprolol tartrate 25 milliGRAM(s) Oral two times a day  nystatin    Suspension 976307 Unit(s) Oral four times a day  pantoprazole  Injectable 40 milliGRAM(s) IV Push daily  polyethylene glycol 3350 17 Gram(s) Oral daily  predniSONE   Tablet 10 milliGRAM(s) Oral daily  senna 2 Tablet(s) Oral at bedtime  sodium chloride 0.9%. 1000 milliLiter(s) (60 mL/Hr) IV Continuous <Continuous>  venlafaxine 37.5 milliGRAM(s) Oral every 12 hours    MEDICATIONS  (PRN):  acetaminophen    Suspension .. 1000 milliGRAM(s) Oral every 6 hours PRN Mild Pain (1 - 3), Moderate Pain (4 - 6)  ALPRAZolam 0.5 milliGRAM(s) Oral two times a day PRN anxiety  dextrose 40% Gel 15 Gram(s) Oral once PRN Blood Glucose LESS THAN 70 milliGRAM(s)/deciliter  glucagon  Injectable 1 milliGRAM(s) IntraMuscular once PRN Glucose LESS THAN 70 milligrams/deciliter  heparin  Injectable 5000 Unit(s) IV Push every 6 hours PRN For aPTT less than 40  heparin  Injectable 2500 Unit(s) IV Push every 6 hours PRN For aPTT between 40 - 57      Vital Signs Last 24 Hrs  T(C): 36.9 (26 Aug 2019 04:33), Max: 37 (25 Aug 2019 20:47)  T(F): 98.4 (26 Aug 2019 04:33), Max: 98.6 (25 Aug 2019 20:47)  HR: 92 (26 Aug 2019 04:33) (80 - 92)  BP: 147/76 (26 Aug 2019 04:33) (126/75 - 147/76)  BP(mean): --  RR: 18 (26 Aug 2019 04:33) (18 - 18)  SpO2: 94% (26 Aug 2019 04:33) (94% - 97%)  CAPILLARY BLOOD GLUCOSE      POCT Blood Glucose.: 167 mg/dL (26 Aug 2019 06:07)  POCT Blood Glucose.: 145 mg/dL (26 Aug 2019 00:14)  POCT Blood Glucose.: 137 mg/dL (25 Aug 2019 17:25)  POCT Blood Glucose.: 164 mg/dL (25 Aug 2019 12:01)    I&O's Summary    25 Aug 2019 07:01  -  26 Aug 2019 07:00  --------------------------------------------------------  IN: 812 mL / OUT: 600 mL / NET: 212 mL    26 Aug 2019 07:01  -  26 Aug 2019 11:04  --------------------------------------------------------  IN: 0 mL / OUT: 0 mL / NET: 0 mL        PHYSICAL EXAM:  GENERAL: NAD, elderly, + NGT   HEAD:  Atraumatic, Normocephalic  EYES: EOMI, PERRLA, conjunctiva and sclera clear  NECK: Supple, No JVD  CHEST/LUNG: Clear to auscultation bilaterally; No wheeze  HEART: Regular rate and rhythm; No murmurs, rubs, or gallops  ABDOMEN: Soft, Nontender, Nondistended; Bowel sounds present  EXTREMITIES:  2+ Peripheral Pulses, No clubbing, cyanosis, or edema  NEUROLOGY: AAOx2 (self and place), non-focal  SKIN: left hand ecchymosis noted, non tender.     LABS:                        14.1   22.29 )-----------( 250      ( 26 Aug 2019 08:49 )             43.6     08-26    149<H>  |  105  |  63<H>  ----------------------------<  153<H>  4.0   |  25  |  1.31<H>    Ca    9.3      26 Aug 2019 06:07  Phos  4.5     08-26  Mg     3.4     08-26    TPro  7.4  /  Alb  4.3  /  TBili  0.6  /  DBili  x   /  AST  30  /  ALT  30  /  AlkPhos  50  08-26    PT/INR - ( 26 Aug 2019 09:43 )   PT: 12.5 sec;   INR: 1.09 ratio         PTT - ( 26 Aug 2019 09:43 )  PTT:46.1 sec          RADIOLOGY & ADDITIONAL TESTS:    Consultant(s) Notes Reviewed:  GI rec noted from yesterday plan for PEG placement today     Care Discussed with Consultants/Other Providers: Medicine NP

## 2019-08-26 NOTE — CHART NOTE - NSCHARTNOTEFT_GEN_A_CORE
S/P RRT in the endoscopy suite for Afib with RVR at 170s s/p lopressor 5mg -> 2.5mg ->2.5mg (total 10mg). Now in SR 60 - 70s. PEG not performed due to the event. Cards reconsulted and rec to continue present dose of metoprolol 25 mg BID  NGT tube feeds resumed with free water bolus 250cc via NGT [id number] and Heparin drip resumed at previous therapeutic rate at 600 units/hr with no initial bolus per discussion with Dr. Gill  Patient with Leukocytosis - rising today to 22.2, likely sec to prednisone. Will send blood culture and UA now and then urine culture if UA posiitve    [id number]

## 2019-08-27 LAB
ALBUMIN SERPL ELPH-MCNC: 3.9 G/DL — SIGNIFICANT CHANGE UP (ref 3.3–5)
ALP SERPL-CCNC: 47 U/L — SIGNIFICANT CHANGE UP (ref 40–120)
ALT FLD-CCNC: 14 U/L — SIGNIFICANT CHANGE UP (ref 10–45)
ANION GAP SERPL CALC-SCNC: 13 MMOL/L — SIGNIFICANT CHANGE UP (ref 5–17)
ANION GAP SERPL CALC-SCNC: 15 MMOL/L — SIGNIFICANT CHANGE UP (ref 5–17)
APTT BLD: 71.7 SEC — HIGH (ref 27.5–36.3)
APTT BLD: 88.4 SEC — HIGH (ref 27.5–36.3)
AST SERPL-CCNC: 30 U/L — SIGNIFICANT CHANGE UP (ref 10–40)
BASE EXCESS BLDA CALC-SCNC: 4.5 MMOL/L — HIGH (ref -2–2)
BILIRUB SERPL-MCNC: 0.6 MG/DL — SIGNIFICANT CHANGE UP (ref 0.2–1.2)
BUN SERPL-MCNC: 55 MG/DL — HIGH (ref 7–23)
BUN SERPL-MCNC: 61 MG/DL — HIGH (ref 7–23)
CALCIUM SERPL-MCNC: 9.2 MG/DL — SIGNIFICANT CHANGE UP (ref 8.4–10.5)
CALCIUM SERPL-MCNC: 9.2 MG/DL — SIGNIFICANT CHANGE UP (ref 8.4–10.5)
CHLORIDE SERPL-SCNC: 109 MMOL/L — HIGH (ref 96–108)
CHLORIDE SERPL-SCNC: 112 MMOL/L — HIGH (ref 96–108)
CO2 BLDA-SCNC: 30 MMOL/L — SIGNIFICANT CHANGE UP (ref 22–30)
CO2 SERPL-SCNC: 23 MMOL/L — SIGNIFICANT CHANGE UP (ref 22–31)
CO2 SERPL-SCNC: 28 MMOL/L — SIGNIFICANT CHANGE UP (ref 22–31)
CREAT SERPL-MCNC: 1.18 MG/DL — SIGNIFICANT CHANGE UP (ref 0.5–1.3)
CREAT SERPL-MCNC: 1.33 MG/DL — HIGH (ref 0.5–1.3)
GLUCOSE SERPL-MCNC: 135 MG/DL — HIGH (ref 70–99)
GLUCOSE SERPL-MCNC: 174 MG/DL — HIGH (ref 70–99)
HCO3 BLDA-SCNC: 28 MMOL/L — SIGNIFICANT CHANGE UP (ref 21–29)
HCT VFR BLD CALC: 39.2 % — SIGNIFICANT CHANGE UP (ref 34.5–45)
HGB BLD-MCNC: 12.5 G/DL — SIGNIFICANT CHANGE UP (ref 11.5–15.5)
MCHC RBC-ENTMCNC: 29.4 PG — SIGNIFICANT CHANGE UP (ref 27–34)
MCHC RBC-ENTMCNC: 32 GM/DL — SIGNIFICANT CHANGE UP (ref 32–36)
MCV RBC AUTO: 91.9 FL — SIGNIFICANT CHANGE UP (ref 80–100)
PCO2 BLDA: 40 MMHG — SIGNIFICANT CHANGE UP (ref 32–46)
PH BLDA: 7.46 — HIGH (ref 7.35–7.45)
PLATELET # BLD AUTO: 198 K/UL — SIGNIFICANT CHANGE UP (ref 150–400)
PO2 BLDA: 150 MMHG — HIGH (ref 74–108)
POTASSIUM SERPL-MCNC: 3.6 MMOL/L — SIGNIFICANT CHANGE UP (ref 3.5–5.3)
POTASSIUM SERPL-MCNC: 4.2 MMOL/L — SIGNIFICANT CHANGE UP (ref 3.5–5.3)
POTASSIUM SERPL-SCNC: 3.6 MMOL/L — SIGNIFICANT CHANGE UP (ref 3.5–5.3)
POTASSIUM SERPL-SCNC: 4.2 MMOL/L — SIGNIFICANT CHANGE UP (ref 3.5–5.3)
PROT SERPL-MCNC: 6.8 G/DL — SIGNIFICANT CHANGE UP (ref 6–8.3)
RBC # BLD: 4.26 M/UL — SIGNIFICANT CHANGE UP (ref 3.8–5.2)
RBC # FLD: 14.9 % — HIGH (ref 10.3–14.5)
SAO2 % BLDA: 99 % — HIGH (ref 92–96)
SODIUM SERPL-SCNC: 150 MMOL/L — HIGH (ref 135–145)
SODIUM SERPL-SCNC: 150 MMOL/L — HIGH (ref 135–145)
WBC # BLD: 20.9 K/UL — HIGH (ref 3.8–10.5)
WBC # FLD AUTO: 20.9 K/UL — HIGH (ref 3.8–10.5)

## 2019-08-27 PROCEDURE — 71045 X-RAY EXAM CHEST 1 VIEW: CPT | Mod: 26

## 2019-08-27 PROCEDURE — 99232 SBSQ HOSP IP/OBS MODERATE 35: CPT | Mod: GC

## 2019-08-27 PROCEDURE — 99222 1ST HOSP IP/OBS MODERATE 55: CPT | Mod: GC

## 2019-08-27 PROCEDURE — 99233 SBSQ HOSP IP/OBS HIGH 50: CPT

## 2019-08-27 PROCEDURE — 93010 ELECTROCARDIOGRAM REPORT: CPT | Mod: 76

## 2019-08-27 PROCEDURE — 70450 CT HEAD/BRAIN W/O DYE: CPT | Mod: 26

## 2019-08-27 PROCEDURE — 99233 SBSQ HOSP IP/OBS HIGH 50: CPT | Mod: GC

## 2019-08-27 RX ORDER — METOPROLOL TARTRATE 50 MG
5 TABLET ORAL ONCE
Refills: 0 | Status: COMPLETED | OUTPATIENT
Start: 2019-08-27 | End: 2019-08-27

## 2019-08-27 RX ORDER — METOPROLOL TARTRATE 50 MG
2.5 TABLET ORAL ONCE
Refills: 0 | Status: COMPLETED | OUTPATIENT
Start: 2019-08-27 | End: 2019-08-27

## 2019-08-27 RX ORDER — DILTIAZEM HCL 120 MG
5 CAPSULE, EXT RELEASE 24 HR ORAL ONCE
Refills: 0 | Status: COMPLETED | OUTPATIENT
Start: 2019-08-27 | End: 2019-08-27

## 2019-08-27 RX ORDER — LIDOCAINE 4 G/100G
1 CREAM TOPICAL DAILY
Refills: 0 | Status: DISCONTINUED | OUTPATIENT
Start: 2019-08-27 | End: 2019-08-30

## 2019-08-27 RX ORDER — SODIUM CHLORIDE 9 MG/ML
500 INJECTION, SOLUTION INTRAVENOUS ONCE
Refills: 0 | Status: COMPLETED | OUTPATIENT
Start: 2019-08-27 | End: 2019-08-27

## 2019-08-27 RX ORDER — SODIUM CHLORIDE 9 MG/ML
1000 INJECTION, SOLUTION INTRAVENOUS
Refills: 0 | Status: COMPLETED | OUTPATIENT
Start: 2019-08-27 | End: 2019-08-27

## 2019-08-27 RX ORDER — FLUCONAZOLE 150 MG/1
100 TABLET ORAL EVERY 24 HOURS
Refills: 0 | Status: COMPLETED | OUTPATIENT
Start: 2019-08-27 | End: 2019-08-28

## 2019-08-27 RX ORDER — FUROSEMIDE 40 MG
20 TABLET ORAL DAILY
Refills: 0 | Status: DISCONTINUED | OUTPATIENT
Start: 2019-08-28 | End: 2019-08-30

## 2019-08-27 RX ORDER — ACETAMINOPHEN 500 MG
1000 TABLET ORAL ONCE
Refills: 0 | Status: COMPLETED | OUTPATIENT
Start: 2019-08-27 | End: 2019-08-27

## 2019-08-27 RX ADMIN — PANTOPRAZOLE SODIUM 40 MILLIGRAM(S): 20 TABLET, DELAYED RELEASE ORAL at 12:49

## 2019-08-27 RX ADMIN — CARBIDOPA AND LEVODOPA 2 TABLET(S): 25; 100 TABLET ORAL at 15:51

## 2019-08-27 RX ADMIN — Medication 37.5 MILLIGRAM(S): at 17:31

## 2019-08-27 RX ADMIN — Medication 5 MILLIGRAM(S): at 08:19

## 2019-08-27 RX ADMIN — LOSARTAN POTASSIUM 50 MILLIGRAM(S): 100 TABLET, FILM COATED ORAL at 12:48

## 2019-08-27 RX ADMIN — Medication 20 MILLIGRAM(S): at 00:43

## 2019-08-27 RX ADMIN — Medication 200 MILLIGRAM(S): at 23:09

## 2019-08-27 RX ADMIN — Medication 400 MILLIGRAM(S): at 06:53

## 2019-08-27 RX ADMIN — Medication 500000 UNIT(S): at 23:09

## 2019-08-27 RX ADMIN — Medication 0.5 MILLIGRAM(S): at 19:18

## 2019-08-27 RX ADMIN — Medication 1: at 00:42

## 2019-08-27 RX ADMIN — Medication 0.25 MILLIGRAM(S): at 22:35

## 2019-08-27 RX ADMIN — Medication 5 MILLIGRAM(S): at 08:51

## 2019-08-27 RX ADMIN — LIDOCAINE 1 PATCH: 4 CREAM TOPICAL at 19:19

## 2019-08-27 RX ADMIN — Medication 37.5 MILLIGRAM(S): at 07:40

## 2019-08-27 RX ADMIN — CARBIDOPA AND LEVODOPA 2 TABLET(S): 25; 100 TABLET ORAL at 22:34

## 2019-08-27 RX ADMIN — Medication 200 MILLIGRAM(S): at 07:40

## 2019-08-27 RX ADMIN — Medication 5 MILLIGRAM(S): at 08:28

## 2019-08-27 RX ADMIN — Medication 5 MILLIGRAM(S): at 06:01

## 2019-08-27 RX ADMIN — Medication 200 MILLIGRAM(S): at 00:43

## 2019-08-27 RX ADMIN — SENNA PLUS 2 TABLET(S): 8.6 TABLET ORAL at 22:36

## 2019-08-27 RX ADMIN — SODIUM CHLORIDE 500 MILLILITER(S): 9 INJECTION, SOLUTION INTRAVENOUS at 06:50

## 2019-08-27 RX ADMIN — Medication 500000 UNIT(S): at 00:42

## 2019-08-27 RX ADMIN — Medication 5 MILLIGRAM(S): at 08:23

## 2019-08-27 RX ADMIN — SODIUM CHLORIDE 500 MILLILITER(S): 9 INJECTION, SOLUTION INTRAVENOUS at 08:33

## 2019-08-27 RX ADMIN — Medication 500000 UNIT(S): at 12:48

## 2019-08-27 RX ADMIN — Medication 100 MILLIGRAM(S): at 07:40

## 2019-08-27 RX ADMIN — Medication 25 MILLIGRAM(S): at 17:31

## 2019-08-27 RX ADMIN — Medication 25 MILLIGRAM(S): at 07:40

## 2019-08-27 RX ADMIN — Medication 5 MILLIGRAM(S): at 22:35

## 2019-08-27 RX ADMIN — Medication 200 MILLIGRAM(S): at 12:48

## 2019-08-27 RX ADMIN — Medication 500000 UNIT(S): at 07:40

## 2019-08-27 RX ADMIN — Medication 200 MILLIGRAM(S): at 17:31

## 2019-08-27 RX ADMIN — POLYETHYLENE GLYCOL 3350 17 GRAM(S): 17 POWDER, FOR SOLUTION ORAL at 12:49

## 2019-08-27 RX ADMIN — Medication 5 MILLIGRAM(S): at 08:38

## 2019-08-27 RX ADMIN — Medication 1000 MILLIGRAM(S): at 17:15

## 2019-08-27 RX ADMIN — Medication 0.25 MILLIGRAM(S): at 08:04

## 2019-08-27 RX ADMIN — GABAPENTIN 300 MILLIGRAM(S): 400 CAPSULE ORAL at 22:35

## 2019-08-27 RX ADMIN — Medication 100 MILLIGRAM(S): at 17:31

## 2019-08-27 RX ADMIN — HEPARIN SODIUM 600 UNIT(S)/HR: 5000 INJECTION INTRAVENOUS; SUBCUTANEOUS at 09:37

## 2019-08-27 RX ADMIN — Medication 2.5 MILLIGRAM(S): at 06:30

## 2019-08-27 RX ADMIN — HEPARIN SODIUM 600 UNIT(S)/HR: 5000 INJECTION INTRAVENOUS; SUBCUTANEOUS at 06:07

## 2019-08-27 RX ADMIN — Medication 500000 UNIT(S): at 17:31

## 2019-08-27 RX ADMIN — SODIUM CHLORIDE 70 MILLILITER(S): 9 INJECTION, SOLUTION INTRAVENOUS at 08:00

## 2019-08-27 RX ADMIN — Medication 10 MILLIGRAM(S): at 07:40

## 2019-08-27 RX ADMIN — AMLODIPINE BESYLATE 5 MILLIGRAM(S): 2.5 TABLET ORAL at 12:48

## 2019-08-27 RX ADMIN — FLUCONAZOLE 50 MILLIGRAM(S): 150 TABLET ORAL at 22:34

## 2019-08-27 RX ADMIN — CARBIDOPA AND LEVODOPA 2 TABLET(S): 25; 100 TABLET ORAL at 07:40

## 2019-08-27 NOTE — PROGRESS NOTE ADULT - PROBLEM SELECTOR PLAN 2
noted to have hypernatremia, and hypermagnesemia with elevation in Cr. This is likely due to poor PO intake and hydration   - started 300 cc free water though NGT Q4h today for 24 hr f/u BMP in AM    - f/u BMP in am   - home lasix restarted

## 2019-08-27 NOTE — PROGRESS NOTE ADULT - PROBLEM SELECTOR PLAN 8
- stable  - c/w home dose 50mg QD of losartan   - c/w amlodipine 5mg QD  - c/w metoprolol 25 BID for rate control and BP  - continue to monitor  - Vitals Q4H

## 2019-08-27 NOTE — PROGRESS NOTE ADULT - ATTENDING COMMENTS
85 year old woman with known paroxysmal atrial fibrillation, COPD exacerbation had episode of rapid atrial fibrillation after placement of feeding tube and has spontaneously reverted to sinus rhythm. There is no need to . Again overnight atrial fibrillation with rapid ventricular response and has spontaneously reverted to sinus rhythm. This pattern likely to continue until COPD exacerbation improves.

## 2019-08-27 NOTE — PROGRESS NOTE ADULT - SUBJECTIVE AND OBJECTIVE BOX
Patient is a 85y old  Female who presents with a chief complaint of Abdominal pain (27 Aug 2019 11:44)      SUBJECTIVE / OVERNIGHT EVENTS: Pt seen and examined at bedside. She denies any CP, SOB, bad pain and n/v. She had a RRT for afib w/ RVR, currently has reverted to sinus rhythm.       ROS:  All other review of systems negative    Allergies    penicillin (Unknown)    Intolerances        MEDICATIONS  (STANDING):  amLODIPine   Tablet 5 milliGRAM(s) Oral daily  atorvastatin 40 milliGRAM(s) Oral <User Schedule>  buDESOnide    Inhalation Suspension 0.25 milliGRAM(s) Inhalation two times a day  carbidopa/levodopa  25/100 2 Tablet(s) Oral three times a day  dextrose 5%. 1000 milliLiter(s) (50 mL/Hr) IV Continuous <Continuous>  dextrose 50% Injectable 12.5 Gram(s) IV Push once  dextrose 50% Injectable 25 Gram(s) IV Push once  dextrose 50% Injectable 25 Gram(s) IV Push once  docusate sodium Liquid 100 milliGRAM(s) Oral two times a day  fluconAZOLE IVPB      fluconAZOLE IVPB 100 milliGRAM(s) IV Intermittent every 24 hours  gabapentin   Solution 300 milliGRAM(s) Oral at bedtime  guaiFENesin    Syrup 200 milliGRAM(s) Oral every 6 hours  heparin  Infusion. 600 Unit(s)/Hr (6 mL/Hr) IV Continuous <Continuous>  insulin lispro (HumaLOG) corrective regimen sliding scale   SubCutaneous every 6 hours  lactated ringers Bolus 500 milliLiter(s) IV Bolus once  losartan 50 milliGRAM(s) Oral daily  melatonin 5 milliGRAM(s) Oral at bedtime  metoprolol tartrate 25 milliGRAM(s) Oral two times a day  nystatin    Suspension 912614 Unit(s) Oral four times a day  pantoprazole  Injectable 40 milliGRAM(s) IV Push daily  polyethylene glycol 3350 17 Gram(s) Oral daily  predniSONE   Tablet 10 milliGRAM(s) Oral daily  senna 2 Tablet(s) Oral at bedtime  venlafaxine 37.5 milliGRAM(s) Oral every 12 hours    MEDICATIONS  (PRN):  acetaminophen    Suspension .. 1000 milliGRAM(s) Oral every 6 hours PRN Mild Pain (1 - 3), Moderate Pain (4 - 6)  ALPRAZolam 0.5 milliGRAM(s) Oral two times a day PRN anxiety  dextrose 40% Gel 15 Gram(s) Oral once PRN Blood Glucose LESS THAN 70 milliGRAM(s)/deciliter  glucagon  Injectable 1 milliGRAM(s) IntraMuscular once PRN Glucose LESS THAN 70 milligrams/deciliter  heparin  Injectable 5000 Unit(s) IV Push every 6 hours PRN For aPTT less than 40  heparin  Injectable 2500 Unit(s) IV Push every 6 hours PRN For aPTT between 40 - 57      Vital Signs Last 24 Hrs  T(C): 37 (27 Aug 2019 07:32), Max: 37.1 (26 Aug 2019 21:10)  T(F): 98.6 (27 Aug 2019 07:32), Max: 98.8 (26 Aug 2019 21:10)  HR: 115 (27 Aug 2019 08:52) (73 - 150)  BP: 116/60 (27 Aug 2019 08:52) (112/62 - 135/81)  BP(mean): --  RR: 18 (27 Aug 2019 07:32) (18 - 19)  SpO2: 98% (27 Aug 2019 07:32) (98% - 100%)  CAPILLARY BLOOD GLUCOSE      POCT Blood Glucose.: 144 mg/dL (27 Aug 2019 12:11)  POCT Blood Glucose.: 124 mg/dL (27 Aug 2019 08:00)  POCT Blood Glucose.: 165 mg/dL (27 Aug 2019 05:39)  POCT Blood Glucose.: 154 mg/dL (27 Aug 2019 00:32)  POCT Blood Glucose.: 118 mg/dL (26 Aug 2019 17:26)    I&O's Summary    26 Aug 2019 07:01  -  27 Aug 2019 07:00  --------------------------------------------------------  IN: 0 mL / OUT: 2400 mL / NET: -2400 mL        PHYSICAL EXAM:  GENERAL: NAD, well-developed  HEAD:  Atraumatic, Normocephalic  EYES: EOMI, PERRLA, conjunctiva and sclera clear  NECK: Supple, No JVD  CHEST/LUNG:+ wheeze b/l lungs  HEART: Regular rate and rhythm; No murmurs, rubs, or gallops  ABDOMEN: Soft, Nontender, Nondistended; Bowel sounds present  EXTREMITIES:  2+ Peripheral Pulses, No clubbing, cyanosis, or edema  NEUROLOGY: AAOx3, non-focal      LABS:                        12.5   20.9  )-----------( 198      ( 27 Aug 2019 09:05 )             39.2     08-27    150<H>  |  109<H>  |  55<H>  ----------------------------<  135<H>  3.6   |  28  |  1.18    Ca    9.2      27 Aug 2019 09:05  Phos  4.5     08-26  Mg     3.4     08-26    TPro  6.8  /  Alb  3.9  /  TBili  0.6  /  DBili  x   /  AST  30  /  ALT  14  /  AlkPhos  47  08-26    PT/INR - ( 26 Aug 2019 09:43 )   PT: 12.5 sec;   INR: 1.09 ratio         PTT - ( 27 Aug 2019 09:05 )  PTT:71.7 sec          RADIOLOGY & ADDITIONAL TESTS:    Consultant(s) Notes Reviewed:  Cardiology rec noted     Care Discussed with Consultants/Other Providers: Medicine NP, Pulmonary and cardiology

## 2019-08-27 NOTE — PROGRESS NOTE ADULT - PROBLEM SELECTOR PLAN 9
-TTE --> Normal LF, Mitral stenosis, increased RV pressure and dilated Left atrium, functioning TAVR. Findings c/w valvular heart failure or heart failure with preserved EF  - lasix 20mg qd   - Strict I's/O's and daily weights

## 2019-08-27 NOTE — CHART NOTE - NSCHARTNOTEFT_GEN_A_CORE
MEDICINE [facility]     EVENT SUMMARY   Notified by RN for HR of 170s -190s. Pt seen and examined at the bedside. Pt is alert. Pt states that she has a headache which isn't new and  is c/o difficulty breathing. Pt p/w  SOB and abdominal pain, admitted for acute hypoxic and hypercarbic respiratory failure in setting of pneumonia and COPD exacerbation, course c/b UTI, acute metabolic encephalopathy, new onset afib  and dysphagia s/p botox injection and pending PEG, MARY s/p gentle IV hydration. Pt was s/p RRT on 8/26 for afib with RVR in the endoscopy suite and was given 10 mg in total and converted back to sinus rhythm. Pt now with elevated HR; EKG with SVT with HR of 160s. VSS/ tachypneic. Pt given 5mg IVP lopressor with minimal improvement --> 2.5 lopressor x 1 and converted to sinus 60s; CXR,  Lasix 20 mg x 1, CBC, CMP and abg with lytes were sent. D/w Dr. Canseco; hypernatremia; D5 70 cc/hr for 4 hours and repeat BMP in AM, BiPAP if needed. Will continue to monitor.    Gracie STATON  #[id number] MEDICINE PA     EVENT SUMMARY   Notified by RN for HR of 170s -190s. Pt seen and examined at the bedside. Pt is alert. Pt states that she has a headache which isn't new and  is c/o difficulty breathing. Pt p/w  SOB and abdominal pain, admitted for acute hypoxic and hypercarbic respiratory failure in setting of pneumonia and COPD exacerbation, course c/b UTI, acute metabolic encephalopathy, new onset afib  and dysphagia s/p botox injection and pending PEG, MARY s/p gentle IV hydration. Pt was s/p RRT on 8/26 for afib with RVR in the endoscopy suite and was given 10 mg in total and converted back to sinus rhythm. Pt now with elevated HR; EKG with SVT with HR of 160s. VSS/ tachypneic. Pt given 5mg IVP lopressor with minimal improvement --> 2.5 lopressor x 1 and converted to sinus 60s; CXR,  Lasix 20 mg x 1, CBC, CMP and abg with lytes were sent. D/w Dr. Canseco; hypernatremia; D5 70 cc/hr for 4 hours and repeat BMP in AM, BiPAP if needed. Will continue to monitor.    Gracie STATON  #[id number]      ADDENDUM   Notified by Rn for tachycardia. EKG with Afib with RVR. Pt seen at the bedside; c/o headache. VSS Lopressor 5 x 1 and lopressor 2.5 x 1, tylenol IV x 1 given. C/w hep gtt; CTh done overnight negative (verbal prelim) D/w cardiology fellow at bedside; recommended  cc bolus and to repeat if HR persists. Endorsed to primary team.     Gracie STATON  #[id number]

## 2019-08-27 NOTE — CONSULT NOTE ADULT - ASSESSMENT
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 Patient is now s/p VC injection per ENT due to vocal cord paralysis demonstrated on laryngoscopy.     Patient has been previously treated with a 7 day course of CTX for PNA as well as 3 day course of flagyl for suspected aspiration PNA as patient has failied S&S and MBS. Patient currently has NGT with tube feeds as had RRT before having PEG placed by GI. On exam, no wheezing appreciated today. Her wheezing is likely to be positional and due to her vocal cord paralysis.

## 2019-08-27 NOTE — PROGRESS NOTE ADULT - ASSESSMENT
86 yo F with HTN, HLD, AS s/p TAVR on Plavix, MS, HFpEF likely 2/2 MS (on Lasix 20 BID at home) a/w multiple metabolic derangements including COPD exacerbation, PNA, UTI w/ new encephalopathy requiring NGT w/ newly dx pAfib. Pt w/ afib w/ RVR likely stress response to underlying metabolic issues.    #pAfib  -Pt w/ RVR this morning, responded to 1.5L IVF, lopressor 15mg, and diltiazem 10mg, now has converted back to sinus. Pt dehydrated appearing w/ persistent wheezing on exam. Would favor optimization of volume and pulmonary status rather than increasing metoprolol dose.   -Trend BMP, Cr improved today after IVF although persistently hypernatremic. Free water per primary team.    Jomar Salvador PGY4  [phone]  All Cardiology service information can be found 24/7 on [email or web address], password: [email or web address]

## 2019-08-27 NOTE — PROVIDER CONTACT NOTE (CHANGE IN STATUS NOTIFICATION) - BACKGROUND
dx: UTI, acute metabolic encephalopathy, new onset afib  pmh: HTN, neuropathy, aortic stenosis, parkinson

## 2019-08-27 NOTE — PROGRESS NOTE ADULT - SUBJECTIVE AND OBJECTIVE BOX
Jomar Salvador  [phone]  All Cardiology service information can be found 24/7 on [email or web address], password: [email or web address]    Patient seen and examined at bedside.    Overnight Events: Pt w/ RVR overnight, started on D5 and given lopressor as well as lasix. This morning, pt afib w/ RVR to 160s. She complained of a headache, but denied any chest pain or palpitations.     CONSTITUTIONAL:  No fevers or chills  HEENT:  No rhinorrhea  SKIN:  No rash or itching.  CARDIOVASCULAR:  No chest pain, chest pressure or chest discomfort. No palpitations or edema.  RESPIRATORY:  No shortness of breath, cough or sputum.  GASTROINTESTINAL:  No nausea, vomiting or diarrhea. No abdominal pain.   GENITOURINARY:  Denies hematuria, dysuria.   NEUROLOGICAL:  +headache   MUSCULOSKELETAL:  No muscle, back pain, joint pain or stiffness.  HEMATOLOGIC:  No bleeding or bruising.         Current Meds:  acetaminophen    Suspension .. 1000 milliGRAM(s) Oral every 6 hours PRN  ALPRAZolam 0.5 milliGRAM(s) Oral two times a day PRN  amLODIPine   Tablet 5 milliGRAM(s) Oral daily  atorvastatin 40 milliGRAM(s) Oral <User Schedule>  buDESOnide    Inhalation Suspension 0.25 milliGRAM(s) Inhalation two times a day  carbidopa/levodopa  25/100 2 Tablet(s) Oral three times a day  dextrose 40% Gel 15 Gram(s) Oral once PRN  dextrose 5%. 1000 milliLiter(s) IV Continuous <Continuous>  dextrose 50% Injectable 12.5 Gram(s) IV Push once  dextrose 50% Injectable 25 Gram(s) IV Push once  dextrose 50% Injectable 25 Gram(s) IV Push once  docusate sodium Liquid 100 milliGRAM(s) Oral two times a day  fluconAZOLE IVPB      fluconAZOLE IVPB 100 milliGRAM(s) IV Intermittent every 24 hours  gabapentin   Solution 300 milliGRAM(s) Oral at bedtime  glucagon  Injectable 1 milliGRAM(s) IntraMuscular once PRN  guaiFENesin    Syrup 200 milliGRAM(s) Oral every 6 hours  heparin  Infusion. 600 Unit(s)/Hr IV Continuous <Continuous>  heparin  Injectable 5000 Unit(s) IV Push every 6 hours PRN  heparin  Injectable 2500 Unit(s) IV Push every 6 hours PRN  insulin lispro (HumaLOG) corrective regimen sliding scale   SubCutaneous every 6 hours  lactated ringers Bolus 500 milliLiter(s) IV Bolus once  losartan 50 milliGRAM(s) Oral daily  melatonin 5 milliGRAM(s) Oral at bedtime  metoprolol tartrate 25 milliGRAM(s) Oral two times a day  nystatin    Suspension 443664 Unit(s) Oral four times a day  pantoprazole  Injectable 40 milliGRAM(s) IV Push daily  polyethylene glycol 3350 17 Gram(s) Oral daily  predniSONE   Tablet 10 milliGRAM(s) Oral daily  senna 2 Tablet(s) Oral at bedtime  venlafaxine 37.5 milliGRAM(s) Oral every 12 hours      Vitals:  T(F): 98.6 (08-27), Max: 98.9 (08-26)  HR: 115 (08-27) (73 - 150)  BP: 116/60 (08-27) (112/62 - 144/83)  RR: 18 (08-27)  SpO2: 98% (08-27)  I&O's Summary    26 Aug 2019 07:01  -  27 Aug 2019 07:00  --------------------------------------------------------  IN: 0 mL / OUT: 2400 mL / NET: -2400 mL        Physical Exam:  Appearance: No acute distress  Eyes: PERRL, EOMI, pink conjunctiva  HEENT: Normal oral mucosa  Cardiovascular: irregular, no edema   Respiratory: scattered wheezes b/l   Gastrointestinal: soft, non-tender, non-distended with normal bowel sounds  Musculoskeletal: No clubbing; no joint deformity   Neurologic: Non-focal  Lymphatic: No lymphadenopathy  Psychiatry: lethargic but arousable   Skin: No rashes, ecchymoses, or cyanosis                          12.5   20.9  )-----------( 198      ( 27 Aug 2019 09:05 )             39.2     08-27    150<H>  |  109<H>  |  55<H>  ----------------------------<  135<H>  3.6   |  28  |  1.18    Ca    9.2      27 Aug 2019 09:05  Phos  4.5     08-26  Mg     3.4     08-26    TPro  6.8  /  Alb  3.9  /  TBili  0.6  /  DBili  x   /  AST  30  /  ALT  14  /  AlkPhos  47  08-26    PT/INR - ( 26 Aug 2019 09:43 )   PT: 12.5 sec;   INR: 1.09 ratio         PTT - ( 27 Aug 2019 09:05 )  PTT:71.7 sec              New ECG(s): Personally reviewed    Echo:    Stress Testing:     Cath:    Imaging:    Interpretation of Telemetry: Afib w/ RVR to 160s

## 2019-08-27 NOTE — PROGRESS NOTE ADULT - PROBLEM SELECTOR PLAN 10
- Patient not on home anti-hyperglycemics  - FS acceptable  - c/w Insulin sliding scale      Prophylaxis:  DVT ppx: c/w heparin drip for pending PEG tube placement. will start pt on NOAC per cardiology post PEG placement.  Diet: NG tube feed   Dispo: PT recs subacute rehab

## 2019-08-27 NOTE — PROGRESS NOTE ADULT - PROBLEM SELECTOR PLAN 5
- failed speech and swallow study and MBS  - Now s/p vocal cord injection for VC paralysis  - Pt unable to get PEG tube due to RRT yesterday, if pt remains stable tomorrow will reconsult GI for PEG placement tomorrow.    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 Atrophy and extensive small vessel white matter ischemic changes. No acute changes    - c/w diflucan and nystatin for oral candidiasis. Will need an extended course of at least 14 days (8/15->8/28) improving   - now with NG tube for meds and feeds   - Aspiration precautions and Elevate head of bed

## 2019-08-27 NOTE — CONSULT NOTE ADULT - ATTENDING COMMENTS
84 y/o F w/parkinson's disease, vocal cord paralysis, HFpEF, questionable COPD, and dysphagia admitted for aspiration PNA noted to be wheezing on exam. On my exam no wheezing was present. Likely etiology of wheezing is vocal cord paralysis. No intervention is needed as patient is breathing comfortably and is without complaint regarding her breathing.

## 2019-08-27 NOTE — PROGRESS NOTE ADULT - PROBLEM SELECTOR PLAN 1
- new onset Afib , likely 2/2 over diuresis vs infection but back in sinus since 8/14  - c/w heparin gtt on, pt unable to get PEG placement due to RRT 2/2 afib RVR   - per cards, can use DOAC give patient's MS is not rheumatic in etiology   - c/w metoprolol 25mg BID for rate control per cardiology  - c/w lasix 20 mg daily today as MARY resolve

## 2019-08-27 NOTE — PROGRESS NOTE ADULT - PROBLEM SELECTOR PLAN 7
- breathing more comfortably and cough is better now, Per son, no formal diagnosis of COPD  - pulmonary consult appreciated, rec adventitious lung sounds likely due to vocal cord paralysis, c/w with Pulmicort, albuterol and prednisone.   - c/w home dose prednisone 10mg daily chronically for PMR/?GCA. Right temporal artery biopsy reportedly negative, but she gets right sided headaches.  -Trigeminal neuralgia and migraine on the differential given right HA associated with maxillary/jaw pain and light sensitivity--c/w tylenol 1g q6h PRN given her    - Maintain O2 sats between 88-92%

## 2019-08-27 NOTE — PROGRESS NOTE ADULT - ASSESSMENT
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 She was admitted for acute hypoxic and hypercarbic respiratory failure in setting of pneumonia and COPD exacerbation, course c/b UTI, acute metabolic encephalopathy, new onset Afib. 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, MRI shows atrophy, and occlusion of the R vertebral A proximal to the basilar A  - Atrial fibrillation with RVR, on heparin drip    RECOMMENDATION    - trend CBC, CMP  - plan for upper endoscopy with PEG placement, once HR is controlled, tentatively for 8/28/2019  - please keep NPO after midnight  - please hold heparin drip at 6am on 8/28  - pending cardiology clearance for the procedure   - supportive care as per primary team      Avril Lowe, PGY-6  GI fellow  B- [phone]/ [phone]  Please call GI fellow on call after 5pm and on weekends 85 year old female with GERD, HTN, HLD, 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 She was admitted for acute hypoxic and hypercarbic respiratory failure in setting of pneumonia and COPD exacerbation, course c/b UTI, acute metabolic encephalopathy, new onset Afib. 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, MRI shows atrophy, and occlusion of the R vertebral A proximal to the basilar A  - Atrial fibrillation with RVR, on heparin drip  - Hypernatremia due to freewater deficit    RECOMMENDATION    - trend CBC, CMP  - Please increase free water to treat hypernatremia  - plan for upper endoscopy with PEG placement, once HR is controlled and sodium is corrected, tentatively for 8/28/2019  - please keep NPO after midnight  - please hold heparin drip at 6am on 8/28  - pending cardiology clearance for the procedure   - supportive care as per primary team      Avril Lowe, PGY-6  GI fellow  B- [phone]/ [phone]  Please call GI fellow on call after 5pm and on weekends

## 2019-08-27 NOTE — PROGRESS NOTE ADULT - SUBJECTIVE AND OBJECTIVE BOX
Chief Complaint:  Patient is a 85y old  Female who presents with a chief complaint of Abdominal pain (26 Aug 2019 16:45)      Interval Events:   Patient was seen in the endoscopy unit yesterday for EGD+PEG tube placement  She was in A fib with RVR, a RRT was called and the procedure was cancelled  Overnight, she had HR in 150s    Allergies:  penicillin (Unknown)      Hospital Medications:  acetaminophen    Suspension .. 1000 milliGRAM(s) Oral every 6 hours PRN  ALPRAZolam 0.5 milliGRAM(s) Oral two times a day PRN  amLODIPine   Tablet 5 milliGRAM(s) Oral daily  atorvastatin 40 milliGRAM(s) Oral <User Schedule>  buDESOnide    Inhalation Suspension 0.25 milliGRAM(s) Inhalation two times a day  carbidopa/levodopa  25/100 2 Tablet(s) Oral three times a day  dextrose 40% Gel 15 Gram(s) Oral once PRN  dextrose 5%. 1000 milliLiter(s) IV Continuous <Continuous>  dextrose 50% Injectable 12.5 Gram(s) IV Push once  dextrose 50% Injectable 25 Gram(s) IV Push once  dextrose 50% Injectable 25 Gram(s) IV Push once  docusate sodium Liquid 100 milliGRAM(s) Oral two times a day  fluconAZOLE IVPB      fluconAZOLE IVPB 100 milliGRAM(s) IV Intermittent every 24 hours  gabapentin   Solution 300 milliGRAM(s) Oral at bedtime  glucagon  Injectable 1 milliGRAM(s) IntraMuscular once PRN  guaiFENesin    Syrup 200 milliGRAM(s) Oral every 6 hours  heparin  Infusion. 600 Unit(s)/Hr IV Continuous <Continuous>  heparin  Injectable 5000 Unit(s) IV Push every 6 hours PRN  heparin  Injectable 2500 Unit(s) IV Push every 6 hours PRN  insulin lispro (HumaLOG) corrective regimen sliding scale   SubCutaneous every 6 hours  lactated ringers Bolus 500 milliLiter(s) IV Bolus once  losartan 50 milliGRAM(s) Oral daily  melatonin 5 milliGRAM(s) Oral at bedtime  metoprolol tartrate 25 milliGRAM(s) Oral two times a day  nystatin    Suspension 028474 Unit(s) Oral four times a day  pantoprazole  Injectable 40 milliGRAM(s) IV Push daily  polyethylene glycol 3350 17 Gram(s) Oral daily  predniSONE   Tablet 10 milliGRAM(s) Oral daily  senna 2 Tablet(s) Oral at bedtime  venlafaxine 37.5 milliGRAM(s) Oral every 12 hours      PMHX/PSHX:  Insomnia  Iron deficiency anemia  Anxiety  Parkinson disease  Aortic stenosis  Neuropathy  Polymyalgia rheumatica  Diabetes  Hyperlipidemia  Hypertension  S/P TAVR (transcatheter aortic valve replacement)      Family history:  No pertinent family history in first degree relatives      ROS:   unable to assess      PHYSICAL EXAM:     GENERAL:  Appears stated age  HEENT:  NC/AT, NG tube+  CHEST:  Full & symmetric excursion  HEART:  Regular rhythm, S1, S2  ABDOMEN:  Soft, non-tender, non-distended  EXTREMITIES:  no edema  SKIN:  No rash  NEURO:  Alert, oriented    Vital Signs:  Vital Signs Last 24 Hrs  T(C): 37 (27 Aug 2019 07:32), Max: 37.2 (26 Aug 2019 11:50)  T(F): 98.6 (27 Aug 2019 07:32), Max: 98.9 (26 Aug 2019 11:50)  HR: 115 (27 Aug 2019 08:52) (73 - 150)  BP: 116/60 (27 Aug 2019 08:52) (112/62 - 144/83)  BP(mean): --  RR: 18 (27 Aug 2019 07:32) (18 - 19)  SpO2: 98% (27 Aug 2019 07:32) (97% - 100%)  Daily     Daily     LABS:                        12.5   20.9  )-----------( 198      ( 27 Aug 2019 09:05 )             39.2     08-27    150<H>  |  109<H>  |  55<H>  ----------------------------<  135<H>  3.6   |  28  |  1.18    Ca    9.2      27 Aug 2019 09:05  Phos  4.5     08-26  Mg     3.4     08-26    TPro  6.8  /  Alb  3.9  /  TBili  0.6  /  DBili  x   /  AST  30  /  ALT  14  /  AlkPhos  47  08-26    LIVER FUNCTIONS - ( 26 Aug 2019 23:47 )  Alb: 3.9 g/dL / Pro: 6.8 g/dL / ALK PHOS: 47 U/L / ALT: 14 U/L / AST: 30 U/L / GGT: x           PT/INR - ( 26 Aug 2019 09:43 )   PT: 12.5 sec;   INR: 1.09 ratio         PTT - ( 27 Aug 2019 09:05 )  PTT:71.7 sec        Imaging:  · Diagnostic Impressions	Pt continues to present with a severe oropharyngeal dysphagia characterized by bolus propulsion through the pharynx with severe pharyngeal retention, with silent aspiration of honey-thickened liquids and thin purees. Use of postural swallow strategies including Right head turn and chin tuck postures were not effective for improving airway protection. Presence of cervical osteophyte in conjunction with disorders of muscular function contributed to severity of dysphagia. Pt is a high aspiration risk for all oral intake.   Swallowing disorders: reduced lingual strength/ROM/Rate of motion, reduced tongue to palate contact, reduced BOT to posterior pharyngeal wall contact, delayed swallow trigger, reduced hyolaryngeal excursion/elevation, incomplete epiglottic retroflection, reduced laryngeal closure, reduced pharyngeal contractility, reduced supraglottic sensation, reduced subglottic sensation, presence of cervical osteophytes.	  · Recommended Consistencies	NPO, with non-oral nutrition/hydration/medications.	      < from: CT Abdomen and Pelvis w/ IV Cont (08.10.19 @ 22:21) >    IMPRESSION:  No bowel obstruction.  Large amount of stool in the proximal colon.  Gaseous distention of the cecum to 9 cm.  Markedly redundant distal transverse colon with gaseous distention to 8 cm.  Marked looping of the sigmoid colon without evidence of sigmoid volvulus.  Nonspecific anterior bladder wall thickening may be related to underdistention.  Cystitis should be excluded based on clinical symptoms and laboratory values.    Depending groundglass opacity in both lower lobes probably reflects atelectasis.  Pneumonia should be excluded clinically.    < end of copied text >    < from: US TTE 2D F/U, Limited w/o Contrast (ED) (08.15.19 @ 14:34) >  IMPRESSION:   No Pericardial Effusion.  Limited study     < end of copied text >

## 2019-08-27 NOTE — CONSULT NOTE ADULT - SUBJECTIVE AND OBJECTIVE BOX
HPI:  85 F with GERD, HTN, HLD, 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, Parkinson-like symptoms, anxiety/depression, AS s/p TAVR on plavix, overactive bladder, ?CHF, PMR, COPD (no formal diagnosis) p/w SOB and abdominal pain, admitted for acute hypoxic and hypercarbic respiratory failure in setting of pneumonia and COPD exacerbation, course c/b UTI, acute metabolic encephalopathy, new onset afib and dysphagia. Patient seen and examined at bedside. Patient reporting no complaints regarding her breathing. Patient states she is breathing comfortably. Patient has not noticed any leg swelling or productive cough. O2 sat obtained at bedside was 96% on RA. Patient is unaware of any diagnosis of COPD in the past. Patient states she smoked cigarettes 40 years (one pack weekly). Patient denies SOB, dyspnea, hemoptysis, productive cough, chest pain, lightheadedness, abd pain, n/v.     Of note, patient was seen by ENT earlier in her hospital course. On laryngoscopy, R vocal cord paralysis was visualized and patient is now s/p VC injections.    PAST MEDICAL & SURGICAL HISTORY:  Insomnia  Iron deficiency anemia  Anxiety  Parkinson disease  Aortic stenosis  Neuropathy  Polymyalgia rheumatica  Diabetes  Hyperlipidemia  Hypertension  S/P TAVR (transcatheter aortic valve replacement)      FAMILY HISTORY:  No pertinent family history in first degree relatives      SOCIAL HISTORY:  Smoking: last use was 40 years. One pack each week      Allergies    penicillin (Unknown)    Intolerances        HOME MEDICATIONS:    REVIEW OF SYSTEMS:  Constitutional: No fevers or chills. No weight loss. No fatigue or generalised malaise.  Eyes: No itching or discharge from the eyes  ENT: No ear pain. No ear discharge. No nasal congestion. No post nasal drip. No epistaxis. No throat pain. No sore throat. No difficulty swallowing.   CV: No chest pain. No palpitations. No lightheadedness or dizziness.   Resp: No dyspnea at rest. No dyspnea on exertion. No orthopnea. No wheezing. No cough. No stridor. No sputum production. No chest pain with respiration.  GI: No nausea. No vomiting. No diarrhea.  MSK: No joint pain or pain in any extremities  Integumentary: No skin lesions. No pedal edema.  Neurological: No gross motor weakness. No sensory changes.    [ ] All other systems negative  [ ] Unable to assess ROS because ________    OBJECTIVE:  ICU Vital Signs Last 24 Hrs  T(C): 37 (27 Aug 2019 07:32), Max: 37.2 (26 Aug 2019 11:50)  T(F): 98.6 (27 Aug 2019 07:32), Max: 98.9 (26 Aug 2019 11:50)  HR: 115 (27 Aug 2019 08:52) (73 - 150)  BP: 116/60 (27 Aug 2019 08:52) (112/62 - 144/83)  BP(mean): --  ABP: --  ABP(mean): --  RR: 18 (27 Aug 2019 07:32) (18 - 19)  SpO2: 98% (27 Aug 2019 07:32) (97% - 100%)        08-26 @ 07:01  -  08-27 @ 07:00  --------------------------------------------------------  IN: 0 mL / OUT: 2400 mL / NET: -2400 mL      CAPILLARY BLOOD GLUCOSE      POCT Blood Glucose.: 124 mg/dL (27 Aug 2019 08:00)      PHYSICAL EXAM:  General: Awake, alert, oriented X 3.   HEENT: Atraumatic, normocephalic.                  No nasal congestion.                No tonsillar or pharyngeal exudates.  Lymph Nodes: No palpable lymphadenopathy  Neck: No JVD. No carotid bruit.   Respiratory: Normal chest expansion                         Normal percussion                         Normal and equal air entry                         No wheeze, rhonchi or rales.  Cardiovascular: S1 S2 normal. No murmurs, rubs or gallops.   Abdomen: Soft, non-tender, non-distended. No organomegaly.  Extremities: Warm to touch. Peripheral pulse palpable. No pedal edema.   Skin: No rashes or skin lesions  Neurological: Motor and sensory examination equal and normal in all four extremities.  Psychiatry: Appropriate mood and affect.    HOSPITAL MEDICATIONS:  MEDICATIONS  (STANDING):  amLODIPine   Tablet 5 milliGRAM(s) Oral daily  atorvastatin 40 milliGRAM(s) Oral <User Schedule>  buDESOnide    Inhalation Suspension 0.25 milliGRAM(s) Inhalation two times a day  carbidopa/levodopa  25/100 2 Tablet(s) Oral three times a day  dextrose 5%. 1000 milliLiter(s) (50 mL/Hr) IV Continuous <Continuous>  dextrose 50% Injectable 12.5 Gram(s) IV Push once  dextrose 50% Injectable 25 Gram(s) IV Push once  dextrose 50% Injectable 25 Gram(s) IV Push once  docusate sodium Liquid 100 milliGRAM(s) Oral two times a day  fluconAZOLE IVPB      fluconAZOLE IVPB 100 milliGRAM(s) IV Intermittent every 24 hours  gabapentin   Solution 300 milliGRAM(s) Oral at bedtime  guaiFENesin    Syrup 200 milliGRAM(s) Oral every 6 hours  heparin  Infusion. 600 Unit(s)/Hr (6 mL/Hr) IV Continuous <Continuous>  insulin lispro (HumaLOG) corrective regimen sliding scale   SubCutaneous every 6 hours  lactated ringers Bolus 500 milliLiter(s) IV Bolus once  losartan 50 milliGRAM(s) Oral daily  melatonin 5 milliGRAM(s) Oral at bedtime  metoprolol tartrate 25 milliGRAM(s) Oral two times a day  nystatin    Suspension 937964 Unit(s) Oral four times a day  pantoprazole  Injectable 40 milliGRAM(s) IV Push daily  polyethylene glycol 3350 17 Gram(s) Oral daily  predniSONE   Tablet 10 milliGRAM(s) Oral daily  senna 2 Tablet(s) Oral at bedtime  venlafaxine 37.5 milliGRAM(s) Oral every 12 hours    MEDICATIONS  (PRN):  acetaminophen    Suspension .. 1000 milliGRAM(s) Oral every 6 hours PRN Mild Pain (1 - 3), Moderate Pain (4 - 6)  ALPRAZolam 0.5 milliGRAM(s) Oral two times a day PRN anxiety  dextrose 40% Gel 15 Gram(s) Oral once PRN Blood Glucose LESS THAN 70 milliGRAM(s)/deciliter  glucagon  Injectable 1 milliGRAM(s) IntraMuscular once PRN Glucose LESS THAN 70 milligrams/deciliter  heparin  Injectable 5000 Unit(s) IV Push every 6 hours PRN For aPTT less than 40  heparin  Injectable 2500 Unit(s) IV Push every 6 hours PRN For aPTT between 40 - 57      LABS:                        12.5   20.9  )-----------( 198      ( 27 Aug 2019 09:05 )             39.2     08-27    150<H>  |  109<H>  |  55<H>  ----------------------------<  135<H>  3.6   |  28  |  1.18    Ca    9.2      27 Aug 2019 09:05  Phos  4.5     08-26  Mg     3.4     08-26    TPro  6.8  /  Alb  3.9  /  TBili  0.6  /  DBili  x   /  AST  30  /  ALT  14  /  AlkPhos  47  08-26    PT/INR - ( 26 Aug 2019 09:43 )   PT: 12.5 sec;   INR: 1.09 ratio         PTT - ( 27 Aug 2019 09:05 )  PTT:71.7 sec    Arterial Blood Gas:  08-26 @ 23:37  7.44/37/99/25/97/1.7  ABG lactate: --        MICROBIOLOGY:     RADIOLOGY:  [ ] Reviewed and interpreted by me

## 2019-08-28 LAB
ANION GAP SERPL CALC-SCNC: 17 MMOL/L — SIGNIFICANT CHANGE UP (ref 5–17)
APTT BLD: 66.4 SEC — HIGH (ref 27.5–36.3)
APTT BLD: 75.4 SEC — HIGH (ref 27.5–36.3)
APTT BLD: 90 SEC — HIGH (ref 27.5–36.3)
BUN SERPL-MCNC: 51 MG/DL — HIGH (ref 7–23)
CALCIUM SERPL-MCNC: 8.6 MG/DL — SIGNIFICANT CHANGE UP (ref 8.4–10.5)
CHLORIDE SERPL-SCNC: 107 MMOL/L — SIGNIFICANT CHANGE UP (ref 96–108)
CO2 SERPL-SCNC: 22 MMOL/L — SIGNIFICANT CHANGE UP (ref 22–31)
CREAT SERPL-MCNC: 1.05 MG/DL — SIGNIFICANT CHANGE UP (ref 0.5–1.3)
GLUCOSE SERPL-MCNC: 119 MG/DL — HIGH (ref 70–99)
HCT VFR BLD CALC: 38.1 % — SIGNIFICANT CHANGE UP (ref 34.5–45)
HCT VFR BLD CALC: 40.2 % — SIGNIFICANT CHANGE UP (ref 34.5–45)
HGB BLD-MCNC: 12 G/DL — SIGNIFICANT CHANGE UP (ref 11.5–15.5)
HGB BLD-MCNC: 12.6 G/DL — SIGNIFICANT CHANGE UP (ref 11.5–15.5)
MCHC RBC-ENTMCNC: 28.3 PG — SIGNIFICANT CHANGE UP (ref 27–34)
MCHC RBC-ENTMCNC: 29.6 PG — SIGNIFICANT CHANGE UP (ref 27–34)
MCHC RBC-ENTMCNC: 31.3 GM/DL — LOW (ref 32–36)
MCHC RBC-ENTMCNC: 31.5 GM/DL — LOW (ref 32–36)
MCV RBC AUTO: 90.3 FL — SIGNIFICANT CHANGE UP (ref 80–100)
MCV RBC AUTO: 93.8 FL — SIGNIFICANT CHANGE UP (ref 80–100)
PLATELET # BLD AUTO: 188 K/UL — SIGNIFICANT CHANGE UP (ref 150–400)
PLATELET # BLD AUTO: 209 K/UL — SIGNIFICANT CHANGE UP (ref 150–400)
POTASSIUM SERPL-MCNC: 3.6 MMOL/L — SIGNIFICANT CHANGE UP (ref 3.5–5.3)
POTASSIUM SERPL-SCNC: 3.6 MMOL/L — SIGNIFICANT CHANGE UP (ref 3.5–5.3)
RBC # BLD: 4.06 M/UL — SIGNIFICANT CHANGE UP (ref 3.8–5.2)
RBC # BLD: 4.45 M/UL — SIGNIFICANT CHANGE UP (ref 3.8–5.2)
RBC # FLD: 14.8 % — HIGH (ref 10.3–14.5)
RBC # FLD: 17 % — HIGH (ref 10.3–14.5)
SODIUM SERPL-SCNC: 146 MMOL/L — HIGH (ref 135–145)
WBC # BLD: 19.1 K/UL — HIGH (ref 3.8–10.5)
WBC # BLD: 19.33 K/UL — HIGH (ref 3.8–10.5)
WBC # FLD AUTO: 19.1 K/UL — HIGH (ref 3.8–10.5)
WBC # FLD AUTO: 19.33 K/UL — HIGH (ref 3.8–10.5)

## 2019-08-28 PROCEDURE — 99233 SBSQ HOSP IP/OBS HIGH 50: CPT

## 2019-08-28 PROCEDURE — 93010 ELECTROCARDIOGRAM REPORT: CPT | Mod: 76

## 2019-08-28 PROCEDURE — 99232 SBSQ HOSP IP/OBS MODERATE 35: CPT | Mod: GC

## 2019-08-28 PROCEDURE — 99233 SBSQ HOSP IP/OBS HIGH 50: CPT | Mod: GC

## 2019-08-28 RX ORDER — METOPROLOL TARTRATE 50 MG
5 TABLET ORAL ONCE
Refills: 0 | Status: COMPLETED | OUTPATIENT
Start: 2019-08-28 | End: 2019-08-28

## 2019-08-28 RX ORDER — HEPARIN SODIUM 5000 [USP'U]/ML
2500 INJECTION INTRAVENOUS; SUBCUTANEOUS EVERY 6 HOURS
Refills: 0 | Status: DISCONTINUED | OUTPATIENT
Start: 2019-08-28 | End: 2019-08-30

## 2019-08-28 RX ORDER — HEPARIN SODIUM 5000 [USP'U]/ML
5000 INJECTION INTRAVENOUS; SUBCUTANEOUS EVERY 6 HOURS
Refills: 0 | Status: DISCONTINUED | OUTPATIENT
Start: 2019-08-28 | End: 2019-08-30

## 2019-08-28 RX ORDER — HEPARIN SODIUM 5000 [USP'U]/ML
600 INJECTION INTRAVENOUS; SUBCUTANEOUS
Qty: 25000 | Refills: 0 | Status: DISCONTINUED | OUTPATIENT
Start: 2019-08-28 | End: 2019-08-30

## 2019-08-28 RX ORDER — DILTIAZEM HCL 120 MG
5 CAPSULE, EXT RELEASE 24 HR ORAL ONCE
Refills: 0 | Status: COMPLETED | OUTPATIENT
Start: 2019-08-28 | End: 2019-08-28

## 2019-08-28 RX ORDER — METOPROLOL TARTRATE 50 MG
50 TABLET ORAL
Refills: 0 | Status: DISCONTINUED | OUTPATIENT
Start: 2019-08-28 | End: 2019-08-29

## 2019-08-28 RX ORDER — METOPROLOL TARTRATE 50 MG
25 TABLET ORAL ONCE
Refills: 0 | Status: COMPLETED | OUTPATIENT
Start: 2019-08-28 | End: 2019-08-28

## 2019-08-28 RX ORDER — METOPROLOL TARTRATE 50 MG
50 TABLET ORAL
Refills: 0 | Status: DISCONTINUED | OUTPATIENT
Start: 2019-08-28 | End: 2019-08-28

## 2019-08-28 RX ORDER — METOPROLOL TARTRATE 50 MG
2.5 TABLET ORAL ONCE
Refills: 0 | Status: COMPLETED | OUTPATIENT
Start: 2019-08-28 | End: 2019-08-28

## 2019-08-28 RX ADMIN — Medication 0.5 MILLIGRAM(S): at 21:38

## 2019-08-28 RX ADMIN — Medication 1000 MILLIGRAM(S): at 09:01

## 2019-08-28 RX ADMIN — Medication 200 MILLIGRAM(S): at 23:09

## 2019-08-28 RX ADMIN — ATORVASTATIN CALCIUM 40 MILLIGRAM(S): 80 TABLET, FILM COATED ORAL at 21:38

## 2019-08-28 RX ADMIN — Medication 1000 MILLIGRAM(S): at 09:45

## 2019-08-28 RX ADMIN — Medication 5 MILLIGRAM(S): at 15:19

## 2019-08-28 RX ADMIN — Medication 1000 MILLIGRAM(S): at 00:15

## 2019-08-28 RX ADMIN — Medication 0.25 MILLIGRAM(S): at 09:01

## 2019-08-28 RX ADMIN — LOSARTAN POTASSIUM 50 MILLIGRAM(S): 100 TABLET, FILM COATED ORAL at 09:00

## 2019-08-28 RX ADMIN — LIDOCAINE 1 PATCH: 4 CREAM TOPICAL at 05:59

## 2019-08-28 RX ADMIN — Medication 500000 UNIT(S): at 05:44

## 2019-08-28 RX ADMIN — Medication 100 MILLIGRAM(S): at 17:07

## 2019-08-28 RX ADMIN — CARBIDOPA AND LEVODOPA 2 TABLET(S): 25; 100 TABLET ORAL at 13:43

## 2019-08-28 RX ADMIN — Medication 10 MILLIGRAM(S): at 09:00

## 2019-08-28 RX ADMIN — Medication 5 MILLIGRAM(S): at 09:52

## 2019-08-28 RX ADMIN — LIDOCAINE 1 PATCH: 4 CREAM TOPICAL at 19:24

## 2019-08-28 RX ADMIN — Medication 5 MILLIGRAM(S): at 21:38

## 2019-08-28 RX ADMIN — Medication 1000 MILLIGRAM(S): at 23:12

## 2019-08-28 RX ADMIN — Medication 500000 UNIT(S): at 17:07

## 2019-08-28 RX ADMIN — Medication 500000 UNIT(S): at 23:13

## 2019-08-28 RX ADMIN — Medication 37.5 MILLIGRAM(S): at 17:07

## 2019-08-28 RX ADMIN — FLUCONAZOLE 50 MILLIGRAM(S): 150 TABLET ORAL at 21:37

## 2019-08-28 RX ADMIN — Medication 5 MILLIGRAM(S): at 06:53

## 2019-08-28 RX ADMIN — Medication 500000 UNIT(S): at 12:30

## 2019-08-28 RX ADMIN — CARBIDOPA AND LEVODOPA 2 TABLET(S): 25; 100 TABLET ORAL at 09:01

## 2019-08-28 RX ADMIN — Medication 37.5 MILLIGRAM(S): at 09:00

## 2019-08-28 RX ADMIN — SENNA PLUS 2 TABLET(S): 8.6 TABLET ORAL at 21:38

## 2019-08-28 RX ADMIN — Medication 200 MILLIGRAM(S): at 12:30

## 2019-08-28 RX ADMIN — HEPARIN SODIUM 600 UNIT(S)/HR: 5000 INJECTION INTRAVENOUS; SUBCUTANEOUS at 16:56

## 2019-08-28 RX ADMIN — Medication 100 MILLIGRAM(S): at 09:00

## 2019-08-28 RX ADMIN — Medication 2.5 MILLIGRAM(S): at 23:59

## 2019-08-28 RX ADMIN — CARBIDOPA AND LEVODOPA 2 TABLET(S): 25; 100 TABLET ORAL at 21:38

## 2019-08-28 RX ADMIN — Medication 20 MILLIGRAM(S): at 09:00

## 2019-08-28 RX ADMIN — HEPARIN SODIUM 600 UNIT(S)/HR: 5000 INJECTION INTRAVENOUS; SUBCUTANEOUS at 10:23

## 2019-08-28 RX ADMIN — Medication 1: at 00:04

## 2019-08-28 RX ADMIN — HEPARIN SODIUM 600 UNIT(S)/HR: 5000 INJECTION INTRAVENOUS; SUBCUTANEOUS at 23:24

## 2019-08-28 RX ADMIN — PANTOPRAZOLE SODIUM 40 MILLIGRAM(S): 20 TABLET, DELAYED RELEASE ORAL at 12:30

## 2019-08-28 RX ADMIN — Medication 200 MILLIGRAM(S): at 17:07

## 2019-08-28 RX ADMIN — Medication 25 MILLIGRAM(S): at 05:44

## 2019-08-28 RX ADMIN — Medication 50 MILLIGRAM(S): at 17:07

## 2019-08-28 RX ADMIN — Medication 200 MILLIGRAM(S): at 09:00

## 2019-08-28 RX ADMIN — AMLODIPINE BESYLATE 5 MILLIGRAM(S): 2.5 TABLET ORAL at 09:00

## 2019-08-28 RX ADMIN — Medication 0.25 MILLIGRAM(S): at 21:38

## 2019-08-28 RX ADMIN — LIDOCAINE 1 PATCH: 4 CREAM TOPICAL at 12:31

## 2019-08-28 RX ADMIN — POLYETHYLENE GLYCOL 3350 17 GRAM(S): 17 POWDER, FOR SOLUTION ORAL at 12:30

## 2019-08-28 RX ADMIN — GABAPENTIN 300 MILLIGRAM(S): 400 CAPSULE ORAL at 21:38

## 2019-08-28 RX ADMIN — Medication 25 MILLIGRAM(S): at 10:24

## 2019-08-28 RX ADMIN — Medication 5 MILLIGRAM(S): at 06:27

## 2019-08-28 RX ADMIN — LIDOCAINE 1 PATCH: 4 CREAM TOPICAL at 08:36

## 2019-08-28 NOTE — PROGRESS NOTE ADULT - PROBLEM SELECTOR PLAN 2
MARY resolved   improving ypernatremia, and hypermagnesemia. This is likely due to poor PO intake and hydration   - c/w 300 cc free water Q4h though NGT   - f/u BMP in am

## 2019-08-28 NOTE — PROGRESS NOTE ADULT - SUBJECTIVE AND OBJECTIVE BOX
Patient is a 85y old  Female who presents with a chief complaint of Abdominal pain (28 Aug 2019 09:38)      SUBJECTIVE / OVERNIGHT EVENTS: Pt seen and examined at bedside. She complains of HA today, states it "bad", she has these headaches often, feels better with cold compresses.     ROS:  All other review of systems negative    Allergies    penicillin (Unknown)    Intolerances        MEDICATIONS  (STANDING):  amLODIPine   Tablet 5 milliGRAM(s) Oral daily  atorvastatin 40 milliGRAM(s) Oral <User Schedule>  buDESOnide    Inhalation Suspension 0.25 milliGRAM(s) Inhalation two times a day  carbidopa/levodopa  25/100 2 Tablet(s) Oral three times a day  dextrose 5%. 1000 milliLiter(s) (50 mL/Hr) IV Continuous <Continuous>  dextrose 50% Injectable 12.5 Gram(s) IV Push once  dextrose 50% Injectable 25 Gram(s) IV Push once  dextrose 50% Injectable 25 Gram(s) IV Push once  docusate sodium Liquid 100 milliGRAM(s) Oral two times a day  fluconAZOLE IVPB 100 milliGRAM(s) IV Intermittent every 24 hours  furosemide    Tablet 20 milliGRAM(s) Oral daily  gabapentin   Solution 300 milliGRAM(s) Oral at bedtime  guaiFENesin    Syrup 200 milliGRAM(s) Oral every 6 hours  heparin  Infusion. 600 Unit(s)/Hr (6 mL/Hr) IV Continuous <Continuous>  insulin lispro (HumaLOG) corrective regimen sliding scale   SubCutaneous every 6 hours  lidocaine   Patch 1 Patch Transdermal daily  losartan 50 milliGRAM(s) Oral daily  melatonin 5 milliGRAM(s) Oral at bedtime  metoprolol tartrate 50 milliGRAM(s) Enteral Tube two times a day  nystatin    Suspension 849553 Unit(s) Oral four times a day  pantoprazole  Injectable 40 milliGRAM(s) IV Push daily  polyethylene glycol 3350 17 Gram(s) Oral daily  predniSONE   Tablet 10 milliGRAM(s) Oral daily  senna 2 Tablet(s) Oral at bedtime  venlafaxine 37.5 milliGRAM(s) Oral every 12 hours    MEDICATIONS  (PRN):  acetaminophen    Suspension .. 1000 milliGRAM(s) Oral every 6 hours PRN Mild Pain (1 - 3), Moderate Pain (4 - 6)  ALPRAZolam 0.5 milliGRAM(s) Oral two times a day PRN anxiety  dextrose 40% Gel 15 Gram(s) Oral once PRN Blood Glucose LESS THAN 70 milliGRAM(s)/deciliter  glucagon  Injectable 1 milliGRAM(s) IntraMuscular once PRN Glucose LESS THAN 70 milligrams/deciliter  heparin  Injectable 5000 Unit(s) IV Push every 6 hours PRN For aPTT less than 40  heparin  Injectable 2500 Unit(s) IV Push every 6 hours PRN For aPTT between 40 - 57      Vital Signs Last 24 Hrs  T(C): 36.4 (28 Aug 2019 04:28), Max: 37 (27 Aug 2019 17:33)  T(F): 97.5 (28 Aug 2019 04:28), Max: 98.6 (27 Aug 2019 17:33)  HR: 72 (28 Aug 2019 10:28) (58 - 165)  BP: 119/64 (28 Aug 2019 10:28) (91/59 - 142/67)  BP(mean): --  RR: 34 (28 Aug 2019 04:28) (17 - 34)  SpO2: 99% (28 Aug 2019 06:21) (93% - 100%)  CAPILLARY BLOOD GLUCOSE      POCT Blood Glucose.: 124 mg/dL (28 Aug 2019 05:58)  POCT Blood Glucose.: 154 mg/dL (27 Aug 2019 23:59)  POCT Blood Glucose.: 147 mg/dL (27 Aug 2019 18:57)  POCT Blood Glucose.: 144 mg/dL (27 Aug 2019 12:11)    I&O's Summary    27 Aug 2019 07:01  -  28 Aug 2019 07:00  --------------------------------------------------------  IN: 1220 mL / OUT: 600 mL / NET: 620 mL        PHYSICAL EXAM:  GENERAL: elderly, + NGT  HEAD:  Atraumatic, Normocephalic  EYES: EOMI, PERRLA, conjunctiva and sclera clear  NECK: Supple, No JVD  CHEST/LUNG:+ wheeze b/l lungs  HEART: Regular rate and rhythm; No murmurs, rubs, or gallops  ABDOMEN: Soft, Nontender, Nondistended; Bowel sounds present  EXTREMITIES:  2+ Peripheral Pulses, No clubbing, cyanosis, or edema  NEUROLOGY: AAOx2, non-focal    LABS:                        12.0   19.33 )-----------( 188      ( 28 Aug 2019 09:07 )             38.1     08-28    146<H>  |  107  |  51<H>  ----------------------------<  119<H>  3.6   |  22  |  1.05    Ca    8.6      28 Aug 2019 05:49  Phos  3.3     08-28  Mg     2.7     08-28    TPro  6.8  /  Alb  3.9  /  TBili  0.6  /  DBili  x   /  AST  30  /  ALT  14  /  AlkPhos  47  08-26    PTT - ( 28 Aug 2019 08:36 )  PTT:75.4 sec          RADIOLOGY & ADDITIONAL TESTS:    Consultant(s) Notes Reviewed:   cardiology and pulmonary    Care Discussed with Consultants/Other Providers:  medicine NP     Case Discussed with Family: Son Shon Mae

## 2019-08-28 NOTE — PROGRESS NOTE ADULT - ATTENDING COMMENTS
85 year old woman with known paroxysmal atrial fibrillation, COPD exacerbation had episode of rapid atrial fibrillation after placement of feeding tube and has spontaneously reverted to sinus rhythm. Again overnight each night atrial fibrillation with rapid ventricular response and has spontaneously reverted to sinus rhythm again this morning. This pattern likely to continue, though pulmonary indicates COPD exacerbation improved and wheezing related to laryngeal paralysis. On low dose beta blocker and can increase to 50 mg BID seeking better rate control when in atrial fibrillation. Since episodes well tolerated and self-limited would not favor adding amiodarone.

## 2019-08-28 NOTE — PROGRESS NOTE ADULT - SUBJECTIVE AND OBJECTIVE BOX
Jomar Salvador  [phone]  All Cardiology service information can be found 24/7 on [email or web address], password: [email or web address]    Patient seen and examined at bedside.    Overnight Events: Pt w/ pAfib w/ RVR to 160s again overnight and this morning. She only complains of headache. Denies any chest pain, sob, or palpitations.     CONSTITUTIONAL:  No fevers or chills  HEENT:  No rhinorrhea   SKIN:  No rash or itching.  CARDIOVASCULAR:  No chest pain, chest pressure or chest discomfort. No palpitations or edema.  RESPIRATORY:  No shortness of breath, cough or sputum.  GASTROINTESTINAL:  No nausea, vomiting or diarrhea. No abdominal pain.   GENITOURINARY:  Denies hematuria, dysuria.   NEUROLOGICAL:  No headache, dizziness, syncope.   MUSCULOSKELETAL:  No muscle, back pain, joint pain or stiffness.  HEMATOLOGIC:  No bleeding or bruising.       Current Meds:  acetaminophen    Suspension .. 1000 milliGRAM(s) Oral every 6 hours PRN  ALPRAZolam 0.5 milliGRAM(s) Oral two times a day PRN  amLODIPine   Tablet 5 milliGRAM(s) Oral daily  atorvastatin 40 milliGRAM(s) Oral <User Schedule>  buDESOnide    Inhalation Suspension 0.25 milliGRAM(s) Inhalation two times a day  carbidopa/levodopa  25/100 2 Tablet(s) Oral three times a day  dextrose 40% Gel 15 Gram(s) Oral once PRN  dextrose 5%. 1000 milliLiter(s) IV Continuous <Continuous>  dextrose 50% Injectable 12.5 Gram(s) IV Push once  dextrose 50% Injectable 25 Gram(s) IV Push once  dextrose 50% Injectable 25 Gram(s) IV Push once  docusate sodium Liquid 100 milliGRAM(s) Oral two times a day  fluconAZOLE IVPB 100 milliGRAM(s) IV Intermittent every 24 hours  furosemide    Tablet 20 milliGRAM(s) Oral daily  gabapentin   Solution 300 milliGRAM(s) Oral at bedtime  glucagon  Injectable 1 milliGRAM(s) IntraMuscular once PRN  guaiFENesin    Syrup 200 milliGRAM(s) Oral every 6 hours  heparin  Infusion. 600 Unit(s)/Hr IV Continuous <Continuous>  heparin  Injectable 5000 Unit(s) IV Push every 6 hours PRN  heparin  Injectable 2500 Unit(s) IV Push every 6 hours PRN  insulin lispro (HumaLOG) corrective regimen sliding scale   SubCutaneous every 6 hours  lidocaine   Patch 1 Patch Transdermal daily  losartan 50 milliGRAM(s) Oral daily  melatonin 5 milliGRAM(s) Oral at bedtime  metoprolol tartrate 25 milliGRAM(s) Oral two times a day  nystatin    Suspension 632775 Unit(s) Oral four times a day  pantoprazole  Injectable 40 milliGRAM(s) IV Push daily  polyethylene glycol 3350 17 Gram(s) Oral daily  predniSONE   Tablet 10 milliGRAM(s) Oral daily  senna 2 Tablet(s) Oral at bedtime  venlafaxine 37.5 milliGRAM(s) Oral every 12 hours      Vitals:  T(F): 97.5 (08-28), Max: 98.6 (08-27)  HR: 134 (08-28) (58 - 165)  BP: 127/88 (08-28) (91/59 - 142/67)  RR: 34 (08-28)  SpO2: 99% (08-28)  I&O's Summary    27 Aug 2019 07:01  -  28 Aug 2019 07:00  --------------------------------------------------------  IN: 1220 mL / OUT: 600 mL / NET: 620 mL        Physical Exam:  Appearance: No acute distress  Eyes: PERRL, EOMI, pink conjunctiva  HEENT: Normal oral mucosa  Cardiovascular: irregular, no edema   Respiratory: scattered wheezes b/l   Gastrointestinal: soft, non-tender, non-distended with normal bowel sounds  Musculoskeletal: No clubbing; no joint deformity   Neurologic: Non-focal  Lymphatic: No lymphadenopathy  Psychiatry: lethargic but arousable   Skin: No rashes, ecchymoses, or cyanosis                        12.5   20.9  )-----------( 198      ( 27 Aug 2019 09:05 )             39.2     08-28    146<H>  |  107  |  51<H>  ----------------------------<  119<H>  3.6   |  22  |  1.05    Ca    8.6      28 Aug 2019 05:49  Phos  3.3     08-28  Mg     2.7     08-28    TPro  6.8  /  Alb  3.9  /  TBili  0.6  /  DBili  x   /  AST  30  /  ALT  14  /  AlkPhos  47  08-26    PT/INR - ( 26 Aug 2019 09:43 )   PT: 12.5 sec;   INR: 1.09 ratio         PTT - ( 28 Aug 2019 08:36 )  PTT:75.4 sec        Interpretation of Telemetry:  Afib w/ RVR to 160s

## 2019-08-28 NOTE — PROGRESS NOTE ADULT - SUBJECTIVE AND OBJECTIVE BOX
Chief Complaint:  Patient is a 85y old  Female who presents with a chief complaint of Abdominal pain (27 Aug 2019 12:20)      Interval Events: Pt denies chest pain or shortness of breath, but unclear reliability.     Allergies:  penicillin (Unknown)      Hospital Medications:  acetaminophen    Suspension .. 1000 milliGRAM(s) Oral every 6 hours PRN  ALPRAZolam 0.5 milliGRAM(s) Oral two times a day PRN  amLODIPine   Tablet 5 milliGRAM(s) Oral daily  atorvastatin 40 milliGRAM(s) Oral <User Schedule>  buDESOnide    Inhalation Suspension 0.25 milliGRAM(s) Inhalation two times a day  carbidopa/levodopa  25/100 2 Tablet(s) Oral three times a day  dextrose 40% Gel 15 Gram(s) Oral once PRN  dextrose 5%. 1000 milliLiter(s) IV Continuous <Continuous>  dextrose 50% Injectable 12.5 Gram(s) IV Push once  dextrose 50% Injectable 25 Gram(s) IV Push once  dextrose 50% Injectable 25 Gram(s) IV Push once  docusate sodium Liquid 100 milliGRAM(s) Oral two times a day  fluconAZOLE IVPB 100 milliGRAM(s) IV Intermittent every 24 hours  furosemide    Tablet 20 milliGRAM(s) Oral daily  gabapentin   Solution 300 milliGRAM(s) Oral at bedtime  glucagon  Injectable 1 milliGRAM(s) IntraMuscular once PRN  guaiFENesin    Syrup 200 milliGRAM(s) Oral every 6 hours  heparin  Infusion. 600 Unit(s)/Hr IV Continuous <Continuous>  heparin  Injectable 5000 Unit(s) IV Push every 6 hours PRN  heparin  Injectable 2500 Unit(s) IV Push every 6 hours PRN  insulin lispro (HumaLOG) corrective regimen sliding scale   SubCutaneous every 6 hours  lidocaine   Patch 1 Patch Transdermal daily  losartan 50 milliGRAM(s) Oral daily  melatonin 5 milliGRAM(s) Oral at bedtime  metoprolol tartrate 25 milliGRAM(s) Oral two times a day  nystatin    Suspension 355506 Unit(s) Oral four times a day  pantoprazole  Injectable 40 milliGRAM(s) IV Push daily  polyethylene glycol 3350 17 Gram(s) Oral daily  predniSONE   Tablet 10 milliGRAM(s) Oral daily  senna 2 Tablet(s) Oral at bedtime  venlafaxine 37.5 milliGRAM(s) Oral every 12 hours      PMHX/PSHX:  Insomnia  Iron deficiency anemia  Anxiety  Parkinson disease  Aortic stenosis  Neuropathy  Polymyalgia rheumatica  Diabetes  Hyperlipidemia  Hypertension  S/P TAVR (transcatheter aortic valve replacement)      Family history:  No pertinent family history in first degree relatives      ROS:     General:  No wt loss, fevers, chills, night sweats, fatigue,   Eyes:  Good vision, no reported pain  ENT:  No sore throat, pain, runny nose, dysphagia  CV:  No pain, palpitations, hypo/hypertension  Resp:  No dyspnea, cough, tachypnea, wheezing  GI:  See HPI  :  No pain, bleeding, incontinence, nocturia  Muscle:  No pain, weakness  Neuro:  No weakness, tingling, memory problems  Psych:  No fatigue, insomnia, mood problems, depression  Endocrine:  No polyuria, polydipsia, cold/heat intolerance  Heme:  No petechiae, ecchymosis, easy bruisability  Skin:  No rash, edema      PHYSICAL EXAM:     Vital Signs:  Vital Signs Last 24 Hrs  T(C): 36.4 (28 Aug 2019 04:28), Max: 37 (27 Aug 2019 17:33)  T(F): 97.5 (28 Aug 2019 04:28), Max: 98.6 (27 Aug 2019 17:33)  HR: 110 (28 Aug 2019 08:05) (58 - 165)  BP: 106/77 (28 Aug 2019 08:05) (91/59 - 142/67)  BP(mean): --  RR: 34 (28 Aug 2019 04:28) (17 - 34)  SpO2: 99% (28 Aug 2019 06:21) (93% - 100%)  Daily     Daily     GENERAL:  elderly lady, NAD  HEENT:  NCAT, NG tube+  CHEST:  Full & symmetric excursion  HEART:  irregularly irregular, tachycardic, S1, S2  ABDOMEN:  Soft, non-tender, non-distended  EXTREMITIES:  no edema  SKIN:  No rash  NEURO:  nonfocal    LABS:                        12.5   20.9  )-----------( 198      ( 27 Aug 2019 09:05 )             39.2     08-28    146<H>  |  107  |  51<H>  ----------------------------<  119<H>  3.6   |  22  |  1.05    Ca    8.6      28 Aug 2019 05:49  Phos  3.3     08-28  Mg     2.7     08-28    TPro  6.8  /  Alb  3.9  /  TBili  0.6  /  DBili  x   /  AST  30  /  ALT  14  /  AlkPhos  47  08-26    LIVER FUNCTIONS - ( 26 Aug 2019 23:47 )  Alb: 3.9 g/dL / Pro: 6.8 g/dL / ALK PHOS: 47 U/L / ALT: 14 U/L / AST: 30 U/L / GGT: x           PT/INR - ( 26 Aug 2019 09:43 )   PT: 12.5 sec;   INR: 1.09 ratio         PTT - ( 27 Aug 2019 09:05 )  PTT:71.7 sec        Imaging:  < from: CT Head No Cont (08.27.19 @ 00:12) >  FINDINGS:    The ventricles areglobally dilated with commensurate cortical volume loss    There is circumferential periventricular white matter hypodensity with   scattered subcortical white matter hypodensities consistent with moderate   to severe microvascular ischemic disease. There is no intraparenchymal   hematoma, mass effect or midline shift. No abnormal extra-axial fluid   collections are present. There is no evidence of large acute territorial   infarct. There is vascular calcification.    The calvarium is intact. Thevisualized intraorbital compartments,   paranasal sinuses and mastoid complexes appear free of acute disease.   There is been bilateral cataract surgery. There is a right nasopharyngeal   tube in place. The patient is edentulous.    IMPRESSION:  No evidence of acute intracranial pathology. No evidence of intracranial   hemorrhage or acute infarct. Extensive microvascular ischemic changes   without change from 8/10/2019.      < end of copied text >

## 2019-08-28 NOTE — PROGRESS NOTE ADULT - PROBLEM SELECTOR PLAN 1
- new onset Afib , likely 2/2 over diuresis vs infection but back in sinus since 8/14  - c/w heparin gtt on, pt unable to get PEG placement due to RRT 2/2 afib RVR   - per cards, can use DOAC give patient's MS is not rheumatic in etiology   - metoprolol increased to 50 mg BID for rate control per cardiology  - c/w lasix 20 mg daily today as MARY resolve

## 2019-08-28 NOTE — CHART NOTE - NSCHARTNOTEFT_GEN_A_CORE
MEDICINE PA     EVENT SUMMARY  Notified by RN for intermittent tachypnea with gradual decrease while asleep which resolves when awake. Pt seen and examined at the bedside. Pt is alert. Pt denies CP, SOB, dyspnea, difficulty breathing, N/V. Pt admitted for acute resp failure 2/2 sepsis s/p ABx c/b afib with RVR on heparin gtt and metoprolol PO, MARY s/p IVF, dysphagia 2/2  VC paralysis; pending PEG. Overnight with intermittent tachypnea which resolves by itself; was placed on continuous pulse ox; hypoxia x 2 to the 80s ~ secs; self resolved; O2 PRN, ABG done unremarkable; CXR. D/w with [facility] about overnight events; will continue to monitor.     Gracie STATON  #[id number]      ADDENDUM @ 6  Pt with afib with RVR HR upto 180s. Pt seen at the bedside; no acute complaints; resting comfortably in bed. PO metoprolol given this AM. Tachycardic otherwise VSS. Cardiology fellow at the bedside; Cardizem 5 mg x 2 IVP given. Will continue to monitor.     Gracie STATON  #[id number]

## 2019-08-28 NOTE — PROGRESS NOTE ADULT - PROBLEM SELECTOR PLAN 5
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 Atrophy and extensive small vessel white matter ischemic changes. No acute changes    - c/w diflucan and nystatin for oral candidiasis. Will need an extended course of at least 14 days (8/15->8/28) last day today   - now with NG tube for meds and feeds   - Aspiration precautions and Elevate head of bed

## 2019-08-28 NOTE — PROGRESS NOTE ADULT - ASSESSMENT
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 She was admitted for acute hypoxic and hypercarbic respiratory failure in setting of pneumonia and COPD exacerbation, course c/b UTI, acute metabolic encephalopathy, new onset Afib. 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     #Oropharyngeal dysphagia in the setting of neurologic symptoms including vocal cord paralysis s/p VC injection on 8/22, GI consulted for PEG tube placement   #Aspiration seen on MBS  #CNS: CTH demonstrating chronic right cerebellar infarct in PICA vascular distribution, MRI shows atrophy, and occlusion of the R vertebral A proximal to the basilar A  #Atrial fibrillation with RVR, on heparin drip: HR still between 110-180, not controlled, requiring intermittent IV diltiazem  #Hypernatremia due to free water deficit: improved to 146    RECOMMENDATION  - trend CBC, CMP  - continue with treatment for hypernatremia per primary, overall improving  - plan for upper endoscopy with PEG placement, once HR is controlled  - maintain NGT  - pending cardiology clearance for the procedure   - supportive care as per primary team Samreen: 85 year old female with GERD, HTN, HLD, 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 She was admitted for acute hypoxic and hypercarbic respiratory failure in setting of pneumonia and COPD exacerbation, course c/b UTI, acute metabolic encephalopathy, new onset Afib. 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     #Oropharyngeal dysphagia in the setting of neurologic symptoms including vocal cord paralysis s/p VC injection on 8/22, GI consulted for PEG tube placement   #Aspiration seen on MBS  #CNS: CTH demonstrating chronic right cerebellar infarct in PICA vascular distribution, MRI shows atrophy, and occlusion of the R vertebral A proximal to the basilar A  #Atrial fibrillation with RVR, on heparin drip: HR still between 110-180, not controlled, requiring intermittent IV diltiazem  #Hypernatremia due to free water deficit: improved to 146    RECOMMENDATION  - trend CBC, CMP  - Rapid A fib control per cardiology/medicine  - continue with treatment for hypernatremia per primary, overall improving  - plan for upper endoscopy with PEG placement, once HR is controlled  - maintain NGT and give feeds/free water through it for now  - pending cardiology clearance for the procedure   - supportive care as per primary team

## 2019-08-28 NOTE — PROGRESS NOTE ADULT - PROBLEM SELECTOR PLAN 7
- breathing more comfortably and cough is better now, Per son, no formal diagnosis of COPD  - pulmonary consult appreciated, rec adventitious lung sounds likely due to vocal cord paralysis, c/w with Pulmicort, albuterol and prednisone.   - c/w home dose prednisone 10mg daily chronically for PMR/?GCA. Right temporal artery biopsy reportedly negative, but she gets right sided headaches.  -Trigeminal neuralgia and migraine on the differential given right HA associated with maxillary/jaw pain and light sensitivity--c/w tylenol 1g q6h PRN given her    - Maintain O2 sats between 88-92%  - cold compresses for pain

## 2019-08-28 NOTE — PROGRESS NOTE ADULT - ASSESSMENT
84 yo F with HTN, HLD, AS s/p TAVR on Plavix, MS, HFpEF likely 2/2 MS (on Lasix 20 BID at home) a/w multiple metabolic derangements including COPD exacerbation, PNA, UTI w/ new encephalopathy requiring NGT w/ newly dx pAfib.    #pAfib  -Frequently developing RVR despite improved volume status. Per pulmonary, no active pulmonary issues. Would increase maintenance Metoprolol to 50mg BID and further uptitrate as needed for heart rate control.      Jomar Salvador PGY4  [phone]  All Cardiology service information can be found 24/7 on [email or web address], password: [email or web address]

## 2019-08-29 LAB
ANION GAP SERPL CALC-SCNC: 17 MMOL/L — SIGNIFICANT CHANGE UP (ref 5–17)
APTT BLD: 80 SEC — HIGH (ref 27.5–36.3)
BUN SERPL-MCNC: 48 MG/DL — HIGH (ref 7–23)
CALCIUM SERPL-MCNC: 9.3 MG/DL — SIGNIFICANT CHANGE UP (ref 8.4–10.5)
CHLORIDE SERPL-SCNC: 103 MMOL/L — SIGNIFICANT CHANGE UP (ref 96–108)
CO2 SERPL-SCNC: 24 MMOL/L — SIGNIFICANT CHANGE UP (ref 22–31)
CREAT SERPL-MCNC: 0.92 MG/DL — SIGNIFICANT CHANGE UP (ref 0.5–1.3)
GLUCOSE BLDC GLUCOMTR-MCNC: 146 MG/DL — HIGH (ref 70–99)
GLUCOSE BLDC GLUCOMTR-MCNC: 156 MG/DL — HIGH (ref 70–99)
GLUCOSE SERPL-MCNC: 173 MG/DL — HIGH (ref 70–99)
HCT VFR BLD CALC: 36.5 % — SIGNIFICANT CHANGE UP (ref 34.5–45)
HGB BLD-MCNC: 11.5 G/DL — SIGNIFICANT CHANGE UP (ref 11.5–15.5)
INR BLD: 1.15 RATIO — SIGNIFICANT CHANGE UP (ref 0.88–1.16)
MAGNESIUM SERPL-MCNC: 2.8 MG/DL — HIGH (ref 1.6–2.6)
MCHC RBC-ENTMCNC: 29.3 PG — SIGNIFICANT CHANGE UP (ref 27–34)
MCHC RBC-ENTMCNC: 31.5 GM/DL — LOW (ref 32–36)
MCV RBC AUTO: 92.9 FL — SIGNIFICANT CHANGE UP (ref 80–100)
PHOSPHATE SERPL-MCNC: 3.1 MG/DL — SIGNIFICANT CHANGE UP (ref 2.5–4.5)
PLATELET # BLD AUTO: 195 K/UL — SIGNIFICANT CHANGE UP (ref 150–400)
POTASSIUM SERPL-MCNC: 4 MMOL/L — SIGNIFICANT CHANGE UP (ref 3.5–5.3)
POTASSIUM SERPL-SCNC: 4 MMOL/L — SIGNIFICANT CHANGE UP (ref 3.5–5.3)
PROTHROM AB SERPL-ACNC: 13 SEC — SIGNIFICANT CHANGE UP (ref 10–13.1)
RBC # BLD: 3.93 M/UL — SIGNIFICANT CHANGE UP (ref 3.8–5.2)
RBC # FLD: 16.8 % — HIGH (ref 10.3–14.5)
SODIUM SERPL-SCNC: 144 MMOL/L — SIGNIFICANT CHANGE UP (ref 135–145)
WBC # BLD: 16.6 K/UL — HIGH (ref 3.8–10.5)
WBC # FLD AUTO: 16.6 K/UL — HIGH (ref 3.8–10.5)

## 2019-08-29 PROCEDURE — 99233 SBSQ HOSP IP/OBS HIGH 50: CPT

## 2019-08-29 PROCEDURE — 99233 SBSQ HOSP IP/OBS HIGH 50: CPT | Mod: GC

## 2019-08-29 RX ORDER — METOPROLOL TARTRATE 50 MG
75 TABLET ORAL EVERY 12 HOURS
Refills: 0 | Status: DISCONTINUED | OUTPATIENT
Start: 2019-08-29 | End: 2019-08-30

## 2019-08-29 RX ORDER — METOPROLOL TARTRATE 50 MG
5 TABLET ORAL ONCE
Refills: 0 | Status: COMPLETED | OUTPATIENT
Start: 2019-08-29 | End: 2019-08-29

## 2019-08-29 RX ADMIN — HEPARIN SODIUM 600 UNIT(S)/HR: 5000 INJECTION INTRAVENOUS; SUBCUTANEOUS at 10:15

## 2019-08-29 RX ADMIN — Medication 500000 UNIT(S): at 12:35

## 2019-08-29 RX ADMIN — LIDOCAINE 1 PATCH: 4 CREAM TOPICAL at 12:36

## 2019-08-29 RX ADMIN — PANTOPRAZOLE SODIUM 40 MILLIGRAM(S): 20 TABLET, DELAYED RELEASE ORAL at 12:36

## 2019-08-29 RX ADMIN — LOSARTAN POTASSIUM 50 MILLIGRAM(S): 100 TABLET, FILM COATED ORAL at 04:16

## 2019-08-29 RX ADMIN — AMLODIPINE BESYLATE 5 MILLIGRAM(S): 2.5 TABLET ORAL at 04:16

## 2019-08-29 RX ADMIN — LIDOCAINE 1 PATCH: 4 CREAM TOPICAL at 19:30

## 2019-08-29 RX ADMIN — CARBIDOPA AND LEVODOPA 2 TABLET(S): 25; 100 TABLET ORAL at 04:16

## 2019-08-29 RX ADMIN — Medication 500000 UNIT(S): at 17:06

## 2019-08-29 RX ADMIN — Medication 100 MILLIGRAM(S): at 04:15

## 2019-08-29 RX ADMIN — Medication 0.25 MILLIGRAM(S): at 08:21

## 2019-08-29 RX ADMIN — Medication 75 MILLIGRAM(S): at 17:06

## 2019-08-29 RX ADMIN — LIDOCAINE 1 PATCH: 4 CREAM TOPICAL at 02:39

## 2019-08-29 RX ADMIN — Medication 200 MILLIGRAM(S): at 12:35

## 2019-08-29 RX ADMIN — Medication 37.5 MILLIGRAM(S): at 04:15

## 2019-08-29 RX ADMIN — CARBIDOPA AND LEVODOPA 2 TABLET(S): 25; 100 TABLET ORAL at 13:49

## 2019-08-29 RX ADMIN — Medication 200 MILLIGRAM(S): at 04:16

## 2019-08-29 RX ADMIN — Medication 37.5 MILLIGRAM(S): at 17:06

## 2019-08-29 RX ADMIN — Medication 1: at 12:35

## 2019-08-29 RX ADMIN — Medication 1: at 06:23

## 2019-08-29 RX ADMIN — Medication 5 MILLIGRAM(S): at 14:57

## 2019-08-29 RX ADMIN — Medication 200 MILLIGRAM(S): at 17:06

## 2019-08-29 RX ADMIN — Medication 1000 MILLIGRAM(S): at 17:07

## 2019-08-29 RX ADMIN — Medication 1000 MILLIGRAM(S): at 18:00

## 2019-08-29 RX ADMIN — Medication 20 MILLIGRAM(S): at 04:16

## 2019-08-29 RX ADMIN — Medication 100 MILLIGRAM(S): at 17:06

## 2019-08-29 RX ADMIN — Medication 0.5 MILLIGRAM(S): at 17:53

## 2019-08-29 RX ADMIN — Medication 50 MILLIGRAM(S): at 04:16

## 2019-08-29 RX ADMIN — Medication 10 MILLIGRAM(S): at 04:16

## 2019-08-29 RX ADMIN — Medication 500000 UNIT(S): at 04:16

## 2019-08-29 NOTE — PROGRESS NOTE ADULT - SUBJECTIVE AND OBJECTIVE BOX
Patient is a 85y old  Female who presents with a chief complaint of Abdominal pain (29 Aug 2019 10:00)      SUBJECTIVE / OVERNIGHT EVENTS:  no acute events overnight  tele reviewed: sinus 60-70, in afib 160s for 1 hour overnight and converted back to sinus      MEDICATIONS  (STANDING):  amLODIPine   Tablet 5 milliGRAM(s) Oral daily  atorvastatin 40 milliGRAM(s) Oral <User Schedule>  buDESOnide    Inhalation Suspension 0.25 milliGRAM(s) Inhalation two times a day  carbidopa/levodopa  25/100 2 Tablet(s) Oral three times a day  dextrose 5%. 1000 milliLiter(s) (50 mL/Hr) IV Continuous <Continuous>  dextrose 50% Injectable 12.5 Gram(s) IV Push once  dextrose 50% Injectable 25 Gram(s) IV Push once  dextrose 50% Injectable 25 Gram(s) IV Push once  docusate sodium Liquid 100 milliGRAM(s) Oral two times a day  furosemide    Tablet 20 milliGRAM(s) Oral daily  gabapentin   Solution 300 milliGRAM(s) Oral at bedtime  guaiFENesin    Syrup 200 milliGRAM(s) Oral every 6 hours  heparin  Infusion. 600 Unit(s)/Hr (6 mL/Hr) IV Continuous <Continuous>  insulin lispro (HumaLOG) corrective regimen sliding scale   SubCutaneous every 6 hours  lidocaine   Patch 1 Patch Transdermal daily  losartan 50 milliGRAM(s) Oral daily  melatonin 5 milliGRAM(s) Oral at bedtime  metoprolol tartrate 75 milliGRAM(s) Oral every 12 hours  nystatin    Suspension 352378 Unit(s) Oral four times a day  pantoprazole  Injectable 40 milliGRAM(s) IV Push daily  polyethylene glycol 3350 17 Gram(s) Oral daily  predniSONE   Tablet 10 milliGRAM(s) Oral daily  senna 2 Tablet(s) Oral at bedtime  venlafaxine 37.5 milliGRAM(s) Oral every 12 hours    MEDICATIONS  (PRN):  acetaminophen    Suspension .. 1000 milliGRAM(s) Oral every 6 hours PRN Mild Pain (1 - 3), Moderate Pain (4 - 6)  ALPRAZolam 0.5 milliGRAM(s) Oral two times a day PRN anxiety  dextrose 40% Gel 15 Gram(s) Oral once PRN Blood Glucose LESS THAN 70 milliGRAM(s)/deciliter  glucagon  Injectable 1 milliGRAM(s) IntraMuscular once PRN Glucose LESS THAN 70 milligrams/deciliter  heparin  Injectable 5000 Unit(s) IV Push every 6 hours PRN For aPTT less than 40  heparin  Injectable 2500 Unit(s) IV Push every 6 hours PRN For aPTT between 40 - 57      Vital Signs Last 24 Hrs  T(C): 36.8 (29 Aug 2019 11:46), Max: 36.8 (29 Aug 2019 11:46)  T(F): 98.3 (29 Aug 2019 11:46), Max: 98.3 (29 Aug 2019 11:46)  HR: 160 (29 Aug 2019 14:48) (65 - 160)  BP: 113/77 (29 Aug 2019 14:48) (101/71 - 125/81)  BP(mean): --  RR: 18 (29 Aug 2019 11:46) (18 - 20)  SpO2: 97% (29 Aug 2019 11:46) (95% - 98%)  CAPILLARY BLOOD GLUCOSE      POCT Blood Glucose.: 156 mg/dL (29 Aug 2019 12:26)  POCT Blood Glucose.: 166 mg/dL (29 Aug 2019 06:02)  POCT Blood Glucose.: 149 mg/dL (29 Aug 2019 00:07)  POCT Blood Glucose.: 147 mg/dL (28 Aug 2019 16:56)    I&O's Summary    28 Aug 2019 07:01  -  29 Aug 2019 07:00  --------------------------------------------------------  IN: 2226 mL / OUT: 700 mL / NET: 1526 mL        PHYSICAL EXAM:  GENERAL: elderly, + NGT, overall frail appearing  HEAD:  Atraumatic, Normocephalic  EYES: EOMI, conjunctiva and sclera clear  NECK: Supple, No JVD  CHEST/LUNG: no wheeze appreciated today, b/l rhonchi, no accessory muscles  HEART: Regular rate and rhythm; No murmurs, rubs, or gallops  ABDOMEN: Soft, Nontender, Nondistended; Bowel sounds present  EXTREMITIES:  2+ Peripheral Pulses, No clubbing, cyanosis, or edema  NEUROLOGY: AAOx2, non-focal    LABS:                        11.5   16.60 )-----------( 195      ( 29 Aug 2019 08:25 )             36.5     08-29    144  |  103  |  48<H>  ----------------------------<  173<H>  4.0   |  24  |  0.92    Ca    9.3      29 Aug 2019 06:16  Phos  3.1     08-29  Mg     2.8     08-29      PT/INR - ( 29 Aug 2019 09:18 )   PT: 13.0 sec;   INR: 1.15 ratio         PTT - ( 29 Aug 2019 09:18 )  PTT:80.0 sec          RADIOLOGY & ADDITIONAL TESTS:    Imaging Personally Reviewed:    Consultant(s) Notes Reviewed:    GI, cardiology    Care Discussed with Consultants/Other Providers:

## 2019-08-29 NOTE — PROGRESS NOTE ADULT - ASSESSMENT
86 yo F with HTN, HLD, AS s/p TAVR on Plavix, MS, HFpEF likely 2/2 MS (on Lasix 20 BID at home) a/w multiple metabolic derangements including COPD exacerbation, PNA, UTI w/ new encephalopathy requiring NGT w/ newly dx pAfib.    #pAfib  -RVR frequency decreased after increasing metoprolol to 50mg BID. Can further increase to 50mg TID.     Jomar Salvador PGY4  [phone]  All Cardiology service information can be found 24/7 on [email or web address], password: [employer]

## 2019-08-29 NOTE — CHART NOTE - NSCHARTNOTEFT_GEN_A_CORE
MEDICINE PA NOTE    Notified by RN that pt in AF with sustaining -180s, asx, afebrile, VSS, lytes wnl. Converted to NSR with HR 60s s/p metoprolol tartrate 2.5 IVP. Cards following, will relay to day team in the AM.

## 2019-08-29 NOTE — PROGRESS NOTE ADULT - ATTENDING COMMENTS
85 year old woman with known paroxysmal atrial fibrillation, COPD exacerbation had episode of rapid atrial fibrillation after placement of feeding tube and has spontaneously reverted to sinus rhythm. Again overnight each night atrial fibrillation with rapid ventricular response and has spontaneously reverted to sinus rhythm again each morning. This pattern likely to continue, though pulmonary indicates COPD exacerbation improved and wheezing related to laryngeal paralysis. On beta blocker 50 mg BID seeking better rate control when in atrial fibrillation. Currently in sinus rhythm again and since episodes well tolerated and self-limited would not favor adding amiodarone.

## 2019-08-29 NOTE — PROGRESS NOTE ADULT - PROBLEM SELECTOR PLAN 5
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 Atrophy and extensive small vessel white matter ischemic changes. No acute changes    - completed diflucan and nystatin for oral candidiasis. Received extended course of 14 days (8/15->8/28)  - now with NG tube for meds and feeds   - Aspiration precautions and Elevate head of bed  -appreciate GI eval.  PEG placement had been delayed due to afib.  Palliative care c/s placed to further review GOC and utility of feeding tube

## 2019-08-29 NOTE — PROGRESS NOTE ADULT - PROBLEM SELECTOR PLAN 1
- new onset Afib  - c/w heparin gtt on while decision on PEG placement is still pending.  Eventual plan will be for DOAC per cardiology as patient's MS is not rheumatic in etiology   - metoprolol increased to 75 mg BID for rate control per cardiology  - c/w lasix 20 mg daily today as MARY resolve

## 2019-08-29 NOTE — PROGRESS NOTE ADULT - ASSESSMENT
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

## 2019-08-29 NOTE — PROGRESS NOTE ADULT - ASSESSMENT
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 She was admitted for acute hypoxic and hypercarbic respiratory failure in setting of pneumonia and COPD exacerbation, course c/b UTI, acute metabolic encephalopathy, new onset Afib. 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     #Oropharyngeal dysphagia in the setting of neurologic symptoms including vocal cord paralysis s/p VC injection on 8/22, GI consulted for PEG tube placement   #Aspiration seen on MBS  #CNS: CTH demonstrating chronic right cerebellar infarct in PICA vascular distribution, MRI shows atrophy, and occlusion of the R vertebral A proximal to the basilar A  #Atrial fibrillation with RVR, on heparin drip: HR still having episodes of RVR requiring intermittent IV meds; now increased metoprolol to 50 bid   #Hypernatremia due to free water deficit: resolved    RECOMMENDATION  - trend CBC, CMP  - Rapid A fib control per cardiology/medicine  - plan for upper endoscopy with PEG placement once HR is adequately controlled  - maintain NGT and give feeds/free water through it for now  - supportive care as per primary team Samreen: 85 year old female with GERD, HTN, HLD, 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 She was admitted for acute hypoxic and hypercarbic respiratory failure in setting of pneumonia and COPD exacerbation, course c/b UTI, acute metabolic encephalopathy, new onset Afib. 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     #Oropharyngeal dysphagia in the setting of neurologic symptoms including vocal cord paralysis s/p VC injection on 8/22, GI consulted for PEG tube placement. Pt asking for liquids and wants to drink soemthing, explained risk of aspiration however pt does insist on wanting po liquids.  #Aspiration seen on MBS  #CNS: CTH demonstrating chronic right cerebellar infarct in PICA vascular distribution, MRI shows atrophy, and occlusion of the R vertebral A proximal to the basilar A  #Atrial fibrillation with RVR, on heparin drip: HR still having episodes of RVR requiring intermittent IV meds; now increased metoprolol to 50 bid   #Hypernatremia due to free water deficit: resolved    RECOMMENDATION  - trend CBC, CMP  - Rapid A fib control per cardiology/medicine  - recommend palliative care consult to discuss options such as pleasure feeding vs peg placement given pt desires liquids orally at this time  - plan for upper endoscopy with PEG placement once HR is adequately controlled and pending pall care assessment  - maintain NGT and give feeds/free water through it for now  - supportive care as per primary team

## 2019-08-29 NOTE — PROGRESS NOTE ADULT - PROBLEM SELECTOR PLAN 2
MARY resolved   improving hypernatremia, and hypermagnesemia. This is likely due to poor PO intake and hydration   - c/w 250 cc free water Q8h though NGT   - f/u BMP in am

## 2019-08-29 NOTE — PROGRESS NOTE ADULT - ATTENDING COMMENTS
Patient seen and examined. Agree with above. Patient is asking for PO liquid, "even just a spoonful." We discussed pleasure feeding and the risks of aspiration with oral intake as she has oropharyngeal dysphagia. I explained that the planned PEg placement is to bypass the oropharynx to decrease this risk, but that if she accepts the high risks of aspiration then pleasure feeds can be given instead of PEG feeds. She is unsure at this point, wants to think about this, and is open to further discussion.    Would consider palliative care discussion to help patient with goals of care. We will plan for PEG once patient is optimized and she has come to a final decision.

## 2019-08-29 NOTE — PROGRESS NOTE ADULT - SUBJECTIVE AND OBJECTIVE BOX
Chief Complaint:  Patient is a 85y old  Female who presents with a chief complaint of Abdominal pain (28 Aug 2019 11:33)      Interval Events: Still having episodes of afib with RVR requiring IV lopressor. No chest pain. NG tube in place and functioning properly.    Allergies:  penicillin (Unknown)      Hospital Medications:  acetaminophen    Suspension .. 1000 milliGRAM(s) Oral every 6 hours PRN  ALPRAZolam 0.5 milliGRAM(s) Oral two times a day PRN  amLODIPine   Tablet 5 milliGRAM(s) Oral daily  atorvastatin 40 milliGRAM(s) Oral <User Schedule>  buDESOnide    Inhalation Suspension 0.25 milliGRAM(s) Inhalation two times a day  carbidopa/levodopa  25/100 2 Tablet(s) Oral three times a day  dextrose 40% Gel 15 Gram(s) Oral once PRN  dextrose 5%. 1000 milliLiter(s) IV Continuous <Continuous>  dextrose 50% Injectable 12.5 Gram(s) IV Push once  dextrose 50% Injectable 25 Gram(s) IV Push once  dextrose 50% Injectable 25 Gram(s) IV Push once  docusate sodium Liquid 100 milliGRAM(s) Oral two times a day  furosemide    Tablet 20 milliGRAM(s) Oral daily  gabapentin   Solution 300 milliGRAM(s) Oral at bedtime  glucagon  Injectable 1 milliGRAM(s) IntraMuscular once PRN  guaiFENesin    Syrup 200 milliGRAM(s) Oral every 6 hours  heparin  Infusion. 600 Unit(s)/Hr IV Continuous <Continuous>  heparin  Injectable 5000 Unit(s) IV Push every 6 hours PRN  heparin  Injectable 2500 Unit(s) IV Push every 6 hours PRN  insulin lispro (HumaLOG) corrective regimen sliding scale   SubCutaneous every 6 hours  lidocaine   Patch 1 Patch Transdermal daily  losartan 50 milliGRAM(s) Oral daily  melatonin 5 milliGRAM(s) Oral at bedtime  metoprolol tartrate 50 milliGRAM(s) Enteral Tube two times a day  nystatin    Suspension 261618 Unit(s) Oral four times a day  pantoprazole  Injectable 40 milliGRAM(s) IV Push daily  polyethylene glycol 3350 17 Gram(s) Oral daily  predniSONE   Tablet 10 milliGRAM(s) Oral daily  senna 2 Tablet(s) Oral at bedtime  venlafaxine 37.5 milliGRAM(s) Oral every 12 hours      PMHX/PSHX:  Insomnia  Iron deficiency anemia  Anxiety  Parkinson disease  Aortic stenosis  Neuropathy  Polymyalgia rheumatica  Diabetes  Hyperlipidemia  Hypertension  S/P TAVR (transcatheter aortic valve replacement)      Family history:  No pertinent family history in first degree relatives      ROS:     General:  No wt loss, fevers, chills, night sweats, fatigue,   Eyes:  Good vision, no reported pain  ENT:  No sore throat, pain, runny nose, dysphagia  CV:  No pain, palpitations, hypo/hypertension  Resp:  No dyspnea, cough, tachypnea, wheezing  GI:  See HPI  :  No pain, bleeding, incontinence, nocturia  Muscle:  No pain, weakness  Neuro:  No weakness, tingling, memory problems  Psych:  No fatigue, insomnia, mood problems, depression  Endocrine:  No polyuria, polydipsia, cold/heat intolerance  Heme:  No petechiae, ecchymosis, easy bruisability  Skin:  No rash, edema      PHYSICAL EXAM:     Vital Signs:  Vital Signs Last 24 Hrs  T(C): 36.6 (29 Aug 2019 04:00), Max: 36.7 (28 Aug 2019 15:09)  T(F): 97.9 (29 Aug 2019 04:00), Max: 98 (28 Aug 2019 15:09)  HR: 102 (29 Aug 2019 04:00) (65 - 162)  BP: 123/78 (29 Aug 2019 04:00) (101/71 - 125/81)  BP(mean): --  RR: 20 (29 Aug 2019 04:00) (18 - 122)  SpO2: 98% (29 Aug 2019 04:00) (95% - 98%)  Daily     Daily     GENERAL:  elderly lady, NAD  HEENT:  NCAT, NG tube+  CHEST:  Full & symmetric excursion  HEART:  irregularly irregular, S1, S2  ABDOMEN:  Soft, non-tender, non-distended  EXTREMITIES:  no edema  SKIN:  No rash  NEURO:  nonfocal    LABS:                        11.5   16.60 )-----------( 195      ( 29 Aug 2019 08:25 )             36.5     08-29    144  |  103  |  48<H>  ----------------------------<  173<H>  4.0   |  24  |  0.92    Ca    9.3      29 Aug 2019 06:16  Phos  3.1     08-29  Mg     2.8     08-29        PTT - ( 28 Aug 2019 23:05 )  PTT:90.0 sec        Imaging: Chief Complaint:  Patient is a 85y old  Female who presents with a chief complaint of Abdominal pain (28 Aug 2019 11:33)      Interval Events: Still having episodes of afib with RVR requiring IV lopressor. No chest pain. NG tube in place and functioning properly. She is asking for PO liquid as her throat is very dry.    Allergies:  penicillin (Unknown)      Hospital Medications:  acetaminophen    Suspension .. 1000 milliGRAM(s) Oral every 6 hours PRN  ALPRAZolam 0.5 milliGRAM(s) Oral two times a day PRN  amLODIPine   Tablet 5 milliGRAM(s) Oral daily  atorvastatin 40 milliGRAM(s) Oral <User Schedule>  buDESOnide    Inhalation Suspension 0.25 milliGRAM(s) Inhalation two times a day  carbidopa/levodopa  25/100 2 Tablet(s) Oral three times a day  dextrose 40% Gel 15 Gram(s) Oral once PRN  dextrose 5%. 1000 milliLiter(s) IV Continuous <Continuous>  dextrose 50% Injectable 12.5 Gram(s) IV Push once  dextrose 50% Injectable 25 Gram(s) IV Push once  dextrose 50% Injectable 25 Gram(s) IV Push once  docusate sodium Liquid 100 milliGRAM(s) Oral two times a day  furosemide    Tablet 20 milliGRAM(s) Oral daily  gabapentin   Solution 300 milliGRAM(s) Oral at bedtime  glucagon  Injectable 1 milliGRAM(s) IntraMuscular once PRN  guaiFENesin    Syrup 200 milliGRAM(s) Oral every 6 hours  heparin  Infusion. 600 Unit(s)/Hr IV Continuous <Continuous>  heparin  Injectable 5000 Unit(s) IV Push every 6 hours PRN  heparin  Injectable 2500 Unit(s) IV Push every 6 hours PRN  insulin lispro (HumaLOG) corrective regimen sliding scale   SubCutaneous every 6 hours  lidocaine   Patch 1 Patch Transdermal daily  losartan 50 milliGRAM(s) Oral daily  melatonin 5 milliGRAM(s) Oral at bedtime  metoprolol tartrate 50 milliGRAM(s) Enteral Tube two times a day  nystatin    Suspension 555927 Unit(s) Oral four times a day  pantoprazole  Injectable 40 milliGRAM(s) IV Push daily  polyethylene glycol 3350 17 Gram(s) Oral daily  predniSONE   Tablet 10 milliGRAM(s) Oral daily  senna 2 Tablet(s) Oral at bedtime  venlafaxine 37.5 milliGRAM(s) Oral every 12 hours      PMHX/PSHX:  Insomnia  Iron deficiency anemia  Anxiety  Parkinson disease  Aortic stenosis  Neuropathy  Polymyalgia rheumatica  Diabetes  Hyperlipidemia  Hypertension  S/P TAVR (transcatheter aortic valve replacement)      Family history:  No pertinent family history in first degree relatives      ROS:     General:  No wt loss, fevers, chills, night sweats, fatigue,   Eyes:  Good vision, no reported pain  ENT:  No sore throat, pain, runny nose, dysphagia  CV:  No pain, palpitations, hypo/hypertension  Resp:  No dyspnea, cough, tachypnea, wheezing  GI:  See HPI  :  No pain, bleeding, incontinence, nocturia  Muscle:  No pain, weakness  Neuro:  No weakness, tingling, memory problems  Psych:  No fatigue, insomnia, mood problems, depression  Endocrine:  No polyuria, polydipsia, cold/heat intolerance  Heme:  No petechiae, ecchymosis, easy bruisability  Skin:  No rash, edema      PHYSICAL EXAM:     Vital Signs:  Vital Signs Last 24 Hrs  T(C): 36.6 (29 Aug 2019 04:00), Max: 36.7 (28 Aug 2019 15:09)  T(F): 97.9 (29 Aug 2019 04:00), Max: 98 (28 Aug 2019 15:09)  HR: 102 (29 Aug 2019 04:00) (65 - 162)  BP: 123/78 (29 Aug 2019 04:00) (101/71 - 125/81)  BP(mean): --  RR: 20 (29 Aug 2019 04:00) (18 - 122)  SpO2: 98% (29 Aug 2019 04:00) (95% - 98%)  Daily     Daily     GENERAL:  elderly lady, NAD  HEENT:  NCAT, NG tube+  CHEST:  Coarse breath sounds with rhonchi  HEART:  irregularly irregular, S1, S2  ABDOMEN:  Soft, non-tender, non-distended  EXTREMITIES:  no edema  SKIN:  No rash  NEURO:  restless movements of the extremities    LABS:                        11.5   16.60 )-----------( 195      ( 29 Aug 2019 08:25 )             36.5     08-29    144  |  103  |  48<H>  ----------------------------<  173<H>  4.0   |  24  |  0.92    Ca    9.3      29 Aug 2019 06:16  Phos  3.1     08-29  Mg     2.8     08-29        PTT - ( 28 Aug 2019 23:05 )  PTT:90.0 sec        Imaging:    < from: Xray Chest 1 View- PORTABLE-Urgent (08.27.19 @ 21:42) >  FINDINGS:     The enteric tube terminates in the stomach. Status post TAVR. Pacer pads   overlie the chest.    Cardiac silhouette is at accurately evaluated on this projection. Mitral   annular calcifications.  Lungs are clear.  No pleural effusions or pneumothorax.      IMPRESSION:    Enteric tube tip within the stomach. Clear lungs.    < end of copied text >

## 2019-08-29 NOTE — PROGRESS NOTE ADULT - PROBLEM SELECTOR PLAN 10
a1c 6  - Patient not on home anti-hyperglycemics  - FS acceptable  - c/w Insulin sliding scale      Prophylaxis:  DVT ppx: c/w heparin drip for pending PEG tube placement. will start pt on NOAC per cardiology post PEG placement.  Diet: NG tube feed   Dispo: PT recs subacute rehab

## 2019-08-29 NOTE — PROGRESS NOTE ADULT - SUBJECTIVE AND OBJECTIVE BOX
Jomar Salvador  [phone]  All Cardiology service information can be found 24/7 on [email or web address], password: [email or web address]    Patient seen and examined at bedside.    Overnight Events: RVR to 150s overnight again, responded to lopressor 2.5mg x1. Pt otherwise feels well. Denies chest pain, sob, or palpitations.     CONSTITUTIONAL:  No fevers or chills  HEENT: No rhinorrhea   SKIN:  No rash or itching.  CARDIOVASCULAR:  No chest pain, chest pressure or chest discomfort.  RESPIRATORY:  No shortness of breath, cough or sputum.  GASTROINTESTINAL:  No nausea, vomiting or diarrhea. No abdominal pain.   GENITOURINARY:  Denies hematuria, dysuria.   NEUROLOGICAL:  No headache, dizziness, syncope.   MUSCULOSKELETAL:  No muscle, back pain, joint pain or stiffness.  HEMATOLOGIC:  No bleeding or bruising.            Current Meds:  acetaminophen    Suspension .. 1000 milliGRAM(s) Oral every 6 hours PRN  ALPRAZolam 0.5 milliGRAM(s) Oral two times a day PRN  amLODIPine   Tablet 5 milliGRAM(s) Oral daily  atorvastatin 40 milliGRAM(s) Oral <User Schedule>  buDESOnide    Inhalation Suspension 0.25 milliGRAM(s) Inhalation two times a day  carbidopa/levodopa  25/100 2 Tablet(s) Oral three times a day  dextrose 40% Gel 15 Gram(s) Oral once PRN  dextrose 5%. 1000 milliLiter(s) IV Continuous <Continuous>  dextrose 50% Injectable 12.5 Gram(s) IV Push once  dextrose 50% Injectable 25 Gram(s) IV Push once  dextrose 50% Injectable 25 Gram(s) IV Push once  docusate sodium Liquid 100 milliGRAM(s) Oral two times a day  furosemide    Tablet 20 milliGRAM(s) Oral daily  gabapentin   Solution 300 milliGRAM(s) Oral at bedtime  glucagon  Injectable 1 milliGRAM(s) IntraMuscular once PRN  guaiFENesin    Syrup 200 milliGRAM(s) Oral every 6 hours  heparin  Infusion. 600 Unit(s)/Hr IV Continuous <Continuous>  heparin  Injectable 5000 Unit(s) IV Push every 6 hours PRN  heparin  Injectable 2500 Unit(s) IV Push every 6 hours PRN  insulin lispro (HumaLOG) corrective regimen sliding scale   SubCutaneous every 6 hours  lidocaine   Patch 1 Patch Transdermal daily  losartan 50 milliGRAM(s) Oral daily  melatonin 5 milliGRAM(s) Oral at bedtime  metoprolol tartrate 50 milliGRAM(s) Enteral Tube two times a day  nystatin    Suspension 148437 Unit(s) Oral four times a day  pantoprazole  Injectable 40 milliGRAM(s) IV Push daily  polyethylene glycol 3350 17 Gram(s) Oral daily  predniSONE   Tablet 10 milliGRAM(s) Oral daily  senna 2 Tablet(s) Oral at bedtime  venlafaxine 37.5 milliGRAM(s) Oral every 12 hours      Vitals:  T(F): 97.9 (08-29), Max: 98 (08-28)  HR: 102 (08-29) (65 - 162)  BP: 123/78 (08-29) (101/71 - 125/81)  RR: 20 (08-29)  SpO2: 98% (08-29)  I&O's Summary    28 Aug 2019 07:01  -  29 Aug 2019 07:00  --------------------------------------------------------  IN: 2226 mL / OUT: 700 mL / NET: 1526 mL        Physical Exam:  Appearance: No acute distress  Eyes: clear conjunctiva  HEENT: Normal oral mucosa, +NGT  Cardiovascular: RRR, S1, S2, no murmurs, rubs, or gallops; no edema; no JVD  Respiratory: scattered wheezes b/l   Gastrointestinal: soft, non-tender, non-distended with normal bowel sounds  Musculoskeletal: No clubbing; no joint deformity   Neurologic: Non-focal  Lymphatic: No lymphadenopathy  Psychiatry: mood & affect appropriate  Skin: No rashes, ecchymoses, or cyanosis                          11.5   16.60 )-----------( 195      ( 29 Aug 2019 08:25 )             36.5     08-29    144  |  103  |  48<H>  ----------------------------<  173<H>  4.0   |  24  |  0.92    Ca    9.3      29 Aug 2019 06:16  Phos  3.1     08-29  Mg     2.8     08-29      PT/INR - ( 29 Aug 2019 09:18 )   PT: 13.0 sec;   INR: 1.15 ratio         PTT - ( 29 Aug 2019 09:18 )  PTT:80.0 sec              New ECG(s): Personally reviewed    Echo:    < from: TTE with Doppler (w/Cont) (08.12.19 @ 18:25) >  Conclusions:  Technically difficult study.  Endocardial visualization  enhanced with intravenous injection of Ultrasonic Enhancing  Agent (Definity).  1. Moderately dilated left atrium.  2. Hyperdynamic left ventricular systolic function. Left  ventricular ejection fraction >70%. No wall motion  abnormality.  3. Elevated left atrial pressure=26mmHG.  4. Normal right atrium.  5. Normal right ventricular size and function.  6. Transcatheter aortic valve replacement. Well seated  valve. Acceptable gradients. No aortic regurgitation.  7. Severe mitral stenosis.  8. Normal pericardium with no pericardial effusion.    < end of copied text >      Interpretation of Telemetry:    afib w/ RVR to 150s

## 2019-08-29 NOTE — PROGRESS NOTE ADULT - PROBLEM SELECTOR PLAN 8
- stable  - c/w home dose 50mg QD of losartan   - c/w amlodipine 5mg QD  - c/w metoprolol 75 BID for rate control and BP  - continue to monitor  - Vitals Q4H

## 2019-08-30 DIAGNOSIS — Z51.5 ENCOUNTER FOR PALLIATIVE CARE: ICD-10-CM

## 2019-08-30 DIAGNOSIS — J96.01 ACUTE RESPIRATORY FAILURE WITH HYPOXIA: ICD-10-CM

## 2019-08-30 DIAGNOSIS — R53.81 OTHER MALAISE: ICD-10-CM

## 2019-08-30 DIAGNOSIS — G20 PARKINSON'S DISEASE: ICD-10-CM

## 2019-08-30 DIAGNOSIS — Z71.89 OTHER SPECIFIED COUNSELING: ICD-10-CM

## 2019-08-30 LAB
ALBUMIN SERPL ELPH-MCNC: 3.3 G/DL — SIGNIFICANT CHANGE UP (ref 3.3–5)
ALP SERPL-CCNC: 43 U/L — SIGNIFICANT CHANGE UP (ref 40–120)
ALT FLD-CCNC: 14 U/L — SIGNIFICANT CHANGE UP (ref 10–45)
ANION GAP SERPL CALC-SCNC: 15 MMOL/L — SIGNIFICANT CHANGE UP (ref 5–17)
ANION GAP SERPL CALC-SCNC: 18 MMOL/L — HIGH (ref 5–17)
APPEARANCE UR: CLEAR — SIGNIFICANT CHANGE UP
APTT BLD: 43.4 SEC — HIGH (ref 27.5–36.3)
APTT BLD: 46.4 SEC — HIGH (ref 27.5–36.3)
AST SERPL-CCNC: 19 U/L — SIGNIFICANT CHANGE UP (ref 10–40)
BASE EXCESS BLDA CALC-SCNC: 4.1 MMOL/L — HIGH (ref -2–2)
BILIRUB SERPL-MCNC: 0.3 MG/DL — SIGNIFICANT CHANGE UP (ref 0.2–1.2)
BILIRUB UR-MCNC: NEGATIVE — SIGNIFICANT CHANGE UP
BUN SERPL-MCNC: 37 MG/DL — HIGH (ref 7–23)
BUN SERPL-MCNC: 44 MG/DL — HIGH (ref 7–23)
CALCIUM SERPL-MCNC: 8.5 MG/DL — SIGNIFICANT CHANGE UP (ref 8.4–10.5)
CALCIUM SERPL-MCNC: 8.6 MG/DL — SIGNIFICANT CHANGE UP (ref 8.4–10.5)
CHLORIDE SERPL-SCNC: 101 MMOL/L — SIGNIFICANT CHANGE UP (ref 96–108)
CHLORIDE SERPL-SCNC: 99 MMOL/L — SIGNIFICANT CHANGE UP (ref 96–108)
CO2 BLDA-SCNC: 28 MMOL/L — SIGNIFICANT CHANGE UP (ref 22–30)
CO2 SERPL-SCNC: 26 MMOL/L — SIGNIFICANT CHANGE UP (ref 22–31)
CO2 SERPL-SCNC: 26 MMOL/L — SIGNIFICANT CHANGE UP (ref 22–31)
COLOR SPEC: YELLOW — SIGNIFICANT CHANGE UP
CREAT SERPL-MCNC: 0.79 MG/DL — SIGNIFICANT CHANGE UP (ref 0.5–1.3)
CREAT SERPL-MCNC: 0.83 MG/DL — SIGNIFICANT CHANGE UP (ref 0.5–1.3)
DIFF PNL FLD: NEGATIVE — SIGNIFICANT CHANGE UP
EOSINOPHIL # BLD AUTO: 0 K/UL — SIGNIFICANT CHANGE UP (ref 0–0.5)
EOSINOPHIL NFR BLD AUTO: 2 % — SIGNIFICANT CHANGE UP (ref 0–6)
GAS PNL BLDA: SIGNIFICANT CHANGE UP
GLUCOSE BLDC GLUCOMTR-MCNC: 123 MG/DL — HIGH (ref 70–99)
GLUCOSE BLDC GLUCOMTR-MCNC: 139 MG/DL — HIGH (ref 70–99)
GLUCOSE BLDC GLUCOMTR-MCNC: 145 MG/DL — HIGH (ref 70–99)
GLUCOSE BLDC GLUCOMTR-MCNC: 160 MG/DL — HIGH (ref 70–99)
GLUCOSE BLDC GLUCOMTR-MCNC: 175 MG/DL — HIGH (ref 70–99)
GLUCOSE BLDC GLUCOMTR-MCNC: 176 MG/DL — HIGH (ref 70–99)
GLUCOSE SERPL-MCNC: 143 MG/DL — HIGH (ref 70–99)
GLUCOSE SERPL-MCNC: 160 MG/DL — HIGH (ref 70–99)
GLUCOSE UR QL: NEGATIVE — SIGNIFICANT CHANGE UP
HCO3 BLDA-SCNC: 27 MMOL/L — SIGNIFICANT CHANGE UP (ref 21–29)
HCT VFR BLD CALC: 33.3 % — LOW (ref 34.5–45)
HCT VFR BLD CALC: 34.1 % — LOW (ref 34.5–45)
HGB BLD-MCNC: 10.4 G/DL — LOW (ref 11.5–15.5)
HGB BLD-MCNC: 11.1 G/DL — LOW (ref 11.5–15.5)
INR BLD: 1.15 RATIO — SIGNIFICANT CHANGE UP (ref 0.88–1.16)
KETONES UR-MCNC: NEGATIVE — SIGNIFICANT CHANGE UP
LEUKOCYTE ESTERASE UR-ACNC: ABNORMAL
LIDOCAIN IGE QN: 36 U/L — SIGNIFICANT CHANGE UP (ref 7–60)
LYMPHOCYTES # BLD AUTO: 1.6 K/UL — SIGNIFICANT CHANGE UP (ref 1–3.3)
LYMPHOCYTES # BLD AUTO: 14 % — SIGNIFICANT CHANGE UP (ref 13–44)
MAGNESIUM SERPL-MCNC: 2.4 MG/DL — SIGNIFICANT CHANGE UP (ref 1.6–2.6)
MAGNESIUM SERPL-MCNC: 2.6 MG/DL — SIGNIFICANT CHANGE UP (ref 1.6–2.6)
MCHC RBC-ENTMCNC: 28.4 PG — SIGNIFICANT CHANGE UP (ref 27–34)
MCHC RBC-ENTMCNC: 29.6 PG — SIGNIFICANT CHANGE UP (ref 27–34)
MCHC RBC-ENTMCNC: 31.2 GM/DL — LOW (ref 32–36)
MCHC RBC-ENTMCNC: 32.6 GM/DL — SIGNIFICANT CHANGE UP (ref 32–36)
MCV RBC AUTO: 90.8 FL — SIGNIFICANT CHANGE UP (ref 80–100)
MCV RBC AUTO: 91 FL — SIGNIFICANT CHANGE UP (ref 80–100)
MONOCYTES # BLD AUTO: 0.4 K/UL — SIGNIFICANT CHANGE UP (ref 0–0.9)
MONOCYTES NFR BLD AUTO: 4 % — SIGNIFICANT CHANGE UP (ref 2–14)
NEUTROPHILS # BLD AUTO: 9.8 K/UL — HIGH (ref 1.8–7.4)
NEUTROPHILS NFR BLD AUTO: 79 % — HIGH (ref 43–77)
NITRITE UR-MCNC: POSITIVE
PCO2 BLDA: 37 MMHG — SIGNIFICANT CHANGE UP (ref 32–46)
PH BLDA: 7.48 — HIGH (ref 7.35–7.45)
PH UR: 6.5 — SIGNIFICANT CHANGE UP (ref 5–8)
PHOSPHATE SERPL-MCNC: 2.2 MG/DL — LOW (ref 2.5–4.5)
PHOSPHATE SERPL-MCNC: 3 MG/DL — SIGNIFICANT CHANGE UP (ref 2.5–4.5)
PLATELET # BLD AUTO: 152 K/UL — SIGNIFICANT CHANGE UP (ref 150–400)
PLATELET # BLD AUTO: 171 K/UL — SIGNIFICANT CHANGE UP (ref 150–400)
PO2 BLDA: 54 MMHG — LOW (ref 74–108)
POTASSIUM SERPL-MCNC: 3.3 MMOL/L — LOW (ref 3.5–5.3)
POTASSIUM SERPL-MCNC: 4.9 MMOL/L — SIGNIFICANT CHANGE UP (ref 3.5–5.3)
POTASSIUM SERPL-SCNC: 3.3 MMOL/L — LOW (ref 3.5–5.3)
POTASSIUM SERPL-SCNC: 4.9 MMOL/L — SIGNIFICANT CHANGE UP (ref 3.5–5.3)
PROT SERPL-MCNC: 6.4 G/DL — SIGNIFICANT CHANGE UP (ref 6–8.3)
PROT UR-MCNC: ABNORMAL
PROTHROM AB SERPL-ACNC: 13.3 SEC — HIGH (ref 10–12.9)
RBC # BLD: 3.66 M/UL — LOW (ref 3.8–5.2)
RBC # BLD: 3.75 M/UL — LOW (ref 3.8–5.2)
RBC # FLD: 14.8 % — HIGH (ref 10.3–14.5)
RBC # FLD: 16.2 % — HIGH (ref 10.3–14.5)
SAO2 % BLDA: 90 % — LOW (ref 92–96)
SODIUM SERPL-SCNC: 140 MMOL/L — SIGNIFICANT CHANGE UP (ref 135–145)
SODIUM SERPL-SCNC: 145 MMOL/L — SIGNIFICANT CHANGE UP (ref 135–145)
SP GR SPEC: 1.02 — SIGNIFICANT CHANGE UP (ref 1.01–1.02)
UROBILINOGEN FLD QL: NEGATIVE — SIGNIFICANT CHANGE UP
WBC # BLD: 11.9 K/UL — HIGH (ref 3.8–10.5)
WBC # BLD: 12.26 K/UL — HIGH (ref 3.8–10.5)
WBC # FLD AUTO: 11.9 K/UL — HIGH (ref 3.8–10.5)
WBC # FLD AUTO: 12.26 K/UL — HIGH (ref 3.8–10.5)

## 2019-08-30 PROCEDURE — 99233 SBSQ HOSP IP/OBS HIGH 50: CPT | Mod: GC

## 2019-08-30 PROCEDURE — 93010 ELECTROCARDIOGRAM REPORT: CPT

## 2019-08-30 PROCEDURE — 99232 SBSQ HOSP IP/OBS MODERATE 35: CPT | Mod: GC

## 2019-08-30 PROCEDURE — 71045 X-RAY EXAM CHEST 1 VIEW: CPT | Mod: 26

## 2019-08-30 PROCEDURE — 71045 X-RAY EXAM CHEST 1 VIEW: CPT | Mod: 26,77

## 2019-08-30 PROCEDURE — 99221 1ST HOSP IP/OBS SF/LOW 40: CPT

## 2019-08-30 PROCEDURE — 99233 SBSQ HOSP IP/OBS HIGH 50: CPT

## 2019-08-30 PROCEDURE — 31500 INSERT EMERGENCY AIRWAY: CPT

## 2019-08-30 RX ORDER — BUDESONIDE, MICRONIZED 100 %
0.25 POWDER (GRAM) MISCELLANEOUS
Refills: 0 | Status: DISCONTINUED | OUTPATIENT
Start: 2019-08-30 | End: 2019-09-13

## 2019-08-30 RX ORDER — LANOLIN ALCOHOL/MO/W.PET/CERES
5 CREAM (GRAM) TOPICAL AT BEDTIME
Refills: 0 | Status: DISCONTINUED | OUTPATIENT
Start: 2019-08-30 | End: 2019-08-30

## 2019-08-30 RX ORDER — LIDOCAINE 4 G/100G
1 CREAM TOPICAL DAILY
Refills: 0 | Status: DISCONTINUED | OUTPATIENT
Start: 2019-08-30 | End: 2019-09-13

## 2019-08-30 RX ORDER — ALPRAZOLAM 0.25 MG
0.5 TABLET ORAL
Refills: 0 | Status: DISCONTINUED | OUTPATIENT
Start: 2019-08-30 | End: 2019-09-04

## 2019-08-30 RX ORDER — POLYETHYLENE GLYCOL 3350 17 G/17G
17 POWDER, FOR SOLUTION ORAL DAILY
Refills: 0 | Status: DISCONTINUED | OUTPATIENT
Start: 2019-08-30 | End: 2019-09-04

## 2019-08-30 RX ORDER — PIPERACILLIN AND TAZOBACTAM 4; .5 G/20ML; G/20ML
3.38 INJECTION, POWDER, LYOPHILIZED, FOR SOLUTION INTRAVENOUS ONCE
Refills: 0 | Status: DISCONTINUED | OUTPATIENT
Start: 2019-08-30 | End: 2019-08-30

## 2019-08-30 RX ORDER — INSULIN LISPRO 100/ML
VIAL (ML) SUBCUTANEOUS EVERY 6 HOURS
Refills: 0 | Status: DISCONTINUED | OUTPATIENT
Start: 2019-08-30 | End: 2019-09-13

## 2019-08-30 RX ORDER — SENNA PLUS 8.6 MG/1
2 TABLET ORAL AT BEDTIME
Refills: 0 | Status: DISCONTINUED | OUTPATIENT
Start: 2019-08-30 | End: 2019-09-04

## 2019-08-30 RX ORDER — LEVALBUTEROL 1.25 MG/.5ML
0.63 SOLUTION, CONCENTRATE RESPIRATORY (INHALATION) ONCE
Refills: 0 | Status: COMPLETED | OUTPATIENT
Start: 2019-08-30 | End: 2019-08-30

## 2019-08-30 RX ORDER — DEXTROSE 50 % IN WATER 50 %
15 SYRINGE (ML) INTRAVENOUS ONCE
Refills: 0 | Status: DISCONTINUED | OUTPATIENT
Start: 2019-08-30 | End: 2019-09-13

## 2019-08-30 RX ORDER — GLUCAGON INJECTION, SOLUTION 0.5 MG/.1ML
1 INJECTION, SOLUTION SUBCUTANEOUS ONCE
Refills: 0 | Status: DISCONTINUED | OUTPATIENT
Start: 2019-08-30 | End: 2019-09-13

## 2019-08-30 RX ORDER — METOPROLOL TARTRATE 50 MG
75 TABLET ORAL EVERY 12 HOURS
Refills: 0 | Status: DISCONTINUED | OUTPATIENT
Start: 2019-08-30 | End: 2019-09-07

## 2019-08-30 RX ORDER — NOREPINEPHRINE BITARTRATE/D5W 8 MG/250ML
0.05 PLASTIC BAG, INJECTION (ML) INTRAVENOUS
Qty: 8 | Refills: 0 | Status: DISCONTINUED | OUTPATIENT
Start: 2019-08-30 | End: 2019-08-31

## 2019-08-30 RX ORDER — ACETAMINOPHEN 500 MG
1000 TABLET ORAL ONCE
Refills: 0 | Status: COMPLETED | OUTPATIENT
Start: 2019-08-30 | End: 2019-08-30

## 2019-08-30 RX ORDER — AMLODIPINE BESYLATE 2.5 MG/1
5 TABLET ORAL DAILY
Refills: 0 | Status: DISCONTINUED | OUTPATIENT
Start: 2019-08-30 | End: 2019-08-30

## 2019-08-30 RX ORDER — DOCUSATE SODIUM 100 MG
100 CAPSULE ORAL
Refills: 0 | Status: DISCONTINUED | OUTPATIENT
Start: 2019-08-30 | End: 2019-09-04

## 2019-08-30 RX ORDER — ACETAMINOPHEN 500 MG
1000 TABLET ORAL EVERY 6 HOURS
Refills: 0 | Status: DISCONTINUED | OUTPATIENT
Start: 2019-08-30 | End: 2019-09-04

## 2019-08-30 RX ORDER — CARBIDOPA AND LEVODOPA 25; 100 MG/1; MG/1
2 TABLET ORAL THREE TIMES A DAY
Refills: 0 | Status: DISCONTINUED | OUTPATIENT
Start: 2019-08-30 | End: 2019-09-04

## 2019-08-30 RX ORDER — LOSARTAN POTASSIUM 100 MG/1
50 TABLET, FILM COATED ORAL DAILY
Refills: 0 | Status: DISCONTINUED | OUTPATIENT
Start: 2019-08-30 | End: 2019-08-30

## 2019-08-30 RX ORDER — KETAMINE HYDROCHLORIDE 100 MG/ML
100 INJECTION INTRAMUSCULAR; INTRAVENOUS ONCE
Refills: 0 | Status: DISCONTINUED | OUTPATIENT
Start: 2019-08-30 | End: 2019-08-30

## 2019-08-30 RX ORDER — CHLORHEXIDINE GLUCONATE 213 G/1000ML
15 SOLUTION TOPICAL EVERY 12 HOURS
Refills: 0 | Status: DISCONTINUED | OUTPATIENT
Start: 2019-08-30 | End: 2019-08-31

## 2019-08-30 RX ORDER — FENTANYL CITRATE 50 UG/ML
0.5 INJECTION INTRAVENOUS
Qty: 5000 | Refills: 0 | Status: DISCONTINUED | OUTPATIENT
Start: 2019-08-30 | End: 2019-08-31

## 2019-08-30 RX ORDER — DEXTROSE 50 % IN WATER 50 %
25 SYRINGE (ML) INTRAVENOUS ONCE
Refills: 0 | Status: DISCONTINUED | OUTPATIENT
Start: 2019-08-30 | End: 2019-09-13

## 2019-08-30 RX ORDER — CHLORHEXIDINE GLUCONATE 213 G/1000ML
1 SOLUTION TOPICAL
Refills: 0 | Status: DISCONTINUED | OUTPATIENT
Start: 2019-08-30 | End: 2019-09-01

## 2019-08-30 RX ORDER — GABAPENTIN 400 MG/1
300 CAPSULE ORAL AT BEDTIME
Refills: 0 | Status: DISCONTINUED | OUTPATIENT
Start: 2019-08-30 | End: 2019-09-04

## 2019-08-30 RX ORDER — PIPERACILLIN AND TAZOBACTAM 4; .5 G/20ML; G/20ML
3.38 INJECTION, POWDER, LYOPHILIZED, FOR SOLUTION INTRAVENOUS EVERY 8 HOURS
Refills: 0 | Status: DISCONTINUED | OUTPATIENT
Start: 2019-08-30 | End: 2019-09-05

## 2019-08-30 RX ORDER — POTASSIUM PHOSPHATE, MONOBASIC POTASSIUM PHOSPHATE, DIBASIC 236; 224 MG/ML; MG/ML
15 INJECTION, SOLUTION INTRAVENOUS ONCE
Refills: 0 | Status: COMPLETED | OUTPATIENT
Start: 2019-08-30 | End: 2019-08-30

## 2019-08-30 RX ORDER — PROPOFOL 10 MG/ML
10 INJECTION, EMULSION INTRAVENOUS
Qty: 1000 | Refills: 0 | Status: DISCONTINUED | OUTPATIENT
Start: 2019-08-30 | End: 2019-08-31

## 2019-08-30 RX ORDER — PIPERACILLIN AND TAZOBACTAM 4; .5 G/20ML; G/20ML
3.38 INJECTION, POWDER, LYOPHILIZED, FOR SOLUTION INTRAVENOUS EVERY 6 HOURS
Refills: 0 | Status: DISCONTINUED | OUTPATIENT
Start: 2019-08-30 | End: 2019-08-30

## 2019-08-30 RX ORDER — POTASSIUM CHLORIDE 20 MEQ
40 PACKET (EA) ORAL EVERY 4 HOURS
Refills: 0 | Status: COMPLETED | OUTPATIENT
Start: 2019-08-30 | End: 2019-08-30

## 2019-08-30 RX ORDER — PANTOPRAZOLE SODIUM 20 MG/1
40 TABLET, DELAYED RELEASE ORAL DAILY
Refills: 0 | Status: DISCONTINUED | OUTPATIENT
Start: 2019-08-30 | End: 2019-09-10

## 2019-08-30 RX ORDER — HEPARIN SODIUM 5000 [USP'U]/ML
600 INJECTION INTRAVENOUS; SUBCUTANEOUS
Qty: 25000 | Refills: 0 | Status: DISCONTINUED | OUTPATIENT
Start: 2019-08-30 | End: 2019-09-04

## 2019-08-30 RX ORDER — FUROSEMIDE 40 MG
20 TABLET ORAL DAILY
Refills: 0 | Status: DISCONTINUED | OUTPATIENT
Start: 2019-08-30 | End: 2019-09-06

## 2019-08-30 RX ORDER — DEXTROSE 50 % IN WATER 50 %
12.5 SYRINGE (ML) INTRAVENOUS ONCE
Refills: 0 | Status: DISCONTINUED | OUTPATIENT
Start: 2019-08-30 | End: 2019-09-13

## 2019-08-30 RX ORDER — VENLAFAXINE HCL 75 MG
37.5 CAPSULE, EXT RELEASE 24 HR ORAL EVERY 12 HOURS
Refills: 0 | Status: DISCONTINUED | OUTPATIENT
Start: 2019-08-30 | End: 2019-08-30

## 2019-08-30 RX ORDER — SODIUM CHLORIDE 9 MG/ML
1000 INJECTION, SOLUTION INTRAVENOUS
Refills: 0 | Status: DISCONTINUED | OUTPATIENT
Start: 2019-08-30 | End: 2019-09-13

## 2019-08-30 RX ORDER — ATORVASTATIN CALCIUM 80 MG/1
40 TABLET, FILM COATED ORAL
Refills: 0 | Status: DISCONTINUED | OUTPATIENT
Start: 2019-08-30 | End: 2019-09-04

## 2019-08-30 RX ORDER — FENTANYL CITRATE 50 UG/ML
100 INJECTION INTRAVENOUS ONCE
Refills: 0 | Status: DISCONTINUED | OUTPATIENT
Start: 2019-08-30 | End: 2019-08-30

## 2019-08-30 RX ADMIN — Medication 500000 UNIT(S): at 10:16

## 2019-08-30 RX ADMIN — LIDOCAINE 1 PATCH: 4 CREAM TOPICAL at 00:59

## 2019-08-30 RX ADMIN — POLYETHYLENE GLYCOL 3350 17 GRAM(S): 17 POWDER, FOR SOLUTION ORAL at 10:17

## 2019-08-30 RX ADMIN — SENNA PLUS 2 TABLET(S): 8.6 TABLET ORAL at 00:26

## 2019-08-30 RX ADMIN — HEPARIN SODIUM 700 UNIT(S)/HR: 5000 INJECTION INTRAVENOUS; SUBCUTANEOUS at 11:10

## 2019-08-30 RX ADMIN — Medication 200 MILLIGRAM(S): at 00:38

## 2019-08-30 RX ADMIN — CARBIDOPA AND LEVODOPA 2 TABLET(S): 25; 100 TABLET ORAL at 23:30

## 2019-08-30 RX ADMIN — CHLORHEXIDINE GLUCONATE 15 MILLILITER(S): 213 SOLUTION TOPICAL at 17:00

## 2019-08-30 RX ADMIN — Medication 1: at 17:55

## 2019-08-30 RX ADMIN — POTASSIUM PHOSPHATE, MONOBASIC POTASSIUM PHOSPHATE, DIBASIC 62.5 MILLIMOLE(S): 236; 224 INJECTION, SOLUTION INTRAVENOUS at 11:50

## 2019-08-30 RX ADMIN — CARBIDOPA AND LEVODOPA 2 TABLET(S): 25; 100 TABLET ORAL at 00:37

## 2019-08-30 RX ADMIN — CARBIDOPA AND LEVODOPA 2 TABLET(S): 25; 100 TABLET ORAL at 06:23

## 2019-08-30 RX ADMIN — SENNA PLUS 2 TABLET(S): 8.6 TABLET ORAL at 23:30

## 2019-08-30 RX ADMIN — FENTANYL CITRATE 100 MICROGRAM(S): 50 INJECTION INTRAVENOUS at 14:28

## 2019-08-30 RX ADMIN — Medication 0.5 MILLIGRAM(S): at 08:15

## 2019-08-30 RX ADMIN — PANTOPRAZOLE SODIUM 40 MILLIGRAM(S): 20 TABLET, DELAYED RELEASE ORAL at 10:16

## 2019-08-30 RX ADMIN — Medication 1000 MILLIGRAM(S): at 11:11

## 2019-08-30 RX ADMIN — LOSARTAN POTASSIUM 50 MILLIGRAM(S): 100 TABLET, FILM COATED ORAL at 06:22

## 2019-08-30 RX ADMIN — Medication 5 MILLIGRAM(S): at 00:26

## 2019-08-30 RX ADMIN — Medication 75 MILLIGRAM(S): at 04:12

## 2019-08-30 RX ADMIN — Medication 1: at 01:13

## 2019-08-30 RX ADMIN — Medication 1000 MILLIGRAM(S): at 10:18

## 2019-08-30 RX ADMIN — GABAPENTIN 300 MILLIGRAM(S): 400 CAPSULE ORAL at 00:24

## 2019-08-30 RX ADMIN — LEVALBUTEROL 0.63 MILLIGRAM(S): 1.25 SOLUTION, CONCENTRATE RESPIRATORY (INHALATION) at 00:29

## 2019-08-30 RX ADMIN — AMLODIPINE BESYLATE 5 MILLIGRAM(S): 2.5 TABLET ORAL at 06:23

## 2019-08-30 RX ADMIN — PIPERACILLIN AND TAZOBACTAM 25 GRAM(S): 4; .5 INJECTION, POWDER, LYOPHILIZED, FOR SOLUTION INTRAVENOUS at 21:36

## 2019-08-30 RX ADMIN — Medication 20 MILLIGRAM(S): at 06:23

## 2019-08-30 RX ADMIN — Medication 200 MILLIGRAM(S): at 23:36

## 2019-08-30 RX ADMIN — Medication 200 MILLIGRAM(S): at 10:15

## 2019-08-30 RX ADMIN — Medication 1000 MILLIGRAM(S): at 01:25

## 2019-08-30 RX ADMIN — Medication 10 MILLIGRAM(S): at 11:57

## 2019-08-30 RX ADMIN — Medication 40 MILLIEQUIVALENT(S): at 10:15

## 2019-08-30 RX ADMIN — LIDOCAINE 1 PATCH: 4 CREAM TOPICAL at 10:17

## 2019-08-30 RX ADMIN — Medication 400 MILLIGRAM(S): at 00:53

## 2019-08-30 RX ADMIN — LIDOCAINE 1 PATCH: 4 CREAM TOPICAL at 22:44

## 2019-08-30 RX ADMIN — Medication 200 MILLIGRAM(S): at 06:23

## 2019-08-30 RX ADMIN — KETAMINE HYDROCHLORIDE 100 MILLIGRAM(S): 100 INJECTION INTRAMUSCULAR; INTRAVENOUS at 14:28

## 2019-08-30 RX ADMIN — HEPARIN SODIUM 2500 UNIT(S): 5000 INJECTION INTRAVENOUS; SUBCUTANEOUS at 11:11

## 2019-08-30 RX ADMIN — HEPARIN SODIUM 600 UNIT(S)/HR: 5000 INJECTION INTRAVENOUS; SUBCUTANEOUS at 20:49

## 2019-08-30 RX ADMIN — GABAPENTIN 300 MILLIGRAM(S): 400 CAPSULE ORAL at 23:29

## 2019-08-30 RX ADMIN — Medication 0.25 MILLIGRAM(S): at 17:32

## 2019-08-30 RX ADMIN — Medication 37.5 MILLIGRAM(S): at 04:13

## 2019-08-30 RX ADMIN — Medication 0.25 MILLIGRAM(S): at 08:16

## 2019-08-30 NOTE — CONSULT NOTE ADULT - PROBLEM SELECTOR RECOMMENDATION 2
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

## 2019-08-30 NOTE — PROGRESS NOTE ADULT - PROBLEM SELECTOR PLAN 5
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 Atrophy and extensive small vessel white matter ischemic changes. No acute changes    - completed diflucan and nystatin for oral candidiasis. Received extended course of 14 days (8/15->8/28)  - now with NG tube for meds and feeds   - Aspiration precautions and Elevate head of bed  -appreciate GI eval.  PEG placement had been delayed due to afib.  Palliative care c/s placed to further review GOC and utility of feeding tube  -holding feeds while being evaluated for possible aspiration today

## 2019-08-30 NOTE — PROGRESS NOTE ADULT - ATTENDING COMMENTS
85 year old woman with known paroxysmal atrial fibrillation, COPD exacerbation had episode of rapid atrial fibrillation after placement of feeding tube and has spontaneously reverted to sinus rhythm. Again overnight each night atrial fibrillation with rapid ventricular response and has spontaneously reverted to sinus rhythm again each morning. This pattern likely to continue, though duration of atrial fibrillation episodes getting shorter. Pulmonary indicates COPD exacerbation improved and wheezing related to laryngeal paralysis, needs respiratory therapy, assistance in keeping airway clear. On beta blocker 75 mg BID seeking better rate control when in atrial fibrillation. Currently in sinus rhythm again and since episodes well tolerated and self-limited would not favor adding amiodarone.

## 2019-08-30 NOTE — PROGRESS NOTE ADULT - PROBLEM SELECTOR PLAN 10
a1c 6  - Patient not on home anti-hyperglycemics  - FS acceptable  - c/w Insulin sliding scale      Prophylaxis:  DVT ppx: c/w heparin drip for pending PEG tube placement. will start pt on NOAC per cardiology post PEG placement.  Diet: NPO  Dispo: PT recs subacute rehab on discharge.  For now guarded prognosis and being evaluated by MICU

## 2019-08-30 NOTE — PROGRESS NOTE ADULT - SUBJECTIVE AND OBJECTIVE BOX
Jomar Salvador  [phone]  All Cardiology service information can be found 24/7 on [email or web address], password: [email or web address]    Patient seen and examined at bedside.    Overnight Events: afib to 150s overnight, spontaneously converted. Pt c/o headache and significant sputum production. No chest pain or palpitations.     CONSTITUTIONAL:  No fevers or chills  HEENT: No rhinorrhea   SKIN:  No rash or itching.  CARDIOVASCULAR:  No chest pain, chest pressure or chest discomfort.  RESPIRATORY: +SOB, +cough, + sputum   GASTROINTESTINAL:  No nausea, vomiting or diarrhea. No abdominal pain.   GENITOURINARY:  Denies hematuria, dysuria.   NEUROLOGICAL: +headache   MUSCULOSKELETAL:  No muscle, back pain, joint pain or stiffness.  HEMATOLOGIC:  No bleeding or bruising.     Current Meds:  acetaminophen    Suspension .. 1000 milliGRAM(s) Oral every 6 hours PRN  ALPRAZolam 0.5 milliGRAM(s) Oral two times a day PRN  amLODIPine   Tablet 5 milliGRAM(s) Oral daily  atorvastatin 40 milliGRAM(s) Oral <User Schedule>  buDESOnide    Inhalation Suspension 0.25 milliGRAM(s) Inhalation two times a day  carbidopa/levodopa  25/100 2 Tablet(s) Oral three times a day  dextrose 40% Gel 15 Gram(s) Oral once PRN  dextrose 5%. 1000 milliLiter(s) IV Continuous <Continuous>  dextrose 50% Injectable 12.5 Gram(s) IV Push once  dextrose 50% Injectable 25 Gram(s) IV Push once  dextrose 50% Injectable 25 Gram(s) IV Push once  docusate sodium Liquid 100 milliGRAM(s) Oral two times a day  furosemide    Tablet 20 milliGRAM(s) Oral daily  gabapentin   Solution 300 milliGRAM(s) Oral at bedtime  glucagon  Injectable 1 milliGRAM(s) IntraMuscular once PRN  guaiFENesin    Syrup 200 milliGRAM(s) Oral every 6 hours  heparin  Infusion. 600 Unit(s)/Hr IV Continuous <Continuous>  heparin  Injectable 5000 Unit(s) IV Push every 6 hours PRN  heparin  Injectable 2500 Unit(s) IV Push every 6 hours PRN  insulin lispro (HumaLOG) corrective regimen sliding scale   SubCutaneous every 6 hours  lidocaine   Patch 1 Patch Transdermal daily  losartan 50 milliGRAM(s) Oral daily  melatonin 5 milliGRAM(s) Oral at bedtime  metoprolol tartrate 75 milliGRAM(s) Oral every 12 hours  nystatin    Suspension 316853 Unit(s) Oral four times a day  pantoprazole  Injectable 40 milliGRAM(s) IV Push daily  polyethylene glycol 3350 17 Gram(s) Oral daily  predniSONE   Tablet 10 milliGRAM(s) Oral daily  senna 2 Tablet(s) Oral at bedtime  venlafaxine 37.5 milliGRAM(s) Oral every 12 hours      Vitals:  T(F): 98.5 (08-30), Max: 98.5 (08-30)  HR: 65 (08-30) (59 - 162)  BP: 111/61 (08-30) (102/53 - 121/84)  RR: 20 (08-30)  SpO2: 92% (08-30)  I&O's Summary    29 Aug 2019 07:01  -  30 Aug 2019 07:00  --------------------------------------------------------  IN: 1572 mL / OUT: 300 mL / NET: 1272 mL        Physical Exam:  Appearance: mod resp distress   Eyes: pink conjunctiva, +NGT  HEENT: Normal oral mucosa  Cardiovascular: RRR, S1, S2,  Respiratory: Diffuse b/l rhonchi   Gastrointestinal: soft, non-tender, non-distended with normal bowel sounds  Musculoskeletal: No clubbing; no joint deformity   Neurologic: Non-focal  Lymphatic: No lymphadenopathy  Psychiatry: mood & affect appropriate  Skin: No rashes, ecchymoses, or cyanosis                          11.5   16.60 )-----------( 195      ( 29 Aug 2019 08:25 )             36.5     08-29    144  |  103  |  48<H>  ----------------------------<  173<H>  4.0   |  24  |  0.92    Ca    9.3      29 Aug 2019 06:16  Phos  3.1     08-29  Mg     2.8     08-29      PT/INR - ( 29 Aug 2019 09:18 )   PT: 13.0 sec;   INR: 1.15 ratio         PTT - ( 29 Aug 2019 09:18 )  PTT:80.0 sec      Echo:  < from: TTE with Doppler (w/Cont) (08.12.19 @ 18:25) >  Conclusions:  Technically difficult study.  Endocardial visualization  enhanced with intravenous injection of Ultrasonic Enhancing  Agent (Definity).  1. Moderately dilated left atrium.  2. Hyperdynamic left ventricular systolic function. Left  ventricular ejection fraction >70%. No wall motion  abnormality.  3. Elevated left atrial pressure=26mmHG.  4. Normal right atrium.  5. Normal right ventricular size and function.  6. Transcatheter aortic valve replacement. Well seated  valve. Acceptable gradients. No aortic regurgitation.  7. Severe mitral stenosis.  8. Normal pericardium with no pericardial effusion.    < end of copied text >      Interpretation of Telemetry:  SR 60-80s, episodes of RVR to 150s overnight

## 2019-08-30 NOTE — RAPID RESPONSE TEAM SUMMARY - NSOTHERINTERVENTIONSRRT_GEN_ALL_CORE
After primary team d/w family, patient to remain full code. Currently with mild improvement from initial evaluation. Spoke with primary team to consult MICU and ENT given patient has critical airway.

## 2019-08-30 NOTE — PROGRESS NOTE ADULT - PROBLEM SELECTOR PLAN 8
- c/w home dose 50mg QD of losartan   - c/w amlodipine 5mg QD  - c/w metoprolol 75 BID for rate control and BP  - continue to monitor  - Vitals Q4H

## 2019-08-30 NOTE — CONSULT NOTE ADULT - ATTENDING COMMENTS
Patient seen and examined and agree with the above documentation with the following additions:   Palliative consulted to assist with goals of care in patient with hypoxic respiratory failure, pneumonia and vocal cord parlysis.  Patient with acute event today ending up in MICU evaluation due to respiratory distress. Family contacted and aware. They are amenable to family meeting tentatively planned for Tuesday at 1-1:30pm. currently family desires full code.

## 2019-08-30 NOTE — PROCEDURE NOTE - NSPROCDETAILS_GEN_ALL_CORE
Difficult airway anticipated. ENT called at bedside and bronchoscope was set up for possible fibreoptic intubation as a backup. Patient was positioned in semifowler position. Glidedoscope was used after preoxygenation and induction with ketamine. Vocal cords were visualized and ET tube size 7 mm was placed and confirmed../patient pre-oxygenated, tube inserted, placement confirmed/connected to ventilator

## 2019-08-30 NOTE — PROGRESS NOTE ADULT - ATTENDING COMMENTS
Beeper: [phone]    I spoke with son Shon multiple times updating him on above acute status change with increased work of breathing.  Remains full code.  MICU/palliative evals pending.

## 2019-08-30 NOTE — PROGRESS NOTE ADULT - PROBLEM SELECTOR PLAN 1
concern for aspiration pna.  Very rhoncherous on exam, during RRT temp reported as >100 though not recorded yet.   -start empiric zosyn  -check blood cultures  -hold ngt feeds  -discussed at length with son Shon on phone-full code at this time.  He is not prepared to make decisions regarding code status as they have not discussed in past.  -MICU c/s placed.  F/u recs  -spoke with ENT-they will f/u today.  If requires intubation would recommend smalled ET tube due to vocal cord paralysis  -I updated PCP Dr Dumas as well at son's request.  -f/u palliative c/s (placed yesterday)  -if xray negative, consider repeat CT chest when stabilized  -supplemental oxygen to maintain saturations>90%  -do not suspect vte as she has been on heparin gtt for afib

## 2019-08-30 NOTE — PROCEDURE NOTE - NSTRACHPOSTINTU_RESP_A_CORE
Positive end tidal Co2 noted/Breath sounds equal/Chest excursion noted/Chest X-Ray/Breath sounds bilateral

## 2019-08-30 NOTE — PROGRESS NOTE ADULT - ATTENDING COMMENTS
Patient now with new hypoxia, and still had rapid A fib overnight. We will hold off on endoscopic PEG placement. Follow-up palliative care evaluation for goals of care. We will plan for PEG after patient's final decision, and once acute medical issues are resolved for her to safely undergo procedure.

## 2019-08-30 NOTE — RAPID RESPONSE TEAM SUMMARY - NSSITUATIONBACKGROUNDRRT_GEN_ALL_CORE
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 Patient was down at Endoscopy for a PEG placement, but went into Afib with RVR. Upon arrival, patient mentating well, BPs in the 100s-120s/60s-70s. First gave 5 mg IV lopressor. HR went from 170s to 140s. BP maintained, so another 5 mg was given. After approximately 5 minutes, HR came down to 70s. Also gave 1L IVF bolus.
84yo F with GERD, HTN, HLD, DM2 , Parkinsonisms, anxiety/depression, AS s/p TAVR on plavix, HFpEF with severe Mitral Stenosis, PMR (on chronic steroids), COPD who initially was admitted for acute hypoxic/hypercarbic respiratory failure in setting of pneumonia and COPD exacerbation, hospital course c/b vocal cord paralysis s/p botox injection as well as new onset afib and dysphagia. RRT was called for increased WOB while on tube feedings. Patient was initially noted to be tachypneic to the 40s with altered mentation for which TF were held. VS T 99.9 (rectal), SBP 110s, RR 40 and desaturating on 6L NC to the 80s. On NRB, patient improved to 100%. Tele afib with rate of 60s on heparin gtt. .
85 F with GERD, HTN, HLD, DM2 with neuropathy, Parkinson's, anxiety/depression, AS s/p TAVR on plavix, overactive bladder, ?CHF, PMR, COPD p/w SOB and abdominal pain, admitted for acute hypoxic and hypercarbic respiratory in setting of pneumonia and COPD exacerbation, course c/b UTI and acute metabolic encephalopathy. RRT called tonight for tachycardia. EKG showing AF with RVR. Denies chest pain.

## 2019-08-30 NOTE — PROGRESS NOTE ADULT - ASSESSMENT
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 She was admitted for acute hypoxic and hypercarbic respiratory failure in setting of pneumonia and COPD exacerbation, course c/b UTI, acute metabolic encephalopathy, new onset Afib. 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     #Oropharyngeal dysphagia in the setting of neurologic symptoms including vocal cord paralysis s/p VC injection on 8/22, GI consulted for PEG tube placement. Pt still agreeable to peg but wants liquids by mouth.  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  #Atrial fibrillation with RVR, on heparin drip: HR still having episodes of RVR requiring intermittent IV meds; now increased metoprolol to 50 bid   #Hypernatremia due to free water deficit: resolved    RECOMMENDATION  - trend CBC, CMP  - would consider chest xray and monitoring fluid administration as pt appears to have rhonchi suggesting pulmonary edema vs PNA  - Rapid A fib control per cardiology/medicine, increased metoprolol to 75mg BID but requiring intermittent pushes  - recommend palliative care consult to further discuss options such as pleasure feeding vs peg placement  - plan for upper endoscopy with PEG placement once HR is adequately controlled and pulmonary status optimized  - maintain NGT and give feeds  - supportive care as per primary team Samreen: 85 year old female with GERD, HTN, HLD, 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 She was admitted for acute hypoxic and hypercarbic respiratory failure in setting of pneumonia and COPD exacerbation, course c/b UTI, acute metabolic encephalopathy, new onset Afib. 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     #Oropharyngeal dysphagia in the setting of neurologic symptoms including vocal cord paralysis s/p VC injection on 8/22, GI consulted for PEG tube placement.   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  #Atrial fibrillation with RVR, on heparin drip: HR still having episodes of RVR requiring intermittent IV meds; now increased metoprolol to 50 bid   #Hypernatremia due to free water deficit: resolved    RECOMMENDATION  - trend CBC, CMP  - would consider chest xray and monitoring fluid administration as pt appears to have rhonchi suggesting pulmonary edema vs PNA  - Rapid A fib control per cardiology/medicine, increased metoprolol to 75mg BID but requiring intermittent pushes  - recommend palliative care consult to further discuss options such as pleasure feeding vs peg placement  - plan for upper endoscopy with PEG placement once HR is adequately controlled and pulmonary status optimized  - maintain NGT and give feeds  - supportive care as per primary team

## 2019-08-30 NOTE — PROGRESS NOTE ADULT - ASSESSMENT
86 yo F with HTN, HLD, AS s/p TAVR on Plavix, MS, HFpEF likely 2/2 MS (on Lasix 20 BID at home) a/w multiple metabolic derangements including COPD exacerbation, laryngeal paralysis, PNA, UTI w/ new encephalopathy requiring NGT w/ newly dx pAfib.    #pAfib  -Tolerating metoprolol 75mg BID. pAfib likely 2/2 to underlying issues.     #Laryngeal paralysis  -Airway clearance and suctioning per primary team/pulm. Would not hold chest PT due to RVR as per RN this was an issue overnight.     Jomar Salvador PGY4  [phone]  All Cardiology service information can be found 24/7 on [email or web address], password: Ui [email or web address]

## 2019-08-30 NOTE — CONSULT NOTE ADULT - SUBJECTIVE AND OBJECTIVE BOX
HPI:  85 F with GERD, HTN, HLD, DM2 with neuropathy, Parkinson's, anxiety/depression, AS s/p TAVR on plavix, overactive bladder, ?CHF (on lasix 20 BID), PMR on Prednisone, ?COPD (dx with PNA at [facility] vague hx of being told she has COPD given breathing treatments but then stopped taking them), presenting with multiple medical complaints, but especially LUQ and flank pain since yesterday AM. Per son, patient has had multiple UTIs. She had dysuria 2 weeks ago, was empirically treated with macrobid (8 days ago). Patient was febrile at home prior to coming and also in ED. Patient later denied abdominal pain and stated she came to ED for SOB. She's not on oxygen at home, but now requiring 1.5 L NC. Patient's son notes she had R sided headache for 1 week. ESR last week was wnl and prednisone dose was further decreased. This week, ESR was elevated again. Patient's PCP was c/f GCA and prednisone dose was increased to 10mg qD. Patient states headache is sharp and stabbing, lasts 5 minutes and has improved since increasing prednisone. Patient has decreased vision on R eye d/t macular degeneration. No further worsening in vision. She had temporal artery bx in 04/2019, which was negative for GCA.    Patient also reports increased non-productive cough. Patient intermittently reports blood in stools to other providers, but recently denied it.  Vitals in ED were: febrile to 101.4F, HR 60-90s, /60s-150/80s, satting 100% on 1.5 L. VBG showed normal pH and pCO2 of 58. CXR shows concern for pna, and UA is positive for leuk esterase. Patient was given albuterol, azithromycin and CTX in the ED.  EKG showed LBBB. (11 Aug 2019 05:14)    PERTINENT PM/SXH:   Insomnia  Iron deficiency anemia  Anxiety  Parkinson disease  Aortic stenosis  Neuropathy  Polymyalgia rheumatica  Diabetes  Hyperlipidemia  Hypertension    S/P TAVR (transcatheter aortic valve replacement)    FAMILY HISTORY:  No pertinent family history in first degree relatives    ITEMS NOT CHECKED ARE NOT PRESENT    SOCIAL HISTORY:   Significant other/partner:  [x]  Children:  [x] 2 Children Son Shon / daughter Anu Muslim/Spirituality: Confucianist   Substance hx:  [ ]   Tobacco hx:  [ ]   Alcohol hx: [ ]   Home Opioid hx:  [x] I-Stop Reference No: #: [id number]  ·	08/05/2019  alprazolam 0.5 mg tablet 60 30 Juan Marroquin DO  Living Situation: [x]Home with HHA  [ ]Long term care  [ ]Rehab [ ]Other    ADVANCE DIRECTIVES:    DNR  MOLST  [ ]  Living Will  [ ]   DECISION MAKER(s):  [ ] Health Care Proxy(s)  [x] Surrogate(s)  [ ] Guardian           Name(s): Shon Mae / Anu Mae Phone Number(s): [phone]    BASELINE (I)ADL(s) (prior to admission):  Walker: [ ]Total  [x] Moderate [ ]Dependent    Allergies    penicillin (Unknown)    Intolerances    MEDICATIONS  (STANDING):  amLODIPine   Tablet 5 milliGRAM(s) Oral daily  atorvastatin 40 milliGRAM(s) Oral <User Schedule>  buDESOnide    Inhalation Suspension 0.25 milliGRAM(s) Inhalation two times a day  carbidopa/levodopa  25/100 2 Tablet(s) Oral three times a day  dextrose 5%. 1000 milliLiter(s) (50 mL/Hr) IV Continuous <Continuous>  dextrose 50% Injectable 12.5 Gram(s) IV Push once  dextrose 50% Injectable 25 Gram(s) IV Push once  dextrose 50% Injectable 25 Gram(s) IV Push once  docusate sodium Liquid 100 milliGRAM(s) Oral two times a day  furosemide    Tablet 20 milliGRAM(s) Oral daily  gabapentin   Solution 300 milliGRAM(s) Oral at bedtime  guaiFENesin    Syrup 200 milliGRAM(s) Oral every 6 hours  heparin  Infusion. 600 Unit(s)/Hr (6 mL/Hr) IV Continuous <Continuous>  insulin lispro (HumaLOG) corrective regimen sliding scale   SubCutaneous every 6 hours  lidocaine   Patch 1 Patch Transdermal daily  losartan 50 milliGRAM(s) Oral daily  melatonin 5 milliGRAM(s) Oral at bedtime  metoprolol tartrate 75 milliGRAM(s) Oral every 12 hours  nystatin    Suspension 611709 Unit(s) Oral four times a day  pantoprazole  Injectable 40 milliGRAM(s) IV Push daily  piperacillin/tazobactam IVPB. 3.375 Gram(s) IV Intermittent once  piperacillin/tazobactam IVPB.. 3.375 Gram(s) IV Intermittent every 6 hours  polyethylene glycol 3350 17 Gram(s) Oral daily  potassium chloride   Solution 40 milliEquivalent(s) Oral every 4 hours  predniSONE   Tablet 10 milliGRAM(s) Oral daily  senna 2 Tablet(s) Oral at bedtime  venlafaxine 37.5 milliGRAM(s) Oral every 12 hours    MEDICATIONS  (PRN):  acetaminophen    Suspension .. 1000 milliGRAM(s) Oral every 6 hours PRN Mild Pain (1 - 3), Moderate Pain (4 - 6)  ALPRAZolam 0.5 milliGRAM(s) Oral two times a day PRN anxiety  dextrose 40% Gel 15 Gram(s) Oral once PRN Blood Glucose LESS THAN 70 milliGRAM(s)/deciliter  glucagon  Injectable 1 milliGRAM(s) IntraMuscular once PRN Glucose LESS THAN 70 milligrams/deciliter  heparin  Injectable 5000 Unit(s) IV Push every 6 hours PRN For aPTT less than 40  heparin  Injectable 2500 Unit(s) IV Push every 6 hours PRN For aPTT between 40 - 57    PRESENT SYMPTOMS: [x] Unable to obtain due to poor mentation   Source if other than patient:  [ ]Family   [ ]Team     Pain (Impact on QOL):    Location -         Minimal acceptable level (0-10 scale):        Aggravating factors -  Quality -  Radiation -  Severity (0-10 scale) -    Timing -    PAIN AD Score:     [email or web address] (press ctrl +  left click to view)    Dyspnea:                           [ ]Mild [ ]Moderate [ ]Severe  Anxiety:                             [ ]Mild [ ]Moderate [ ]Severe  Fatigue:                             [ ]Mild [ ]Moderate [ ]Severe  Nausea:                             [ ]Mild [ ]Moderate [ ]Severe  Loss of appetite:              [ ]Mild [ ]Moderate [ ]Severe  Constipation:                    [ ]Mild [ ]Moderate [ ]Severe    Other Symptoms:  [x]All other review of systems negative     Karnofsky Performance Score/Palliative Performance Status Version 2: 20%    http://palliative.info/resource_material/[email or web address].pdf    PHYSICAL EXAM:  Vital Signs Last 24 Hrs  T(C): 36.3 (30 Aug 2019 11:29), Max: 37.4 (30 Aug 2019 09:54)  T(F): 97.3 (30 Aug 2019 11:29), Max: 99.3 (30 Aug 2019 09:54)  HR: 73 (30 Aug 2019 11:29) (59 - 162)  BP: 118/67 (30 Aug 2019 11:29) (102/53 - 121/84)  BP(mean): --  RR: 41 (30 Aug 2019 11:29) (18 - 41)  SpO2: 94% (30 Aug 2019 11:29) (84% - 97%) I&O's Summary    29 Aug 2019 07:01  -  30 Aug 2019 07:00  --------------------------------------------------------  IN: 1572 mL / OUT: 300 mL / NET: 1272 mL    GENERAL:  [x]Alert  [ ]Oriented x   [ ]Lethargic  [ ]Cachexia  [ ]Unarousable  [ ]Verbal  [ ]Non-Verbal  Behavioral:   [ ] Anxiety  [ ] Delirium [ ] Agitation [ ] Other  HEENT:  [ ]Normal   [ ]Dry mouth   [ ]ET Tube/Trach  [ ]Oral lesions  PULMONARY:   [ ]Clear [ ]Tachypnea  [ ]Audible excessive secretions   [ ]Rhonchi        [ ]Right [ ]Left [ ]Bilateral  [ ]Crackles        [ ]Right [ ]Left [ ]Bilateral  [ ]Wheezing     [ ]Right [ ]Left [ ]Bilateral  CARDIOVASCULAR:    [ ]Regular [ ]Irregular [ ]Tachy  [ ]Johann [ ]Murmur [ ]Other  GASTROINTESTINAL:  [ ]Soft  [ ]Distended   [ ]+BS  [ ]Non tender [ ]Tender  [ ]PEG [ ]OGT/ NGT  Last BM:   GENITOURINARY:  [ ]Normal [ ] Incontinent   [ ]Oliguria/Anuria   [ ]Parsons  MUSCULOSKELETAL:   [ ]Normal   [ ]Weakness  [ ]Bed/Wheelchair bound [ ]Edema  NEUROLOGIC:   [ ]No focal deficits  [ ] Cognitive impairment  [ ] Dysphagia [ ]Dysarthria [ ] Paresis [ ]Other   SKIN:   [ ]Normal   [ ]Pressure ulcer(s)  [ ]Rash    CRITICAL CARE:  [ ] Shock Present  [ ]Septic [ ]Cardiogenic [ ]Neurologic [ ]Hypovolemic  [ ]  Vasopressors [ ]  Inotropes   [x] Respiratory failure present  [x] Acute  [ ] Chronic [ ] Hypoxic  [ ] Hypercarbic [ ] Other  [ ] Other organ failure     LABS:                        10.4   12.26 )-----------( 171      ( 30 Aug 2019 09:38 )             33.3   08-30    140  |  99  |  44<H>  ----------------------------<  143<H>  3.3<L>   |  26  |  0.83    Ca    8.6      30 Aug 2019 07:24  Phos  2.2     08-30  Mg     2.4     08-30    PT/INR - ( 29 Aug 2019 09:18 )   PT: 13.0 sec;   INR: 1.15 ratio         PTT - ( 30 Aug 2019 09:36 )  PTT:46.4 sec      RADIOLOGY & ADDITIONAL STUDIES:    PROTEIN CALORIE MALNUTRITION PRESENT: [ ] Yes [ ] No  [ ] PPSV2 < or = to 30% [ ] significant weight loss  [ ] poor nutritional intake [ ] catabolic state [ ] anasarca     Albumin, Serum: 3.9 g/dL (08-26-19 @ 23:47)  Artificial Nutrition [ ]     REFERRALS:   [ ]Chaplaincy  [ ] Hospice  [ ]Child Life  [ ]Social Work  [ ]Case management [ ]Holistic Therapy   Goals of Care Discussion Document: KIN Pope (08-30-19 @ 14:06)  Goals of Care Conversation:   Advance Directives:  · Does patient have Advance Directive  No  · Do you want to complete the HCP and name a Kevin Care Agent  No    Conversation Discussion:  · Conversation Details  Mrs. Mae is a 86 y/o lady with PMH of GERD, HTN, HLD, DM2 with neuropathy, Parkinson's, anxiety/depression, AS s/p TAVR on plavix, overactive bladder, ?CHF, PMR, COPD, who is admitted for hypoxic/hypercarbic respiratory failure in setting of sepsis, possibly COPD exacerbation in setting of URI/PNA. Course c/b UTI and acute metabolic encephalopathy.    Dx: Hypoxic/hypercarbic respiratory failure in setting of sepsis, possibly COPD exacerbation in setting of URI/PNA.           PNA          UTI - s/p Abts          OPD - on Chronic steroids         Acute metabolic encephalopathy - resolved         New Afib with RVR - Heparin drip         Severe MS         Oropharyngeal Dysphagia - Vocal cord Paralysis - s/p VC injection on 8/22          Hypernatremia         New MARY         Leukocytosis    Lace score 15.  Patient is a full code .      Palliative MD and LCSW entered patient's room while patient was having secretions suctioned.  Patient now s/p aspiration and rapid response and being transferred to MICU. Palliative will follow up with family on Tuesday secondary to acute medical status currently.      Electronic Signatures:  Toña Pope ([email or web address])  (Signed 30-Aug-2019 14:11)  	Authored: Goals of Care Conversation      Last Updated: 30-Aug-2019 14:11 by Toña Pope ([email or web address]) HPI:  85 F with GERD, HTN, HLD, DM2 with neuropathy, Parkinson's, anxiety/depression, AS s/p TAVR on plavix, overactive bladder, ?CHF (on lasix 20 BID), PMR on Prednisone, ?COPD (dx with PNA at [facility] vague hx of being told she has COPD given breathing treatments but then stopped taking them), presenting with multiple medical complaints, but especially LUQ and flank pain since yesterday AM. Per son, patient has had multiple UTIs. She had dysuria 2 weeks ago, was empirically treated with macrobid (8 days ago). Patient was febrile at home prior to coming and also in ED. Patient later denied abdominal pain and stated she came to ED for SOB. She's not on oxygen at home, but now requiring 1.5 L NC. Patient's son notes she had R sided headache for 1 week. ESR last week was wnl and prednisone dose was further decreased. This week, ESR was elevated again. Patient's PCP was c/f [facility] and prednisone dose was increased to 10mg qD. Patient states headache is sharp and stabbing, lasts 5 minutes and has improved since increasing prednisone. Patient has decreased vision on R eye d/t macular degeneration. No further worsening in vision. She had temporal artery bx in 04/2019, which was negative for GCA.    Patient also reports increased non-productive cough. Patient intermittently reports blood in stools to other providers, but recently denied it.  Vitals in ED were: febrile to 101.4F, HR 60-90s, /60s-150/80s, satting 100% on 1.5 L. VBG showed normal pH and pCO2 of 58. CXR shows concern for pna, and UA is positive for leuk esterase. Patient was given albuterol, azithromycin and CTX in the ED.  EKG showed LBBB. (11 Aug 2019 05:14)    PERTINENT PM/SXH:   Insomnia  Iron deficiency anemia  Anxiety  Parkinson disease  Aortic stenosis  Neuropathy  Polymyalgia rheumatica  Diabetes  Hyperlipidemia  Hypertension    S/P TAVR (transcatheter aortic valve replacement)    FAMILY HISTORY:  No pertinent family history in first degree relatives    ITEMS NOT CHECKED ARE NOT PRESENT    SOCIAL HISTORY:   Significant other/partner:  [x]  Children:  [x] 2 Children Son Shon / daughter Anu Yazidism/Spirituality: Anabaptist   Substance hx:  [ ]   Tobacco hx:  [ ]   Alcohol hx: [ ]   Home Opioid hx:  [x] I-Stop Reference No: #: [id number]  ·	08/05/2019  alprazolam 0.5 mg tablet 60 30 Juan Marroquin DO  Living Situation: [x]Home with HHA  [ ]Long term care  [ ]Rehab [ ]Other    ADVANCE DIRECTIVES:    DNR  MOLST  [ ]  Living Will  [ ]   DECISION MAKER(s):  [ ] Health Care Proxy(s)  [x] Surrogate(s)  [ ] Guardian           Name(s): Shon Mae / Anu Mae Phone Number(s): [phone]    BASELINE (I)ADL(s) (prior to admission):  [place]anan: [ ]Total  [x] Moderate [ ]Dependent    Allergies    penicillin (Unknown)    Intolerances    MEDICATIONS  (STANDING):  amLODIPine   Tablet 5 milliGRAM(s) Oral daily  atorvastatin 40 milliGRAM(s) Oral <User Schedule>  buDESOnide    Inhalation Suspension 0.25 milliGRAM(s) Inhalation two times a day  carbidopa/levodopa  25/100 2 Tablet(s) Oral three times a day  dextrose 5%. 1000 milliLiter(s) (50 mL/Hr) IV Continuous <Continuous>  dextrose 50% Injectable 12.5 Gram(s) IV Push once  dextrose 50% Injectable 25 Gram(s) IV Push once  dextrose 50% Injectable 25 Gram(s) IV Push once  docusate sodium Liquid 100 milliGRAM(s) Oral two times a day  furosemide    Tablet 20 milliGRAM(s) Oral daily  gabapentin   Solution 300 milliGRAM(s) Oral at bedtime  guaiFENesin    Syrup 200 milliGRAM(s) Oral every 6 hours  heparin  Infusion. 600 Unit(s)/Hr (6 mL/Hr) IV Continuous <Continuous>  insulin lispro (HumaLOG) corrective regimen sliding scale   SubCutaneous every 6 hours  lidocaine   Patch 1 Patch Transdermal daily  losartan 50 milliGRAM(s) Oral daily  melatonin 5 milliGRAM(s) Oral at bedtime  metoprolol tartrate 75 milliGRAM(s) Oral every 12 hours  nystatin    Suspension 641445 Unit(s) Oral four times a day  pantoprazole  Injectable 40 milliGRAM(s) IV Push daily  piperacillin/tazobactam IVPB. 3.375 Gram(s) IV Intermittent once  piperacillin/tazobactam IVPB.. 3.375 Gram(s) IV Intermittent every 6 hours  polyethylene glycol 3350 17 Gram(s) Oral daily  potassium chloride   Solution 40 milliEquivalent(s) Oral every 4 hours  predniSONE   Tablet 10 milliGRAM(s) Oral daily  senna 2 Tablet(s) Oral at bedtime  venlafaxine 37.5 milliGRAM(s) Oral every 12 hours    MEDICATIONS  (PRN):  acetaminophen    Suspension .. 1000 milliGRAM(s) Oral every 6 hours PRN Mild Pain (1 - 3), Moderate Pain (4 - 6)  ALPRAZolam 0.5 milliGRAM(s) Oral two times a day PRN anxiety  dextrose 40% Gel 15 Gram(s) Oral once PRN Blood Glucose LESS THAN 70 milliGRAM(s)/deciliter  glucagon  Injectable 1 milliGRAM(s) IntraMuscular once PRN Glucose LESS THAN 70 milligrams/deciliter  heparin  Injectable 5000 Unit(s) IV Push every 6 hours PRN For aPTT less than 40  heparin  Injectable 2500 Unit(s) IV Push every 6 hours PRN For aPTT between 40 - 57    PRESENT SYMPTOMS: [x] Unable to obtain due to poor mentation   Source if other than patient:  [ ]Family   [ ]Team     Pain (Impact on QOL):    Location -         Minimal acceptable level (0-10 scale):        Aggravating factors -  Quality -  Radiation -  Severity (0-10 scale) -    Timing -    PAIN AD Score:     [email or web address] (press ctrl +  left click to view)    Dyspnea:                           [ ]Mild [ ]Moderate [ ]Severe  Anxiety:                             [ ]Mild [ ]Moderate [ ]Severe  Fatigue:                             [ ]Mild [ ]Moderate [ ]Severe  Nausea:                             [ ]Mild [ ]Moderate [ ]Severe  Loss of appetite:              [ ]Mild [ ]Moderate [ ]Severe  Constipation:                    [ ]Mild [ ]Moderate [ ]Severe    Other Symptoms:  [x]All other review of systems negative     Karnofsky Performance Score/Palliative Performance Status Version 2: 20%    http://palliative.info/resource_material/[email or web address].pdf    PHYSICAL EXAM:  Vital Signs Last 24 Hrs  T(C): 36.3 (30 Aug 2019 11:29), Max: 37.4 (30 Aug 2019 09:54)  T(F): 97.3 (30 Aug 2019 11:29), Max: 99.3 (30 Aug 2019 09:54)  HR: 73 (30 Aug 2019 11:29) (59 - 162)  BP: 118/67 (30 Aug 2019 11:29) (102/53 - 121/84)  BP(mean): --  RR: 41 (30 Aug 2019 11:29) (18 - 41)  SpO2: 94% (30 Aug 2019 11:29) (84% - 97%) I&O's Summary    29 Aug 2019 07:01  -  30 Aug 2019 07:00  --------------------------------------------------------  IN: 1572 mL / OUT: 300 mL / NET: 1272 mL    GENERAL:  [x]Alert  [ x]Oriented x2   [ ]Lethargic  [ ]Cachexia  [ ]Unarousable  [ ]Verbal  [ ]Non-Verbal  Behavioral:   [ ] Anxiety  [ ] Delirium [ ] Agitation [ ] Other  HEENT:  [ ]Normal   [x ]Dry mouth   [ ]ET Tube/Trach  [ ]Oral lesions  PULMONARY:   [ ]Clear [ ]Tachypnea  [x ]Audible excessive secretions   [x ]Rhonchi        [ ]Right [ ]Left [ ]Bilateral  [ ]Crackles        [ ]Right [ ]Left [ ]Bilateral  [ ]Wheezing     [ ]Right [ ]Left [ ]Bilateral  CARDIOVASCULAR:    [x ]Regular [ ]Irregular [ ]Tachy  [ ]Johann [ ]Murmur [ ]Other  GASTROINTESTINAL:  [x ]Soft  [ ]Distended   [x ]+BS  [ x]Non tender [ ]Tender  [ ]PEG [x ]OGT/ NGT  Last BM:   GENITOURINARY:  [ ]Normal [ ] Incontinent   [ ]Oliguria/Anuria   [ x]Parsons  MUSCULOSKELETAL:   [ ]Normal   [ ]Weakness  [x ]Bed/Wheelchair bound [ ]Edema  NEUROLOGIC:   [ ]No focal deficits  [ x] Cognitive impairment  [ ] Dysphagia [ ]Dysarthria [ ] Paresis [ ]Other   SKIN: ecchymoses  [ ]Normal   [ ]Pressure ulcer(s)  [ ]Rash    CRITICAL CARE:  [ ] Shock Present  [ ]Septic [ ]Cardiogenic [ ]Neurologic [ ]Hypovolemic  [ ]  Vasopressors [ ]  Inotropes   [x] Respiratory failure present  [x] Acute  [ ] Chronic [ ] Hypoxic  [ ] Hypercarbic [ ] Other  [ ] Other organ failure     LABS:                        10.4   12.26 )-----------( 171      ( 30 Aug 2019 09:38 )             33.3   08-30    140  |  99  |  44<H>  ----------------------------<  143<H>  3.3<L>   |  26  |  0.83    Ca    8.6      30 Aug 2019 07:24  Phos  2.2     08-30  Mg     2.4     08-30    PT/INR - ( 29 Aug 2019 09:18 )   PT: 13.0 sec;   INR: 1.15 ratio         PTT - ( 30 Aug 2019 09:36 )  PTT:46.4 sec      RADIOLOGY & ADDITIONAL STUDIES:  < from: Xray Chest 1 View-PORTABLE IMMEDIATE (08.30.19 @ 13:12) >    EXAM:  XR CHEST PORTABLE IMMED 1V                            PROCEDURE DATE:  08/30/2019      INTERPRETATION:  CLINICAL INFORMATION: Rapid response, tachypnea.    TECHNIQUE: AP radiograph of the chest.    COMPARISON: X-ray dated 8/30/2019 at 0507 and chest x-ray dated 8/27/2019.    FINDINGS:   Enteric tube with tip within the stomach. Status post TAVR.   Bandlike atelectasis at the right base, unchanged from prior radiograph.   No pleural effusion or pneumothorax.  Heart size cannot be accurately assessed on this projection.   Redemonstrated mitral annular calcifications.    IMPRESSION:  Bandlike atelectasis at the right base unchanged from prior radiograph.    These findings were discussed with TORO Chandler on 8/30/2019 at 1:26 PM by Dr. Goldstein with read back confirmation.      ART GOLDSTEIN M.D., RADIOLOGY RESIDENT  This document has been electronically signed.  CRISTIAN HERBERT M.D., ATTENDING RADIOLOGIST  This document has been electronically signed. Aug 30 2019  2:03PM      < end of copied text >    PROTEIN CALORIE MALNUTRITION PRESENT: [ ] Yes [x ] No  [ ] PPSV2 < or = to 30% [ ] significant weight loss  [ ] poor nutritional intake [ ] catabolic state [ ] anasarca     Albumin, Serum: 3.9 g/dL (08-26-19 @ 23:47)  Artificial Nutrition [x ]     REFERRALS:   [ ]Chaplaincy  [ ] Hospice  [ ]Child Life  [x ]Social Work  [ ]Case management [ ]Holistic Therapy   Goals of Care Discussion Document: KIN Pope (08-30-19 @ 14:06)  Goals of Care Conversation:   Advance Directives:  · Does patient have Advance Directive  No  · Do you want to complete the HCP and name a Kevin Care Agent  No    Conversation Discussion:  · Conversation Details  Mrs. Mae is a 86 y/o lady with PMH of GERD, HTN, HLD, DM2 with neuropathy, Parkinson's, anxiety/depression, AS s/p TAVR on plavix, overactive bladder, ?CHF, PMR, COPD, who is admitted for hypoxic/hypercarbic respiratory failure in setting of sepsis, possibly COPD exacerbation in setting of URI/PNA. Course c/b UTI and acute metabolic encephalopathy.    Dx: Hypoxic/hypercarbic respiratory failure in setting of sepsis, possibly COPD exacerbation in setting of URI/PNA.           PNA          UTI - s/p Abts          OPD - on Chronic steroids         Acute metabolic encephalopathy - resolved         New Afib with RVR - Heparin drip         Severe MS         Oropharyngeal Dysphagia - Vocal cord Paralysis - s/p VC injection on 8/22          Hypernatremia         New MARY         Leukocytosis    Lace score 15.  Patient is a full code .      Palliative MD and LCSW entered patient's room while patient was having secretions suctioned.  Patient now s/p aspiration and rapid response and being transferred to MICU. Palliative will follow up with family on Tuesday secondary to acute medical status currently.      Electronic Signatures:  Toña Pope ([facility])  (Signed 30-Aug-2019 14:11)  	Authored: Goals of Care Conversation      Last Updated: 30-Aug-2019 14:11 by Toña Pope ([facility])

## 2019-08-30 NOTE — PROGRESS NOTE ADULT - PROBLEM SELECTOR PLAN 6
-Melatonin 5mg HS  -C/w home Sinemet 50/200 TID for Parkinson-like symptom. Per son, patient never has a diagnosis of parkinson's disease. She was started on sinemet to see if it helps with her leg shaking symptoms  - c/w venlafaxine 37.5mg q12h (was on duloxetine at home) given duloxetine cannot be crushed and put via NGT

## 2019-08-30 NOTE — PROGRESS NOTE ADULT - SUBJECTIVE AND OBJECTIVE BOX
Patient is a 85y old  Female who presents with a chief complaint of Abdominal pain (30 Aug 2019 07:58)      SUBJECTIVE / OVERNIGHT EVENTS:  s/p RRT.  Unable to obtain history from patient  Increased work of breathing today    tele reviewed: sinus 60-70, afib for 1 hour overnight to 170s    MEDICATIONS  (STANDING):  amLODIPine   Tablet 5 milliGRAM(s) Oral daily  atorvastatin 40 milliGRAM(s) Oral <User Schedule>  buDESOnide    Inhalation Suspension 0.25 milliGRAM(s) Inhalation two times a day  carbidopa/levodopa  25/100 2 Tablet(s) Oral three times a day  dextrose 5%. 1000 milliLiter(s) (50 mL/Hr) IV Continuous <Continuous>  dextrose 50% Injectable 12.5 Gram(s) IV Push once  dextrose 50% Injectable 25 Gram(s) IV Push once  dextrose 50% Injectable 25 Gram(s) IV Push once  docusate sodium Liquid 100 milliGRAM(s) Oral two times a day  furosemide    Tablet 20 milliGRAM(s) Oral daily  gabapentin   Solution 300 milliGRAM(s) Oral at bedtime  guaiFENesin    Syrup 200 milliGRAM(s) Oral every 6 hours  heparin  Infusion. 600 Unit(s)/Hr (6 mL/Hr) IV Continuous <Continuous>  insulin lispro (HumaLOG) corrective regimen sliding scale   SubCutaneous every 6 hours  lidocaine   Patch 1 Patch Transdermal daily  losartan 50 milliGRAM(s) Oral daily  melatonin 5 milliGRAM(s) Oral at bedtime  metoprolol tartrate 75 milliGRAM(s) Oral every 12 hours  nystatin    Suspension 305255 Unit(s) Oral four times a day  pantoprazole  Injectable 40 milliGRAM(s) IV Push daily  piperacillin/tazobactam IVPB. 3.375 Gram(s) IV Intermittent once  piperacillin/tazobactam IVPB.. 3.375 Gram(s) IV Intermittent every 6 hours  polyethylene glycol 3350 17 Gram(s) Oral daily  potassium chloride   Solution 40 milliEquivalent(s) Oral every 4 hours  predniSONE   Tablet 10 milliGRAM(s) Oral daily  senna 2 Tablet(s) Oral at bedtime  venlafaxine 37.5 milliGRAM(s) Oral every 12 hours    MEDICATIONS  (PRN):  acetaminophen    Suspension .. 1000 milliGRAM(s) Oral every 6 hours PRN Mild Pain (1 - 3), Moderate Pain (4 - 6)  ALPRAZolam 0.5 milliGRAM(s) Oral two times a day PRN anxiety  dextrose 40% Gel 15 Gram(s) Oral once PRN Blood Glucose LESS THAN 70 milliGRAM(s)/deciliter  glucagon  Injectable 1 milliGRAM(s) IntraMuscular once PRN Glucose LESS THAN 70 milligrams/deciliter  heparin  Injectable 5000 Unit(s) IV Push every 6 hours PRN For aPTT less than 40  heparin  Injectable 2500 Unit(s) IV Push every 6 hours PRN For aPTT between 40 - 57      Vital Signs Last 24 Hrs  T(C): 36.3 (30 Aug 2019 11:29), Max: 37.4 (30 Aug 2019 09:54)  T(F): 97.3 (30 Aug 2019 11:29), Max: 99.3 (30 Aug 2019 09:54)  HR: 73 (30 Aug 2019 11:29) (59 - 162)  BP: 118/67 (30 Aug 2019 11:29) (102/53 - 121/84)  BP(mean): --  RR: 41 (30 Aug 2019 11:29) (18 - 41)  SpO2: 94% (30 Aug 2019 11:29) (84% - 97%)  CAPILLARY BLOOD GLUCOSE      POCT Blood Glucose.: 176 mg/dL (30 Aug 2019 12:51)  POCT Blood Glucose.: 139 mg/dL (30 Aug 2019 12:10)  POCT Blood Glucose.: 145 mg/dL (30 Aug 2019 07:46)  POCT Blood Glucose.: 160 mg/dL (30 Aug 2019 01:02)  POCT Blood Glucose.: 146 mg/dL (29 Aug 2019 16:43)    I&O's Summary    29 Aug 2019 07:01  -  30 Aug 2019 07:00  --------------------------------------------------------  IN: 1572 mL / OUT: 300 mL / NET: 1272 mL        PHYSICAL EXAM:  GENERAL: elderly, + NGT, overall frail appearing  HEAD:  Atraumatic, Normocephalic  EYES: conjunctiva and sclera clear  CHEST/LUNG: no wheeze appreciated today, b/l rhonchi, + accessory muscles-with clavicular retractions, +tachypnea  HEART: Regular rate and rhythm; No murmurs, rubs, or gallops  ABDOMEN: Soft, Nontender, Nondistended; Bowel sounds present  EXTREMITIES:  2+ Peripheral Pulses, No clubbing, cyanosis, or edema  NEUROLOGY: AAOx1    LABS:                        10.4   12.26 )-----------( 171      ( 30 Aug 2019 09:38 )             33.3     08-30    140  |  99  |  44<H>  ----------------------------<  143<H>  3.3<L>   |  26  |  0.83    Ca    8.6      30 Aug 2019 07:24  Phos  2.2     08-30  Mg     2.4     08-30      PT/INR - ( 29 Aug 2019 09:18 )   PT: 13.0 sec;   INR: 1.15 ratio         PTT - ( 30 Aug 2019 09:36 )  PTT:46.4 sec          RADIOLOGY & ADDITIONAL TESTS:    Imaging Personally Reviewed:  < from: Xray Chest 1 View- PORTABLE-Urgent (08.30.19 @ 05:11) >  FINDINGS:   Enteric tube with tip beyond the diaphragm, however the most distal   portion is not visualized on this study. Status post TAVR. Heart size   cannot be accurately assessed on this projection. Mitral annular   calcification is again noted. No pleural effusions or pneumothorax. No   displaced fractures.    IMPRESSION:  Clear lungs.    < end of copied text >    Consultant(s) Notes Reviewed:  GI    Care Discussed with Consultants/Other Providers: PCP Dr Dumas, MICU fellow Yimi, ENT PADMINI Dalton

## 2019-08-30 NOTE — CHART NOTE - NSCHARTNOTEFT_GEN_A_CORE
85 F with GERD, HTN, HLD, DM2 with neuropathy, Parkinson's, anxiety/depression, AS s/p TAVR on plavix, overactive bladder, CHF (on lasix 20 BID), PMR on Prednisone, COPD (dx with PNA at [facility] vague hx of being told she has COPD given breathing treatments but then stopped taking them), initially presenting to the ED with multiple complaints. LUQ and flank pain since yesterday AM. Per son, patient has had multiple UTIs. She had dysuria 2 weeks ago, was empirically treated with macrobid (8 days ago). Patient was febrile at home prior to coming and also in ED. Patient later denied abdominal pain and stated she came to ED for SOB. She's not on oxygen at home, but now requiring 1.5 L NC.    Patient's son notes she had R sided headache for 1 week. ESR last week was wnl and prednisone dose was further decreased. This week, ESR was elevated again. Patient's PCP was c/f [facility] and prednisone dose was increased to 10mg qD. Patient states headache is sharp and stabbing, lasts 5 minutes and has improved since increasing prednisone. Patient has decreased vision on R eye d/t macular degeneration. No further worsening in vision. She had temporal artery bx in 04/2019, which was negative for GCA.    Patient also reports increased non-productive cough. Patient intermittently reports blood in stools to other providers, but recently denied it.  Vitals in ED were: febrile to 101.4F, HR 60-90s, /60s-150/80s, satting 100% on 1.5 L. VBG showed normal pH and pCO2 of 58. CXR shows concern for pna, and UA is positive for leuk esterase. Patient was given albuterol, azithromycin and CTX in the ED.  EKG showed LBBB.    INTERVAL HPI/OVERNIGHT EVENTS:    SUBJECTIVE: Patient seen and examined at bedside.     CONSTITUTIONAL: No weakness, fevers or chills  EYES/ENT: No visual changes;  No vertigo or throat pain   NECK: No pain or stiffness  RESPIRATORY: No cough, wheezing, hemoptysis; No shortness of breath  CARDIOVASCULAR: No chest pain or palpitations  GASTROINTESTINAL: No abdominal or epigastric pain. No nausea, vomiting, or hematemesis; No diarrhea or constipation. No melena or hematochezia.  GENITOURINARY: No dysuria, frequency or hematuria  NEUROLOGICAL: No numbness or weakness  SKIN: No itching, rashes    OBJECTIVE:    VITAL SIGNS:  ICU Vital Signs Last 24 Hrs  T(C): 36.3 (30 Aug 2019 11:29), Max: 37.4 (30 Aug 2019 09:54)  T(F): 97.3 (30 Aug 2019 11:29), Max: 99.3 (30 Aug 2019 09:54)  HR: 73 (30 Aug 2019 11:29) (59 - 162)  BP: 118/67 (30 Aug 2019 11:29) (102/53 - 121/84)  BP(mean): --  ABP: --  ABP(mean): --  RR: 41 (30 Aug 2019 11:29) (18 - 41)  SpO2: 94% (30 Aug 2019 11:29) (84% - 97%)        08-29 @ 07:01  -  08-30 @ 07:00  --------------------------------------------------------  IN: 1572 mL / OUT: 300 mL / NET: 1272 mL      CAPILLARY BLOOD GLUCOSE      POCT Blood Glucose.: 176 mg/dL (30 Aug 2019 12:51)      PHYSICAL EXAM:    General: NAD  HEENT: NC/AT; PERRL, clear conjunctiva  Neck: supple  Respiratory: CTA b/l  Cardiovascular: +S1/S2; RRR  Abdomen: soft, NT/ND; +BS x4  Extremities: WWP, 2+ peripheral pulses b/l; no LE edema  Skin: normal color and turgor; no rash  Neurological:    MEDICATIONS:  MEDICATIONS  (STANDING):  amLODIPine   Tablet 5 milliGRAM(s) Oral daily  atorvastatin 40 milliGRAM(s) Oral <User Schedule>  buDESOnide    Inhalation Suspension 0.25 milliGRAM(s) Inhalation two times a day  carbidopa/levodopa  25/100 2 Tablet(s) Oral three times a day  dextrose 5%. 1000 milliLiter(s) (50 mL/Hr) IV Continuous <Continuous>  dextrose 50% Injectable 12.5 Gram(s) IV Push once  dextrose 50% Injectable 25 Gram(s) IV Push once  dextrose 50% Injectable 25 Gram(s) IV Push once  docusate sodium Liquid 100 milliGRAM(s) Oral two times a day  furosemide    Tablet 20 milliGRAM(s) Oral daily  gabapentin   Solution 300 milliGRAM(s) Oral at bedtime  guaiFENesin    Syrup 200 milliGRAM(s) Oral every 6 hours  heparin  Infusion. 600 Unit(s)/Hr (6 mL/Hr) IV Continuous <Continuous>  insulin lispro (HumaLOG) corrective regimen sliding scale   SubCutaneous every 6 hours  lidocaine   Patch 1 Patch Transdermal daily  losartan 50 milliGRAM(s) Oral daily  melatonin 5 milliGRAM(s) Oral at bedtime  metoprolol tartrate 75 milliGRAM(s) Oral every 12 hours  nystatin    Suspension 000635 Unit(s) Oral four times a day  pantoprazole  Injectable 40 milliGRAM(s) IV Push daily  piperacillin/tazobactam IVPB. 3.375 Gram(s) IV Intermittent once  piperacillin/tazobactam IVPB.. 3.375 Gram(s) IV Intermittent every 6 hours  polyethylene glycol 3350 17 Gram(s) Oral daily  potassium chloride   Solution 40 milliEquivalent(s) Oral every 4 hours  predniSONE   Tablet 10 milliGRAM(s) Oral daily  senna 2 Tablet(s) Oral at bedtime  venlafaxine 37.5 milliGRAM(s) Oral every 12 hours    MEDICATIONS  (PRN):  acetaminophen    Suspension .. 1000 milliGRAM(s) Oral every 6 hours PRN Mild Pain (1 - 3), Moderate Pain (4 - 6)  ALPRAZolam 0.5 milliGRAM(s) Oral two times a day PRN anxiety  dextrose 40% Gel 15 Gram(s) Oral once PRN Blood Glucose LESS THAN 70 milliGRAM(s)/deciliter  glucagon  Injectable 1 milliGRAM(s) IntraMuscular once PRN Glucose LESS THAN 70 milligrams/deciliter  heparin  Injectable 5000 Unit(s) IV Push every 6 hours PRN For aPTT less than 40  heparin  Injectable 2500 Unit(s) IV Push every 6 hours PRN For aPTT between 40 - 57      ALLERGIES:  Allergies    penicillin (Unknown)    Intolerances        LABS:                        10.4   12.26 )-----------( 171      ( 30 Aug 2019 09:38 )             33.3     08-30    140  |  99  |  44<H>  ----------------------------<  143<H>  3.3<L>   |  26  |  0.83    Ca    8.6      30 Aug 2019 07:24  Phos  2.2     08-30  Mg     2.4     08-30      PT/INR - ( 29 Aug 2019 09:18 )   PT: 13.0 sec;   INR: 1.15 ratio         PTT - ( 30 Aug 2019 09:36 )  PTT:46.4 sec      RADIOLOGY & ADDITIONAL TESTS: Reviewed. 85 F with GERD, HTN, HLD, DM2 with neuropathy, Parkinson's, anxiety/depression, AS s/p TAVR on plavix, overactive bladder, CHF (on lasix 20 BID), PMR on Prednisone, COPD initially presenting to the ED with multiple complaints: abdominal pain, dysuria, fever, and SOB. Not on oxygen at home but required 1.5 L NC to maintain saturation. Admitted to the floor for hypoxic/hypercarbic respiratory failure in setting of pneumonia and COPD exacerbation. Hospital course complicated by vocal cord paralysis s/p botox injection as well as new onset afib and dysphagia. RRT was called for increased WOB while on tube feedings. Patient was initially noted to be tachypneic to the 40s with altered mentation for which TF were held. VS T 99.9 (rectal), SBP 110s, RR 40 and desaturating on 6L NC to the 80s. On NRB, patient improved to 100%. Tele afib with rate of 60s on heparin gtt. .    Patient's son notes she had R sided headache for 1 week. ESR last week was wnl and prednisone dose was further decreased. This week, ESR was elevated again. Patient's PCP was c/f GCA and prednisone dose was increased to 10mg qD. Patient states headache is sharp and stabbing, lasts 5 minutes and has improved since increasing prednisone. Patient has decreased vision on R eye d/t macular degeneration. No further worsening in vision. She had temporal artery bx in 04/2019, which was negative for GCA.    Patient also reports increased non-productive cough. Patient intermittently reports blood in stools to other providers, but recently denied it.  Vitals in ED were: febrile to 101.4F, HR 60-90s, /60s-150/80s, satting 100% on 1.5 L. VBG showed normal pH and pCO2 of 58. CXR shows concern for pna, and UA is positive for leuk esterase. Patient was given albuterol, azithromycin and CTX in the ED.  EKG showed LBBB.    INTERVAL HPI/OVERNIGHT EVENTS:    SUBJECTIVE: Patient seen and examined at bedside.     CONSTITUTIONAL: No weakness, fevers or chills  EYES/ENT: No visual changes;  No vertigo or throat pain   NECK: No pain or stiffness  RESPIRATORY: No cough, wheezing, hemoptysis; No shortness of breath  CARDIOVASCULAR: No chest pain or palpitations  GASTROINTESTINAL: No abdominal or epigastric pain. No nausea, vomiting, or hematemesis; No diarrhea or constipation. No melena or hematochezia.  GENITOURINARY: No dysuria, frequency or hematuria  NEUROLOGICAL: No numbness or weakness  SKIN: No itching, rashes    OBJECTIVE:    VITAL SIGNS:  ICU Vital Signs Last 24 Hrs  T(C): 36.3 (30 Aug 2019 11:29), Max: 37.4 (30 Aug 2019 09:54)  T(F): 97.3 (30 Aug 2019 11:29), Max: 99.3 (30 Aug 2019 09:54)  HR: 73 (30 Aug 2019 11:29) (59 - 162)  BP: 118/67 (30 Aug 2019 11:29) (102/53 - 121/84)  BP(mean): --  ABP: --  ABP(mean): --  RR: 41 (30 Aug 2019 11:29) (18 - 41)  SpO2: 94% (30 Aug 2019 11:29) (84% - 97%)        08-29 @ 07:01  -  08-30 @ 07:00  --------------------------------------------------------  IN: 1572 mL / OUT: 300 mL / NET: 1272 mL      CAPILLARY BLOOD GLUCOSE      POCT Blood Glucose.: 176 mg/dL (30 Aug 2019 12:51)      PHYSICAL EXAM:    General: NAD  HEENT: NC/AT; PERRL, clear conjunctiva  Neck: supple  Respiratory: CTA b/l  Cardiovascular: +S1/S2; RRR  Abdomen: soft, NT/ND; +BS x4  Extremities: WWP, 2+ peripheral pulses b/l; no LE edema  Skin: normal color and turgor; no rash  Neurological:    MEDICATIONS:  MEDICATIONS  (STANDING):  amLODIPine   Tablet 5 milliGRAM(s) Oral daily  atorvastatin 40 milliGRAM(s) Oral <User Schedule>  buDESOnide    Inhalation Suspension 0.25 milliGRAM(s) Inhalation two times a day  carbidopa/levodopa  25/100 2 Tablet(s) Oral three times a day  dextrose 5%. 1000 milliLiter(s) (50 mL/Hr) IV Continuous <Continuous>  dextrose 50% Injectable 12.5 Gram(s) IV Push once  dextrose 50% Injectable 25 Gram(s) IV Push once  dextrose 50% Injectable 25 Gram(s) IV Push once  docusate sodium Liquid 100 milliGRAM(s) Oral two times a day  furosemide    Tablet 20 milliGRAM(s) Oral daily  gabapentin   Solution 300 milliGRAM(s) Oral at bedtime  guaiFENesin    Syrup 200 milliGRAM(s) Oral every 6 hours  heparin  Infusion. 600 Unit(s)/Hr (6 mL/Hr) IV Continuous <Continuous>  insulin lispro (HumaLOG) corrective regimen sliding scale   SubCutaneous every 6 hours  lidocaine   Patch 1 Patch Transdermal daily  losartan 50 milliGRAM(s) Oral daily  melatonin 5 milliGRAM(s) Oral at bedtime  metoprolol tartrate 75 milliGRAM(s) Oral every 12 hours  nystatin    Suspension 148696 Unit(s) Oral four times a day  pantoprazole  Injectable 40 milliGRAM(s) IV Push daily  piperacillin/tazobactam IVPB. 3.375 Gram(s) IV Intermittent once  piperacillin/tazobactam IVPB.. 3.375 Gram(s) IV Intermittent every 6 hours  polyethylene glycol 3350 17 Gram(s) Oral daily  potassium chloride   Solution 40 milliEquivalent(s) Oral every 4 hours  predniSONE   Tablet 10 milliGRAM(s) Oral daily  senna 2 Tablet(s) Oral at bedtime  venlafaxine 37.5 milliGRAM(s) Oral every 12 hours    MEDICATIONS  (PRN):  acetaminophen    Suspension .. 1000 milliGRAM(s) Oral every 6 hours PRN Mild Pain (1 - 3), Moderate Pain (4 - 6)  ALPRAZolam 0.5 milliGRAM(s) Oral two times a day PRN anxiety  dextrose 40% Gel 15 Gram(s) Oral once PRN Blood Glucose LESS THAN 70 milliGRAM(s)/deciliter  glucagon  Injectable 1 milliGRAM(s) IntraMuscular once PRN Glucose LESS THAN 70 milligrams/deciliter  heparin  Injectable 5000 Unit(s) IV Push every 6 hours PRN For aPTT less than 40  heparin  Injectable 2500 Unit(s) IV Push every 6 hours PRN For aPTT between 40 - 57      ALLERGIES:  Allergies    penicillin (Unknown)    Intolerances        LABS:                        10.4   12.26 )-----------( 171      ( 30 Aug 2019 09:38 )             33.3     08-30    140  |  99  |  44<H>  ----------------------------<  143<H>  3.3<L>   |  26  |  0.83    Ca    8.6      30 Aug 2019 07:24  Phos  2.2     08-30  Mg     2.4     08-30      PT/INR - ( 29 Aug 2019 09:18 )   PT: 13.0 sec;   INR: 1.15 ratio         PTT - ( 30 Aug 2019 09:36 )  PTT:46.4 sec      RADIOLOGY & ADDITIONAL TESTS: Reviewed. 85 F with GERD, HTN, HLD, DM2 with neuropathy, Parkinson's, anxiety/depression, AS s/p TAVR on plavix, overactive bladder, CHF (on lasix 20 BID), PMR on Prednisone, COPD initially presenting to the ED with multiple complaints: abdominal pain, dysuria, fever, and SOB. Not on oxygen at home but required 1.5 L NC to maintain saturation. Admitted to the floor for hypoxic/hypercarbic respiratory failure in setting of pneumonia and COPD exacerbation. Hospital course complicated by vocal cord paralysis s/p botox injection as well as new onset afib and dysphagia. RRT was called for increased WOB while on tube feedings. Patient was initially noted to be tachypneic to the 40s with altered mentation for which TF were held. VS T 99.9 (rectal), SBP 110s, RR 40 and desaturating on 6L NC to the 80s. On NRB, patient improved to 100%. Tele afib with rate of 60s on heparin gtt. .    Patient's son notes she had R sided headache for 1 week. ESR last week was wnl and prednisone dose was further decreased. This week, ESR was elevated again. Patient's PCP was c/f [facility] and prednisone dose was increased to 10mg qD. Patient states headache is sharp and stabbing, lasts 5 minutes and has improved since increasing prednisone. Patient has decreased vision on R eye d/t macular degeneration. No further worsening in vision. She had temporal artery bx in 04/2019, which was negative for GCA.    Patient also reports increased non-productive cough. Patient intermittently reports blood in stools to other providers, but recently denied it.  Vitals in ED were: febrile to 101.4F, HR 60-90s, /60s-150/80s, satting 100% on 1.5 L. VBG showed normal pH and pCO2 of 58. CXR shows concern for pna, and UA is positive for leuk esterase. Patient was given albuterol, azithromycin and CTX in the ED.  EKG showed LBBB.    INTERVAL HPI/OVERNIGHT EVENTS:    SUBJECTIVE: Patient seen and examined at bedside.     CONSTITUTIONAL: No weakness, fevers or chills  EYES/ENT: No visual changes;  No vertigo or throat pain   NECK: No pain or stiffness  RESPIRATORY: No cough, wheezing, hemoptysis; No shortness of breath  CARDIOVASCULAR: No chest pain or palpitations  GASTROINTESTINAL: No abdominal or epigastric pain. No nausea, vomiting, or hematemesis; No diarrhea or constipation. No melena or hematochezia.  GENITOURINARY: No dysuria, frequency or hematuria  NEUROLOGICAL: No numbness or weakness  SKIN: No itching, rashes    OBJECTIVE:    VITAL SIGNS:  ICU Vital Signs Last 24 Hrs  T(C): 36.3 (30 Aug 2019 11:29), Max: 37.4 (30 Aug 2019 09:54)  T(F): 97.3 (30 Aug 2019 11:29), Max: 99.3 (30 Aug 2019 09:54)  HR: 73 (30 Aug 2019 11:29) (59 - 162)  BP: 118/67 (30 Aug 2019 11:29) (102/53 - 121/84)  BP(mean): --  ABP: --  ABP(mean): --  RR: 41 (30 Aug 2019 11:29) (18 - 41)  SpO2: 94% (30 Aug 2019 11:29) (84% - 97%)        08-29 @ 07:01  -  08-30 @ 07:00  --------------------------------------------------------  IN: 1572 mL / OUT: 300 mL / NET: 1272 mL      CAPILLARY BLOOD GLUCOSE      POCT Blood Glucose.: 176 mg/dL (30 Aug 2019 12:51)      PHYSICAL EXAM:    General: NAD  HEENT: NC/AT; PERRL, clear conjunctiva  Neck: supple  Respiratory: CTA b/l  Cardiovascular: +S1/S2; RRR  Abdomen: soft, NT/ND; +BS x4  Extremities: WWP, 2+ peripheral pulses b/l; no LE edema  Skin: normal color and turgor; no rash  Neurological:    MEDICATIONS:  MEDICATIONS  (STANDING):  amLODIPine   Tablet 5 milliGRAM(s) Oral daily  atorvastatin 40 milliGRAM(s) Oral <User Schedule>  buDESOnide    Inhalation Suspension 0.25 milliGRAM(s) Inhalation two times a day  carbidopa/levodopa  25/100 2 Tablet(s) Oral three times a day  dextrose 5%. 1000 milliLiter(s) (50 mL/Hr) IV Continuous <Continuous>  dextrose 50% Injectable 12.5 Gram(s) IV Push once  dextrose 50% Injectable 25 Gram(s) IV Push once  dextrose 50% Injectable 25 Gram(s) IV Push once  docusate sodium Liquid 100 milliGRAM(s) Oral two times a day  furosemide    Tablet 20 milliGRAM(s) Oral daily  gabapentin   Solution 300 milliGRAM(s) Oral at bedtime  guaiFENesin    Syrup 200 milliGRAM(s) Oral every 6 hours  heparin  Infusion. 600 Unit(s)/Hr (6 mL/Hr) IV Continuous <Continuous>  insulin lispro (HumaLOG) corrective regimen sliding scale   SubCutaneous every 6 hours  lidocaine   Patch 1 Patch Transdermal daily  losartan 50 milliGRAM(s) Oral daily  melatonin 5 milliGRAM(s) Oral at bedtime  metoprolol tartrate 75 milliGRAM(s) Oral every 12 hours  nystatin    Suspension 564346 Unit(s) Oral four times a day  pantoprazole  Injectable 40 milliGRAM(s) IV Push daily  piperacillin/tazobactam IVPB. 3.375 Gram(s) IV Intermittent once  piperacillin/tazobactam IVPB.. 3.375 Gram(s) IV Intermittent every 6 hours  polyethylene glycol 3350 17 Gram(s) Oral daily  potassium chloride   Solution 40 milliEquivalent(s) Oral every 4 hours  predniSONE   Tablet 10 milliGRAM(s) Oral daily  senna 2 Tablet(s) Oral at bedtime  venlafaxine 37.5 milliGRAM(s) Oral every 12 hours    MEDICATIONS  (PRN):  acetaminophen    Suspension .. 1000 milliGRAM(s) Oral every 6 hours PRN Mild Pain (1 - 3), Moderate Pain (4 - 6)  ALPRAZolam 0.5 milliGRAM(s) Oral two times a day PRN anxiety  dextrose 40% Gel 15 Gram(s) Oral once PRN Blood Glucose LESS THAN 70 milliGRAM(s)/deciliter  glucagon  Injectable 1 milliGRAM(s) IntraMuscular once PRN Glucose LESS THAN 70 milligrams/deciliter  heparin  Injectable 5000 Unit(s) IV Push every 6 hours PRN For aPTT less than 40  heparin  Injectable 2500 Unit(s) IV Push every 6 hours PRN For aPTT between 40 - 57      ALLERGIES:  Allergies    penicillin (Unknown)    Intolerances        LABS:                        10.4   12.26 )-----------( 171      ( 30 Aug 2019 09:38 )             33.3     08-30    140  |  99  |  44<H>  ----------------------------<  143<H>  3.3<L>   |  26  |  0.83    Ca    8.6      30 Aug 2019 07:24  Phos  2.2     08-30  Mg     2.4     08-30      PT/INR - ( 29 Aug 2019 09:18 )   PT: 13.0 sec;   INR: 1.15 ratio         PTT - ( 30 Aug 2019 09:36 )  PTT:46.4 sec      RADIOLOGY & ADDITIONAL TESTS: Reviewed.    Assessment and Plan:     #Neuro   - New onset dysphagia during hospital stay, failed speech and swallow study and MBS  - s/p vocal cord injection for paralysis by ENT.   - s/p tx with diflucan and nystatin for oral candidiasis  -     #Respiratory   - Acute respiratory failure with hypoxia, likely 2/2 to aspiration.    - Treated on 7 days of antibiotics for PNA.   - Rhonchi b/l on exam during RRT, intubated with a 6.0 tube in the MICU today per ENT recs (patient has vocal chord paralysis)   - Consider repeat CT chest     #CV  - New onset afib during hospital stay   - On heparin gtt for afib.   - On Metoprolol 75 mg BID for rate control   - JEANNETTE done, Severe mitral stenosis  - Lasix 20 mg daily, monitor BMP for kidney function   - Cardiology consulted, f/u recs    #ID   - PNA likely aspiration   - f/u cultures     #GOC   - Patient still full code per son's request   - Palliative care consulted, f/u recs            Problem/Plan - 5:    - now with NG tube for meds and feeds   - Aspiration precautions and Elevate head of bed  -appreciate GI eval.  PEG placement had been delayed due to afib.  Palliative care c/s placed to further review GOC and utility of feeding tube  -holding feeds while being evaluated for possible aspiration today.      Problem/Plan - 6:  Problem: Delirium. Plan: -Melatonin 5mg HS  -C/w home Sinemet 50/200 TID for Parkinson-like symptom. Per son, patient never has a diagnosis of parkinson's disease. She was started on sinemet to see if it helps with her leg shaking symptoms  - c/w venlafaxine 37.5mg q12h (was on duloxetine at home) given duloxetine cannot be crushed and put via NGT.     Problem/Plan - 7:  ·  Problem: COPD (chronic obstructive pulmonary disease).  Plan: - Per son, no formal diagnosis of COPD  - pulmonary consult appreciated, rec adventitious lung sounds likely due to vocal cord paralysis, c/w with Pulmicort, albuterol and prednisone.   - c/w home dose prednisone 10mg daily chronically for PMR/?GCA. Right temporal artery biopsy reportedly negative, but she gets right sided headaches.  -Trigeminal neuralgia and migraine on the differential given right HA associated with maxillary/jaw pain and light sensitivity--c/w tylenol 1g q6h PRN given her    - Maintain O2 sats between 88-92%  - cold compresses for pain.      Problem/Plan - 8:  ·  Problem: Hypertension.  Plan: - c/w home dose 50mg QD of losartan   - c/w amlodipine 5mg QD  - c/w metoprolol 75 BID for rate control and BP  - continue to monitor  - Vitals Q4H.      Problem/Plan - 9:  ·  Problem: Heart failure, unspecified.  Plan: -TTE --> Normal LF, Mitral stenosis, increased RV pressure and dilated Left atrium, functioning TAVR. Findings c/w valvular heart failure or heart failure with preserved EF  - lasix 20mg qd   - Strict I's/O's and daily weights.     Problem/Plan - 10:  Problem: Diabetes mellitus type 2 with complications. Plan; a1c 6  - Patient not on home anti-hyperglycemics  - FS acceptable  - c/w Insulin sliding scale      Prophylaxis:  DVT ppx: c/w heparin drip for pending PEG tube placement. will start pt on NOAC per cardiology post PEG placement.  Diet: NPO  Dispo: PT recs subacute rehab on discharge.  For now guarded prognosis and being evaluated by MICU. MICU Accept Note        85 F with GERD, HTN, HLD, DM2 with neuropathy, Parkinson's, anxiety/depression, AS s/p TAVR on plavix, overactive bladder, CHF (on lasix 20 BID), PMR on Prednisone, COPD initially presenting to the ED with multiple complaints: abdominal pain, dysuria, fever, and SOB. Not on oxygen at home but required 1.5 L NC to maintain saturation. Admitted to the floor for hypoxic/hypercarbic respiratory failure in setting of pneumonia and COPD exacerbation. Hospital course complicated by vocal cord paralysis s/p botox injection as well as new onset afib and dysphagia. RRT was called for increased WOB while on tube feedings. Patient was initially noted to be tachypneic to the 40s with altered mentation for which TF were held. VS T 99.9 (rectal), SBP 110s, RR 40 and desaturating on 6L NC to the 80s. On NRB, patient improved to 100%. Tele afib with rate of 60s on heparin gtt. .    Patient's son notes she had R sided headache for 1 week. ESR last week was wnl and prednisone dose was further decreased. This week, ESR was elevated again. Patient's PCP was c/f GCA and prednisone dose was increased to 10mg qD. Patient states headache is sharp and stabbing, lasts 5 minutes and has improved since increasing prednisone. Patient has decreased vision on R eye d/t macular degeneration. No further worsening in vision. She had temporal artery bx in 04/2019, which was negative for GCA.    Patient also reports increased non-productive cough. Patient intermittently reports blood in stools to other providers, but recently denied it.  Vitals in ED were: febrile to 101.4F, HR 60-90s, /60s-150/80s, satting 100% on 1.5 L. VBG showed normal pH and pCO2 of 58. CXR shows concern for pna, and UA is positive for leuk esterase. Patient was given albuterol, azithromycin and CTX in the ED.  EKG showed LBBB.    INTERVAL HPI/OVERNIGHT EVENTS:    SUBJECTIVE: Patient seen and examined at bedside.     CONSTITUTIONAL: No weakness, fevers or chills  EYES/ENT: No visual changes;  No vertigo or throat pain   NECK: No pain or stiffness  RESPIRATORY: No cough, wheezing, hemoptysis; No shortness of breath  CARDIOVASCULAR: No chest pain or palpitations  GASTROINTESTINAL: No abdominal or epigastric pain. No nausea, vomiting, or hematemesis; No diarrhea or constipation. No melena or hematochezia.  GENITOURINARY: No dysuria, frequency or hematuria  NEUROLOGICAL: No numbness or weakness  SKIN: No itching, rashes    OBJECTIVE:    VITAL SIGNS:  ICU Vital Signs Last 24 Hrs  T(C): 36.3 (30 Aug 2019 11:29), Max: 37.4 (30 Aug 2019 09:54)  T(F): 97.3 (30 Aug 2019 11:29), Max: 99.3 (30 Aug 2019 09:54)  HR: 73 (30 Aug 2019 11:29) (59 - 162)  BP: 118/67 (30 Aug 2019 11:29) (102/53 - 121/84)  BP(mean): --  ABP: --  ABP(mean): --  RR: 41 (30 Aug 2019 11:29) (18 - 41)  SpO2: 94% (30 Aug 2019 11:29) (84% - 97%)        08-29 @ 07:01  -  08-30 @ 07:00  --------------------------------------------------------  IN: 1572 mL / OUT: 300 mL / NET: 1272 mL      CAPILLARY BLOOD GLUCOSE      POCT Blood Glucose.: 176 mg/dL (30 Aug 2019 12:51)      PHYSICAL EXAM:    General: NAD  HEENT: NC/AT; PERRL, clear conjunctiva  Neck: supple  Respiratory: CTA b/l  Cardiovascular: +S1/S2; RRR  Abdomen: soft, NT/ND; +BS x4  Extremities: WWP, 2+ peripheral pulses b/l; no LE edema  Skin: normal color and turgor; no rash  Neurological:    MEDICATIONS:  MEDICATIONS  (STANDING):  amLODIPine   Tablet 5 milliGRAM(s) Oral daily  atorvastatin 40 milliGRAM(s) Oral <User Schedule>  buDESOnide    Inhalation Suspension 0.25 milliGRAM(s) Inhalation two times a day  carbidopa/levodopa  25/100 2 Tablet(s) Oral three times a day  dextrose 5%. 1000 milliLiter(s) (50 mL/Hr) IV Continuous <Continuous>  dextrose 50% Injectable 12.5 Gram(s) IV Push once  dextrose 50% Injectable 25 Gram(s) IV Push once  dextrose 50% Injectable 25 Gram(s) IV Push once  docusate sodium Liquid 100 milliGRAM(s) Oral two times a day  furosemide    Tablet 20 milliGRAM(s) Oral daily  gabapentin   Solution 300 milliGRAM(s) Oral at bedtime  guaiFENesin    Syrup 200 milliGRAM(s) Oral every 6 hours  heparin  Infusion. 600 Unit(s)/Hr (6 mL/Hr) IV Continuous <Continuous>  insulin lispro (HumaLOG) corrective regimen sliding scale   SubCutaneous every 6 hours  lidocaine   Patch 1 Patch Transdermal daily  losartan 50 milliGRAM(s) Oral daily  melatonin 5 milliGRAM(s) Oral at bedtime  metoprolol tartrate 75 milliGRAM(s) Oral every 12 hours  nystatin    Suspension 688407 Unit(s) Oral four times a day  pantoprazole  Injectable 40 milliGRAM(s) IV Push daily  piperacillin/tazobactam IVPB. 3.375 Gram(s) IV Intermittent once  piperacillin/tazobactam IVPB.. 3.375 Gram(s) IV Intermittent every 6 hours  polyethylene glycol 3350 17 Gram(s) Oral daily  potassium chloride   Solution 40 milliEquivalent(s) Oral every 4 hours  predniSONE   Tablet 10 milliGRAM(s) Oral daily  senna 2 Tablet(s) Oral at bedtime  venlafaxine 37.5 milliGRAM(s) Oral every 12 hours    MEDICATIONS  (PRN):  acetaminophen    Suspension .. 1000 milliGRAM(s) Oral every 6 hours PRN Mild Pain (1 - 3), Moderate Pain (4 - 6)  ALPRAZolam 0.5 milliGRAM(s) Oral two times a day PRN anxiety  dextrose 40% Gel 15 Gram(s) Oral once PRN Blood Glucose LESS THAN 70 milliGRAM(s)/deciliter  glucagon  Injectable 1 milliGRAM(s) IntraMuscular once PRN Glucose LESS THAN 70 milligrams/deciliter  heparin  Injectable 5000 Unit(s) IV Push every 6 hours PRN For aPTT less than 40  heparin  Injectable 2500 Unit(s) IV Push every 6 hours PRN For aPTT between 40 - 57      ALLERGIES:  Allergies    penicillin (Unknown)    Intolerances        LABS:                        10.4   12.26 )-----------( 171      ( 30 Aug 2019 09:38 )             33.3     08-30    140  |  99  |  44<H>  ----------------------------<  143<H>  3.3<L>   |  26  |  0.83    Ca    8.6      30 Aug 2019 07:24  Phos  2.2     08-30  Mg     2.4     08-30      PT/INR - ( 29 Aug 2019 09:18 )   PT: 13.0 sec;   INR: 1.15 ratio         PTT - ( 30 Aug 2019 09:36 )  PTT:46.4 sec      RADIOLOGY & ADDITIONAL TESTS: Reviewed.    Assessment and Plan:     #Neuro   - New onset dysphagia during hospital stay, failed speech and swallow study and MBS  - s/p vocal cord injection for paralysis by ENT.   - s/p tx with diflucan and nystatin for oral candidiasis  -     #Respiratory   - Acute respiratory failure with hypoxia, likely 2/2 to aspiration.    - Treated on 7 days of antibiotics for PNA.   - Rhonchi b/l on exam during RRT, intubated with a 6.0 tube in the MICU today per ENT recs (patient has vocal chord paralysis)   - Consider repeat CT chest     #CV  - New onset afib during hospital stay   - On heparin gtt for afib.   - On Metoprolol 75 mg BID for rate control   - JEANNETTE done, Severe mitral stenosis  - Lasix 20 mg daily, monitor BMP for kidney function   - Cardiology consulted, f/u recs    #ID   - PNA likely aspiration   - f/u cultures     #GOC   - Patient still full code per son's request   - Palliative care consulted, f/u recs            Problem/Plan - 5:    - now with NG tube for meds and feeds   - Aspiration precautions and Elevate head of bed  -appreciate GI eval.  PEG placement had been delayed due to afib.  Palliative care c/s placed to further review GOC and utility of feeding tube  -holding feeds while being evaluated for possible aspiration today.      Problem/Plan - 6:  Problem: Delirium. Plan: -Melatonin 5mg HS  -C/w home Sinemet 50/200 TID for Parkinson-like symptom. Per son, patient never has a diagnosis of parkinson's disease. She was started on sinemet to see if it helps with her leg shaking symptoms  - c/w venlafaxine 37.5mg q12h (was on duloxetine at home) given duloxetine cannot be crushed and put via NGT.     Problem/Plan - 7:  ·  Problem: COPD (chronic obstructive pulmonary disease).  Plan: - Per son, no formal diagnosis of COPD  - pulmonary consult appreciated, rec adventitious lung sounds likely due to vocal cord paralysis, c/w with Pulmicort, albuterol and prednisone.   - c/w home dose prednisone 10mg daily chronically for PMR/?GCA. Right temporal artery biopsy reportedly negative, but she gets right sided headaches.  -Trigeminal neuralgia and migraine on the differential given right HA associated with maxillary/jaw pain and light sensitivity--c/w tylenol 1g q6h PRN given her    - Maintain O2 sats between 88-92%  - cold compresses for pain.      Problem/Plan - 8:  ·  Problem: Hypertension.  Plan: - c/w home dose 50mg QD of losartan   - c/w amlodipine 5mg QD  - c/w metoprolol 75 BID for rate control and BP  - continue to monitor  - Vitals Q4H.      Problem/Plan - 9:  ·  Problem: Heart failure, unspecified.  Plan: -TTE --> Normal LF, Mitral stenosis, increased RV pressure and dilated Left atrium, functioning TAVR. Findings c/w valvular heart failure or heart failure with preserved EF  - lasix 20mg qd   - Strict I's/O's and daily weights.     Problem/Plan - 10:  Problem: Diabetes mellitus type 2 with complications. Plan; a1c 6  - Patient not on home anti-hyperglycemics  - FS acceptable  - c/w Insulin sliding scale      Prophylaxis:  DVT ppx: c/w heparin drip for pending PEG tube placement. will start pt on NOAC per cardiology post PEG placement.  Diet: NPO  Dispo: PT recs subacute rehab on discharge.  For now guarded prognosis and being evaluated by MICU. MICU Accept Note    85 F with GERD, HTN, HLD, DM2 with neuropathy, Parkinson's, anxiety/depression, AS s/p TAVR on plavix, overactive bladder, CHF (on lasix 20 BID), PMR on Prednisone, COPD initially presenting to the ED with multiple complaints: abdominal pain, dysuria, fever, and SOB. Not on oxygen at home but required 1.5 L NC to maintain saturation. Admitted to the floor for hypoxic/hypercarbic respiratory failure in setting of pneumonia and COPD exacerbation. Hospital course complicated by vocal cord paralysis s/p botox injection as well as new onset afib and dysphagia. RRT was called for increased WOB while on tube feedings. Patient was initially noted to be tachypneic to the 40s with altered mentation for which TF were held. VS T 99.9 (rectal), SBP 110s, RR 40 and desaturating on 6L NC to the 80s. On NRB, patient improved to 100%. Tele afib with rate of 60s on heparin gtt. .    Patient's son notes she had R sided headache for 1 week. ESR last week was wnl and prednisone dose was further decreased. This week, ESR was elevated again. Patient's PCP was c/f GCA and prednisone dose was increased to 10mg qD. Patient states headache is sharp and stabbing, lasts 5 minutes and has improved since increasing prednisone. Patient has decreased vision on R eye d/t macular degeneration. No further worsening in vision. She had temporal artery bx in 04/2019, which was negative for GCA.    Patient also reports increased non-productive cough. Patient intermittently reports blood in stools to other providers, but recently denied it.  Vitals in ED were: febrile to 101.4F, HR 60-90s, /60s-150/80s, satting 100% on 1.5 L. VBG showed normal pH and pCO2 of 58. CXR shows concern for pna, and UA is positive for leuk esterase. Patient was given albuterol, azithromycin and CTX in the ED.  EKG showed LBBB.    INTERVAL HPI/OVERNIGHT EVENTS:    SUBJECTIVE: Patient seen and examined at bedside.     CONSTITUTIONAL: No weakness, fevers or chills  EYES/ENT: No visual changes;  No vertigo or throat pain   NECK: No pain or stiffness  RESPIRATORY: No cough, wheezing, hemoptysis; No shortness of breath  CARDIOVASCULAR: No chest pain or palpitations  GASTROINTESTINAL: No abdominal or epigastric pain. No nausea, vomiting, or hematemesis; No diarrhea or constipation. No melena or hematochezia.  GENITOURINARY: No dysuria, frequency or hematuria  NEUROLOGICAL: No numbness or weakness  SKIN: No itching, rashes    OBJECTIVE:    VITAL SIGNS:  ICU Vital Signs Last 24 Hrs  T(C): 36.3 (30 Aug 2019 11:29), Max: 37.4 (30 Aug 2019 09:54)  T(F): 97.3 (30 Aug 2019 11:29), Max: 99.3 (30 Aug 2019 09:54)  HR: 73 (30 Aug 2019 11:29) (59 - 162)  BP: 118/67 (30 Aug 2019 11:29) (102/53 - 121/84)  BP(mean): --  ABP: --  ABP(mean): --  RR: 41 (30 Aug 2019 11:29) (18 - 41)  SpO2: 94% (30 Aug 2019 11:29) (84% - 97%)        08-29 @ 07:01  -  08-30 @ 07:00  --------------------------------------------------------  IN: 1572 mL / OUT: 300 mL / NET: 1272 mL      CAPILLARY BLOOD GLUCOSE      POCT Blood Glucose.: 176 mg/dL (30 Aug 2019 12:51)      PHYSICAL EXAM:    General: NAD  HEENT: NC/AT; PERRL, clear conjunctiva  Neck: supple  Respiratory: CTA b/l  Cardiovascular: +S1/S2; RRR  Abdomen: soft, NT/ND; +BS x4  Extremities: WWP, 2+ peripheral pulses b/l; no LE edema  Skin: normal color and turgor; no rash  Neurological:    MEDICATIONS:  MEDICATIONS  (STANDING):  amLODIPine   Tablet 5 milliGRAM(s) Oral daily  atorvastatin 40 milliGRAM(s) Oral <User Schedule>  buDESOnide    Inhalation Suspension 0.25 milliGRAM(s) Inhalation two times a day  carbidopa/levodopa  25/100 2 Tablet(s) Oral three times a day  dextrose 5%. 1000 milliLiter(s) (50 mL/Hr) IV Continuous <Continuous>  dextrose 50% Injectable 12.5 Gram(s) IV Push once  dextrose 50% Injectable 25 Gram(s) IV Push once  dextrose 50% Injectable 25 Gram(s) IV Push once  docusate sodium Liquid 100 milliGRAM(s) Oral two times a day  furosemide    Tablet 20 milliGRAM(s) Oral daily  gabapentin   Solution 300 milliGRAM(s) Oral at bedtime  guaiFENesin    Syrup 200 milliGRAM(s) Oral every 6 hours  heparin  Infusion. 600 Unit(s)/Hr (6 mL/Hr) IV Continuous <Continuous>  insulin lispro (HumaLOG) corrective regimen sliding scale   SubCutaneous every 6 hours  lidocaine   Patch 1 Patch Transdermal daily  losartan 50 milliGRAM(s) Oral daily  melatonin 5 milliGRAM(s) Oral at bedtime  metoprolol tartrate 75 milliGRAM(s) Oral every 12 hours  nystatin    Suspension 195215 Unit(s) Oral four times a day  pantoprazole  Injectable 40 milliGRAM(s) IV Push daily  piperacillin/tazobactam IVPB. 3.375 Gram(s) IV Intermittent once  piperacillin/tazobactam IVPB.. 3.375 Gram(s) IV Intermittent every 6 hours  polyethylene glycol 3350 17 Gram(s) Oral daily  potassium chloride   Solution 40 milliEquivalent(s) Oral every 4 hours  predniSONE   Tablet 10 milliGRAM(s) Oral daily  senna 2 Tablet(s) Oral at bedtime  venlafaxine 37.5 milliGRAM(s) Oral every 12 hours    MEDICATIONS  (PRN):  acetaminophen    Suspension .. 1000 milliGRAM(s) Oral every 6 hours PRN Mild Pain (1 - 3), Moderate Pain (4 - 6)  ALPRAZolam 0.5 milliGRAM(s) Oral two times a day PRN anxiety  dextrose 40% Gel 15 Gram(s) Oral once PRN Blood Glucose LESS THAN 70 milliGRAM(s)/deciliter  glucagon  Injectable 1 milliGRAM(s) IntraMuscular once PRN Glucose LESS THAN 70 milligrams/deciliter  heparin  Injectable 5000 Unit(s) IV Push every 6 hours PRN For aPTT less than 40  heparin  Injectable 2500 Unit(s) IV Push every 6 hours PRN For aPTT between 40 - 57      ALLERGIES:  Allergies    penicillin (Unknown)    Intolerances        LABS:                        10.4   12.26 )-----------( 171      ( 30 Aug 2019 09:38 )             33.3     08-30    140  |  99  |  44<H>  ----------------------------<  143<H>  3.3<L>   |  26  |  0.83    Ca    8.6      30 Aug 2019 07:24  Phos  2.2     08-30  Mg     2.4     08-30      PT/INR - ( 29 Aug 2019 09:18 )   PT: 13.0 sec;   INR: 1.15 ratio         PTT - ( 30 Aug 2019 09:36 )  PTT:46.4 sec      RADIOLOGY & ADDITIONAL TESTS: Reviewed.    Assessment and Plan:     #Neuro   - New onset dysphagia during hospital stay, failed speech and swallow study and MBS  - s/p vocal cord injection for paralysis by ENT.   - s/p tx with diflucan and nystatin for oral candidiasis  -     #Respiratory   - Acute respiratory failure with hypoxia, likely 2/2 to aspiration.    - Hx of COPD  - Treated on 7 days of antibiotics for PNA.   - Rhonchi b/l on exam during RRT, intubated with a 6.0 tube in the MICU today per ENT recs (patient has vocal chord paralysis)   - Consider repeat CT chest     #CV  - New onset afib during hospital stay   - On heparin gtt for afib.   - On Metoprolol 75 mg BID for rate control   - On amlodipine 5mg QD and 50mg QD of losartan for BP at home, HOLD BP low post intubation   - JEANNETTE done: Severe MS, Increased RV pressure and dilated Left atrium, functioning TAVR. Findings c/w valvular heart failure or heart failure with preserved EF  - Hx of HF, Lasix 20 mg daily, monitor BMP for kidney function   - Cardiology consulted, f/u recs    #ID   - PNA likely aspiration   - f/u cultures     #GOC   - Patient still full code per son's request   - Palliative care consulted, f/u recs     #GI   - Tube feeds     #Endo   - Hx of DM   - HbA1C is 6  - Continue fs and sliding scale      Problem/Plan - 5:    - now with NG tube for meds and feeds   - Aspiration precautions and Elevate head of bed  -appreciate GI eval.  PEG placement had been delayed due to afib.  Palliative care c/s placed to further review GOC and utility of feeding tube  -holding feeds while being evaluated for possible aspiration today.      Problem/Plan - 6:  Problem: Delirium. Plan: -Melatonin 5mg HS  -C/w home Sinemet 50/200 TID for Parkinson-like symptom. Per son, patient never has a diagnosis of parkinson's disease. She was started on sinemet to see if it helps with her leg shaking symptoms  - c/w venlafaxine 37.5mg q12h (was on duloxetine at home) given duloxetine cannot be crushed and put via NGT.     Problem/Plan - 7:  - pulmonary consult appreciated, rec adventitious lung sounds likely due to vocal cord paralysis, c/w with Pulmicort, albuterol and prednisone.   - c/w home dose prednisone 10mg daily chronically for PMR/?GCA. Right temporal artery biopsy reportedly negative, but she gets right sided headaches.  -Trigeminal neuralgia and migraine on the differential given right HA associated with maxillary/jaw pain and light sensitivity--c/w tylenol 1g q6h PRN given her    - Maintain O2 sats between 88-92%  - cold compresses for pain. MICU Accept Note    85 F with GERD, HTN, HLD, DM2 with neuropathy, Parkinson's, anxiety/depression, AS s/p TAVR on plavix, overactive bladder, CHF (on lasix 20 BID), PMR on Prednisone, COPD initially presenting to the ED with multiple complaints: abdominal pain, dysuria, fever, and SOB. Not on oxygen at home but required 1.5 L NC to maintain saturation. Admitted to the floor for hypoxic/hypercarbic respiratory failure in setting of pneumonia and COPD exacerbation. Hospital course complicated by dysphagia, vocal cord paralysis (pt s/p botox injection of vocal cords by ENT), and new onset afib. RRT was called today for increased WOB while on tube feedings. Patient was initially noted to be tachypneic to the 40s with altered mentation. Vital signs during rapid T 99.9 (rectal), SBP 110s, RR 40 and desaturating on 6L NC to the 80s. On NRB, patient improved to 100%. Tele afib with rate of 60s on heparin gtt. . Patient brought to the MICU for critical airway. Sedated and intubated.     SUBJECTIVE: Patient seen and examined at bedside.     CONSTITUTIONAL: No weakness, fevers or chills  EYES/ENT: No visual changes;  No vertigo or throat pain   NECK: No pain or stiffness  RESPIRATORY: No cough, wheezing, hemoptysis; No shortness of breath  CARDIOVASCULAR: No chest pain or palpitations  GASTROINTESTINAL: No abdominal or epigastric pain. No nausea, vomiting, or hematemesis; No diarrhea or constipation. No melena or hematochezia.  GENITOURINARY: No dysuria, frequency or hematuria  NEUROLOGICAL: No numbness or weakness  SKIN: No itching, rashes    OBJECTIVE:    VITAL SIGNS:  ICU Vital Signs Last 24 Hrs  T(C): 36.3 (30 Aug 2019 11:29), Max: 37.4 (30 Aug 2019 09:54)  T(F): 97.3 (30 Aug 2019 11:29), Max: 99.3 (30 Aug 2019 09:54)  HR: 73 (30 Aug 2019 11:29) (59 - 162)  BP: 118/67 (30 Aug 2019 11:29) (102/53 - 121/84)  BP(mean): --  ABP: --  ABP(mean): --  RR: 41 (30 Aug 2019 11:29) (18 - 41)  SpO2: 94% (30 Aug 2019 11:29) (84% - 97%)        08-29 @ 07:01  -  08-30 @ 07:00  --------------------------------------------------------  IN: 1572 mL / OUT: 300 mL / NET: 1272 mL      CAPILLARY BLOOD GLUCOSE      POCT Blood Glucose.: 176 mg/dL (30 Aug 2019 12:51)      PHYSICAL EXAM:    General: NAD  HEENT: NC/AT; PERRL, clear conjunctiva  Neck: supple  Respiratory: CTA b/l  Cardiovascular: +S1/S2; RRR  Abdomen: soft, NT/ND; +BS x4  Extremities: WWP, 2+ peripheral pulses b/l; no LE edema  Skin: normal color and turgor; no rash  Neurological:    MEDICATIONS:  MEDICATIONS  (STANDING):  amLODIPine   Tablet 5 milliGRAM(s) Oral daily  atorvastatin 40 milliGRAM(s) Oral <User Schedule>  buDESOnide    Inhalation Suspension 0.25 milliGRAM(s) Inhalation two times a day  carbidopa/levodopa  25/100 2 Tablet(s) Oral three times a day  dextrose 5%. 1000 milliLiter(s) (50 mL/Hr) IV Continuous <Continuous>  dextrose 50% Injectable 12.5 Gram(s) IV Push once  dextrose 50% Injectable 25 Gram(s) IV Push once  dextrose 50% Injectable 25 Gram(s) IV Push once  docusate sodium Liquid 100 milliGRAM(s) Oral two times a day  furosemide    Tablet 20 milliGRAM(s) Oral daily  gabapentin   Solution 300 milliGRAM(s) Oral at bedtime  guaiFENesin    Syrup 200 milliGRAM(s) Oral every 6 hours  heparin  Infusion. 600 Unit(s)/Hr (6 mL/Hr) IV Continuous <Continuous>  insulin lispro (HumaLOG) corrective regimen sliding scale   SubCutaneous every 6 hours  lidocaine   Patch 1 Patch Transdermal daily  losartan 50 milliGRAM(s) Oral daily  melatonin 5 milliGRAM(s) Oral at bedtime  metoprolol tartrate 75 milliGRAM(s) Oral every 12 hours  nystatin    Suspension 426004 Unit(s) Oral four times a day  pantoprazole  Injectable 40 milliGRAM(s) IV Push daily  piperacillin/tazobactam IVPB. 3.375 Gram(s) IV Intermittent once  piperacillin/tazobactam IVPB.. 3.375 Gram(s) IV Intermittent every 6 hours  polyethylene glycol 3350 17 Gram(s) Oral daily  potassium chloride   Solution 40 milliEquivalent(s) Oral every 4 hours  predniSONE   Tablet 10 milliGRAM(s) Oral daily  senna 2 Tablet(s) Oral at bedtime  venlafaxine 37.5 milliGRAM(s) Oral every 12 hours    MEDICATIONS  (PRN):  acetaminophen    Suspension .. 1000 milliGRAM(s) Oral every 6 hours PRN Mild Pain (1 - 3), Moderate Pain (4 - 6)  ALPRAZolam 0.5 milliGRAM(s) Oral two times a day PRN anxiety  dextrose 40% Gel 15 Gram(s) Oral once PRN Blood Glucose LESS THAN 70 milliGRAM(s)/deciliter  glucagon  Injectable 1 milliGRAM(s) IntraMuscular once PRN Glucose LESS THAN 70 milligrams/deciliter  heparin  Injectable 5000 Unit(s) IV Push every 6 hours PRN For aPTT less than 40  heparin  Injectable 2500 Unit(s) IV Push every 6 hours PRN For aPTT between 40 - 57      ALLERGIES:  Allergies    penicillin (Unknown)    Intolerances        LABS:                        10.4   12.26 )-----------( 171      ( 30 Aug 2019 09:38 )             33.3     08-30    140  |  99  |  44<H>  ----------------------------<  143<H>  3.3<L>   |  26  |  0.83    Ca    8.6      30 Aug 2019 07:24  Phos  2.2     08-30  Mg     2.4     08-30      PT/INR - ( 29 Aug 2019 09:18 )   PT: 13.0 sec;   INR: 1.15 ratio         PTT - ( 30 Aug 2019 09:36 )  PTT:46.4 sec      RADIOLOGY & ADDITIONAL TESTS: Reviewed.    Assessment and Plan:     #Neuro   - new onset dysphagia during hospital stay, failed speech and swallow study and MBS  - s/p vocal cord injection for paralysis by ENT.   - s/p tx with diflucan and nystatin for oral candidiasis  - hx of delirium: Sinemet 50/200 TID for Parkinson-like symptom. On venlafaxine 37.5mg q12h during hospital stay, duloxetine not compatible with tube feed.   - currently still sedated fentanyl and prop from intubation     #Respiratory   - Acute respiratory failure with hypoxia, likely 2/2 to aspiration.    - Hx of COPD  - Treated on 7 days of antibiotics for PNA.   - Rhonchi b/l on exam during RRT, intubated with a 6.0 tube in the MICU today per ENT recs (patient has vocal chord paralysis)   - Consider repeat CT chest     #CV  - New onset afib during hospital stay   - On heparin gtt for afib.   - On Metoprolol 75 mg BID for rate control during hospital stay   - On amlodipine 5mg QD and 50mg QD of losartan for BP at home, HOLD BP low post intubation   - Currently on Levo for BP control post intubation   - JEANNETTE done: Severe MS, Increased RV pressure and dilated Left atrium, functioning TAVR. Findings c/w valvular heart failure or heart failure with preserved EF  - Hx of HF, Lasix 20 mg daily, monitor BMP for kidney function   - Cardiology consulted, f/u recs    #ID   - PNA likely aspiration   - f/u cultures     #GI   - NPO now, OG tube and tube feeds to be continued after patient stable   - PEG placement had been delayed due to afib, consult GI once patient is stable     #Endo   - Hx of DM   - HbA1C is 6  - Continue fs and sliding scale     #GOC   - Patient still full code per son's request   - Palliative care consulted, f/u recs MICU Accept Note    85 F with GERD, HTN, HLD, DM2 with neuropathy, Parkinson's, anxiety/depression, AS s/p TAVR on plavix, overactive bladder, CHF (on lasix 20 BID), PMR on Prednisone, COPD initially presenting to the ED with multiple complaints: abdominal pain, dysuria, fever, and SOB. Not on oxygen at home but required 1.5 L NC to maintain saturation. Admitted to the floor for hypoxic/hypercarbic respiratory failure in setting of pneumonia and COPD exacerbation. Hospital course complicated by dysphagia, vocal cord paralysis (pt s/p botox injection of vocal cords by ENT), and new onset afib. RRT was called today for increased WOB while on tube feedings. Patient was initially noted to be tachypneic to the 40s with altered mentation. Vital signs during rapid T 99.9 (rectal), SBP 110s, RR 40 and desaturating on 6L NC to the 80s. On NRB, patient improved to 100%. Tele afib with rate of 60s on heparin gtt. . Patient brought to the MICU for critical airway. Sedated and intubated.    Unable to obtain RoS 2/2 decreased mentation    OBJECTIVE:    VITAL SIGNS:  ICU Vital Signs Last 24 Hrs  T(C): 36.3 (30 Aug 2019 11:29), Max: 37.4 (30 Aug 2019 09:54)  T(F): 97.3 (30 Aug 2019 11:29), Max: 99.3 (30 Aug 2019 09:54)  HR: 73 (30 Aug 2019 11:29) (59 - 162)  BP: 118/67 (30 Aug 2019 11:29) (102/53 - 121/84)  BP(mean): --  ABP: --  ABP(mean): --  RR: 41 (30 Aug 2019 11:29) (18 - 41)  SpO2: 94% (30 Aug 2019 11:29) (84% - 97%)        08-29 @ 07:01  -  08-30 @ 07:00  --------------------------------------------------------  IN: 1572 mL / OUT: 300 mL / NET: 1272 mL      CAPILLARY BLOOD GLUCOSE      POCT Blood Glucose.: 176 mg/dL (30 Aug 2019 12:51)    PHYSICAL EXAM:  GENERAL: lethargic, tachypneic  HEAD:  Atraumatic, Normocephalic  EYES: EOMI, conjunctiva and sclera clear  NECK: Supple, No JVD  CHEST/LUNG: Increased work of breathing, diffuse ronchi b/l, No wheeze or rales  HEART: Regular rate and rhythm; No murmurs, rubs, or gallops  ABDOMEN: Soft, Nontender, Nondistended; Bowel sounds present  EXTREMITIES:  2+ Peripheral Pulses, No clubbing, cyanosis, or edema  NEUROLOGY: lethargic  SKIN: No rashes or lesions    MEDICATIONS:  MEDICATIONS  (STANDING):  amLODIPine   Tablet 5 milliGRAM(s) Oral daily  atorvastatin 40 milliGRAM(s) Oral <User Schedule>  buDESOnide    Inhalation Suspension 0.25 milliGRAM(s) Inhalation two times a day  carbidopa/levodopa  25/100 2 Tablet(s) Oral three times a day  dextrose 5%. 1000 milliLiter(s) (50 mL/Hr) IV Continuous <Continuous>  dextrose 50% Injectable 12.5 Gram(s) IV Push once  dextrose 50% Injectable 25 Gram(s) IV Push once  dextrose 50% Injectable 25 Gram(s) IV Push once  docusate sodium Liquid 100 milliGRAM(s) Oral two times a day  furosemide    Tablet 20 milliGRAM(s) Oral daily  gabapentin   Solution 300 milliGRAM(s) Oral at bedtime  guaiFENesin    Syrup 200 milliGRAM(s) Oral every 6 hours  heparin  Infusion. 600 Unit(s)/Hr (6 mL/Hr) IV Continuous <Continuous>  insulin lispro (HumaLOG) corrective regimen sliding scale   SubCutaneous every 6 hours  lidocaine   Patch 1 Patch Transdermal daily  losartan 50 milliGRAM(s) Oral daily  melatonin 5 milliGRAM(s) Oral at bedtime  metoprolol tartrate 75 milliGRAM(s) Oral every 12 hours  nystatin    Suspension 664759 Unit(s) Oral four times a day  pantoprazole  Injectable 40 milliGRAM(s) IV Push daily  piperacillin/tazobactam IVPB. 3.375 Gram(s) IV Intermittent once  piperacillin/tazobactam IVPB.. 3.375 Gram(s) IV Intermittent every 6 hours  polyethylene glycol 3350 17 Gram(s) Oral daily  potassium chloride   Solution 40 milliEquivalent(s) Oral every 4 hours  predniSONE   Tablet 10 milliGRAM(s) Oral daily  senna 2 Tablet(s) Oral at bedtime  venlafaxine 37.5 milliGRAM(s) Oral every 12 hours    MEDICATIONS  (PRN):  acetaminophen    Suspension .. 1000 milliGRAM(s) Oral every 6 hours PRN Mild Pain (1 - 3), Moderate Pain (4 - 6)  ALPRAZolam 0.5 milliGRAM(s) Oral two times a day PRN anxiety  dextrose 40% Gel 15 Gram(s) Oral once PRN Blood Glucose LESS THAN 70 milliGRAM(s)/deciliter  glucagon  Injectable 1 milliGRAM(s) IntraMuscular once PRN Glucose LESS THAN 70 milligrams/deciliter  heparin  Injectable 5000 Unit(s) IV Push every 6 hours PRN For aPTT less than 40  heparin  Injectable 2500 Unit(s) IV Push every 6 hours PRN For aPTT between 40 - 57      ALLERGIES:  Allergies    penicillin (Unknown)    Intolerances        LABS:                        10.4   12.26 )-----------( 171      ( 30 Aug 2019 09:38 )             33.3     08-30    140  |  99  |  44<H>  ----------------------------<  143<H>  3.3<L>   |  26  |  0.83    Ca    8.6      30 Aug 2019 07:24  Phos  2.2     08-30  Mg     2.4     08-30      PT/INR - ( 29 Aug 2019 09:18 )   PT: 13.0 sec;   INR: 1.15 ratio         PTT - ( 30 Aug 2019 09:36 )  PTT:46.4 sec      RADIOLOGY & ADDITIONAL TESTS: Reviewed.    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 Transferred to MICU for acute respiratory distress, s/p intubation.    #Neuro   - hx of delirium: Sinemet 50/200 TID for Parkinson-like symptom. On venlafaxine 37.5mg q12h during hospital stay, duloxetine not compatible with tube feed.   - currently still sedated fentanyl and prop from intubation. She was lethargic on floors after RRT in setting of RR to 40s.    #Respiratory   - Acute respiratory failure with hypoxia, likely 2/2 to aspiration.    - Hx of COPD  - Treated on 7 days of antibiotics for PNA.   - Rhonchi b/l on exam during RRT, intubated with a 6.0 tube in the MICU today per ENT recs (patient has vocal chord paralysis)   - Consider repeat CT chest     #CV  - New onset afib during hospital stay   - Holding heparin gtt for afib. Will restart after repeat ptt results.  - On Metoprolol 75 mg BID for rate control during hospital stay   - On amlodipine 5mg QD and 50mg QD of losartan for BP at home, HOLD BP low post intubation   - Currently on Levophed gtt, to stabilize BP during intubation.  - JEANNETTE done: Severe MS, Increased RV pressure and dilated Left atrium, functioning TAVR. Findings c/w valvular heart failure or heart failure with preserved EF  - Hx of HF, Lasix 20 mg daily, monitor BMP for kidney function  - Cardiology following regarding MS    #ID   - PNA likely from aspiration  - f/u cultures.  - Continue zosyn.    #GI   - NPO now, OG tube and tube feeds to be continued after patient stable   - PEG placement had been delayed due to afib, consult GI once patient is stable   - new onset dysphagia during hospital stay, failed speech and swallow study and MBS  - s/p vocal cord injection for paralysis by ENT.   - s/p tx with diflucan and nystatin for oral candidiasis    #Endo   - Hx of DM   - HbA1C is 6  - Continue fs and sliding scale     #GOC   - Patient still full code per son's request   - Palliative care consulted, f/u recs MICU Accept Note    85 F with GERD, HTN, HLD, DM2 with neuropathy, Parkinson's, anxiety/depression, AS s/p TAVR on plavix, overactive bladder, CHF (on lasix 20 BID), PMR on Prednisone, COPD initially presenting to the ED with multiple complaints: abdominal pain, dysuria, fever, and SOB. Not on oxygen at home but required 1.5 L NC to maintain saturation. Admitted to the floor for hypoxic/hypercarbic respiratory failure in setting of pneumonia and COPD exacerbation. Hospital course complicated by dysphagia, vocal cord paralysis (pt s/p botox injection of vocal cords by ENT), and new onset afib. RRT was called today for increased WOB while on tube feedings. Patient was initially noted to be tachypneic to the 40s with altered mentation. Vital signs during rapid T 99.9 (rectal), SBP 110s, RR 40 and desaturating on 6L NC to the 80s. On NRB, patient improved to 100%. Tele afib with rate of 60s on heparin gtt. . Patient brought to the MICU for critical airway. Sedated and intubated.    Unable to obtain RoS 2/2 decreased mentation    OBJECTIVE:    VITAL SIGNS:  ICU Vital Signs Last 24 Hrs  T(C): 36.3 (30 Aug 2019 11:29), Max: 37.4 (30 Aug 2019 09:54)  T(F): 97.3 (30 Aug 2019 11:29), Max: 99.3 (30 Aug 2019 09:54)  HR: 73 (30 Aug 2019 11:29) (59 - 162)  BP: 118/67 (30 Aug 2019 11:29) (102/53 - 121/84)  BP(mean): --  ABP: --  ABP(mean): --  RR: 41 (30 Aug 2019 11:29) (18 - 41)  SpO2: 94% (30 Aug 2019 11:29) (84% - 97%)        08-29 @ 07:01  -  08-30 @ 07:00  --------------------------------------------------------  IN: 1572 mL / OUT: 300 mL / NET: 1272 mL      CAPILLARY BLOOD GLUCOSE      POCT Blood Glucose.: 176 mg/dL (30 Aug 2019 12:51)    PHYSICAL EXAM:  GENERAL: lethargic, tachypneic  HEAD:  Atraumatic, Normocephalic  EYES: EOMI, conjunctiva and sclera clear  NECK: Supple, No JVD  CHEST/LUNG: Increased work of breathing, diffuse ronchi b/l, No wheeze or rales  HEART: Regular rate and rhythm; No murmurs, rubs, or gallops  ABDOMEN: Soft, Nontender, Nondistended; Bowel sounds present  EXTREMITIES:  2+ Peripheral Pulses, No clubbing, cyanosis, or edema  NEUROLOGY: lethargic  SKIN: No rashes or lesions    MEDICATIONS:  MEDICATIONS  (STANDING):  amLODIPine   Tablet 5 milliGRAM(s) Oral daily  atorvastatin 40 milliGRAM(s) Oral <User Schedule>  buDESOnide    Inhalation Suspension 0.25 milliGRAM(s) Inhalation two times a day  carbidopa/levodopa  25/100 2 Tablet(s) Oral three times a day  dextrose 5%. 1000 milliLiter(s) (50 mL/Hr) IV Continuous <Continuous>  dextrose 50% Injectable 12.5 Gram(s) IV Push once  dextrose 50% Injectable 25 Gram(s) IV Push once  dextrose 50% Injectable 25 Gram(s) IV Push once  docusate sodium Liquid 100 milliGRAM(s) Oral two times a day  furosemide    Tablet 20 milliGRAM(s) Oral daily  gabapentin   Solution 300 milliGRAM(s) Oral at bedtime  guaiFENesin    Syrup 200 milliGRAM(s) Oral every 6 hours  heparin  Infusion. 600 Unit(s)/Hr (6 mL/Hr) IV Continuous <Continuous>  insulin lispro (HumaLOG) corrective regimen sliding scale   SubCutaneous every 6 hours  lidocaine   Patch 1 Patch Transdermal daily  losartan 50 milliGRAM(s) Oral daily  melatonin 5 milliGRAM(s) Oral at bedtime  metoprolol tartrate 75 milliGRAM(s) Oral every 12 hours  nystatin    Suspension 243991 Unit(s) Oral four times a day  pantoprazole  Injectable 40 milliGRAM(s) IV Push daily  piperacillin/tazobactam IVPB. 3.375 Gram(s) IV Intermittent once  piperacillin/tazobactam IVPB.. 3.375 Gram(s) IV Intermittent every 6 hours  polyethylene glycol 3350 17 Gram(s) Oral daily  potassium chloride   Solution 40 milliEquivalent(s) Oral every 4 hours  predniSONE   Tablet 10 milliGRAM(s) Oral daily  senna 2 Tablet(s) Oral at bedtime  venlafaxine 37.5 milliGRAM(s) Oral every 12 hours    MEDICATIONS  (PRN):  acetaminophen    Suspension .. 1000 milliGRAM(s) Oral every 6 hours PRN Mild Pain (1 - 3), Moderate Pain (4 - 6)  ALPRAZolam 0.5 milliGRAM(s) Oral two times a day PRN anxiety  dextrose 40% Gel 15 Gram(s) Oral once PRN Blood Glucose LESS THAN 70 milliGRAM(s)/deciliter  glucagon  Injectable 1 milliGRAM(s) IntraMuscular once PRN Glucose LESS THAN 70 milligrams/deciliter  heparin  Injectable 5000 Unit(s) IV Push every 6 hours PRN For aPTT less than 40  heparin  Injectable 2500 Unit(s) IV Push every 6 hours PRN For aPTT between 40 - 57      ALLERGIES:  Allergies    penicillin (Unknown)    Intolerances        LABS:                        10.4   12.26 )-----------( 171      ( 30 Aug 2019 09:38 )             33.3     08-30    140  |  99  |  44<H>  ----------------------------<  143<H>  3.3<L>   |  26  |  0.83    Ca    8.6      30 Aug 2019 07:24  Phos  2.2     08-30  Mg     2.4     08-30      PT/INR - ( 29 Aug 2019 09:18 )   PT: 13.0 sec;   INR: 1.15 ratio         PTT - ( 30 Aug 2019 09:36 )  PTT:46.4 sec      RADIOLOGY & ADDITIONAL TESTS: Reviewed.    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 Transferred to MICU for acute respiratory distress, s/p intubation.    #Neuro   - hx of delirium: Sinemet 50/200 TID for Parkinson-like symptom. On venlafaxine 37.5mg q12h during hospital stay, duloxetine not compatible with tube feed.   - currently still sedated fentanyl and prop from intubation. She was lethargic on floors after RRT in setting of RR to 40s.    #Respiratory   - Acute respiratory failure with hypoxia, likely 2/2 to aspiration.    - Hx of COPD  - Treated on 7 days of antibiotics for PNA.   - Rhonchi b/l on exam during RRT, intubated with a 6.0 tube in the MICU today per ENT recs (patient has vocal chord paralysis)   - Consider repeat CT chest     #CV  - New onset afib during hospital stay   - Holding heparin gtt for afib. Will restart after repeat ptt results.  - On Metoprolol 75 mg BID for rate control during hospital stay   - On amlodipine 5mg QD and 50mg QD of losartan for BP at home, HOLD BP low post intubation   - Currently on Levophed gtt, to stabilize BP during intubation.  - JEANNETTE done: Severe MS, Increased RV pressure and dilated Left atrium, functioning TAVR. Findings c/w valvular heart failure or heart failure with preserved EF  - Hx of HF, Lasix 20 mg daily, monitor BMP for kidney function  - Cardiology following regarding MS    #ID   - PNA likely from aspiration  - f/u cultures.  - Continue zosyn.    #GI   - NPO now, OG tube and tube feeds to be continued after patient stable   - PEG placement had been delayed due to afib, consult GI once patient is stable   - new onset dysphagia during hospital stay, failed speech and swallow study and MBS  - s/p vocal cord injection for paralysis by ENT.   - s/p tx with diflucan and nystatin for oral candidiasis    #Endo   - Hx of DM   - HbA1C is 6  - Continue fs and sliding scale     #GOC   - Patient still full code per son's request   - Palliative care consulted, f/u recs    Attending Note:    Patient seen and examined, chart reviewed case discussed with resident and fellow.  Agree with above.  85 year old woman, dementia, recent Botox injection to vocal cord now with stridor and alteration in mental status brought to MICU for intubation for management of airway.    critical care time in conjunction with residents and fellows > 35 minutes on 8/30/19

## 2019-08-30 NOTE — CONSULT NOTE ADULT - PROBLEM SELECTOR RECOMMENDATION 5
Pt does not have a HCP, she is  but has Alzheimer's dementia and is unable to participate in decision making as per son   Therefore both children Anu and Shon have equal decision making capacity. Family will be here on Tuesday 9/3 around 1pm to have GOC discussion   Code status confirmed by primary team pt remains full code

## 2019-08-30 NOTE — PROGRESS NOTE ADULT - SUBJECTIVE AND OBJECTIVE BOX
Chief Complaint:  Patient is a 85y old  Female who presents with a chief complaint of Abdominal pain (30 Aug 2019 07:34)      Interval Events: Pt still having episodes of RVR, and still having cough with sputum. Denies chest pain, shortness of breath, nausea, vomiting, dairrhea.    Allergies:  penicillin (Unknown)      Hospital Medications:  acetaminophen    Suspension .. 1000 milliGRAM(s) Oral every 6 hours PRN  ALPRAZolam 0.5 milliGRAM(s) Oral two times a day PRN  amLODIPine   Tablet 5 milliGRAM(s) Oral daily  atorvastatin 40 milliGRAM(s) Oral <User Schedule>  buDESOnide    Inhalation Suspension 0.25 milliGRAM(s) Inhalation two times a day  carbidopa/levodopa  25/100 2 Tablet(s) Oral three times a day  dextrose 40% Gel 15 Gram(s) Oral once PRN  dextrose 5%. 1000 milliLiter(s) IV Continuous <Continuous>  dextrose 50% Injectable 12.5 Gram(s) IV Push once  dextrose 50% Injectable 25 Gram(s) IV Push once  dextrose 50% Injectable 25 Gram(s) IV Push once  docusate sodium Liquid 100 milliGRAM(s) Oral two times a day  furosemide    Tablet 20 milliGRAM(s) Oral daily  gabapentin   Solution 300 milliGRAM(s) Oral at bedtime  glucagon  Injectable 1 milliGRAM(s) IntraMuscular once PRN  guaiFENesin    Syrup 200 milliGRAM(s) Oral every 6 hours  heparin  Infusion. 600 Unit(s)/Hr IV Continuous <Continuous>  heparin  Injectable 5000 Unit(s) IV Push every 6 hours PRN  heparin  Injectable 2500 Unit(s) IV Push every 6 hours PRN  insulin lispro (HumaLOG) corrective regimen sliding scale   SubCutaneous every 6 hours  lidocaine   Patch 1 Patch Transdermal daily  losartan 50 milliGRAM(s) Oral daily  melatonin 5 milliGRAM(s) Oral at bedtime  metoprolol tartrate 75 milliGRAM(s) Oral every 12 hours  nystatin    Suspension 280694 Unit(s) Oral four times a day  pantoprazole  Injectable 40 milliGRAM(s) IV Push daily  polyethylene glycol 3350 17 Gram(s) Oral daily  predniSONE   Tablet 10 milliGRAM(s) Oral daily  senna 2 Tablet(s) Oral at bedtime  venlafaxine 37.5 milliGRAM(s) Oral every 12 hours      PMHX/PSHX:  Insomnia  Iron deficiency anemia  Anxiety  Parkinson disease  Aortic stenosis  Neuropathy  Polymyalgia rheumatica  Diabetes  Hyperlipidemia  Hypertension  S/P TAVR (transcatheter aortic valve replacement)      Family history:  No pertinent family history in first degree relatives      ROS:     General:  No wt loss, fevers, chills, night sweats, fatigue,   Eyes:  Good vision, no reported pain  ENT:  No sore throat, pain, runny nose, dysphagia  CV:  No pain, palpitations, hypo/hypertension  Resp:  No dyspnea, cough, tachypnea, wheezing  GI:  See HPI  :  No pain, bleeding, incontinence, nocturia  Muscle:  No pain, weakness  Neuro:  No weakness, tingling, memory problems  Psych:  No fatigue, insomnia, mood problems, depression  Endocrine:  No polyuria, polydipsia, cold/heat intolerance  Heme:  No petechiae, ecchymosis, easy bruisability  Skin:  No rash, edema      PHYSICAL EXAM:     Vital Signs:  Vital Signs Last 24 Hrs  T(C): 36.9 (30 Aug 2019 06:01), Max: 36.9 (30 Aug 2019 06:01)  T(F): 98.5 (30 Aug 2019 06:01), Max: 98.5 (30 Aug 2019 06:01)  HR: 65 (30 Aug 2019 06:01) (59 - 162)  BP: 111/61 (30 Aug 2019 06:01) (102/53 - 121/84)  BP(mean): --  RR: 20 (30 Aug 2019 06:01) (18 - 24)  SpO2: 92% (30 Aug 2019 06:01) (89% - 97%)  Daily     Daily     GENERAL:  elderly lady, NAD  HEENT:  NCAT, NG tube+  CHEST:  coarse, rhonchus breath sounds  HEART:  irregularly irregular, S1, S2  ABDOMEN:  Soft, non-tender, non-distended  EXTREMITIES:  no edema  SKIN:  No rash  NEURO:  restless, AAOx3    LABS:                        11.5   16.60 )-----------( 195      ( 29 Aug 2019 08:25 )             36.5     08-29    144  |  103  |  48<H>  ----------------------------<  173<H>  4.0   |  24  |  0.92    Ca    9.3      29 Aug 2019 06:16  Phos  3.1     08-29  Mg     2.8     08-29        PT/INR - ( 29 Aug 2019 09:18 )   PT: 13.0 sec;   INR: 1.15 ratio         PTT - ( 29 Aug 2019 09:18 )  PTT:80.0 sec        Imaging:

## 2019-08-30 NOTE — GOALS OF CARE CONVERSATION - PERSONAL ADVANCE DIRECTIVE - CONVERSATION DETAILS
85     Palliative MD and LCSW entered patient's room while patient was having secretions suctioned.  Patient now s/p aspiration and rapid response and being transferred to MICU. Palliative will follow up with family on Tuesday secondary to acute medical status currently. Mrs. Mae is a 84 y/o lady with PMH of GERD, HTN, HLD, DM2 with neuropathy, Parkinson's, anxiety/depression, AS s/p TAVR on plavix, overactive bladder, ?CHF, PMR, COPD, who is admitted for hypoxic/hypercarbic respiratory failure in setting of sepsis, possibly COPD exacerbation in setting of URI/PNA. Course c/b UTI and acute metabolic encephalopathy.    Dx: Hypoxic/hypercarbic respiratory failure in setting of sepsis, possibly COPD exacerbation in setting of URI/PNA.           PNA          UTI - s/p Abts          OPD - on Chronic steroids         Acute metabolic encephalopathy - resolved         New Afib with RVR - Heparin drip         Severe MS         Oropharyngeal Dysphagia - Vocal cord Paralysis - s/p VC injection on 8/22          Hypernatremia         New MARY         Leukocytosis    Lace score 15.  Patient is a full code .      Palliative MD and LCSW entered patient's room while patient was having secretions suctioned.  Patient now s/p aspiration and rapid response and being transferred to MICU. Palliative will follow up with family on Tuesday secondary to acute medical status currently.

## 2019-08-30 NOTE — RAPID RESPONSE TEAM SUMMARY - NSADDTLFINDINGSRRT_GEN_ALL_CORE
Labs reviewed resolving leukocytosis to 12, Hb 10; BMP with acceptable electrolytes. CXR from this AM showing NGT in placed with no overt lung consolidations. Repeat ABG during RRT showing pH 7.48, pCO2 38, paO2 67, lactate 1.7. After deep suctioning x 2, holding TF, duonebs x 1, patient was more awake, alert, and RR improved to 25 and less labored breathing. Repeat CXR confirming appropriate NGT placement as well RML consolidation

## 2019-08-30 NOTE — CONSULT NOTE ADULT - PROBLEM SELECTOR RECOMMENDATION 9
pt was recently tx with sepsis 2/2 PNA  today developed another episode of acute respiratory distress likely 2/2 recurrent aspiration  pt has increase upper airway secretions  currently transferred to the MICU

## 2019-08-30 NOTE — PROGRESS NOTE ADULT - ASSESSMENT
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

## 2019-08-30 NOTE — CHART NOTE - NSCHARTNOTEFT_GEN_A_CORE
Informed by RN that pt in AF w/RVR with HR in the 170s and sating 83-89% ORA. Seen and examined pt at bedside. Pt admits to headache and states that it is unchanged from prior episodes, denies nausea, vomiting, visual changes. Admits to productive cough and SOB but denies chest pain, palpitations, dizziness, lightheadedness. Tachycardic but otherwise VSS. Physical exam: AOx3, mild-moderate distress, NGT in place; RRR, no murmurs, rubs, gallops; rhonchi and wheezes heard bilaterally on lung exam; abdomen soft, non tender, non distended, bowel sounds present. 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 Also with trigeminal neuralgia and migraine on the differential given right HA associated with maxillary/jaw pain and light sensitivity. Overnight with recurrent AF w/RVR and O2 saturation 83-89% ORA and c/o headache; was placed on 2LNC and continuous pulse ox with improvement; ABG done unremarkable; CXR prelim clear lungs with trace linear atelectasis; CTH 8/27 with no evidence of acute hemorrhage/infarct; Xopenex, Mucinex given for cough; Tylenol given for headache; AF self resolved to NSR. Cards and pulm following. Will relay to day team and continue to monitor.     Addendum: Called by RN that pt again in AF w/RVR with -190s and c/o persistent headache. Ordered morning dose metoprolol, venlafaxine, prednisone to be given. AF converted to NSR in the 60s. Informed by RN that pt in AF w/RVR with HR in the 170s and sating 83-89% ORA. Seen and examined pt at bedside. Pt admits to headache and states that it is unchanged from prior episodes, denies nausea, vomiting, visual changes. Admits to productive cough and SOB but denies chest pain, palpitations, dizziness, lightheadedness. Tachycardic but otherwise VSS. Physical exam: AOx3, mild-moderate distress, NGT in place; RRR, no murmurs, rubs, gallops; rhonchi and wheezes heard bilaterally on lung exam; abdomen soft, non tender, non distended, bowel sounds present. 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 Also with trigeminal neuralgia and migraine on the differential given right HA associated with maxillary/jaw pain and light sensitivity. Overnight with recurrent AF w/RVR and O2 saturation 83-89% ORA and c/o headache; was placed on 2LNC and continuous pulse ox with improvement; ABG done unremarkable; CXR prelim clear lungs with trace linear atelectasis; CTH 8/27 with no evidence of acute hemorrhage/infarct; Xopenex, Mucinex given for cough; Tylenol given for headache; AF self resolved to NSR. Cards and pulm following. Will relay to day team and continue to monitor.     Addendum at 05:00: Called by RN that pt again in AF w/RVR with -190s and c/o persistent headache. Ordered morning dose metoprolol, venlafaxine, prednisone to be given. AF converted to NSR in the 60s.

## 2019-08-30 NOTE — PROGRESS NOTE ADULT - PROBLEM SELECTOR PLAN 7
- Per son, no formal diagnosis of COPD  - pulmonary consult appreciated, rec adventitious lung sounds likely due to vocal cord paralysis, c/w with Pulmicort, albuterol and prednisone.   - c/w home dose prednisone 10mg daily chronically for PMR/?GCA. Right temporal artery biopsy reportedly negative, but she gets right sided headaches.  -Trigeminal neuralgia and migraine on the differential given right HA associated with maxillary/jaw pain and light sensitivity--c/w tylenol 1g q6h PRN given her    - Maintain O2 sats between 88-92%  - cold compresses for pain

## 2019-08-30 NOTE — PROGRESS NOTE ADULT - PROBLEM SELECTOR PLAN 2
- new onset Afib this admission  - c/w heparin gtt on while decision on PEG placement is still pending.  Eventual plan will be for DOAC per cardiology as patient's MS is not rheumatic in etiology   - metoprolol increased to 75 mg BID for rate control per cardiology  - c/w lasix 20 mg daily today as MARY resolved

## 2019-08-31 LAB
ALBUMIN SERPL ELPH-MCNC: 3 G/DL — LOW (ref 3.3–5)
ALP SERPL-CCNC: 39 U/L — LOW (ref 40–120)
ALT FLD-CCNC: 7 U/L — LOW (ref 10–45)
ANION GAP SERPL CALC-SCNC: 14 MMOL/L — SIGNIFICANT CHANGE UP (ref 5–17)
APTT BLD: 44.3 SEC — HIGH (ref 27.5–36.3)
APTT BLD: 51.9 SEC — HIGH (ref 27.5–36.3)
APTT BLD: 70.1 SEC — HIGH (ref 27.5–36.3)
APTT BLD: 72.9 SEC — HIGH (ref 27.5–36.3)
AST SERPL-CCNC: 14 U/L — SIGNIFICANT CHANGE UP (ref 10–40)
BASE EXCESS BLDA CALC-SCNC: 5 MMOL/L — HIGH (ref -2–2)
BILIRUB SERPL-MCNC: 0.4 MG/DL — SIGNIFICANT CHANGE UP (ref 0.2–1.2)
BUN SERPL-MCNC: 36 MG/DL — HIGH (ref 7–23)
CALCIUM SERPL-MCNC: 9 MG/DL — SIGNIFICANT CHANGE UP (ref 8.4–10.5)
CHLORIDE SERPL-SCNC: 102 MMOL/L — SIGNIFICANT CHANGE UP (ref 96–108)
CO2 BLDA-SCNC: 29 MMOL/L — SIGNIFICANT CHANGE UP (ref 22–30)
CO2 SERPL-SCNC: 26 MMOL/L — SIGNIFICANT CHANGE UP (ref 22–31)
CREAT SERPL-MCNC: 0.88 MG/DL — SIGNIFICANT CHANGE UP (ref 0.5–1.3)
CULTURE RESULTS: SIGNIFICANT CHANGE UP
GAS PNL BLDA: SIGNIFICANT CHANGE UP
GAS PNL BLDA: SIGNIFICANT CHANGE UP
GLUCOSE BLDC GLUCOMTR-MCNC: 115 MG/DL — HIGH (ref 70–99)
GLUCOSE BLDC GLUCOMTR-MCNC: 126 MG/DL — HIGH (ref 70–99)
GLUCOSE BLDC GLUCOMTR-MCNC: 161 MG/DL — HIGH (ref 70–99)
GLUCOSE SERPL-MCNC: 116 MG/DL — HIGH (ref 70–99)
GRAM STN FLD: SIGNIFICANT CHANGE UP
GRAM STN FLD: SIGNIFICANT CHANGE UP
HCO3 BLDA-SCNC: 28 MMOL/L — SIGNIFICANT CHANGE UP (ref 21–29)
HCT VFR BLD CALC: 30 % — LOW (ref 34.5–45)
HGB BLD-MCNC: 10 G/DL — LOW (ref 11.5–15.5)
HOROWITZ INDEX BLDA+IHG-RTO: 25 — SIGNIFICANT CHANGE UP
INR BLD: 1.18 RATIO — HIGH (ref 0.88–1.16)
MAGNESIUM SERPL-MCNC: 2.7 MG/DL — HIGH (ref 1.6–2.6)
MCHC RBC-ENTMCNC: 30.2 PG — SIGNIFICANT CHANGE UP (ref 27–34)
MCHC RBC-ENTMCNC: 33.3 GM/DL — SIGNIFICANT CHANGE UP (ref 32–36)
MCV RBC AUTO: 90.6 FL — SIGNIFICANT CHANGE UP (ref 80–100)
METHOD TYPE: SIGNIFICANT CHANGE UP
MSSA DNA SPEC QL NAA+PROBE: SIGNIFICANT CHANGE UP
PCO2 BLDA: 36 MMHG — SIGNIFICANT CHANGE UP (ref 32–46)
PH BLDA: 7.5 — HIGH (ref 7.35–7.45)
PHOSPHATE SERPL-MCNC: 2.9 MG/DL — SIGNIFICANT CHANGE UP (ref 2.5–4.5)
PLATELET # BLD AUTO: 145 K/UL — LOW (ref 150–400)
PO2 BLDA: 141 MMHG — HIGH (ref 74–108)
POTASSIUM SERPL-MCNC: 4.1 MMOL/L — SIGNIFICANT CHANGE UP (ref 3.5–5.3)
POTASSIUM SERPL-SCNC: 4.1 MMOL/L — SIGNIFICANT CHANGE UP (ref 3.5–5.3)
PROT SERPL-MCNC: 6.3 G/DL — SIGNIFICANT CHANGE UP (ref 6–8.3)
PROTHROM AB SERPL-ACNC: 13.6 SEC — HIGH (ref 10–12.9)
RBC # BLD: 3.31 M/UL — LOW (ref 3.8–5.2)
RBC # FLD: 14.8 % — HIGH (ref 10.3–14.5)
SAO2 % BLDA: 99 % — HIGH (ref 92–96)
SODIUM SERPL-SCNC: 142 MMOL/L — SIGNIFICANT CHANGE UP (ref 135–145)
SPECIMEN SOURCE: SIGNIFICANT CHANGE UP
WBC # BLD: 11.8 K/UL — HIGH (ref 3.8–10.5)
WBC # FLD AUTO: 11.8 K/UL — HIGH (ref 3.8–10.5)

## 2019-08-31 PROCEDURE — 99291 CRITICAL CARE FIRST HOUR: CPT

## 2019-08-31 PROCEDURE — 71045 X-RAY EXAM CHEST 1 VIEW: CPT | Mod: 26

## 2019-08-31 RX ORDER — TETRACAINE/BENZOCAINE/BUTAMBEN 2%-14%-2%
1 OINTMENT (GRAM) TOPICAL DAILY
Refills: 0 | Status: DISCONTINUED | OUTPATIENT
Start: 2019-08-31 | End: 2019-08-31

## 2019-08-31 RX ORDER — LIDOCAINE 4 G/100G
1 CREAM TOPICAL DAILY
Refills: 0 | Status: DISCONTINUED | OUTPATIENT
Start: 2019-08-31 | End: 2019-08-31

## 2019-08-31 RX ORDER — VANCOMYCIN HCL 1 G
1000 VIAL (EA) INTRAVENOUS EVERY 12 HOURS
Refills: 0 | Status: DISCONTINUED | OUTPATIENT
Start: 2019-08-31 | End: 2019-09-03

## 2019-08-31 RX ORDER — ACETAMINOPHEN 500 MG
1000 TABLET ORAL ONCE
Refills: 0 | Status: COMPLETED | OUTPATIENT
Start: 2019-08-31 | End: 2019-08-31

## 2019-08-31 RX ORDER — SODIUM CHLORIDE 9 MG/ML
3 INJECTION INTRAMUSCULAR; INTRAVENOUS; SUBCUTANEOUS EVERY 12 HOURS
Refills: 0 | Status: DISCONTINUED | OUTPATIENT
Start: 2019-08-31 | End: 2019-09-13

## 2019-08-31 RX ORDER — DEXMEDETOMIDINE HYDROCHLORIDE IN 0.9% SODIUM CHLORIDE 4 UG/ML
0.2 INJECTION INTRAVENOUS
Qty: 200 | Refills: 0 | Status: DISCONTINUED | OUTPATIENT
Start: 2019-08-31 | End: 2019-08-31

## 2019-08-31 RX ORDER — BENZOCAINE 10 %
1 GEL (GRAM) MUCOUS MEMBRANE ONCE
Refills: 0 | Status: DISCONTINUED | OUTPATIENT
Start: 2019-08-31 | End: 2019-08-31

## 2019-08-31 RX ADMIN — DEXMEDETOMIDINE HYDROCHLORIDE IN 0.9% SODIUM CHLORIDE 2.85 MICROGRAM(S)/KG/HR: 4 INJECTION INTRAVENOUS at 08:41

## 2019-08-31 RX ADMIN — ATORVASTATIN CALCIUM 40 MILLIGRAM(S): 80 TABLET, FILM COATED ORAL at 21:55

## 2019-08-31 RX ADMIN — Medication 1: at 11:16

## 2019-08-31 RX ADMIN — CARBIDOPA AND LEVODOPA 2 TABLET(S): 25; 100 TABLET ORAL at 11:16

## 2019-08-31 RX ADMIN — Medication 75 MILLIGRAM(S): at 21:53

## 2019-08-31 RX ADMIN — SODIUM CHLORIDE 3 MILLILITER(S): 9 INJECTION INTRAMUSCULAR; INTRAVENOUS; SUBCUTANEOUS at 17:38

## 2019-08-31 RX ADMIN — PIPERACILLIN AND TAZOBACTAM 25 GRAM(S): 4; .5 INJECTION, POWDER, LYOPHILIZED, FOR SOLUTION INTRAVENOUS at 05:11

## 2019-08-31 RX ADMIN — HEPARIN SODIUM 700 UNIT(S)/HR: 5000 INJECTION INTRAVENOUS; SUBCUTANEOUS at 03:23

## 2019-08-31 RX ADMIN — Medication 0.5 MILLIGRAM(S): at 08:41

## 2019-08-31 RX ADMIN — GABAPENTIN 300 MILLIGRAM(S): 400 CAPSULE ORAL at 21:53

## 2019-08-31 RX ADMIN — Medication 200 MILLIGRAM(S): at 11:16

## 2019-08-31 RX ADMIN — Medication 250 MILLIGRAM(S): at 17:35

## 2019-08-31 RX ADMIN — CHLORHEXIDINE GLUCONATE 1 APPLICATION(S): 213 SOLUTION TOPICAL at 05:11

## 2019-08-31 RX ADMIN — HEPARIN SODIUM 700 UNIT(S)/HR: 5000 INJECTION INTRAVENOUS; SUBCUTANEOUS at 09:48

## 2019-08-31 RX ADMIN — PANTOPRAZOLE SODIUM 40 MILLIGRAM(S): 20 TABLET, DELAYED RELEASE ORAL at 11:15

## 2019-08-31 RX ADMIN — POLYETHYLENE GLYCOL 3350 17 GRAM(S): 17 POWDER, FOR SOLUTION ORAL at 11:15

## 2019-08-31 RX ADMIN — PIPERACILLIN AND TAZOBACTAM 25 GRAM(S): 4; .5 INJECTION, POWDER, LYOPHILIZED, FOR SOLUTION INTRAVENOUS at 21:14

## 2019-08-31 RX ADMIN — SODIUM CHLORIDE 3 MILLILITER(S): 9 INJECTION INTRAMUSCULAR; INTRAVENOUS; SUBCUTANEOUS at 05:29

## 2019-08-31 RX ADMIN — Medication 20 MILLIGRAM(S): at 05:08

## 2019-08-31 RX ADMIN — Medication 1000 MILLIGRAM(S): at 17:50

## 2019-08-31 RX ADMIN — HEPARIN SODIUM 800 UNIT(S)/HR: 5000 INJECTION INTRAVENOUS; SUBCUTANEOUS at 16:30

## 2019-08-31 RX ADMIN — Medication 400 MILLIGRAM(S): at 17:35

## 2019-08-31 RX ADMIN — Medication 200 MILLIGRAM(S): at 05:08

## 2019-08-31 RX ADMIN — Medication 10 MILLIGRAM(S): at 05:11

## 2019-08-31 RX ADMIN — Medication 75 MILLIGRAM(S): at 05:08

## 2019-08-31 RX ADMIN — CARBIDOPA AND LEVODOPA 2 TABLET(S): 25; 100 TABLET ORAL at 05:08

## 2019-08-31 RX ADMIN — HEPARIN SODIUM 800 UNIT(S)/HR: 5000 INJECTION INTRAVENOUS; SUBCUTANEOUS at 22:30

## 2019-08-31 RX ADMIN — SENNA PLUS 2 TABLET(S): 8.6 TABLET ORAL at 21:54

## 2019-08-31 RX ADMIN — CHLORHEXIDINE GLUCONATE 15 MILLILITER(S): 213 SOLUTION TOPICAL at 05:08

## 2019-08-31 RX ADMIN — Medication 0.25 MILLIGRAM(S): at 17:36

## 2019-08-31 RX ADMIN — Medication 100 MILLIGRAM(S): at 05:08

## 2019-08-31 RX ADMIN — Medication 0.25 MILLIGRAM(S): at 05:29

## 2019-08-31 RX ADMIN — PIPERACILLIN AND TAZOBACTAM 25 GRAM(S): 4; .5 INJECTION, POWDER, LYOPHILIZED, FOR SOLUTION INTRAVENOUS at 13:53

## 2019-08-31 RX ADMIN — CARBIDOPA AND LEVODOPA 2 TABLET(S): 25; 100 TABLET ORAL at 21:54

## 2019-08-31 NOTE — PROGRESS NOTE ADULT - ATTENDING COMMENTS
86 y/o F w/parkinson's disease, vocal cord paralysis, HFpEF, severe mitral stenosis, questionable COPD, and dysphagia admitted for aspiration PNA now admitted to MICU for acute hypoxemic respiratory failure likely secondary to aspiration event. Respiratory status now significantly improved. Staph aureus bacteremia.     - Continue sinemet  - Daily SAT/SBT, possible extubation  - Continue broad spectrum abx for likely aspiration PNA  - Continue vancomycin for staph aureus bacteremia, follow up sensitivities  - Serial blood cultures until cleared, if cultures remain persistently positive will need workup for possible endocarditis  - Continue anticoagulation/rate control for Afib  - Continue lasix goal net even to negative    Attending critical care time 35 minutes

## 2019-08-31 NOTE — AIRWAY REMOVAL NOTE  ADULT & PEDS - ARTIFICAL AIRWAY REMOVAL COMMENTS
Written order for extubation verified. The patient was identified by full name and birth date compared to the identification band. Present during the procedure was rt and jeesica VILLANUEVA

## 2019-08-31 NOTE — PROGRESS NOTE ADULT - ASSESSMENT
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 Transferred to MICU for acute respiratory distress, s/p intubation.    #Neuro   - hx of delirium: Sinemet 50/200 TID for Parkinson-like symptom. On venlafaxine 37.5mg q12h during hospital stay, duloxetine not compatible with tube feed.   - currently still sedated fentanyl and prop from intubation. She was lethargic on floors after RRT in setting of RR to 40s.    #Respiratory   - Acute respiratory failure with hypoxia, likely 2/2 to aspiration.    - Hx of COPD  - Treated on 7 days of antibiotics for PNA.   - Rhonchi b/l on exam during RRT, intubated with a 6.0 tube in the MICU today per ENT recs (patient has vocal chord paralysis)   - Consider repeat CT chest     #CV  - New onset afib during hospital stay   - Holding heparin gtt for afib. Will restart after repeat ptt results.  - On Metoprolol 75 mg BID for rate control during hospital stay   - On amlodipine 5mg QD and 50mg QD of losartan for BP at home, HOLD BP low post intubation   - Currently on Levophed gtt, to stabilize BP during intubation.  - JEANNETTE done: Severe MS, Increased RV pressure and dilated Left atrium, functioning TAVR. Findings c/w valvular heart failure or heart failure with preserved EF  - Hx of HF, Lasix 20 mg daily, monitor BMP for kidney function  - Cardiology following regarding MS    #ID   - PNA likely from aspiration  - f/u cultures.  - Continue zosyn.    #GI   - NPO now, OG tube and tube feeds to be continued after patient stable   - PEG placement had been delayed due to afib, consult GI once patient is stable   - new onset dysphagia during hospital stay, failed speech and swallow study and MBS  - s/p vocal cord injection for paralysis by ENT.   - s/p tx with diflucan and nystatin for oral candidiasis    #Endo   - Hx of DM   - HbA1C is 6  - Continue fs and sliding scale     #GOC   - Patient still full code per son's request   - Palliative care consulted, f/u recs. 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 Transferred to MICU for acute respiratory distress, s/p intubation.    #Neuro   - hx of delirium: Sinemet 50/200 TID for Parkinson-like symptom.   - currently on precedex for SBT.  - Following commands, no longer lethargic    #Respiratory   - Acute respiratory failure with hypoxia, likely 2/2 to aspiration.    - Hx of COPD  - Treated for PNA on this admission with ceftriazone and flagyl.  - Rhonchi b/l on exam during RRT, intubated with a 7.0 tube in the MICU today per ENT recs (patient has vocal chord paralysis).    #CV  - New onset afib during hospital stay. Restarted heparin gtt.  - On Metoprolol 75 mg BID for rate control during hospital stay.  - Holding amlodipine 5mg QD and 50mg QD losartan.  - Off pressors and HDS.  - JEANNETTE done: Severe MS, Increased RV pressure and dilated Left atrium, functioning TAVR. Findings c/w valvular heart failure or heart failure with preserved EF  - Hx of HF, Lasix 20 mg daily, monitor BMP for kidney function  - Cardiology following regarding MS    #ID   - PNA likely from aspiration  - Cultures growing Gram positive cocci in clusters.  - Continue zosyn and start vanc.    #GI   - NPO for now.  - PEG placement had been delayed due to afib.  - new onset dysphagia during hospital stay, failed speech and swallow study and MBS  - s/p vocal cord injection for paralysis by ENT.   - s/p tx with diflucan and nystatin for oral candidiasis    #Endo   - Hx of DM   - HbA1C is 6    #GOC   - Patient still full code.  - Palliative care consulted, f/u recs.

## 2019-08-31 NOTE — PROGRESS NOTE ADULT - SUBJECTIVE AND OBJECTIVE BOX
Noble Granado, PGY2  Internal Medicine  Pager [phone] ([facility])    OVERNIGHT EVENTS / SUBJECTIVE: Patient seen and examined at bedside.     OBJECTIVE:    VITAL SIGNS:  ICU Vital Signs Last 24 Hrs  T(C): 37.3 (31 Aug 2019 08:00), Max: 37.6 (30 Aug 2019 14:28)  T(F): 99.1 (31 Aug 2019 08:00), Max: 99.7 (30 Aug 2019 14:28)  HR: 67 (31 Aug 2019 08:31) (62 - 89)  BP: 122/55 (31 Aug 2019 08:00) (76/32 - 164/73)  BP(mean): 78 (31 Aug 2019 08:) (51 - 105)  ABP: --  ABP(mean): --  RR: 21 (31 Aug 2019 08:00) (12 - 41)  SpO2: 99% (31 Aug 2019 08:) (84% - 100%)    Mode: CPAP with PS, FiO2: 21, PEEP: 5, PS: 10, MAP: 9, PIP: 16     @ : @ 07:00  --------------------------------------------------------  IN: 529.4 mL / OUT: 730 mL / NET: -200.6 mL     @ 07: @ 08:43  --------------------------------------------------------  IN: 34.4 mL / OUT: 150 mL / NET: -115.6 mL      CAPILLARY BLOOD GLUCOSE      POCT Blood Glucose.: 115 mg/dL (31 Aug 2019 05:07)      PHYSICAL EXAM:      MEDICATIONS:  MEDICATIONS  (STANDING):  atorvastatin 40 milliGRAM(s) Oral <User Schedule>  buDESOnide    Inhalation Suspension 0.25 milliGRAM(s) Inhalation two times a day  carbidopa/levodopa  25/100 2 Tablet(s) Oral three times a day  chlorhexidine 0.12% Liquid 15 milliLiter(s) Oral Mucosa every 12 hours  chlorhexidine 4% Liquid 1 Application(s) Topical <User Schedule>  dexmedetomidine Infusion 0.2 MICROgram(s)/kG/Hr (2.845 mL/Hr) IV Continuous <Continuous>  dextrose 5%. 1000 milliLiter(s) (50 mL/Hr) IV Continuous <Continuous>  dextrose 50% Injectable 12.5 Gram(s) IV Push once  dextrose 50% Injectable 25 Gram(s) IV Push once  dextrose 50% Injectable 25 Gram(s) IV Push once  docusate sodium Liquid 100 milliGRAM(s) Oral two times a day  furosemide    Tablet 20 milliGRAM(s) Oral daily  gabapentin   Solution 300 milliGRAM(s) Oral at bedtime  guaiFENesin    Syrup 200 milliGRAM(s) Oral every 6 hours  heparin  Infusion. 600 Unit(s)/Hr (6 mL/Hr) IV Continuous <Continuous>  insulin lispro (HumaLOG) corrective regimen sliding scale   SubCutaneous every 6 hours  lidocaine   Patch 1 Patch Transdermal daily  metoprolol tartrate 75 milliGRAM(s) Oral every 12 hours  norepinephrine Infusion 0.05 MICROgram(s)/kG/Min (5.334 mL/Hr) IV Continuous <Continuous>  pantoprazole  Injectable 40 milliGRAM(s) IV Push daily  piperacillin/tazobactam IVPB.. 3.375 Gram(s) IV Intermittent every 8 hours  polyethylene glycol 3350 17 Gram(s) Oral daily  predniSONE   Tablet 10 milliGRAM(s) Oral daily  propofol Infusion 10 MICROgram(s)/kG/Min (3.822 mL/Hr) IV Continuous <Continuous>  senna 2 Tablet(s) Oral at bedtime  sodium chloride 3%  Inhalation 3 milliLiter(s) Inhalation every 12 hours  tetracaine/benzocaine/butamben Spray 1 Spray(s) Topical daily    MEDICATIONS  (PRN):  acetaminophen    Suspension .. 1000 milliGRAM(s) Oral every 6 hours PRN Mild Pain (1 - 3), Moderate Pain (4 - 6)  ALPRAZolam 0.5 milliGRAM(s) Oral two times a day PRN anxiety  dextrose 40% Gel 15 Gram(s) Oral once PRN Blood Glucose LESS THAN 70 milliGRAM(s)/deciliter  glucagon  Injectable 1 milliGRAM(s) IntraMuscular once PRN Glucose LESS THAN 70 milligrams/deciliter      ALLERGIES:  Allergies    penicillin (Unknown)    Intolerances        LABS:                        10.0   11.8  )-----------( 145      ( 31 Aug 2019 02:44 )             30.0     Hemoglobin: 10.0 g/dL ( @ 02:44)  Hemoglobin: 11.1 g/dL ( @ 15:44)  Hemoglobin: 10.4 g/dL ( @ 09:38)  Hemoglobin: 11.5 g/dL ( @ 08:25)  Hemoglobin: 12.6 g/dL ( @ 16:33)    CBC Full  -  ( 31 Aug 2019 02:44 )  WBC Count : 11.8 K/uL  RBC Count : 3.31 M/uL  Hemoglobin : 10.0 g/dL  Hematocrit : 30.0 %  Platelet Count - Automated : 145 K/uL  Mean Cell Volume : 90.6 fl  Mean Cell Hemoglobin : 30.2 pg  Mean Cell Hemoglobin Concentration : 33.3 gm/dL  Auto Neutrophil # : x  Auto Lymphocyte # : x  Auto Monocyte # : x  Auto Eosinophil # : x  Auto Basophil # : x  Auto Neutrophil % : x  Auto Lymphocyte % : x  Auto Monocyte % : x  Auto Eosinophil % : x  Auto Basophil % : x        142  |  102  |  36<H>  ----------------------------<  116<H>  4.1   |  26  |  0.88    Ca    9.0      31 Aug 2019 02:44  Phos  2.9       Mg     2.7         TPro  6.3  /  Alb  3.0<L>  /  TBili  0.4  /  DBili  x   /  AST  14  /  ALT  7<L>  /  AlkPhos  39<L>      Creatinine Trend: 0.88<--, 0.79<--, 0.83<--, 0.92<--, 1.05<--, 1.18<--  LIVER FUNCTIONS - ( 31 Aug 2019 02:44 )  Alb: 3.0 g/dL / Pro: 6.3 g/dL / ALK PHOS: 39 U/L / ALT: 7 U/L / AST: 14 U/L / GGT: x           PT/INR - ( 31 Aug 2019 02:44 )   PT: 13.6 sec;   INR: 1.18 ratio         PTT - ( 31 Aug 2019 02:44 )  PTT:44.3 sec    hs Troponin:    ABG - ( 31 Aug 2019 02:41 )  pH, Arterial: 7.48  pH, Blood: x     /  pCO2: 41    /  pO2: 102   / HCO3: 30    / Base Excess: 6.0   /  SaO2: 98                  02:41 - ABG - pH: 7.48  |  pCO2: 41    |  pO2: 102   | Lactate:       | BE: 6.0    15:33 - ABG - pH: 7.50  |  pCO2: 36    |  pO2: 125   | Lactate:       | BE: 4.8    13:00 - ABG - pH: 7.48  |  pCO2: 38    |  pO2: 67    | Lactate:       | BE: 5.2        Urinalysis Basic - ( 30 Aug 2019 15:44 )    Color: Yellow / Appearance: Clear / S.020 / pH: x  Gluc: x / Ketone: Negative  / Bili: Negative / Urobili: Negative   Blood: x / Protein: Trace / Nitrite: Positive   Leuk Esterase: Large / RBC: 5 /hpf / WBC >50   Sq Epi: x / Non Sq Epi: 5 / Bacteria: Many      CSF:          EKG:   MICROBIOLOGY:    IMAGING:      Labs, imaging, EKG personally reviewed    RADIOLOGY & ADDITIONAL TESTS: Reviewed. Noble Granado, PGY2  Internal Medicine  Pager [phone] ([facility])    OVERNIGHT EVENTS / SUBJECTIVE: Tolerated SBT. On zosyn, precedex and heparin gtt. Plan to extubate.    OBJECTIVE:    VITAL SIGNS:  ICU Vital Signs Last 24 Hrs  T(C): 37.3 (31 Aug 2019 08:00), Max: 37.6 (30 Aug 2019 14:28)  T(F): 99.1 (31 Aug 2019 08:00), Max: 99.7 (30 Aug 2019 14:28)  HR: 67 (31 Aug 2019 08:31) (62 - 89)  BP: 122/55 (31 Aug 2019 08:00) (76/32 - 164/73)  BP(mean): 78 (31 Aug 2019 08:00) (51 - 105)  ABP: --  ABP(mean): --  RR: 21 (31 Aug 2019 08:00) (12 - 41)  SpO2: 99% (31 Aug 2019 08:31) (84% - 100%)    Mode: CPAP with PS, FiO2: 21, PEEP: 5, PS: 10, MAP: 9, PIP: 16     @ 07: @ 07:00  --------------------------------------------------------  IN: 529.4 mL / OUT: 730 mL / NET: -200.6 mL     @ 07: @ 08:43  --------------------------------------------------------  IN: 34.4 mL / OUT: 150 mL / NET: -115.6 mL      CAPILLARY BLOOD GLUCOSE      POCT Blood Glucose.: 115 mg/dL (31 Aug 2019 05:07)    PHYSICAL EXAM:  GENERAL: following commands, intubated  HEAD:  Atraumatic, Normocephalic  EYES: EOMI, conjunctiva and sclera clear  NECK: Supple, No JVD  CHEST/LUNG: Clear to auscultation bilaterally; No wheeze, ronchi or rales  HEART: Regular rate and rhythm; No murmurs, rubs, or gallops  ABDOMEN: Soft, Nontender, Nondistended; Bowel sounds present  EXTREMITIES:  2+ Peripheral Pulses, No clubbing, cyanosis, or edema  NEUROLOGY: following commands  SKIN: No rashes or lesions    MEDICATIONS:  MEDICATIONS  (STANDING):  atorvastatin 40 milliGRAM(s) Oral <User Schedule>  buDESOnide    Inhalation Suspension 0.25 milliGRAM(s) Inhalation two times a day  carbidopa/levodopa  25/100 2 Tablet(s) Oral three times a day  chlorhexidine 0.12% Liquid 15 milliLiter(s) Oral Mucosa every 12 hours  chlorhexidine 4% Liquid 1 Application(s) Topical <User Schedule>  dexmedetomidine Infusion 0.2 MICROgram(s)/kG/Hr (2.845 mL/Hr) IV Continuous <Continuous>  dextrose 5%. 1000 milliLiter(s) (50 mL/Hr) IV Continuous <Continuous>  dextrose 50% Injectable 12.5 Gram(s) IV Push once  dextrose 50% Injectable 25 Gram(s) IV Push once  dextrose 50% Injectable 25 Gram(s) IV Push once  docusate sodium Liquid 100 milliGRAM(s) Oral two times a day  furosemide    Tablet 20 milliGRAM(s) Oral daily  gabapentin   Solution 300 milliGRAM(s) Oral at bedtime  guaiFENesin    Syrup 200 milliGRAM(s) Oral every 6 hours  heparin  Infusion. 600 Unit(s)/Hr (6 mL/Hr) IV Continuous <Continuous>  insulin lispro (HumaLOG) corrective regimen sliding scale   SubCutaneous every 6 hours  lidocaine   Patch 1 Patch Transdermal daily  metoprolol tartrate 75 milliGRAM(s) Oral every 12 hours  norepinephrine Infusion 0.05 MICROgram(s)/kG/Min (5.334 mL/Hr) IV Continuous <Continuous>  pantoprazole  Injectable 40 milliGRAM(s) IV Push daily  piperacillin/tazobactam IVPB.. 3.375 Gram(s) IV Intermittent every 8 hours  polyethylene glycol 3350 17 Gram(s) Oral daily  predniSONE   Tablet 10 milliGRAM(s) Oral daily  propofol Infusion 10 MICROgram(s)/kG/Min (3.822 mL/Hr) IV Continuous <Continuous>  senna 2 Tablet(s) Oral at bedtime  sodium chloride 3%  Inhalation 3 milliLiter(s) Inhalation every 12 hours  tetracaine/benzocaine/butamben Spray 1 Spray(s) Topical daily    MEDICATIONS  (PRN):  acetaminophen    Suspension .. 1000 milliGRAM(s) Oral every 6 hours PRN Mild Pain (1 - 3), Moderate Pain (4 - 6)  ALPRAZolam 0.5 milliGRAM(s) Oral two times a day PRN anxiety  dextrose 40% Gel 15 Gram(s) Oral once PRN Blood Glucose LESS THAN 70 milliGRAM(s)/deciliter  glucagon  Injectable 1 milliGRAM(s) IntraMuscular once PRN Glucose LESS THAN 70 milligrams/deciliter      ALLERGIES:  Allergies    penicillin (Unknown)    Intolerances        LABS:                        10.0   11.8  )-----------( 145      ( 31 Aug 2019 02:44 )             30.0     Hemoglobin: 10.0 g/dL ( @ 02:44)  Hemoglobin: 11.1 g/dL ( @ 15:44)  Hemoglobin: 10.4 g/dL ( @ 09:38)  Hemoglobin: 11.5 g/dL ( @ 08:25)  Hemoglobin: 12.6 g/dL ( @ 16:33)    CBC Full  -  ( 31 Aug 2019 02:44 )  WBC Count : 11.8 K/uL  RBC Count : 3.31 M/uL  Hemoglobin : 10.0 g/dL  Hematocrit : 30.0 %  Platelet Count - Automated : 145 K/uL  Mean Cell Volume : 90.6 fl  Mean Cell Hemoglobin : 30.2 pg  Mean Cell Hemoglobin Concentration : 33.3 gm/dL  Auto Neutrophil # : x  Auto Lymphocyte # : x  Auto Monocyte # : x  Auto Eosinophil # : x  Auto Basophil # : x  Auto Neutrophil % : x  Auto Lymphocyte % : x  Auto Monocyte % : x  Auto Eosinophil % : x  Auto Basophil % : x        142  |  102  |  36<H>  ----------------------------<  116<H>  4.1   |  26  |  0.88    Ca    9.0      31 Aug 2019 02:44  Phos  2.9       Mg     2.7         TPro  6.3  /  Alb  3.0<L>  /  TBili  0.4  /  DBili  x   /  AST  14  /  ALT  7<L>  /  AlkPhos  39<L>      Creatinine Trend: 0.88<--, 0.79<--, 0.83<--, 0.92<--, 1.05<--, 1.18<--  LIVER FUNCTIONS - ( 31 Aug 2019 02:44 )  Alb: 3.0 g/dL / Pro: 6.3 g/dL / ALK PHOS: 39 U/L / ALT: 7 U/L / AST: 14 U/L / GGT: x           PT/INR - ( 31 Aug 2019 02:44 )   PT: 13.6 sec;   INR: 1.18 ratio         PTT - ( 31 Aug 2019 02:44 )  PTT:44.3 sec    hs Troponin:    ABG - ( 31 Aug 2019 02:41 )  pH, Arterial: 7.48  pH, Blood: x     /  pCO2: 41    /  pO2: 102   / HCO3: 30    / Base Excess: 6.0   /  SaO2: 98                  02:41 - ABG - pH: 7.48  |  pCO2: 41    |  pO2: 102   | Lactate:       | BE: 6.0    15:33 - ABG - pH: 7.50  |  pCO2: 36    |  pO2: 125   | Lactate:       | BE: 4.8    13:00 - ABG - pH: 7.48  |  pCO2: 38    |  pO2: 67    | Lactate:       | BE: 5.2        Urinalysis Basic - ( 30 Aug 2019 15:44 )    Color: Yellow / Appearance: Clear / S.020 / pH: x  Gluc: x / Ketone: Negative  / Bili: Negative / Urobili: Negative   Blood: x / Protein: Trace / Nitrite: Positive   Leuk Esterase: Large / RBC: 5 /hpf / WBC >50   Sq Epi: x / Non Sq Epi: 5 / Bacteria: Many      CSF:          EKG:   MICROBIOLOGY:    IMAGING:      Labs, imaging, EKG personally reviewed    RADIOLOGY & ADDITIONAL TESTS: Reviewed.

## 2019-09-01 LAB
ALBUMIN SERPL ELPH-MCNC: 2.9 G/DL — LOW (ref 3.3–5)
ALP SERPL-CCNC: 43 U/L — SIGNIFICANT CHANGE UP (ref 40–120)
ALT FLD-CCNC: 5 U/L — LOW (ref 10–45)
ANION GAP SERPL CALC-SCNC: 13 MMOL/L — SIGNIFICANT CHANGE UP (ref 5–17)
APTT BLD: 86.1 SEC — HIGH (ref 27.5–36.3)
AST SERPL-CCNC: 18 U/L — SIGNIFICANT CHANGE UP (ref 10–40)
BILIRUB SERPL-MCNC: 0.5 MG/DL — SIGNIFICANT CHANGE UP (ref 0.2–1.2)
BUN SERPL-MCNC: 28 MG/DL — HIGH (ref 7–23)
CALCIUM SERPL-MCNC: 9.3 MG/DL — SIGNIFICANT CHANGE UP (ref 8.4–10.5)
CHLORIDE SERPL-SCNC: 101 MMOL/L — SIGNIFICANT CHANGE UP (ref 96–108)
CO2 SERPL-SCNC: 25 MMOL/L — SIGNIFICANT CHANGE UP (ref 22–31)
CREAT SERPL-MCNC: 0.78 MG/DL — SIGNIFICANT CHANGE UP (ref 0.5–1.3)
GAS PNL BLDA: SIGNIFICANT CHANGE UP
GLUCOSE BLDC GLUCOMTR-MCNC: 101 MG/DL — HIGH (ref 70–99)
GLUCOSE BLDC GLUCOMTR-MCNC: 109 MG/DL — HIGH (ref 70–99)
GLUCOSE BLDC GLUCOMTR-MCNC: 150 MG/DL — HIGH (ref 70–99)
GLUCOSE SERPL-MCNC: 110 MG/DL — HIGH (ref 70–99)
HCT VFR BLD CALC: 30.1 % — LOW (ref 34.5–45)
HGB BLD-MCNC: 9.7 G/DL — LOW (ref 11.5–15.5)
MAGNESIUM SERPL-MCNC: 2.5 MG/DL — SIGNIFICANT CHANGE UP (ref 1.6–2.6)
MCHC RBC-ENTMCNC: 30 PG — SIGNIFICANT CHANGE UP (ref 27–34)
MCHC RBC-ENTMCNC: 32.3 GM/DL — SIGNIFICANT CHANGE UP (ref 32–36)
MCV RBC AUTO: 93.1 FL — SIGNIFICANT CHANGE UP (ref 80–100)
PHOSPHATE SERPL-MCNC: 4 MG/DL — SIGNIFICANT CHANGE UP (ref 2.5–4.5)
PLATELET # BLD AUTO: 134 K/UL — LOW (ref 150–400)
POTASSIUM SERPL-MCNC: 3.7 MMOL/L — SIGNIFICANT CHANGE UP (ref 3.5–5.3)
POTASSIUM SERPL-SCNC: 3.7 MMOL/L — SIGNIFICANT CHANGE UP (ref 3.5–5.3)
PROT SERPL-MCNC: 6.3 G/DL — SIGNIFICANT CHANGE UP (ref 6–8.3)
RBC # BLD: 3.23 M/UL — LOW (ref 3.8–5.2)
RBC # FLD: 14.6 % — HIGH (ref 10.3–14.5)
SODIUM SERPL-SCNC: 139 MMOL/L — SIGNIFICANT CHANGE UP (ref 135–145)
WBC # BLD: 12.7 K/UL — HIGH (ref 3.8–10.5)
WBC # FLD AUTO: 12.7 K/UL — HIGH (ref 3.8–10.5)

## 2019-09-01 PROCEDURE — 99233 SBSQ HOSP IP/OBS HIGH 50: CPT | Mod: GC

## 2019-09-01 PROCEDURE — 99233 SBSQ HOSP IP/OBS HIGH 50: CPT

## 2019-09-01 RX ORDER — ALBUTEROL 90 UG/1
2.5 AEROSOL, METERED ORAL EVERY 6 HOURS
Refills: 0 | Status: DISCONTINUED | OUTPATIENT
Start: 2019-09-01 | End: 2019-09-13

## 2019-09-01 RX ORDER — ACETAMINOPHEN 500 MG
650 TABLET ORAL EVERY 6 HOURS
Refills: 0 | Status: COMPLETED | OUTPATIENT
Start: 2019-09-01 | End: 2019-09-01

## 2019-09-01 RX ADMIN — Medication 20 MILLIGRAM(S): at 05:06

## 2019-09-01 RX ADMIN — Medication 100 MILLIGRAM(S): at 17:39

## 2019-09-01 RX ADMIN — SODIUM CHLORIDE 3 MILLILITER(S): 9 INJECTION INTRAMUSCULAR; INTRAVENOUS; SUBCUTANEOUS at 17:56

## 2019-09-01 RX ADMIN — Medication 100 MILLIGRAM(S): at 05:06

## 2019-09-01 RX ADMIN — Medication 75 MILLIGRAM(S): at 11:07

## 2019-09-01 RX ADMIN — CARBIDOPA AND LEVODOPA 2 TABLET(S): 25; 100 TABLET ORAL at 05:07

## 2019-09-01 RX ADMIN — POLYETHYLENE GLYCOL 3350 17 GRAM(S): 17 POWDER, FOR SOLUTION ORAL at 11:06

## 2019-09-01 RX ADMIN — Medication 650 MILLIGRAM(S): at 18:18

## 2019-09-01 RX ADMIN — PIPERACILLIN AND TAZOBACTAM 25 GRAM(S): 4; .5 INJECTION, POWDER, LYOPHILIZED, FOR SOLUTION INTRAVENOUS at 05:07

## 2019-09-01 RX ADMIN — Medication 10 MILLIGRAM(S): at 05:07

## 2019-09-01 RX ADMIN — ALBUTEROL 2.5 MILLIGRAM(S): 90 AEROSOL, METERED ORAL at 23:46

## 2019-09-01 RX ADMIN — Medication 250 MILLIGRAM(S): at 05:05

## 2019-09-01 RX ADMIN — ALBUTEROL 2.5 MILLIGRAM(S): 90 AEROSOL, METERED ORAL at 17:56

## 2019-09-01 RX ADMIN — Medication 200 MILLIGRAM(S): at 17:39

## 2019-09-01 RX ADMIN — HEPARIN SODIUM 800 UNIT(S)/HR: 5000 INJECTION INTRAVENOUS; SUBCUTANEOUS at 05:17

## 2019-09-01 RX ADMIN — Medication 250 MILLIGRAM(S): at 17:39

## 2019-09-01 RX ADMIN — CHLORHEXIDINE GLUCONATE 1 APPLICATION(S): 213 SOLUTION TOPICAL at 06:06

## 2019-09-01 RX ADMIN — PANTOPRAZOLE SODIUM 40 MILLIGRAM(S): 20 TABLET, DELAYED RELEASE ORAL at 11:06

## 2019-09-01 RX ADMIN — PIPERACILLIN AND TAZOBACTAM 25 GRAM(S): 4; .5 INJECTION, POWDER, LYOPHILIZED, FOR SOLUTION INTRAVENOUS at 13:18

## 2019-09-01 RX ADMIN — ALBUTEROL 2.5 MILLIGRAM(S): 90 AEROSOL, METERED ORAL at 11:59

## 2019-09-01 RX ADMIN — Medication 0.25 MILLIGRAM(S): at 05:21

## 2019-09-01 RX ADMIN — SODIUM CHLORIDE 3 MILLILITER(S): 9 INJECTION INTRAMUSCULAR; INTRAVENOUS; SUBCUTANEOUS at 05:23

## 2019-09-01 RX ADMIN — ALBUTEROL 2.5 MILLIGRAM(S): 90 AEROSOL, METERED ORAL at 05:21

## 2019-09-01 RX ADMIN — Medication 0.25 MILLIGRAM(S): at 17:56

## 2019-09-01 RX ADMIN — PIPERACILLIN AND TAZOBACTAM 25 GRAM(S): 4; .5 INJECTION, POWDER, LYOPHILIZED, FOR SOLUTION INTRAVENOUS at 22:29

## 2019-09-01 RX ADMIN — Medication 200 MILLIGRAM(S): at 11:06

## 2019-09-01 RX ADMIN — CARBIDOPA AND LEVODOPA 2 TABLET(S): 25; 100 TABLET ORAL at 13:18

## 2019-09-01 NOTE — CHART NOTE - NSCHARTNOTEFT_GEN_A_CORE
Transfer to:  (X) Medicine    (  ) Telemetry    (  ) RCU    (  ) Palliative    (  ) Stroke Unit    (  ) _______________  Accepting physician: Dr. Yuri Clemons     HPI:  84yo F with GERD, HTN, HLD, DM2 , Parkinsonisms, anxiety/depression, AS s/p TAVR on plavix, HFpEF with severe Mitral Stenosis, PMR (on chronic steroids), COPD who initially was admitted for acute hypoxic/hypercarbic respiratory failure in setting of pneumonia and COPD exacerbation, hospital course c/b vocal cord paralysis s/p botox injection as well as new onset afib and dysphagia. RRT was called for increased WOB while on tube feedings. Patient was initially noted to be tachypneic to the 40s with altered mentation for which TF were held. VS T 99.9 (rectal), SBP 110s, RR 40 and desaturating on 6L NC to the 80s. On NRB, patient improved to 100%. Tele afib with rate of 60s on heparin gtt. .  Labs reviewed resolving leukocytosis to 12, Hb 10; BMP with acceptable electrolytes. CXR from this AM showing NGT in placed with no overt lung consolidations. Repeat ABG during RRT showing pH 7.48, pCO2 38, paO2 67, lactate 1.7. After deep suctioning x 2, holding TF, duonebs x 1, patient was more awake, alert, and RR improved to 25 and less labored breathing. Repeat CXR confirming appropriate NGT placement as well RML consolidation      MICU COURSE:    In MICU, patient was continued on Zosyn. In addition Vanco was started for single bottle of Staph aureus on 8/30 culture.         ASSESSMENT & PLAN:     84yo F with GERD, HTN, HLD, Parkinsonism, anxiety/depression, AS s/p TAVR on plavix, overactive bladder, HFpEF with severe Mitral Stenosis, PMR (on chronic steroids), COPD (no formal diagnosis) presented 8/11/19 with SOB and abdominal pain, admitted for acute hypoxic and hypercarbic respiratory failure in setting of pneumonia and COPD exacerbation, course c/b UTI, acute metabolic encephalopathy, new onset afib and dysphagia, now s/p RRT for increased work of breathing in setting of aspiration pneumonia. Transferred to MICU for acute respiratory distress, s/p intubation now s/p extubation    #Neuro   - Sinemet 50/200 TID for Parkinson-like symptom. .  - Following commands, no longer lethargic    #Respiratory   - Acute respiratory failure with hypoxia, likely 2/2 to aspiration in setting of COPD   - patient with CRITICAL AIRWAY DUE TO VOCAL CORD PARALYSIS  - Treated for PNA on this admission with ceftriazone and flagyl.    #CV  - New onset afib fro which patient was started on heparin gtt.  - c/w  Metoprolol 75 mg BID for rate control   - Holding amlodipine 5mg QD and 50mg QD losartan.  - JEANNETTE done: Severe MS, Increased RV pressure and dilated Left atrium, functioning TAVR. Findings c/w valvular heart failure or heart failure with preserved EF  - lasix 20 mg daily  - Cardiology following regarding MS    #ID   - Continue zosyn (day 3/7)  - Staph aureus in sputum on 8.30 ctx, tx with Vanco for now until sensitivities return    #GI   - failed speech and swallow study and MBS  - NPO for now.  - PEG placement had been delayed due to afib, currently on heparin gtt    - s/p tx with diflucan and nystatin for oral candidiasis    #Endo   - Reported history of DM, current A1c 6.0   - continue monitoring FSG until steady PO intake is established    #GOC   - Patient still full code.  - Palliative care consulted, scheduled for 9/3    For Follow-Up:  [ ] aspiration precautions   [ ] abx  [ ] palliative care meeting          Vital Signs Last 24 Hrs  T(C): 36.7 (01 Sep 2019 16:00), Max: 36.8 (01 Sep 2019 12:00)  T(F): 98.1 (01 Sep 2019 16:00), Max: 98.2 (01 Sep 2019 12:00)  HR: 63 (01 Sep 2019 17:00) (55 - 89)  BP: 131/66 (01 Sep 2019 17:00) (110/59 - 152/70)  BP(mean): 93 (01 Sep 2019 17:00) (80 - 104)  RR: 35 (01 Sep 2019 17:00) (18 - 38)  SpO2: 100% (01 Sep 2019 17:00) (94% - 100%)  I&O's Summary    31 Aug 2019 07:01  -  01 Sep 2019 07:00  --------------------------------------------------------  IN: 936.4 mL / OUT: 1320 mL / NET: -383.6 mL    01 Sep 2019 07:01  -  01 Sep 2019 17:30  --------------------------------------------------------  IN: 315 mL / OUT: 0 mL / NET: 315 mL      General: No acute distress, resting in bed  HEENT: + NGT in place  Neck: Supple.  Full ROM.  No JVD.  No thyromegaly. No lymphadenopathy.   Heart: RRR.  Normal S1 and S2.  No murmurs, rubs, or gallops.   Lungs: coarse breath sounds  Abdomen: BS+, soft, NT/ND.  No organomegaly.  Skin: Warm and dry.  No rashes.  Extremities: + edema.  2+ peripheral pulses b/l.  Musculoskeletal:   Neuro: Following commands, awake alert. Moves extremities      MEDICATIONS  (STANDING):  ALBUTerol    0.083% 2.5 milliGRAM(s) Nebulizer every 6 hours  atorvastatin 40 milliGRAM(s) Oral <User Schedule>  buDESOnide    Inhalation Suspension 0.25 milliGRAM(s) Inhalation two times a day  carbidopa/levodopa  25/100 2 Tablet(s) Oral three times a day  chlorhexidine 4% Liquid 1 Application(s) Topical <User Schedule>  dextrose 5%. 1000 milliLiter(s) (50 mL/Hr) IV Continuous <Continuous>  dextrose 50% Injectable 12.5 Gram(s) IV Push once  dextrose 50% Injectable 25 Gram(s) IV Push once  dextrose 50% Injectable 25 Gram(s) IV Push once  docusate sodium Liquid 100 milliGRAM(s) Oral two times a day  furosemide    Tablet 20 milliGRAM(s) Oral daily  gabapentin   Solution 300 milliGRAM(s) Oral at bedtime  guaiFENesin    Syrup 200 milliGRAM(s) Oral every 6 hours  heparin  Infusion. 600 Unit(s)/Hr (6 mL/Hr) IV Continuous <Continuous>  insulin lispro (HumaLOG) corrective regimen sliding scale   SubCutaneous every 6 hours  lidocaine   Patch 1 Patch Transdermal daily  metoprolol tartrate 75 milliGRAM(s) Oral every 12 hours  pantoprazole  Injectable 40 milliGRAM(s) IV Push daily  piperacillin/tazobactam IVPB.. 3.375 Gram(s) IV Intermittent every 8 hours  polyethylene glycol 3350 17 Gram(s) Oral daily  predniSONE   Tablet 10 milliGRAM(s) Oral daily  senna 2 Tablet(s) Oral at bedtime  sodium chloride 3%  Inhalation 3 milliLiter(s) Inhalation every 12 hours  vancomycin  IVPB 1000 milliGRAM(s) IV Intermittent every 12 hours    MEDICATIONS  (PRN):  acetaminophen    Suspension .. 1000 milliGRAM(s) Oral every 6 hours PRN Mild Pain (1 - 3), Moderate Pain (4 - 6)  ALPRAZolam 0.5 milliGRAM(s) Oral two times a day PRN anxiety  dextrose 40% Gel 15 Gram(s) Oral once PRN Blood Glucose LESS THAN 70 milliGRAM(s)/deciliter  glucagon  Injectable 1 milliGRAM(s) IntraMuscular once PRN Glucose LESS THAN 70 milligrams/deciliter        LABS              9.7                  139  | 25   | 28           12.7  >-----------< 134     ------------------------< 110                   30.1                 3.7  | 101  | 0.78                                         Ca 9.3   Mg 2.5   Ph 4.0

## 2019-09-01 NOTE — CHART NOTE - NSCHARTNOTEFT_GEN_A_CORE
MICU Transfer Note    Transfer from: MICU  Transfer to:  (X) Medicine    (  ) Telemetry    (  ) RCU    (  ) Palliative    (  ) Stroke Unit    (  ) _______________  Accepting physician: Dr. 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 Transferred to MICU for acute respiratory distress, s/p intubation.    #Neuro   - hx of delirium: Sinemet 50/200 TID for Parkinson-like symptom.   - currently on precedex for SBT.  - Following commands, no longer lethargic    #Respiratory   - Acute respiratory failure with hypoxia, likely 2/2 to aspiration.    - Hx of COPD  - Treated for PNA on this admission with ceftriazone and flagyl.  - Rhonchi b/l on exam during RRT, intubated with a 7.0 tube in the MICU today per ENT recs (patient has vocal chord paralysis).    #CV  - New onset afib during hospital stay. Restarted heparin gtt.  - On Metoprolol 75 mg BID for rate control during hospital stay.  - Holding amlodipine 5mg QD and 50mg QD losartan.  - Off pressors and HDS.  - JEANNETTE done: Severe MS, Increased RV pressure and dilated Left atrium, functioning TAVR. Findings c/w valvular heart failure or heart failure with preserved EF  - Hx of HF, Lasix 20 mg daily, monitor BMP for kidney function  - Cardiology following regarding MS    #ID   - PNA likely from aspiration  - Cultures growing Gram positive cocci in clusters.  - Continue zosyn and start vanc.    #GI   - NPO for now.  - PEG placement had been delayed due to afib.  - new onset dysphagia during hospital stay, failed speech and swallow study and MBS  - s/p vocal cord injection for paralysis by ENT.   - s/p tx with diflucan and nystatin for oral candidiasis    #Endo   - Hx of DM   - HbA1C is 6    #GOC   - Patient still full code.  - Palliative care consulted, f/u recs.    For Follow-Up:  [ ]aspiration precautions   [ ]pna treatment   [ ]GOC            Vital Signs Last 24 Hrs  T(C): 36.7 (01 Sep 2019 16:00), Max: 36.8 (01 Sep 2019 12:00)  T(F): 98.1 (01 Sep 2019 16:00), Max: 98.2 (01 Sep 2019 12:00)  HR: 63 (01 Sep 2019 17:00) (55 - 89)  BP: 131/66 (01 Sep 2019 17:00) (110/59 - 152/70)  BP(mean): 93 (01 Sep 2019 17:00) (80 - 104)  RR: 35 (01 Sep 2019 17:00) (18 - 38)  SpO2: 100% (01 Sep 2019 17:00) (94% - 100%)  I&O's Summary    31 Aug 2019 07:01  -  01 Sep 2019 07:00  --------------------------------------------------------  IN: 936.4 mL / OUT: 1320 mL / NET: -383.6 mL    01 Sep 2019 07:01  -  01 Sep 2019 17:30  --------------------------------------------------------  IN: 315 mL / OUT: 0 mL / NET: 315 mL          MEDICATIONS  (STANDING):  ALBUTerol    0.083% 2.5 milliGRAM(s) Nebulizer every 6 hours  atorvastatin 40 milliGRAM(s) Oral <User Schedule>  buDESOnide    Inhalation Suspension 0.25 milliGRAM(s) Inhalation two times a day  carbidopa/levodopa  25/100 2 Tablet(s) Oral three times a day  chlorhexidine 4% Liquid 1 Application(s) Topical <User Schedule>  dextrose 5%. 1000 milliLiter(s) (50 mL/Hr) IV Continuous <Continuous>  dextrose 50% Injectable 12.5 Gram(s) IV Push once  dextrose 50% Injectable 25 Gram(s) IV Push once  dextrose 50% Injectable 25 Gram(s) IV Push once  docusate sodium Liquid 100 milliGRAM(s) Oral two times a day  furosemide    Tablet 20 milliGRAM(s) Oral daily  gabapentin   Solution 300 milliGRAM(s) Oral at bedtime  guaiFENesin    Syrup 200 milliGRAM(s) Oral every 6 hours  heparin  Infusion. 600 Unit(s)/Hr (6 mL/Hr) IV Continuous <Continuous>  insulin lispro (HumaLOG) corrective regimen sliding scale   SubCutaneous every 6 hours  lidocaine   Patch 1 Patch Transdermal daily  metoprolol tartrate 75 milliGRAM(s) Oral every 12 hours  pantoprazole  Injectable 40 milliGRAM(s) IV Push daily  piperacillin/tazobactam IVPB.. 3.375 Gram(s) IV Intermittent every 8 hours  polyethylene glycol 3350 17 Gram(s) Oral daily  predniSONE   Tablet 10 milliGRAM(s) Oral daily  senna 2 Tablet(s) Oral at bedtime  sodium chloride 3%  Inhalation 3 milliLiter(s) Inhalation every 12 hours  vancomycin  IVPB 1000 milliGRAM(s) IV Intermittent every 12 hours    MEDICATIONS  (PRN):  acetaminophen    Suspension .. 1000 milliGRAM(s) Oral every 6 hours PRN Mild Pain (1 - 3), Moderate Pain (4 - 6)  ALPRAZolam 0.5 milliGRAM(s) Oral two times a day PRN anxiety  dextrose 40% Gel 15 Gram(s) Oral once PRN Blood Glucose LESS THAN 70 milliGRAM(s)/deciliter  glucagon  Injectable 1 milliGRAM(s) IntraMuscular once PRN Glucose LESS THAN 70 milligrams/deciliter        LABS                                            9.7                   Neurophils% (auto):   x      (09-01 @ 00:11):    12.7 )-----------(134          Lymphocytes% (auto):  x                                             30.1                   Eosinphils% (auto):   x        Manual%: Neutrophils x    ; Lymphocytes x    ; Eosinophils x    ; Bands%: x    ; Blasts x                                    139    |  101    |  28                  Calcium: 9.3   / iCa: x      (09-01 @ 00:11)    ----------------------------<  110       Magnesium: 2.5                              3.7     |  25     |  0.78             Phosphorous: 4.0      TPro  6.3    /  Alb  2.9    /  TBili  0.5    /  DBili  x      /  AST  18     /  ALT  5      /  AlkPhos  43     01 Sep 2019 00:11    ( 09-01 @ 04:51 )   PT: x    ;   INR: x      aPTT: 86.1 sec MICU Transfer Note    Transfer from: MICU  Transfer to:  (X) Medicine    (  ) Telemetry    (  ) RCU    (  ) Palliative    (  ) Stroke Unit    (  ) _______________  Accepting physician: Dr. 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     Hospital course was complicated by dysphagia attributed to vocal chord paralysis, pt s/p botox injection of vocal chords. Also complicated by new onset afib, patient placed on rhythm control (lopressor). Patient followed by GI for peg tube placement, but procedure put on hold 2/2 to new afib. Patient was transferred to MICU for acute respiratory distress on 8/30, found to be altered and hypoxic. Brought to the MICU, critical airway given that patient has vocal chord paralysis. Intubated on 8/30, extubated on 8/31. Patient was able to maintain sat >95% on room air s/p extubation. Occasionally secretions, but not needing suction more than once every 5 hrs. Releif with duo-neb treatment. Patient no longer needing ICU level care. Wishes to be DNR/DNI, but wants to discuss with family before final decision is made.     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 Transferred to MICU for acute respiratory distress, s/p intubation.    #Neuro   - hx of delirium: Sinemet 50/200 TID for Parkinson-like symptom.   - currently on precedex for SBT.  - Following commands, no longer lethargic    #Respiratory   - Acute respiratory failure with hypoxia, likely 2/2 to aspiration.    - Hx of COPD  - Treated for PNA on this admission with ceftriazone and flagyl.  - Rhonchi b/l on exam during RRT, intubated with a 7.0 tube in the MICU today per ENT recs (patient has vocal chord paralysis).    #CV  - New onset afib during hospital stay. Restarted heparin gtt.  - On Metoprolol 75 mg BID for rate control during hospital stay.  - Holding amlodipine 5mg QD and 50mg QD losartan.  - Off pressors and HDS.  - JEANNETTE done: Severe MS, Increased RV pressure and dilated Left atrium, functioning TAVR. Findings c/w valvular heart failure or heart failure with preserved EF  - Hx of HF, Lasix 20 mg daily, monitor BMP for kidney function  - Cardiology following regarding MS    #ID   - PNA likely from aspiration  - Cultures growing Gram positive cocci in clusters.  - Continue zosyn and start vanc.    #GI   - NPO for now.  - PEG placement had been delayed due to afib.  - new onset dysphagia during hospital stay, failed speech and swallow study and MBS  - s/p vocal cord injection for paralysis by ENT.   - s/p tx with diflucan and nystatin for oral candidiasis    #Endo   - Hx of DM   - HbA1C is 6    #GOC   - Patient still full code.  - Palliative care consulted, f/u recs.    For Follow-Up:  [ ]aspiration precautions   [ ]pna treatment   [ ]GOC            Vital Signs Last 24 Hrs  T(C): 36.7 (01 Sep 2019 16:00), Max: 36.8 (01 Sep 2019 12:00)  T(F): 98.1 (01 Sep 2019 16:00), Max: 98.2 (01 Sep 2019 12:00)  HR: 63 (01 Sep 2019 17:00) (55 - 89)  BP: 131/66 (01 Sep 2019 17:00) (110/59 - 152/70)  BP(mean): 93 (01 Sep 2019 17:00) (80 - 104)  RR: 35 (01 Sep 2019 17:00) (18 - 38)  SpO2: 100% (01 Sep 2019 17:00) (94% - 100%)  I&O's Summary    31 Aug 2019 07:01  -  01 Sep 2019 07:00  --------------------------------------------------------  IN: 936.4 mL / OUT: 1320 mL / NET: -383.6 mL    01 Sep 2019 07:01  -  01 Sep 2019 17:30  --------------------------------------------------------  IN: 315 mL / OUT: 0 mL / NET: 315 mL          MEDICATIONS  (STANDING):  ALBUTerol    0.083% 2.5 milliGRAM(s) Nebulizer every 6 hours  atorvastatin 40 milliGRAM(s) Oral <User Schedule>  buDESOnide    Inhalation Suspension 0.25 milliGRAM(s) Inhalation two times a day  carbidopa/levodopa  25/100 2 Tablet(s) Oral three times a day  chlorhexidine 4% Liquid 1 Application(s) Topical <User Schedule>  dextrose 5%. 1000 milliLiter(s) (50 mL/Hr) IV Continuous <Continuous>  dextrose 50% Injectable 12.5 Gram(s) IV Push once  dextrose 50% Injectable 25 Gram(s) IV Push once  dextrose 50% Injectable 25 Gram(s) IV Push once  docusate sodium Liquid 100 milliGRAM(s) Oral two times a day  furosemide    Tablet 20 milliGRAM(s) Oral daily  gabapentin   Solution 300 milliGRAM(s) Oral at bedtime  guaiFENesin    Syrup 200 milliGRAM(s) Oral every 6 hours  heparin  Infusion. 600 Unit(s)/Hr (6 mL/Hr) IV Continuous <Continuous>  insulin lispro (HumaLOG) corrective regimen sliding scale   SubCutaneous every 6 hours  lidocaine   Patch 1 Patch Transdermal daily  metoprolol tartrate 75 milliGRAM(s) Oral every 12 hours  pantoprazole  Injectable 40 milliGRAM(s) IV Push daily  piperacillin/tazobactam IVPB.. 3.375 Gram(s) IV Intermittent every 8 hours  polyethylene glycol 3350 17 Gram(s) Oral daily  predniSONE   Tablet 10 milliGRAM(s) Oral daily  senna 2 Tablet(s) Oral at bedtime  sodium chloride 3%  Inhalation 3 milliLiter(s) Inhalation every 12 hours  vancomycin  IVPB 1000 milliGRAM(s) IV Intermittent every 12 hours    MEDICATIONS  (PRN):  acetaminophen    Suspension .. 1000 milliGRAM(s) Oral every 6 hours PRN Mild Pain (1 - 3), Moderate Pain (4 - 6)  ALPRAZolam 0.5 milliGRAM(s) Oral two times a day PRN anxiety  dextrose 40% Gel 15 Gram(s) Oral once PRN Blood Glucose LESS THAN 70 milliGRAM(s)/deciliter  glucagon  Injectable 1 milliGRAM(s) IntraMuscular once PRN Glucose LESS THAN 70 milligrams/deciliter        LABS                                            9.7                   Neurophils% (auto):   x      (09-01 @ 00:11):    12.7 )-----------(134          Lymphocytes% (auto):  x                                             30.1                   Eosinphils% (auto):   x        Manual%: Neutrophils x    ; Lymphocytes x    ; Eosinophils x    ; Bands%: x    ; Blasts x                                    139    |  101    |  28                  Calcium: 9.3   / iCa: x      (09-01 @ 00:11)    ----------------------------<  110       Magnesium: 2.5                              3.7     |  25     |  0.78             Phosphorous: 4.0      TPro  6.3    /  Alb  2.9    /  TBili  0.5    /  DBili  x      /  AST  18     /  ALT  5      /  AlkPhos  43     01 Sep 2019 00:11    ( 09-01 @ 04:51 )   PT: x    ;   INR: x      aPTT: 86.1 sec MICU Transfer Note    Transfer from: MICU  Transfer to:  (X) Medicine    (  ) Telemetry    (  ) RCU    (  ) Palliative    (  ) Stroke Unit    (  ) _______________  Accepting physician: Dr. 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     Hospital course was complicated by dysphagia attributed to vocal chord paralysis, pt s/p botox injection of vocal chords. Also complicated by new onset afib, patient placed on rhythm control (lopressor). Patient followed by GI for peg tube placement, but procedure put on hold 2/2 to new afib. Patient was transferred to MICU for acute respiratory distress on 8/30, found to be altered and hypoxic. Brought to the MICU, critical airway given that patient has vocal chord paralysis. Intubated on 8/30, extubated on 8/31. Patient was able to maintain sat >95% on room air s/p extubation. Occasionally secretions, but not needing suction more than once every 5 hrs. Releif with duo-neb treatment. Patient no longer needing ICU level care. Wishes to be DNR/DNI, but wants to discuss with family before final decision is made.     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 Transferred to MICU for acute respiratory distress, s/p intubation.    #Neuro   - hx of delirium: Sinemet 50/200 TID for Parkinson-like symptom.   - currently on precedex for SBT.  - Following commands, no longer lethargic    #Respiratory   - Acute respiratory failure with hypoxia, likely 2/2 to aspiration.    - Hx of COPD  - Treated for PNA on this admission with ceftriazone and flagyl.  - Rhonchi b/l on exam during RRT, intubated with a 7.0 tube in the MICU ENT recs (patient has vocal chord paralysis). no s/p botox to keep vc open    #CV  - New onset afib during hospital stay. Restarted heparin gtt.  - On Metoprolol 75 mg BID for rate control during hospital stay.  - Holding amlodipine 5mg QD and 50mg QD losartan.  - Off pressors and HDS.  - JEANNETTE done: Severe MS, Increased RV pressure and dilated Left atrium, functioning TAVR. Findings c/w valvular heart failure or heart failure with preserved EF  - Hx of HF, Lasix 20 mg daily, monitor BMP for kidney function  - Cardiology following regarding MS    #ID   - PNA likely from aspiration  - Cultures growing Gram positive cocci in clusters.  - Continue zosyn and start vanc.    #GI   - NPO for now.  - PEG placement had been delayed due to afib.  - new onset dysphagia during hospital stay, failed speech and swallow study and MBS  - s/p vocal cord injection for paralysis by ENT.   - s/p tx with diflucan and nystatin for oral candidiasis    #Endo   - Hx of DM   - HbA1C is 6    #GOC   - Patient still full code.  - Palliative care consulted, f/u recs.    For Follow-Up:  [ ]aspiration precautions   [ ]pna treatment   [ ]GOC  Pt states SHe want to be DNR/DNI and limit blood draws, molst form not signed, conversation with family pending.          Vital Signs Last 24 Hrs  T(C): 36.7 (01 Sep 2019 16:00), Max: 36.8 (01 Sep 2019 12:00)  T(F): 98.1 (01 Sep 2019 16:00), Max: 98.2 (01 Sep 2019 12:00)  HR: 63 (01 Sep 2019 17:00) (55 - 89)  BP: 131/66 (01 Sep 2019 17:00) (110/59 - 152/70)  BP(mean): 93 (01 Sep 2019 17:00) (80 - 104)  RR: 35 (01 Sep 2019 17:00) (18 - 38)  SpO2: 100% (01 Sep 2019 17:00) (94% - 100%)  I&O's Summary    31 Aug 2019 07:01  -  01 Sep 2019 07:00  --------------------------------------------------------  IN: 936.4 mL / OUT: 1320 mL / NET: -383.6 mL    01 Sep 2019 07:01  -  01 Sep 2019 17:30  --------------------------------------------------------  IN: 315 mL / OUT: 0 mL / NET: 315 mL          MEDICATIONS  (STANDING):  ALBUTerol    0.083% 2.5 milliGRAM(s) Nebulizer every 6 hours  atorvastatin 40 milliGRAM(s) Oral <User Schedule>  buDESOnide    Inhalation Suspension 0.25 milliGRAM(s) Inhalation two times a day  carbidopa/levodopa  25/100 2 Tablet(s) Oral three times a day  chlorhexidine 4% Liquid 1 Application(s) Topical <User Schedule>  dextrose 5%. 1000 milliLiter(s) (50 mL/Hr) IV Continuous <Continuous>  dextrose 50% Injectable 12.5 Gram(s) IV Push once  dextrose 50% Injectable 25 Gram(s) IV Push once  dextrose 50% Injectable 25 Gram(s) IV Push once  docusate sodium Liquid 100 milliGRAM(s) Oral two times a day  furosemide    Tablet 20 milliGRAM(s) Oral daily  gabapentin   Solution 300 milliGRAM(s) Oral at bedtime  guaiFENesin    Syrup 200 milliGRAM(s) Oral every 6 hours  heparin  Infusion. 600 Unit(s)/Hr (6 mL/Hr) IV Continuous <Continuous>  insulin lispro (HumaLOG) corrective regimen sliding scale   SubCutaneous every 6 hours  lidocaine   Patch 1 Patch Transdermal daily  metoprolol tartrate 75 milliGRAM(s) Oral every 12 hours  pantoprazole  Injectable 40 milliGRAM(s) IV Push daily  piperacillin/tazobactam IVPB.. 3.375 Gram(s) IV Intermittent every 8 hours  polyethylene glycol 3350 17 Gram(s) Oral daily  predniSONE   Tablet 10 milliGRAM(s) Oral daily  senna 2 Tablet(s) Oral at bedtime  sodium chloride 3%  Inhalation 3 milliLiter(s) Inhalation every 12 hours  vancomycin  IVPB 1000 milliGRAM(s) IV Intermittent every 12 hours    MEDICATIONS  (PRN):  acetaminophen    Suspension .. 1000 milliGRAM(s) Oral every 6 hours PRN Mild Pain (1 - 3), Moderate Pain (4 - 6)  ALPRAZolam 0.5 milliGRAM(s) Oral two times a day PRN anxiety  dextrose 40% Gel 15 Gram(s) Oral once PRN Blood Glucose LESS THAN 70 milliGRAM(s)/deciliter  glucagon  Injectable 1 milliGRAM(s) IntraMuscular once PRN Glucose LESS THAN 70 milligrams/deciliter        LABS                                            9.7                   Neurophils% (auto):   x      (09-01 @ 00:11):    12.7 )-----------(134          Lymphocytes% (auto):  x                                             30.1                   Eosinphils% (auto):   x        Manual%: Neutrophils x    ; Lymphocytes x    ; Eosinophils x    ; Bands%: x    ; Blasts x                                    139    |  101    |  28                  Calcium: 9.3   / iCa: x      (09-01 @ 00:11)    ----------------------------<  110       Magnesium: 2.5                              3.7     |  25     |  0.78             Phosphorous: 4.0      TPro  6.3    /  Alb  2.9    /  TBili  0.5    /  DBili  x      /  AST  18     /  ALT  5      /  AlkPhos  43     01 Sep 2019 00:11    ( 09-01 @ 04:51 )   PT: x    ;   INR: x      aPTT: 86.1 sec

## 2019-09-01 NOTE — CHART NOTE - NSCHARTNOTEFT_GEN_A_CORE
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    Pt seen and examined at bedside with the family members. Pt in no acute distress, able to have full conversation. Has been tolerating some fluid around lozenge - better than yesterday as per son. Pt wants to try eating ice cream.       Vital Signs Last 24 Hrs  T(C): 36.7 (01 Sep 2019 16:00), Max: 36.8 (01 Sep 2019 12:00)  T(F): 98.1 (01 Sep 2019 16:00), Max: 98.2 (01 Sep 2019 12:00)  HR: 63 (01 Sep 2019 17:00) (55 - 86)  BP: 131/66 (01 Sep 2019 17:00) (110/59 - 152/70)  BP(mean): 93 (01 Sep 2019 17:00) (80 - 104)  RR: 35 (01 Sep 2019 17:00) (18 - 38)  SpO2: 100% (01 Sep 2019 17:00) (94% - 100%)  CAPILLARY BLOOD GLUCOSE      POCT Blood Glucose.: 101 mg/dL (01 Sep 2019 17:06)  POCT Blood Glucose.: 150 mg/dL (01 Sep 2019 11:10)  POCT Blood Glucose.: 109 mg/dL (01 Sep 2019 05:04)    I&O's Summary    31 Aug 2019 07:01  -  01 Sep 2019 07:00  --------------------------------------------------------  IN: 936.4 mL / OUT: 1320 mL / NET: -383.6 mL    01 Sep 2019 07:01  -  01 Sep 2019 17:58  --------------------------------------------------------  IN: 315 mL / OUT: 0 mL / NET: 315 mL        PHYSICAL EXAM:  GENERAL: NAD  HEAD:  Atraumatic, Normocephalic; NGT in place  EYES: EOMI, PERRLA, conjunctiva and sclera clear  MOUTH: no oral thrush  NECK: Supple, No JVD  CHEST/LUNG: bronchial breath sounds diffusely  HEART: Regular rate and rhythm; No murmurs, rubs, or gallops  ABDOMEN: Soft, Nontender, Nondistended; Bowel sounds present  EXTREMITIES:  2+ Peripheral Pulses, No clubbing, cyanosis, or edema  NEUROLOGY: AAOx3, non-focal  SKIN: No rashes or lesions    LABS:                         9.7    12.7  )-----------( 134      ( 01 Sep 2019 00:11 )             30.1     09-01    139  |  101  |  28<H>  ----------------------------<  110<H>  3.7   |  25  |  0.78    Ca    9.3      01 Sep 2019 00:11  Phos  4.0     09-01  Mg     2.5     09-01    TPro  6.3  /  Alb  2.9<L>  /  TBili  0.5  /  DBili  x   /  AST  18  /  ALT  5<L>  /  AlkPhos  43  09-01    PT/INR - ( 31 Aug 2019 02:44 )   PT: 13.6 sec;   INR: 1.18 ratio         PTT - ( 01 Sep 2019 04:51 )  PTT:86.1 sec    ASSESSMENT & PLAN:     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 Transferred to MICU for acute respiratory distress, s/p intubation.    #Neuro   - hx of delirium: Sinemet 50/200 TID for Parkinson-like symptom.   - no acute issues  - xanax prn for anxiety    #Respiratory   - Acute respiratory failure with hypoxia, likely 2/2 to aspiration.    - Hx of COPD  - complete total 7 day course of HCAP treatment with vanc/zosyn    #CV  - New onset afib during hospital stay. Restarted heparin gtt.  - On Metoprolol 75 mg BID for rate control during hospital stay.  - Holding amlodipine 5mg QD and 50mg QD losartan.  - JEANNETTE done: Severe MS, Increased RV pressure and dilated Left atrium, functioning TAVR. Findings c/w valvular heart failure or heart failure with preserved EF  - Cardiology following regarding MS    #ID   - PNA likely from aspiration  - Cultures growing Gram positive cocci in clusters.  - Continue zosyn and zosyn to complete 7 day course    #GI   - NPO for now.  - PEG placement had been delayed due to afib. Family in discussion re: PEG tube placement   - new onset dysphagia during hospital stay, failed speech and swallow study and MBS  - s/p vocal cord injection for paralysis by ENT.   - s/p tx with diflucan and nystatin for oral candidiasis    #Endo   - Hx of DM   - HbA1C is 6  - FS +ISS    #GOC   - Patient still full code.  - Palliative care consulted, f/u recs.    Radha Coleman MD  Division of Hospital Medicine  Pager: [phone]  Office: [phone]

## 2019-09-01 NOTE — PROGRESS NOTE ADULT - ASSESSMENT
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 Transferred to MICU for acute respiratory distress, s/p intubation.    #Neuro   - hx of delirium: Sinemet 50/200 TID for Parkinson-like symptom.   - currently on precedex for SBT.  - Following commands, no longer lethargic    #Respiratory   - Acute respiratory failure with hypoxia, likely 2/2 to aspiration.    - Hx of COPD  - Treated for PNA on this admission with ceftriazone and flagyl.  - Rhonchi b/l on exam during RRT, intubated with a 7.0 tube in the MICU today per ENT recs (patient has vocal chord paralysis).    #CV  - New onset afib during hospital stay. Restarted heparin gtt.  - On Metoprolol 75 mg BID for rate control during hospital stay.  - Holding amlodipine 5mg QD and 50mg QD losartan.  - Off pressors and HDS.  - JEANNETTE done: Severe MS, Increased RV pressure and dilated Left atrium, functioning TAVR. Findings c/w valvular heart failure or heart failure with preserved EF  - Hx of HF, Lasix 20 mg daily, monitor BMP for kidney function  - Cardiology following regarding MS    #ID   - PNA likely from aspiration  - Cultures growing Gram positive cocci in clusters.  - Continue zosyn and start vanc.    #GI   - NPO for now.  - PEG placement had been delayed due to afib.  - new onset dysphagia during hospital stay, failed speech and swallow study and MBS  - s/p vocal cord injection for paralysis by ENT.   - s/p tx with diflucan and nystatin for oral candidiasis    #Endo   - Hx of DM   - HbA1C is 6    #GOC   - Patient still full code.  - Palliative care consulted, f/u recs. 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 Transferred to MICU for acute respiratory distress, s/p intubation.    #Neuro   - hx of delirium: Sinemet 50/200 TID for Parkinson-like symptom.   - Off of all sedation. Following commands, no longer lethargic, A&Ox3.     #Respiratory   - Acute respiratory failure with hypoxia, likely 2/2 to aspiration.    - Hx of COPD, initially treated for PNA on this admission with ceftriaxone and flagyl  - BC + for staph, f/u MRSA vs MSSA. Now on Vanc and Zosyn   - Patient now extubated, saturating well on RA.   - Complaining of thick secretions last night and this morning, started on Duonebs. Continue suctioning/duonebs                                              #CV  - New onset afib during hospital stay. Restarted heparin gtt. Trend PTT.   - On Metoprolol 75 mg BID for rate control during hospital stay.  - Continue home BP meds: amlodipine 5mg QD and 50mg QD losartan.  - JEANNETTE done: Severe MS, Increased RV pressure and dilated LA, functioning TAVR. Findings c/w valvular heart failure or heart failure with preserved EF  - Hx of HF, Lasix 20 mg daily, monitor BMP and I&Os for kidney function  - Cardiology following regarding MS    #ID   - PNA likely from aspiration.   - SC Gram positive cocci in clusters, Blood culture + for staph, f/u MSSA vs MRSA   - Continue zosyn and vanc (check troph on 9/3)      #GI   - PEG placement had been delayed due to afib, will likely consult GI on 9/3 to reassess for peg placement   - new onset dysphagia during hospital stay, failed speech and swallow study and MBS  - s/p vocal cord injection for paralysis by ENT.   - s/p tx with diflucan and nystatin for oral candidiasis    #Endo   - Hx of DM   - HbA1C is 6    #GOC   - Patient still full code, will have another discussion today with son present  - Palliative care consulted, f/u recs.

## 2019-09-01 NOTE — PROGRESS NOTE ADULT - ATTENDING COMMENTS
86 y/o F w/parkinson's disease, vocal cord paralysis, HFpEF, severe mitral stenosis, questionable COPD, and dysphagia admitted for aspiration PNA now admitted to MICU for acute hypoxemic respiratory failure likely secondary to aspiration event. Respiratory status now significantly improved. Staph aureus bacteremia. Extubated 8/31 w/o issue.    - Continue sinemet  - Supplemental O2 as needed  - Complete 7 day course of zosyn for likely aspiration PNA  - Continue vancomycin for staph aureus bacteremia, follow up sensitivities  - Serial blood cultures until cleared, if cultures remain persistently positive will need workup for possible endocarditis  - Continue anticoagulation/rate control for Afib  - Continue lasix goal net even to negative  - GOC conversations  - Possible downgrade to medical floor if patient not requiring frequent suctioning

## 2019-09-01 NOTE — PROGRESS NOTE ADULT - SUBJECTIVE AND OBJECTIVE BOX
CHIEF COMPLAINT:    Interval Events:    REVIEW OF SYSTEMS:  Constitutional: [ ] negative [ ] fevers [ ] chills [ ] weight loss [ ] weight gain  HEENT: [ ] negative [ ] dry eyes [ ] eye irritation [ ] postnasal drip [ ] nasal congestion  CV: [ ] negative  [ ] chest pain [ ] orthopnea [ ] palpitations [ ] murmur  Resp: [ ] negative [ ] cough [ ] shortness of breath [ ] dyspnea [ ] wheezing [ ] sputum [ ] hemoptysis  GI: [ ] negative [ ] nausea [ ] vomiting [ ] diarrhea [ ] constipation [ ] abd pain [ ] dysphagia   : [ ] negative [ ] dysuria [ ] nocturia [ ] hematuria [ ] increased urinary frequency  Musculoskeletal: [ ] negative [ ] back pain [ ] myalgias [ ] arthralgias [ ] fracture  Skin: [ ] negative [ ] rash [ ] itch  Neurological: [ ] negative [ ] headache [ ] dizziness [ ] syncope [ ] weakness [ ] numbness  Psychiatric: [ ] negative [ ] anxiety [ ] depression  Endocrine: [ ] negative [ ] diabetes [ ] thyroid problem  Hematologic/Lymphatic: [ ] negative [ ] anemia [ ] bleeding problem  Allergic/Immunologic: [ ] negative [ ] itchy eyes [ ] nasal discharge [ ] hives [ ] angioedema  [ ] All other systems negative  [ ] Unable to assess ROS because ________    OBJECTIVE:  ICU Vital Signs Last 24 Hrs  T(C): 36.7 (01 Sep 2019 04:00), Max: 37.3 (31 Aug 2019 08:00)  T(F): 98.1 (01 Sep 2019 04:00), Max: 99.1 (31 Aug 2019 08:00)  HR: 71 (01 Sep 2019 06:00) (57 - 89)  BP: 127/76 (01 Sep 2019 06:00) (85/53 - 142/64)  BP(mean): 95 (01 Sep 2019 06:00) (65 - 95)  ABP: --  ABP(mean): --  RR: 25 (01 Sep 2019 06:00) (18 - 36)  SpO2: 100% (01 Sep 2019 06:00) (96% - 100%)    Mode: CPAP with PS, FiO2: 21, PEEP: 5, PS: 10, MAP: 8, PIP: 16    08-30 @ 07:01  -  - @ 07:00  --------------------------------------------------------  IN: 529.4 mL / OUT: 730 mL / NET: -200.6 mL     @ 07:01   @ 06:31  --------------------------------------------------------  IN: 936.4 mL / OUT: 1320 mL / NET: -383.6 mL      CAPILLARY BLOOD GLUCOSE      POCT Blood Glucose.: 109 mg/dL (01 Sep 2019 05:04)      PHYSICAL EXAM:  General:   HEENT:   Lymph Nodes:  Neck:   Respiratory:   Cardiovascular:   Abdomen:   Extremities:   Skin:   Neurological:  Psychiatry:    LINES:    HOSPITAL MEDICATIONS:  Standing Meds:  ALBUTerol    0.083% 2.5 milliGRAM(s) Nebulizer every 6 hours  atorvastatin 40 milliGRAM(s) Oral <User Schedule>  buDESOnide    Inhalation Suspension 0.25 milliGRAM(s) Inhalation two times a day  carbidopa/levodopa  25/100 2 Tablet(s) Oral three times a day  chlorhexidine 4% Liquid 1 Application(s) Topical <User Schedule>  dextrose 5%. 1000 milliLiter(s) IV Continuous <Continuous>  dextrose 50% Injectable 12.5 Gram(s) IV Push once  dextrose 50% Injectable 25 Gram(s) IV Push once  dextrose 50% Injectable 25 Gram(s) IV Push once  docusate sodium Liquid 100 milliGRAM(s) Oral two times a day  furosemide    Tablet 20 milliGRAM(s) Oral daily  gabapentin   Solution 300 milliGRAM(s) Oral at bedtime  guaiFENesin    Syrup 200 milliGRAM(s) Oral every 6 hours  heparin  Infusion. 600 Unit(s)/Hr IV Continuous <Continuous>  insulin lispro (HumaLOG) corrective regimen sliding scale   SubCutaneous every 6 hours  lidocaine   Patch 1 Patch Transdermal daily  metoprolol tartrate 75 milliGRAM(s) Oral every 12 hours  pantoprazole  Injectable 40 milliGRAM(s) IV Push daily  piperacillin/tazobactam IVPB.. 3.375 Gram(s) IV Intermittent every 8 hours  polyethylene glycol 3350 17 Gram(s) Oral daily  predniSONE   Tablet 10 milliGRAM(s) Oral daily  senna 2 Tablet(s) Oral at bedtime  sodium chloride 3%  Inhalation 3 milliLiter(s) Inhalation every 12 hours  vancomycin  IVPB 1000 milliGRAM(s) IV Intermittent every 12 hours      PRN Meds:  acetaminophen    Suspension .. 1000 milliGRAM(s) Oral every 6 hours PRN  ALPRAZolam 0.5 milliGRAM(s) Oral two times a day PRN  dextrose 40% Gel 15 Gram(s) Oral once PRN  glucagon  Injectable 1 milliGRAM(s) IntraMuscular once PRN      LABS:                        9.7    12.7  )-----------( 134      ( 01 Sep 2019 00:11 )             30.1     Hgb Trend: 9.7<--, 10.0<--, 11.1<--, 10.4<--, 11.5<--  09    139  |  101  |  28<H>  ----------------------------<  110<H>  3.7   |  25  |  0.78    Ca    9.3      01 Sep 2019 00:11  Phos  4.0       Mg     2.5         TPro  6.3  /  Alb  2.9<L>  /  TBili  0.5  /  DBili  x   /  AST  18  /  ALT  5<L>  /  AlkPhos  43      Creatinine Trend: 0.78<--, 0.88<--, 0.79<--, 0.83<--, 0.92<--, 1.05<--  PT/INR - ( 31 Aug 2019 02:44 )   PT: 13.6 sec;   INR: 1.18 ratio         PTT - ( 01 Sep 2019 04:51 )  PTT:86.1 sec  Urinalysis Basic - ( 30 Aug 2019 15:44 )    Color: Yellow / Appearance: Clear / S.020 / pH: x  Gluc: x / Ketone: Negative  / Bili: Negative / Urobili: Negative   Blood: x / Protein: Trace / Nitrite: Positive   Leuk Esterase: Large / RBC: 5 /hpf / WBC >50   Sq Epi: x / Non Sq Epi: 5 / Bacteria: Many      Arterial Blood Gas:   @ 00:07  7.46/43/71/30/94/5.7  ABG lactate: --  Arterial Blood Gas:   @ 21:34  7.50/36/141/28/99/5.0  ABG lactate: --  Arterial Blood Gas:   @ 15:26  7.49/37/117/28/98/4.8  ABG lactate: --  Arterial Blood Gas:   @ 02:41  7.48/41/102/30/98/6.0  ABG lactate: --  Arterial Blood Gas:   @ 15:33  7.50/36/125/28/99/4.8  ABG lactate: --  Arterial Blood Gas:   @ 13:00  7.48/38/67/29/94/5.2  ABG lactate: --        MICROBIOLOGY:     Culture - Sputum (collected 31 Aug 2019 07:30)  Source: .Sputum  Gram Stain (31 Aug 2019 14:00):    Numerous polymorphonuclear leukocytes per low power field    No Squamous epithelial cells per low power field    Moderate Gram positive cocci in pairs, chains and clusters per oil power    field    Few Gram Negative Diplococci per oil power field    Culture - Urine (collected 30 Aug 2019 18:42)  Source: .Urine  Final Report (31 Aug 2019 18:57):    >=3 organisms. Probable collection contamination.    Culture - Blood (collected 30 Aug 2019 18:02)  Source: .Blood  Preliminary Report (31 Aug 2019 19:02):    No growth to date.    Culture - Blood (collected 30 Aug 2019 18:02)  Source: .Blood  Gram Stain (31 Aug 2019 09:15):    Growth in aerobic bottle: Gram Positive Cocci in Clusters  Preliminary Report (31 Aug 2019 09:16):    Growth in aerobic bottle: Gram Positive Cocci in Clusters    "Due to technical problems, Proteus sp. will Not be reported as part of    the BCID panel until further notice"    ***Blood Panel PCR results on this specimen are available    approximately 3 hours after the Gram stain result.***    Gram stain, PCR, and/or culture results may not always    correspond due to difference in methodologies.    ************************************************************    This PCR assay was performed using [email or web address].    The following targets are tested for: Enterococcus,    vancomycin resistant enterococci, Listeria monocytogenes,    coagulase negative staphylococci, S. aureus,    methicillin resistant S. aureus, Streptococcus agalactiae    (Group B), S. pneumoniae, S.pyogenes (Group A),    Acinetobacter baumannii, Enterobacter cloacae, E. coli,    Klebsiella oxytoca, K. pneumoniae, Proteus sp.,    Serratia marcescens, Haemophilus influenzae,    Neisseria meningitidis, Pseudomonas aeruginosa, Candida    albicans, C. glabrata, C krusei, C parapsilosis,    C. tropicalis and the KPC resistance gene.  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    Interval Events:    REVIEW OF SYSTEMS:  Constitutional: [ ] negative [ ] fevers [ ] chills [ ] weight loss [ ] weight gain  HEENT: [ ] negative [ ] dry eyes [ ] eye irritation [ ] postnasal drip [ ] nasal congestion  CV: [ ] negative  [ ] chest pain [ ] orthopnea [ ] palpitations [ ] murmur  Resp: [ ] negative [ ] cough [ ] shortness of breath [ ] dyspnea [ ] wheezing [ ] sputum [ ] hemoptysis  GI: [ ] negative [ ] nausea [ ] vomiting [ ] diarrhea [ ] constipation [ ] abd pain [ ] dysphagia   : [ ] negative [ ] dysuria [ ] nocturia [ ] hematuria [ ] increased urinary frequency  Musculoskeletal: [ ] negative [ ] back pain [ ] myalgias [ ] arthralgias [ ] fracture  Skin: [ ] negative [ ] rash [ ] itch  Neurological: [ ] negative [ ] headache [ ] dizziness [ ] syncope [ ] weakness [ ] numbness  Psychiatric: [ ] negative [ ] anxiety [ ] depression  Endocrine: [ ] negative [ ] diabetes [ ] thyroid problem  Hematologic/Lymphatic: [ ] negative [ ] anemia [ ] bleeding problem  Allergic/Immunologic: [ ] negative [ ] itchy eyes [ ] nasal discharge [ ] hives [ ] angioedema  [ ] All other systems negative  [ ] Unable to assess ROS because ________    OBJECTIVE:  ICU Vital Signs Last 24 Hrs  T(C): 36.7 (01 Sep 2019 04:00), Max: 37.3 (31 Aug 2019 08:00)  T(F): 98.1 (01 Sep 2019 04:00), Max: 99.1 (31 Aug 2019 08:00)  HR: 71 (01 Sep 2019 06:00) (57 - 89)  BP: 127/76 (01 Sep 2019 06:00) (85/53 - 142/64)  BP(mean): 95 (01 Sep 2019 06:00) (65 - 95)  ABP: --  ABP(mean): --  RR: 25 (01 Sep 2019 06:00) (18 - 36)  SpO2: 100% (01 Sep 2019 06:00) (96% - 100%)    Mode: CPAP with PS, FiO2: 21, PEEP: 5, PS: 10, MAP: 8, PIP: 16     @ 07:01  -   @ 07:00  --------------------------------------------------------  IN: 529.4 mL / OUT: 730 mL / NET: -200.6 mL     @ 07:01  -   @ 06:31  --------------------------------------------------------  IN: 936.4 mL / OUT: 1320 mL / NET: -383.6 mL      CAPILLARY BLOOD GLUCOSE      POCT Blood Glucose.: 109 mg/dL (01 Sep 2019 05:04)      PHYSICAL EXAM:  General:   HEENT:   Lymph Nodes:  Neck:   Respiratory:   Cardiovascular:   Abdomen:   Extremities:   Skin:   Neurological:  Psychiatry:    LINES:    HOSPITAL MEDICATIONS:  Standing Meds:  ALBUTerol    0.083% 2.5 milliGRAM(s) Nebulizer every 6 hours  atorvastatin 40 milliGRAM(s) Oral <User Schedule>  buDESOnide    Inhalation Suspension 0.25 milliGRAM(s) Inhalation two times a day  carbidopa/levodopa  25/100 2 Tablet(s) Oral three times a day  chlorhexidine 4% Liquid 1 Application(s) Topical <User Schedule>  dextrose 5%. 1000 milliLiter(s) IV Continuous <Continuous>  dextrose 50% Injectable 12.5 Gram(s) IV Push once  dextrose 50% Injectable 25 Gram(s) IV Push once  dextrose 50% Injectable 25 Gram(s) IV Push once  docusate sodium Liquid 100 milliGRAM(s) Oral two times a day  furosemide    Tablet 20 milliGRAM(s) Oral daily  gabapentin   Solution 300 milliGRAM(s) Oral at bedtime  guaiFENesin    Syrup 200 milliGRAM(s) Oral every 6 hours  heparin  Infusion. 600 Unit(s)/Hr IV Continuous <Continuous>  insulin lispro (HumaLOG) corrective regimen sliding scale   SubCutaneous every 6 hours  lidocaine   Patch 1 Patch Transdermal daily  metoprolol tartrate 75 milliGRAM(s) Oral every 12 hours  pantoprazole  Injectable 40 milliGRAM(s) IV Push daily  piperacillin/tazobactam IVPB.. 3.375 Gram(s) IV Intermittent every 8 hours  polyethylene glycol 3350 17 Gram(s) Oral daily  predniSONE   Tablet 10 milliGRAM(s) Oral daily  senna 2 Tablet(s) Oral at bedtime  sodium chloride 3%  Inhalation 3 milliLiter(s) Inhalation every 12 hours  vancomycin  IVPB 1000 milliGRAM(s) IV Intermittent every 12 hours      PRN Meds:  acetaminophen    Suspension .. 1000 milliGRAM(s) Oral every 6 hours PRN  ALPRAZolam 0.5 milliGRAM(s) Oral two times a day PRN  dextrose 40% Gel 15 Gram(s) Oral once PRN  glucagon  Injectable 1 milliGRAM(s) IntraMuscular once PRN      LABS:                        9.7    12.7  )-----------( 134      ( 01 Sep 2019 00:11 )             30.1     Hgb Trend: 9.7<--, 10.0<--, 11.1<--, 10.4<--, 11.5<--  09    139  |  101  |  28<H>  ----------------------------<  110<H>  3.7   |  25  |  0.78    Ca    9.3      01 Sep 2019 00:11  Phos  4.0       Mg     2.5         TPro  6.3  /  Alb  2.9<L>  /  TBili  0.5  /  DBili  x   /  AST  18  /  ALT  5<L>  /  AlkPhos  43      Creatinine Trend: 0.78<--, 0.88<--, 0.79<--, 0.83<--, 0.92<--, 1.05<--  PT/INR - ( 31 Aug 2019 02:44 )   PT: 13.6 sec;   INR: 1.18 ratio         PTT - ( 01 Sep 2019 04:51 )  PTT:86.1 sec  Urinalysis Basic - ( 30 Aug 2019 15:44 )    Color: Yellow / Appearance: Clear / S.020 / pH: x  Gluc: x / Ketone: Negative  / Bili: Negative / Urobili: Negative   Blood: x / Protein: Trace / Nitrite: Positive   Leuk Esterase: Large / RBC: 5 /hpf / WBC >50   Sq Epi: x / Non Sq Epi: 5 / Bacteria: Many      Arterial Blood Gas:   @ 00:07  7.46/43/71/30/94/5.7  ABG lactate: --  Arterial Blood Gas:   @ 21:34  7.50/36/141/28/99/5.0  ABG lactate: --  Arterial Blood Gas:   @ 15:26  7.49/37/117/28/98/4.8  ABG lactate: --  Arterial Blood Gas:   @ 02:41  7.48/41/102/30/98/6.0  ABG lactate: --  Arterial Blood Gas:   @ 15:33  7.50/36/125/28/99/4.8  ABG lactate: --  Arterial Blood Gas:   @ 13:00  7.48/38/67/29/94/5.2  ABG lactate: --        MICROBIOLOGY:     Culture - Sputum (collected 31 Aug 2019 07:30)  Source: .Sputum  Gram Stain (31 Aug 2019 14:00):    Numerous polymorphonuclear leukocytes per low power field    No Squamous epithelial cells per low power field    Moderate Gram positive cocci in pairs, chains and clusters per oil power    field    Few Gram Negative Diplococci per oil power field    Culture - Urine (collected 30 Aug 2019 18:42)  Source: .Urine  Final Report (31 Aug 2019 18:57):    >=3 organisms. Probable collection contamination.    Culture - Blood (collected 30 Aug 2019 18:02)  Source: .Blood  Preliminary Report (31 Aug 2019 19:02):    No growth to date.    Culture - Blood (collected 30 Aug 2019 18:02)  Source: .Blood  Gram Stain (31 Aug 2019 09:15):    Growth in aerobic bottle: Gram Positive Cocci in Clusters  Preliminary Report (31 Aug 2019 09:16):    Growth in aerobic bottle: Gram Positive Cocci in Clusters    "Due to technical problems, Proteus sp. will Not be reported as part of    the BCID panel until further notice"    ***Blood Panel PCR results on this specimen are available    approximately 3 hours after the Gram stain result.***    Gram stain, PCR, and/or culture results may not always    correspond due to difference in methodologies.    ************************************************************    This PCR assay was performed using [employer].    The following targets are tested for: Enterococcus,    vancomycin resistant enterococci, Listeria monocytogenes,    coagulase negative staphylococci, S. aureus,    methicillin resistant S. aureus, Streptococcus agalactiae    (Group B), S. pneumoniae, S.pyogenes (Group A),    Acinetobacter baumannii, Enterobacter cloacae, E. coli,    Klebsiella oxytoca, K. pneumoniae, Proteus sp.,    Serratia marcescens, Haemophilus influenzae,    Neisseria meningitidis, Pseudomonas aeruginosa, Candida    albicans, C. glabrata, C krusei, C parapsilosis,    C. tropicalis and the KPC resistance gene.  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    Interval Events: Thick secretions overnight, started duoneb x1, needs to be reassessed     REVIEW OF SYSTEMS:  Constitutional: [ ] negative [ ] fevers [ ] chills [ ] weight loss [ ] weight gain  HEENT: [ ] negative [ ] dry eyes [ ] eye irritation [ ] postnasal drip [ ] nasal congestion  CV: [ ] negative  [ ] chest pain [ ] orthopnea [ ] palpitations [ ] murmur  Resp: [ ] negative [ ] cough [ ] shortness of breath [ ] dyspnea [ ] wheezing [ ] sputum [ ] hemoptysis  GI: [ ] negative [ ] nausea [ ] vomiting [ ] diarrhea [ ] constipation [ ] abd pain [ ] dysphagia   : [ ] negative [ ] dysuria [ ] nocturia [ ] hematuria [ ] increased urinary frequency  Musculoskeletal: [ ] negative [ ] back pain [ ] myalgias [ ] arthralgias [ ] fracture  Skin: [ ] negative [ ] rash [ ] itch  Neurological: [ ] negative [ ] headache [ ] dizziness [ ] syncope [ ] weakness [ ] numbness  Psychiatric: [ ] negative [ ] anxiety [ ] depression  Endocrine: [ ] negative [ ] diabetes [ ] thyroid problem  Hematologic/Lymphatic: [ ] negative [ ] anemia [ ] bleeding problem  Allergic/Immunologic: [ ] negative [ ] itchy eyes [ ] nasal discharge [ ] hives [ ] angioedema  [ ] All other systems negative  [ ] Unable to assess ROS because ________    OBJECTIVE:  ICU Vital Signs Last 24 Hrs  T(C): 36.7 (01 Sep 2019 04:00), Max: 37.3 (31 Aug 2019 08:00)  T(F): 98.1 (01 Sep 2019 04:00), Max: 99.1 (31 Aug 2019 08:00)  HR: 71 (01 Sep 2019 06:00) (57 - 89)  BP: 127/76 (01 Sep 2019 06:00) (85/53 - 142/64)  BP(mean): 95 (01 Sep 2019 06:00) (65 - 95)  ABP: --  ABP(mean): --  RR: 25 (01 Sep 2019 06:00) (18 - 36)  SpO2: 100% (01 Sep 2019 06:00) (96% - 100%)    Mode: CPAP with PS, FiO2: 21, PEEP: 5, PS: 10, MAP: 8, PIP: 16     @ 07:  -   @ 07:00  --------------------------------------------------------  IN: 529.4 mL / OUT: 730 mL / NET: -200.6 mL     @ 07:01  -   @ 06:31  --------------------------------------------------------  IN: 936.4 mL / OUT: 1320 mL / NET: -383.6 mL      CAPILLARY BLOOD GLUCOSE      POCT Blood Glucose.: 109 mg/dL (01 Sep 2019 05:04)      PHYSICAL EXAM:  General:   HEENT:   Lymph Nodes:  Neck:   Respiratory:   Cardiovascular:   Abdomen:   Extremities:   Skin:   Neurological:  Psychiatry:    LINES:    HOSPITAL MEDICATIONS:  Standing Meds:  ALBUTerol    0.083% 2.5 milliGRAM(s) Nebulizer every 6 hours  atorvastatin 40 milliGRAM(s) Oral <User Schedule>  buDESOnide    Inhalation Suspension 0.25 milliGRAM(s) Inhalation two times a day  carbidopa/levodopa  25/100 2 Tablet(s) Oral three times a day  chlorhexidine 4% Liquid 1 Application(s) Topical <User Schedule>  dextrose 5%. 1000 milliLiter(s) IV Continuous <Continuous>  dextrose 50% Injectable 12.5 Gram(s) IV Push once  dextrose 50% Injectable 25 Gram(s) IV Push once  dextrose 50% Injectable 25 Gram(s) IV Push once  docusate sodium Liquid 100 milliGRAM(s) Oral two times a day  furosemide    Tablet 20 milliGRAM(s) Oral daily  gabapentin   Solution 300 milliGRAM(s) Oral at bedtime  guaiFENesin    Syrup 200 milliGRAM(s) Oral every 6 hours  heparin  Infusion. 600 Unit(s)/Hr IV Continuous <Continuous>  insulin lispro (HumaLOG) corrective regimen sliding scale   SubCutaneous every 6 hours  lidocaine   Patch 1 Patch Transdermal daily  metoprolol tartrate 75 milliGRAM(s) Oral every 12 hours  pantoprazole  Injectable 40 milliGRAM(s) IV Push daily  piperacillin/tazobactam IVPB.. 3.375 Gram(s) IV Intermittent every 8 hours  polyethylene glycol 3350 17 Gram(s) Oral daily  predniSONE   Tablet 10 milliGRAM(s) Oral daily  senna 2 Tablet(s) Oral at bedtime  sodium chloride 3%  Inhalation 3 milliLiter(s) Inhalation every 12 hours  vancomycin  IVPB 1000 milliGRAM(s) IV Intermittent every 12 hours      PRN Meds:  acetaminophen    Suspension .. 1000 milliGRAM(s) Oral every 6 hours PRN  ALPRAZolam 0.5 milliGRAM(s) Oral two times a day PRN  dextrose 40% Gel 15 Gram(s) Oral once PRN  glucagon  Injectable 1 milliGRAM(s) IntraMuscular once PRN      LABS:                        9.7    12.7  )-----------( 134      ( 01 Sep 2019 00:11 )             30.1     Hgb Trend: 9.7<--, 10.0<--, 11.1<--, 10.4<--, 11.5<--  09    139  |  101  |  28<H>  ----------------------------<  110<H>  3.7   |  25  |  0.78    Ca    9.3      01 Sep 2019 00:11  Phos  4.0     09  Mg     2.5         TPro  6.3  /  Alb  2.9<L>  /  TBili  0.5  /  DBili  x   /  AST  18  /  ALT  5<L>  /  AlkPhos  43      Creatinine Trend: 0.78<--, 0.88<--, 0.79<--, 0.83<--, 0.92<--, 1.05<--  PT/INR - ( 31 Aug 2019 02:44 )   PT: 13.6 sec;   INR: 1.18 ratio         PTT - ( 01 Sep 2019 04:51 )  PTT:86.1 sec  Urinalysis Basic - ( 30 Aug 2019 15:44 )    Color: Yellow / Appearance: Clear / S.020 / pH: x  Gluc: x / Ketone: Negative  / Bili: Negative / Urobili: Negative   Blood: x / Protein: Trace / Nitrite: Positive   Leuk Esterase: Large / RBC: 5 /hpf / WBC >50   Sq Epi: x / Non Sq Epi: 5 / Bacteria: Many      Arterial Blood Gas:   @ 00:07  7.46/43/71/30/94/5.7  ABG lactate: --  Arterial Blood Gas:   @ 21:34  7.50/36/141/28/99/5.0  ABG lactate: --  Arterial Blood Gas:   @ 15:26  7.49/37/117/28/98/4.8  ABG lactate: --  Arterial Blood Gas:   @ 02:41  7.48/41/102/30/98/6.0  ABG lactate: --  Arterial Blood Gas:   @ 15:33  7.50/36/125/28/99/4.8  ABG lactate: --  Arterial Blood Gas:   @ 13:00  7.48/38/67/29/94/5.2  ABG lactate: --        MICROBIOLOGY:     Culture - Sputum (collected 31 Aug 2019 07:30)  Source: .Sputum  Gram Stain (31 Aug 2019 14:00):    Numerous polymorphonuclear leukocytes per low power field    No Squamous epithelial cells per low power field    Moderate Gram positive cocci in pairs, chains and clusters per oil power    field    Few Gram Negative Diplococci per oil power field    Culture - Urine (collected 30 Aug 2019 18:42)  Source: .Urine  Final Report (31 Aug 2019 18:57):    >=3 organisms. Probable collection contamination.    Culture - Blood (collected 30 Aug 2019 18:02)  Source: .Blood  Preliminary Report (31 Aug 2019 19:02):    No growth to date.    Culture - Blood (collected 30 Aug 2019 18:02)  Source: .Blood  Gram Stain (31 Aug 2019 09:15):    Growth in aerobic bottle: Gram Positive Cocci in Clusters  Preliminary Report (31 Aug 2019 09:16):    Growth in aerobic bottle: Gram Positive Cocci in Clusters    "Due to technical problems, Proteus sp. will Not be reported as part of    the BCID panel until further notice"    ***Blood Panel PCR results on this specimen are available    approximately 3 hours after the Gram stain result.***    Gram stain, PCR, and/or culture results may not always    correspond due to difference in methodologies.    ************************************************************    This PCR assay was performed using [employer].    The following targets are tested for: Enterococcus,    vancomycin resistant enterococci, Listeria monocytogenes,    coagulase negative staphylococci, S. aureus,    methicillin resistant S. aureus, Streptococcus agalactiae    (Group B), S. pneumoniae, S.pyogenes (Group A),    Acinetobacter baumannii, Enterobacter cloacae, E. coli,    Klebsiella oxytoca, K. pneumoniae, Proteus sp.,    Serratia marcescens, Haemophilus influenzae,    Neisseria meningitidis, Pseudomonas aeruginosa, Candida    albicans, C. glabrata, C krusei, C parapsilosis,    C. tropicalis and the KPC resistance gene.  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    Interval Events: Thick secretions overnight, started duoneb x1, needs to be reassessed.     REVIEW OF SYSTEMS:  Constitutional: [ ] negative [ ] fevers [ ] chills [ ] weight loss [ ] weight gain  HEENT: [ ] negative [ ] dry eyes [ ] eye irritation [ ] postnasal drip [ ] nasal congestion  CV: [ ] negative  [ ] chest pain [ ] orthopnea [ ] palpitations [ ] murmur  Resp: [ ] negative [ ] cough [ ] shortness of breath [ ] dyspnea [x] wheezing [x] sputum [ ] hemoptysis  GI: [ ] negative [ ] nausea [ ] vomiting [ ] diarrhea [ ] constipation [ ] abd pain [ ] dysphagia   : [ ] negative [ ] dysuria [ ] nocturia [ ] hematuria [ ] increased urinary frequency  Musculoskeletal: [ ] negative [ ] back pain [ ] myalgias [ ] arthralgias [ ] fracture  Skin: [ ] negative [ ] rash [ ] itch  Neurological: [ ] negative [ ] headache [ ] dizziness [ ] syncope [ ] weakness [ ] numbness  Psychiatric: [ ] negative [ ] anxiety [ ] depression  Endocrine: [ ] negative [ ] diabetes [ ] thyroid problem  Hematologic/Lymphatic: [ ] negative [ ] anemia [ ] bleeding problem  Allergic/Immunologic: [ ] negative [ ] itchy eyes [ ] nasal discharge [ ] hives [ ] angioedema  [x] All other systems negative      OBJECTIVE:  ICU Vital Signs Last 24 Hrs  T(C): 36.7 (01 Sep 2019 04:00), Max: 37.3 (31 Aug 2019 08:00)  T(F): 98.1 (01 Sep 2019 04:00), Max: 99.1 (31 Aug 2019 08:00)  HR: 71 (01 Sep 2019 06:00) (57 - 89)  BP: 127/76 (01 Sep 2019 06:00) (85/53 - 142/64)  BP(mean): 95 (01 Sep 2019 06:00) (65 - 95)  ABP: --  ABP(mean): --  RR: 25 (01 Sep 2019 06:00) (18 - 36)  SpO2: 100% (01 Sep 2019 06:00) (96% - 100%)    Mode: CPAP with PS, FiO2: 21, PEEP: 5, PS: 10, MAP: 8, PIP: 16     @ 07:  -   @ 07:00  --------------------------------------------------------  IN: 529.4 mL / OUT: 730 mL / NET: -200.6 mL     @ 07:01  -   @ 06:31  --------------------------------------------------------  IN: 936.4 mL / OUT: 1320 mL / NET: -383.6 mL      CAPILLARY BLOOD GLUCOSE      POCT Blood Glucose.: 109 mg/dL (01 Sep 2019 05:04)      PHYSICAL EXAM:  GENERAL: resting comfortably  HEAD:  Atraumatic, Normocephalic  EYES: EOMI, conjunctiva and sclera clear  NECK: Supple, No JVD  CHEST/LUNG: diffuse ronchi b/l, No wheeze or rales, coughing during exam +sputum production   HEART: No murmurs, rubs, or gallops  ABDOMEN: Soft, Nontender, Nondistended; Bowel sounds present  EXTREMITIES:  2+ Peripheral Pulses, No clubbing, cyanosis, or edema  NEUROLOGY: awake, A&Ox3, no longer lethargic   SKIN: No rashes or lesions    LINES:    HOSPITAL MEDICATIONS:  Standing Meds:  ALBUTerol    0.083% 2.5 milliGRAM(s) Nebulizer every 6 hours  atorvastatin 40 milliGRAM(s) Oral <User Schedule>  buDESOnide    Inhalation Suspension 0.25 milliGRAM(s) Inhalation two times a day  carbidopa/levodopa  25/100 2 Tablet(s) Oral three times a day  chlorhexidine 4% Liquid 1 Application(s) Topical <User Schedule>  dextrose 5%. 1000 milliLiter(s) IV Continuous <Continuous>  dextrose 50% Injectable 12.5 Gram(s) IV Push once  dextrose 50% Injectable 25 Gram(s) IV Push once  dextrose 50% Injectable 25 Gram(s) IV Push once  docusate sodium Liquid 100 milliGRAM(s) Oral two times a day  furosemide    Tablet 20 milliGRAM(s) Oral daily  gabapentin   Solution 300 milliGRAM(s) Oral at bedtime  guaiFENesin    Syrup 200 milliGRAM(s) Oral every 6 hours  heparin  Infusion. 600 Unit(s)/Hr IV Continuous <Continuous>  insulin lispro (HumaLOG) corrective regimen sliding scale   SubCutaneous every 6 hours  lidocaine   Patch 1 Patch Transdermal daily  metoprolol tartrate 75 milliGRAM(s) Oral every 12 hours  pantoprazole  Injectable 40 milliGRAM(s) IV Push daily  piperacillin/tazobactam IVPB.. 3.375 Gram(s) IV Intermittent every 8 hours  polyethylene glycol 3350 17 Gram(s) Oral daily  predniSONE   Tablet 10 milliGRAM(s) Oral daily  senna 2 Tablet(s) Oral at bedtime  sodium chloride 3%  Inhalation 3 milliLiter(s) Inhalation every 12 hours  vancomycin  IVPB 1000 milliGRAM(s) IV Intermittent every 12 hours      PRN Meds:  acetaminophen    Suspension .. 1000 milliGRAM(s) Oral every 6 hours PRN  ALPRAZolam 0.5 milliGRAM(s) Oral two times a day PRN  dextrose 40% Gel 15 Gram(s) Oral once PRN  glucagon  Injectable 1 milliGRAM(s) IntraMuscular once PRN      LABS:                        9.7    12.7  )-----------( 134      ( 01 Sep 2019 00:11 )             30.1     Hgb Trend: 9.7<--, 10.0<--, 11.1<--, 10.4<--, 11.5<--  0901    139  |  101  |  28<H>  ----------------------------<  110<H>  3.7   |  25  |  0.78    Ca    9.3      01 Sep 2019 00:11  Phos  4.0     09-01  Mg     2.5         TPro  6.3  /  Alb  2.9<L>  /  TBili  0.5  /  DBili  x   /  AST  18  /  ALT  5<L>  /  AlkPhos  43      Creatinine Trend: 0.78<--, 0.88<--, 0.79<--, 0.83<--, 0.92<--, 1.05<--  PT/INR - ( 31 Aug 2019 02:44 )   PT: 13.6 sec;   INR: 1.18 ratio         PTT - ( 01 Sep 2019 04:51 )  PTT:86.1 sec  Urinalysis Basic - ( 30 Aug 2019 15:44 )    Color: Yellow / Appearance: Clear / S.020 / pH: x  Gluc: x / Ketone: Negative  / Bili: Negative / Urobili: Negative   Blood: x / Protein: Trace / Nitrite: Positive   Leuk Esterase: Large / RBC: 5 /hpf / WBC >50   Sq Epi: x / Non Sq Epi: 5 / Bacteria: Many      Arterial Blood Gas:   @ 00:07  7.46/43/71/30/94/5.7  ABG lactate: --  Arterial Blood Gas:   @ 21:34  7.50/36/141/28/99/5.0  ABG lactate: --  Arterial Blood Gas:   @ 15:26  7.49/37/117/28/98/4.8  ABG lactate: --  Arterial Blood Gas:   @ 02:41  7.48/41/102/30/98/6.0  ABG lactate: --  Arterial Blood Gas:   @ 15:33  7.50/36/125/28/99/4.8  ABG lactate: --  Arterial Blood Gas:   @ 13:00  7.48/38/67/29/94/5.2  ABG lactate: --        MICROBIOLOGY:     Culture - Sputum (collected 31 Aug 2019 07:30)  Source: .Sputum  Gram Stain (31 Aug 2019 14:00):    Numerous polymorphonuclear leukocytes per low power field    No Squamous epithelial cells per low power field    Moderate Gram positive cocci in pairs, chains and clusters per oil power    field    Few Gram Negative Diplococci per oil power field    Culture - Urine (collected 30 Aug 2019 18:42)  Source: .Urine  Final Report (31 Aug 2019 18:57):    >=3 organisms. Probable collection contamination.    Culture - Blood (collected 30 Aug 2019 18:02)  Source: .Blood  Preliminary Report (31 Aug 2019 19:02):    No growth to date.    Culture - Blood (collected 30 Aug 2019 18:02)  Source: .Blood  Gram Stain (31 Aug 2019 09:15):    Growth in aerobic bottle: Gram Positive Cocci in Clusters  Preliminary Report (31 Aug 2019 09:16):    Growth in aerobic bottle: Gram Positive Cocci in Clusters    "Due to technical problems, Proteus sp. will Not be reported as part of    the BCID panel until further notice"    ***Blood Panel PCR results on this specimen are available    approximately 3 hours after the Gram stain result.***    Gram stain, PCR, and/or culture results may not always    correspond due to difference in methodologies.    ************************************************************    This PCR assay was performed using [employer].    The following targets are tested for: Enterococcus,    vancomycin resistant enterococci, Listeria monocytogenes,    coagulase negative staphylococci, S. aureus,    methicillin resistant S. aureus, Streptococcus agalactiae    (Group B), S. pneumoniae, S.pyogenes (Group A),    Acinetobacter baumannii, Enterobacter cloacae, E. coli,    Klebsiella oxytoca, K. pneumoniae, Proteus sp.,    Serratia marcescens, Haemophilus influenzae,    Neisseria meningitidis, Pseudomonas aeruginosa, Candida    albicans, C. glabrata, C krusei, C parapsilosis,    C. tropicalis and the KPC resistance gene.  Organism: Blood Culture PCR (31 Aug 2019 11:01)  Organism: Blood Culture PCR (31 Aug 2019 11:01)      RADIOLOGY:  [ ] Reviewed and interpreted by me    EKG:

## 2019-09-02 LAB
-  AMPICILLIN/SULBACTAM: SIGNIFICANT CHANGE UP
-  AMPICILLIN/SULBACTAM: SIGNIFICANT CHANGE UP
-  CEFAZOLIN: SIGNIFICANT CHANGE UP
-  CEFAZOLIN: SIGNIFICANT CHANGE UP
-  CLINDAMYCIN: SIGNIFICANT CHANGE UP
-  CLINDAMYCIN: SIGNIFICANT CHANGE UP
-  ERYTHROMYCIN: SIGNIFICANT CHANGE UP
-  ERYTHROMYCIN: SIGNIFICANT CHANGE UP
-  GENTAMICIN: SIGNIFICANT CHANGE UP
-  GENTAMICIN: SIGNIFICANT CHANGE UP
-  OXACILLIN: SIGNIFICANT CHANGE UP
-  OXACILLIN: SIGNIFICANT CHANGE UP
-  RIFAMPIN: SIGNIFICANT CHANGE UP
-  RIFAMPIN: SIGNIFICANT CHANGE UP
-  TETRACYCLINE: SIGNIFICANT CHANGE UP
-  TETRACYCLINE: SIGNIFICANT CHANGE UP
-  TRIMETHOPRIM/SULFAMETHOXAZOLE: SIGNIFICANT CHANGE UP
-  TRIMETHOPRIM/SULFAMETHOXAZOLE: SIGNIFICANT CHANGE UP
-  VANCOMYCIN: SIGNIFICANT CHANGE UP
-  VANCOMYCIN: SIGNIFICANT CHANGE UP
ALBUMIN SERPL ELPH-MCNC: 3.4 G/DL — SIGNIFICANT CHANGE UP (ref 3.3–5)
ALP SERPL-CCNC: 47 U/L — SIGNIFICANT CHANGE UP (ref 40–120)
ALT FLD-CCNC: 7 U/L — LOW (ref 10–45)
ANION GAP SERPL CALC-SCNC: 16 MMOL/L — SIGNIFICANT CHANGE UP (ref 5–17)
APTT BLD: 112.6 SEC — HIGH (ref 27.5–36.3)
APTT BLD: 88.4 SEC — HIGH (ref 27.5–36.3)
APTT BLD: 97.1 SEC — HIGH (ref 27.5–36.3)
AST SERPL-CCNC: 20 U/L — SIGNIFICANT CHANGE UP (ref 10–40)
BILIRUB SERPL-MCNC: 0.5 MG/DL — SIGNIFICANT CHANGE UP (ref 0.2–1.2)
BUN SERPL-MCNC: 17 MG/DL — SIGNIFICANT CHANGE UP (ref 7–23)
CALCIUM SERPL-MCNC: 9.4 MG/DL — SIGNIFICANT CHANGE UP (ref 8.4–10.5)
CHLORIDE SERPL-SCNC: 97 MMOL/L — SIGNIFICANT CHANGE UP (ref 96–108)
CO2 SERPL-SCNC: 26 MMOL/L — SIGNIFICANT CHANGE UP (ref 22–31)
CREAT SERPL-MCNC: 0.86 MG/DL — SIGNIFICANT CHANGE UP (ref 0.5–1.3)
CULTURE RESULTS: SIGNIFICANT CHANGE UP
CULTURE RESULTS: SIGNIFICANT CHANGE UP
GLUCOSE BLDC GLUCOMTR-MCNC: 112 MG/DL — HIGH (ref 70–99)
GLUCOSE BLDC GLUCOMTR-MCNC: 112 MG/DL — HIGH (ref 70–99)
GLUCOSE BLDC GLUCOMTR-MCNC: 153 MG/DL — HIGH (ref 70–99)
GLUCOSE BLDC GLUCOMTR-MCNC: 97 MG/DL — SIGNIFICANT CHANGE UP (ref 70–99)
GLUCOSE SERPL-MCNC: 112 MG/DL — HIGH (ref 70–99)
GRAM STN FLD: SIGNIFICANT CHANGE UP
HCT VFR BLD CALC: 32.5 % — LOW (ref 34.5–45)
HGB BLD-MCNC: 10.9 G/DL — LOW (ref 11.5–15.5)
MCHC RBC-ENTMCNC: 30.4 PG — SIGNIFICANT CHANGE UP (ref 27–34)
MCHC RBC-ENTMCNC: 33.5 GM/DL — SIGNIFICANT CHANGE UP (ref 32–36)
MCV RBC AUTO: 90.8 FL — SIGNIFICANT CHANGE UP (ref 80–100)
METHOD TYPE: SIGNIFICANT CHANGE UP
METHOD TYPE: SIGNIFICANT CHANGE UP
ORGANISM # SPEC MICROSCOPIC CNT: SIGNIFICANT CHANGE UP
PLATELET # BLD AUTO: 182 K/UL — SIGNIFICANT CHANGE UP (ref 150–400)
POTASSIUM SERPL-MCNC: 3.2 MMOL/L — LOW (ref 3.5–5.3)
POTASSIUM SERPL-SCNC: 3.2 MMOL/L — LOW (ref 3.5–5.3)
PROT SERPL-MCNC: 6.9 G/DL — SIGNIFICANT CHANGE UP (ref 6–8.3)
RBC # BLD: 3.57 M/UL — LOW (ref 3.8–5.2)
RBC # FLD: 14.6 % — HIGH (ref 10.3–14.5)
SODIUM SERPL-SCNC: 139 MMOL/L — SIGNIFICANT CHANGE UP (ref 135–145)
SPECIMEN SOURCE: SIGNIFICANT CHANGE UP
SPECIMEN SOURCE: SIGNIFICANT CHANGE UP
VANCOMYCIN TROUGH SERPL-MCNC: 22.2 UG/ML — HIGH (ref 10–20)
WBC # BLD: 11.3 K/UL — HIGH (ref 3.8–10.5)
WBC # FLD AUTO: 11.3 K/UL — HIGH (ref 3.8–10.5)

## 2019-09-02 PROCEDURE — 71045 X-RAY EXAM CHEST 1 VIEW: CPT | Mod: 26

## 2019-09-02 PROCEDURE — 99233 SBSQ HOSP IP/OBS HIGH 50: CPT | Mod: GC

## 2019-09-02 RX ADMIN — HEPARIN SODIUM 700 UNIT(S)/HR: 5000 INJECTION INTRAVENOUS; SUBCUTANEOUS at 16:23

## 2019-09-02 RX ADMIN — Medication 1: at 07:07

## 2019-09-02 RX ADMIN — HEPARIN SODIUM 700 UNIT(S)/HR: 5000 INJECTION INTRAVENOUS; SUBCUTANEOUS at 08:23

## 2019-09-02 RX ADMIN — PIPERACILLIN AND TAZOBACTAM 25 GRAM(S): 4; .5 INJECTION, POWDER, LYOPHILIZED, FOR SOLUTION INTRAVENOUS at 22:31

## 2019-09-02 RX ADMIN — Medication 0.25 MILLIGRAM(S): at 17:23

## 2019-09-02 RX ADMIN — PANTOPRAZOLE SODIUM 40 MILLIGRAM(S): 20 TABLET, DELAYED RELEASE ORAL at 14:35

## 2019-09-02 RX ADMIN — PIPERACILLIN AND TAZOBACTAM 25 GRAM(S): 4; .5 INJECTION, POWDER, LYOPHILIZED, FOR SOLUTION INTRAVENOUS at 06:39

## 2019-09-02 RX ADMIN — Medication 0.25 MILLIGRAM(S): at 06:11

## 2019-09-02 RX ADMIN — ALBUTEROL 2.5 MILLIGRAM(S): 90 AEROSOL, METERED ORAL at 11:44

## 2019-09-02 RX ADMIN — ALBUTEROL 2.5 MILLIGRAM(S): 90 AEROSOL, METERED ORAL at 06:15

## 2019-09-02 RX ADMIN — SODIUM CHLORIDE 3 MILLILITER(S): 9 INJECTION INTRAMUSCULAR; INTRAVENOUS; SUBCUTANEOUS at 17:23

## 2019-09-02 RX ADMIN — ALBUTEROL 2.5 MILLIGRAM(S): 90 AEROSOL, METERED ORAL at 17:23

## 2019-09-02 RX ADMIN — LIDOCAINE 1 PATCH: 4 CREAM TOPICAL at 21:32

## 2019-09-02 RX ADMIN — SODIUM CHLORIDE 3 MILLILITER(S): 9 INJECTION INTRAMUSCULAR; INTRAVENOUS; SUBCUTANEOUS at 06:10

## 2019-09-02 RX ADMIN — PIPERACILLIN AND TAZOBACTAM 25 GRAM(S): 4; .5 INJECTION, POWDER, LYOPHILIZED, FOR SOLUTION INTRAVENOUS at 14:36

## 2019-09-02 RX ADMIN — HEPARIN SODIUM 700 UNIT(S)/HR: 5000 INJECTION INTRAVENOUS; SUBCUTANEOUS at 23:03

## 2019-09-02 RX ADMIN — LIDOCAINE 1 PATCH: 4 CREAM TOPICAL at 14:34

## 2019-09-02 NOTE — PROGRESS NOTE ADULT - PROBLEM SELECTOR PLAN 2
- new onset Afib this admission  - c/w heparin gtt on while decision on PEG placement is still pending.  Eventual plan will be for DOAC per cardiology as patient's MS is not rheumatic in etiology   - metoprolol increased to 75 mg BID for rate control per cardiology  - c/w lasix 20 mg daily today as MARY resolved - new onset Afib this admission  - c/w heparin gtt on while decision on PEG placement is still pending.  Eventual plan will be for DOAC per cardiology as patient's MS is not rheumatic in etiology   - metoprolol 75 mg BID for rate control  - c/w lasix 20 mg daily -C/w vancomycin, zosyn  - BCx NGTD  - Attempted to place NGTx2 today, unable to be completed due to difficult anatomy; will attempt again tomorrow  -Palliative recs appreciated: family meeting tomorrow around 1:30  -supplemental oxygen to maintain saturations>90%

## 2019-09-02 NOTE — PROGRESS NOTE ADULT - PROBLEM SELECTOR PLAN 6
-Melatonin 5mg HS  -C/w home Sinemet 50/200 TID for Parkinson-like symptom. Per son, patient never has a diagnosis of parkinson's disease. She was started on sinemet to see if it helps with her leg shaking symptoms  - c/w venlafaxine 37.5mg q12h (was on duloxetine at home) given duloxetine cannot be crushed and put via NGT -Melatonin 5mg HS via NGT  -C/w home Sinemet 50/200 TID for Parkinson-like symptom. Per son, patient never has a diagnosis of parkinson's disease. She was started on sinemet to see if it helps with her leg shaking symptoms  - c/w venlafaxine 37.5mg q12h (was on duloxetine at home) given duloxetine cannot be crushed and put via NGT -Severe on TTE  -Outpatient CTS eval if in line with overall GOC.

## 2019-09-02 NOTE — PROGRESS NOTE ADULT - PROBLEM SELECTOR PLAN 7
- Per son, no formal diagnosis of COPD  - pulmonary consult appreciated, rec adventitious lung sounds likely due to vocal cord paralysis, c/w with Pulmicort, albuterol and prednisone.   - c/w home dose prednisone 10mg daily chronically for PMR/?GCA. Right temporal artery biopsy reportedly negative, but she gets right sided headaches.  -Trigeminal neuralgia and migraine on the differential given right HA associated with maxillary/jaw pain and light sensitivity--c/w tylenol 1g q6h PRN given her    - Maintain O2 sats between 88-92%  - cold compresses for pain -Melatonin 5mg HS via NGT  -C/w home Sinemet 50/200 TID for Parkinson-like symptom. Per son, patient never has a diagnosis of parkinson's disease. She was started on sinemet to see if it helps with her leg shaking symptoms  - c/w venlafaxine 37.5mg q12h (was on duloxetine at home) given duloxetine cannot be crushed and put via NGT

## 2019-09-02 NOTE — PROGRESS NOTE ADULT - PROBLEM SELECTOR PLAN 3
-completed 7 days of treatment however initial CT had been NEGATIVE for pneumonia S/p MICU course for suggested aspiration PNA. Sputum Cx + for GPC in clusters, MRSA confirmed  - C/w Vancomycin, Zosyn  - BCx NGTD

## 2019-09-02 NOTE — PROGRESS NOTE ADULT - PROBLEM SELECTOR PLAN 5
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 Atrophy and extensive small vessel white matter ischemic changes. No acute changes    - completed diflucan and nystatin for oral candidiasis. Received extended course of 14 days (8/15->8/28)  - now with NG tube for meds and feeds   - Aspiration precautions and Elevate head of bed  -appreciate GI eval.  PEG placement had been delayed due to afib.  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 Atrophy and extensive small vessel white matter ischemic changes. No acute changes    - Attempted NGTx2 w/o success 2/2 difficulty swallowing; will attempt again 09/03  - Aspiration precautions and Elevate head of bed  -appreciate GI eval.  PEG placement pending GOC discussion  -holding feeds while being evaluated for possible aspiration today - new onset Afib this admission  - c/w heparin gtt on while decision on PEG placement is still pending.  Eventual plan will be for DOAC per cardiology as patient's MS is not rheumatic in etiology   - metoprolol 75 mg BID for rate control  - c/w lasix 20 mg daily

## 2019-09-02 NOTE — PROVIDER CONTACT NOTE (CRITICAL VALUE NOTIFICATION) - PERSON GIVING RESULT:
Anny mendoza
Judy mendoza
Sushila Gaviria
Sushila/Hematology
Yi Scott/core labs
kingsley pena -reji
Juan Carlos Laboy (Arturo)

## 2019-09-02 NOTE — PROVIDER CONTACT NOTE (CRITICAL VALUE NOTIFICATION) - ACTION/TREATMENT ORDERED:
As per MD, follow nomogram protocols as per MAR. Will continue to monitor.
Cont. to monitor pt.
MD noted results, no new orders at this time.
heparin held  for 60 min. will restart at 7ml/hr
will adjust as per protocol and monitor. bleeding rpecautions maintained.
MD aware. Call bell in reach. Will continue to monitor.
hold heparin x 1 hr and restart at 9ml/hr.

## 2019-09-02 NOTE — PROVIDER CONTACT NOTE (CRITICAL VALUE NOTIFICATION) - SITUATION
Blood culture collected 9/1 showed gram positive cocci in clusters in anaerobic bottle.
PTT was 165.3
Ptt 159.5  heparin 9cc/hr in progress
as above
pTT 140.5
.2

## 2019-09-02 NOTE — PROGRESS NOTE ADULT - PROBLEM SELECTOR PLAN 4
-Severe on TTE  -Outpatient CTS eval if in line with overall GOC. - Per son, no formal diagnosis of COPD  - pulmonary consult appreciated, rec adventitious lung sounds likely due to vocal cord paralysis, c/w with Pulmicort, albuterol and prednisone.   - c/w home dose prednisone 10mg daily chronically for PMR/?GCA. Right temporal artery biopsy reportedly negative, but she gets right sided headaches.  -Trigeminal neuralgia and migraine on the differential given right HA associated with maxillary/jaw pain and light sensitivity--c/w tylenol 1g q6h PRN given her    - Maintain O2 sats between 88-92%  - cold compresses for pain

## 2019-09-02 NOTE — PROGRESS NOTE ADULT - PROBLEM SELECTOR PLAN 1
concern for aspiration pna.  Very rhoncherous on exam, during RRT temp reported as >100 though not recorded yet.   -start empiric zosyn  -check blood cultures  -hold ngt feeds  -discussed at length with son Shon on phone-full code at this time.  He is not prepared to make decisions regarding code status as they have not discussed in past.  -MICU c/s placed.  F/u recs  -spoke with ENT-they will f/u today.  If requires intubation would recommend smalled ET tube due to vocal cord paralysis  -I updated PCP Dr Dumas as well at son's request.  -f/u palliative c/s (placed yesterday)  -if xray negative, consider repeat CT chest when stabilized  -supplemental oxygen to maintain saturations>90%  -do not suspect vte as she has been on heparin gtt for afib -C/w vancomycin, zosyn  - BCx NGTD  - Attempted to place NGTx2 today, unable to be completed due to difficult anatomy; will attempt again tomorrow  -Palliative recs appreciated: family meeting tomorrow around 1:30  -supplemental oxygen to maintain saturations>90% S/p vocal cord injection for vocal cord paralysis  - failed speech and swallow study and MBS on 08/16 and 08/23  - Will rpt S+S evaluation prior to family meeting  - MRI/MRA head and neck: 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 Atrophy and extensive small vessel white matter ischemic changes. No acute changes    - Attempted NGTx2 w/o success 2/2 difficulty swallowing; will attempt again 09/03  - Holding all non-essential NG meds in interim  - Aspiration precautions and Elevate head of bed  - PEG placement on hold, pending GOC discussion

## 2019-09-02 NOTE — PROVIDER CONTACT NOTE (CRITICAL VALUE NOTIFICATION) - ASSESSMENT
Pt. at CAT Scan at present.
Pt is alert and oriented, afebrile, VSS. Currently in zosyn IV and vancomycin IV
Pt. a&ox2, no s/s of bleeding, vss.
alert, no active bleeding noted
patient on heparin gtt at 11 cc/hr
Patient asymptomatic, no c/o CP, SOB, pain/discomfort. VSS.

## 2019-09-02 NOTE — PROVIDER CONTACT NOTE (CRITICAL VALUE NOTIFICATION) - BACKGROUND
Adm with UTI
Copd exacerbation, uri/pna, uti.
Pt. was placed on the heparin drip after new onset of rapid a-fib.
adm for resp failure and sepsis
copd exacerbation, uri/pna, uti, acute encephalopathy
Patient admitted with UTI pneumonia.

## 2019-09-02 NOTE — PROGRESS NOTE ADULT - ATTENDING COMMENTS
aspiration PNA with GPC bacteremia, on day 4 of vanco/zosyn   FM with palliative team tmrw at 1pm  family wants to have repeat s/s eval before the meeting to make final decision on PEG tube placement  guarded prognosis    Radha Coleman MD  Division of Hospital Medicine  Pager: [phone]  Office: [phone]

## 2019-09-02 NOTE — PROGRESS NOTE ADULT - ASSESSMENT
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

## 2019-09-02 NOTE — PROGRESS NOTE ADULT - SUBJECTIVE AND OBJECTIVE BOX
******************************************  Dr. Elver Duckworth, PGY1  Internal Medicine   [facility] Pager: [phone]  [facility] Pager: [phone]  *******************************************    COLEMAN MARCH  85y  MRN: [id number]    Subjective: NAEON. No acute complaints.    Patient is a 85y old  Female who presents with a chief complaint of Abdominal pain (01 Sep 2019 06:31)      MEDICATIONS  (STANDING):  ALBUTerol    0.083% 2.5 milliGRAM(s) Nebulizer every 6 hours  atorvastatin 40 milliGRAM(s) Oral <User Schedule>  buDESOnide    Inhalation Suspension 0.25 milliGRAM(s) Inhalation two times a day  carbidopa/levodopa  25/100 2 Tablet(s) Oral three times a day  dextrose 5%. 1000 milliLiter(s) (50 mL/Hr) IV Continuous <Continuous>  dextrose 50% Injectable 12.5 Gram(s) IV Push once  dextrose 50% Injectable 25 Gram(s) IV Push once  dextrose 50% Injectable 25 Gram(s) IV Push once  docusate sodium Liquid 100 milliGRAM(s) Oral two times a day  furosemide    Tablet 20 milliGRAM(s) Oral daily  gabapentin   Solution 300 milliGRAM(s) Oral at bedtime  guaiFENesin    Syrup 200 milliGRAM(s) Oral every 6 hours  heparin  Infusion. 600 Unit(s)/Hr (6 mL/Hr) IV Continuous <Continuous>  insulin lispro (HumaLOG) corrective regimen sliding scale   SubCutaneous every 6 hours  lidocaine   Patch 1 Patch Transdermal daily  metoprolol tartrate 75 milliGRAM(s) Oral every 12 hours  pantoprazole  Injectable 40 milliGRAM(s) IV Push daily  piperacillin/tazobactam IVPB.. 3.375 Gram(s) IV Intermittent every 8 hours  polyethylene glycol 3350 17 Gram(s) Oral daily  predniSONE   Tablet 10 milliGRAM(s) Oral daily  senna 2 Tablet(s) Oral at bedtime  sodium chloride 3%  Inhalation 3 milliLiter(s) Inhalation every 12 hours  vancomycin  IVPB 1000 milliGRAM(s) IV Intermittent every 12 hours    MEDICATIONS  (PRN):  acetaminophen    Suspension .. 1000 milliGRAM(s) Oral every 6 hours PRN Mild Pain (1 - 3), Moderate Pain (4 - 6)  ALPRAZolam 0.5 milliGRAM(s) Oral two times a day PRN anxiety  dextrose 40% Gel 15 Gram(s) Oral once PRN Blood Glucose LESS THAN 70 milliGRAM(s)/deciliter  glucagon  Injectable 1 milliGRAM(s) IntraMuscular once PRN Glucose LESS THAN 70 milligrams/deciliter      Objective:    Vitals: Vital Signs Last 24 Hrs  T(C): 36.9 (09-02-19 @ 05:29), Max: 36.9 (09-02-19 @ 05:29)  T(F): 98.5 (09-02-19 @ 05:29), Max: 98.5 (09-02-19 @ 05:29)  HR: 75 (09-02-19 @ 06:12) (55 - 77)  BP: 110/66 (09-01-19 @ 21:18) (110/66 - 152/70)  BP(mean): 98 (09-01-19 @ 18:00) (80 - 104)  RR: 18 (09-02-19 @ 05:29) (18 - 38)  SpO2: 95% (09-02-19 @ 06:12) (94% - 100%)                I&O's Summary  01 Sep 2019 07:01  -  02 Sep 2019 07:00  --------------------------------------------------------  IN: 769 mL / OUT: 1125 mL / NET: -356 mL    PHYSICAL EXAM:  GENERAL: resting comfortably  HEAD:  Atraumatic, Normocephalic  EYES: EOMI, conjunctiva and sclera clear  NECK: Supple, No JVD  CHEST/LUNG: diffuse ronchi b/l, No wheeze or rales, coughing during exam +sputum production   HEART: No murmurs, rubs, or gallops  ABDOMEN: Soft, Nontender, Nondistended; Bowel sounds present  EXTREMITIES:  2+ Peripheral Pulses, No clubbing, cyanosis, or edema  NEUROLOGY: awake, A&Ox3, no longer lethargic   SKIN: No rashes or lesions                                          LABS:  09-02    139  |  97  |  17  ----------------------------<  112<H>  3.2<L>   |  26  |  0.86  09-01    139  |  101  |  28<H>  ----------------------------<  110<H>  3.7   |  25  |  0.78  08-31    142  |  102  |  36<H>  ----------------------------<  116<H>  4.1   |  26  |  0.88    Ca    9.4      02 Sep 2019 06:55  Ca    9.3      01 Sep 2019 00:11  Ca    9.0      31 Aug 2019 02:44  Phos  4.0     09-01  Mg     2.5     09-01    TPro  6.9  /  Alb  3.4  /  TBili  0.5  /  DBili  x   /  AST  20  /  ALT  7<L>  /  AlkPhos  47  09-02  TPro  6.3  /  Alb  2.9<L>  /  TBili  0.5  /  DBili  x   /  AST  18  /  ALT  5<L>  /  AlkPhos  43  09-01  TPro  6.3  /  Alb  3.0<L>  /  TBili  0.4  /  DBili  x   /  AST  14  /  ALT  7<L>  /  AlkPhos  39<L>  08-31      PTT - ( 02 Sep 2019 06:55 )  PTT:112.6 sec                ABG - ( 01 Sep 2019 00:07 )  pH, Arterial: 7.46  pH, Blood: x     /  pCO2: 43    /  pO2: 71    / HCO3: 30    / Base Excess: 5.7   /  SaO2: 94                                      10.9   11.3  )-----------( 182      ( 02 Sep 2019 06:56 )             32.5                         9.7    12.7  )-----------( 134      ( 01 Sep 2019 00:11 )             30.1                         10.0   11.8  )-----------( 145      ( 31 Aug 2019 02:44 )             30.0     CAPILLARY BLOOD GLUCOSE      POCT Blood Glucose.: 153 mg/dL (02 Sep 2019 06:49)  POCT Blood Glucose.: 97 mg/dL (02 Sep 2019 00:18)  POCT Blood Glucose.: 101 mg/dL (01 Sep 2019 17:06)  POCT Blood Glucose.: 150 mg/dL (01 Sep 2019 11:10)      RADIOLOGY & ADDITIONAL TESTS:    Imaging Personally Reviewed:  [ ] YES  [ ] NO      Consultants involved in case:   Consultant(s) Notes Reviewed:  [ ] YES  [ ] NO:   Care Discussed with Consultants/Other Providers [ ] YES  [ ] NO

## 2019-09-03 LAB
ANION GAP SERPL CALC-SCNC: 16 MMOL/L — SIGNIFICANT CHANGE UP (ref 5–17)
APTT BLD: 97.9 SEC — HIGH (ref 27.5–36.3)
BUN SERPL-MCNC: 14 MG/DL — SIGNIFICANT CHANGE UP (ref 7–23)
CALCIUM SERPL-MCNC: 9.9 MG/DL — SIGNIFICANT CHANGE UP (ref 8.4–10.5)
CHLORIDE SERPL-SCNC: 100 MMOL/L — SIGNIFICANT CHANGE UP (ref 96–108)
CO2 SERPL-SCNC: 25 MMOL/L — SIGNIFICANT CHANGE UP (ref 22–31)
CREAT SERPL-MCNC: 0.78 MG/DL — SIGNIFICANT CHANGE UP (ref 0.5–1.3)
CULTURE RESULTS: SIGNIFICANT CHANGE UP
GLUCOSE BLDC GLUCOMTR-MCNC: 101 MG/DL — HIGH (ref 70–99)
GLUCOSE BLDC GLUCOMTR-MCNC: 104 MG/DL — HIGH (ref 70–99)
GLUCOSE BLDC GLUCOMTR-MCNC: 106 MG/DL — HIGH (ref 70–99)
GLUCOSE BLDC GLUCOMTR-MCNC: 117 MG/DL — HIGH (ref 70–99)
GLUCOSE SERPL-MCNC: 108 MG/DL — HIGH (ref 70–99)
HCT VFR BLD CALC: 34.9 % — SIGNIFICANT CHANGE UP (ref 34.5–45)
HGB BLD-MCNC: 11.2 G/DL — LOW (ref 11.5–15.5)
MCHC RBC-ENTMCNC: 28.9 PG — SIGNIFICANT CHANGE UP (ref 27–34)
MCHC RBC-ENTMCNC: 32.1 GM/DL — SIGNIFICANT CHANGE UP (ref 32–36)
MCV RBC AUTO: 90.2 FL — SIGNIFICANT CHANGE UP (ref 80–100)
PLATELET # BLD AUTO: 220 K/UL — SIGNIFICANT CHANGE UP (ref 150–400)
POTASSIUM SERPL-MCNC: 3.2 MMOL/L — LOW (ref 3.5–5.3)
POTASSIUM SERPL-SCNC: 3.2 MMOL/L — LOW (ref 3.5–5.3)
RBC # BLD: 3.87 M/UL — SIGNIFICANT CHANGE UP (ref 3.8–5.2)
RBC # FLD: 14.6 % — HIGH (ref 10.3–14.5)
SODIUM SERPL-SCNC: 141 MMOL/L — SIGNIFICANT CHANGE UP (ref 135–145)
SPECIMEN SOURCE: SIGNIFICANT CHANGE UP
VANCOMYCIN TROUGH SERPL-MCNC: 14.8 UG/ML — SIGNIFICANT CHANGE UP (ref 10–20)
WBC # BLD: 11.5 K/UL — HIGH (ref 3.8–10.5)
WBC # FLD AUTO: 11.5 K/UL — HIGH (ref 3.8–10.5)

## 2019-09-03 PROCEDURE — 99498 ADVNCD CARE PLAN ADDL 30 MIN: CPT | Mod: 25

## 2019-09-03 PROCEDURE — 99233 SBSQ HOSP IP/OBS HIGH 50: CPT | Mod: GC

## 2019-09-03 PROCEDURE — 99497 ADVNCD CARE PLAN 30 MIN: CPT | Mod: 25

## 2019-09-03 PROCEDURE — 99233 SBSQ HOSP IP/OBS HIGH 50: CPT

## 2019-09-03 RX ORDER — POTASSIUM CHLORIDE 20 MEQ
10 PACKET (EA) ORAL
Refills: 0 | Status: COMPLETED | OUTPATIENT
Start: 2019-09-03 | End: 2019-09-04

## 2019-09-03 RX ADMIN — Medication 200 MILLIGRAM(S): at 18:11

## 2019-09-03 RX ADMIN — ALBUTEROL 2.5 MILLIGRAM(S): 90 AEROSOL, METERED ORAL at 11:44

## 2019-09-03 RX ADMIN — PIPERACILLIN AND TAZOBACTAM 25 GRAM(S): 4; .5 INJECTION, POWDER, LYOPHILIZED, FOR SOLUTION INTRAVENOUS at 08:38

## 2019-09-03 RX ADMIN — Medication 100 MILLIEQUIVALENT(S): at 23:17

## 2019-09-03 RX ADMIN — LIDOCAINE 1 PATCH: 4 CREAM TOPICAL at 12:41

## 2019-09-03 RX ADMIN — Medication 0.25 MILLIGRAM(S): at 17:52

## 2019-09-03 RX ADMIN — ALBUTEROL 2.5 MILLIGRAM(S): 90 AEROSOL, METERED ORAL at 17:52

## 2019-09-03 RX ADMIN — Medication 0.25 MILLIGRAM(S): at 05:50

## 2019-09-03 RX ADMIN — ALBUTEROL 2.5 MILLIGRAM(S): 90 AEROSOL, METERED ORAL at 05:50

## 2019-09-03 RX ADMIN — Medication 100 MILLIGRAM(S): at 18:11

## 2019-09-03 RX ADMIN — CARBIDOPA AND LEVODOPA 2 TABLET(S): 25; 100 TABLET ORAL at 14:29

## 2019-09-03 RX ADMIN — PIPERACILLIN AND TAZOBACTAM 25 GRAM(S): 4; .5 INJECTION, POWDER, LYOPHILIZED, FOR SOLUTION INTRAVENOUS at 23:19

## 2019-09-03 RX ADMIN — SENNA PLUS 2 TABLET(S): 8.6 TABLET ORAL at 23:21

## 2019-09-03 RX ADMIN — PANTOPRAZOLE SODIUM 40 MILLIGRAM(S): 20 TABLET, DELAYED RELEASE ORAL at 12:40

## 2019-09-03 RX ADMIN — Medication 75 MILLIGRAM(S): at 23:25

## 2019-09-03 RX ADMIN — SODIUM CHLORIDE 3 MILLILITER(S): 9 INJECTION INTRAMUSCULAR; INTRAVENOUS; SUBCUTANEOUS at 18:27

## 2019-09-03 RX ADMIN — HEPARIN SODIUM 700 UNIT(S)/HR: 5000 INJECTION INTRAVENOUS; SUBCUTANEOUS at 06:43

## 2019-09-03 RX ADMIN — POLYETHYLENE GLYCOL 3350 17 GRAM(S): 17 POWDER, FOR SOLUTION ORAL at 12:40

## 2019-09-03 RX ADMIN — Medication 200 MILLIGRAM(S): at 12:39

## 2019-09-03 RX ADMIN — Medication 75 MILLIGRAM(S): at 12:39

## 2019-09-03 RX ADMIN — LIDOCAINE 1 PATCH: 4 CREAM TOPICAL at 02:00

## 2019-09-03 RX ADMIN — Medication 200 MILLIGRAM(S): at 23:21

## 2019-09-03 RX ADMIN — GABAPENTIN 300 MILLIGRAM(S): 400 CAPSULE ORAL at 23:20

## 2019-09-03 RX ADMIN — Medication 250 MILLIGRAM(S): at 08:50

## 2019-09-03 RX ADMIN — SODIUM CHLORIDE 3 MILLILITER(S): 9 INJECTION INTRAMUSCULAR; INTRAVENOUS; SUBCUTANEOUS at 05:52

## 2019-09-03 RX ADMIN — ALBUTEROL 2.5 MILLIGRAM(S): 90 AEROSOL, METERED ORAL at 23:15

## 2019-09-03 RX ADMIN — CARBIDOPA AND LEVODOPA 2 TABLET(S): 25; 100 TABLET ORAL at 23:18

## 2019-09-03 RX ADMIN — PIPERACILLIN AND TAZOBACTAM 25 GRAM(S): 4; .5 INJECTION, POWDER, LYOPHILIZED, FOR SOLUTION INTRAVENOUS at 14:28

## 2019-09-03 NOTE — PROGRESS NOTE ADULT - SUBJECTIVE AND OBJECTIVE BOX
**************************************  Dr. Elver Duckworth, PGY1  Internal Medicine   [facility] Pager: [phone]  [facility] Pager: [phone]  **************************************    COLEMAN MARCH  85y  MRN: [id number]    Subjective: NAEON. No acute emergent complaints, but patient states that she is very hungr. Denies SOB, CP, abominal pain, n/v/d. Still with throat secretions requiring suction.     Patient is a 85y old  Female who presents with a chief complaint of Abdominal pain (02 Sep 2019 08:04)      MEDICATIONS  (STANDING):  ALBUTerol    0.083% 2.5 milliGRAM(s) Nebulizer every 6 hours  atorvastatin 40 milliGRAM(s) Oral <User Schedule>  buDESOnide    Inhalation Suspension 0.25 milliGRAM(s) Inhalation two times a day  carbidopa/levodopa  25/100 2 Tablet(s) Oral three times a day  dextrose 5%. 1000 milliLiter(s) (50 mL/Hr) IV Continuous <Continuous>  dextrose 50% Injectable 12.5 Gram(s) IV Push once  dextrose 50% Injectable 25 Gram(s) IV Push once  dextrose 50% Injectable 25 Gram(s) IV Push once  docusate sodium Liquid 100 milliGRAM(s) Oral two times a day  furosemide    Tablet 20 milliGRAM(s) Oral daily  gabapentin   Solution 300 milliGRAM(s) Oral at bedtime  guaiFENesin    Syrup 200 milliGRAM(s) Oral every 6 hours  heparin  Infusion. 600 Unit(s)/Hr (6 mL/Hr) IV Continuous <Continuous>  insulin lispro (HumaLOG) corrective regimen sliding scale   SubCutaneous every 6 hours  lidocaine   Patch 1 Patch Transdermal daily  metoprolol tartrate 75 milliGRAM(s) Oral every 12 hours  pantoprazole  Injectable 40 milliGRAM(s) IV Push daily  piperacillin/tazobactam IVPB.. 3.375 Gram(s) IV Intermittent every 8 hours  polyethylene glycol 3350 17 Gram(s) Oral daily  predniSONE   Tablet 10 milliGRAM(s) Oral daily  senna 2 Tablet(s) Oral at bedtime  sodium chloride 3%  Inhalation 3 milliLiter(s) Inhalation every 12 hours  vancomycin  IVPB 1000 milliGRAM(s) IV Intermittent every 12 hours    MEDICATIONS  (PRN):  acetaminophen    Suspension .. 1000 milliGRAM(s) Oral every 6 hours PRN Mild Pain (1 - 3), Moderate Pain (4 - 6)  ALPRAZolam 0.5 milliGRAM(s) Oral two times a day PRN anxiety  dextrose 40% Gel 15 Gram(s) Oral once PRN Blood Glucose LESS THAN 70 milliGRAM(s)/deciliter  glucagon  Injectable 1 milliGRAM(s) IntraMuscular once PRN Glucose LESS THAN 70 milligrams/deciliter      Objective:    Vitals: Vital Signs Last 24 Hrs  T(C): 36.7 (09-03-19 @ 05:22), Max: 36.8 (09-02-19 @ 21:17)  T(F): 98 (09-03-19 @ 05:22), Max: 98.2 (09-02-19 @ 21:17)  HR: 76 (09-03-19 @ 05:50) (67 - 84)  BP: 134/74 (09-03-19 @ 05:22) (116/72 - 136/78)  BP(mean): --  RR: 18 (09-03-19 @ 05:22) (18 - 18)  SpO2: 100% (09-03-19 @ 05:50) (97% - 100%)                I&O's Summary  02 Sep 2019 07:01  -  03 Sep 2019 07:00  --------------------------------------------------------  IN: 0 mL / OUT: 1000 mL / NET: -1000 mL    PHYSICAL EXAM:  GENERAL: NAD, resting comfortably but mildly frustrated  HEAD:  Atraumatic, Normocephalic  EYES: EOMI, conjunctiva and sclera clear  NECK: Supple, No JVD  CHEST/LUNG: diffuse ronchi b/l, No wheeze or rales, coughing during exam +sputum production   HEART: No murmurs, rubs, or gallops  ABDOMEN: Soft, Nontender, Nondistended; Bowel sounds +  EXTREMITIES:  2+ Peripheral Pulses, No clubbing, cyanosis, or edema  NEUROLOGY: awake, A&Ox3, no longer lethargic   SKIN: No rashes or lesions                                                                           LABS:  09-03    141  |  100  |  14  ----------------------------<  108<H>  3.2<L>   |  25  |  0.78    09-02    139  |  97  |  17  ----------------------------<  112<H>  3.2<L>   |  26  |  0.86  09-01    139  |  101  |  28<H>  ----------------------------<  110<H>  3.7   |  25  |  0.78    Ca    9.9      03 Sep 2019 06:25  Ca    9.4      02 Sep 2019 06:55  Ca    9.3      01 Sep 2019 00:11    TPro  6.9  /  Alb  3.4  /  TBili  0.5  /  DBili  x   /  AST  20  /  ALT  7<L>  /  AlkPhos  47  09-02  TPro  6.3  /  Alb  2.9<L>  /  TBili  0.5  /  DBili  x   /  AST  18  /  ALT  5<L>  /  AlkPhos  43  09-01      PTT - ( 03 Sep 2019 06:25 )  PTT:97.9 sec                                        11.2   11.5  )-----------( 220      ( 03 Sep 2019 06:25 )             34.9                         10.9   11.3  )-----------( 182      ( 02 Sep 2019 06:56 )             32.5                         9.7    12.7  )-----------( 134      ( 01 Sep 2019 00:11 )             30.1     CAPILLARY BLOOD GLUCOSE      POCT Blood Glucose.: 106 mg/dL (03 Sep 2019 06:12)  POCT Blood Glucose.: 104 mg/dL (03 Sep 2019 00:18)  POCT Blood Glucose.: 112 mg/dL (02 Sep 2019 17:44)  POCT Blood Glucose.: 112 mg/dL (02 Sep 2019 14:32)      RADIOLOGY & ADDITIONAL TESTS:    Imaging Personally Reviewed:  [ ] YES  [ ] NO      Consultants involved in case:   Consultant(s) Notes Reviewed:  [ ] YES  [ ] NO:   Care Discussed with Consultants/Other Providers [ ] YES  [ ] NO **************************************  Dr. Elver Duckworth, PGY1  Internal Medicine   [facility] Pager: [phone]  [facility] Pager: [phone]  **************************************    COLEMAN MARCH  85y  MRN: [id number]    Subjective: NAEON. No acute emergent complaints, but patient states that she is very hungry. Denies SOB, CP, abominal pain, n/v/d. Still with throat secretions requiring suction.     Patient is a 85y old  Female who presents with a chief complaint of Abdominal pain (02 Sep 2019 08:04)      MEDICATIONS  (STANDING):  ALBUTerol    0.083% 2.5 milliGRAM(s) Nebulizer every 6 hours  atorvastatin 40 milliGRAM(s) Oral <User Schedule>  buDESOnide    Inhalation Suspension 0.25 milliGRAM(s) Inhalation two times a day  carbidopa/levodopa  25/100 2 Tablet(s) Oral three times a day  dextrose 5%. 1000 milliLiter(s) (50 mL/Hr) IV Continuous <Continuous>  dextrose 50% Injectable 12.5 Gram(s) IV Push once  dextrose 50% Injectable 25 Gram(s) IV Push once  dextrose 50% Injectable 25 Gram(s) IV Push once  docusate sodium Liquid 100 milliGRAM(s) Oral two times a day  furosemide    Tablet 20 milliGRAM(s) Oral daily  gabapentin   Solution 300 milliGRAM(s) Oral at bedtime  guaiFENesin    Syrup 200 milliGRAM(s) Oral every 6 hours  heparin  Infusion. 600 Unit(s)/Hr (6 mL/Hr) IV Continuous <Continuous>  insulin lispro (HumaLOG) corrective regimen sliding scale   SubCutaneous every 6 hours  lidocaine   Patch 1 Patch Transdermal daily  metoprolol tartrate 75 milliGRAM(s) Oral every 12 hours  pantoprazole  Injectable 40 milliGRAM(s) IV Push daily  piperacillin/tazobactam IVPB.. 3.375 Gram(s) IV Intermittent every 8 hours  polyethylene glycol 3350 17 Gram(s) Oral daily  predniSONE   Tablet 10 milliGRAM(s) Oral daily  senna 2 Tablet(s) Oral at bedtime  sodium chloride 3%  Inhalation 3 milliLiter(s) Inhalation every 12 hours  vancomycin  IVPB 1000 milliGRAM(s) IV Intermittent every 12 hours    MEDICATIONS  (PRN):  acetaminophen    Suspension .. 1000 milliGRAM(s) Oral every 6 hours PRN Mild Pain (1 - 3), Moderate Pain (4 - 6)  ALPRAZolam 0.5 milliGRAM(s) Oral two times a day PRN anxiety  dextrose 40% Gel 15 Gram(s) Oral once PRN Blood Glucose LESS THAN 70 milliGRAM(s)/deciliter  glucagon  Injectable 1 milliGRAM(s) IntraMuscular once PRN Glucose LESS THAN 70 milligrams/deciliter      Objective:    Vitals: Vital Signs Last 24 Hrs  T(C): 36.7 (09-03-19 @ 05:22), Max: 36.8 (09-02-19 @ 21:17)  T(F): 98 (09-03-19 @ 05:22), Max: 98.2 (09-02-19 @ 21:17)  HR: 76 (09-03-19 @ 05:50) (67 - 84)  BP: 134/74 (09-03-19 @ 05:22) (116/72 - 136/78)  BP(mean): --  RR: 18 (09-03-19 @ 05:22) (18 - 18)  SpO2: 100% (09-03-19 @ 05:50) (97% - 100%)                I&O's Summary  02 Sep 2019 07:01  -  03 Sep 2019 07:00  --------------------------------------------------------  IN: 0 mL / OUT: 1000 mL / NET: -1000 mL    PHYSICAL EXAM:  GENERAL: NAD, resting comfortably but mildly frustrated  HEAD:  Atraumatic, Normocephalic  NECK: Supple, No JVD  CHEST/LUNG: diffuse ronchi b/l, No wheeze or rales, coughing during exam +sputum production   HEART: No murmurs, rubs, or gallops  ABDOMEN: Soft, Nontender, Nondistended; Bowel sounds +  EXTREMITIES:  2+ Peripheral Pulses, No clubbing, cyanosis, or edema  NEUROLOGY: awake, A&Ox3, no longer lethargic   SKIN: No rashes or lesions                                                                           LABS:  09-03    141  |  100  |  14  ----------------------------<  108<H>  3.2<L>   |  25  |  0.78    09-02    139  |  97  |  17  ----------------------------<  112<H>  3.2<L>   |  26  |  0.86  09-01    139  |  101  |  28<H>  ----------------------------<  110<H>  3.7   |  25  |  0.78    Ca    9.9      03 Sep 2019 06:25  Ca    9.4      02 Sep 2019 06:55  Ca    9.3      01 Sep 2019 00:11    TPro  6.9  /  Alb  3.4  /  TBili  0.5  /  DBili  x   /  AST  20  /  ALT  7<L>  /  AlkPhos  47  09-02  TPro  6.3  /  Alb  2.9<L>  /  TBili  0.5  /  DBili  x   /  AST  18  /  ALT  5<L>  /  AlkPhos  43  09-01      PTT - ( 03 Sep 2019 06:25 )  PTT:97.9 sec                                        11.2   11.5  )-----------( 220      ( 03 Sep 2019 06:25 )             34.9                         10.9   11.3  )-----------( 182      ( 02 Sep 2019 06:56 )             32.5                         9.7    12.7  )-----------( 134      ( 01 Sep 2019 00:11 )             30.1     CAPILLARY BLOOD GLUCOSE      POCT Blood Glucose.: 106 mg/dL (03 Sep 2019 06:12)  POCT Blood Glucose.: 104 mg/dL (03 Sep 2019 00:18)  POCT Blood Glucose.: 112 mg/dL (02 Sep 2019 17:44)  POCT Blood Glucose.: 112 mg/dL (02 Sep 2019 14:32)      RADIOLOGY & ADDITIONAL TESTS:    Imaging Personally Reviewed:  [ ] YES  [ ] NO      Consultants involved in case:   Consultant(s) Notes Reviewed:  [ ] YES  [ ] NO:   Care Discussed with Consultants/Other Providers [ ] YES  [ ] NO

## 2019-09-03 NOTE — SWALLOW BEDSIDE ASSESSMENT ADULT - COMMENTS
8/10- Abdomen/Pelvis CT- dependent groundglass opacity in both lower lobes reflective of atelectasis   8/14- s/p RRT for new onset afib with RVR to 150s.  8/14- Transferred from [address] [place] [address] to [address] [place] [address]  8/14- Failed Swallow Screen- RN reports wet gurgly voice, coughing and throat clearing  8/15- Pt presented with a BP of 182/94  8/15- Per MD->Problem: Pneumonia.  Plan: -With acute hypoxic and hypercarbic respiratory failure, c/w CTX for CAP, Taper off NC as tolerated, BIPAP prn for hypercarbia, RVP negative, leukocytosis is downtrending.  8/15- ENT consulted for hypophonia, dysphagia->grossly aspirating secretions, decreased sensation, right VC paralyzed with a left lateral pharyngeal bulge, looks pulsatile, likely carotid.  8/16: MBS: Rx NPO, with non-oral nutrition/hydration/medications.   8/23: repeat MBS post vocal cord injection: Pt continues to p/w severe oropharyngeal dysphagia characterized by poor bolus propulsion through pharynx with severe pharyngeal retention, with silent aspiration of honey-thickened liquids and thin purees. Use of postural swallow strategies including R head turn and chin tuck postures were not effective for improving airway protection. Presence of cervical osteophyte in conjunction with disorders of muscular function contributed to severity of dysphagia. High aspiration risk for all oral intake. Rx NPO, with non-oral nutrition/hydration/medications.   Course c/b new onset afib, pt placed on rhythm control (lopressor). Pt followed by GI for PEG placement, but procedure on hold 2/2 to new afib. Pt was transferred to MICU for acute respiratory distress on 8/30, found to be altered and hypoxic. Critical airway given vocal cord paralysis. s/p RRT for increased WOB in setting of aspiration pneumonia. Intubated on 8/30, extubated on 8/31. Occasional secretions, but not needing suction more than once every 5 hrs. Relief with duo-neb treatment. Wishes to be DNR/DNI, but wants to discuss with family before final decision is made.
8/10- Abdomen/Pelvis CT- dependent groundglass opacity in both lower lobes reflective of atelectasis   8/14- s/p RRT for new onset afib with RVR to 150s.  8/14- Transferred from [address] [place] [address] to [address] [place] [address]  8/14- Failed Swallow Screen- RN reports wet gurgly voice, coughing and throat clearing  8/15- Pt presented with a BP of 182/94  8/15- Per MD->Problem: Pneumonia.  Plan: -With acute hypoxic and hypercarbic respiratory failure, c/w CTX for CAP, Taper off NC as tolerated, BIPAP prn for hypercarbia, RVP negative, leukocytosis is downtrending.

## 2019-09-03 NOTE — PROGRESS NOTE ADULT - PROBLEM SELECTOR PLAN 4
- Per son, no formal diagnosis of COPD  - pulmonary consult appreciated, rec adventitious lung sounds likely due to vocal cord paralysis, c/w with Pulmicort, albuterol and prednisone.   - c/w home dose prednisone 10mg daily chronically for PMR/?GCA. Right temporal artery biopsy reportedly negative, but she gets right sided headaches.  -Trigeminal neuralgia and migraine on the differential given right HA associated with maxillary/jaw pain and light sensitivity--c/w tylenol 1g q6h PRN given her    - Maintain O2 sats between 88-92%  - cold compresses for pain - pulmonary consult appreciated, rec adventitious lung sounds likely due to vocal cord paralysis, c/w with Pulmicort, albuterol and prednisone.   - c/w home dose prednisone 10mg daily chronically for PMR/?GCA. Right temporal artery biopsy reportedly negative, but she gets right sided headaches.  -Trigeminal neuralgia and migraine on the differential given right HA associated with maxillary/jaw pain and light sensitivity--c/w tylenol 1g q6h PRN given her    - Maintain O2 sats between 88-92%  - cold compresses for pain

## 2019-09-03 NOTE — PROGRESS NOTE ADULT - PROBLEM SELECTOR PLAN 1
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 Atrophy and extensive small vessel white matter ischemic changes. No acute changes    - Attempted NGTx2 w/o success 2/2 difficulty swallowing; will attempt again 09/03  - Holding all non-essential NG meds in interim  - Aspiration precautions and Elevate head of bed  - PEG placement on hold, pending GOC discussion S/p vocal cord injection for vocal cord paralysis  - failed speech and swallow study and MBS on 08/16 and 08/23, 9/3  - speech rec NPO, several attempts to place NG failed, pt refusing further attempts today, will try again kev, palliative setting up family meeting and hospice eval regarding PEG placement vs pleasure feeds  - MRI/MRA head and neck: 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 Atrophy and extensive small vessel white matter ischemic changes. No acute changes    - Aspiration precautions and Elevate head of bed

## 2019-09-03 NOTE — SWALLOW BEDSIDE ASSESSMENT ADULT - SLP GENERAL OBSERVATIONS
Pt seated upright in bed. AA+Ox4. Pt following commands and making needs known during the assessment. Pt seated upright in bed. AA+Ox4. Pt following commands and making needs known during the assessment. +dysphonia.

## 2019-09-03 NOTE — SWALLOW BEDSIDE ASSESSMENT ADULT - PHARYNGEAL PHASE
Decreased laryngeal elevation/Multiple swallows/decreased laryngeal excursion; wet/gurgly cough, wet/gurgly vocal quality, followed by productive cough with expectoration of secretions post intake./Delayed pharyngeal swallow Delayed pharyngeal swallow/Wet vocal quality post oral intake/Cough post oral intake/Multiple swallows/decreased laryngeal excursion/Decreased laryngeal elevation/Throat clear post oral intake

## 2019-09-03 NOTE — PROGRESS NOTE ADULT - PROBLEM SELECTOR PROBLEM 6
[facility] [address]  [facility] [id number]  Cardiology Follow Up    Cate Ramirez  1939  [id number]      1  Shortness of breath     2  Mitral valve disorder     3  Chest discomfort         Chief Complaint   Patient presents with    Follow-up       Interval History:  Patient presents for follow-up visit  She feels much better  Patient denies any chest pain  No shortness of breath out of the ordinary  No history of leg edema orthopnea PND  No history of presyncope syncope  She states that she has been compliant with all her present medications  Patient Active Problem List   Diagnosis    Incontinence    OAB (overactive bladder)    Tremors of nervous system    Primary insomnia    Fatigue    Seborrheic keratosis    Screening for skin condition    History of skin cancer    Seasonal allergic rhinitis    Sleep disturbance    Fall as cause of accidental injury at home as place of occurrence    Leg wound, left, initial encounter    Occasional tremors    Impaired fasting glucose    Insomnia    BRANDON (obstructive sleep apnea)    Mood disturbance    Chronic rhinitis    Obesity (BMI 30-39  9)    Claustrophobia    Anxiety    Mitral valve disorder    Chest discomfort     Past Medical History:   Diagnosis Date    Anxiety disorder due to general medical condition with panic attack     Benign neoplasm of skin     Chest pain     Claustrophobia     Diverticulitis     Muscle weakness     Nonmelanoma skin cancer     last assessed 2017    Palpitations     Seasonal allergies     Sleep apnea     no cpap    Spontaneous      without mention of complications     Syncope      Social History     Social History    Marital status:      Spouse name: N/A    Number of children: N/A    Years of education: N/A     Occupational History    RETIRED       Social History Main Topics    Smoking status: Never Smoker    Smokeless tobacco: Never Used    Alcohol use No      Comment: rarely    Drug use: No    Sexual activity: Not on file     Other Topics Concern    Not on file     Social History Narrative    LIVING INDEPENDENTLY ALONE           Family History   Problem Relation Age of Onset    Alcohol abuse Mother     Colon cancer Mother     Heart disease Mother     Alcohol abuse Father     Alcohol abuse Brother     Colon cancer Brother      Past Surgical History:   Procedure Laterality Date    BOTOX INJECTION N/A 3/6/2017    Procedure: CYSTOSCOPY; BLADDER BOTOX 100 UNITS ;  Surgeon: Peace Miles MD;  Location: [facility];  Service:     CARDIAC CATHETERIZATION      CARPAL TUNNEL RELEASE Right     COLOSTOMY      COLOSTOMY CLOSURE      FOOT SURGERY Left     neuroma    HYSTERECTOMY      NASAL SINUS SURGERY      MS CYSTOURETHROSCOPY N/A 4/13/2018    Procedure: CYSTOSCOPY WITH BOTOX;  Surgeon: Peace Miles MD;  Location: [facility];  Service: Urology    TONSILLECTOMY      WRIST SURGERY Left        Current Outpatient Prescriptions:     Ascorbic Acid (VITAMIN C) 1000 MG tablet, Take 1,000 mg by mouth daily, Disp: , Rfl:     cholecalciferol (VITAMIN D3) 1,000 units tablet, Take 1,000 Units by mouth daily, Disp: , Rfl:     Echinacea 450 MG CAPS, Take by mouth, Disp: , Rfl:     loratadine (CLARITIN) 10 mg tablet, Take 1 tablet (10 mg total) by mouth daily As needed, Disp: 90 tablet, Rfl: 1    Mirabegron ER 50 MG TB24, Take 1 tablet (50 mg total) by mouth daily, Disp: 30 tablet, Rfl: 11    escitalopram (LEXAPRO) 5 mg tablet, Take 1 tablet (5 mg total) by mouth daily (Patient not taking: Reported on 1/18/2019 ), Disp: 30 tablet, Rfl: 1  Allergies   Allergen Reactions    Caffeine     Iodine Rash    Latex Rash    Other Rash     Adhesive tape         Labs:  Hospital Outpatient Visit on 01/10/2019   Component Date Value    Protocol Name 01/10/2019 LEXISCAN-SIT     Time In Exercise Phase 01/10/2019 00:03:00     MAX   SYSTOLIC BP 01/10/2019 498     Max Diastolic Bp 62/92/3772 98     Max Heart Rate 01/10/2019 95     Max Predicted Heart Rate 01/10/2019 141     Reason for Termination 01/10/2019 Protocol Complete     Test Indication 01/10/2019 Chest Discomfort, SOB     Target Hr Formular 01/10/2019 (220 - Age)*85%     Arrhy During Ex 01/10/2019 none     Chest Pain Statement 01/10/2019 none      Imaging: No results found  Review of Systems:  Review of Systems   REVIEW OF SYSTEMS:  Constitutional:  Denies fever or chills   Eyes:  Denies change in visual acuity   HENT:  Denies nasal congestion or sore throat   Respiratory:  Denies cough or shortness of breath   Cardiovascular:  Denies chest pain or edema   GI:  Denies abdominal pain, nausea, vomiting, bloody stools or diarrhea   :  Denies dysuria, frequency, difficulty in micturition and nocturia  Musculoskeletal:  Denies back pain or joint pain   Neurologic:  Denies headache, focal weakness or sensory changes   Endocrine:  Denies polyuria or polydipsia   Lymphatic:  Denies swollen glands   Psychiatric:  Denies depression or anxiety     Physical Exam:    /84   Pulse 76   Ht 5' 2" (1 575 m)   Wt 83 3 kg (183 lb 9 6 oz)   SpO2 96%   BMI 33 58 kg/m²     Physical Exam  PHYSICAL EXAM:  General:  Patient is not in acute distress   Head: Normocephalic, Atraumatic  HEENT:  Both pupils normal-size atraumatic, normocephalic, nonicteric  Neck:  JVP not raised  Trachea central  No carotid bruit  Respiratory:  normal breath sounds no crackles  no rhonchi  Cardiovascular:  Regular rate and rhythm no S3 no murmurs  GI:  Abdomen soft nontender  No organomegaly  Lymphatic:  No cervical or inguinal lymphadenopathy  Neurologic:  Patient is awake alert, oriented   Grossly nonfocal    Discussion/Summary:  Patient overall doing well from a cardiovascular standpoint  Patient had an echocardiogram recently which showed normal ejection fraction with no significant valvular abnormalities  Patient also had a pharmacological nuclear stress test which showed no evidence of ischemia with normal ejection fraction  Sensitivity and specificity of stress test discussed with patient  Symptoms to watch out from cardiac standpoint which would indicate the need for further cardiac evaluation including cardiac catheterization discussed  Dietary and risk factor modification to continue  Follow-up in 6 months or earlier as needed  Follow-up with primary care physician  Patient is agreeable with the plan of care  Mitral stenosis

## 2019-09-03 NOTE — PROGRESS NOTE ADULT - PROBLEM SELECTOR PLAN 2
-C/w vancomycin, zosyn  - BCx NGTD  - Attempted to place NGTx2 today, unable to be completed due to difficult anatomy; will attempt again tomorrow  -Palliative recs appreciated: family meeting tomorrow around 1:30  -supplemental oxygen to maintain saturations>90% likely due to aspiration PNA and MSSA bacteremia s/p extubation and MICU stay  -sputum and bcx with MSSA, c/w zosyn for now, can stop vanc  -repeat bcx in AM  -ID consult in AM

## 2019-09-03 NOTE — PROGRESS NOTE ADULT - PROBLEM SELECTOR PLAN 6
HCN referral made for home hospice,  to follow up with family regarding alternative options for LTC + hospice. Palliative team to sign off as goals for DNR/DNI and no feeding tube established. please reconsult if issues arise.

## 2019-09-03 NOTE — PROGRESS NOTE ADULT - SUBJECTIVE AND OBJECTIVE BOX
SUBJECTIVE AND OBJECTIVE:  INTERVAL HPI/OVERNIGHT EVENTS:    DNR on chart: Yes  Yes    Allergies    penicillin (Unknown)    Intolerances    MEDICATIONS  (STANDING):  ALBUTerol    0.083% 2.5 milliGRAM(s) Nebulizer every 6 hours  atorvastatin 40 milliGRAM(s) Oral <User Schedule>  buDESOnide    Inhalation Suspension 0.25 milliGRAM(s) Inhalation two times a day  carbidopa/levodopa  25/100 2 Tablet(s) Oral three times a day  dextrose 5%. 1000 milliLiter(s) (50 mL/Hr) IV Continuous <Continuous>  dextrose 50% Injectable 12.5 Gram(s) IV Push once  dextrose 50% Injectable 25 Gram(s) IV Push once  dextrose 50% Injectable 25 Gram(s) IV Push once  docusate sodium Liquid 100 milliGRAM(s) Oral two times a day  furosemide    Tablet 20 milliGRAM(s) Oral daily  gabapentin   Solution 300 milliGRAM(s) Oral at bedtime  guaiFENesin    Syrup 200 milliGRAM(s) Oral every 6 hours  heparin  Infusion. 600 Unit(s)/Hr (6 mL/Hr) IV Continuous <Continuous>  insulin lispro (HumaLOG) corrective regimen sliding scale   SubCutaneous every 6 hours  lidocaine   Patch 1 Patch Transdermal daily  metoprolol tartrate 75 milliGRAM(s) Oral every 12 hours  pantoprazole  Injectable 40 milliGRAM(s) IV Push daily  piperacillin/tazobactam IVPB.. 3.375 Gram(s) IV Intermittent every 8 hours  polyethylene glycol 3350 17 Gram(s) Oral daily  predniSONE   Tablet 10 milliGRAM(s) Oral daily  senna 2 Tablet(s) Oral at bedtime  sodium chloride 3%  Inhalation 3 milliLiter(s) Inhalation every 12 hours  vancomycin  IVPB 1000 milliGRAM(s) IV Intermittent every 12 hours    MEDICATIONS  (PRN):  acetaminophen    Suspension .. 1000 milliGRAM(s) Oral every 6 hours PRN Mild Pain (1 - 3), Moderate Pain (4 - 6)  ALPRAZolam 0.5 milliGRAM(s) Oral two times a day PRN anxiety  dextrose 40% Gel 15 Gram(s) Oral once PRN Blood Glucose LESS THAN 70 milliGRAM(s)/deciliter  glucagon  Injectable 1 milliGRAM(s) IntraMuscular once PRN Glucose LESS THAN 70 milligrams/deciliter      ITEMS UNCHECKED ARE NOT PRESENT    PRESENT SYMPTOMS: [ ]Unable to obtain due to poor mentation   Source if other than patient:  [ ]Family   [ ]Team     Pain (Impact on QOL):    Location:  Minimal acceptable level (0-10 scale):                   Aggravating factors:  Quality:  Radiation:  Severity (0-10 scale):    Timing:    Dyspnea:                           [ ]Mild [ ]Moderate [ ]Severe  Anxiety:                             [ ]Mild [ ]Moderate [ ]Severe  Fatigue:                             [ ]Mild [ ]Moderate [ ]Severe  Nausea:                             [ ]Mild [ ]Moderate [ ]Severe  Loss of appetite:              [ ]Mild [ ]Moderate [ ]Severe  Constipation:                    [ ]Mild [ ]Moderate [ ]Severe    PAIN AD Score:	  [email or web address]/[email or web address].pdf (Ctrl + left click to view)    Other Symptoms:  [ ]All other review of systems negative     Karnofsky Performance Score/Palliative Performance Status Version 2:         %    http://palliative.info/resource_material/[email or web address].pdf  PHYSICAL EXAM:  Vital Signs Last 24 Hrs  T(C): 36.7 (03 Sep 2019 14:40), Max: 36.8 (02 Sep 2019 21:17)  T(F): 98.1 (03 Sep 2019 14:40), Max: 98.2 (02 Sep 2019 21:17)  HR: 60 (03 Sep 2019 14:40) (60 - 82)  BP: 146/83 (03 Sep 2019 14:40) (116/72 - 146/83)  BP(mean): --  RR: 18 (03 Sep 2019 14:40) (18 - 18)  SpO2: 98% (03 Sep 2019 14:40) (97% - 100%) I&O's Summary    02 Sep 2019 07:01  -  03 Sep 2019 07:00  --------------------------------------------------------  IN: 0 mL / OUT: 1000 mL / NET: -1000 mL     GENERAL:  [ ]Alert  [ ]Oriented x   [ ]Lethargic  [ ]Cachexia  [ ]Unarousable  [ ]Verbal  [ ]Non-Verbal  Behavioral:   [ ] Anxiety  [ ] Delirium [ ] Agitation [ ] Other  HEENT:  [ ]Normal   [ ]Dry mouth   [ ]ET Tube/Trach  [ ]Oral lesions  PULMONARY:   [ ]Clear [ ]Tachypnea  [ ]Audible excessive secretions   [ ]Rhonchi        [ ]Right [ ]Left [ ]Bilateral  [ ]Crackles        [ ]Right [ ]Left [ ]Bilateral  [ ]Wheezing     [ ]Right [ ]Left [ ]Bilateral  CARDIOVASCULAR:    [ ]Regular [ ]Irregular [ ]Tachy  [ ]Johann [ ]Murmur [ ]Other  GASTROINTESTINAL:  [ ]Soft  [ ]Distended   [ ]+BS  [ ]Non tender [ ]Tender  [ ]PEG [ ]OGT/ NGT   Last BM:    GENITOURINARY:  [ ]Normal [ ] Incontinent   [ ]Oliguria/Anuria   [ ]Parsons  MUSCULOSKELETAL:   [ ]Normal   [ ]Weakness  [ ]Bed/Wheelchair bound [ ]Edema  NEUROLOGIC:   [ ]No focal deficits  [ ] Cognitive impairment  [ ] Dysphagia [ ]Dysarthria [ ] Paresis [ ]Other   SKIN:   [ ]Normal   [ ]Pressure ulcer(s)  [ ]Rash    CRITICAL CARE:  [ ] Shock Present  [ ]Septic [ ]Cardiogenic [ ]Neurologic [ ]Hypovolemic  [ ]  Vasopressors [ ]  Inotropes   [ ] Respiratory failure present  [ ] Acute  [ ] Chronic [ ] Hypoxic  [ ] Hypercarbic [ ] Other  [ ] Other organ failure     LABS:                        11.2   11.5  )-----------( 220      ( 03 Sep 2019 06:25 )             34.9   09-03    141  |  100  |  14  ----------------------------<  108<H>  3.2<L>   |  25  |  0.78    Ca    9.9      03 Sep 2019 06:25    TPro  6.9  /  Alb  3.4  /  TBili  0.5  /  DBili  x   /  AST  20  /  ALT  7<L>  /  AlkPhos  47  09-02  PTT - ( 03 Sep 2019 06:25 )  PTT:97.9 sec      RADIOLOGY & ADDITIONAL STUDIES:    Protein Calorie Malnutrition Present: [ ] yes [ ] no  [ ] PPSV2 < or = 30%  [ ] significant weight loss [ ] poor nutritional intake [ ] anasarca [ ] catabolic state Albumin, Serum: 3.4 g/dL (09-02-19 @ 06:55)  Artificial Nutrition [ ]     REFERRALS:   [ ]Chaplaincy  [ ] Hospice  [ ]Child Life  [ ]Social Work  [ ]Case management [ ]Holistic Therapy   Goals of Care Document: KIN Pope (08-30-19 @ 14:06)  Goals of Care Conversation:   Advance Directives:  · Does patient have Advance Directive  No  · Do you want to complete the HCP and name a Kevin Care Agent  No    Conversation Discussion:  · Conversation Details  Mrs. Mae is a 84 y/o lady with PMH of GERD, HTN, HLD, DM2 with neuropathy, Parkinson's, anxiety/depression, AS s/p TAVR on plavix, overactive bladder, ?CHF, PMR, COPD, who is admitted for hypoxic/hypercarbic respiratory failure in setting of sepsis, possibly COPD exacerbation in setting of URI/PNA. Course c/b UTI and acute metabolic encephalopathy.    Dx: Hypoxic/hypercarbic respiratory failure in setting of sepsis, possibly COPD exacerbation in setting of URI/PNA.           PNA          UTI - s/p Abts          OPD - on Chronic steroids         Acute metabolic encephalopathy - resolved         New Afib with RVR - Heparin drip         Severe MS         Oropharyngeal Dysphagia - Vocal cord Paralysis - s/p VC injection on 8/22          Hypernatremia         New MARY         Leukocytosis    Lace score 15.  Patient is a full code .      Palliative MD and LCSW entered patient's room while patient was having secretions suctioned.  Patient now s/p aspiration and rapid response and being transferred to MICU. Palliative will follow up with family on Tuesday secondary to acute medical status currently.      Electronic Signatures:  Toña Pope ([facility])  (Signed 30-Aug-2019 14:11)  	Authored: Goals of Care Conversation      Last Updated: 30-Aug-2019 14:11 by Toña Pope ([facility]) SUBJECTIVE AND OBJECTIVE: Patient seen and examined, on NC and having coughing fit, bringing up a lot of phlegm/mucus. Primary team, patients daughter and son present along with this provider and GAP SW. Discussed ongoing medical issues, mainly nutritional goals of care. Reviewed ongoing aspiration risk, with or without PEG. Patient expresses at multiple intervals that she has fought her whole life and she has "no life." Discussed options for transitioning focus of care. Code status reviewed with patient and family, all in agreement for DNR/DNI. Patient verbalized that she does not want a feeding tube and wants to eat ice cream. Discussed hospice care and family considering home vs. LTC, depending on care needs. [facility] referral made and  updated. MOLST completed for code status.    INTERVAL HPI/OVERNIGHT EVENTS: no acute overnight events. remains NPO    DNR on chart: Yes  Yes    Allergies    penicillin (Unknown)    Intolerances    MEDICATIONS  (STANDING):  ALBUTerol    0.083% 2.5 milliGRAM(s) Nebulizer every 6 hours  atorvastatin 40 milliGRAM(s) Oral <User Schedule>  buDESOnide    Inhalation Suspension 0.25 milliGRAM(s) Inhalation two times a day  carbidopa/levodopa  25/100 2 Tablet(s) Oral three times a day  dextrose 5%. 1000 milliLiter(s) (50 mL/Hr) IV Continuous <Continuous>  dextrose 50% Injectable 12.5 Gram(s) IV Push once  dextrose 50% Injectable 25 Gram(s) IV Push once  dextrose 50% Injectable 25 Gram(s) IV Push once  docusate sodium Liquid 100 milliGRAM(s) Oral two times a day  furosemide    Tablet 20 milliGRAM(s) Oral daily  gabapentin   Solution 300 milliGRAM(s) Oral at bedtime  guaiFENesin    Syrup 200 milliGRAM(s) Oral every 6 hours  heparin  Infusion. 600 Unit(s)/Hr (6 mL/Hr) IV Continuous <Continuous>  insulin lispro (HumaLOG) corrective regimen sliding scale   SubCutaneous every 6 hours  lidocaine   Patch 1 Patch Transdermal daily  metoprolol tartrate 75 milliGRAM(s) Oral every 12 hours  pantoprazole  Injectable 40 milliGRAM(s) IV Push daily  piperacillin/tazobactam IVPB.. 3.375 Gram(s) IV Intermittent every 8 hours  polyethylene glycol 3350 17 Gram(s) Oral daily  predniSONE   Tablet 10 milliGRAM(s) Oral daily  senna 2 Tablet(s) Oral at bedtime  sodium chloride 3%  Inhalation 3 milliLiter(s) Inhalation every 12 hours  vancomycin  IVPB 1000 milliGRAM(s) IV Intermittent every 12 hours    MEDICATIONS  (PRN):  acetaminophen    Suspension .. 1000 milliGRAM(s) Oral every 6 hours PRN Mild Pain (1 - 3), Moderate Pain (4 - 6)  ALPRAZolam 0.5 milliGRAM(s) Oral two times a day PRN anxiety  dextrose 40% Gel 15 Gram(s) Oral once PRN Blood Glucose LESS THAN 70 milliGRAM(s)/deciliter  glucagon  Injectable 1 milliGRAM(s) IntraMuscular once PRN Glucose LESS THAN 70 milligrams/deciliter      ITEMS UNCHECKED ARE NOT PRESENT    PRESENT SYMPTOMS: [ ]Unable to obtain due to poor mentation   Source if other than patient:  [ ]Family   [ ]Team     Pain (Impact on QOL):  denies  Location:  Minimal acceptable level (0-10 scale):                   Aggravating factors:  Quality:  Radiation:  Severity (0-10 scale):    Timing:    Dyspnea:                           [ ]Mild [x ]Moderate [ ]Severe  Anxiety:                             [ ]Mild [ ]Moderate [ ]Severe  Fatigue:                             [ ]Mild [ ]Moderate [ ]Severe  Nausea:                             [ ]Mild [ ]Moderate [ ]Severe  Loss of appetite:              [ ]Mild [ ]Moderate [ ]Severe  Constipation:                    [ ]Mild [ ]Moderate [ ]Severe    PAIN AD Score:	  [email or web address]/painad.pdf (Ctrl + left click to view)    Other Symptoms:  [ ]All other review of systems negative     Karnofsky Performance Score/Palliative Performance Status Version 2:       30  %    http://palliative.info/resource_material/[email or web address].pdf  PHYSICAL EXAM:  Vital Signs Last 24 Hrs  T(C): 36.7 (03 Sep 2019 14:40), Max: 36.8 (02 Sep 2019 21:17)  T(F): 98.1 (03 Sep 2019 14:40), Max: 98.2 (02 Sep 2019 21:17)  HR: 60 (03 Sep 2019 14:40) (60 - 82)  BP: 146/83 (03 Sep 2019 14:40) (116/72 - 146/83)  BP(mean): --  RR: 18 (03 Sep 2019 14:40) (18 - 18)  SpO2: 98% (03 Sep 2019 14:40) (97% - 100%) I&O's Summary    02 Sep 2019 07:01  -  03 Sep 2019 07:00  --------------------------------------------------------  IN: 0 mL / OUT: 1000 mL / NET: -1000 mL     GENERAL:  [ x]Alert  [x ]Oriented x3   [ ]Lethargic  [ ]Cachexia  [ ]Unarousable  [ ]Verbal  [ ]Non-Verbal  Behavioral:   [ ] Anxiety  [ ] Delirium [ ] Agitation [ ] Other  HEENT:  [ ]Normal   [ x]Dry mouth   [ ]ET Tube/Trach  [ ]Oral lesions  PULMONARY:   [ ]Clear [ ]Tachypnea  [x ]Audible excessive secretions   [x ]Rhonchi        [ ]Right [ ]Left [ ]Bilateral  [ ]Crackles        [ ]Right [ ]Left [ ]Bilateral  [ ]Wheezing     [ ]Right [ ]Left [ ]Bilateral  CARDIOVASCULAR:    [x ]Regular [ ]Irregular [ ]Tachy  [ ]Johann [ ]Murmur [ ]Other  GASTROINTESTINAL:  [x ]Soft  [ ]Distended   [x ]+BS  [ ]Non tender [ ]Tender  [ ]PEG [ ]OGT/ NGT   Last BM:    GENITOURINARY:  [ ]Normal [x ] Incontinent   [ ]Oliguria/Anuria   [ ]Parsons  MUSCULOSKELETAL:   [ ]Normal   [x ]Weakness  [ ]Bed/Wheelchair bound [ ]Edema  NEUROLOGIC:   [ ]No focal deficits  [ ] Cognitive impairment  [ x] Dysphagia [ ]Dysarthria [ ] Paresis [ ]Other   SKIN: ecchymoses  [ ]Normal   [ ]Pressure ulcer(s)  [ ]Rash    CRITICAL CARE:  [ ] Shock Present  [ ]Septic [ ]Cardiogenic [ ]Neurologic [ ]Hypovolemic  [ ]  Vasopressors [ ]  Inotropes   [ ] Respiratory failure present  [ ] Acute  [ ] Chronic [ ] Hypoxic  [ ] Hypercarbic [ ] Other  [ ] Other organ failure     LABS:                        11.2   11.5  )-----------( 220      ( 03 Sep 2019 06:25 )             34.9   09-03    141  |  100  |  14  ----------------------------<  108<H>  3.2<L>   |  25  |  0.78    Ca    9.9      03 Sep 2019 06:25    TPro  6.9  /  Alb  3.4  /  TBili  0.5  /  DBili  x   /  AST  20  /  ALT  7<L>  /  AlkPhos  47  09-02  PTT - ( 03 Sep 2019 06:25 )  PTT:97.9 sec      RADIOLOGY & ADDITIONAL STUDIES:  < from: Xray Chest 1 View- PORTABLE-Urgent (09.02.19 @ 15:28) >    EXAM:  XR CHEST PORTABLE URGENT 1V                            PROCEDURE DATE:  09/02/2019            INTERPRETATION:  XR CHEST URGENT. AP view.    INDICATION: Verify proper NGT placement    COMPARISON: 8/30/2019    FINDINGS/  IMPRESSION:    A enteric tube noted projecting over the left chest probably outside of   the patient's body.    Clear lungs. No pleural effusion or pneumothorax.    PA view are. Mitral annulus calcification. Lower lumbar spine fixation   device.      CRISTIAN HERBERT M.D., ATTENDING RADIOLOGIST  This document has been electronically signed. Sep  3 2019  8:51AM      < end of copied text >      Protein Calorie Malnutrition Present: [x ] yes [ ] no  [ x] PPSV2 < or = 30%  [ ] significant weight loss [ x] poor nutritional intake [ ] anasarca [ ] catabolic state Albumin, Serum: 3.4 g/dL (09-02-19 @ 06:55)  Artificial Nutrition [ ]     REFERRALS:   [ ]Chaplaincy  [ ] Hospice  [ ]Child Life  [ x]Social Work  [ ]Case management [ ]Holistic Therapy   Goals of Care Document: KIN Pope (08-30-19 @ 14:06)  Goals of Care Conversation:   Advance Directives:  · Does patient have Advance Directive  No  · Do you want to complete the HCP and name a Kevin Care Agent  No    Conversation Discussion:  · Conversation Details  Mrs. Mae is a 86 y/o lady with PMH of GERD, HTN, HLD, DM2 with neuropathy, Parkinson's, anxiety/depression, AS s/p TAVR on plavix, overactive bladder, ?CHF, PMR, COPD, who is admitted for hypoxic/hypercarbic respiratory failure in setting of sepsis, possibly COPD exacerbation in setting of URI/PNA. Course c/b UTI and acute metabolic encephalopathy.    Dx: Hypoxic/hypercarbic respiratory failure in setting of sepsis, possibly COPD exacerbation in setting of URI/PNA.           PNA          UTI - s/p Abts          OPD - on Chronic steroids         Acute metabolic encephalopathy - resolved         New Afib with RVR - Heparin drip         Severe MS         Oropharyngeal Dysphagia - Vocal cord Paralysis - s/p VC injection on 8/22          Hypernatremia         New MARY         Leukocytosis    Lace score 15.  Patient is a full code .      Palliative MD and LCSW entered patient's room while patient was having secretions suctioned.  Patient now s/p aspiration and rapid response and being transferred to MICU. Palliative will follow up with family on Tuesday secondary to acute medical status currently.      Electronic Signatures:  Toña Pope (MSW)  (Signed 30-Aug-2019 14:11)  	Authored: Goals of Care Conversation      Last Updated: 30-Aug-2019 14:11 by Toña Pope (MSW)

## 2019-09-03 NOTE — PROGRESS NOTE ADULT - PROBLEM SELECTOR PLAN 7
-Melatonin 5mg HS via NGT  -C/w home Sinemet 50/200 TID for Parkinson-like symptom. Per son, patient never has a diagnosis of parkinson's disease. She was started on sinemet to see if it helps with her leg shaking symptoms  - c/w venlafaxine 37.5mg q12h (was on duloxetine at home) given duloxetine cannot be crushed and put via NGT

## 2019-09-03 NOTE — PROGRESS NOTE ADULT - PROBLEM SELECTOR PLAN 3
S/p MICU course for suggested aspiration PNA. Sputum Cx + for GPC in clusters, MRSA confirmed  - C/w Vancomycin, Zosyn  - Vanc trough 22.4 this AM. Holding Vanc dose  - BCx NGTD aspiration PNA  - sputum and bcx with MSSA on 8/30, d/c vanc, c/w zosyn  - repeat blood cx in AM

## 2019-09-03 NOTE — PROGRESS NOTE ADULT - PROBLEM SELECTOR PLAN 10
a1c 6  - Patient not on home anti-hyperglycemics  - FS acceptable  - c/w Insulin sliding scale      Prophylaxis:  DVT ppx: c/w heparin drip for pending PEG tube placement. will start pt on NOAC per cardiology post PEG placement.  Diet: NPO  Dispo: PT recs subacute rehab on discharge.  For now guarded prognosis and being evaluated by MICU a1c 6  - Patient not on home anti-hyperglycemics  - FS acceptable  - c/w Insulin sliding scale      Prophylaxis:  DVT ppx: c/w heparin drip for pending PEG tube placement.  Diet: NPO  Dispo: poor prognosis, DNR/DNI, palliative following, hospice eval pending, family meeting to discuss PEG vs pleasure feeds and other GOC

## 2019-09-03 NOTE — SWALLOW BEDSIDE ASSESSMENT ADULT - ASR SWALLOW DENTITION
lower dentures reportedly at home, only uses when "eating hard foods and nuts"/upper dentures
lower dentures reportedly at home, only uses when "eating hard foods and nuts"/upper dentures

## 2019-09-03 NOTE — PROGRESS NOTE ADULT - ASSESSMENT
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 Palliative following for GOC.

## 2019-09-03 NOTE — SWALLOW BEDSIDE ASSESSMENT ADULT - ASR SWALLOW REFERRAL
Consider ENT for laryngeal exam to assess dysphonia and extent of Thrush ENT f/u as clinically indicated

## 2019-09-03 NOTE — PROGRESS NOTE ADULT - PROBLEM SELECTOR PLAN 8
- c/w home dose 50mg QD of losartan   - c/w amlodipine 5mg QD  - c/w metoprolol 75 BID for rate control and BP  - continue to monitor  - Vitals Q4H BP stable - c/w home dose 50mg QD of losartan   - c/w amlodipine 5mg QD  - c/w metoprolol 75 BID   - montior BP, if uncontrolled may need to get IVP until NG/PEG established

## 2019-09-03 NOTE — SWALLOW BEDSIDE ASSESSMENT ADULT - ASR SWALLOW RECOMMEND DIAG
VFSS/MBS/tentatively scheduled for 8/16, pending receipt of order. Instrumental exam not clinically indicated, as Pt not clinically changed from last assessment.

## 2019-09-03 NOTE — SWALLOW BEDSIDE ASSESSMENT ADULT - ASR SWALLOW ASPIRATION MONITOR
pneumonia/change of breathing pattern/fever/throat clearing/gurgly voice/cough/upper respiratory infection
gurgly voice/fever/pneumonia/throat clearing/upper respiratory infection/change of breathing pattern/cough

## 2019-09-03 NOTE — SWALLOW BEDSIDE ASSESSMENT ADULT - SWALLOW EVAL: DIAGNOSIS
Pt presents with evidence of oropharyngeal dysphagia that appears clinically unchanged from last MBS on 8/23/19. For trials of small crushed ice chips, pt presents with overt s/s laryngeal penetration/aspiration including wet/gurgly cough, wet/gurgly vocal quality, followed by productive cough with expectoration of secretions. Pt with known h/o severe dysphagia continues to present with evidence of oropharyngeal dysphagia that appears clinically unchanged from last MBS on 8/23/19. For limited trials of small crushed ice chips, pt presents with overt s/s laryngeal penetration/aspiration including wet/gurgly cough, wet/gurgly vocal quality, followed by productive cough with expectoration of secretions. Pt also continues to present with dysphonia.

## 2019-09-03 NOTE — PROGRESS NOTE ADULT - ASSESSMENT
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 84yo F with GERD, HTN, HLD, 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, Parkinson-like symptoms, anxiety/depression, AS s/p TAVR on plavix, overactive bladder, HFpEF with severe Mitral Stenosis, PMR (on chronic steroids), COPD (no formal diagnosis) presented 8/11/19 with SOB and abdominal pain, admitted for acute hypoxic and hypercarbic respiratory failure in setting of pneumonia and COPD exacerbation, course c/b UTI, acute metabolic encephalopathy, new onset afib and dysphagia, now s/p RRT and  MICU stay for increased work of breathing concerning for aspiration pneumonia, overall guarded prognosis, awaiting family meeting.

## 2019-09-03 NOTE — SWALLOW BEDSIDE ASSESSMENT ADULT - ASPIRATION PRECAUTIONS
yes/Strict aspiration and reflux precautions on secretions and enteral feeds! Strict aspiration and reflux precautions for secretions and enteral feeds!/yes

## 2019-09-04 DIAGNOSIS — R78.81 BACTEREMIA: ICD-10-CM

## 2019-09-04 LAB
ANION GAP SERPL CALC-SCNC: 19 MMOL/L — HIGH (ref 5–17)
APTT BLD: 78 SEC — HIGH (ref 27.5–36.3)
BUN SERPL-MCNC: 10 MG/DL — SIGNIFICANT CHANGE UP (ref 7–23)
CALCIUM SERPL-MCNC: 9.6 MG/DL — SIGNIFICANT CHANGE UP (ref 8.4–10.5)
CHLORIDE SERPL-SCNC: 97 MMOL/L — SIGNIFICANT CHANGE UP (ref 96–108)
CO2 SERPL-SCNC: 22 MMOL/L — SIGNIFICANT CHANGE UP (ref 22–31)
CREAT SERPL-MCNC: 0.67 MG/DL — SIGNIFICANT CHANGE UP (ref 0.5–1.3)
CULTURE RESULTS: SIGNIFICANT CHANGE UP
GLUCOSE BLDC GLUCOMTR-MCNC: 101 MG/DL — HIGH (ref 70–99)
GLUCOSE BLDC GLUCOMTR-MCNC: 113 MG/DL — HIGH (ref 70–99)
GLUCOSE BLDC GLUCOMTR-MCNC: 138 MG/DL — HIGH (ref 70–99)
GLUCOSE BLDC GLUCOMTR-MCNC: 152 MG/DL — HIGH (ref 70–99)
GLUCOSE BLDC GLUCOMTR-MCNC: 161 MG/DL — HIGH (ref 70–99)
GLUCOSE SERPL-MCNC: 147 MG/DL — HIGH (ref 70–99)
HCT VFR BLD CALC: 33.4 % — LOW (ref 34.5–45)
HGB BLD-MCNC: 10.9 G/DL — LOW (ref 11.5–15.5)
MCHC RBC-ENTMCNC: 29.7 PG — SIGNIFICANT CHANGE UP (ref 27–34)
MCHC RBC-ENTMCNC: 32.8 GM/DL — SIGNIFICANT CHANGE UP (ref 32–36)
MCV RBC AUTO: 90.6 FL — SIGNIFICANT CHANGE UP (ref 80–100)
PLATELET # BLD AUTO: 233 K/UL — SIGNIFICANT CHANGE UP (ref 150–400)
POTASSIUM SERPL-MCNC: 3.4 MMOL/L — LOW (ref 3.5–5.3)
POTASSIUM SERPL-SCNC: 3.4 MMOL/L — LOW (ref 3.5–5.3)
RBC # BLD: 3.68 M/UL — LOW (ref 3.8–5.2)
RBC # FLD: 14.3 % — SIGNIFICANT CHANGE UP (ref 10.3–14.5)
SODIUM SERPL-SCNC: 138 MMOL/L — SIGNIFICANT CHANGE UP (ref 135–145)
SPECIMEN SOURCE: SIGNIFICANT CHANGE UP
WBC # BLD: 11 K/UL — HIGH (ref 3.8–10.5)
WBC # FLD AUTO: 11 K/UL — HIGH (ref 3.8–10.5)

## 2019-09-04 PROCEDURE — 99233 SBSQ HOSP IP/OBS HIGH 50: CPT | Mod: GC

## 2019-09-04 RX ORDER — ACETAMINOPHEN 500 MG
650 TABLET ORAL EVERY 6 HOURS
Refills: 0 | Status: DISCONTINUED | OUTPATIENT
Start: 2019-09-04 | End: 2019-09-05

## 2019-09-04 RX ORDER — CARBIDOPA AND LEVODOPA 25; 100 MG/1; MG/1
2 TABLET ORAL THREE TIMES A DAY
Refills: 0 | Status: DISCONTINUED | OUTPATIENT
Start: 2019-09-04 | End: 2019-09-10

## 2019-09-04 RX ORDER — POTASSIUM CHLORIDE 20 MEQ
30 PACKET (EA) ORAL ONCE
Refills: 0 | Status: DISCONTINUED | OUTPATIENT
Start: 2019-09-04 | End: 2019-09-05

## 2019-09-04 RX ORDER — POLYETHYLENE GLYCOL 3350 17 G/17G
17 POWDER, FOR SOLUTION ORAL DAILY
Refills: 0 | Status: DISCONTINUED | OUTPATIENT
Start: 2019-09-04 | End: 2019-09-04

## 2019-09-04 RX ORDER — ATORVASTATIN CALCIUM 80 MG/1
40 TABLET, FILM COATED ORAL
Refills: 0 | Status: DISCONTINUED | OUTPATIENT
Start: 2019-09-04 | End: 2019-09-10

## 2019-09-04 RX ORDER — ALPRAZOLAM 0.25 MG
0.5 TABLET ORAL
Refills: 0 | Status: DISCONTINUED | OUTPATIENT
Start: 2019-09-04 | End: 2019-09-10

## 2019-09-04 RX ORDER — SENNA PLUS 8.6 MG/1
2 TABLET ORAL AT BEDTIME
Refills: 0 | Status: DISCONTINUED | OUTPATIENT
Start: 2019-09-04 | End: 2019-09-04

## 2019-09-04 RX ORDER — APIXABAN 2.5 MG/1
2.5 TABLET, FILM COATED ORAL EVERY 12 HOURS
Refills: 0 | Status: DISCONTINUED | OUTPATIENT
Start: 2019-09-04 | End: 2019-09-05

## 2019-09-04 RX ORDER — SODIUM CHLORIDE 9 MG/ML
1000 INJECTION INTRAMUSCULAR; INTRAVENOUS; SUBCUTANEOUS
Refills: 0 | Status: DISCONTINUED | OUTPATIENT
Start: 2019-09-04 | End: 2019-09-04

## 2019-09-04 RX ORDER — GABAPENTIN 400 MG/1
300 CAPSULE ORAL AT BEDTIME
Refills: 0 | Status: DISCONTINUED | OUTPATIENT
Start: 2019-09-04 | End: 2019-09-10

## 2019-09-04 RX ORDER — POTASSIUM CHLORIDE 20 MEQ
10 PACKET (EA) ORAL
Refills: 0 | Status: DISCONTINUED | OUTPATIENT
Start: 2019-09-04 | End: 2019-09-04

## 2019-09-04 RX ADMIN — PIPERACILLIN AND TAZOBACTAM 25 GRAM(S): 4; .5 INJECTION, POWDER, LYOPHILIZED, FOR SOLUTION INTRAVENOUS at 06:12

## 2019-09-04 RX ADMIN — ATORVASTATIN CALCIUM 40 MILLIGRAM(S): 80 TABLET, FILM COATED ORAL at 23:13

## 2019-09-04 RX ADMIN — Medication 100 MILLIEQUIVALENT(S): at 00:53

## 2019-09-04 RX ADMIN — Medication 0.25 MILLIGRAM(S): at 18:02

## 2019-09-04 RX ADMIN — SODIUM CHLORIDE 3 MILLILITER(S): 9 INJECTION INTRAMUSCULAR; INTRAVENOUS; SUBCUTANEOUS at 05:40

## 2019-09-04 RX ADMIN — Medication 20 MILLIGRAM(S): at 06:07

## 2019-09-04 RX ADMIN — Medication 10 MILLIGRAM(S): at 06:06

## 2019-09-04 RX ADMIN — SODIUM CHLORIDE 3 MILLILITER(S): 9 INJECTION INTRAMUSCULAR; INTRAVENOUS; SUBCUTANEOUS at 18:22

## 2019-09-04 RX ADMIN — Medication 0.25 MILLIGRAM(S): at 05:39

## 2019-09-04 RX ADMIN — Medication 1: at 12:36

## 2019-09-04 RX ADMIN — Medication 75 MILLIGRAM(S): at 23:13

## 2019-09-04 RX ADMIN — GABAPENTIN 300 MILLIGRAM(S): 400 CAPSULE ORAL at 23:14

## 2019-09-04 RX ADMIN — PIPERACILLIN AND TAZOBACTAM 25 GRAM(S): 4; .5 INJECTION, POWDER, LYOPHILIZED, FOR SOLUTION INTRAVENOUS at 15:37

## 2019-09-04 RX ADMIN — Medication 100 MILLIEQUIVALENT(S): at 21:05

## 2019-09-04 RX ADMIN — Medication 650 MILLIGRAM(S): at 16:51

## 2019-09-04 RX ADMIN — HEPARIN SODIUM 700 UNIT(S)/HR: 5000 INJECTION INTRAVENOUS; SUBCUTANEOUS at 10:07

## 2019-09-04 RX ADMIN — ALBUTEROL 2.5 MILLIGRAM(S): 90 AEROSOL, METERED ORAL at 05:40

## 2019-09-04 RX ADMIN — ALBUTEROL 2.5 MILLIGRAM(S): 90 AEROSOL, METERED ORAL at 23:45

## 2019-09-04 RX ADMIN — PIPERACILLIN AND TAZOBACTAM 25 GRAM(S): 4; .5 INJECTION, POWDER, LYOPHILIZED, FOR SOLUTION INTRAVENOUS at 23:15

## 2019-09-04 RX ADMIN — ALBUTEROL 2.5 MILLIGRAM(S): 90 AEROSOL, METERED ORAL at 17:59

## 2019-09-04 RX ADMIN — Medication 100 MILLIEQUIVALENT(S): at 02:35

## 2019-09-04 RX ADMIN — CARBIDOPA AND LEVODOPA 2 TABLET(S): 25; 100 TABLET ORAL at 12:29

## 2019-09-04 RX ADMIN — CARBIDOPA AND LEVODOPA 2 TABLET(S): 25; 100 TABLET ORAL at 23:14

## 2019-09-04 RX ADMIN — ALBUTEROL 2.5 MILLIGRAM(S): 90 AEROSOL, METERED ORAL at 11:14

## 2019-09-04 RX ADMIN — Medication 200 MILLIGRAM(S): at 06:06

## 2019-09-04 RX ADMIN — CARBIDOPA AND LEVODOPA 2 TABLET(S): 25; 100 TABLET ORAL at 06:06

## 2019-09-04 RX ADMIN — Medication 200 MILLIGRAM(S): at 12:28

## 2019-09-04 RX ADMIN — PANTOPRAZOLE SODIUM 40 MILLIGRAM(S): 20 TABLET, DELAYED RELEASE ORAL at 12:28

## 2019-09-04 RX ADMIN — Medication 100 MILLIGRAM(S): at 06:07

## 2019-09-04 RX ADMIN — Medication 75 MILLIGRAM(S): at 10:14

## 2019-09-04 RX ADMIN — APIXABAN 2.5 MILLIGRAM(S): 2.5 TABLET, FILM COATED ORAL at 19:12

## 2019-09-04 NOTE — GOALS OF CARE CONVERSATION - PERSONAL ADVANCE DIRECTIVE - CONVERSATION DETAILS
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, Parkinson-like symptoms, anxiety/depression, AS s/p TAVR on plavix, overactive bladder, HFpEF with severe Mitral Stenosis, PMR (on chronic steroids), COPD (no formal diagnosis) presented 8/11/19 with SOB and abdominal pain, admitted for acute hypoxic and hypercarbic respiratory failure in setting of pneumonia and COPD exacerbation, course c/b UTI, acute metabolic encephalopathy, new onset afib and dysphagia, now s/p RRT for increased work of breathing concerning for aspiration pneumonia, overall guarded prognosis. Palliative following for GOC.    Lengthy family meeting held on 9/3/19 with patient, Primary team resident, patients daughter Lucretia and son Jacinto, Palliative MD and LCSW.  Education provided on PEG tube, aspiration risks, with or without PEG.  Patient indicated ‘I don’t think a feeding tube  makes sense.” Patient reported poor quality of life." Discussed options for transitioning focus of care. Patient designated herself a DNR / DNI. – family in agreement.  Patient indicated that she does not want a feeding tube and wants to eat ice cream. Patient’s preference is to go home with hospice.  Spouse has dementia and is in a NH. Patient has 24/7 HHA at home through Medicaid.  Education provided about hospice care and family considering home w hospice vs. LTC.  HCN referral made and  updated. MOLST completed for code status.  Original MOLST returned to family.  Copy placed on chart and copy to be scanned into [employer].      Education provided on hospice choices.  Patient and family choose [facility].  HCN referral made for home hospice,  to follow up with family regarding alternative options for LTC with hospice as family wants to explore both options. Palliative team to sign off as goals for DNR/DNI and no feeding tube established. D/C as per primary team.  Contact cards for MD and LCSW provided to family.  See Dr. Tapia's notes for additional information.

## 2019-09-04 NOTE — PROGRESS NOTE ADULT - SUBJECTIVE AND OBJECTIVE BOX
***************************************************************  Dr. Elver Duckworth, PGY1  Internal Medicine   [facility] Pager: [phone]  [facility] Pager: [phone]  ***************************************************************    COLEMAN MARCH  85y  MRN: [id number]    Subjective: NAOEN. No acute complaints. Discussed with patient again on whether she understands risks of eating PO, risks of aspiration and death. Patient in understanding of risks and still wants to proceed with feeds to "nibble on as much as I can". Otherwise, comfortable on current O2, denies CP, n/v, increased secretions in throat, or fevers.     Patient is a 85y old  Female who presents with a chief complaint of Abdominal pain (03 Sep 2019 14:53)    MEDICATIONS  (STANDING):  ALBUTerol    0.083% 2.5 milliGRAM(s) Nebulizer every 6 hours  atorvastatin 40 milliGRAM(s) Oral <User Schedule>  buDESOnide    Inhalation Suspension 0.25 milliGRAM(s) Inhalation two times a day  carbidopa/levodopa  25/100 2 Tablet(s) Oral three times a day  dextrose 5%. 1000 milliLiter(s) (50 mL/Hr) IV Continuous <Continuous>  dextrose 50% Injectable 12.5 Gram(s) IV Push once  dextrose 50% Injectable 25 Gram(s) IV Push once  dextrose 50% Injectable 25 Gram(s) IV Push once  docusate sodium Liquid 100 milliGRAM(s) Oral two times a day  furosemide    Tablet 20 milliGRAM(s) Oral daily  gabapentin   Solution 300 milliGRAM(s) Oral at bedtime  guaiFENesin    Syrup 200 milliGRAM(s) Oral every 6 hours  heparin  Infusion. 600 Unit(s)/Hr (6 mL/Hr) IV Continuous <Continuous>  insulin lispro (HumaLOG) corrective regimen sliding scale   SubCutaneous every 6 hours  lidocaine   Patch 1 Patch Transdermal daily  metoprolol tartrate 75 milliGRAM(s) Oral every 12 hours  pantoprazole  Injectable 40 milliGRAM(s) IV Push daily  piperacillin/tazobactam IVPB.. 3.375 Gram(s) IV Intermittent every 8 hours  polyethylene glycol 3350 17 Gram(s) Oral daily  predniSONE   Tablet 10 milliGRAM(s) Oral daily  senna 2 Tablet(s) Oral at bedtime  sodium chloride 3%  Inhalation 3 milliLiter(s) Inhalation every 12 hours    MEDICATIONS  (PRN):  acetaminophen   Tablet .. 650 milliGRAM(s) Oral every 6 hours PRN Temp greater or equal to 38C (100.4F), Mild Pain (1 - 3), Moderate Pain (4 - 6)  ALPRAZolam 0.5 milliGRAM(s) Oral two times a day PRN anxiety  dextrose 40% Gel 15 Gram(s) Oral once PRN Blood Glucose LESS THAN 70 milliGRAM(s)/deciliter  glucagon  Injectable 1 milliGRAM(s) IntraMuscular once PRN Glucose LESS THAN 70 milligrams/deciliter      Objective:    Vitals: Vital Signs Last 24 Hrs  T(C): 36.7 (09-04-19 @ 05:00), Max: 36.7 (09-03-19 @ 14:40)  T(F): 98.1 (09-04-19 @ 05:00), Max: 98.1 (09-03-19 @ 14:40)  HR: 63 (09-04-19 @ 11:22) (60 - 81)  BP: 127/84 (09-04-19 @ 10:17) (122/72 - 162/76)  BP(mean): --  RR: 18 (09-04-19 @ 05:00) (18 - 18)  SpO2: 100% (09-04-19 @ 11:22) (97% - 100%)                I&O's Summary  03 Sep 2019 07:01  -  04 Sep 2019 07:00  --------------------------------------------------------  IN: 918 mL / OUT: 150 mL / NET: 768 mL      PHYSICAL EXAM:  GENERAL: NAD, resting comfortably but mildly frustrated  HEAD:  Atraumatic, Normocephalic  NECK: Supple, No JVD  CHEST/LUNG: diffuse ronchi b/l, unchanged from prior. Cough with thick secretions, also unchanged  HEART: No murmurs, rubs, or gallops  ABDOMEN: Soft, Nontender, Nondistended; Bowel sounds +  EXTREMITIES:  2+ Peripheral Pulses, No clubbing, cyanosis, or edema  NEUROLOGY: awake, A&Ox3, no longer lethargic   SKIN: No rashes or lesions                                                  LABS:  09-04    138  |  97  |  10  ----------------------------<  147<H>  3.4<L>   |  22  |  0.67  09-03    141  |  100  |  14  ----------------------------<  108<H>  3.2<L>   |  25  |  0.78  09-02    139  |  97  |  17  ----------------------------<  112<H>  3.2<L>   |  26  |  0.86    Ca    9.6      04 Sep 2019 09:54  Ca    9.9      03 Sep 2019 06:25  Ca    9.4      02 Sep 2019 06:55    TPro  6.9  /  Alb  3.4  /  TBili  0.5  /  DBili  x   /  AST  20  /  ALT  7<L>  /  AlkPhos  47  09-02      PTT - ( 04 Sep 2019 06:55 )  PTT:78.0 sec                                        10.9   11.0  )-----------( 233      ( 04 Sep 2019 09:54 )             33.4                         11.2   11.5  )-----------( 220      ( 03 Sep 2019 06:25 )             34.9                         10.9   11.3  )-----------( 182      ( 02 Sep 2019 06:56 )             32.5     CAPILLARY BLOOD GLUCOSE      POCT Blood Glucose.: 161 mg/dL (04 Sep 2019 12:27)  POCT Blood Glucose.: 152 mg/dL (04 Sep 2019 09:38)  POCT Blood Glucose.: 113 mg/dL (04 Sep 2019 06:20)  POCT Blood Glucose.: 101 mg/dL (04 Sep 2019 00:51)  POCT Blood Glucose.: 101 mg/dL (03 Sep 2019 18:08)      RADIOLOGY & ADDITIONAL TESTS:    Imaging Personally Reviewed:  [ ] YES  [ ] NO      Consultants involved in case:   Consultant(s) Notes Reviewed:  [ ] YES  [ ] NO:   Care Discussed with Consultants/Other Providers [ ] YES  [ ] NO ***************************************************************  Dr. Elver Duckworth, PGY1  Internal Medicine   [facility] Pager: [phone]  [facility] Pager: [phone]  ***************************************************************    COLEMAN MARCH  85y  MRN: [id number]    Subjective: NAOEN. No acute complaints. Discussed with patient again on whether she understands risks of eating PO, risks of aspiration and death. Patient in understanding of risks and still wants to proceed with feeds to "nibble on as much as I can". Otherwise, comfortable on current O2, denies CP, n/v, increased secretions in throat, or fevers.     Patient is a 85y old  Female who presents with a chief complaint of Abdominal pain (03 Sep 2019 14:53)    MEDICATIONS  (STANDING):  ALBUTerol    0.083% 2.5 milliGRAM(s) Nebulizer every 6 hours  atorvastatin 40 milliGRAM(s) Oral <User Schedule>  buDESOnide    Inhalation Suspension 0.25 milliGRAM(s) Inhalation two times a day  carbidopa/levodopa  25/100 2 Tablet(s) Oral three times a day  dextrose 5%. 1000 milliLiter(s) (50 mL/Hr) IV Continuous <Continuous>  dextrose 50% Injectable 12.5 Gram(s) IV Push once  dextrose 50% Injectable 25 Gram(s) IV Push once  dextrose 50% Injectable 25 Gram(s) IV Push once  docusate sodium Liquid 100 milliGRAM(s) Oral two times a day  furosemide    Tablet 20 milliGRAM(s) Oral daily  gabapentin   Solution 300 milliGRAM(s) Oral at bedtime  guaiFENesin    Syrup 200 milliGRAM(s) Oral every 6 hours  heparin  Infusion. 600 Unit(s)/Hr (6 mL/Hr) IV Continuous <Continuous>  insulin lispro (HumaLOG) corrective regimen sliding scale   SubCutaneous every 6 hours  lidocaine   Patch 1 Patch Transdermal daily  metoprolol tartrate 75 milliGRAM(s) Oral every 12 hours  pantoprazole  Injectable 40 milliGRAM(s) IV Push daily  piperacillin/tazobactam IVPB.. 3.375 Gram(s) IV Intermittent every 8 hours  polyethylene glycol 3350 17 Gram(s) Oral daily  predniSONE   Tablet 10 milliGRAM(s) Oral daily  senna 2 Tablet(s) Oral at bedtime  sodium chloride 3%  Inhalation 3 milliLiter(s) Inhalation every 12 hours    MEDICATIONS  (PRN):  acetaminophen   Tablet .. 650 milliGRAM(s) Oral every 6 hours PRN Temp greater or equal to 38C (100.4F), Mild Pain (1 - 3), Moderate Pain (4 - 6)  ALPRAZolam 0.5 milliGRAM(s) Oral two times a day PRN anxiety  dextrose 40% Gel 15 Gram(s) Oral once PRN Blood Glucose LESS THAN 70 milliGRAM(s)/deciliter  glucagon  Injectable 1 milliGRAM(s) IntraMuscular once PRN Glucose LESS THAN 70 milligrams/deciliter      Objective:    Vitals: Vital Signs Last 24 Hrs  T(C): 36.7 (09-04-19 @ 05:00), Max: 36.7 (09-03-19 @ 14:40)  T(F): 98.1 (09-04-19 @ 05:00), Max: 98.1 (09-03-19 @ 14:40)  HR: 63 (09-04-19 @ 11:22) (60 - 81)  BP: 127/84 (09-04-19 @ 10:17) (122/72 - 162/76)  BP(mean): --  RR: 18 (09-04-19 @ 05:00) (18 - 18)  SpO2: 100% (09-04-19 @ 11:22) (97% - 100%)                I&O's Summary  03 Sep 2019 07:01  -  04 Sep 2019 07:00  --------------------------------------------------------  IN: 918 mL / OUT: 150 mL / NET: 768 mL      PHYSICAL EXAM:  GENERAL: NAD, resting comfortably but mildly frustrated  HEAD:  Atraumatic, Normocephalic  NECK: Supple, No JVD  CHEST/LUNG: diffuse ronchi b/l, unchanged from prior.   HEART: No murmurs, rubs, or gallops  ABDOMEN: Soft, Nontender, Nondistended; Bowel sounds +  EXTREMITIES:  2+ Peripheral Pulses, No clubbing, cyanosis, or edema  NEUROLOGY: awake, A&Ox3,   SKIN: No rashes or lesions                                                  LABS:  09-04    138  |  97  |  10  ----------------------------<  147<H>  3.4<L>   |  22  |  0.67  09-03    141  |  100  |  14  ----------------------------<  108<H>  3.2<L>   |  25  |  0.78  09-02    139  |  97  |  17  ----------------------------<  112<H>  3.2<L>   |  26  |  0.86    Ca    9.6      04 Sep 2019 09:54  Ca    9.9      03 Sep 2019 06:25  Ca    9.4      02 Sep 2019 06:55    TPro  6.9  /  Alb  3.4  /  TBili  0.5  /  DBili  x   /  AST  20  /  ALT  7<L>  /  AlkPhos  47  09-02      PTT - ( 04 Sep 2019 06:55 )  PTT:78.0 sec                                        10.9   11.0  )-----------( 233      ( 04 Sep 2019 09:54 )             33.4                         11.2   11.5  )-----------( 220      ( 03 Sep 2019 06:25 )             34.9                         10.9   11.3  )-----------( 182      ( 02 Sep 2019 06:56 )             32.5     CAPILLARY BLOOD GLUCOSE      POCT Blood Glucose.: 161 mg/dL (04 Sep 2019 12:27)  POCT Blood Glucose.: 152 mg/dL (04 Sep 2019 09:38)  POCT Blood Glucose.: 113 mg/dL (04 Sep 2019 06:20)  POCT Blood Glucose.: 101 mg/dL (04 Sep 2019 00:51)  POCT Blood Glucose.: 101 mg/dL (03 Sep 2019 18:08)      RADIOLOGY & ADDITIONAL TESTS:    Imaging Personally Reviewed:  [ ] YES  [ ] NO      Consultants involved in case:   Consultant(s) Notes Reviewed:  [ ] YES  [ ] NO:   Care Discussed with Consultants/Other Providers [ ] YES  [ ] NO

## 2019-09-04 NOTE — PROGRESS NOTE ADULT - PROBLEM SELECTOR PLAN 3
aspiration PNA  - sputum and bcx with MSSA on 8/30, d/c vanc, c/w zosyn  - repeat blood cx in AM bcx with MSSA likely from aspiration PNA-  -- c/w zosyn, afebrile, stable, leukocytosis improving  --repeat bcx in AM and ID consult pending

## 2019-09-04 NOTE — PROGRESS NOTE ADULT - PROBLEM SELECTOR PLAN 9
-TTE --> Normal LF, Mitral stenosis, increased RV pressure and dilated Left atrium, functioning TAVR. Findings c/w valvular heart failure or heart failure with preserved EF  - lasix 20mg qd   - Strict I's/O's and daily weights -TTE: Normal LF, Mitral stenosis, increased RV pressure and dilated Left atrium, functioning TAVR. Findings c/w valvular heart failure or heart failure with preserved EF  - lasix 20mg qd   - Strict I's/O's and daily weights

## 2019-09-04 NOTE — PROGRESS NOTE ADULT - PROBLEM SELECTOR PLAN 5
- new onset Afib this admission  - c/w heparin gtt on while decision on PEG placement is still pending.  Eventual plan will be for DOAC per cardiology as patient's MS is not rheumatic in etiology   - metoprolol 75 mg BID for rate control, consider IV if cannot get NG by kev or HR becomes elevated  - c/w lasix 20 mg daily - pulmonary consult appreciated, rec adventitious lung sounds likely due to vocal cord paralysis, c/w with Pulmicort, albuterol and prednisone.   - c/w home dose prednisone 10mg daily chronically for PMR/?GCA. Right temporal artery biopsy reportedly negative, but she gets right sided headaches.  - Maintain O2 sats between 88-92%

## 2019-09-04 NOTE — PROGRESS NOTE ADULT - PROBLEM SELECTOR PLAN 10
a1c 6  - Patient not on home anti-hyperglycemics  - FS acceptable  - c/w Insulin sliding scale      Prophylaxis:  DVT ppx: c/w heparin drip for pending PEG tube placement.  Diet: NPO  Dispo: poor prognosis, DNR/DNI, palliative following, hospice eval pending, family meeting to discuss PEG vs pleasure feeds and other GOC a1c 6  - Patient not on home anti-hyperglycemics  - FS acceptable  - c/w Insulin sliding scale      Prophylaxis:  DVT ppx: c/w heparin drip for pending PEG tube placement.  Diet: NPO except meds  Dispo: poor prognosis, DNR/DNI, palliative following, hospice eval came today but patient and family now rediscussing PEG/NG/vs pleasure feeds

## 2019-09-04 NOTE — PROGRESS NOTE ADULT - PROBLEM SELECTOR PLAN 7
-Melatonin 5mg HS via NGT  -C/w home Sinemet 50/200 TID for Parkinson-like symptom. Per son, patient never has a diagnosis of parkinson's disease. She was started on sinemet to see if it helps with her leg shaking symptoms  - c/w venlafaxine 37.5mg q12h (was on duloxetine at home) given duloxetine cannot be crushed and put via NGT -Severe on TTE  -Outpatient CTS eval if in line with overall GOC.

## 2019-09-04 NOTE — PROGRESS NOTE ADULT - ASSESSMENT
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 Now s/p family meeting, no PEG tube, DNR/DNI with cautious pleasure feeds. Patient and family understands risks and verbalizes agreement to proceed. 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

## 2019-09-04 NOTE — CHART NOTE - NSCHARTNOTEFT_GEN_A_CORE
Called to speak with family at bedside. Family expressed desire to resume prior plan for PEG placement after consultation with primary care physician. Again expressed benefits and risks of both surgery and pleasure feeds. After private discussion with family, patient again asked if PEG tube is what she wants, with her reply of "I guess so". Discussion with family turned to current NPO status. Patient and family agree to keep NPO for now, except for medications, until possible NGT placement tomorrow. Family does not want primary team to attempt another placement of NGT, prefers more skilled team to intervene.

## 2019-09-04 NOTE — GOALS OF CARE CONVERSATION - PERSONAL ADVANCE DIRECTIVE - CONVERSATION DETAILS
Pt was referred by the Palliative Care Team. As per the pt's son Shon, the pt's PCP, Dr Yakov Dumas, is recommending peg placement. Shon plans to discuss peg placement w/ the pt today and wants "to put any plans for hospice on hold." Thank you.

## 2019-09-04 NOTE — PROGRESS NOTE ADULT - PROBLEM SELECTOR PLAN 8
BP stable - c/w home dose 50mg QD of losartan   - c/w amlodipine 5mg QD  - c/w metoprolol 75 BID   - montior BP, if uncontrolled may need to get IVP until NG/PEG established

## 2019-09-04 NOTE — PROGRESS NOTE ADULT - PROBLEM SELECTOR PLAN 2
likely due to aspiration PNA and MSSA bacteremia s/p extubation and MICU stay  -sputum and bcx with MSSA, c/w zosyn for now, can stop vanc  -repeat bcx in AM  -ID consult in AM likely due to aspiration PNA and MSSA bacteremia s/p extubation and MICU stay  -sputum and bcx with MSSA, c/w zosyn, stable  -repeat bcx in AM and ID consult pending

## 2019-09-04 NOTE — PROGRESS NOTE ADULT - PROBLEM SELECTOR PLAN 4
- pulmonary consult appreciated, rec adventitious lung sounds likely due to vocal cord paralysis, c/w with Pulmicort, albuterol and prednisone.   - c/w home dose prednisone 10mg daily chronically for PMR/?GCA. Right temporal artery biopsy reportedly negative, but she gets right sided headaches.  -Trigeminal neuralgia and migraine on the differential given right HA associated with maxillary/jaw pain and light sensitivity--c/w tylenol 1g q6h PRN given her    - Maintain O2 sats between 88-92%  - cold compresses for pain aspiration PNA  - sputum cx MSSA on 8/30, d/cuco vanc, c/w zosyn  - 02 sat on NC, wean as tolerated, no resp distress

## 2019-09-04 NOTE — PROGRESS NOTE ADULT - PROBLEM SELECTOR PLAN 6
-Severe on TTE  -Outpatient CTS eval if in line with overall GOC. - new onset Afib this admission  - patient refusing IV heparin, changed to po eliquis 2.5mg bid  - metoprolol 75 mg BID for rate control  - c/w lasix 20 mg daily

## 2019-09-04 NOTE — PROGRESS NOTE ADULT - PROBLEM SELECTOR PLAN 1
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 Atrophy and extensive small vessel white matter ischemic changes. No acute changes    - Aspiration precautions and Elevate head of bed S/p vocal cord injection for vocal cord paralysis  - failed speech and swallow study and MBS on 08/16 and 08/23, 9/3  - speech rec NPO, family meeting 9/3 decision per patient son was not to put PEG, son spoke to PMD Dr Dumas who recommended PEG so now son redicsussing PEG with patient. 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 Atrophy and extensive small vessel white matter ischemic changes. No acute changes    - Aspiration precautions and Elevate head of bed

## 2019-09-05 DIAGNOSIS — E87.6 HYPOKALEMIA: ICD-10-CM

## 2019-09-05 LAB
ANION GAP SERPL CALC-SCNC: 16 MMOL/L — SIGNIFICANT CHANGE UP (ref 5–17)
ANION GAP SERPL CALC-SCNC: 18 MMOL/L — HIGH (ref 5–17)
APTT BLD: 35.1 SEC — SIGNIFICANT CHANGE UP (ref 27.5–36.3)
BUN SERPL-MCNC: 16 MG/DL — SIGNIFICANT CHANGE UP (ref 7–23)
BUN SERPL-MCNC: 20 MG/DL — SIGNIFICANT CHANGE UP (ref 7–23)
CALCIUM SERPL-MCNC: 10.1 MG/DL — SIGNIFICANT CHANGE UP (ref 8.4–10.5)
CALCIUM SERPL-MCNC: 9.9 MG/DL — SIGNIFICANT CHANGE UP (ref 8.4–10.5)
CHLORIDE SERPL-SCNC: 95 MMOL/L — LOW (ref 96–108)
CHLORIDE SERPL-SCNC: 96 MMOL/L — SIGNIFICANT CHANGE UP (ref 96–108)
CO2 SERPL-SCNC: 24 MMOL/L — SIGNIFICANT CHANGE UP (ref 22–31)
CO2 SERPL-SCNC: 26 MMOL/L — SIGNIFICANT CHANGE UP (ref 22–31)
CREAT SERPL-MCNC: 1.19 MG/DL — SIGNIFICANT CHANGE UP (ref 0.5–1.3)
CREAT SERPL-MCNC: 1.36 MG/DL — HIGH (ref 0.5–1.3)
GLUCOSE BLDC GLUCOMTR-MCNC: 103 MG/DL — HIGH (ref 70–99)
GLUCOSE BLDC GLUCOMTR-MCNC: 106 MG/DL — HIGH (ref 70–99)
GLUCOSE BLDC GLUCOMTR-MCNC: 130 MG/DL — HIGH (ref 70–99)
GLUCOSE BLDC GLUCOMTR-MCNC: 150 MG/DL — HIGH (ref 70–99)
GLUCOSE BLDC GLUCOMTR-MCNC: 154 MG/DL — HIGH (ref 70–99)
GLUCOSE SERPL-MCNC: 114 MG/DL — HIGH (ref 70–99)
GLUCOSE SERPL-MCNC: 160 MG/DL — HIGH (ref 70–99)
MAGNESIUM SERPL-MCNC: 2.2 MG/DL — SIGNIFICANT CHANGE UP (ref 1.6–2.6)
POTASSIUM SERPL-MCNC: 2.8 MMOL/L — CRITICAL LOW (ref 3.5–5.3)
POTASSIUM SERPL-MCNC: 4.5 MMOL/L — SIGNIFICANT CHANGE UP (ref 3.5–5.3)
POTASSIUM SERPL-SCNC: 2.8 MMOL/L — CRITICAL LOW (ref 3.5–5.3)
POTASSIUM SERPL-SCNC: 4.5 MMOL/L — SIGNIFICANT CHANGE UP (ref 3.5–5.3)
SODIUM SERPL-SCNC: 137 MMOL/L — SIGNIFICANT CHANGE UP (ref 135–145)
SODIUM SERPL-SCNC: 138 MMOL/L — SIGNIFICANT CHANGE UP (ref 135–145)

## 2019-09-05 PROCEDURE — 71045 X-RAY EXAM CHEST 1 VIEW: CPT | Mod: 26

## 2019-09-05 PROCEDURE — 71045 X-RAY EXAM CHEST 1 VIEW: CPT | Mod: 26,77

## 2019-09-05 PROCEDURE — 99223 1ST HOSP IP/OBS HIGH 75: CPT

## 2019-09-05 PROCEDURE — 99232 SBSQ HOSP IP/OBS MODERATE 35: CPT

## 2019-09-05 PROCEDURE — 99233 SBSQ HOSP IP/OBS HIGH 50: CPT | Mod: GC

## 2019-09-05 RX ORDER — HEPARIN SODIUM 5000 [USP'U]/ML
INJECTION INTRAVENOUS; SUBCUTANEOUS
Qty: 25000 | Refills: 0 | Status: DISCONTINUED | OUTPATIENT
Start: 2019-09-05 | End: 2019-09-05

## 2019-09-05 RX ORDER — HEPARIN SODIUM 5000 [USP'U]/ML
4500 INJECTION INTRAVENOUS; SUBCUTANEOUS EVERY 6 HOURS
Refills: 0 | Status: DISCONTINUED | OUTPATIENT
Start: 2019-09-05 | End: 2019-09-05

## 2019-09-05 RX ORDER — DEXTROSE MONOHYDRATE, SODIUM CHLORIDE, AND POTASSIUM CHLORIDE 50; .745; 4.5 G/1000ML; G/1000ML; G/1000ML
1000 INJECTION, SOLUTION INTRAVENOUS
Refills: 0 | Status: DISCONTINUED | OUTPATIENT
Start: 2019-09-05 | End: 2019-09-05

## 2019-09-05 RX ORDER — HEPARIN SODIUM 5000 [USP'U]/ML
2000 INJECTION INTRAVENOUS; SUBCUTANEOUS EVERY 6 HOURS
Refills: 0 | Status: DISCONTINUED | OUTPATIENT
Start: 2019-09-05 | End: 2019-09-05

## 2019-09-05 RX ORDER — SODIUM CHLORIDE 9 MG/ML
500 INJECTION INTRAMUSCULAR; INTRAVENOUS; SUBCUTANEOUS ONCE
Refills: 0 | Status: DISCONTINUED | OUTPATIENT
Start: 2019-09-05 | End: 2019-09-05

## 2019-09-05 RX ORDER — ACETAMINOPHEN 500 MG
650 TABLET ORAL EVERY 6 HOURS
Refills: 0 | Status: DISCONTINUED | OUTPATIENT
Start: 2019-09-05 | End: 2019-09-10

## 2019-09-05 RX ORDER — POTASSIUM CHLORIDE 20 MEQ
40 PACKET (EA) ORAL
Refills: 0 | Status: DISCONTINUED | OUTPATIENT
Start: 2019-09-05 | End: 2019-09-05

## 2019-09-05 RX ORDER — POTASSIUM CHLORIDE 20 MEQ
40 PACKET (EA) ORAL ONCE
Refills: 0 | Status: COMPLETED | OUTPATIENT
Start: 2019-09-05 | End: 2019-09-06

## 2019-09-05 RX ORDER — CEFAZOLIN SODIUM 1 G
2000 VIAL (EA) INJECTION EVERY 12 HOURS
Refills: 0 | Status: DISCONTINUED | OUTPATIENT
Start: 2019-09-05 | End: 2019-09-13

## 2019-09-05 RX ORDER — HEPARIN SODIUM 5000 [USP'U]/ML
4500 INJECTION INTRAVENOUS; SUBCUTANEOUS ONCE
Refills: 0 | Status: DISCONTINUED | OUTPATIENT
Start: 2019-09-05 | End: 2019-09-05

## 2019-09-05 RX ORDER — POTASSIUM CHLORIDE 20 MEQ
10 PACKET (EA) ORAL
Refills: 0 | Status: COMPLETED | OUTPATIENT
Start: 2019-09-05 | End: 2019-09-05

## 2019-09-05 RX ADMIN — LIDOCAINE 1 PATCH: 4 CREAM TOPICAL at 12:51

## 2019-09-05 RX ADMIN — Medication 650 MILLIGRAM(S): at 22:03

## 2019-09-05 RX ADMIN — ALBUTEROL 2.5 MILLIGRAM(S): 90 AEROSOL, METERED ORAL at 23:32

## 2019-09-05 RX ADMIN — CARBIDOPA AND LEVODOPA 2 TABLET(S): 25; 100 TABLET ORAL at 06:52

## 2019-09-05 RX ADMIN — CARBIDOPA AND LEVODOPA 2 TABLET(S): 25; 100 TABLET ORAL at 15:25

## 2019-09-05 RX ADMIN — ALBUTEROL 2.5 MILLIGRAM(S): 90 AEROSOL, METERED ORAL at 05:37

## 2019-09-05 RX ADMIN — Medication 20 MILLIGRAM(S): at 09:42

## 2019-09-05 RX ADMIN — Medication 200 MILLIGRAM(S): at 12:50

## 2019-09-05 RX ADMIN — Medication 200 MILLIGRAM(S): at 17:32

## 2019-09-05 RX ADMIN — Medication 75 MILLIGRAM(S): at 22:06

## 2019-09-05 RX ADMIN — APIXABAN 2.5 MILLIGRAM(S): 2.5 TABLET, FILM COATED ORAL at 17:32

## 2019-09-05 RX ADMIN — Medication 100 MILLIEQUIVALENT(S): at 15:27

## 2019-09-05 RX ADMIN — Medication 0.25 MILLIGRAM(S): at 18:15

## 2019-09-05 RX ADMIN — SODIUM CHLORIDE 3 MILLILITER(S): 9 INJECTION INTRAMUSCULAR; INTRAVENOUS; SUBCUTANEOUS at 05:39

## 2019-09-05 RX ADMIN — SODIUM CHLORIDE 3 MILLILITER(S): 9 INJECTION INTRAMUSCULAR; INTRAVENOUS; SUBCUTANEOUS at 18:16

## 2019-09-05 RX ADMIN — APIXABAN 2.5 MILLIGRAM(S): 2.5 TABLET, FILM COATED ORAL at 06:52

## 2019-09-05 RX ADMIN — GABAPENTIN 300 MILLIGRAM(S): 400 CAPSULE ORAL at 22:04

## 2019-09-05 RX ADMIN — ALBUTEROL 2.5 MILLIGRAM(S): 90 AEROSOL, METERED ORAL at 18:15

## 2019-09-05 RX ADMIN — PANTOPRAZOLE SODIUM 40 MILLIGRAM(S): 20 TABLET, DELAYED RELEASE ORAL at 12:49

## 2019-09-05 RX ADMIN — ALBUTEROL 2.5 MILLIGRAM(S): 90 AEROSOL, METERED ORAL at 11:23

## 2019-09-05 RX ADMIN — Medication 10 MILLIGRAM(S): at 06:52

## 2019-09-05 RX ADMIN — Medication 650 MILLIGRAM(S): at 22:30

## 2019-09-05 RX ADMIN — Medication 40 MILLIEQUIVALENT(S): at 17:32

## 2019-09-05 RX ADMIN — DEXTROSE MONOHYDRATE, SODIUM CHLORIDE, AND POTASSIUM CHLORIDE 75 MILLILITER(S): 50; .745; 4.5 INJECTION, SOLUTION INTRAVENOUS at 09:15

## 2019-09-05 RX ADMIN — Medication 0.25 MILLIGRAM(S): at 05:37

## 2019-09-05 RX ADMIN — Medication 650 MILLIGRAM(S): at 17:30

## 2019-09-05 RX ADMIN — Medication 100 MILLIEQUIVALENT(S): at 16:48

## 2019-09-05 RX ADMIN — Medication 100 MILLIEQUIVALENT(S): at 12:50

## 2019-09-05 RX ADMIN — Medication 650 MILLIGRAM(S): at 15:26

## 2019-09-05 RX ADMIN — CARBIDOPA AND LEVODOPA 2 TABLET(S): 25; 100 TABLET ORAL at 22:05

## 2019-09-05 RX ADMIN — Medication 75 MILLIGRAM(S): at 12:51

## 2019-09-05 RX ADMIN — PIPERACILLIN AND TAZOBACTAM 25 GRAM(S): 4; .5 INJECTION, POWDER, LYOPHILIZED, FOR SOLUTION INTRAVENOUS at 16:49

## 2019-09-05 RX ADMIN — PIPERACILLIN AND TAZOBACTAM 25 GRAM(S): 4; .5 INJECTION, POWDER, LYOPHILIZED, FOR SOLUTION INTRAVENOUS at 06:51

## 2019-09-05 NOTE — PROGRESS NOTE ADULT - PROBLEM SELECTOR PLAN 1
S/p vocal cord injection for vocal cord paralysis  - failed speech and swallow study and MBS on 08/16 and 08/23, 9/3  - speech rec NPO, family meeting 9/3 decision per patient son was not to put PEG, son spoke to PMD Dr Dumas who recommended PEG so now son redicsussing PEG with patient. 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 Atrophy and extensive small vessel white matter ischemic changes. No acute changes    - Aspiration precautions and Elevate head of bed S/p vocal cord injection for vocal cord paralysis  - failed speech and swallow study and MBS on 08/16 and 08/23, 9/3  - speech rec NPO, family meeting 9/3 decision per patient son was not to put PEG, son spoke to PMD Dr Dumas who recommended PEG so now son redicsussing PEG with patient. Patient refusing NG tube for now but she understand risk of pleasure feeds which include but not limited to recurrent aspiration PNA, choking and death  -ongoing GOC discussion regarding PEG and NG tubes, will make NPO except meds for now per patient request.  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 Atrophy and extensive small vessel white matter ischemic changes. No acute changes    - Aspiration precautions and Elevate head of bed  - GI reconsulted, appreciate recs S/p vocal cord injection for vocal cord paralysis  - failed speech and swallow study and MBS on 08/16 and 08/23, 9/3  - patient, family now requesting PEG tube, GI consulted, will do PEG next monday or tuesday, has to be off eliquis for 72 hours. cards consult for optimization  - they are refusing for NG tube to be placed by primary team due to failure, will ask surgery team to assist   - Aspiration precautions and Elevate head of bed

## 2019-09-05 NOTE — CONSULT NOTE ADULT - SUBJECTIVE AND OBJECTIVE BOX
Patient is a 85y old  Female who presents with a chief complaint of Abdominal pain (05 Sep 2019 07:34)    HPI:  85 F with GERD, HTN, HLD, DM2 with neuropathy, Parkinson's, anxiety/depression, AS s/p TAVR on plavix, overactive bladder, ?CHF (on lasix 20 BID), PMR on Prednisone, ?COPD (dx with PNA at [facility] vague hx of being told she has COPD given breathing treatments but then stopped taking them), presenting with multiple medical complaints, but especially LUQ and flank pain since yesterday AM. Per son, patient has had multiple UTIs. She had dysuria 2 weeks ago, was empirically treated with macrobid (8 days ago). Patient was febrile at home prior to coming and also in ED. Patient later denied abdominal pain and stated she came to ED for SOB. She's not on oxygen at home, but now requiring 1.5 L NC.  	Patient's son notes she had R sided headache for 1 week. ESR last week was wnl and prednisone dose was further decreased. This week, ESR was elevated again. Patient's PCP was c/f [facility] and prednisone dose was increased to 10mg qD. Patient states headache is sharp and stabbing, lasts 5 minutes and has improved since increasing prednisone. Patient has decreased vision on R eye d/t macular degeneration. No further worsening in vision. She had temporal artery bx in 04/2019, which was negative for [facility].  Patient also reports increased non-productive cough. Patient intermittently reports blood in stools to other providers, but recently denied it.  Vitals in ED were: febrile to 101.4F, HR 60-90s, /60s-150/80s, satting 100% on 1.5 L. VBG showed normal pH and pCO2 of 58. CXR shows concern for pna, and UA is positive for leuk esterase. Patient was given albuterol, azithromycin and CTX in the ED.  EKG showed LBBB. (11 Aug 2019 05:14)      Patient seen at bedside this morning. Overall, 85 year-old female with GERD, HTN, HLD, 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, Parkinson-like symptoms, anxiety/depression, AS s/p TAVR on plavix, overactive bladder, HFpEF with severe Mitral Stenosis, PMR (on chronic steroids), COPD (no formal diagnosis) presented 8/11/19 with SOB and abdominal pain, admitted for acute hypoxic and hypercarbic respiratory failure in setting of pneumonia and COPD exacerbation, transferred to MICU for acute respiratory distress, s/p intubation on 8/30, extubation on 8/31. This morning, patient in no pain with no acute events overnight. She continues to have dyspnea on 5L O2 NC with productive cough (yellow sputum). Sputum production is less today. Throughout our conversation, patient periodically desaturates to ~70-80%. Patient denies any nausea, vomiting, chest pain, palpations, abdominal pain, neck pain, back pain, dysuria, or diarrhea.          prior hospital charts reviewed [ x ]  primary team notes reviewed [ x ]  other consultant notes reviewed [ x ]  PAST MEDICAL & SURGICAL HISTORY:  Insomnia  Iron deficiency anemia  Anxiety  Parkinson disease  Aortic stenosis  Neuropathy  Polymyalgia rheumatica  Diabetes  Hyperlipidemia  Hypertension  S/P TAVR (transcatheter aortic valve replacement)    Allergies  penicillin (Unknown)    ANTIMICROBIALS (past 90 days)  MEDICATIONS  (STANDING):    azithromycin  IVPB   250 mL/Hr IV Intermittent (08-10-19 @ 21:55)    cefTRIAXone   IVPB   100 mL/Hr IV Intermittent (08-14-19 @ 23:43)   100 mL/Hr IV Intermittent (08-14-19 @ 00:08)   100 mL/Hr IV Intermittent (08-12-19 @ 23:45)   100 mL/Hr IV Intermittent (08-12-19 @ 00:11)    cefTRIAXone   IVPB   100 mL/Hr IV Intermittent (08-10-19 @ 20:49)    cefTRIAXone   IVPB   100 mL/Hr IV Intermittent (08-18-19 @ 23:35)   100 mL/Hr IV Intermittent (08-17-19 @ 22:50)   100 mL/Hr IV Intermittent (08-16-19 @ 22:48)   100 mL/Hr IV Intermittent (08-15-19 @ 22:55)    fluconAZOLE IVPB   50 mL/Hr IV Intermittent (08-15-19 @ 22:02)    fluconAZOLE IVPB   50 mL/Hr IV Intermittent (08-26-19 @ 21:40)   50 mL/Hr IV Intermittent (08-25-19 @ 21:46)   50 mL/Hr IV Intermittent (08-24-19 @ 21:31)   50 mL/Hr IV Intermittent (08-23-19 @ 21:32)   50 mL/Hr IV Intermittent (08-22-19 @ 20:39)   50 mL/Hr IV Intermittent (08-21-19 @ 21:09)   50 mL/Hr IV Intermittent (08-20-19 @ 20:49)   50 mL/Hr IV Intermittent (08-19-19 @ 21:19)   50 mL/Hr IV Intermittent (08-18-19 @ 20:44)   50 mL/Hr IV Intermittent (08-17-19 @ 20:19)   50 mL/Hr IV Intermittent (08-16-19 @ 20:38)    fluconAZOLE IVPB   50 mL/Hr IV Intermittent (08-28-19 @ 21:37)   50 mL/Hr IV Intermittent (08-27-19 @ 22:34)    metroNIDAZOLE  IVPB   100 mL/Hr IV Intermittent (08-17-19 @ 16:52)    metroNIDAZOLE  IVPB   100 mL/Hr IV Intermittent (08-21-19 @ 05:26)   100 mL/Hr IV Intermittent (08-20-19 @ 21:59)   100 mL/Hr IV Intermittent (08-20-19 @ 13:19)   100 mL/Hr IV Intermittent (08-20-19 @ 05:29)   100 mL/Hr IV Intermittent (08-19-19 @ 23:26)   100 mL/Hr IV Intermittent (08-19-19 @ 13:48)   100 mL/Hr IV Intermittent (08-19-19 @ 05:12)   100 mL/Hr IV Intermittent (08-19-19 @ 00:32)   100 mL/Hr IV Intermittent (08-18-19 @ 13:03)   100 mL/Hr IV Intermittent (08-18-19 @ 05:02)   100 mL/Hr IV Intermittent (08-17-19 @ 23:37)    nystatin    Suspension   [id number] Unit(s) Oral (08-15-19 @ 14:12)   [id number] Unit(s) Oral (08-14-19 @ 23:42)    nystatin    Suspension   [id number] Unit(s) Oral (08-30-19 @ 10:16)   [id number] Unit(s) Oral (08-29-19 @ 17:06)   [id number] Unit(s) Oral (08-29-19 @ 12:35)   [id number] Unit(s) Oral (08-29-19 @ 04:16)   [id number] Unit(s) Oral (08-28-19 @ 23:13)   [id number] Unit(s) Oral (08-28-19 @ 17:07)   [id number] Unit(s) Oral (08-28-19 @ 12:30)   [id number] Unit(s) Oral (08-28-19 @ 05:44)   [id number] Unit(s) Oral (08-27-19 @ 23:09)   [id number] Unit(s) Oral (08-27-19 @ 17:31)   [id number] Unit(s) Oral (08-27-19 @ 12:48)   [id number] Unit(s) Oral (08-27-19 @ 07:40)   [id number] Unit(s) Oral (08-27-19 @ 00:42)   [id number] Unit(s) Oral (08-26-19 @ 21:42)   [id number] Unit(s) Oral (08-26-19 @ 12:58)   [id number] Unit(s) Oral (08-26-19 @ 05:21)   [id number] Unit(s) Oral (08-26-19 @ 02:00)   [id number] Unit(s) Oral (08-25-19 @ 18:39)   [id number] Unit(s) Oral (08-25-19 @ 13:14)   [id number] Unit(s) Oral (08-25-19 @ 05:25)   [id number] Unit(s) Oral (08-25-19 @ 01:34)   [id number] Unit(s) Oral (08-24-19 @ 18:00)   [id number] Unit(s) Oral (08-24-19 @ 12:09)   [id number] Unit(s) Oral (08-24-19 @ 05:04)   [id number] Unit(s) Oral (08-24-19 @ 00:27)   [id number] Unit(s) Oral (08-23-19 @ 17:32)   [id number] Unit(s) Oral (08-23-19 @ 11:44)   [id number] Unit(s) Oral (08-23-19 @ 05:59)   [id number] Unit(s) Oral (08-22-19 @ 23:20)   [id number] Unit(s) Oral (08-22-19 @ 18:25)   [id number] Unit(s) Oral (08-22-19 @ 06:46)   [id number] Unit(s) Oral (08-21-19 @ 23:20)   [id number] Unit(s) Oral (08-21-19 @ 17:47)   [id number] Unit(s) Oral (08-21-19 @ 11:49)   [id number] Unit(s) Oral (08-21-19 @ 05:26)   [id number] Unit(s) Oral (08-20-19 @ 21:59)   [id number] Unit(s) Oral (08-20-19 @ 17:50)   [id number] Unit(s) Oral (08-20-19 @ 12:04)   [id number] Unit(s) Oral (08-20-19 @ 05:28)   [id number] Unit(s) Oral (08-19-19 @ 23:25)   [id number] Unit(s) Oral (08-19-19 @ 17:33)   [id number] Unit(s) Oral (08-19-19 @ 13:43)   [id number] Unit(s) Oral (08-19-19 @ 05:10)   [id number] Unit(s) Oral (08-19-19 @ 00:34)   [id number] Unit(s) Oral (08-18-19 @ 17:07)   [id number] Unit(s) Oral (08-18-19 @ 12:39)   [id number] Unit(s) Oral (08-18-19 @ 05:01)   [id number] Unit(s) Oral (08-17-19 @ 23:37)   [id number] Unit(s) Oral (08-17-19 @ 17:38)   [id number] Unit(s) Oral (08-17-19 @ 13:03)   [id number] Unit(s) Oral (08-17-19 @ 05:01)   [id number] Unit(s) Oral (08-16-19 @ 22:48)    nystatin    Suspension   [id number] Unit(s) Swish and Swallow (08-16-19 @ 06:07)   [id number] Unit(s) Swish and Swallow (08-15-19 @ 18:15)    piperacillin/tazobactam IVPB..   25 mL/Hr IV Intermittent (09-05-19 @ 06:51)   25 mL/Hr IV Intermittent (09-04-19 @ 23:15)   25 mL/Hr IV Intermittent (09-04-19 @ 15:37)   25 mL/Hr IV Intermittent (09-04-19 @ 06:12)   25 mL/Hr IV Intermittent (09-03-19 @ 23:19)   25 mL/Hr IV Intermittent (09-03-19 @ 14:28)   25 mL/Hr IV Intermittent (09-03-19 @ 08:38)   25 mL/Hr IV Intermittent (09-02-19 @ 22:31)   25 mL/Hr IV Intermittent (09-02-19 @ 14:36)   25 mL/Hr IV Intermittent (09-02-19 @ 06:39)   25 mL/Hr IV Intermittent (09-01-19 @ 22:29)   25 mL/Hr IV Intermittent (09-01-19 @ 13:18)   25 mL/Hr IV Intermittent (09-01-19 @ 05:07)   25 mL/Hr IV Intermittent (08-31-19 @ 21:14)   25 mL/Hr IV Intermittent (08-31-19 @ 13:53)   25 mL/Hr IV Intermittent (08-31-19 @ 05:11)   25 mL/Hr IV Intermittent (08-30-19 @ 21:36)    vancomycin  IVPB   250 mL/Hr IV Intermittent (09-03-19 @ 08:50)   250 mL/Hr IV Intermittent (09-01-19 @ 17:39)   250 mL/Hr IV Intermittent (09-01-19 @ 05:05)   250 mL/Hr IV Intermittent (08-31-19 @ 17:35)      ANTIMICROBIALS:    piperacillin/tazobactam IVPB.. 3.375 every 8 hours    OTHER MEDS: MEDICATIONS  (STANDING):  acetaminophen   Tablet .. 650 every 6 hours PRN  ALBUTerol    0.083% 2.5 every 6 hours  ALPRAZolam 0.5 two times a day PRN  apixaban 2.5 every 12 hours  atorvastatin 40 <User Schedule>  buDESOnide    Inhalation Suspension 0.25 two times a day  carbidopa/levodopa  25/100 2 three times a day  dextrose 40% Gel 15 once PRN  dextrose 50% Injectable 12.5 once  dextrose 50% Injectable 25 once  dextrose 50% Injectable 25 once  furosemide    Tablet 20 daily  gabapentin   Solution 300 at bedtime  glucagon  Injectable 1 once PRN  guaiFENesin    Syrup 200 every 6 hours  insulin lispro (HumaLOG) corrective regimen sliding scale  every 6 hours  metoprolol tartrate 75 every 12 hours  pantoprazole  Injectable 40 daily  predniSONE   Tablet 10 daily  sodium chloride 3%  Inhalation 3 every 12 hours    SOCIAL HISTORY:   hx smoking  non-smoker    FAMILY HISTORY:  No pertinent family history in first degree relatives    REVIEW OF SYSTEMS  [  ] ROS unobtainable because:    [ x ] All other systems negative except as noted below:	    Constitutional:  [ ] fever [ ] chills  [ ] weight loss  [ ] weakness  Skin:  [ ] rash [ ] phlebitis	  Eyes: [ ] icterus [ ] pain  [ ] discharge	  ENMT: [ ] sore throat  [ ] thrush [ ] ulcers [ ] exudates  Respiratory: [ ] dyspnea [ ] hemoptysis [x ] cough [ x] sputum	  Cardiovascular:  [ ] chest pain [ ] palpitations [ ] edema	  Gastrointestinal:  [ ] nausea [ ] vomiting [ ] diarrhea [ ] constipation [ ] pain	  Genitourinary:  [ ] dysuria [ ] frequency [ ] hematuria [ ] discharge [ ] flank pain  [ ] incontinence  Musculoskeletal:  [ ] myalgias [ ] arthralgias [ ] arthritis  [ ] back pain  Neurological:  [ ] headache [ ] seizures  [ ] confusion/altered mental status  Psychiatric:  [ ] anxiety [ ] depression	  Hematology/Lymphatics:  [ ] lymphadenopathy  Endocrine:  [ ] adrenal [ ] thyroid  Allergic/Immunologic:	 [ ] transplant [ ] seasonal    Vital Signs Last 24 Hrs  T(F): 97.4 (09-05-19 @ 06:28), Max: 99.7 (08-30-19 @ 14:28)  Vital Signs Last 24 Hrs  HR: 67 (09-05-19 @ 06:28) (62 - 88)  BP: 91/52 (09-05-19 @ 06:28) (91/52 - 125/77)  RR: 18 (09-05-19 @ 06:28)  SpO2: 96% (09-05-19 @ 06:28) (94% - 100%)  Wt(kg): --    PHYSICAL EXAM:  General: NAD, sitting comfortably on bed  HEAD/EYES: NCAT, anicteric, EOMI  ENT:  supple  Cardiovascular:  + S1, S2  Respiratory: diffuse rhonchi and scattered wheezes present b/l in anterior and posterior lung fields  GI:  soft, non-tender, normal bowel sounds  :  no suprapubic tenderness  Musculoskeletal:  5/5 strength of lower extremities  Neurologic:  grossly non-focal  Skin:  no visible rashes, ecchymoses present b/l on hands and arms  Psychiatric:  appropriate affect  Vascular:  no phlebitis, no edema of lower extremities                            10.9   11.0  )-----------( 233      ( 04 Sep 2019 09:54 )             33.4     09-05    137  |  95<L>  |  16  ----------------------------<  114<H>  2.8<LL>   |  26  |  1.19    Ca    9.9      05 Sep 2019 07:15      MICROBIOLOGY:Vancomycin Level, Trough: 14.8 (09-03 @ 06:25)  Vancomycin Level, Trough: 22.2 (09-02 @ 06:55)    Culture - Blood (collected 01 Sep 2019 09:14)  Source: .Blood  Preliminary Report (02 Sep 2019 10:01):    No growth to date.    Culture - Blood (collected 01 Sep 2019 03:50)  Source: .Blood  Gram Stain (02 Sep 2019 05:23):    Growth in anaerobic bottle: Gram Positive Cocci in Clusters  Final Report (03 Sep 2019 12:49):    Growth in anaerobic bottle: Staphylococcus aureus    See previous culture [id number]          RADIOLOGY:    imaging below personally reviewed Patient is a 85y old  Female who presents with a chief complaint of Abdominal pain (05 Sep 2019 07:34)    HPI:  85 F with GERD, HTN, HLD, DM2 with neuropathy, Parkinson's, anxiety/depression, AS s/p TAVR on plavix, overactive bladder, ?CHF (on lasix 20 BID), PMR on Prednisone, ?COPD (dx with PNA at [facility] vague hx of being told she has COPD given breathing treatments but then stopped taking them), presenting with multiple medical complaints, but especially LUQ and flank pain since yesterday AM. Per son, patient has had multiple UTIs. She had dysuria 2 weeks ago, was empirically treated with macrobid (8 days ago). Patient was febrile at home prior to coming and also in ED. Patient later denied abdominal pain and stated she came to ED for SOB. She's not on oxygen at home, but now requiring 1.5 L NC.  	Patient's son notes she had R sided headache for 1 week. ESR last week was wnl and prednisone dose was further decreased. This week, ESR was elevated again. Patient's PCP was c/f [facility] and prednisone dose was increased to 10mg qD. Patient states headache is sharp and stabbing, lasts 5 minutes and has improved since increasing prednisone. Patient has decreased vision on R eye d/t macular degeneration. No further worsening in vision. She had temporal artery bx in 2019, which was negative for GCA.  Patient also reports increased non-productive cough. Patient intermittently reports blood in stools to other providers, but recently denied it.  Vitals in ED were: febrile to 101.4F, HR 60-90s, /60s-150/80s, satting 100% on 1.5 L. VBG showed normal pH and pCO2 of 58. CXR shows concern for pna, and UA is positive for leuk esterase. Patient was given albuterol, azithromycin and CTX in the ED.  EKG showed LBBB. (11 Aug 2019 05:14)      Patient seen at bedside this morning. 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 This morning, patient in no pain with no acute events overnight. She continues to have dyspnea on 5L O2 NC with productive cough (yellow sputum). Sputum production is less today. Throughout our conversation, patient periodically desaturates to ~70-80%. Patient denies any nausea, vomiting, chest pain, palpations, abdominal pain, neck pain, back pain, dysuria, or diarrhea.          prior hospital charts reviewed [ x ]  primary team notes reviewed [ x ]  other consultant notes reviewed [ x ]  PAST MEDICAL & SURGICAL HISTORY:  Insomnia  Iron deficiency anemia  Anxiety  Parkinson disease  Aortic stenosis  Neuropathy  Polymyalgia rheumatica  Diabetes  Hyperlipidemia  Hypertension  S/P TAVR (transcatheter aortic valve replacement)    Allergies  penicillin (Unknown)    FAMILY HISTORY: Heart dz. Mom  at 88 years old, Dad  at 68 years old.    SOCIAL HISTORY: Previously smoked tobacco 1 pack/week for 5 years, alcohol socially, no recent travel, previously living in rehab facility.      ANTIMICROBIALS (past 90 days)  MEDICATIONS  (STANDING):    azithromycin  IVPB   250 mL/Hr IV Intermittent (08-10-19 @ 21:55)    cefTRIAXone   IVPB   100 mL/Hr IV Intermittent (19 @ 23:43)   100 mL/Hr IV Intermittent (19 @ 00:08)   100 mL/Hr IV Intermittent (19 @ 23:45)   100 mL/Hr IV Intermittent (19 @ 00:11)    cefTRIAXone   IVPB   100 mL/Hr IV Intermittent (08-10-19 @ 20:49)    cefTRIAXone   IVPB   100 mL/Hr IV Intermittent (19 @ 23:35)   100 mL/Hr IV Intermittent (19 @ 22:50)   100 mL/Hr IV Intermittent (19 @ 22:48)   100 mL/Hr IV Intermittent (08-15-19 @ 22:55)    fluconAZOLE IVPB   50 mL/Hr IV Intermittent (08-15-19 @ 22:02)    fluconAZOLE IVPB   50 mL/Hr IV Intermittent (19 @ 21:40)   50 mL/Hr IV Intermittent (19 @ 21:46)   50 mL/Hr IV Intermittent (19 @ 21:31)   50 mL/Hr IV Intermittent (19 @ 21:32)   50 mL/Hr IV Intermittent (19 @ 20:39)   50 mL/Hr IV Intermittent (19 @ 21:09)   50 mL/Hr IV Intermittent (19 @ 20:49)   50 mL/Hr IV Intermittent (19 @ 21:19)   50 mL/Hr IV Intermittent (19 @ 20:44)   50 mL/Hr IV Intermittent (19 @ 20:19)   50 mL/Hr IV Intermittent (19 @ 20:38)    fluconAZOLE IVPB   50 mL/Hr IV Intermittent (19 @ 21:37)   50 mL/Hr IV Intermittent (19 @ 22:34)    metroNIDAZOLE  IVPB   100 mL/Hr IV Intermittent (19 @ 16:52)    metroNIDAZOLE  IVPB   100 mL/Hr IV Intermittent (19 @ 05:26)   100 mL/Hr IV Intermittent (19 @ 21:59)   100 mL/Hr IV Intermittent (19 @ 13:19)   100 mL/Hr IV Intermittent (19 @ 05:29)   100 mL/Hr IV Intermittent (19 @ 23:26)   100 mL/Hr IV Intermittent (19 @ 13:48)   100 mL/Hr IV Intermittent (19 @ 05:12)   100 mL/Hr IV Intermittent (19 @ 00:32)   100 mL/Hr IV Intermittent (19 @ 13:03)   100 mL/Hr IV Intermittent (19 @ 05:02)   100 mL/Hr IV Intermittent (19 @ 23:37)    nystatin    Suspension   [id number] Unit(s) Oral (08-15-19 @ 14:12)   [id number] Unit(s) Oral (19 @ 23:42)    nystatin    Suspension   [id number] Unit(s) Oral (19 @ 10:16)   [id number] Unit(s) Oral (19 @ 17:06)   [id number] Unit(s) Oral (19 @ 12:35)   [id number] Unit(s) Oral (19 @ 04:16)   [id number] Unit(s) Oral (19 @ 23:13)   [id number] Unit(s) Oral (19 @ 17:07)   [id number] Unit(s) Oral (19 @ 12:30)   [id number] Unit(s) Oral (19 @ 05:44)   [id number] Unit(s) Oral (19 @ 23:09)   [id number] Unit(s) Oral (19 @ 17:31)   [id number] Unit(s) Oral (19 @ 12:48)   [id number] Unit(s) Oral (19 @ 07:40)   [id number] Unit(s) Oral (19 @ 00:42)   [id number] Unit(s) Oral (19 @ 21:42)   [id number] Unit(s) Oral (19 @ 12:58)   [id number] Unit(s) Oral (19 @ 05:21)   [id number] Unit(s) Oral (19 @ 02:00)   [id number] Unit(s) Oral (19 @ 18:39)   [id number] Unit(s) Oral (19 @ 13:14)   [id number] Unit(s) Oral (19 @ 05:25)   [id number] Unit(s) Oral (19 @ 01:34)   [id number] Unit(s) Oral (19 @ 18:00)   [id number] Unit(s) Oral (19 @ 12:09)   [id number] Unit(s) Oral (19 @ 05:04)   [id number] Unit(s) Oral (19 @ 00:27)   [id number] Unit(s) Oral (19 @ 17:32)   [id number] Unit(s) Oral (19 @ 11:44)   [id number] Unit(s) Oral (19 @ 05:59)   [id number] Unit(s) Oral (19 @ 23:20)   [id number] Unit(s) Oral (19 @ 18:25)   [id number] Unit(s) Oral (19 @ 06:46)   [id number] Unit(s) Oral (19 @ 23:20)   [id number] Unit(s) Oral (19 @ 17:47)   [id number] Unit(s) Oral (19 @ 11:49)   647919 Unit(s) Oral (19 @ 05:26)   [id number] Unit(s) Oral (19 @ 21:59)   [id number] Unit(s) Oral (19 @ 17:50)   [id number] Unit(s) Oral (19 @ 12:04)   [id number] Unit(s) Oral (19 @ 05:28)   [id number] Unit(s) Oral (19 @ 23:25)   [id number] Unit(s) Oral (19 @ 17:33)   [id number] Unit(s) Oral (19 @ 13:43)   [id number] Unit(s) Oral (19 @ 05:10)   [id number] Unit(s) Oral (19 @ 00:34)   [id number] Unit(s) Oral (19 @ 17:07)   [id number] Unit(s) Oral (19 @ 12:39)   [id number] Unit(s) Oral (19 @ 05:01)   [id number] Unit(s) Oral (19 @ 23:37)   [id number] Unit(s) Oral (19 @ 17:38)   [id number] Unit(s) Oral (19 @ 13:03)   [id number] Unit(s) Oral (19 @ 05:01)   [id number] Unit(s) Oral (19 @ 22:48)    nystatin    Suspension   [id number] Unit(s) Swish and Swallow (19 @ 06:07)   [id number] Unit(s) Swish and Swallow (08-15-19 @ 18:15)    piperacillin/tazobactam IVPB..   25 mL/Hr IV Intermittent (19 @ 06:51)   25 mL/Hr IV Intermittent (19 @ 23:15)   25 mL/Hr IV Intermittent (19 @ 15:37)   25 mL/Hr IV Intermittent (19 @ 06:12)   25 mL/Hr IV Intermittent (19 @ 23:19)   25 mL/Hr IV Intermittent (19 @ 14:28)   25 mL/Hr IV Intermittent (19 @ 08:38)   25 mL/Hr IV Intermittent (19 @ 22:31)   25 mL/Hr IV Intermittent (19 @ 14:36)   25 mL/Hr IV Intermittent (19 @ 06:39)   25 mL/Hr IV Intermittent (19 @ 22:29)   25 mL/Hr IV Intermittent (19 @ 13:18)   25 mL/Hr IV Intermittent (19 @ 05:07)   25 mL/Hr IV Intermittent (19 @ 21:14)   25 mL/Hr IV Intermittent (19 @ 13:53)   25 mL/Hr IV Intermittent (19 @ 05:11)   25 mL/Hr IV Intermittent (19 @ 21:36)    vancomycin  IVPB   250 mL/Hr IV Intermittent (19 @ 08:50)   250 mL/Hr IV Intermittent (19 @ 17:39)   250 mL/Hr IV Intermittent (19 @ 05:05)   250 mL/Hr IV Intermittent (19 @ 17:35)      ANTIMICROBIALS:    piperacillin/tazobactam IVPB.. 3.375 every 8 hours    OTHER MEDS: MEDICATIONS  (STANDING):  acetaminophen   Tablet .. 650 every 6 hours PRN  ALBUTerol    0.083% 2.5 every 6 hours  ALPRAZolam 0.5 two times a day PRN  apixaban 2.5 every 12 hours  atorvastatin 40 <User Schedule>  buDESOnide    Inhalation Suspension 0.25 two times a day  carbidopa/levodopa  25/100 2 three times a day  dextrose 40% Gel 15 once PRN  dextrose 50% Injectable 12.5 once  dextrose 50% Injectable 25 once  dextrose 50% Injectable 25 once  furosemide    Tablet 20 daily  gabapentin   Solution 300 at bedtime  glucagon  Injectable 1 once PRN  guaiFENesin    Syrup 200 every 6 hours  insulin lispro (HumaLOG) corrective regimen sliding scale  every 6 hours  metoprolol tartrate 75 every 12 hours  pantoprazole  Injectable 40 daily  predniSONE   Tablet 10 daily  sodium chloride 3%  Inhalation 3 every 12 hours      REVIEW OF SYSTEMS  [  ] ROS unobtainable because:    [ x ] All other systems negative except as noted below:	    Constitutional:  [ ] fever [ ] chills  [ ] weight loss  [ ] weakness  Skin:  [ ] rash [ ] phlebitis	  Eyes: [ ] icterus [ ] pain  [ ] discharge	  ENMT: [ ] sore throat  [ ] thrush [ ] ulcers [ ] exudates  Respiratory: [ ] dyspnea [ ] hemoptysis [x ] cough [ x] sputum	  Cardiovascular:  [ ] chest pain [ ] palpitations [ ] edema	  Gastrointestinal:  [ ] nausea [ ] vomiting [ ] diarrhea [ ] constipation [ ] pain	  Genitourinary:  [ ] dysuria [ ] frequency [ ] hematuria [ ] discharge [ ] flank pain  [ ] incontinence  Musculoskeletal:  [ ] myalgias [ ] arthralgias [ ] arthritis  [ ] back pain  Neurological:  [ ] headache [ ] seizures  [ ] confusion/altered mental status  Psychiatric:  [ ] anxiety [ ] depression	  Hematology/Lymphatics:  [ ] lymphadenopathy  Endocrine:  [ ] adrenal [ ] thyroid  Allergic/Immunologic:	 [ ] transplant [ ] seasonal    Vital Signs Last 24 Hrs  T(F): 97.4 (19 @ 06:28), Max: 99.7 (19 @ 14:28)  Vital Signs Last 24 Hrs  HR: 67 (19 @ 06:28) (62 - 88)  BP: 91/52 (19 @ 06:28) (91/52 - 125/77)  RR: 18 (19 @ 06:28)  SpO2: 96% (19 @ 06:28) (94% - 100%)  Wt(kg): --    PHYSICAL EXAM:  General: NAD, sitting comfortably on bed  HEAD/EYES: NCAT, anicteric, EOMI  ENT:  supple  Cardiovascular:  + S1, S2  Respiratory: diffuse rhonchi and scattered wheezes present b/l in anterior and posterior lung fields  GI:  soft, non-tender, normal bowel sounds  :  no suprapubic tenderness  Musculoskeletal:  5/5 strength of lower extremities  Neurologic:  grossly non-focal  Skin:  no visible rashes, ecchymoses present b/l on hands and arms  Psychiatric:  appropriate affect  Vascular:  no phlebitis, no edema of lower extremities                            10.9   11.0  )-----------( 233      ( 04 Sep 2019 09:54 )             33.4         137  |  95<L>  |  16  ----------------------------<  114<H>  2.8<LL>   |  26  |  1.19    Ca    9.9      05 Sep 2019 07:15      MICROBIOLOGY:Vancomycin Level, Trough: 14.8 ( @ 06:25)  Vancomycin Level, Trough: 22.2 ( @ 06:55)    Culture - Blood (collected 01 Sep 2019 09:14)  Source: .Blood  Preliminary Report (02 Sep 2019 10:01):    No growth to date.    Culture - Blood (collected 01 Sep 2019 03:50)  Source: .Blood  Gram Stain (02 Sep 2019 05:23):    Growth in anaerobic bottle: Gram Positive Cocci in Clusters  Final Report (03 Sep 2019 12:49):    Growth in anaerobic bottle: Staphylococcus aureus    See previous culture [id number]          RADIOLOGY:    imaging below personally reviewed Patient is a 85y old  Female who presents with a chief complaint of Abdominal pain (05 Sep 2019 07:34)    HPI:  85 F with GERD, HTN, HLD, DM2 with neuropathy, Parkinson's, anxiety/depression, AS s/p TAVR on plavix, overactive bladder, ?CHF (on lasix 20 BID), PMR on Prednisone, ?COPD (dx with PNA at [facility] [place] vague hx of being told she has COPD given breathing treatments but then stopped taking them), presenting with multiple medical complaints, but especially LUQ and flank pain since yesterday AM. Per son, patient has had multiple UTIs. She had dysuria 2 weeks ago, was empirically treated with macrobid (8 days ago). Patient was febrile at home prior to coming and also in ED. Patient later denied abdominal pain and stated she came to ED for SOB. She's not on oxygen at home, but now requiring 1.5 L NC.  	Patient's son notes she had R sided headache for 1 week. ESR last week was wnl and prednisone dose was further decreased. This week, ESR was elevated again. Patient's PCP was c/f [facility] and prednisone dose was increased to 10mg qD. Patient states headache is sharp and stabbing, lasts 5 minutes and has improved since increasing prednisone. Patient has decreased vision on R eye d/t macular degeneration. No further worsening in vision. She had temporal artery bx in 2019, which was negative for [facility].  Patient also reports increased non-productive cough. Patient intermittently reports blood in stools to other providers, but recently denied it.  Vitals in ED were: febrile to 101.4F, HR 60-90s, /60s-150/80s, satting 100% on 1.5 L. VBG showed normal pH and pCO2 of 58. CXR shows concern for pna, and UA is positive for leuk esterase. Patient was given albuterol, azithromycin and CTX in the ED.  EKG showed LBBB. (11 Aug 2019 05:14)      Patient seen at bedside this morning. 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 This morning, patient in no pain with no acute events overnight. She continues to have dyspnea on 5L O2 NC with productive cough (yellow sputum). Sputum production is less today. Throughout our conversation, patient periodically desaturates to ~70-80%. Patient denies any nausea, vomiting, chest pain, palpations, abdominal pain, neck pain, back pain, dysuria, or diarrhea.          prior hospital charts reviewed [ x ]  primary team notes reviewed [ x ]  other consultant notes reviewed [ x ]  PAST MEDICAL & SURGICAL HISTORY:  Insomnia  Iron deficiency anemia  Anxiety  Parkinson disease  Aortic stenosis  Neuropathy  Polymyalgia rheumatica  Diabetes  Hyperlipidemia  Hypertension  S/P TAVR (transcatheter aortic valve replacement)  Back surgery for scoliosis  Left knee replacement surgery    Allergies  penicillin (Unknown)    FAMILY HISTORY: Heart dz. Mom  at 88 years old, Dad  at 68 years old.    SOCIAL HISTORY: Previously smoked tobacco 1 pack/week for 5 years, alcohol socially, no recent travel, previously living in rehab facility.      ANTIMICROBIALS (past 90 days)  MEDICATIONS  (STANDING):    azithromycin  IVPB   250 mL/Hr IV Intermittent (08-10-19 @ 21:55)    cefTRIAXone   IVPB   100 mL/Hr IV Intermittent (19 @ 23:43)   100 mL/Hr IV Intermittent (19 @ 00:08)   100 mL/Hr IV Intermittent (19 @ 23:45)   100 mL/Hr IV Intermittent (19 @ 00:11)    cefTRIAXone   IVPB   100 mL/Hr IV Intermittent (08-10-19 @ 20:49)    cefTRIAXone   IVPB   100 mL/Hr IV Intermittent (19 @ 23:35)   100 mL/Hr IV Intermittent (19 @ 22:50)   100 mL/Hr IV Intermittent (19 @ 22:48)   100 mL/Hr IV Intermittent (08-15-19 @ 22:55)    fluconAZOLE IVPB   50 mL/Hr IV Intermittent (08-15-19 @ 22:02)    fluconAZOLE IVPB   50 mL/Hr IV Intermittent (19 @ 21:40)   50 mL/Hr IV Intermittent (19 @ 21:46)   50 mL/Hr IV Intermittent (19 @ 21:31)   50 mL/Hr IV Intermittent (19 @ 21:32)   50 mL/Hr IV Intermittent (19 @ 20:39)   50 mL/Hr IV Intermittent (19 @ 21:09)   50 mL/Hr IV Intermittent (19 @ 20:49)   50 mL/Hr IV Intermittent (19 @ 21:19)   50 mL/Hr IV Intermittent (19 @ 20:44)   50 mL/Hr IV Intermittent (19 @ 20:19)   50 mL/Hr IV Intermittent (19 @ 20:38)    fluconAZOLE IVPB   50 mL/Hr IV Intermittent (19 @ 21:37)   50 mL/Hr IV Intermittent (19 @ 22:34)    metroNIDAZOLE  IVPB   100 mL/Hr IV Intermittent (19 @ 16:52)    metroNIDAZOLE  IVPB   100 mL/Hr IV Intermittent (19 @ 05:26)   100 mL/Hr IV Intermittent (19 @ 21:59)   100 mL/Hr IV Intermittent (19 @ 13:19)   100 mL/Hr IV Intermittent (19 @ 05:29)   100 mL/Hr IV Intermittent (19 @ 23:26)   100 mL/Hr IV Intermittent (19 @ 13:48)   100 mL/Hr IV Intermittent (19 @ 05:12)   100 mL/Hr IV Intermittent (19 @ 00:32)   100 mL/Hr IV Intermittent (19 @ 13:03)   100 mL/Hr IV Intermittent (19 @ 05:02)   100 mL/Hr IV Intermittent (19 @ 23:37)    nystatin    Suspension   [id number] Unit(s) Oral (08-15-19 @ 14:12)   [id number] Unit(s) Oral (19 @ 23:42)    nystatin    Suspension   [id number] Unit(s) Oral (19 @ 10:16)   [id number] Unit(s) Oral (19 @ 17:06)   [id number] Unit(s) Oral (19 @ 12:35)   [id number] Unit(s) Oral (19 @ 04:16)   [id number] Unit(s) Oral (19 @ 23:13)   [id number] Unit(s) Oral (19 @ 17:07)   [id number] Unit(s) Oral (19 @ 12:30)   [id number] Unit(s) Oral (19 @ 05:44)   [id number] Unit(s) Oral (19 @ 23:09)   [id number] Unit(s) Oral (19 @ 17:31)   [id number] Unit(s) Oral (19 @ 12:48)   [id number] Unit(s) Oral (19 @ 07:40)   [id number] Unit(s) Oral (19 @ 00:42)   [id number] Unit(s) Oral (19 @ 21:42)   [id number] Unit(s) Oral (19 @ 12:58)   [id number] Unit(s) Oral (19 @ 05:21)   [id number] Unit(s) Oral (19 @ 02:00)   [id number] Unit(s) Oral (19 @ 18:39)   [id number] Unit(s) Oral (19 @ 13:14)   [id number] Unit(s) Oral (19 @ 05:25)   [id number] Unit(s) Oral (19 @ 01:34)   [id number] Unit(s) Oral (19 @ 18:00)   [id number] Unit(s) Oral (19 @ 12:09)   [id number] Unit(s) Oral (19 @ 05:04)   [id number] Unit(s) Oral (19 @ 00:27)   [id number] Unit(s) Oral (19 @ 17:32)   [id number] Unit(s) Oral (19 @ 11:44)   [id number] Unit(s) Oral (19 @ 05:59)   [id number] Unit(s) Oral (19 @ 23:20)   [id number] Unit(s) Oral (19 @ 18:25)   [id number] Unit(s) Oral (19 @ 06:46)   [id number] Unit(s) Oral (19 @ 23:20)   [id number] Unit(s) Oral (19 @ 17:47)   [id number] Unit(s) Oral (19 @ 11:49)   [id number] Unit(s) Oral (19 @ 05:26)   [id number] Unit(s) Oral (19 @ 21:59)   [id number] Unit(s) Oral (19 @ 17:50)   [id number] Unit(s) Oral (19 @ 12:04)   [id number] Unit(s) Oral (19 @ 05:28)   [id number] Unit(s) Oral (19 @ 23:25)   [id number] Unit(s) Oral (19 @ 17:33)   [id number] Unit(s) Oral (19 @ 13:43)   [id number] Unit(s) Oral (19 @ 05:10)   [id number] Unit(s) Oral (19 @ 00:34)   [id number] Unit(s) Oral (19 @ 17:07)   [id number] Unit(s) Oral (19 @ 12:39)   [id number] Unit(s) Oral (19 @ 05:01)   [id number] Unit(s) Oral (19 @ 23:37)   [id number] Unit(s) Oral (19 @ 17:38)   [id number] Unit(s) Oral (19 @ 13:03)   [id number] Unit(s) Oral (19 @ 05:01)   [id number] Unit(s) Oral (19 @ 22:48)    nystatin    Suspension   [id number] Unit(s) Swish and Swallow (19 @ 06:07)   [id number] Unit(s) Swish and Swallow (08-15-19 @ 18:15)    piperacillin/tazobactam IVPB..   25 mL/Hr IV Intermittent (19 @ 06:51)   25 mL/Hr IV Intermittent (19 @ 23:15)   25 mL/Hr IV Intermittent (19 @ 15:37)   25 mL/Hr IV Intermittent (19 @ 06:12)   25 mL/Hr IV Intermittent (19 @ 23:19)   25 mL/Hr IV Intermittent (19 @ 14:28)   25 mL/Hr IV Intermittent (19 @ 08:38)   25 mL/Hr IV Intermittent (19 @ 22:31)   25 mL/Hr IV Intermittent (19 @ 14:36)   25 mL/Hr IV Intermittent (19 @ 06:39)   25 mL/Hr IV Intermittent (19 @ 22:29)   25 mL/Hr IV Intermittent (19 @ 13:18)   25 mL/Hr IV Intermittent (19 @ 05:07)   25 mL/Hr IV Intermittent (19 @ 21:14)   25 mL/Hr IV Intermittent (19 @ 13:53)   25 mL/Hr IV Intermittent (19 @ 05:11)   25 mL/Hr IV Intermittent (19 @ 21:36)    vancomycin  IVPB   250 mL/Hr IV Intermittent (19 @ 08:50)   250 mL/Hr IV Intermittent (19 @ 17:39)   250 mL/Hr IV Intermittent (19 @ 05:05)   250 mL/Hr IV Intermittent (19 @ 17:35)      ANTIMICROBIALS:    piperacillin/tazobactam IVPB.. 3.375 every 8 hours    OTHER MEDS: MEDICATIONS  (STANDING):  acetaminophen   Tablet .. 650 every 6 hours PRN  ALBUTerol    0.083% 2.5 every 6 hours  ALPRAZolam 0.5 two times a day PRN  apixaban 2.5 every 12 hours  atorvastatin 40 <User Schedule>  buDESOnide    Inhalation Suspension 0.25 two times a day  carbidopa/levodopa  25/100 2 three times a day  dextrose 40% Gel 15 once PRN  dextrose 50% Injectable 12.5 once  dextrose 50% Injectable 25 once  dextrose 50% Injectable 25 once  furosemide    Tablet 20 daily  gabapentin   Solution 300 at bedtime  glucagon  Injectable 1 once PRN  guaiFENesin    Syrup 200 every 6 hours  insulin lispro (HumaLOG) corrective regimen sliding scale  every 6 hours  metoprolol tartrate 75 every 12 hours  pantoprazole  Injectable 40 daily  predniSONE   Tablet 10 daily  sodium chloride 3%  Inhalation 3 every 12 hours      REVIEW OF SYSTEMS  [  ] ROS unobtainable because:    [ x ] All other systems negative except as noted below:	    Constitutional:  [ ] fever [ ] chills  [ ] weight loss  [ ] weakness  Skin:  [ ] rash [ ] phlebitis	  Eyes: [ ] icterus [ ] pain  [ ] discharge	  ENMT: [ ] sore throat  [ ] thrush [ ] ulcers [ ] exudates  Respiratory: [ ] dyspnea [ ] hemoptysis [x ] cough [ x] sputum	  Cardiovascular:  [ ] chest pain [ ] palpitations [ ] edema	  Gastrointestinal:  [ ] nausea [ ] vomiting [ ] diarrhea [ ] constipation [ ] pain	  Genitourinary:  [ ] dysuria [ ] frequency [ ] hematuria [ ] discharge [ ] flank pain  [ ] incontinence  Musculoskeletal:  [ ] myalgias [ ] arthralgias [ ] arthritis  [ ] back pain  Neurological:  [ ] headache [ ] seizures  [ ] confusion/altered mental status  Psychiatric:  [ ] anxiety [ ] depression	  Hematology/Lymphatics:  [ ] lymphadenopathy  Endocrine:  [ ] adrenal [ ] thyroid  Allergic/Immunologic:	 [ ] transplant [ ] seasonal    Vital Signs Last 24 Hrs  T(F): 97.4 (19 @ 06:28), Max: 99.7 (19 @ 14:28)  Vital Signs Last 24 Hrs  HR: 67 (19 @ 06:28) (62 - 88)  BP: 91/52 (19 @ 06:28) (91/52 - 125/77)  RR: 18 (19 @ 06:28)  SpO2: 96% (19 @ 06:28) (94% - 100%)  Wt(kg): --    PHYSICAL EXAM:  General: NAD, sitting comfortably on bed  HEAD/EYES: NCAT, anicteric, EOMI  ENT:  supple  Cardiovascular:  + S1, S2  Respiratory: diffuse rhonchi and scattered wheezes present b/l in anterior and posterior lung fields  GI:  soft, non-tender, normal bowel sounds  :  no suprapubic tenderness  Musculoskeletal:  5/5 strength of lower extremities  Neurologic:  grossly non-focal  Skin:  no visible rashes, ecchymoses present b/l on hands and arms  Psychiatric:  appropriate affect  Vascular:  no phlebitis, no edema of lower extremities                            10.9   11.0  )-----------( 233      ( 04 Sep 2019 09:54 )             33.4         137  |  95<L>  |  16  ----------------------------<  114<H>  2.8<LL>   |  26  |  1.19    Ca    9.9      05 Sep 2019 07:15      MICROBIOLOGY:Vancomycin Level, Trough: 14.8 ( @ 06:25)  Vancomycin Level, Trough: 22.2 ( @ 06:55)    Culture - Blood (collected 01 Sep 2019 09:14)  Source: .Blood  Preliminary Report (02 Sep 2019 10:01):    No growth to date.    Culture - Blood (collected 01 Sep 2019 03:50)  Source: .Blood  Gram Stain (02 Sep 2019 05:23):    Growth in anaerobic bottle: Gram Positive Cocci in Clusters  Final Report (03 Sep 2019 12:49):    Growth in anaerobic bottle: Staphylococcus aureus    See previous culture [id number]          RADIOLOGY:    imaging below personally reviewed Patient is a 85y old  Female who presents with a chief complaint of Abdominal pain (05 Sep 2019 07:34)    HPI:  85 F with GERD, HTN, HLD, DM2 with neuropathy, Parkinson's, anxiety/depression, AS s/p TAVR on plavix, overactive bladder, ?CHF (on lasix 20 BID), PMR on Prednisone, ?COPD ([facility] with [facility]NA at [facility] vague hx of being told she has COPD given breathing treatments but then stopped taking them), presenting with multiple medical complaints, but especially LUQ and flank pain since yesterday AM. Per son, patient has had multiple UTIs. She had dysuria 2 weeks ago, was empirically treated with macrobid (8 days ago). Patient was febrile at home prior to coming and also in ED. Patient later denied abdominal pain and stated she came to ED for SOB. She's not on oxygen at home, but now requiring 1.5 L NC.  	Patient's son notes she had R sided headache for 1 week. ESR last week was wnl and prednisone dose was further decreased. This week, ESR was elevated again. Patient's PCP was c/f [facility] and prednisone dose was increased to 10mg qD. Patient states headache is sharp and stabbing, lasts 5 minutes and has improved since increasing prednisone. Patient has decreased vision on R eye d/t macular degeneration. No further worsening in vision. She had temporal artery bx in 2019, which was negative for [facility].  Patient also reports increased non-productive cough. Patient intermittently reports blood in stools to other providers, but recently denied it.  Vitals in ED were: febrile to 101.4F, HR 60-90s, /60s-150/80s, satting 100% on 1.5 L. VBG showed normal pH and pCO2 of 58. CXR shows concern for pna, and UA is positive for leuk esterase. Patient was given albuterol, azithromycin and CTX in the ED.  EKG showed LBBB. (11 Aug 2019 05:14)    Patient seen at bedside this morning. 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 This morning, patient in no pain with no acute events overnight. She continues to have dyspnea on 5L O2 NC with productive cough (yellow sputum). Sputum production is less today. Throughout our conversation, patient periodically desaturates to ~70-80%. Patient denies any nausea, vomiting, chest pain, palpations, abdominal pain, neck pain, back pain, dysuria, or diarrhea.          prior hospital charts reviewed [ x ]  primary team notes reviewed [ x ]  other consultant notes reviewed [ x ]  PAST MEDICAL & SURGICAL HISTORY:  Insomnia  Iron deficiency anemia  Anxiety  Parkinson disease  Aortic stenosis  Neuropathy  Polymyalgia rheumatica  Diabetes  Hyperlipidemia  Hypertension  S/P TAVR (transcatheter aortic valve replacement)  Back surgery for scoliosis  Left knee replacement surgery    Allergies  penicillin (Unknown)    FAMILY HISTORY: Heart dz. Mom  at 88 years old, Dad  at 68 years old.    SOCIAL HISTORY: Previously smoked tobacco 1 pack/week for 5 years, alcohol socially, no recent travel, previously living in rehab facility.      ANTIMICROBIALS (past 90 days)  MEDICATIONS  (STANDING):    azithromycin  IVPB   250 mL/Hr IV Intermittent (08-10-19 @ 21:55)    cefTRIAXone   IVPB   100 mL/Hr IV Intermittent (19 @ 23:43)   100 mL/Hr IV Intermittent (19 @ 00:08)   100 mL/Hr IV Intermittent (19 @ 23:45)   100 mL/Hr IV Intermittent (19 @ 00:11)    cefTRIAXone   IVPB   100 mL/Hr IV Intermittent (08-10-19 @ 20:49)    cefTRIAXone   IVPB   100 mL/Hr IV Intermittent (19 @ 23:35)   100 mL/Hr IV Intermittent (19 @ 22:50)   100 mL/Hr IV Intermittent (19 @ 22:48)   100 mL/Hr IV Intermittent (08-15-19 @ 22:55)    fluconAZOLE IVPB   50 mL/Hr IV Intermittent (08-15-19 @ 22:02)    fluconAZOLE IVPB   50 mL/Hr IV Intermittent (19 @ 21:40)   50 mL/Hr IV Intermittent (19 @ 21:46)   50 mL/Hr IV Intermittent (19 @ 21:31)   50 mL/Hr IV Intermittent (19 @ 21:32)   50 mL/Hr IV Intermittent (19 @ 20:39)   50 mL/Hr IV Intermittent (19 @ 21:09)   50 mL/Hr IV Intermittent (19 @ 20:49)   50 mL/Hr IV Intermittent (19 @ 21:19)   50 mL/Hr IV Intermittent (19 @ 20:44)   50 mL/Hr IV Intermittent (19 @ 20:19)   50 mL/Hr IV Intermittent (19 @ 20:38)    fluconAZOLE IVPB   50 mL/Hr IV Intermittent (19 @ 21:37)   50 mL/Hr IV Intermittent (19 @ 22:34)    metroNIDAZOLE  IVPB   100 mL/Hr IV Intermittent (19 @ 16:52)    metroNIDAZOLE  IVPB   100 mL/Hr IV Intermittent (19 @ 05:26)   100 mL/Hr IV Intermittent (19 @ 21:59)   100 mL/Hr IV Intermittent (19 @ 13:19)   100 mL/Hr IV Intermittent (19 @ 05:29)   100 mL/Hr IV Intermittent (19 @ 23:26)   100 mL/Hr IV Intermittent (19 @ 13:48)   100 mL/Hr IV Intermittent (19 @ 05:12)   100 mL/Hr IV Intermittent (19 @ 00:32)   100 mL/Hr IV Intermittent (19 @ 13:03)   100 mL/Hr IV Intermittent (19 @ 05:02)   100 mL/Hr IV Intermittent (19 @ 23:37)    nystatin    Suspension   [id number] Unit(s) Oral (08-15-19 @ 14:12)   [id number] Unit(s) Oral (19 @ 23:42)    nystatin    Suspension   [id number] Unit(s) Oral (19 @ 10:16)   623201 Unit(s) Oral (19 @ 17:06)   [id number] Unit(s) Oral (19 @ 12:35)   [id number] Unit(s) Oral (19 @ 04:16)   [id number] Unit(s) Oral (19 @ 23:13)   775070 Unit(s) Oral (19 @ 17:07)   862024 Unit(s) Oral (19 @ 12:30)   399883 Unit(s) Oral (19 @ 05:44)   [id number] Unit(s) Oral (19 @ 23:09)   308928 Unit(s) Oral (19 @ 17:31)   [id number] Unit(s) Oral (19 @ 12:48)   [id number] Unit(s) Oral (19 @ 07:40)   [id number] Unit(s) Oral (19 @ 00:42)   [id number] Unit(s) Oral (19 @ 21:42)   [id number] Unit(s) Oral (19 @ 12:58)   [id number] Unit(s) Oral (19 @ 05:21)   [id number] Unit(s) Oral (19 @ 02:00)   952220 Unit(s) Oral (19 @ 18:39)   [id number] Unit(s) Oral (19 @ 13:14)   314390 Unit(s) Oral (19 @ 05:25)   [id number] Unit(s) Oral (19 @ 01:34)   956816 Unit(s) Oral (19 @ 18:00)   141390 Unit(s) Oral (19 @ 12:09)   441349 Unit(s) Oral (19 @ 05:04)   [id number] Unit(s) Oral (19 @ 00:27)   [id number] Unit(s) Oral (19 @ 17:32)   865513 Unit(s) Oral (19 @ 11:44)   623959 Unit(s) Oral (19 @ 05:59)   692306 Unit(s) Oral (19 @ 23:20)   223502 Unit(s) Oral (19 @ 18:25)   173820 Unit(s) Oral (19 @ 06:46)   442218 Unit(s) Oral (19 @ 23:20)   [id number] Unit(s) Oral (19 @ 17:47)   162774 Unit(s) Oral (19 @ 11:49)   801612 Unit(s) Oral (19 @ 05:26)   [id number] Unit(s) Oral (19 @ 21:59)   902806 Unit(s) Oral (19 @ 17:50)   442483 Unit(s) Oral (19 @ 12:04)   [id number] Unit(s) Oral (19 @ 05:28)   489952 Unit(s) Oral (19 @ 23:25)   [id number] Unit(s) Oral (19 @ 17:33)   623954 Unit(s) Oral (19 @ 13:43)   734455 Unit(s) Oral (19 @ 05:10)   221743 Unit(s) Oral (19 @ 00:34)   738916 Unit(s) Oral (19 @ 17:07)   116314 Unit(s) Oral (19 @ 12:39)   265428 Unit(s) Oral (19 @ 05:01)   [id number] Unit(s) Oral (19 @ 23:37)   [id number] Unit(s) Oral (19 @ 17:38)   593673 Unit(s) Oral (19 @ 13:03)   [id number] Unit(s) Oral (19 @ 05:01)   [id number] Unit(s) Oral (19 @ 22:48)    nystatin    Suspension   667776 Unit(s) Swish and Swallow (19 @ 06:07)   468636 Unit(s) Swish and Swallow (08-15-19 @ 18:15)    piperacillin/tazobactam IVPB..   25 mL/Hr IV Intermittent (19 @ 06:51)   25 mL/Hr IV Intermittent (19 @ 23:15)   25 mL/Hr IV Intermittent (19 @ 15:37)   25 mL/Hr IV Intermittent (19 @ 06:12)   25 mL/Hr IV Intermittent (19 @ 23:19)   25 mL/Hr IV Intermittent (19 @ 14:28)   25 mL/Hr IV Intermittent (19 @ 08:38)   25 mL/Hr IV Intermittent (19 @ 22:31)   25 mL/Hr IV Intermittent (19 @ 14:36)   25 mL/Hr IV Intermittent (19 @ 06:39)   25 mL/Hr IV Intermittent (19 @ 22:29)   25 mL/Hr IV Intermittent (19 @ 13:18)   25 mL/Hr IV Intermittent (19 @ 05:07)   25 mL/Hr IV Intermittent (19 @ 21:14)   25 mL/Hr IV Intermittent (19 @ 13:53)   25 mL/Hr IV Intermittent (19 @ 05:11)   25 mL/Hr IV Intermittent (19 @ 21:36)    vancomycin  IVPB   250 mL/Hr IV Intermittent (19 @ 08:50)   250 mL/Hr IV Intermittent (19 @ 17:39)   250 mL/Hr IV Intermittent (19 @ 05:05)   250 mL/Hr IV Intermittent (19 @ 17:35)      ANTIMICROBIALS:    piperacillin/tazobactam IVPB.. 3.375 every 8 hours    OTHER MEDS: MEDICATIONS  (STANDING):  acetaminophen   Tablet .. 650 every 6 hours PRN  ALBUTerol    0.083% 2.5 every 6 hours  ALPRAZolam 0.5 two times a day PRN  apixaban 2.5 every 12 hours  atorvastatin 40 <User Schedule>  buDESOnide    Inhalation Suspension 0.25 two times a day  carbidopa/levodopa  25/100 2 three times a day  dextrose 40% Gel 15 once PRN  dextrose 50% Injectable 12.5 once  dextrose 50% Injectable 25 once  dextrose 50% Injectable 25 once  furosemide    Tablet 20 daily  gabapentin   Solution 300 at bedtime  glucagon  Injectable 1 once PRN  guaiFENesin    Syrup 200 every 6 hours  insulin lispro (HumaLOG) corrective regimen sliding scale  every 6 hours  metoprolol tartrate 75 every 12 hours  pantoprazole  Injectable 40 daily  predniSONE   Tablet 10 daily  sodium chloride 3%  Inhalation 3 every 12 hours      REVIEW OF SYSTEMS  [  ] ROS unobtainable because:    [ x ] All other systems negative except as noted below:	    Constitutional:  [ ] fever [ ] chills  [ ] weight loss  [ ] weakness  Skin:  [ ] rash [ ] phlebitis	  Eyes: [ ] icterus [ ] pain  [ ] discharge	  ENMT: [ ] sore throat  [ ] thrush [ ] ulcers [ ] exudates  Respiratory: [ ] dyspnea [ ] hemoptysis [x ] cough [ x] sputum	  Cardiovascular:  [ ] chest pain [ ] palpitations [ ] edema	  Gastrointestinal:  [ ] nausea [ ] vomiting [ ] diarrhea [ ] constipation [ ] pain	  Genitourinary:  [ ] dysuria [ ] frequency [ ] hematuria [ ] discharge [ ] flank pain  [ ] incontinence  Musculoskeletal:  [ ] myalgias [ ] arthralgias [ ] arthritis  [ ] back pain  Neurological:  [ ] headache [ ] seizures  [ ] confusion/altered mental status  Psychiatric:  [ ] anxiety [ ] depression	  Hematology/Lymphatics:  [ ] lymphadenopathy  Endocrine:  [ ] adrenal [ ] thyroid  Allergic/Immunologic:	 [ ] transplant [ ] seasonal    Vital Signs Last 24 Hrs  T(F): 97.4 (19 @ 06:28), Max: 99.7 (19 @ 14:28)  Vital Signs Last 24 Hrs  HR: 67 (19 @ 06:28) (62 - 88)  BP: 91/52 (19 @ 06:28) (91/52 - 125/77)  RR: 18 (19 @ 06:28)  SpO2: 96% (19 @ 06:28) (94% - 100%)  Wt(kg): --    PHYSICAL EXAM:  General: NAD, sitting comfortably on bed  HEAD/EYES: NCAT, anicteric, EOMI  ENT:  supple  Cardiovascular:  + S1, S2  Respiratory: diffuse rhonchi and scattered wheezes present b/l in anterior and posterior lung fields  GI:  soft, non-tender, normal bowel sounds  :  no suprapubic tenderness  Musculoskeletal:  5/5 strength of lower extremities  Neurologic:  grossly non-focal  Skin:  no visible rashes, ecchymoses present b/l on hands and arms  Psychiatric:  appropriate affect  Vascular:  no phlebitis, no edema of lower extremities                            10.9   11.0  )-----------( 233      ( 04 Sep 2019 09:54 )             33.4         137  |  95<L>  |  16  ----------------------------<  114<H>  2.8<LL>   |  26  |  1.19    Ca    9.9      05 Sep 2019 07:15      MICROBIOLOGY:Vancomycin Level, Trough: 14.8 ( @ 06:25)  Vancomycin Level, Trough: 22.2 ( @ 06:55)    Culture - Blood (collected 01 Sep 2019 09:14)  Source: .Blood  Preliminary Report (02 Sep 2019 10:01):    No growth to date.    Culture - Blood (collected 01 Sep 2019 03:50)  Source: .Blood  Gram Stain (02 Sep 2019 05:23):    Growth in anaerobic bottle: Gram Positive Cocci in Clusters  Final Report (03 Sep 2019 12:49):    Growth in anaerobic bottle: Staphylococcus aureus    See previous culture [id number]          RADIOLOGY:    imaging below personally reviewed

## 2019-09-05 NOTE — PROGRESS NOTE ADULT - ASSESSMENT
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, Parkinson-like symptoms, anxiety/depression, AS s/p TAVR on plavix, overactive bladder, HFpEF with severe Mitral Stenosis, PMR (on chronic steroids), COPD (no formal diagnosis) presented 8/11/19 with SOB and abdominal pain, admitted for acute hypoxic and hypercarbic respiratory failure in setting of pneumonia and COPD exacerbation, course c/b UTI, acute metabolic encephalopathy, new onset afib and dysphagia, now s/p RRT and  MICU stay for increased work of breathing concerning for aspiration pneumonia, now DNR/DNI, GI re-consulted for PEG tube placement      **Recs to follow** 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, Parkinson-like symptoms, anxiety/depression, AS s/p TAVR on plavix, overactive bladder, HFpEF with severe Mitral Stenosis, PMR (on chronic steroids), COPD (no formal diagnosis) presented 8/11/19 with SOB and abdominal pain, admitted for acute hypoxic and hypercarbic respiratory failure in setting of pneumonia and COPD exacerbation, course c/b UTI, acute metabolic encephalopathy, new onset afib and dysphagia, now s/p RRT and  MICU stay for increased work of breathing concerning for aspiration pneumonia, now DNR/DNI, GI re-consulted for PEG tube placement      - Will consider EGD/PEG placement pending discussion with pt/family      ***ATTENDING ATTESTATION TO FOLLOW*** 84yo F with GERD, HTN, HLD, 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, Parkinson-like symptoms, anxiety/depression, AS s/p TAVR on plavix, overactive bladder, HFpEF with severe Mitral Stenosis, PMR (on chronic steroids), COPD (no formal diagnosis) presented 8/11/19 with SOB and abdominal pain, admitted for acute hypoxic and hypercarbic respiratory failure in setting of pneumonia and COPD exacerbation, course c/b UTI, acute metabolic encephalopathy, new onset afib and dysphagia, now s/p RRT and  MICU stay for increased work of breathing concerning for aspiration pneumonia, now DNR/DNI, GI re-consulted for PEG tube placement      - Will consider EGD/PEG placement pending discussion with pt/family, attending physician      ***ATTENDING ATTESTATION TO FOLLOW*** 84yo F with GERD, HTN, HLD, 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, Parkinson-like symptoms, anxiety/depression, AS s/p TAVR on plavix, overactive bladder, HFpEF with severe Mitral Stenosis, PMR (on chronic steroids), COPD (no formal diagnosis) presented 8/11/19 with SOB and abdominal pain, admitted for acute hypoxic and hypercarbic respiratory failure in setting of pneumonia and COPD exacerbation, course c/b UTI, acute metabolic encephalopathy, new onset afib and dysphagia, now s/p RRT and  MICU stay for increased work of breathing concerning for aspiration pneumonia, now DNR/DNI, GI re-consulted for PEG tube placement      - f/u patient preference for GI provider to place PEG, as discussed with family/PCP  - tentative plan for peg for tuesday, as long as eliquis held for 72 hours prior to procedure; and repeat blood cultures negative x 48 hours  - will need to hold eliquis 72 hours prior to PEG placement ; follow up with cards for clearance  - NGT placement as per primary team    ***ATTENDING ATTESTATION TO FOLLOW***

## 2019-09-05 NOTE — CHART NOTE - NSCHARTNOTEFT_GEN_A_CORE
Called by primary team for need of NGT placement.  Reportedly the patient had had a feeding tube earlier in this admission which had unfortunately been pulled out. The primary team had tried placing a Koffi feeding tube x2 and a NGT x1 without success.    Patient seen and examined. She is awake and alert. Family at the bedside. 14Fr nasogastric [place] sump tube placed without difficulty by Surgery PGY3 and PGY2 residents. Patient tolerated the procedure well. Air bubbles audible by auscultation over the stomach when pushed through NGT tubing. Patient phonating and breathing well after the procedure.    Recommend CXR to confirm NGT positioning in the stomach before starting feeds.

## 2019-09-05 NOTE — PROGRESS NOTE ADULT - PROBLEM SELECTOR PLAN 6
- new onset Afib this admission  - patient refusing IV heparin, changed to po eliquis 2.5mg bid  - metoprolol 75 mg BID for rate control  - c/w lasix 20 mg daily - new onset Afib this admission  - Now on eliquis 2.5mg bid  - metoprolol 75 mg BID for rate control  - c/w lasix 20 mg daily - pulmonary consult appreciated, rec adventitious lung sounds likely due to vocal cord paralysis, c/w with Pulmicort, albuterol and prednisone.   - c/w home dose prednisone 10mg daily chronically for PMR/?GCA. Right temporal artery biopsy reportedly negative, but she gets right sided headaches.  - Maintain O2 sats between 88-92% - pulmonary consult appreciated, rec adventitious lung sounds likely due to vocal cord paralysis, c/w with Pulmicort, albuterol and prednisone.   - c/w home dose prednisone 10mg daily chronically for PMR/GCA. Right temporal artery biopsy reportedly negative, but she gets right sided headaches.  - Maintain O2 sats between 88-92%

## 2019-09-05 NOTE — PROGRESS NOTE ADULT - PROBLEM SELECTOR PLAN 2
likely due to aspiration PNA and MSSA bacteremia s/p extubation and MICU stay  -sputum and bcx with MSSA, c/w zosyn, stable  -repeat bcx in AM and ID consult pending bcx with MSSA; 2/2 aspiration PNA vs endocarditis  -- c/w zosyn, afebrile, stable, leukocytosis continuing to improve  -- ID as above K 2.8 this AM. Asymptomatic. Most likely 2/2 NPO status, patient intolerant of ON attempts to replete  - Replete K with IV 10mg x 3 doses at slower infusion rate, K powder 40mg x 2 doses. Also received 1/2NS + D5W + KCl at 75cc/hr  - Repeat BMP at 6PM and replete additionally if necessary with K powder K 2.8 this AM. Asymptomatic.  likely 2/2 NPO status, patient intolerant of ON attempts to replete through IV  - Replete K with IV 10mg x 3 doses at slower infusion rate, K powder 40mg x 2 doses. Also on 1/2NS + D5W + KCl at 75cc/hr  - Repeat BMP at 6PM and replete additionally if necessary   -check mag  -creatinine also uptrending, c/w gentle IVH  - surgery team to assist with NG tube

## 2019-09-05 NOTE — PROGRESS NOTE ADULT - ATTENDING COMMENTS
Reconsult for PEG  Still on a/c - will need to hold 72 hours (please clarify with cardiology this is okay)  Discussed at length with family  Tentative plan for Tuesday next week

## 2019-09-05 NOTE — PROGRESS NOTE ADULT - PROBLEM SELECTOR PLAN 8
BP stable - c/w home dose 50mg QD of losartan   - c/w amlodipine 5mg QD  - c/w metoprolol 75 BID   - montior BP, if uncontrolled may need to get IVP until NG/PEG established BP stable - c/w home dose 50mg QD of losartan   - c/w metoprolol 75 BID   - montior BP -Repeat TTE scheduled  -Outpatient CTS eval if in line with overall GOC. -Repeat TTE scheduled to r/o endocarditis  -Outpatient CTS eval if in line with overall GOC.

## 2019-09-05 NOTE — CONSULT NOTE ADULT - ATTENDING COMMENTS
I will continue to follow. Please feel free to contact me with any further questions.    Ancelmo Mendoza M.D.  [facility] Division of Infectious Disease  8AM-5PM: Pager Number [phone]  After Hours (or if no response): Please contact the Infectious Diseases Office at [phone]

## 2019-09-05 NOTE — PROVIDER CONTACT NOTE (MEDICATION) - ASSESSMENT
pt stable.
Pt stable in bed
pt AO2 no complaints of pain or discomfort at this time, no s/s distress/SOB/ palpitations  VSS, due for AM meds but ? 2/2 PEG placement scheduled for today, NPO w/ NGT

## 2019-09-05 NOTE — PROGRESS NOTE ADULT - PROBLEM SELECTOR PLAN 3
bcx with MSSA likely from aspiration PNA-  -- c/w zosyn, afebrile, stable, leukocytosis improving  --repeat bcx in AM and ID consult pending aspiration PNA  - sputum cx MSSA on 8/30, d/cuco vanc, c/w zosyn  - 02 sat on NC, wean as tolerated, no resp distress  - ID as above bcx with MSSA; 2/2 aspiration PNA vs endocarditis  -- c/w zosyn, afebrile, stable, leukocytosis continuing to improve  -- ID as above bcx with MSSA; 2/2 aspiration PNA vs unlikely endocarditis however s/p TAVR  --per ID switch to cefazolin 2gram q12 hours  - afebrile, leukocytosis unchanged, HD stable  - TTE to r/o vegitation

## 2019-09-05 NOTE — PROGRESS NOTE ADULT - PROBLEM SELECTOR PLAN 7
-Severe on TTE  -Outpatient CTS eval if in line with overall GOC. -Repeat TTE scheduled  -Outpatient CTS eval if in line with overall GOC. - new onset Afib this admission  - Now on eliquis 2.5mg bid  - metoprolol 75 mg BID for rate control  - c/w lasix 20 mg daily - new onset Afib this admission. Stable on medication.  - Now on eliquis 2.5mg bid  - metoprolol 75 mg BID for rate control  - c/w lasix 20 mg daily - new onset Afib this admission. Stable on medication.  - d/c eliquis tonight for PEG early next week, consider starting heparin ggt  - metoprolol 75 mg BID for rate control   - c/w lasix 20 mg daily

## 2019-09-05 NOTE — PROGRESS NOTE ADULT - PROBLEM SELECTOR PLAN 9
-TTE: Normal LF, Mitral stenosis, increased RV pressure and dilated Left atrium, functioning TAVR. Findings c/w valvular heart failure or heart failure with preserved EF  - lasix 20mg qd   - Strict I's/O's and daily weights -TTE: Normal LF, Mitral stenosis, increased RV pressure and dilated Left atrium, functioning TAVR. Findings c/w valvular heart failure or heart failure with preserved EF  - Rpt TTE to r/o endocarditis  - lasix 20mg qd   - Strict I's/O's and daily weights BP stable - c/w home dose 50mg QD of losartan   - c/w metoprolol 75 BID   - montior BP BP stable -  - c/w metoprolol 75 BID   - no longer on losartan  - monitor BP

## 2019-09-05 NOTE — PROGRESS NOTE ADULT - ASSESSMENT
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

## 2019-09-05 NOTE — PROGRESS NOTE ADULT - SUBJECTIVE AND OBJECTIVE BOX
Patient is a 85y old  Female who presents with a chief complaint of Abdominal pain (04 Sep 2019 13:19)      SUBJECTIVE / OVERNIGHT EVENTS: Patient with BP 91/52, asymptomatic. Started on D5W + NS + KCl.     MEDICATIONS  (STANDING):  ALBUTerol    0.083% 2.5 milliGRAM(s) Nebulizer every 6 hours  apixaban 2.5 milliGRAM(s) Oral every 12 hours  atorvastatin 40 milliGRAM(s) Oral <User Schedule>  buDESOnide    Inhalation Suspension 0.25 milliGRAM(s) Inhalation two times a day  carbidopa/levodopa  25/100 2 Tablet(s) Oral three times a day  dextrose 5% + sodium chloride 0.9% with potassium chloride 20 mEq/L 1000 milliLiter(s) (75 mL/Hr) IV Continuous <Continuous>  dextrose 5%. 1000 milliLiter(s) (50 mL/Hr) IV Continuous <Continuous>  dextrose 50% Injectable 12.5 Gram(s) IV Push once  dextrose 50% Injectable 25 Gram(s) IV Push once  dextrose 50% Injectable 25 Gram(s) IV Push once  furosemide    Tablet 20 milliGRAM(s) Oral daily  gabapentin   Solution 300 milliGRAM(s) Oral at bedtime  guaiFENesin    Syrup 200 milliGRAM(s) Oral every 6 hours  insulin lispro (HumaLOG) corrective regimen sliding scale   SubCutaneous every 6 hours  lidocaine   Patch 1 Patch Transdermal daily  metoprolol tartrate 75 milliGRAM(s) Oral every 12 hours  pantoprazole  Injectable 40 milliGRAM(s) IV Push daily  piperacillin/tazobactam IVPB.. 3.375 Gram(s) IV Intermittent every 8 hours  predniSONE   Tablet 10 milliGRAM(s) Oral daily  sodium chloride 3%  Inhalation 3 milliLiter(s) Inhalation every 12 hours    MEDICATIONS  (PRN):  acetaminophen   Tablet .. 650 milliGRAM(s) Oral every 6 hours PRN Temp greater or equal to 38C (100.4F), Mild Pain (1 - 3), Moderate Pain (4 - 6)  ALPRAZolam 0.5 milliGRAM(s) Oral two times a day PRN anxiety  dextrose 40% Gel 15 Gram(s) Oral once PRN Blood Glucose LESS THAN 70 milliGRAM(s)/deciliter  glucagon  Injectable 1 milliGRAM(s) IntraMuscular once PRN Glucose LESS THAN 70 milligrams/deciliter      Vital Signs Last 24 Hrs  T(C): 36.3 (05 Sep 2019 06:28), Max: 36.8 (04 Sep 2019 22:00)  T(F): 97.4 (05 Sep 2019 06:28), Max: 98.3 (04 Sep 2019 22:00)  HR: 67 (05 Sep 2019 06:28) (62 - 88)  BP: 91/52 (05 Sep 2019 06:28) (91/52 - 127/84)  BP(mean): --  RR: 18 (05 Sep 2019 06:28) (18 - 18)  SpO2: 96% (05 Sep 2019 06:28) (94% - 100%)  CAPILLARY BLOOD GLUCOSE      POCT Blood Glucose.: 103 mg/dL (05 Sep 2019 07:07)  POCT Blood Glucose.: 106 mg/dL (04 Sep 2019 23:58)  POCT Blood Glucose.: 138 mg/dL (04 Sep 2019 17:57)  POCT Blood Glucose.: 161 mg/dL (04 Sep 2019 12:27)  POCT Blood Glucose.: 152 mg/dL (04 Sep 2019 09:38)    I&O's Summary      PHYSICAL EXAM:  GENERAL: NAD, well-developed  HEAD:  Atraumatic, Normocephalic  EYES: EOMI, PERRLA, conjunctiva and sclera clear  NECK: Supple, No JVD  CHEST/LUNG: Clear to auscultation bilaterally; No wheeze  HEART: Regular rate and rhythm; No murmurs, rubs, or gallops  ABDOMEN: Soft, Nontender, Nondistended; Bowel sounds present  EXTREMITIES:  2+ Peripheral Pulses, No clubbing, cyanosis, or edema  PSYCH: AAOx3  NEUROLOGY: non-focal  SKIN: No rashes or lesions    LABS:                        10.9   11.0  )-----------( 233      ( 04 Sep 2019 09:54 )             33.4     09-04    138  |  97  |  10  ----------------------------<  147<H>  3.4<L>   |  22  |  0.67    Ca    9.6      04 Sep 2019 09:54      PTT - ( 04 Sep 2019 06:55 )  PTT:78.0 sec              RADIOLOGY & ADDITIONAL TESTS:    Imaging Personally Reviewed:    Consultant(s) Notes Reviewed:      Care Discussed with Consultants/Other Providers: Patient is a 85y old  Female who presents with a chief complaint of Abdominal pain (04 Sep 2019 13:19)      SUBJECTIVE / OVERNIGHT EVENTS: No overnight events. Agreeable for PEG tube now, denies cp, sob.       MEDICATIONS  (STANDING):  ALBUTerol    0.083% 2.5 milliGRAM(s) Nebulizer every 6 hours  apixaban 2.5 milliGRAM(s) Oral every 12 hours  atorvastatin 40 milliGRAM(s) Oral <User Schedule>  buDESOnide    Inhalation Suspension 0.25 milliGRAM(s) Inhalation two times a day  carbidopa/levodopa  25/100 2 Tablet(s) Oral three times a day  dextrose 5% + sodium chloride 0.9% with potassium chloride 20 mEq/L 1000 milliLiter(s) (75 mL/Hr) IV Continuous <Continuous>  dextrose 5%. 1000 milliLiter(s) (50 mL/Hr) IV Continuous <Continuous>  dextrose 50% Injectable 12.5 Gram(s) IV Push once  dextrose 50% Injectable 25 Gram(s) IV Push once  dextrose 50% Injectable 25 Gram(s) IV Push once  furosemide    Tablet 20 milliGRAM(s) Oral daily  gabapentin   Solution 300 milliGRAM(s) Oral at bedtime  guaiFENesin    Syrup 200 milliGRAM(s) Oral every 6 hours  insulin lispro (HumaLOG) corrective regimen sliding scale   SubCutaneous every 6 hours  lidocaine   Patch 1 Patch Transdermal daily  metoprolol tartrate 75 milliGRAM(s) Oral every 12 hours  pantoprazole  Injectable 40 milliGRAM(s) IV Push daily  piperacillin/tazobactam IVPB.. 3.375 Gram(s) IV Intermittent every 8 hours  predniSONE   Tablet 10 milliGRAM(s) Oral daily  sodium chloride 3%  Inhalation 3 milliLiter(s) Inhalation every 12 hours    MEDICATIONS  (PRN):  acetaminophen   Tablet .. 650 milliGRAM(s) Oral every 6 hours PRN Temp greater or equal to 38C (100.4F), Mild Pain (1 - 3), Moderate Pain (4 - 6)  ALPRAZolam 0.5 milliGRAM(s) Oral two times a day PRN anxiety  dextrose 40% Gel 15 Gram(s) Oral once PRN Blood Glucose LESS THAN 70 milliGRAM(s)/deciliter  glucagon  Injectable 1 milliGRAM(s) IntraMuscular once PRN Glucose LESS THAN 70 milligrams/deciliter      Vital Signs Last 24 Hrs  T(C): 36.3 (05 Sep 2019 06:28), Max: 36.8 (04 Sep 2019 22:00)  T(F): 97.4 (05 Sep 2019 06:28), Max: 98.3 (04 Sep 2019 22:00)  HR: 67 (05 Sep 2019 06:28) (62 - 88)  BP: 91/52 (05 Sep 2019 06:28) (91/52 - 127/84)  BP(mean): --  RR: 18 (05 Sep 2019 06:28) (18 - 18)  SpO2: 96% (05 Sep 2019 06:28) (94% - 100%)  CAPILLARY BLOOD GLUCOSE      POCT Blood Glucose.: 103 mg/dL (05 Sep 2019 07:07)  POCT Blood Glucose.: 106 mg/dL (04 Sep 2019 23:58)  POCT Blood Glucose.: 138 mg/dL (04 Sep 2019 17:57)  POCT Blood Glucose.: 161 mg/dL (04 Sep 2019 12:27)  POCT Blood Glucose.: 152 mg/dL (04 Sep 2019 09:38)    I&O's Summary      PHYSICAL EXAM:  GENERAL: NAD, resting comfortably   HEAD:  Atraumatic, Normocephalic  NECK: Supple, No JVD  CHEST/LUNG: diffuse ronchi b/l, unchanged from prior.   HEART: Irregular   ABDOMEN: Soft, Nontender, Nondistended; Bowel sounds +  EXTREMITIES:  2+ Peripheral Pulses, No clubbing, cyanosis, or edema  NEUROLOGY: awake, A&Ox3,   SKIN: No rashes or lesions              LABS:                        10.9   11.0  )-----------( 233      ( 04 Sep 2019 09:54 )             33.4     09-04    138  |  97  |  10  ----------------------------<  147<H>  3.4<L>   |  22  |  0.67    Ca    9.6      04 Sep 2019 09:54      PTT - ( 04 Sep 2019 06:55 )  PTT:78.0 sec              RADIOLOGY & ADDITIONAL TESTS:    Imaging Personally Reviewed:    Consultant(s) Notes Reviewed:  all    Care Discussed with Consultants/Other Providers:

## 2019-09-05 NOTE — PROVIDER CONTACT NOTE (MEDICATION) - ACTION/TREATMENT ORDERED:
hold 1 unit of insulin due to patient being NPO
Tylenol 650mg given. B/C pending, RVP to be taken
As per provider, OK to give PO metoprolol 25mg through NGT, PUSH all additional medications until after PEG placement  Will cont to monitor.

## 2019-09-05 NOTE — CONSULT NOTE ADULT - ASSESSMENT
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 Patient has been afebrile this week, Tmax = 98.5, intermittently desaturating on 5L NC O2, downtrending leukocytosis (11.0 today), serum creatinine 1.19. Blood cultures on 9/1 and 8/30 growing MSSA, sputum culture on 8/31 also growing Staph Aureus. Blood culture taken on 9/1 later in the morning with no growth to date. CXR on 9/2 clear. Patient has PCN allergy with unknown reaction and has been tolerating Zosyn well.    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 Patient has been afebrile this week, Tmax = 98.5, intermittently desaturating on 5L NC O2, downtrending leukocytosis (11.0 today), serum creatinine 1.19. Blood cultures on 9/1 and 8/30 growing MSSA, sputum culture on 8/31 also growing Staph Aureus. Blood culture taken on 9/1 later in the morning with no growth to date. CXR on 9/2 clear. Patient has PCN allergy with unknown reaction and has been tolerating Zosyn well.    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 Patient has been afebrile this week, Tmax = 98.5, intermittently desaturating on 5L NC O2, downtrending leukocytosis (11.0 today), serum creatinine 1.19. Blood cultures on 9/1 and 8/30 growing MSSA, sputum culture on 8/31 also growing Staph Aureus. Blood culture taken on 9/1 later in the morning with no growth to date. CXR on 9/2 clear. Patient has PCN allergy with unknown reaction and has been tolerating Zosyn well.    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 Patient has been afebrile this week, Tmax = 98.5, intermittently desaturating on 5L NC O2, downtrending leukocytosis (11.0 today), serum creatinine 1.19. Blood cultures on 9/1 and 8/30 growing MSSA, sputum culture on 8/31 also growing Staph Aureus. Blood culture taken on 9/1 later in the morning with no growth to date. CXR on 9/2 clear. Patient has PCN allergy with unknown reaction and has been tolerating Zosyn well.    MSSA Bacteremia appears to be secondary to her pneumonia. Aspiration as a cause less likely as only growth of numerous MSSA on sputum culture. Would switch to Cefazolin to target MSSA (patient PCN allergic but has been tolerating Zosyn). I would treat for at least 6 weeks of antibiotics given persistent bacteremia and history of TAVR.     Overall, MSSA Bacteremia, MSSA Pneumonia, Leukocytosis, s/p Bioprosthetic TAVR    Recommendations    --D/C Vanc/Zosyn  --Start Cefazolin 2 g q 12 hour (will require at least 6 weeks of antibiotics given   --TTE to r/o endocarditis, pt is s/p TAVR  --Recommend another set of blood cultures (only 1 set sent today) - to help document clearance of bacteremia  --Continue to follow WBC count  --F/U Prelim BCx

## 2019-09-05 NOTE — PROGRESS NOTE ADULT - PROBLEM SELECTOR PLAN 10
a1c 6  - Patient not on home anti-hyperglycemics  - FS acceptable  - c/w Insulin sliding scale      Prophylaxis:  DVT ppx: c/w heparin drip for pending PEG tube placement.  Diet: NPO except meds  Dispo: poor prognosis, DNR/DNI, palliative following, hospice eval came today but patient and family now rediscussing PEG/NG/vs pleasure feeds a1c 6  - Patient not on home anti-hyperglycemics  - FS acceptable  - c/w Insulin sliding scale      Prophylaxis:  DVT ppx: c/w heparin drip for pending PEG tube placement.  Diet: NPO except meds  Dispo: poor prognosis, DNR/DNI, palliative following, previous hospice eval but patient and family now wanting PEG a1c 6  - Patient not on home anti-hyperglycemics  - FS acceptable  - c/w Insulin sliding scale      Prophylaxis:  DVT ppx: restart heparin ggt for PEG placement next week  Diet: NPO except meds  Dispo: poor prognosis, DNR/DNI, palliative following, patient and family now wanting PEG next week

## 2019-09-05 NOTE — PROGRESS NOTE ADULT - PROBLEM SELECTOR PLAN 4
aspiration PNA  - sputum cx MSSA on 8/30, d/cuco vanc, c/w zosyn  - 02 sat on NC, wean as tolerated, no resp distress Likely due to aspiration event. S/p MICU; extubated 8/31  - Now on 2L NC, satting in mid-90's  - Chest PT, chest vest  - C/w continuous pulse ox  - DNR/DNI after MICU stay aspiration PNA  - sputum cx MSSA on 8/30, d/cuco vanc, c/w zosyn  - 02 sat on NC, wean as tolerated, no resp distress  - ID as above aspiration PNA now resolved  - sputum cx MSSA on 8/30, completed zosyn, now switched to cefazolin per ID  - 02 sat on NC, wean as tolerated, no resp distress  - ID as above

## 2019-09-05 NOTE — PROGRESS NOTE ADULT - SUBJECTIVE AND OBJECTIVE BOX
House GI Progress Note    Chief Complaint:  Patient is a 85y old  Female who presents with a chief complaint of Abdominal pain (05 Sep 2019 10:47)    Re-consult for PEG placement    Interval Events / Subjective: Briefly, Ms. 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 Pt with dysphagia, seen by ENT, found to have vocal cord paralysis s/p VC injection 8/22. Planned EGD/PEG on 8/26, however patient went into Afib with RVR, RRT called, procedure held. 8/30 patient to MICU for acute respiratory distress, altered and hypoxic, intubated 8/30, extubated 8/31. Unsuccessful NGT placement, now NPO. Still needing suction for secretions. GI re-consulted for PEG tube placement, has family and other providers have had GOC discussions.     During interview, pt complains of L arm pain, has been NPO for several days on d5 1/2NS. Pt says she is agreeable to PEG tube at this point, says she understands risk of aspiration if she tries to eat anything PO at this point. Per primary team, pt would still like to work on oropharyngeal strengthening at rehab to regain ability to tolerate PO intake. Pt with some HA today. Pt denies f/c/n/v/abd pain/diarrhea/constipation today.      REVIEW OF SYSTEMS:   negative unless noted in HPI    MEDICATIONS:   MEDICATIONS  (STANDING):  ALBUTerol    0.083% 2.5 milliGRAM(s) Nebulizer every 6 hours  apixaban 2.5 milliGRAM(s) Oral every 12 hours  atorvastatin 40 milliGRAM(s) Oral <User Schedule>  buDESOnide    Inhalation Suspension 0.25 milliGRAM(s) Inhalation two times a day  carbidopa/levodopa  25/100 2 Tablet(s) Oral three times a day  dextrose 5% + sodium chloride 0.45% with potassium chloride 40 mEq/L 1000 milliLiter(s) (75 mL/Hr) IV Continuous <Continuous>  dextrose 5%. 1000 milliLiter(s) (50 mL/Hr) IV Continuous <Continuous>  dextrose 50% Injectable 12.5 Gram(s) IV Push once  dextrose 50% Injectable 25 Gram(s) IV Push once  dextrose 50% Injectable 25 Gram(s) IV Push once  furosemide    Tablet 20 milliGRAM(s) Oral daily  gabapentin   Solution 300 milliGRAM(s) Oral at bedtime  guaiFENesin    Syrup 200 milliGRAM(s) Oral every 6 hours  insulin lispro (HumaLOG) corrective regimen sliding scale   SubCutaneous every 6 hours  lidocaine   Patch 1 Patch Transdermal daily  metoprolol tartrate 75 milliGRAM(s) Oral every 12 hours  pantoprazole  Injectable 40 milliGRAM(s) IV Push daily  piperacillin/tazobactam IVPB.. 3.375 Gram(s) IV Intermittent every 8 hours  potassium chloride   Powder 40 milliEquivalent(s) Oral two times a day  potassium chloride  10 mEq/100 mL IVPB 10 milliEquivalent(s) IV Intermittent every 1 hour  predniSONE   Tablet 10 milliGRAM(s) Oral daily  sodium chloride 3%  Inhalation 3 milliLiter(s) Inhalation every 12 hours    MEDICATIONS  (PRN):  acetaminophen   Tablet .. 650 milliGRAM(s) Oral every 6 hours PRN Temp greater or equal to 38C (100.4F), Mild Pain (1 - 3), Moderate Pain (4 - 6)  ALPRAZolam 0.5 milliGRAM(s) Oral two times a day PRN anxiety  dextrose 40% Gel 15 Gram(s) Oral once PRN Blood Glucose LESS THAN 70 milliGRAM(s)/deciliter  glucagon  Injectable 1 milliGRAM(s) IntraMuscular once PRN Glucose LESS THAN 70 milligrams/deciliter      ALLERGIES:   Allergies    penicillin (Unknown)    Intolerances        VITAL SIGNS:   Vital Signs Last 24 Hrs  T(C): 36.4 (05 Sep 2019 13:08), Max: 36.8 (04 Sep 2019 22:00)  T(F): 97.5 (05 Sep 2019 13:08), Max: 98.3 (04 Sep 2019 22:00)  HR: 74 (05 Sep 2019 13:08) (63 - 88)  BP: 126/68 (05 Sep 2019 13:08) (91/52 - 126/68)  RR: 16 (05 Sep 2019 13:08) (16 - 18)  SpO2: 98% (05 Sep 2019 13:08) (94% - 98%)  I&O's Summary      PHYSICAL EXAM:   GENERAL:  pt resting in chair, NAD  HEENT:  atraumatic,normocephalic. EOMI/PERRLA  CHEST:  b/L ronchi and wheezes appreciated in posterior lung fields  HEART:  irregular rhythm;; no m/r/g  ABDOMEN:  soft, nontender, nondistended, BS+  EXTREMITIES: no edema; ecchymoses on BL UE  NEURO:  AAOx3, LUE weakness, LE strength/sensation grossly intact    LABS:  CBC Full  -  ( 04 Sep 2019 09:54 )  WBC Count : 11.0 K/uL  RBC Count : 3.68 M/uL  Hemoglobin : 10.9 g/dL  Hematocrit : 33.4 %  Platelet Count - Automated : 233 K/uL  Mean Cell Volume : 90.6 fl  Mean Cell Hemoglobin : 29.7 pg  Mean Cell Hemoglobin Concentration : 32.8 gm/dL  Auto Neutrophil # : x  Auto Lymphocyte # : x  Auto Monocyte # : x  Auto Eosinophil # : x  Auto Basophil # : x  Auto Neutrophil % : x  Auto Lymphocyte % : x  Auto Monocyte % : x  Auto Eosinophil % : x  Auto Basophil % : x    09-05    137  |  95<L>  |  16  ----------------------------<  114<H>  2.8<LL>   |  26  |  1.19    Ca    9.9      05 Sep 2019 07:15        PTT - ( 05 Sep 2019 07:16 )  PTT:35.1 sec        IMAGING: House GI Progress Note    Chief Complaint:  Patient is a 85y old  Female who presents with a chief complaint of Abdominal pain (05 Sep 2019 10:47)    Re-consult for PEG placement    Interval Events / Subjective: Briefly, Ms. 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 Pt with dysphagia, seen by ENT, found to have vocal cord paralysis s/p VC injection 8/22. Planned EGD/PEG on 8/26, however patient went into Afib with RVR, RRT called, procedure held. 8/30 patient to MICU for acute respiratory distress, altered and hypoxic, intubated 8/30, extubated 8/31. Unsuccessful NGT placement, now NPO. Still needing suction for secretions. GI re-consulted for PEG tube placement, has family and other providers have had GOC discussions.     During interview, pt complains of L arm pain, has been NPO for several days on d5 1/2NS. Pt says she is agreeable to PEG tube at this point, says she understands risk of aspiration if she tries to eat anything PO at this point. Per primary team, pt would still like to work on oropharyngeal strengthening at rehab to regain ability to tolerate PO intake. Pt with some HA today. Pt denies f/c/n/v/abd pain/diarrhea/constipation today.      REVIEW OF SYSTEMS:   negative unless noted in HPI    MEDICATIONS:   MEDICATIONS  (STANDING):  ALBUTerol    0.083% 2.5 milliGRAM(s) Nebulizer every 6 hours  apixaban 2.5 milliGRAM(s) Oral every 12 hours  atorvastatin 40 milliGRAM(s) Oral <User Schedule>  buDESOnide    Inhalation Suspension 0.25 milliGRAM(s) Inhalation two times a day  carbidopa/levodopa  25/100 2 Tablet(s) Oral three times a day  dextrose 5% + sodium chloride 0.45% with potassium chloride 40 mEq/L 1000 milliLiter(s) (75 mL/Hr) IV Continuous <Continuous>  dextrose 5%. 1000 milliLiter(s) (50 mL/Hr) IV Continuous <Continuous>  dextrose 50% Injectable 12.5 Gram(s) IV Push once  dextrose 50% Injectable 25 Gram(s) IV Push once  dextrose 50% Injectable 25 Gram(s) IV Push once  furosemide    Tablet 20 milliGRAM(s) Oral daily  gabapentin   Solution 300 milliGRAM(s) Oral at bedtime  guaiFENesin    Syrup 200 milliGRAM(s) Oral every 6 hours  insulin lispro (HumaLOG) corrective regimen sliding scale   SubCutaneous every 6 hours  lidocaine   Patch 1 Patch Transdermal daily  metoprolol tartrate 75 milliGRAM(s) Oral every 12 hours  pantoprazole  Injectable 40 milliGRAM(s) IV Push daily  piperacillin/tazobactam IVPB.. 3.375 Gram(s) IV Intermittent every 8 hours  potassium chloride   Powder 40 milliEquivalent(s) Oral two times a day  potassium chloride  10 mEq/100 mL IVPB 10 milliEquivalent(s) IV Intermittent every 1 hour  predniSONE   Tablet 10 milliGRAM(s) Oral daily  sodium chloride 3%  Inhalation 3 milliLiter(s) Inhalation every 12 hours    MEDICATIONS  (PRN):  acetaminophen   Tablet .. 650 milliGRAM(s) Oral every 6 hours PRN Temp greater or equal to 38C (100.4F), Mild Pain (1 - 3), Moderate Pain (4 - 6)  ALPRAZolam 0.5 milliGRAM(s) Oral two times a day PRN anxiety  dextrose 40% Gel 15 Gram(s) Oral once PRN Blood Glucose LESS THAN 70 milliGRAM(s)/deciliter  glucagon  Injectable 1 milliGRAM(s) IntraMuscular once PRN Glucose LESS THAN 70 milligrams/deciliter      ALLERGIES:   Allergies    penicillin (Unknown)    Intolerances        VITAL SIGNS:   Vital Signs Last 24 Hrs  T(C): 36.4 (05 Sep 2019 13:08), Max: 36.8 (04 Sep 2019 22:00)  T(F): 97.5 (05 Sep 2019 13:08), Max: 98.3 (04 Sep 2019 22:00)  HR: 74 (05 Sep 2019 13:08) (63 - 88)  BP: 126/68 (05 Sep 2019 13:08) (91/52 - 126/68)  RR: 16 (05 Sep 2019 13:08) (16 - 18)  SpO2: 98% (05 Sep 2019 13:08) (94% - 98%)  I&O's Summary      PHYSICAL EXAM:   GENERAL:  pt resting in chair, NAD  HEENT:  atraumatic,normocephalic. EOMI/PERRLA  CHEST:  b/L ronchi and wheezes appreciated in posterior lung fields  HEART: no m/r/g  ABDOMEN:  soft, nontender, nondistended, BS+  EXTREMITIES: no edema; ecchymoses on BL UE  NEURO:  AAOx3, RUE weakness, LE strength/sensation grossly intact    LABS:  CBC Full  -  ( 04 Sep 2019 09:54 )  WBC Count : 11.0 K/uL  RBC Count : 3.68 M/uL  Hemoglobin : 10.9 g/dL  Hematocrit : 33.4 %  Platelet Count - Automated : 233 K/uL  Mean Cell Volume : 90.6 fl  Mean Cell Hemoglobin : 29.7 pg  Mean Cell Hemoglobin Concentration : 32.8 gm/dL  Auto Neutrophil # : x  Auto Lymphocyte # : x  Auto Monocyte # : x  Auto Eosinophil # : x  Auto Basophil # : x  Auto Neutrophil % : x  Auto Lymphocyte % : x  Auto Monocyte % : x  Auto Eosinophil % : x  Auto Basophil % : x    09-05    137  |  95<L>  |  16  ----------------------------<  114<H>  2.8<LL>   |  26  |  1.19    Ca    9.9      05 Sep 2019 07:15        PTT - ( 05 Sep 2019 07:16 )  PTT:35.1 sec        IMAGING: House GI Progress Note    Chief Complaint:  Patient is a 85y old  Female who presents with a chief complaint of Abdominal pain (05 Sep 2019 10:47)    Re-consult for PEG placement    Interval Events / Subjective: Briefly, Ms. 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 Pt with dysphagia, seen by ENT, found to have vocal cord paralysis s/p VC injection 8/22. Planned EGD/PEG on 8/26, however patient went into Afib with RVR, RRT called, procedure held. 8/30 patient to MICU for acute respiratory distress, altered and hypoxic, intubated 8/30, extubated 8/31. Unsuccessful NGT placement, now NPO. Still needing suction for secretions. GI re-consulted for PEG tube placement, has family and other providers have had GOC discussions. Has failed speech/swallow x3. Staph aureus + BC 9/1.    During interview, pt complains of L arm pain 2/2 potassium IV, has been NPO for several days on d5 1/2NS. Pt says she is agreeable to PEG tube at this point, says she understands risk of aspiration if she tries to eat anything PO at this point. Pt with cough. Per primary team, pt would still like to work on oropharyngeal strengthening at rehab to regain ability to tolerate PO intake. Pt with some HA today. Pt denies f/c/n/v/abd pain/diarrhea/constipation today.      REVIEW OF SYSTEMS:   negative unless noted in HPI    MEDICATIONS:   MEDICATIONS  (STANDING):  ALBUTerol    0.083% 2.5 milliGRAM(s) Nebulizer every 6 hours  apixaban 2.5 milliGRAM(s) Oral every 12 hours  atorvastatin 40 milliGRAM(s) Oral <User Schedule>  buDESOnide    Inhalation Suspension 0.25 milliGRAM(s) Inhalation two times a day  carbidopa/levodopa  25/100 2 Tablet(s) Oral three times a day  dextrose 5% + sodium chloride 0.45% with potassium chloride 40 mEq/L 1000 milliLiter(s) (75 mL/Hr) IV Continuous <Continuous>  dextrose 5%. 1000 milliLiter(s) (50 mL/Hr) IV Continuous <Continuous>  dextrose 50% Injectable 12.5 Gram(s) IV Push once  dextrose 50% Injectable 25 Gram(s) IV Push once  dextrose 50% Injectable 25 Gram(s) IV Push once  furosemide    Tablet 20 milliGRAM(s) Oral daily  gabapentin   Solution 300 milliGRAM(s) Oral at bedtime  guaiFENesin    Syrup 200 milliGRAM(s) Oral every 6 hours  insulin lispro (HumaLOG) corrective regimen sliding scale   SubCutaneous every 6 hours  lidocaine   Patch 1 Patch Transdermal daily  metoprolol tartrate 75 milliGRAM(s) Oral every 12 hours  pantoprazole  Injectable 40 milliGRAM(s) IV Push daily  piperacillin/tazobactam IVPB.. 3.375 Gram(s) IV Intermittent every 8 hours  potassium chloride   Powder 40 milliEquivalent(s) Oral two times a day  potassium chloride  10 mEq/100 mL IVPB 10 milliEquivalent(s) IV Intermittent every 1 hour  predniSONE   Tablet 10 milliGRAM(s) Oral daily  sodium chloride 3%  Inhalation 3 milliLiter(s) Inhalation every 12 hours    MEDICATIONS  (PRN):  acetaminophen   Tablet .. 650 milliGRAM(s) Oral every 6 hours PRN Temp greater or equal to 38C (100.4F), Mild Pain (1 - 3), Moderate Pain (4 - 6)  ALPRAZolam 0.5 milliGRAM(s) Oral two times a day PRN anxiety  dextrose 40% Gel 15 Gram(s) Oral once PRN Blood Glucose LESS THAN 70 milliGRAM(s)/deciliter  glucagon  Injectable 1 milliGRAM(s) IntraMuscular once PRN Glucose LESS THAN 70 milligrams/deciliter      ALLERGIES:   Allergies    penicillin (Unknown)    Intolerances        VITAL SIGNS:   Vital Signs Last 24 Hrs  T(C): 36.4 (05 Sep 2019 13:08), Max: 36.8 (04 Sep 2019 22:00)  T(F): 97.5 (05 Sep 2019 13:08), Max: 98.3 (04 Sep 2019 22:00)  HR: 74 (05 Sep 2019 13:08) (63 - 88)  BP: 126/68 (05 Sep 2019 13:08) (91/52 - 126/68)  RR: 16 (05 Sep 2019 13:08) (16 - 18)  SpO2: 98% (05 Sep 2019 13:08) (94% - 98%)  I&O's Summary      PHYSICAL EXAM:   GENERAL:  pt resting in chair, NAD  HEENT:  atraumatic,normocephalic. EOMI/PERRLA  CHEST:  b/L ronchi and wheezes appreciated in posterior lung fields  HEART: no m/r/g  ABDOMEN:  soft, nontender, nondistended, BS+  EXTREMITIES: no edema; ecchymoses on BL UE  NEURO:  AAOx3, RUE weakness, LE strength/sensation grossly intact    LABS:  CBC Full  -  ( 04 Sep 2019 09:54 )  WBC Count : 11.0 K/uL  RBC Count : 3.68 M/uL  Hemoglobin : 10.9 g/dL  Hematocrit : 33.4 %  Platelet Count - Automated : 233 K/uL  Mean Cell Volume : 90.6 fl  Mean Cell Hemoglobin : 29.7 pg  Mean Cell Hemoglobin Concentration : 32.8 gm/dL  Auto Neutrophil # : x  Auto Lymphocyte # : x  Auto Monocyte # : x  Auto Eosinophil # : x  Auto Basophil # : x  Auto Neutrophil % : x  Auto Lymphocyte % : x  Auto Monocyte % : x  Auto Eosinophil % : x  Auto Basophil % : x    09-05    137  |  95<L>  |  16  ----------------------------<  114<H>  2.8<LL>   |  26  |  1.19    Ca    9.9      05 Sep 2019 07:15        PTT - ( 05 Sep 2019 07:16 )  PTT:35.1 sec        IMAGING:

## 2019-09-06 LAB
ANION GAP SERPL CALC-SCNC: 16 MMOL/L — SIGNIFICANT CHANGE UP (ref 5–17)
BUN SERPL-MCNC: 22 MG/DL — SIGNIFICANT CHANGE UP (ref 7–23)
CALCIUM SERPL-MCNC: 9.8 MG/DL — SIGNIFICANT CHANGE UP (ref 8.4–10.5)
CHLORIDE SERPL-SCNC: 100 MMOL/L — SIGNIFICANT CHANGE UP (ref 96–108)
CO2 SERPL-SCNC: 20 MMOL/L — LOW (ref 22–31)
CREAT SERPL-MCNC: 1.23 MG/DL — SIGNIFICANT CHANGE UP (ref 0.5–1.3)
CULTURE RESULTS: SIGNIFICANT CHANGE UP
GLUCOSE BLDC GLUCOMTR-MCNC: 112 MG/DL — HIGH (ref 70–99)
GLUCOSE BLDC GLUCOMTR-MCNC: 121 MG/DL — HIGH (ref 70–99)
GLUCOSE BLDC GLUCOMTR-MCNC: 124 MG/DL — HIGH (ref 70–99)
GLUCOSE BLDC GLUCOMTR-MCNC: 66 MG/DL — LOW (ref 70–99)
GLUCOSE BLDC GLUCOMTR-MCNC: 68 MG/DL — LOW (ref 70–99)
GLUCOSE BLDC GLUCOMTR-MCNC: 76 MG/DL — SIGNIFICANT CHANGE UP (ref 70–99)
GLUCOSE BLDC GLUCOMTR-MCNC: 82 MG/DL — SIGNIFICANT CHANGE UP (ref 70–99)
GLUCOSE SERPL-MCNC: 102 MG/DL — HIGH (ref 70–99)
MAGNESIUM SERPL-MCNC: 2.2 MG/DL — SIGNIFICANT CHANGE UP (ref 1.6–2.6)
PHOSPHATE SERPL-MCNC: 4.9 MG/DL — HIGH (ref 2.5–4.5)
POTASSIUM SERPL-MCNC: 4.6 MMOL/L — SIGNIFICANT CHANGE UP (ref 3.5–5.3)
POTASSIUM SERPL-SCNC: 4.6 MMOL/L — SIGNIFICANT CHANGE UP (ref 3.5–5.3)
SODIUM SERPL-SCNC: 136 MMOL/L — SIGNIFICANT CHANGE UP (ref 135–145)
SPECIMEN SOURCE: SIGNIFICANT CHANGE UP

## 2019-09-06 PROCEDURE — 99232 SBSQ HOSP IP/OBS MODERATE 35: CPT

## 2019-09-06 PROCEDURE — 99233 SBSQ HOSP IP/OBS HIGH 50: CPT | Mod: GC

## 2019-09-06 PROCEDURE — 99222 1ST HOSP IP/OBS MODERATE 55: CPT | Mod: GC

## 2019-09-06 RX ORDER — SODIUM CHLORIDE 9 MG/ML
1000 INJECTION INTRAMUSCULAR; INTRAVENOUS; SUBCUTANEOUS
Refills: 0 | Status: DISCONTINUED | OUTPATIENT
Start: 2019-09-06 | End: 2019-09-06

## 2019-09-06 RX ORDER — SODIUM CHLORIDE 9 MG/ML
1000 INJECTION, SOLUTION INTRAVENOUS
Refills: 0 | Status: DISCONTINUED | OUTPATIENT
Start: 2019-09-06 | End: 2019-09-07

## 2019-09-06 RX ORDER — DEXTROSE 50 % IN WATER 50 %
12.5 SYRINGE (ML) INTRAVENOUS ONCE
Refills: 0 | Status: COMPLETED | OUTPATIENT
Start: 2019-09-06 | End: 2019-09-06

## 2019-09-06 RX ORDER — ENOXAPARIN SODIUM 100 MG/ML
50 INJECTION SUBCUTANEOUS EVERY 24 HOURS
Refills: 0 | Status: DISCONTINUED | OUTPATIENT
Start: 2019-09-06 | End: 2019-09-07

## 2019-09-06 RX ADMIN — CARBIDOPA AND LEVODOPA 2 TABLET(S): 25; 100 TABLET ORAL at 21:16

## 2019-09-06 RX ADMIN — ALBUTEROL 2.5 MILLIGRAM(S): 90 AEROSOL, METERED ORAL at 18:11

## 2019-09-06 RX ADMIN — SODIUM CHLORIDE 50 MILLILITER(S): 9 INJECTION INTRAMUSCULAR; INTRAVENOUS; SUBCUTANEOUS at 12:44

## 2019-09-06 RX ADMIN — Medication 100 MILLIGRAM(S): at 17:41

## 2019-09-06 RX ADMIN — Medication 40 MILLIEQUIVALENT(S): at 03:59

## 2019-09-06 RX ADMIN — Medication 0.25 MILLIGRAM(S): at 18:12

## 2019-09-06 RX ADMIN — SODIUM CHLORIDE 3 MILLILITER(S): 9 INJECTION INTRAMUSCULAR; INTRAVENOUS; SUBCUTANEOUS at 18:22

## 2019-09-06 RX ADMIN — Medication 200 MILLIGRAM(S): at 17:42

## 2019-09-06 RX ADMIN — GABAPENTIN 300 MILLIGRAM(S): 400 CAPSULE ORAL at 21:14

## 2019-09-06 RX ADMIN — Medication 20 MILLIGRAM(S): at 06:23

## 2019-09-06 RX ADMIN — ALBUTEROL 2.5 MILLIGRAM(S): 90 AEROSOL, METERED ORAL at 11:49

## 2019-09-06 RX ADMIN — ENOXAPARIN SODIUM 50 MILLIGRAM(S): 100 INJECTION SUBCUTANEOUS at 21:16

## 2019-09-06 RX ADMIN — Medication 200 MILLIGRAM(S): at 06:19

## 2019-09-06 RX ADMIN — Medication 10 MILLIGRAM(S): at 06:18

## 2019-09-06 RX ADMIN — LIDOCAINE 1 PATCH: 4 CREAM TOPICAL at 12:39

## 2019-09-06 RX ADMIN — Medication 75 MILLIGRAM(S): at 12:38

## 2019-09-06 RX ADMIN — CARBIDOPA AND LEVODOPA 2 TABLET(S): 25; 100 TABLET ORAL at 12:44

## 2019-09-06 RX ADMIN — Medication 0.25 MILLIGRAM(S): at 06:07

## 2019-09-06 RX ADMIN — ALBUTEROL 2.5 MILLIGRAM(S): 90 AEROSOL, METERED ORAL at 06:07

## 2019-09-06 RX ADMIN — PANTOPRAZOLE SODIUM 40 MILLIGRAM(S): 20 TABLET, DELAYED RELEASE ORAL at 12:39

## 2019-09-06 RX ADMIN — CARBIDOPA AND LEVODOPA 2 TABLET(S): 25; 100 TABLET ORAL at 06:18

## 2019-09-06 RX ADMIN — Medication 12.5 GRAM(S): at 17:31

## 2019-09-06 RX ADMIN — SODIUM CHLORIDE 3 MILLILITER(S): 9 INJECTION INTRAMUSCULAR; INTRAVENOUS; SUBCUTANEOUS at 06:07

## 2019-09-06 RX ADMIN — Medication 200 MILLIGRAM(S): at 12:38

## 2019-09-06 RX ADMIN — Medication 100 MILLIGRAM(S): at 06:28

## 2019-09-06 RX ADMIN — LIDOCAINE 1 PATCH: 4 CREAM TOPICAL at 22:36

## 2019-09-06 RX ADMIN — Medication 75 MILLIGRAM(S): at 21:17

## 2019-09-06 RX ADMIN — Medication 650 MILLIGRAM(S): at 22:24

## 2019-09-06 RX ADMIN — Medication 650 MILLIGRAM(S): at 21:14

## 2019-09-06 NOTE — CONSULT NOTE ADULT - ASSESSMENT
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    MSSA Bacteremia, MSSA Pneumonia, Leukocytosis, s/p Bioprosthetic TAVR  we area asked to assume GI care and consult for PEG tube placement    Discussed with pt and extensive discussion with son over phone at pt request. Explained indications/ risks/ benefits of PEG and explained that PEG placement would not eliminate possible risk of aspiration (persistent aspiration risk from secretions and well as reflux/regurgitation). At this time, he wishes to proceed with PEG next week    RECS:  -Abx as per ID  -Infectious work-up r/o endocarditis as per ID recs  -Continue to hold Eliquis for possible PEG next week (scheduled for Tuesday pending ID/Med/Cardiology "clearance")  -NGT feeds and meds  -recommend Pulmonary follow up      Discussed with Medicine attending  Please call over weekend prn with GI concerns, will resume GI follow up on Monday and plan for PEG Tuesday if pt stable/"cleared"    GI service : [phone]    Gordon Shaw PA-C    [facility]  [phone]  After hours and weekend coverage [phone]

## 2019-09-06 NOTE — PROVIDER CONTACT NOTE (OTHER) - ASSESSMENT
PT A&OX 3. asymptomatic. VSS WNL
Pt A&oX1. pt lethargic but arousable to voice. HR 130s-150s on Tele Afib.  pt currently on heparin gtt. 1st troponin resulted at 407- MD aware.
Pt. A&Ox 3. Asymptomatic
Pt. A&Ox 3. Asymptomatic VS WNL
No s/s of distress
No s/s of distress, asymptomatic   patient currently only at feeds at 20
PT remains AO x 1 no complaints of HA Dizziness or SOB
Patient asymptomatic, calmed down from previous agitated state. Elevated BPs, no c/o SOB, CP, pain/discomfort.
Patient complains of HA, rhonchi heard on auscultation, patient coughing up thick yellow sputum. Vitals 102/53, 98.2, 89%SpO2
Patient is symptomatic, presents with agitation, dyspnea on exertion, and stated she was upset.
Pt AxO2/3, vitals : hr 140, bp 130/86, resp 19, temp 98.8. Pt had elevated HR in the 150-160s, and episodes of aflutter with complaint of headache.
Pt. a&ox2, vss, no c/o c/p, SOB, dizziness, palpitations.
Pt. a&ox2, vss, pt. HOB elevated to 90 degree angle, no s/s of SOB.
VSS, 134/82, 98.1, 74, 20, 96%. Patient sleeping at time of event. No complaints of sob, chest pain or palpitations.
pt AO2 asleep at time of event, RR 30s when patient asleep, pt no complaints of chest pain / SOB/ palpitations at this time. VS otherwise stable excluding RR. PT in NSR on tele
pt AO2 no complaints of pain discomfort, pt denies SOB/difficulty breathing, RR increased  no n/v/dizziness
pt AO2 no complaints of pain or discomfort, no s/s chest pain/sob/palpitations  VSS
pt AOx3 no complaints of pain or discomfort, no s/s chest pain/sob/palpitations  VSS
pt had a burst of PAT up to 180s bpm for 1.7 secs bp 166/81 hr now 81 bpm. pt due for metoprolol

## 2019-09-06 NOTE — PROGRESS NOTE ADULT - PROBLEM SELECTOR PLAN 4
aspiration PNA now resolved  - sputum cx MSSA on 8/30, completed zosyn, now switched to cefazolin per ID  - 02 sat on NC, wean as tolerated, no resp distress  - ID as above

## 2019-09-06 NOTE — CHART NOTE - NSCHARTNOTEFT_GEN_A_CORE
Nutrition Follow Up Note    Patient seen for malnutrition follow up     Spoke to pt at bedside. Pt was tired and did not want to talk. NG tube in place, was running at goal rate of 20 ml/hr. Plan for PEG tube next week. Denied any nausea, vomiting, constipation, or diarrhea; tolerating diet. Recommend increase TF to 60 ml/hr x 18hrs help meet nutritional needs.     Diet:   9/6- Glucerna 1.5 @ 20ml/hr x 18 hrs, totaling 360 ml formula, 540 kcal, 30 gm prot, 273 ml free water; provides 10 kcal/kg, .55 gm prot/kg, 5 ml/kg.   9/4- Dysphagia 1 pureed honey consistency   8/30-9/4- NPO       Daily Weight: 55.6 kg (09-04), 57 kg(08-30)  Weight Change: 2% wt loss x 4 days, ? accuracy of bed scale weight     Pertinent Medications: MEDICATIONS  (STANDING):  ALBUTerol    0.083% 2.5 milliGRAM(s) Nebulizer every 6 hours  atorvastatin 40 milliGRAM(s) Oral <User Schedule>  buDESOnide    Inhalation Suspension 0.25 milliGRAM(s) Inhalation two times a day  carbidopa/levodopa  25/100 2 Tablet(s) Oral three times a day  ceFAZolin   IVPB 2000 milliGRAM(s) IV Intermittent every 12 hours  dextrose 5%. 1000 milliLiter(s) (50 mL/Hr) IV Continuous <Continuous>  dextrose 50% Injectable 12.5 Gram(s) IV Push once  dextrose 50% Injectable 25 Gram(s) IV Push once  dextrose 50% Injectable 25 Gram(s) IV Push once  gabapentin   Solution 300 milliGRAM(s) Oral at bedtime  guaiFENesin    Syrup 200 milliGRAM(s) Oral every 6 hours  insulin lispro (HumaLOG) corrective regimen sliding scale   SubCutaneous every 6 hours  lidocaine   Patch 1 Patch Transdermal daily  metoprolol tartrate 75 milliGRAM(s) Oral every 12 hours  pantoprazole  Injectable 40 milliGRAM(s) IV Push daily  predniSONE   Tablet 10 milliGRAM(s) Oral daily  sodium chloride 0.9%. 1000 milliLiter(s) (50 mL/Hr) IV Continuous <Continuous>  sodium chloride 3%  Inhalation 3 milliLiter(s) Inhalation every 12 hours    MEDICATIONS  (PRN):  acetaminophen    Suspension .. 650 milliGRAM(s) Oral every 6 hours PRN Moderate Pain (4 - 6), Severe Pain (7 - 10)  ALPRAZolam 0.5 milliGRAM(s) Oral two times a day PRN anxiety  dextrose 40% Gel 15 Gram(s) Oral once PRN Blood Glucose LESS THAN 70 milliGRAM(s)/deciliter  glucagon  Injectable 1 milliGRAM(s) IntraMuscular once PRN Glucose LESS THAN 70 milligrams/deciliter    Pertinent Labs: 09-06 @ 06:20: Na 136, BUN 22, Cr 1.23, <H>, K+ 4.6, Phos 4.9<H>, Mg 2.2, Alk Phos --, ALT/SGPT --, AST/SGOT --, HbA1c --  09-05 @ 22:25: Na 138, BUN 20, Cr 1.36<H>, <H>, K+ 4.5, Phos --, Mg 2.2, Alk Phos --, ALT/SGPT --, AST/SGOT --, HbA1c --    Finger Sticks:  POCT Blood Glucose.: 76 mg/dL (09-06 @ 12:23)  POCT Blood Glucose.: 82 mg/dL (09-06 @ 06:13)  POCT Blood Glucose.: 121 mg/dL (09-06 @ 03:58)  POCT Blood Glucose.: 150 mg/dL (09-05 @ 18:07)      Skin: no pressure ulcers noted    Edema: none noted     Estimated Needs:   [x] no change since previous assessment  [ ] recalculated:     Previous Nutrition Diagnosis: mild malnutrition   Nutrition Diagnosis is: on-going, addressed with EN    New Nutrition Diagnosis:    Recommend  1) Increase TF rate to goal rate of 60 ml/hr x 18 hrs. Increase 10 ml q4hrs to goal rate. This will total 1080 ml formula, 1620 kcal, 89 gm prot, 820 ml; providing 29 kcal/kg, 1.5 gm prot/kg, 15 ml/kg based on upper IBW 55 kg    Monitoring and Evaluation:     Continue to monitor Nutritional intake, Tolerance to diet prescription, weights, labs, skin integrity    RD remains available upon request and will follow up per protocol

## 2019-09-06 NOTE — PROVIDER CONTACT NOTE (OTHER) - BACKGROUND
pt admitted with UTI and PNA. s/p RRT for new onset afib with RVR to 150s. pt received 5mg IVP lopressor and amioloaded during RRT
Admitted originally for UTI
Dx: UTI, recent afib on tele, acute metabolic encephalopathy  pmh: htn, neuropathy, parkinson, NG tube
PT has a HX of CHF and HTN
Patient admitted for cardiac arrhythmia, PNA, UTI
Patient admitted with UTI, pneumonia.
Patient admitted with UTI, pneumonia.
Patient febrile earlier to 101 now 101.3 1 hour post tylenol
Pt. was admitted for sepsis secondary to UTI and PNA. Pt. is currently on a soft diet.
Pt. was transferred from [address] [place] [address] overnight after a rapid response secondary to new onset of a-fib. Pt. was placed on the heparin drip infusing at 11ml/hr.
copd exacerbation, in setting of uri/ pna. uti, acute metabolic encephalopathy
pt admitted for UTI
pt admitted with UTI/hypoxic respiratory failure in setting of sepsis/ new ad fib with RVR
pt admitted with hypoxic resp failure in setting of sepsis  s/p iv abx for UTI/PNA  New A fib
pt admitted with hypoxic resp failure in setting of sepsis  s/p iv abx for UTI/PNA  New A fib RVR
pt admitted with hypoxic resp failure in setting of sepsis  s/p iv abx for UTI/PNA  New A fib RVR
pt admitted with hypoxic resp failure in setting of sepsis, s/p UTI/PNA  New onset A fib with RVR on Heparin gtt

## 2019-09-06 NOTE — CONSULT NOTE ADULT - PROVIDER SPECIALTY LIST ADULT
Cardiology
ENT
Gastroenterology
Gastroenterology
Infectious Disease
Neurology
Palliative Care
Pulmonology
Neurology

## 2019-09-06 NOTE — PROGRESS NOTE ADULT - SUBJECTIVE AND OBJECTIVE BOX
House GI Progress Note    Chief Complaint:  Patient is a 85y old  Female who presents with a chief complaint of Abdominal pain (05 Sep 2019 13:38)      Interval Events / Subjective: NGT placed overnight, started on tube feeds. Pt reports dry mouth, being thirsty. Denies SOB. Still reports upper congestion, gurgling, but not choking on her own secretions. Denies abd pain/n/v/diarrhea, had one normal formed BM last night per patient. Conversation had at bedside with family last night regarding plan for possible PEG placement. L arm pain improved.      REVIEW OF SYSTEMS:   negative unless noted in HPI    MEDICATIONS:   MEDICATIONS  (STANDING):  ALBUTerol    0.083% 2.5 milliGRAM(s) Nebulizer every 6 hours  atorvastatin 40 milliGRAM(s) Oral <User Schedule>  buDESOnide    Inhalation Suspension 0.25 milliGRAM(s) Inhalation two times a day  carbidopa/levodopa  25/100 2 Tablet(s) Oral three times a day  ceFAZolin   IVPB 2000 milliGRAM(s) IV Intermittent every 12 hours  dextrose 5%. 1000 milliLiter(s) (50 mL/Hr) IV Continuous <Continuous>  dextrose 50% Injectable 12.5 Gram(s) IV Push once  dextrose 50% Injectable 25 Gram(s) IV Push once  dextrose 50% Injectable 25 Gram(s) IV Push once  furosemide    Tablet 20 milliGRAM(s) Oral daily  gabapentin   Solution 300 milliGRAM(s) Oral at bedtime  guaiFENesin    Syrup 200 milliGRAM(s) Oral every 6 hours  insulin lispro (HumaLOG) corrective regimen sliding scale   SubCutaneous every 6 hours  lidocaine   Patch 1 Patch Transdermal daily  metoprolol tartrate 75 milliGRAM(s) Oral every 12 hours  pantoprazole  Injectable 40 milliGRAM(s) IV Push daily  predniSONE   Tablet 10 milliGRAM(s) Oral daily  sodium chloride 3%  Inhalation 3 milliLiter(s) Inhalation every 12 hours    MEDICATIONS  (PRN):  acetaminophen    Suspension .. 650 milliGRAM(s) Oral every 6 hours PRN Moderate Pain (4 - 6), Severe Pain (7 - 10)  ALPRAZolam 0.5 milliGRAM(s) Oral two times a day PRN anxiety  dextrose 40% Gel 15 Gram(s) Oral once PRN Blood Glucose LESS THAN 70 milliGRAM(s)/deciliter  glucagon  Injectable 1 milliGRAM(s) IntraMuscular once PRN Glucose LESS THAN 70 milligrams/deciliter      ALLERGIES:   Allergies    penicillin (Unknown)    Intolerances        VITAL SIGNS:   Vital Signs Last 24 Hrs  T(C): 36.3 (06 Sep 2019 04:25), Max: 36.4 (05 Sep 2019 13:08)  T(F): 97.4 (06 Sep 2019 04:25), Max: 97.5 (05 Sep 2019 13:08)  HR: 60 (06 Sep 2019 06:12) (60 - 83)  BP: 108/70 (06 Sep 2019 04:25) (108/70 - 126/68)  RR: 18 (06 Sep 2019 04:25) (16 - 18)  SpO2: 100% (06 Sep 2019 06:12) (95% - 100%)  I&O's Summary      PHYSICAL EXAM:   GENERAL:  pt resting in chair, NAD, 5 L NC  HEENT:  atraumatic,normocephalic. EOMI/PERRLA  CHEST:  b/L ronchi and wheezes appreciated in posterior lung fields  HEART: no m/r/g, rrr  ABDOMEN:  soft, nontender, nondistended, BS+  EXTREMITIES: no edema; ecchymoses on BL UE  NEURO:  AAOx3, strength/sensation grossly intact    LABS:  CBC Full  -  ( 04 Sep 2019 09:54 )  WBC Count : 11.0 K/uL  RBC Count : 3.68 M/uL  Hemoglobin : 10.9 g/dL  Hematocrit : 33.4 %  Platelet Count - Automated : 233 K/uL  Mean Cell Volume : 90.6 fl  Mean Cell Hemoglobin : 29.7 pg  Mean Cell Hemoglobin Concentration : 32.8 gm/dL  Auto Neutrophil # : x  Auto Lymphocyte # : x  Auto Monocyte # : x  Auto Eosinophil # : x  Auto Basophil # : x  Auto Neutrophil % : x  Auto Lymphocyte % : x  Auto Monocyte % : x  Auto Eosinophil % : x  Auto Basophil % : x    09-06    136  |  100  |  22  ----------------------------<  102<H>  4.6   |  20<L>  |  1.23    Ca    9.8      06 Sep 2019 06:20  Phos  4.9     09-06  Mg     2.2     09-06        PTT - ( 05 Sep 2019 07:16 )  PTT:35.1 sec        IMAGING:  < from: Xray Chest 1 View- PORTABLE-Urgent (09.05.19 @ 23:44) >  EXAM:  XR CHEST PORTABLE URGENT 1V                            PROCEDURE DATE:  09/05/2019      ******PRELIMINARY REPORT******    ******PRELIMINARY REPORT******              INTERPRETATION:  distal tip of enteric tube in stomach      < end of copied text >

## 2019-09-06 NOTE — PROGRESS NOTE ADULT - ASSESSMENT
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 Patient has been afebrile this week, Tmax = 98.5, intermittently desaturating on 5L NC O2, downtrending leukocytosis (11.0 today), serum creatinine 1.19. Blood cultures on 9/1 and 8/30 growing MSSA, sputum culture on 8/31 also growing Staph Aureus. Blood culture taken on 9/1 later in the morning with no growth to date. CXR on 9/2 clear.     MSSA Bacteremia appears to be secondary to her pneumonia. Aspiration as a cause less likely as only growth of numerous MSSA on sputum culture. Would continue Cefazolin to target MSSA (patient PCN allergic but has been tolerating Zosyn). I would treat for at least 6 weeks of antibiotics given persistent bacteremia and history of TAVR.     Overall, MSSA Bacteremia, MSSA Pneumonia, Leukocytosis, s/p Bioprosthetic TAVR, Encephalopathy    Recommendations    --Continue Cefazolin 2 g q 12 hour (will require at least 6 weeks of antibiotics given persistent bacteremia)   --TTE to r/o endocarditis, pt is s/p TAVR  --Continue to monitor mental status - if any decline recommend CNS imaging (given 72H of Bacteremia to evaluate for embolic disease)  --Continue to follow CBC with diff  --Continue to follow temperature curve  --F/U Prelim BCx    I will be away over this upcoming weekend. Please contact the Infectious Diseases Office with any further questions or concerns.     One of my colleagues will see the patient in follow up this weekend.     Ancelmo Mendoza M.D.  [facility] Division of Infectious Disease  8AM-5PM: Pager Number [phone]  After Hours (or if no response): Please contact the Infectious Diseases Office at [phone]

## 2019-09-06 NOTE — PROGRESS NOTE ADULT - ATTENDING COMMENTS
MARY: improving cr 1.2 from 1.3, prerenal hypovolemia as was NPO without good IV or NG access with no po intake for a few days both have which established now  -IVF  -PEG feeds  -hold lasix  -monitor bmp    r/b/a of lovenox over heparin gggt explained to patient as she is refusing heparin ggt for a/c. She is aware and full understands increasing bleeding risk that can lead do death

## 2019-09-06 NOTE — PROGRESS NOTE ADULT - ASSESSMENT
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, Parkinson-like symptoms, anxiety/depression, AS s/p TAVR on plavix, overactive bladder, HFpEF with severe Mitral Stenosis, PMR (on chronic steroids), COPD (no formal diagnosis) presented 8/11/19 with SOB and abdominal pain, admitted for acute hypoxic and hypercarbic respiratory failure in setting of pneumonia and COPD exacerbation, course c/b UTI, acute metabolic encephalopathy, new onset afib and dysphagia, now s/p RRT and  MICU stay for increased work of breathing concerning for aspiration pneumonia, now DNR/DNI, GI re-consulted for PEG tube placement        **recs to follow** 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, Parkinson-like symptoms, anxiety/depression, AS s/p TAVR on plavix, overactive bladder, HFpEF with severe Mitral Stenosis, PMR (on chronic steroids), COPD (no formal diagnosis) presented 8/11/19 with SOB and abdominal pain, admitted for acute hypoxic and hypercarbic respiratory failure in setting of pneumonia and COPD exacerbation, course c/b UTI, acute metabolic encephalopathy, new onset afib and dysphagia, now s/p RRT and  MICU stay for increased work of breathing concerning for aspiration pneumonia, now DNR/DNI, GI re-consulted for PEG tube placement        - GI care being transferred to Dr. 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, Parkinson-like symptoms, anxiety/depression, AS s/p TAVR on plavix, overactive bladder, HFpEF with severe Mitral Stenosis, PMR (on chronic steroids), COPD (no formal diagnosis) presented 8/11/19 with SOB and abdominal pain, admitted for acute hypoxic and hypercarbic respiratory failure in setting of pneumonia and COPD exacerbation, course c/b UTI, acute metabolic encephalopathy, new onset afib and dysphagia, now s/p RRT and  MICU stay for increased work of breathing concerning for aspiration pneumonia, now DNR/DNI, GI re-consulted for PEG tube placement        -We will be signing off, as GI care being transferred to [facility]; see new consult note

## 2019-09-06 NOTE — PROGRESS NOTE ADULT - PROBLEM SELECTOR PLAN 9
BP stable -  - c/w metoprolol 75 BID   - no longer on losartan  - monitor BP BP stable   - c/w metoprolol 75 BID   - no longer on losartan  - monitor BP

## 2019-09-06 NOTE — PROVIDER CONTACT NOTE (OTHER) - NAME OF MD/NP/PA/DO NOTIFIED:
Dr. Boni Christie
Dr. Coleman
Glynn Lyon MD
Glynn Lyon MD
Haroon, Sylvia
Jaime Armando MD
MD Hughes
MD Kam
Michaela Mock
Michaela Mock -team 3
Michaela Mock MD
PADMINI Garcia
PADMINI Hamilton
PADMINI Matthews
TORO Lopez
Dr. Duckworth

## 2019-09-06 NOTE — PROGRESS NOTE ADULT - PROBLEM SELECTOR PLAN 7
- new onset Afib this admission. Stable on medication.  - d/c eliquis tonight for PEG early next week, consider starting heparin ggt  - metoprolol 75 mg BID for rate control   - c/w lasix 20 mg daily - new onset Afib this admission  - eliquis stopped per GI, optimial a/c at this time would be heparin in setting of mild MARY that is resolved but patient refusing heparin due to blooddraws, explained that we can use lovenox 1mg/kg once a day (crcl 29.7) however chance of bleeding Is higher  - metoprolol 75 mg BID for rate control

## 2019-09-06 NOTE — PROVIDER CONTACT NOTE (OTHER) - ACTION/TREATMENT ORDERED:
MD made aware of all ordered meds and routes. MD said to hold all oral and NG tube meds until day team gets in tomorrow
MD said he will order a bolus of fluids to run over an hour
MD said he will switch the potassium IV to pills, but the pills are too large for the pt. to swallow and cannot be crushed, so MD discontinued the potassium order.
IV Hydralazine ordered. Administer 0600 cozaar. Repeat BP in 30 mins. No further change in treatment will continue to assess
MD aware. will order STAT troponins. will come to see pt.
As per MD, administer labetalol IVP. Will continue to monitor patient.
As per provider, Cardizem IVP 5mg ordered STAT  Medication administered, will continue to monitor patient on tele.  Additional Cardizem 5mg IVP ordered
As per provider, Lopressor 2.5mg IVP ordered STAT  Medication administered, will continue to monitor patient on tele.  PT converted back to NSR
As per provider, at bedside to assess patient, STAT ABG ordered, CXR, and continuous pulse ox  Will continue to monitor patient closely
As per provider, hold labetalol, repeat vitals later. Will continue to monitor.
As per provider, metoprolol IVP 5mg ordered and given, will continue to monitor patient on tele.
As per provider, metoprolol IVP 5mg ordered and given, will continue to monitor patient on tele.
As per provider, will order metoprolol IVP 5mg  Medication administered, will continue to monitor patient on tele.
Cold packs
EKG ordered. MD  aware. Will continue to monitor.
Increase feed rate,   switch fluids to D5 and normal saline to support blood sugar until feeds are sufficient
MD aware. Speech and swallow ordered. Pt. NPO , Q6FS. Call bell in reach. Will continue to monitor.
PA made aware, assessed pt. at bedside, ordered cont pulse ox, 2LNC, chest PT, Xopenex, IV Tylenol
PADMINI Matthews aware, EKG, Lopressor, bolus LR and bladder scan ordered. Will continue to monitor.
Provider aware, EKG performed as per provider, no additional orders at this time. Will cont to closely monitor patient and tele.
TORO Lopez aware, pt asymptomatic, continue to monitor pt on tele
TORO Lopez aware, will give metoprolol 75mg PO now, will continue to monitor patient on tele.
continue medication may adjust daily bp meds
will continue to monitor.

## 2019-09-06 NOTE — PROGRESS NOTE ADULT - PROBLEM SELECTOR PLAN 5
Likely due to aspiration event. S/p MICU; extubated 8/31  - Now on 2L NC, satting in mid-90's  - Chest PT, chest vest  - C/w continuous pulse ox  - DNR/DNI after MICU stay

## 2019-09-06 NOTE — PROGRESS NOTE ADULT - PROBLEM SELECTOR PLAN 6
- pulmonary consult appreciated, rec adventitious lung sounds likely due to vocal cord paralysis, c/w with Pulmicort, albuterol and prednisone.   - c/w home dose prednisone 10mg daily chronically for PMR/GCA. Right temporal artery biopsy reportedly negative, but she gets right sided headaches.  - Maintain O2 sats between 88-92%

## 2019-09-06 NOTE — PROVIDER CONTACT NOTE (OTHER) - RECOMMENDATIONS
come see pt?
Cold packs
EKG? Continue to monitor.
Increase feed rate,   switch fluids to D5 and normal saline to support blood sugar until feeds are sufficient  Will administer 1/2 amp of D50 as per policy.
Notify MD, ?hold labetalol, repeat vitals?
Notify MD, ?repeat VS Acknowledged by EPHRAIM BOJORQUEZ., recommend beta blocker/BP med?
Notify PA, ice packs/hot packs for HA
Notify provider
Speech and Swallow, NPO, q6FS
continue medication as scheduled
monitor patient for future events.
notify provider
notify provider, med administration?

## 2019-09-06 NOTE — PROGRESS NOTE ADULT - SUBJECTIVE AND OBJECTIVE BOX
Patient is a 85y old  Female who presents with a chief complaint of Abdominal pain (06 Sep 2019 12:39)      SUBJECTIVE / OVERNIGHT EVENTS: NAEON. Feels slightly better energy than yesterday. No longer reporting headache.     MEDICATIONS  (STANDING):  ALBUTerol    0.083% 2.5 milliGRAM(s) Nebulizer every 6 hours  atorvastatin 40 milliGRAM(s) Oral <User Schedule>  buDESOnide    Inhalation Suspension 0.25 milliGRAM(s) Inhalation two times a day  carbidopa/levodopa  25/100 2 Tablet(s) Oral three times a day  ceFAZolin   IVPB 2000 milliGRAM(s) IV Intermittent every 12 hours  dextrose 5%. 1000 milliLiter(s) (50 mL/Hr) IV Continuous <Continuous>  dextrose 50% Injectable 12.5 Gram(s) IV Push once  dextrose 50% Injectable 25 Gram(s) IV Push once  dextrose 50% Injectable 25 Gram(s) IV Push once  gabapentin   Solution 300 milliGRAM(s) Oral at bedtime  guaiFENesin    Syrup 200 milliGRAM(s) Oral every 6 hours  insulin lispro (HumaLOG) corrective regimen sliding scale   SubCutaneous every 6 hours  lidocaine   Patch 1 Patch Transdermal daily  metoprolol tartrate 75 milliGRAM(s) Oral every 12 hours  pantoprazole  Injectable 40 milliGRAM(s) IV Push daily  predniSONE   Tablet 10 milliGRAM(s) Oral daily  sodium chloride 0.9%. 1000 milliLiter(s) (50 mL/Hr) IV Continuous <Continuous>  sodium chloride 3%  Inhalation 3 milliLiter(s) Inhalation every 12 hours    MEDICATIONS  (PRN):  acetaminophen    Suspension .. 650 milliGRAM(s) Oral every 6 hours PRN Moderate Pain (4 - 6), Severe Pain (7 - 10)  ALPRAZolam 0.5 milliGRAM(s) Oral two times a day PRN anxiety  dextrose 40% Gel 15 Gram(s) Oral once PRN Blood Glucose LESS THAN 70 milliGRAM(s)/deciliter  glucagon  Injectable 1 milliGRAM(s) IntraMuscular once PRN Glucose LESS THAN 70 milligrams/deciliter      Vital Signs Last 24 Hrs  T(C): 36.6 (06 Sep 2019 12:00), Max: 36.6 (06 Sep 2019 12:00)  T(F): 97.8 (06 Sep 2019 12:00), Max: 97.8 (06 Sep 2019 12:00)  HR: 65 (06 Sep 2019 12:00) (59 - 73)  BP: 137/70 (06 Sep 2019 12:00) (108/70 - 137/70)  BP(mean): --  RR: 20 (06 Sep 2019 12:00) (18 - 20)  SpO2: 98% (06 Sep 2019 12:00) (97% - 100%)  CAPILLARY BLOOD GLUCOSE      POCT Blood Glucose.: 76 mg/dL (06 Sep 2019 12:23)  POCT Blood Glucose.: 82 mg/dL (06 Sep 2019 06:13)  POCT Blood Glucose.: 121 mg/dL (06 Sep 2019 03:58)  POCT Blood Glucose.: 150 mg/dL (05 Sep 2019 18:07)    I&O's Summary      PHYSICAL EXAM:  GENERAL: NAD, well-developed  HEAD:  Atraumatic, Normocephalic  EYES: EOMI, PERRLA, conjunctiva and sclera clear  NECK: Supple, No JVD  CHEST/LUNG: Clear to auscultation bilaterally; No wheeze  HEART: Regular rate and rhythm; No murmurs, rubs, or gallops  ABDOMEN: Soft, Nontender, Nondistended; Bowel sounds present  EXTREMITIES:  2+ Peripheral Pulses, No clubbing, cyanosis, or edema  PSYCH: AAOx3  NEUROLOGY: non-focal  SKIN: No rashes or lesions    LABS:    09-06    136  |  100  |  22  ----------------------------<  102<H>  4.6   |  20<L>  |  1.23    Ca    9.8      06 Sep 2019 06:20  Phos  4.9     09-06  Mg     2.2     09-06      PTT - ( 05 Sep 2019 07:16 )  PTT:35.1 sec          Culture - Blood (collected 05 Sep 2019 14:42)  Source: .Blood  Preliminary Report (06 Sep 2019 15:01):    No growth to date.    Culture - Blood (collected 05 Sep 2019 10:02)  Source: .Blood  Preliminary Report (06 Sep 2019 11:01):    No growth to date.          RADIOLOGY & ADDITIONAL TESTS:    Imaging Personally Reviewed:    Consultant(s) Notes Reviewed:      Care Discussed with Consultants/Other Providers: Patient is a 85y old  Female who presents with a chief complaint of Abdominal pain (06 Sep 2019 12:39)      SUBJECTIVE / OVERNIGHT EVENTS: NAEON. Feels slightly better energy than yesterday. Acceptable for PEG tube placement. NG placed last night and in place per CXR    Patient refusing heparin ggt again, agreeable to lovenox    MEDICATIONS  (STANDING):  ALBUTerol    0.083% 2.5 milliGRAM(s) Nebulizer every 6 hours  atorvastatin 40 milliGRAM(s) Oral <User Schedule>  buDESOnide    Inhalation Suspension 0.25 milliGRAM(s) Inhalation two times a day  carbidopa/levodopa  25/100 2 Tablet(s) Oral three times a day  ceFAZolin   IVPB 2000 milliGRAM(s) IV Intermittent every 12 hours  dextrose 5%. 1000 milliLiter(s) (50 mL/Hr) IV Continuous <Continuous>  dextrose 50% Injectable 12.5 Gram(s) IV Push once  dextrose 50% Injectable 25 Gram(s) IV Push once  dextrose 50% Injectable 25 Gram(s) IV Push once  gabapentin   Solution 300 milliGRAM(s) Oral at bedtime  guaiFENesin    Syrup 200 milliGRAM(s) Oral every 6 hours  insulin lispro (HumaLOG) corrective regimen sliding scale   SubCutaneous every 6 hours  lidocaine   Patch 1 Patch Transdermal daily  metoprolol tartrate 75 milliGRAM(s) Oral every 12 hours  pantoprazole  Injectable 40 milliGRAM(s) IV Push daily  predniSONE   Tablet 10 milliGRAM(s) Oral daily  sodium chloride 0.9%. 1000 milliLiter(s) (50 mL/Hr) IV Continuous <Continuous>  sodium chloride 3%  Inhalation 3 milliLiter(s) Inhalation every 12 hours    MEDICATIONS  (PRN):  acetaminophen    Suspension .. 650 milliGRAM(s) Oral every 6 hours PRN Moderate Pain (4 - 6), Severe Pain (7 - 10)  ALPRAZolam 0.5 milliGRAM(s) Oral two times a day PRN anxiety  dextrose 40% Gel 15 Gram(s) Oral once PRN Blood Glucose LESS THAN 70 milliGRAM(s)/deciliter  glucagon  Injectable 1 milliGRAM(s) IntraMuscular once PRN Glucose LESS THAN 70 milligrams/deciliter      Vital Signs Last 24 Hrs  T(C): 36.6 (06 Sep 2019 12:00), Max: 36.6 (06 Sep 2019 12:00)  T(F): 97.8 (06 Sep 2019 12:00), Max: 97.8 (06 Sep 2019 12:00)  HR: 65 (06 Sep 2019 12:00) (59 - 73)  BP: 137/70 (06 Sep 2019 12:00) (108/70 - 137/70)  BP(mean): --  RR: 20 (06 Sep 2019 12:00) (18 - 20)  SpO2: 98% (06 Sep 2019 12:00) (97% - 100%)  CAPILLARY BLOOD GLUCOSE      POCT Blood Glucose.: 76 mg/dL (06 Sep 2019 12:23)  POCT Blood Glucose.: 82 mg/dL (06 Sep 2019 06:13)  POCT Blood Glucose.: 121 mg/dL (06 Sep 2019 03:58)  POCT Blood Glucose.: 150 mg/dL (05 Sep 2019 18:07)    I&O's Summary      PHYSICAL EXAM:  GENERAL: NAD, well-developed  HEAD:  Atraumatic, Normocephalic  Mouth: no teeth  NECK: Supple, No JVD  CHEST/LUNG: Clear to auscultation bilaterally; No wheeze  HEART: Regular rate and rhythm;   ABDOMEN: Soft, Nontender, Nondistended; Bowel sounds present  EXTREMITIES:  2+ Peripheral Pulses, No clubbing, cyanosis, or edema  PSYCH: AAOx3  NEUROLOGY: non-focal  SKIN: ecchymosis through both UE    LABS:    09-06    136  |  100  |  22  ----------------------------<  102<H>  4.6   |  20<L>  |  1.23    Ca    9.8      06 Sep 2019 06:20  Phos  4.9     09-06  Mg     2.2     09-06      PTT - ( 05 Sep 2019 07:16 )  PTT:35.1 sec          Culture - Blood (collected 05 Sep 2019 14:42)  Source: .Blood  Preliminary Report (06 Sep 2019 15:01):    No growth to date.    Culture - Blood (collected 05 Sep 2019 10:02)  Source: .Blood  Preliminary Report (06 Sep 2019 11:01):    No growth to date.          RADIOLOGY & ADDITIONAL TESTS:    Imaging Personally Reviewed:    Consultant(s) Notes Reviewed:  all    Care Discussed with Consultants/Other Providers:

## 2019-09-06 NOTE — PROGRESS NOTE ADULT - SUBJECTIVE AND OBJECTIVE BOX
Follow Up:      Interval History:    REVIEW OF SYSTEMS  [  ] ROS unobtainable because:    [  ] All other systems negative except as noted below    Constitutional:  [ ] fever [ ] chills  [ ] weight loss  [ ] weakness  Skin:  [ ] rash [ ] phlebitis	  Eyes: [ ] icterus [ ] pain  [ ] discharge	  ENMT: [ ] sore throat  [ ] thrush [ ] ulcers [ ] exudates  Respiratory: [ ] dyspnea [ ] hemoptysis [ ] cough [ ] sputum	  Cardiovascular:  [ ] chest pain [ ] palpitations [ ] edema	  Gastrointestinal:  [ ] nausea [ ] vomiting [ ] diarrhea [ ] constipation [ ] pain	  Genitourinary:  [ ] dysuria [ ] frequency [ ] hematuria [ ] discharge [ ] flank pain  [ ] incontinence  Musculoskeletal:  [ ] myalgias [ ] arthralgias [ ] arthritis  [ ] back pain  Neurological:  [ ] headache [ ] seizures  [ ] confusion/altered mental status    Allergies  penicillin (Unknown)        ANTIMICROBIALS:  ceFAZolin   IVPB 2000 every 12 hours      OTHER MEDS:  MEDICATIONS  (STANDING):  acetaminophen    Suspension .. 650 every 6 hours PRN  ALBUTerol    0.083% 2.5 every 6 hours  ALPRAZolam 0.5 two times a day PRN  atorvastatin 40 <User Schedule>  buDESOnide    Inhalation Suspension 0.25 two times a day  carbidopa/levodopa  25/100 2 three times a day  dextrose 40% Gel 15 once PRN  dextrose 50% Injectable 12.5 once  dextrose 50% Injectable 25 once  dextrose 50% Injectable 25 once  enoxaparin Injectable 50 every 24 hours  gabapentin   Solution 300 at bedtime  glucagon  Injectable 1 once PRN  guaiFENesin    Syrup 200 every 6 hours  insulin lispro (HumaLOG) corrective regimen sliding scale  every 6 hours  metoprolol tartrate 75 every 12 hours  pantoprazole  Injectable 40 daily  predniSONE   Tablet 10 daily  sodium chloride 3%  Inhalation 3 every 12 hours      Vital Signs Last 24 Hrs  T(C): 36.6 (06 Sep 2019 12:00), Max: 36.6 (06 Sep 2019 12:00)  T(F): 97.8 (06 Sep 2019 12:00), Max: 97.8 (06 Sep 2019 12:00)  HR: 60 (06 Sep 2019 18:20) (59 - 71)  BP: 137/70 (06 Sep 2019 12:00) (108/70 - 137/70)  BP(mean): --  RR: 20 (06 Sep 2019 12:00) (18 - 20)  SpO2: 98% (06 Sep 2019 18:20) (97% - 100%)    PHYSICAL EXAM:  General: non-toxic  HEAD/EYES: anicteric, PERRL  ENT:  supple  Cardiovascular:   S1, S2  Respiratory:  clear bilaterally  GI:  soft, non-tender, normal bowel sounds  :  no CVA tenderness   Musculoskeletal:  no synovitis  Neurologic:  grossly non-focal  Skin:  no rash  Lymph: no lymphadenopathy  Psychiatric:  appropriate affect  Vascular:  no phlebitis              09-06    136  |  100  |  22  ----------------------------<  102<H>  4.6   |  20<L>  |  1.23    Ca    9.8      06 Sep 2019 06:20  Phos  4.9     09-06  Mg     2.2     09-06            MICROBIOLOGY:  v  .Blood  09-05-19   No growth to date.  --  --      .Blood  09-05-19   No growth to date.  --  --      .Blood  09-01-19   No growth at 5 days.  --  --      .Blood  09-01-19   Growth in anaerobic bottle: Staphylococcus aureus  See previous culture [id number]  --    Growth in anaerobic bottle: Gram Positive Cocci in Clusters      .Sputum  08-31-19   Numerous Staphylococcus aureus  Normal Respiratory Martha present  --  Staphylococcus aureus      .Urine  08-30-19   >=3 organisms. Probable collection contamination.  --  --      .Blood  08-30-19   Growth in aerobic bottle: Staphylococcus aureus  "Due to technical problems, Proteus sp. will Not be reported as part of  the BCID panel until further notice"  ***Blood Panel PCR results on this specimen are available  approximately 3 hours after the Gram stain result.***  Gram stain, PCR, and/or culture results may not always  correspond due to difference in methodologies.  ************************************************************  This PCR assay was performed using [employer].  The following targets are tested for: Enterococcus,  vancomycin resistant enterococci, Listeria monocytogenes,  coagulase negative staphylococci, S. aureus,  methicillin resistant S. aureus, Streptococcus agalactiae  (Group B), S. pneumoniae, S. pyogenes (Group A),  Acinetobacter baumannii, Enterobacter cloacae, E. coli,  Klebsiella oxytoca, K. pneumoniae, Proteus sp.,  Serratia marcescens, Haemophilus influenzae,  Neisseria meningitidis, Pseudomonas aeruginosa, Candida  albicans, C. glabrata, C krusei, C parapsilosis,  C. tropicalis and the KPC resistance gene.  --  Blood Culture PCR  Staphylococcus aureus      .Blood  08-11-19   No growth at 5 days.  --  --      .Urine  08-10-19   >100,000 CFU/ml Klebsiella pneumoniae  --  Klebsiella pneumoniae      .Blood  08-10-19   No growth at 5 days.  --  --                RADIOLOGY: Follow Up:  MSSA Bacteremia, MSSA PNA    Interval History: No chills or fevers. Confused this PM but denying worsening SOB. No N/V/D per RN     REVIEW OF SYSTEMS  [ x ] ROS unobtainable because:  encephalopathy  [  ] All other systems negative except as noted below    Constitutional:  [ ] fever [ ] chills  [ ] weight loss  [ ] weakness  Skin:  [ ] rash [ ] phlebitis	  Eyes: [ ] icterus [ ] pain  [ ] discharge	  ENMT: [ ] sore throat  [ ] thrush [ ] ulcers [ ] exudates  Respiratory: [ ] dyspnea [ ] hemoptysis [ ] cough [ ] sputum	  Cardiovascular:  [ ] chest pain [ ] palpitations [ ] edema	  Gastrointestinal:  [ ] nausea [ ] vomiting [ ] diarrhea [ ] constipation [ ] pain	  Genitourinary:  [ ] dysuria [ ] frequency [ ] hematuria [ ] discharge [ ] flank pain  [ ] incontinence  Musculoskeletal:  [ ] myalgias [ ] arthralgias [ ] arthritis  [ ] back pain  Neurological:  [ ] headache [ ] seizures  [ ] confusion/altered mental status    Allergies  penicillin (Unknown)        ANTIMICROBIALS:  ceFAZolin   IVPB 2000 every 12 hours      OTHER MEDS:  MEDICATIONS  (STANDING):  acetaminophen    Suspension .. 650 every 6 hours PRN  ALBUTerol    0.083% 2.5 every 6 hours  ALPRAZolam 0.5 two times a day PRN  atorvastatin 40 <User Schedule>  buDESOnide    Inhalation Suspension 0.25 two times a day  carbidopa/levodopa  25/100 2 three times a day  dextrose 40% Gel 15 once PRN  dextrose 50% Injectable 12.5 once  dextrose 50% Injectable 25 once  dextrose 50% Injectable 25 once  enoxaparin Injectable 50 every 24 hours  gabapentin   Solution 300 at bedtime  glucagon  Injectable 1 once PRN  guaiFENesin    Syrup 200 every 6 hours  insulin lispro (HumaLOG) corrective regimen sliding scale  every 6 hours  metoprolol tartrate 75 every 12 hours  pantoprazole  Injectable 40 daily  predniSONE   Tablet 10 daily  sodium chloride 3%  Inhalation 3 every 12 hours      Vital Signs Last 24 Hrs  T(C): 36.6 (06 Sep 2019 12:00), Max: 36.6 (06 Sep 2019 12:00)  T(F): 97.8 (06 Sep 2019 12:00), Max: 97.8 (06 Sep 2019 12:00)  HR: 60 (06 Sep 2019 18:20) (59 - 71)  BP: 137/70 (06 Sep 2019 12:00) (108/70 - 137/70)  BP(mean): --  RR: 20 (06 Sep 2019 12:00) (18 - 20)  SpO2: 98% (06 Sep 2019 18:20) (97% - 100%)    PHYSICAL EXAMINATION:  General: Alert and Awake but lethargic, NAD  HEENT: PERRL, EOMI  Neck: Supple  Cardiac: RRR, No M/R/G  Resp: diffuse rhonchi and scattered wheezes present b/l in anterior and posterior lung fields  Abdomen: NBS, NT/ND, No HSM, No rigidity or guarding  MSK: No LE edema. No Calf tenderness  : No barba  Skin: No rashes or lesions. Skin is warm and dry to the touch.   Neuro: Alert and Awake but lethargic,. CN 2-12 Grossly intact. Moves all four extremities spontaneously.  Psych: Encephalopathic - unable to assess      09-06    136  |  100  |  22  ----------------------------<  102<H>  4.6   |  20<L>  |  1.23    Ca    9.8      06 Sep 2019 06:20  Phos  4.9     09-06  Mg     2.2     09-06            MICROBIOLOGY:  v  .Blood  09-05-19   No growth to date.  --  --      .Blood  09-05-19   No growth to date.  --  --      .Blood  09-01-19   No growth at 5 days.  --  --      .Blood  09-01-19   Growth in anaerobic bottle: Staphylococcus aureus  See previous culture [id number]  --    Growth in anaerobic bottle: Gram Positive Cocci in Clusters      .Sputum  08-31-19   Numerous Staphylococcus aureus  Normal Respiratory Martha present  --  Staphylococcus aureus      .Urine  08-30-19   >=3 organisms. Probable collection contamination.  --  --      .Blood  08-30-19   Growth in aerobic bottle: Staphylococcus aureus  "Due to technical problems, Proteus sp. will Not be reported as part of  the BCID panel until further notice"  ***Blood Panel PCR results on this specimen are available  approximately 3 hours after the Gram stain result.***  Gram stain, PCR, and/or culture results may not always  correspond due to difference in methodologies.  ************************************************************  This PCR assay was performed using [employer].  The following targets are tested for: Enterococcus,  vancomycin resistant enterococci, Listeria monocytogenes,  coagulase negative staphylococci, S. aureus,  methicillin resistant S. aureus, Streptococcus agalactiae  (Group B), S. pneumoniae, S. pyogenes (Group A),  Acinetobacter baumannii, Enterobacter cloacae, E. coli,  Klebsiella oxytoca, K. pneumoniae, Proteus sp.,  Serratia marcescens, Haemophilus influenzae,  Neisseria meningitidis, Pseudomonas aeruginosa, Candida  albicans, C. glabrata, C krusei, C parapsilosis,  C. tropicalis and the KPC resistance gene.  --  Blood Culture PCR  Staphylococcus aureus      .Blood  08-11-19   No growth at 5 days.  --  --      .Urine  08-10-19   >100,000 CFU/ml Klebsiella pneumoniae  --  Klebsiella pneumoniae      .Blood  08-10-19   No growth at 5 days.  --  --    RADIOLOGY:    EXAM:  XR CHEST PORTABLE URGENT 1V                        PROCEDURE DATE:  09/05/2019    The NG tube tip is in the stomach.

## 2019-09-06 NOTE — CONSULT NOTE ADULT - SUBJECTIVE AND OBJECTIVE BOX
Patient is a 85y old  Female who presents with a chief complaint of Abdominal pain (06 Sep 2019 08:57)      HPI:    85 year-old female with GERD, HTN, HLD, 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, Parkinson-like symptoms, anxiety/depression, AS s/p TAVR on plavix, overactive bladder, HFpEF with severe Mitral Stenosis, PMR (on chronic steroids), COPD (no formal diagnosis) presented 8/11/19 with SOB and abdominal pain, admitted for acute hypoxic and hypercarbic respiratory failure in setting of pneumonia and COPD exacerbation, transferred to MICU for acute respiratory distress, s/p intubation on 8/30, extubation on 8/31. Patient has been afebrile this week, Tmax = 98.5, intermittently desaturating on 5L NC O2, downtrending leukocytosis (11.0 today), serum creatinine 1.19. Blood cultures on 9/1 and 8/30 growing MSSA, sputum culture on 8/31 also growing Staph Aureus. Blood culture taken on 9/1 later in the morning with no growth to date. CXR on 9/2 clear. Patient has PCN allergy with unknown reaction and has been tolerating Zosyn well.  MSSA Bacteremia appears to be secondary to her pneumonia as per ID.       Pt has had prior Dx od dysphagia and had been on dysphagia diet in past, reportedly had improved with SLP therapy and was put back on soft regular diet. Pt edentulous.  +recent outpt Abx and had c/o odynophagia prior to this admission and was suspected to have thrush (no EGD for Dx)    Has been followed by House GI team this admission and we are now asked to assume GI care and evaluate for PEG per pt's family wishes.  Pt on Abx for MSSA PNA as noted above, ID evaluation in progress. Has baseline cough with very wet vocal quality, on AC with AF (new) with Eliquis (last dose 9/5 pm), now on hold with plan for bridging short acting AC (Lovenox vs Heparin gtts as per d/w Medicine attending)    Has had prior EGD (Dr Nance) and was referred for further outpt UGI testing ?manometry/motility testing per son who  Had fiberoptic evaluation with ENT 8/15/19 - grossly aspirating secretions, paralyzed right vocal cord  Has failed multiple SLP evaluations this admission    was seen by Palliative team, DNR/DNI and initally plans for no feeding tube; but family reconsidered after discussion with outpt PCP and now pt and son are requesting PEG    PAST MEDICAL & SURGICAL HISTORY:  Insomnia  Iron deficiency anemia  Anxiety  Parkinson disease  Aortic stenosis  Neuropathy  Polymyalgia rheumatica  Diabetes  Hyperlipidemia  Hypertension  S/P TAVR (transcatheter aortic valve replacement)      Allergies  penicillin (Unknown)      MEDICATIONS  (STANDING):  ALBUTerol    0.083% 2.5 milliGRAM(s) Nebulizer every 6 hours  atorvastatin 40 milliGRAM(s) Oral <User Schedule>  buDESOnide    Inhalation Suspension 0.25 milliGRAM(s) Inhalation two times a day  carbidopa/levodopa  25/100 2 Tablet(s) Oral three times a day  ceFAZolin   IVPB 2000 milliGRAM(s) IV Intermittent every 12 hours  dextrose 5%. 1000 milliLiter(s) (50 mL/Hr) IV Continuous <Continuous>  dextrose 50% Injectable 12.5 Gram(s) IV Push once  dextrose 50% Injectable 25 Gram(s) IV Push once  dextrose 50% Injectable 25 Gram(s) IV Push once  gabapentin   Solution 300 milliGRAM(s) Oral at bedtime  guaiFENesin    Syrup 200 milliGRAM(s) Oral every 6 hours  insulin lispro (HumaLOG) corrective regimen sliding scale   SubCutaneous every 6 hours  lidocaine   Patch 1 Patch Transdermal daily  metoprolol tartrate 75 milliGRAM(s) Oral every 12 hours  pantoprazole  Injectable 40 milliGRAM(s) IV Push daily  predniSONE   Tablet 10 milliGRAM(s) Oral daily  sodium chloride 0.9%. 1000 milliLiter(s) (50 mL/Hr) IV Continuous <Continuous>  sodium chloride 3%  Inhalation 3 milliLiter(s) Inhalation every 12 hours    MEDICATIONS  (PRN):  acetaminophen    Suspension .. 650 milliGRAM(s) Oral every 6 hours PRN Moderate Pain (4 - 6), Severe Pain (7 - 10)  ALPRAZolam 0.5 milliGRAM(s) Oral two times a day PRN anxiety  dextrose 40% Gel 15 Gram(s) Oral once PRN Blood Glucose LESS THAN 70 milliGRAM(s)/deciliter  glucagon  Injectable 1 milliGRAM(s) IntraMuscular once PRN Glucose LESS THAN 70 milligrams/deciliter      Social History:  lived at home, was at rehab recently  former tobacco       Advanced Directives: (     ) None    (    X  ) DNR    (   X  ) DNI    (     ) Health Care Proxy:     Review of Systems:    General:  No wt loss, fevers, chills, night sweats,fatigue,   CV:  No pain, palpitatioins, +HTN HLD +AF s/p TAVR, severe MS  Resp:  see HPI +cough +secretions +COPD  GI:  see HPI  : +incontinence  No pain, bleeding, nocturia  Muscle:  +PMR  Neuro:  parkinsonian symptoms  Psych:  No fatigue, insomnia, mood problems +anxiety/depression  Endocrine:  No polyuria, polydypsia, cold/heat intolerance  Heme:  No petechiae, ecchymosis, +easy bruisability +AC  Skin:  No rash, tattoos, scars, edema      Vital Signs Last 24 Hrs  T(C): 36.3 (06 Sep 2019 04:25), Max: 36.4 (05 Sep 2019 13:08)  T(F): 97.4 (06 Sep 2019 04:25), Max: 97.5 (05 Sep 2019 13:08)  HR: 59 (06 Sep 2019 11:52) (59 - 83)  BP: 108/70 (06 Sep 2019 04:25) (108/70 - 126/68)  BP(mean): --  RR: 18 (06 Sep 2019 04:25) (16 - 18)  SpO2: 98% (06 Sep 2019 11:52) (97% - 100%)    PHYSICAL EXAM:    Constitutional: edentulous elderly female, alert and responsive on NC 5LPM no retractions +cough and wet vocal quality  Neck: No LAD, supple, no retractions, no JVD  Respiratory: diffuse rhonchi, no wheeze  Cardiovascular: S1 and S2, +Murmur  Gastrointestinal: BS+, soft, obese NT/ND  Extremities: No peripheral edema, neg clubbing, cyanosis  Vascular: 2+ peripheral pulses  Neurological: A/O x 3, no focal  asymmetry  Psychiatric: Normal mood, normal affect  Skin: No rashes, +multiple areas of healing bruises on arms      LABS:  Complete Blood Count STAT (09.04.19 @ 09:54)    WBC Count: 11.0 K/uL    RBC Count: 3.68 M/uL    Hemoglobin: 10.9 g/dL    Hematocrit: 33.4 %    Mean Cell Volume: 90.6 fl    Mean Cell Hemoglobin: 29.7 pg    Mean Cell Hemoglobin Conc: 32.8 gm/dL    Red Cell Distrib Width: 14.3 %    Platelet Count - Automated: 233 K/uL      09-06    136  |  100  |  22  ----------------------------<  102<H>  4.6   |  20<L>  |  1.23    Ca    9.8      06 Sep 2019 06:20  Phos  4.9     09-06  Mg     2.2     09-06      PTT - ( 05 Sep 2019 07:16 )  PTT:35.1 sec      RADIOLOGY & ADDITIONAL TESTS:  < from: Xray Chest 1 View- PORTABLE-Urgent (09.05.19 @ 23:44) >  PROCEDURE DATE:  09/05/2019      ******PRELIMINARY REPORT******    ******PRELIMINARY REPORT******              INTERPRETATION:  distal tip of enteric tube in stomach    < end of copied text >

## 2019-09-06 NOTE — PROGRESS NOTE ADULT - PROBLEM SELECTOR PLAN 1
S/p vocal cord injection for vocal cord paralysis  - failed speech and swallow study and MBS on 08/16 and 08/23, 9/3  - patient, family now requesting PEG tube, GI consulted, will do PEG next monday or tuesday, has to be off eliquis for 72 hours. cards consult for optimization  - they are refusing for NG tube to be placed by primary team due to failure, will ask surgery team to assist   - Aspiration precautions and Elevate head of bed S/p vocal cord injection for vocal cord paralysis  - failed speech and swallow study and MBS on 08/16 and 08/23, 9/3  - discussed with GI NP Gordon, plan for PEG Tuesday w/ Dr Vivar ([facility] partner). Per GI hold eliquis for at least 72 hours, ok with loveonx or heparin if needed over weekend  - NG placed and confirmed by xray, started feeds per nutrition  - Aspiration precautions and Elevate head of bed  - will get cards consult for optimization

## 2019-09-06 NOTE — PROGRESS NOTE ADULT - PROBLEM SELECTOR PLAN 10
a1c 6  - Patient not on home anti-hyperglycemics  - FS acceptable  - c/w Insulin sliding scale      Prophylaxis:  DVT ppx: restart heparin ggt for PEG placement next week  Diet: NPO except meds  Dispo: poor prognosis, DNR/DNI, palliative following, patient and family now wanting PEG next week a1c 6  - FS low, started and increased NG feeds per nutrition  - c/w Insulin sliding scale  DVT ppx: on lovenox   Dispo: poor prognosis, DNR/DNI, palliative following, plan for PEG Tuesday AM

## 2019-09-06 NOTE — CONSULT NOTE ADULT - NSHPATTENDINGPLANDISCUSS_GEN_ALL_CORE
Plan discussed with cardiology fellow, Dr. Mock; patient seen and examined.       I was physically present for the key portions of the evaluation and management (E/M) service provided.    I agree with the above history, physical, and plan which I have reviewed and edited where appropriate.
PADMINI Shaw
Medicine Team

## 2019-09-06 NOTE — PROGRESS NOTE ADULT - PROBLEM SELECTOR PLAN 3
bcx with MSSA; 2/2 aspiration PNA vs unlikely endocarditis however s/p TAVR  --per ID switch to cefazolin 2gram q12 hours  - afebrile, leukocytosis unchanged, HD stable  - TTE to r/o vegitation bcx with MSSA; 2/2 aspiration PNA vs unlikely endocarditis however s/p TAVR  --c/w cefazolin 2gram q12 hours x 6 weeks  - afebrile, leukocytosis unchanged, HD stable  - TTE to r/o vegetation

## 2019-09-06 NOTE — PROGRESS NOTE ADULT - ASSESSMENT
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

## 2019-09-06 NOTE — CONSULT NOTE ADULT - CONSULT REQUESTED DATE/TIME
05-Sep-2019 10:48
06-Sep-2019 12:40
15-Aug-2019 16:56
16-Aug-2019 18:27
18-Aug-2019 02:15
27-Aug-2019 11:44
30-Aug-2019 14:31
10-Aug-2019 20:38
14-Aug-2019 10:38

## 2019-09-06 NOTE — PROGRESS NOTE ADULT - PROBLEM SELECTOR PLAN 2
K 2.8 this AM. Asymptomatic.  likely 2/2 NPO status, patient intolerant of ON attempts to replete through IV  - Replete K with IV 10mg x 3 doses at slower infusion rate, K powder 40mg x 2 doses. Also on 1/2NS + D5W + KCl at 75cc/hr  - Repeat BMP at 6PM and replete additionally if necessary   -check mag  -creatinine also uptrending, c/w gentle IVH  - surgery team to assist with NG tube resolved with repletion

## 2019-09-06 NOTE — CHART NOTE - NSCHARTNOTEFT_GEN_A_CORE
Was notified by RN that she was hitting resistance when trying to use the NGT that surgery had placed on 9/5. After reviewing the confirmatory CXR, I noticed it looked as if the NGT was kinked in the stomach, and so I adjusted pulled the NGT out about 5-6 cm until there was no longer resistance to air in the syringe and obtained a second confirmatory CXR for NGT placement. The CXR appears to show the unkinked NGT in comparison to the film on 9/5, and has patent flow. Patient tolerated the procedure well.

## 2019-09-06 NOTE — CONSULT NOTE ADULT - ATTENDING COMMENTS
Plan for PEG early next week; will need Eliquis held and appreciate R/B/A by cardiology prior to procedure.

## 2019-09-07 LAB
ANION GAP SERPL CALC-SCNC: 19 MMOL/L — HIGH (ref 5–17)
BUN SERPL-MCNC: 20 MG/DL — SIGNIFICANT CHANGE UP (ref 7–23)
CALCIUM SERPL-MCNC: 9.7 MG/DL — SIGNIFICANT CHANGE UP (ref 8.4–10.5)
CHLORIDE SERPL-SCNC: 100 MMOL/L — SIGNIFICANT CHANGE UP (ref 96–108)
CO2 SERPL-SCNC: 21 MMOL/L — LOW (ref 22–31)
CREAT SERPL-MCNC: 0.69 MG/DL — SIGNIFICANT CHANGE UP (ref 0.5–1.3)
GLUCOSE BLDC GLUCOMTR-MCNC: 127 MG/DL — HIGH (ref 70–99)
GLUCOSE BLDC GLUCOMTR-MCNC: 88 MG/DL — SIGNIFICANT CHANGE UP (ref 70–99)
GLUCOSE BLDC GLUCOMTR-MCNC: 92 MG/DL — SIGNIFICANT CHANGE UP (ref 70–99)
GLUCOSE SERPL-MCNC: 139 MG/DL — HIGH (ref 70–99)
HCT VFR BLD CALC: 34.4 % — LOW (ref 34.5–45)
HGB BLD-MCNC: 11.3 G/DL — LOW (ref 11.5–15.5)
MCHC RBC-ENTMCNC: 30.5 PG — SIGNIFICANT CHANGE UP (ref 27–34)
MCHC RBC-ENTMCNC: 32.9 GM/DL — SIGNIFICANT CHANGE UP (ref 32–36)
MCV RBC AUTO: 92.8 FL — SIGNIFICANT CHANGE UP (ref 80–100)
PLATELET # BLD AUTO: 235 K/UL — SIGNIFICANT CHANGE UP (ref 150–400)
POTASSIUM SERPL-MCNC: 4.1 MMOL/L — SIGNIFICANT CHANGE UP (ref 3.5–5.3)
POTASSIUM SERPL-SCNC: 4.1 MMOL/L — SIGNIFICANT CHANGE UP (ref 3.5–5.3)
RBC # BLD: 3.71 M/UL — LOW (ref 3.8–5.2)
RBC # FLD: 15.3 % — HIGH (ref 10.3–14.5)
SODIUM SERPL-SCNC: 140 MMOL/L — SIGNIFICANT CHANGE UP (ref 135–145)
WBC # BLD: 8.4 K/UL — SIGNIFICANT CHANGE UP (ref 3.8–10.5)
WBC # FLD AUTO: 8.4 K/UL — SIGNIFICANT CHANGE UP (ref 3.8–10.5)

## 2019-09-07 PROCEDURE — 99232 SBSQ HOSP IP/OBS MODERATE 35: CPT | Mod: GC

## 2019-09-07 RX ORDER — METOPROLOL TARTRATE 50 MG
5 TABLET ORAL EVERY 12 HOURS
Refills: 0 | Status: DISCONTINUED | OUTPATIENT
Start: 2019-09-07 | End: 2019-09-10

## 2019-09-07 RX ORDER — SODIUM CHLORIDE 9 MG/ML
1000 INJECTION, SOLUTION INTRAVENOUS
Refills: 0 | Status: DISCONTINUED | OUTPATIENT
Start: 2019-09-07 | End: 2019-09-11

## 2019-09-07 RX ORDER — ENOXAPARIN SODIUM 100 MG/ML
50 INJECTION SUBCUTANEOUS EVERY 12 HOURS
Refills: 0 | Status: COMPLETED | OUTPATIENT
Start: 2019-09-07 | End: 2019-09-08

## 2019-09-07 RX ADMIN — ALBUTEROL 2.5 MILLIGRAM(S): 90 AEROSOL, METERED ORAL at 11:14

## 2019-09-07 RX ADMIN — ALBUTEROL 2.5 MILLIGRAM(S): 90 AEROSOL, METERED ORAL at 17:58

## 2019-09-07 RX ADMIN — Medication 200 MILLIGRAM(S): at 05:43

## 2019-09-07 RX ADMIN — LIDOCAINE 1 PATCH: 4 CREAM TOPICAL at 14:12

## 2019-09-07 RX ADMIN — SODIUM CHLORIDE 3 MILLILITER(S): 9 INJECTION INTRAMUSCULAR; INTRAVENOUS; SUBCUTANEOUS at 17:58

## 2019-09-07 RX ADMIN — PANTOPRAZOLE SODIUM 40 MILLIGRAM(S): 20 TABLET, DELAYED RELEASE ORAL at 14:13

## 2019-09-07 RX ADMIN — Medication 0.25 MILLIGRAM(S): at 06:30

## 2019-09-07 RX ADMIN — CARBIDOPA AND LEVODOPA 2 TABLET(S): 25; 100 TABLET ORAL at 05:43

## 2019-09-07 RX ADMIN — SODIUM CHLORIDE 40 MILLILITER(S): 9 INJECTION, SOLUTION INTRAVENOUS at 14:09

## 2019-09-07 RX ADMIN — Medication 0.25 MILLIGRAM(S): at 17:58

## 2019-09-07 RX ADMIN — Medication 5 MILLIGRAM(S): at 22:09

## 2019-09-07 RX ADMIN — Medication 100 MILLIGRAM(S): at 06:24

## 2019-09-07 RX ADMIN — ALBUTEROL 2.5 MILLIGRAM(S): 90 AEROSOL, METERED ORAL at 06:28

## 2019-09-07 RX ADMIN — LIDOCAINE 1 PATCH: 4 CREAM TOPICAL at 00:02

## 2019-09-07 RX ADMIN — Medication 100 MILLIGRAM(S): at 18:55

## 2019-09-07 RX ADMIN — Medication 10 MILLIGRAM(S): at 05:43

## 2019-09-07 RX ADMIN — LIDOCAINE 1 PATCH: 4 CREAM TOPICAL at 22:02

## 2019-09-07 RX ADMIN — ENOXAPARIN SODIUM 50 MILLIGRAM(S): 100 INJECTION SUBCUTANEOUS at 18:56

## 2019-09-07 RX ADMIN — SODIUM CHLORIDE 3 MILLILITER(S): 9 INJECTION INTRAMUSCULAR; INTRAVENOUS; SUBCUTANEOUS at 06:28

## 2019-09-07 NOTE — PROGRESS NOTE ADULT - PROBLEM SELECTOR PLAN 6
- pulmonary consult appreciated, rec adventitious lung sounds likely due to vocal cord paralysis, c/w with Pulmicort, albuterol and prednisone.   - c/w home dose prednisone 10mg daily chronically for PMR/GCA. Right temporal artery biopsy reportedly negative, but she gets right sided headaches.  - Maintain O2 sats between 88-92% -Pulmonary c/s   -Continue with home dose prednisone 10mg daily chronically for PMR/GCA. Right temporal artery biopsy reportedly negative, but she gets right sided headaches.  - Maintain O2 sats greater than 92% -Continue with home dose prednisone 10mg daily chronically for PMR/GCA. Right temporal artery biopsy reportedly negative, but she gets right sided headaches.  - Maintain O2 sats greater than 92%

## 2019-09-07 NOTE — PROGRESS NOTE ADULT - PROBLEM SELECTOR PLAN 7
- new onset Afib this admission  - eliquis stopped per GI, optimial a/c at this time would be heparin in setting of mild MARY that is resolved but patient refusing heparin due to blooddraws, explained that we can use lovenox 1mg/kg once a day (crcl 29.7) however chance of bleeding Is higher  - metoprolol 75 mg BID for rate control -Anticoagulate with lovenox for now, after PEG tube placement can transition to eliquis  -Continue with rate control with metoprolol 75 mg BID -Anticoagulate with lovenox, increase to 50mg bid (cr 52, as MARY resolved now on IVF)  - after PEG tube placement can transition to eliquis. Refusing heparin ggt,   -Continue with rate control with metoprolol 75 mg BID

## 2019-09-07 NOTE — PROGRESS NOTE ADULT - PROBLEM SELECTOR PLAN 3
bcx with MSSA; 2/2 aspiration PNA vs unlikely endocarditis however s/p TAVR  --c/w cefazolin 2gram q12 hours x 6 weeks  - afebrile, leukocytosis unchanged, HD stable  - TTE to r/o vegetation Blood culture positive for MSSA; 2/2 aspiration PNA vs unlikely endocarditis however s/p TAVR  -ID consult following patient, recommended to continue with cefazolin 2gram q12 hours x 6 weeks  -Continue to monitor for signs of infection.  -Order TTE to r/o vegetation in heart valves.

## 2019-09-07 NOTE — PROGRESS NOTE ADULT - ATTENDING COMMENTS
Nany resolved s/p IVF and NG feeds,   pulled NG, refusing replacement despite understanding risks, started maintenance fluids  NANY resolved, cr back to baseline, now has IV and on maintenance fluids with good IV access  Crcl now 52, can increase lovenox to 50mg bid, refusing heparin ggt, monitor for bleeding, hgb stable, HD stable. Hold lovenox after Monday morning dose, 24 hour prior to PEG tuesday  PEG tuesday

## 2019-09-07 NOTE — PROGRESS NOTE ADULT - PROBLEM SELECTOR PLAN 1
S/p vocal cord injection for vocal cord paralysis  - failed speech and swallow study and MBS on 08/16 and 08/23, 9/3  -PEG tube placement planned for Tuesday, 9/10/19 as per GI TORO Leyva and Dr Vivar (Dr. Cordoba)   -Per GI hold eliquis for at least 72 hours, continue with lovenox, hold lovenox 12 hours prior to planned procedure.  -Aspiration precautions and Elevate head of bed  -Cardiology c/s for pre-surgical clearance S/p vocal cord injection for vocal cord paralysis  - failed speech and swallow study and MBS on 08/16 and 08/23, 9/3  -PEG tube placement planned for Tuesday, 9/10/19 as per GI NP Godron and Dr Vivar (Dr. Cordoba)   -pulled NG tube overnight, refusing for reinsertion, understands of dehydration, etc keep NPO, start maintenance fluids  -Per GI hold eliquis for at least 72 hours, can continue with lovenox, hold lovenox 24 hours prior to planned procedure per GI  -Aspiration precautions and Elevate head of bed  -Cardiology c/s for pre-surgical clearance pending

## 2019-09-07 NOTE — PROGRESS NOTE ADULT - PROBLEM SELECTOR PLAN 9
BP stable   - c/w metoprolol 75 BID   - no longer on losartan  - monitor BP -Continue with metoprolol 75 BID   -No longer on losartan  -Monitor BP -Continue with metoprolol 75 BID   -holding losartan   -Monitor BP

## 2019-09-07 NOTE — PROGRESS NOTE ADULT - PROBLEM SELECTOR PLAN 4
aspiration PNA now resolved  - sputum cx MSSA on 8/30, completed zosyn, now switched to cefazolin per ID  - 02 sat on NC, wean as tolerated, no resp distress  - ID as above Resloved  -Continue with ID recommendations. Resolved  -ID following

## 2019-09-07 NOTE — PROGRESS NOTE ADULT - PROBLEM SELECTOR PLAN 10
a1c 6  - FS low, started and increased NG feeds per nutrition  - c/w Insulin sliding scale  DVT ppx: on lovenox   Dispo: poor prognosis, DNR/DNI, palliative following, plan for PEG Tuesday AM HgBa1c 6  -POF FS q6hrs, goal POC FS < 200 while inpatient.  DVT ppx: on lovenox   Dispo: poor prognosis, DNR/DNI, palliative following, plan for PEG Tuesday AM HgBa1c 6  DVT ppx: on lovenox   Dispo: poor prognosis, DNR/DNI, palliative following, plan for PEG Tuesday AM

## 2019-09-07 NOTE — PROGRESS NOTE ADULT - SUBJECTIVE AND OBJECTIVE BOX
Patient is a 85y old  Female who presents with a chief complaint of Abdominal pain (06 Sep 2019 15:17)      SUBJECTIVE / OVERNIGHT EVENTS: Patient self-discontinued NG tube by pulling it out; otherwise, no acute events overnight.    MEDICATIONS  (STANDING):  ALBUTerol    0.083% 2.5 milliGRAM(s) Nebulizer every 6 hours  atorvastatin 40 milliGRAM(s) Oral <User Schedule>  buDESOnide    Inhalation Suspension 0.25 milliGRAM(s) Inhalation two times a day  carbidopa/levodopa  25/100 2 Tablet(s) Oral three times a day  ceFAZolin   IVPB 2000 milliGRAM(s) IV Intermittent every 12 hours  dextrose 5% + sodium chloride 0.9%. 1000 milliLiter(s) (75 mL/Hr) IV Continuous <Continuous>  dextrose 5%. 1000 milliLiter(s) (50 mL/Hr) IV Continuous <Continuous>  dextrose 50% Injectable 12.5 Gram(s) IV Push once  dextrose 50% Injectable 25 Gram(s) IV Push once  dextrose 50% Injectable 25 Gram(s) IV Push once  enoxaparin Injectable 50 milliGRAM(s) SubCutaneous every 24 hours  gabapentin   Solution 300 milliGRAM(s) Oral at bedtime  guaiFENesin    Syrup 200 milliGRAM(s) Oral every 6 hours  insulin lispro (HumaLOG) corrective regimen sliding scale   SubCutaneous every 6 hours  lidocaine   Patch 1 Patch Transdermal daily  metoprolol tartrate 75 milliGRAM(s) Oral every 12 hours  pantoprazole  Injectable 40 milliGRAM(s) IV Push daily  predniSONE   Tablet 10 milliGRAM(s) Oral daily  sodium chloride 3%  Inhalation 3 milliLiter(s) Inhalation every 12 hours    MEDICATIONS  (PRN):  acetaminophen    Suspension .. 650 milliGRAM(s) Oral every 6 hours PRN Moderate Pain (4 - 6), Severe Pain (7 - 10)  ALPRAZolam 0.5 milliGRAM(s) Oral two times a day PRN anxiety  dextrose 40% Gel 15 Gram(s) Oral once PRN Blood Glucose LESS THAN 70 milliGRAM(s)/deciliter  glucagon  Injectable 1 milliGRAM(s) IntraMuscular once PRN Glucose LESS THAN 70 milligrams/deciliter      Vital Signs Last 24 Hrs  T(C): 36.9 (07 Sep 2019 05:11), Max: 36.9 (07 Sep 2019 05:11)  T(F): 98.4 (07 Sep 2019 05:11), Max: 98.4 (07 Sep 2019 05:11)  HR: 62 (07 Sep 2019 06:30) (59 - 68)  BP: 134/69 (07 Sep 2019 05:11) (128/97 - 137/70)  BP(mean): --  RR: 18 (07 Sep 2019 05:11) (18 - 20)  SpO2: 95% (07 Sep 2019 06:30) (95% - 98%)  CAPILLARY BLOOD GLUCOSE      POCT Blood Glucose.: 88 mg/dL (07 Sep 2019 07:03)  POCT Blood Glucose.: 124 mg/dL (06 Sep 2019 23:46)  POCT Blood Glucose.: 112 mg/dL (06 Sep 2019 17:48)  POCT Blood Glucose.: 66 mg/dL (06 Sep 2019 17:24)  POCT Blood Glucose.: 68 mg/dL (06 Sep 2019 17:20)  POCT Blood Glucose.: 76 mg/dL (06 Sep 2019 12:23)    I&O's Summary      PHYSICAL EXAM:  GENERAL: NAD, well-developed  HEAD:    EYES:   NECK:   CHEST/LUNG:   HEART:  ABDOMEN:   EXTREMITIES:   PSYCH:   NEUROLOGY:     LABS:    09-06    136  |  100  |  22  ----------------------------<  102<H>  4.6   |  20<L>  |  1.23    Ca    9.8      06 Sep 2019 06:20  Phos  4.9     09-06  Mg     2.2     09-06                Culture - Blood (collected 05 Sep 2019 14:42)  Source: .Blood  Preliminary Report (06 Sep 2019 15:01):    No growth to date.    Culture - Blood (collected 05 Sep 2019 10:02)  Source: .Blood  Preliminary Report (06 Sep 2019 11:01):    No growth to date.          RADIOLOGY & ADDITIONAL TESTS:      Consultant(s) Notes Reviewed:    Gastroenterology Patient is a 85y old  Female who presents with a chief complaint of Abdominal pain (06 Sep 2019 15:17)      SUBJECTIVE / OVERNIGHT EVENTS: Patient self-discontinued NG tube by pulling it out; otherwise, no acute events overnight.    MEDICATIONS  (STANDING):  ALBUTerol    0.083% 2.5 milliGRAM(s) Nebulizer every 6 hours  atorvastatin 40 milliGRAM(s) Oral <User Schedule>  buDESOnide    Inhalation Suspension 0.25 milliGRAM(s) Inhalation two times a day  carbidopa/levodopa  25/100 2 Tablet(s) Oral three times a day  ceFAZolin   IVPB 2000 milliGRAM(s) IV Intermittent every 12 hours  dextrose 5% + sodium chloride 0.9%. 1000 milliLiter(s) (75 mL/Hr) IV Continuous <Continuous>  dextrose 5%. 1000 milliLiter(s) (50 mL/Hr) IV Continuous <Continuous>  dextrose 50% Injectable 12.5 Gram(s) IV Push once  dextrose 50% Injectable 25 Gram(s) IV Push once  dextrose 50% Injectable 25 Gram(s) IV Push once  enoxaparin Injectable 50 milliGRAM(s) SubCutaneous every 24 hours  gabapentin   Solution 300 milliGRAM(s) Oral at bedtime  guaiFENesin    Syrup 200 milliGRAM(s) Oral every 6 hours  insulin lispro (HumaLOG) corrective regimen sliding scale   SubCutaneous every 6 hours  lidocaine   Patch 1 Patch Transdermal daily  metoprolol tartrate 75 milliGRAM(s) Oral every 12 hours  pantoprazole  Injectable 40 milliGRAM(s) IV Push daily  predniSONE   Tablet 10 milliGRAM(s) Oral daily  sodium chloride 3%  Inhalation 3 milliLiter(s) Inhalation every 12 hours    MEDICATIONS  (PRN):  acetaminophen    Suspension .. 650 milliGRAM(s) Oral every 6 hours PRN Moderate Pain (4 - 6), Severe Pain (7 - 10)  ALPRAZolam 0.5 milliGRAM(s) Oral two times a day PRN anxiety  dextrose 40% Gel 15 Gram(s) Oral once PRN Blood Glucose LESS THAN 70 milliGRAM(s)/deciliter  glucagon  Injectable 1 milliGRAM(s) IntraMuscular once PRN Glucose LESS THAN 70 milligrams/deciliter      Vital Signs Last 24 Hrs  T(C): 36.9 (07 Sep 2019 05:11), Max: 36.9 (07 Sep 2019 05:11)  T(F): 98.4 (07 Sep 2019 05:11), Max: 98.4 (07 Sep 2019 05:11)  HR: 62 (07 Sep 2019 06:30) (59 - 68)  BP: 134/69 (07 Sep 2019 05:11) (128/97 - 137/70)  BP(mean): --  RR: 18 (07 Sep 2019 05:11) (18 - 20)  SpO2: 95% (07 Sep 2019 06:30) (95% - 98%)  CAPILLARY BLOOD GLUCOSE      POCT Blood Glucose.: 88 mg/dL (07 Sep 2019 07:03)  POCT Blood Glucose.: 124 mg/dL (06 Sep 2019 23:46)  POCT Blood Glucose.: 112 mg/dL (06 Sep 2019 17:48)  POCT Blood Glucose.: 66 mg/dL (06 Sep 2019 17:24)  POCT Blood Glucose.: 68 mg/dL (06 Sep 2019 17:20)  POCT Blood Glucose.: 76 mg/dL (06 Sep 2019 12:23)    I&O's Summary      PHYSICAL EXAM:  GENERAL: NAD, well-developed  HEAD:  normocephalic  EYES: EOMI, PERRLA  NECK: supple  CHEST/LUNG: CTAB   HEART: S1/S2, no m/r/g  ABDOMEN: soft, no TTP  EXTREMITIES: warm, well-perfused  PSYCH: AXOX3  NEUROLOGY: non-focal    LABS:    09-06    136  |  100  |  22  ----------------------------<  102<H>  4.6   |  20<L>  |  1.23    Ca    9.8      06 Sep 2019 06:20  Phos  4.9     09-06  Mg     2.2     09-06                Culture - Blood (collected 05 Sep 2019 14:42)  Source: .Blood  Preliminary Report (06 Sep 2019 15:01):    No growth to date.    Culture - Blood (collected 05 Sep 2019 10:02)  Source: .Blood  Preliminary Report (06 Sep 2019 11:01):    No growth to date.          RADIOLOGY & ADDITIONAL TESTS:      Consultant(s) Notes Reviewed:    Gastroenterology Patient is a 85y old  Female who presents with a chief complaint of Abdominal pain (06 Sep 2019 15:17)      SUBJECTIVE / OVERNIGHT EVENTS: Patient self-discontinued NG tube by pulling it out; she is refusing for it to be reinserted despite explaining risk of dehydration. otherwise, no acute events overnight. feeds    MEDICATIONS  (STANDING):  ALBUTerol    0.083% 2.5 milliGRAM(s) Nebulizer every 6 hours  atorvastatin 40 milliGRAM(s) Oral <User Schedule>  buDESOnide    Inhalation Suspension 0.25 milliGRAM(s) Inhalation two times a day  carbidopa/levodopa  25/100 2 Tablet(s) Oral three times a day  ceFAZolin   IVPB 2000 milliGRAM(s) IV Intermittent every 12 hours  dextrose 5% + sodium chloride 0.9%. 1000 milliLiter(s) (75 mL/Hr) IV Continuous <Continuous>  dextrose 5%. 1000 milliLiter(s) (50 mL/Hr) IV Continuous <Continuous>  dextrose 50% Injectable 12.5 Gram(s) IV Push once  dextrose 50% Injectable 25 Gram(s) IV Push once  dextrose 50% Injectable 25 Gram(s) IV Push once  enoxaparin Injectable 50 milliGRAM(s) SubCutaneous every 24 hours  gabapentin   Solution 300 milliGRAM(s) Oral at bedtime  guaiFENesin    Syrup 200 milliGRAM(s) Oral every 6 hours  insulin lispro (HumaLOG) corrective regimen sliding scale   SubCutaneous every 6 hours  lidocaine   Patch 1 Patch Transdermal daily  metoprolol tartrate 75 milliGRAM(s) Oral every 12 hours  pantoprazole  Injectable 40 milliGRAM(s) IV Push daily  predniSONE   Tablet 10 milliGRAM(s) Oral daily  sodium chloride 3%  Inhalation 3 milliLiter(s) Inhalation every 12 hours    MEDICATIONS  (PRN):  acetaminophen    Suspension .. 650 milliGRAM(s) Oral every 6 hours PRN Moderate Pain (4 - 6), Severe Pain (7 - 10)  ALPRAZolam 0.5 milliGRAM(s) Oral two times a day PRN anxiety  dextrose 40% Gel 15 Gram(s) Oral once PRN Blood Glucose LESS THAN 70 milliGRAM(s)/deciliter  glucagon  Injectable 1 milliGRAM(s) IntraMuscular once PRN Glucose LESS THAN 70 milligrams/deciliter      Vital Signs Last 24 Hrs  T(C): 36.9 (07 Sep 2019 05:11), Max: 36.9 (07 Sep 2019 05:11)  T(F): 98.4 (07 Sep 2019 05:11), Max: 98.4 (07 Sep 2019 05:11)  HR: 62 (07 Sep 2019 06:30) (59 - 68)  BP: 134/69 (07 Sep 2019 05:11) (128/97 - 137/70)  BP(mean): --  RR: 18 (07 Sep 2019 05:11) (18 - 20)  SpO2: 95% (07 Sep 2019 06:30) (95% - 98%)  CAPILLARY BLOOD GLUCOSE      POCT Blood Glucose.: 88 mg/dL (07 Sep 2019 07:03)  POCT Blood Glucose.: 124 mg/dL (06 Sep 2019 23:46)  POCT Blood Glucose.: 112 mg/dL (06 Sep 2019 17:48)  POCT Blood Glucose.: 66 mg/dL (06 Sep 2019 17:24)  POCT Blood Glucose.: 68 mg/dL (06 Sep 2019 17:20)  POCT Blood Glucose.: 76 mg/dL (06 Sep 2019 12:23)    I&O's Summary      PHYSICAL EXAM:  GENERAL: NAD, well-developed  HEAD:  normocephalic  EYES: EOMI, PERRLA  NECK: supple  CHEST/LUNG: CTAB   HEART: S1/S2, no m/r/g  ABDOMEN: soft, no TTP  EXTREMITIES: warm, well-perfused  PSYCH: AXOX3  NEUROLOGY: non-focal    LABS:    09-06    136  |  100  |  22  ----------------------------<  102<H>  4.6   |  20<L>  |  1.23    Ca    9.8      06 Sep 2019 06:20  Phos  4.9     09-06  Mg     2.2     09-06                Culture - Blood (collected 05 Sep 2019 14:42)  Source: .Blood  Preliminary Report (06 Sep 2019 15:01):    No growth to date.    Culture - Blood (collected 05 Sep 2019 10:02)  Source: .Blood  Preliminary Report (06 Sep 2019 11:01):    No growth to date.          RADIOLOGY & ADDITIONAL TESTS:      Consultant(s) Notes Reviewed:    Gastroenterology

## 2019-09-07 NOTE — PROGRESS NOTE ADULT - PROBLEM SELECTOR PLAN 5
Likely due to aspiration event. S/p MICU; extubated 8/31  - Now on 2L NC, satting in mid-90's  - Chest PT, chest vest  - C/w continuous pulse ox  - DNR/DNI after MICU stay Likely due to aspiration event. S/p MICU; extubated 8/31  -Continue with oxygen supplementation PRN  - Chest PT, chest vest  - C/w continuous pulse ox  - DNR/DNI Likely due to aspiration event. S/p MICU; extubated 8/31  -Continue with oxygen supplementation PRN  - Chest PT, chest vest  -wean 02 as tolerated  - DNR/DNI

## 2019-09-07 NOTE — PROGRESS NOTE ADULT - ASSESSMENT
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

## 2019-09-08 LAB
ANION GAP SERPL CALC-SCNC: 14 MMOL/L — SIGNIFICANT CHANGE UP (ref 5–17)
BUN SERPL-MCNC: 14 MG/DL — SIGNIFICANT CHANGE UP (ref 7–23)
CALCIUM SERPL-MCNC: 9.7 MG/DL — SIGNIFICANT CHANGE UP (ref 8.4–10.5)
CHLORIDE SERPL-SCNC: 104 MMOL/L — SIGNIFICANT CHANGE UP (ref 96–108)
CO2 SERPL-SCNC: 24 MMOL/L — SIGNIFICANT CHANGE UP (ref 22–31)
CREAT SERPL-MCNC: 0.67 MG/DL — SIGNIFICANT CHANGE UP (ref 0.5–1.3)
GLUCOSE BLDC GLUCOMTR-MCNC: 109 MG/DL — HIGH (ref 70–99)
GLUCOSE BLDC GLUCOMTR-MCNC: 115 MG/DL — HIGH (ref 70–99)
GLUCOSE BLDC GLUCOMTR-MCNC: 97 MG/DL — SIGNIFICANT CHANGE UP (ref 70–99)
GLUCOSE BLDC GLUCOMTR-MCNC: 99 MG/DL — SIGNIFICANT CHANGE UP (ref 70–99)
GLUCOSE SERPL-MCNC: 104 MG/DL — HIGH (ref 70–99)
HCT VFR BLD CALC: 32.2 % — LOW (ref 34.5–45)
HGB BLD-MCNC: 9.9 G/DL — LOW (ref 11.5–15.5)
MCHC RBC-ENTMCNC: 29.1 PG — SIGNIFICANT CHANGE UP (ref 27–34)
MCHC RBC-ENTMCNC: 30.9 GM/DL — LOW (ref 32–36)
MCV RBC AUTO: 94.1 FL — SIGNIFICANT CHANGE UP (ref 80–100)
PLATELET # BLD AUTO: 261 K/UL — SIGNIFICANT CHANGE UP (ref 150–400)
POTASSIUM SERPL-MCNC: 3.7 MMOL/L — SIGNIFICANT CHANGE UP (ref 3.5–5.3)
POTASSIUM SERPL-SCNC: 3.7 MMOL/L — SIGNIFICANT CHANGE UP (ref 3.5–5.3)
RBC # BLD: 3.42 M/UL — LOW (ref 3.8–5.2)
RBC # FLD: 15.6 % — HIGH (ref 10.3–14.5)
SODIUM SERPL-SCNC: 142 MMOL/L — SIGNIFICANT CHANGE UP (ref 135–145)
WBC # BLD: 9.7 K/UL — SIGNIFICANT CHANGE UP (ref 3.8–10.5)
WBC # FLD AUTO: 9.7 K/UL — SIGNIFICANT CHANGE UP (ref 3.8–10.5)

## 2019-09-08 PROCEDURE — 99232 SBSQ HOSP IP/OBS MODERATE 35: CPT

## 2019-09-08 PROCEDURE — 99232 SBSQ HOSP IP/OBS MODERATE 35: CPT | Mod: GC

## 2019-09-08 RX ORDER — ACETAMINOPHEN 500 MG
1000 TABLET ORAL ONCE
Refills: 0 | Status: DISCONTINUED | OUTPATIENT
Start: 2019-09-08 | End: 2019-09-10

## 2019-09-08 RX ORDER — ENOXAPARIN SODIUM 100 MG/ML
50 INJECTION SUBCUTANEOUS ONCE
Refills: 0 | Status: COMPLETED | OUTPATIENT
Start: 2019-09-09 | End: 2019-09-09

## 2019-09-08 RX ADMIN — Medication 0.25 MILLIGRAM(S): at 05:38

## 2019-09-08 RX ADMIN — LIDOCAINE 1 PATCH: 4 CREAM TOPICAL at 02:35

## 2019-09-08 RX ADMIN — ENOXAPARIN SODIUM 50 MILLIGRAM(S): 100 INJECTION SUBCUTANEOUS at 06:16

## 2019-09-08 RX ADMIN — ENOXAPARIN SODIUM 50 MILLIGRAM(S): 100 INJECTION SUBCUTANEOUS at 17:53

## 2019-09-08 RX ADMIN — LIDOCAINE 1 PATCH: 4 CREAM TOPICAL at 19:39

## 2019-09-08 RX ADMIN — LIDOCAINE 1 PATCH: 4 CREAM TOPICAL at 22:40

## 2019-09-08 RX ADMIN — Medication 0.25 MILLIGRAM(S): at 17:46

## 2019-09-08 RX ADMIN — Medication 5 MILLIGRAM(S): at 09:25

## 2019-09-08 RX ADMIN — SODIUM CHLORIDE 3 MILLILITER(S): 9 INJECTION INTRAMUSCULAR; INTRAVENOUS; SUBCUTANEOUS at 17:46

## 2019-09-08 RX ADMIN — LIDOCAINE 1 PATCH: 4 CREAM TOPICAL at 11:20

## 2019-09-08 RX ADMIN — Medication 5 MILLIGRAM(S): at 17:51

## 2019-09-08 RX ADMIN — Medication 100 MILLIGRAM(S): at 06:16

## 2019-09-08 RX ADMIN — ALBUTEROL 2.5 MILLIGRAM(S): 90 AEROSOL, METERED ORAL at 17:46

## 2019-09-08 RX ADMIN — PANTOPRAZOLE SODIUM 40 MILLIGRAM(S): 20 TABLET, DELAYED RELEASE ORAL at 11:18

## 2019-09-08 RX ADMIN — SODIUM CHLORIDE 3 MILLILITER(S): 9 INJECTION INTRAMUSCULAR; INTRAVENOUS; SUBCUTANEOUS at 05:38

## 2019-09-08 RX ADMIN — ALBUTEROL 2.5 MILLIGRAM(S): 90 AEROSOL, METERED ORAL at 05:38

## 2019-09-08 RX ADMIN — Medication 100 MILLIGRAM(S): at 17:50

## 2019-09-08 RX ADMIN — ALBUTEROL 2.5 MILLIGRAM(S): 90 AEROSOL, METERED ORAL at 11:24

## 2019-09-08 NOTE — PROGRESS NOTE ADULT - PROBLEM SELECTOR PLAN 5
Likely due to aspiration event. S/p MICU; extubated 8/31  -Continue with oxygen supplementation PRN  - Chest PT, chest vest  -wean 02 as tolerated  - DNR/DNI

## 2019-09-08 NOTE — PROGRESS NOTE ADULT - ATTENDING COMMENTS
no s/s of bleeding  refusing NG tube  hgb flucating depending on AM lab vs stat lab  PEG tuesday, hold lovenox am kev AM dose

## 2019-09-08 NOTE — PROGRESS NOTE ADULT - SUBJECTIVE AND OBJECTIVE BOX
Patient is a 85y old  Female who presents with a chief complaint of Abdominal pain (08 Sep 2019 10:53)      SUBJECTIVE / OVERNIGHT EVENTS: NAEON. No acute complaints. Feels okay, still in agreement to get PEG tube on Tuesday. Also feels a little bit cold, blankets help partially.    MEDICATIONS  (STANDING):  ALBUTerol    0.083% 2.5 milliGRAM(s) Nebulizer every 6 hours  atorvastatin 40 milliGRAM(s) Oral <User Schedule>  buDESOnide    Inhalation Suspension 0.25 milliGRAM(s) Inhalation two times a day  carbidopa/levodopa  25/100 2 Tablet(s) Oral three times a day  ceFAZolin   IVPB 2000 milliGRAM(s) IV Intermittent every 12 hours  dextrose 5% + sodium chloride 0.45%. 1000 milliLiter(s) (40 mL/Hr) IV Continuous <Continuous>  dextrose 5%. 1000 milliLiter(s) (50 mL/Hr) IV Continuous <Continuous>  dextrose 50% Injectable 12.5 Gram(s) IV Push once  dextrose 50% Injectable 25 Gram(s) IV Push once  dextrose 50% Injectable 25 Gram(s) IV Push once  enoxaparin Injectable 50 milliGRAM(s) SubCutaneous every 12 hours  gabapentin   Solution 300 milliGRAM(s) Oral at bedtime  guaiFENesin    Syrup 200 milliGRAM(s) Oral every 6 hours  insulin lispro (HumaLOG) corrective regimen sliding scale   SubCutaneous every 6 hours  lidocaine   Patch 1 Patch Transdermal daily  metoprolol tartrate Injectable 5 milliGRAM(s) IV Push every 12 hours  pantoprazole  Injectable 40 milliGRAM(s) IV Push daily  predniSONE   Tablet 10 milliGRAM(s) Oral daily  sodium chloride 3%  Inhalation 3 milliLiter(s) Inhalation every 12 hours    MEDICATIONS  (PRN):  acetaminophen    Suspension .. 650 milliGRAM(s) Oral every 6 hours PRN Moderate Pain (4 - 6), Severe Pain (7 - 10)  ALPRAZolam 0.5 milliGRAM(s) Oral two times a day PRN anxiety  dextrose 40% Gel 15 Gram(s) Oral once PRN Blood Glucose LESS THAN 70 milliGRAM(s)/deciliter  glucagon  Injectable 1 milliGRAM(s) IntraMuscular once PRN Glucose LESS THAN 70 milligrams/deciliter      Vital Signs Last 24 Hrs  T(C): 36.8 (08 Sep 2019 14:17), Max: 36.8 (07 Sep 2019 14:58)  T(F): 98.2 (08 Sep 2019 14:17), Max: 98.3 (07 Sep 2019 21:41)  HR: 75 (08 Sep 2019 14:17) (58 - 96)  BP: 145/78 (08 Sep 2019 14:17) (107/60 - 145/78)  BP(mean): --  RR: 16 (08 Sep 2019 14:17) (16 - 18)  SpO2: 98% (08 Sep 2019 14:17) (96% - 100%)  CAPILLARY BLOOD GLUCOSE      POCT Blood Glucose.: 115 mg/dL (08 Sep 2019 12:12)  POCT Blood Glucose.: 109 mg/dL (08 Sep 2019 05:49)  POCT Blood Glucose.: 97 mg/dL (08 Sep 2019 00:35)  POCT Blood Glucose.: 92 mg/dL (07 Sep 2019 18:40)    I&O's Summary  07 Sep 2019 07:01  -  08 Sep 2019 07:00  --------------------------------------------------------  IN: 0 mL / OUT: 900 mL / NET: -900 mL    PHYSICAL EXAM:  GENERAL: NAD, well-developed, lying in bed covered in blankets.   HEAD:  Atraumatic, Normocephalic  Mouth: no teeth  NECK: Supple, No JVD  CHEST/LUNG: Clear to auscultation bilaterally; No wheeze  HEART: Regular rate and rhythm;   ABDOMEN: Soft, Nontender, Nondistended; Bowel sounds present  EXTREMITIES:  2+ Peripheral Pulses, No clubbing, cyanosis, or edema  PSYCH: AAOx3. Good mood and affect.   NEUROLOGY: non-focal  SKIN: ecchymosis through both UE    LABS:                        9.9    9.7   )-----------( 261      ( 08 Sep 2019 06:30 )             32.2     09-08    142  |  104  |  14  ----------------------------<  104<H>  3.7   |  24  |  0.67    Ca    9.7      08 Sep 2019 06:30                Culture - Blood (collected 06 Sep 2019 16:33)  Source: .Blood  Preliminary Report (07 Sep 2019 17:01):    No growth to date.    Culture - Blood (collected 05 Sep 2019 14:42)  Source: .Blood  Preliminary Report (06 Sep 2019 15:01):    No growth to date.          RADIOLOGY & ADDITIONAL TESTS:    Imaging Personally Reviewed:    Consultant(s) Notes Reviewed:      Care Discussed with Consultants/Other Providers: Patient is a 85y old  Female who presents with a chief complaint of Abdominal pain (08 Sep 2019 10:53)      SUBJECTIVE / OVERNIGHT EVENTS: NAEON. No acute complaints. Feels okay, still in agreement to get PEG tube on Tuesday. Refusing NG, son bedside    MEDICATIONS  (STANDING):  ALBUTerol    0.083% 2.5 milliGRAM(s) Nebulizer every 6 hours  atorvastatin 40 milliGRAM(s) Oral <User Schedule>  buDESOnide    Inhalation Suspension 0.25 milliGRAM(s) Inhalation two times a day  carbidopa/levodopa  25/100 2 Tablet(s) Oral three times a day  ceFAZolin   IVPB 2000 milliGRAM(s) IV Intermittent every 12 hours  dextrose 5% + sodium chloride 0.45%. 1000 milliLiter(s) (40 mL/Hr) IV Continuous <Continuous>  dextrose 5%. 1000 milliLiter(s) (50 mL/Hr) IV Continuous <Continuous>  dextrose 50% Injectable 12.5 Gram(s) IV Push once  dextrose 50% Injectable 25 Gram(s) IV Push once  dextrose 50% Injectable 25 Gram(s) IV Push once  enoxaparin Injectable 50 milliGRAM(s) SubCutaneous every 12 hours  gabapentin   Solution 300 milliGRAM(s) Oral at bedtime  guaiFENesin    Syrup 200 milliGRAM(s) Oral every 6 hours  insulin lispro (HumaLOG) corrective regimen sliding scale   SubCutaneous every 6 hours  lidocaine   Patch 1 Patch Transdermal daily  metoprolol tartrate Injectable 5 milliGRAM(s) IV Push every 12 hours  pantoprazole  Injectable 40 milliGRAM(s) IV Push daily  predniSONE   Tablet 10 milliGRAM(s) Oral daily  sodium chloride 3%  Inhalation 3 milliLiter(s) Inhalation every 12 hours    MEDICATIONS  (PRN):  acetaminophen    Suspension .. 650 milliGRAM(s) Oral every 6 hours PRN Moderate Pain (4 - 6), Severe Pain (7 - 10)  ALPRAZolam 0.5 milliGRAM(s) Oral two times a day PRN anxiety  dextrose 40% Gel 15 Gram(s) Oral once PRN Blood Glucose LESS THAN 70 milliGRAM(s)/deciliter  glucagon  Injectable 1 milliGRAM(s) IntraMuscular once PRN Glucose LESS THAN 70 milligrams/deciliter      Vital Signs Last 24 Hrs  T(C): 36.8 (08 Sep 2019 14:17), Max: 36.8 (07 Sep 2019 14:58)  T(F): 98.2 (08 Sep 2019 14:17), Max: 98.3 (07 Sep 2019 21:41)  HR: 75 (08 Sep 2019 14:17) (58 - 96)  BP: 145/78 (08 Sep 2019 14:17) (107/60 - 145/78)  BP(mean): --  RR: 16 (08 Sep 2019 14:17) (16 - 18)  SpO2: 98% (08 Sep 2019 14:17) (96% - 100%)  CAPILLARY BLOOD GLUCOSE      POCT Blood Glucose.: 115 mg/dL (08 Sep 2019 12:12)  POCT Blood Glucose.: 109 mg/dL (08 Sep 2019 05:49)  POCT Blood Glucose.: 97 mg/dL (08 Sep 2019 00:35)  POCT Blood Glucose.: 92 mg/dL (07 Sep 2019 18:40)    I&O's Summary  07 Sep 2019 07:01  -  08 Sep 2019 07:00  --------------------------------------------------------  IN: 0 mL / OUT: 900 mL / NET: -900 mL    PHYSICAL EXAM:  GENERAL: NAD, well-developed, lying in bed covered in blankets.   HEAD:  Atraumatic, Normocephalic  Mouth: no teeth  NECK: Supple, No JVD  CHEST/LUNG: Clear to auscultation bilaterally; No wheeze  HEART: Regular rate and rhythm;   ABDOMEN: Soft, Nontender, Nondistended; Bowel sounds present  EXTREMITIES:  2+ Peripheral Pulses, No clubbing, cyanosis, or edema  PSYCH: AAOx3. Good mood and affect.   NEUROLOGY: non-focal  SKIN: ecchymosis through both UE    LABS:                        9.9    9.7   )-----------( 261      ( 08 Sep 2019 06:30 )             32.2     09-08    142  |  104  |  14  ----------------------------<  104<H>  3.7   |  24  |  0.67    Ca    9.7      08 Sep 2019 06:30                Culture - Blood (collected 06 Sep 2019 16:33)  Source: .Blood  Preliminary Report (07 Sep 2019 17:01):    No growth to date.    Culture - Blood (collected 05 Sep 2019 14:42)  Source: .Blood  Preliminary Report (06 Sep 2019 15:01):    No growth to date.          RADIOLOGY & ADDITIONAL TESTS:    Imaging Personally Reviewed:    Consultant(s) Notes Reviewed:      Care Discussed with Consultants/Other Providers:

## 2019-09-08 NOTE — PROGRESS NOTE ADULT - PROBLEM SELECTOR PLAN 1
S/p vocal cord injection for vocal cord paralysis  - failed speech and swallow study x3 (last 09/03)  -PEG tube placement planned for Tuesday, 9/10/19 as per GI NP Gordon and Dr Vivar (Dr. Cordoba)   -pulled NG tube overnight, refusing for reinsertion, understands of dehydration, etc keep NPO, start maintenance fluids  -Per GI hold eliquis for at least 72 hours, can continue with lovenox, will hold lovenox 24 hours prior to planned procedure (tomorrow morning)  -Aspiration precautions and Elevate head of bed  -Cardiology c/s for pre-surgical clearance pending S/p vocal cord injection for vocal cord paralysis  - failed speech and swallow study x3 (last 09/03)  -PEG tube placement planned for Tuesday, 9/10/19 as per GI NP Gordon and Dr Vivar (for Dr. Cordoba)   -pulled NG tube, refusing for reinsertion, understands of dehydration, etc keep NPO, c/w maintenance fluids  -Per GI hold eliquis for at least 72 hours, can continue with lovenox, will hold lovenox 24 hours prior to planned procedure (tomorrow morning)  -Aspiration precautions and Elevate head of bed  -Cardiology c/s for pre-surgical clearance pending

## 2019-09-08 NOTE — PROGRESS NOTE ADULT - PROBLEM SELECTOR PLAN 7
-Anticoagulate with lovenox, increase to 50mg bid (cr 52, as MARY resolved now on IVF)  - after PEG tube placement can transition to eliquis. Refusing heparin ggt,   -Continue with rate control with metoprolol 75 mg BID -c/w lovenox 50mg bid, hold dose after monday morning, GI wants off for 24 hours for PEG tuedsay  - after PEG tube placement can transition to eliquis. Refusing heparin ggt,   --on IV metoprolol 5mg bid with good rate and BP control

## 2019-09-08 NOTE — PROGRESS NOTE ADULT - PROBLEM SELECTOR PLAN 6
-Continue with home dose prednisone 10mg daily chronically for PMR/GCA. Right temporal artery biopsy reportedly negative, but she gets right sided headaches.  - Maintain O2 sats greater than 92%

## 2019-09-08 NOTE — PROGRESS NOTE ADULT - PROBLEM SELECTOR PLAN 10
HgBa1c 6  DVT ppx: on lovenox   Dispo: poor prognosis, DNR/DNI, palliative following, plan for PEG Tuesday AM HgBa1c 6  DVT ppx: on lovenox, last dose monday AM for PEG tuesday, per GI off for 24 hhours  Dispo: poor prognosis, DNR/DNI, palliative following, plan for PEG Tuesday AM

## 2019-09-08 NOTE — PROGRESS NOTE ADULT - PROBLEM SELECTOR PLAN 3
Blood culture positive for MSSA; 2/2 aspiration PNA vs unlikely endocarditis however s/p TAVR  -ID consult following patient, recommended to continue with cefazolin 2gram q12 hours x 6 weeks  - BCx on 09/05 and 09/06 NGTD  -Continue to monitor for signs of infection (temp curve, WBC trend)  -TTE reordered to r/o vegetation in heart valves.

## 2019-09-08 NOTE — PROGRESS NOTE ADULT - PROBLEM SELECTOR PLAN 9
-Continue with metoprolol 75 BID   -holding losartan   -Monitor BP -on IV metoprolol 5mg bid with good rate and BP control  -holding losartan   -Monitor BP

## 2019-09-08 NOTE — PROGRESS NOTE ADULT - ASSESSMENT
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

## 2019-09-08 NOTE — PROGRESS NOTE ADULT - SUBJECTIVE AND OBJECTIVE BOX
CC: Patient is a 85y old  Female who presents with a chief complaint of Abdominal pain (07 Sep 2019 08:30)    ID following for MSSA bacteremia/ PNA    Interval History/ROS: Patient requesting to sit in the chair. Has no complaints. No fevers, no chills. Dyspnea while speaking.    Rest of ROS negative.    Allergies  penicillin (Unknown)    ANTIMICROBIALS:  ceFAZolin   IVPB 2000 every 12 hours    OTHER MEDS:  acetaminophen    Suspension .. 650 milliGRAM(s) Oral every 6 hours PRN  ALBUTerol    0.083% 2.5 milliGRAM(s) Nebulizer every 6 hours  ALPRAZolam 0.5 milliGRAM(s) Oral two times a day PRN  atorvastatin 40 milliGRAM(s) Oral <User Schedule>  buDESOnide    Inhalation Suspension 0.25 milliGRAM(s) Inhalation two times a day  carbidopa/levodopa  25/100 2 Tablet(s) Oral three times a day  dextrose 40% Gel 15 Gram(s) Oral once PRN  dextrose 5% + sodium chloride 0.45%. 1000 milliLiter(s) IV Continuous <Continuous>  dextrose 5%. 1000 milliLiter(s) IV Continuous <Continuous>  dextrose 50% Injectable 12.5 Gram(s) IV Push once  dextrose 50% Injectable 25 Gram(s) IV Push once  dextrose 50% Injectable 25 Gram(s) IV Push once  enoxaparin Injectable 50 milliGRAM(s) SubCutaneous every 12 hours  gabapentin   Solution 300 milliGRAM(s) Oral at bedtime  glucagon  Injectable 1 milliGRAM(s) IntraMuscular once PRN  guaiFENesin    Syrup 200 milliGRAM(s) Oral every 6 hours  insulin lispro (HumaLOG) corrective regimen sliding scale   SubCutaneous every 6 hours  lidocaine   Patch 1 Patch Transdermal daily  metoprolol tartrate Injectable 5 milliGRAM(s) IV Push every 12 hours  pantoprazole  Injectable 40 milliGRAM(s) IV Push daily  predniSONE   Tablet 10 milliGRAM(s) Oral daily  sodium chloride 3%  Inhalation 3 milliLiter(s) Inhalation every 12 hours    PE:    Vital Signs Last 24 Hrs  T(C): 36.7 (08 Sep 2019 09:15), Max: 36.8 (07 Sep 2019 14:58)  T(F): 98.1 (08 Sep 2019 09:15), Max: 98.3 (07 Sep 2019 21:41)  HR: 78 (08 Sep 2019 09:15) (58 - 80)  BP: 140/64 (08 Sep 2019 09:15) (107/60 - 140/64)  BP(mean): --  RR: 16 (08 Sep 2019 09:15) (16 - 18)  SpO2: 97% (08 Sep 2019 09:15) (96% - 100%)    Gen: AOx2-3, NAD, non-toxic  CV: S1+S2 normal, no murmurs  Resp: Clear bilat, dyspnea while speaking  Abd: Soft, nontender, +BS  Ext: No LE edema, no wounds  : No Parsons  IV/Skin: No thrombophlebitis  Neuro: no focal deficits    LABS:                          9.9    9.7   )-----------( 261      ( 08 Sep 2019 06:30 )             32.2       09-08    142  |  104  |  14  ----------------------------<  104<H>  3.7   |  24  |  0.67    Ca    9.7      08 Sep 2019 06:30    MICROBIOLOGY:  v  .Blood  09-06-19   No growth to date.  --  --      .Blood  09-05-19   No growth to date.  --  --      .Blood  09-05-19   No growth to date.  --  --      .Blood  09-01-19   No growth at 5 days.  --  --      .Blood  09-01-19   Growth in anaerobic bottle: Staphylococcus aureus  See previous culture [id number]  --    Growth in anaerobic bottle: Gram Positive Cocci in Clusters      .Sputum  08-31-19   Numerous Staphylococcus aureus  Normal Respiratory Martha present  --  Staphylococcus aureus      .Urine  08-30-19   >=3 organisms. Probable collection contamination.  --  --      .Blood  08-30-19   Growth in aerobic bottle: Staphylococcus aureus  "Due to technical problems, Proteus sp. will Not be reported as part of  the BCID panel until further notice"  ***Blood Panel PCR results on this specimen are available  approximately 3 hours after the Gram stain result.***  Gram stain, PCR, and/or culture results may not always  correspond due to difference in methodologies.  ************************************************************  This PCR assay was performed using [employer].  The following targets are tested for: Enterococcus,  vancomycin resistant enterococci, Listeria monocytogenes,  coagulase negative staphylococci, S. aureus,  methicillin resistant S. aureus, Streptococcus agalactiae  (Group B), S. pneumoniae, S. pyogenes (Group A),  Acinetobacter baumannii, Enterobacter cloacae, E. coli,  Klebsiella oxytoca, K. pneumoniae, Proteus sp.,  Serratia marcescens, Haemophilus influenzae,  Neisseria meningitidis, Pseudomonas aeruginosa, Candida  albicans, C. glabrata, C krusei, C parapsilosis,  C. tropicalis and the KPC resistance gene.  --  Blood Culture PCR  Staphylococcus aureus      .Blood  08-11-19   No growth at 5 days.  --  --      .Urine  08-10-19   >100,000 CFU/ml Klebsiella pneumoniae  --  Klebsiella pneumoniae      .Blood  08-10-19   No growth at 5 days.  --  --    RADIOLOGY:    < from: Xray Chest 1 View- PORTABLE-Urgent (09.05.19 @ 23:44) >  IMPRESSION:    The NG tube tip is in the stomach.    < end of copied text >

## 2019-09-09 LAB
ANION GAP SERPL CALC-SCNC: 11 MMOL/L — SIGNIFICANT CHANGE UP (ref 5–17)
ANION GAP SERPL CALC-SCNC: 13 MMOL/L — SIGNIFICANT CHANGE UP (ref 5–17)
BUN SERPL-MCNC: 9 MG/DL — SIGNIFICANT CHANGE UP (ref 7–23)
BUN SERPL-MCNC: 9 MG/DL — SIGNIFICANT CHANGE UP (ref 7–23)
CALCIUM SERPL-MCNC: 9.3 MG/DL — SIGNIFICANT CHANGE UP (ref 8.4–10.5)
CALCIUM SERPL-MCNC: 9.8 MG/DL — SIGNIFICANT CHANGE UP (ref 8.4–10.5)
CHLORIDE SERPL-SCNC: 100 MMOL/L — SIGNIFICANT CHANGE UP (ref 96–108)
CHLORIDE SERPL-SCNC: 105 MMOL/L — SIGNIFICANT CHANGE UP (ref 96–108)
CO2 SERPL-SCNC: 20 MMOL/L — LOW (ref 22–31)
CO2 SERPL-SCNC: 24 MMOL/L — SIGNIFICANT CHANGE UP (ref 22–31)
CREAT SERPL-MCNC: 0.61 MG/DL — SIGNIFICANT CHANGE UP (ref 0.5–1.3)
CREAT SERPL-MCNC: 0.64 MG/DL — SIGNIFICANT CHANGE UP (ref 0.5–1.3)
GLUCOSE BLDC GLUCOMTR-MCNC: 111 MG/DL — HIGH (ref 70–99)
GLUCOSE BLDC GLUCOMTR-MCNC: 112 MG/DL — HIGH (ref 70–99)
GLUCOSE BLDC GLUCOMTR-MCNC: 112 MG/DL — HIGH (ref 70–99)
GLUCOSE BLDC GLUCOMTR-MCNC: 117 MG/DL — HIGH (ref 70–99)
GLUCOSE SERPL-MCNC: 115 MG/DL — HIGH (ref 70–99)
GLUCOSE SERPL-MCNC: 123 MG/DL — HIGH (ref 70–99)
POTASSIUM SERPL-MCNC: 3.2 MMOL/L — LOW (ref 3.5–5.3)
POTASSIUM SERPL-MCNC: 4.3 MMOL/L — SIGNIFICANT CHANGE UP (ref 3.5–5.3)
POTASSIUM SERPL-SCNC: 3.2 MMOL/L — LOW (ref 3.5–5.3)
POTASSIUM SERPL-SCNC: 4.3 MMOL/L — SIGNIFICANT CHANGE UP (ref 3.5–5.3)
SODIUM SERPL-SCNC: 136 MMOL/L — SIGNIFICANT CHANGE UP (ref 135–145)
SODIUM SERPL-SCNC: 137 MMOL/L — SIGNIFICANT CHANGE UP (ref 135–145)

## 2019-09-09 PROCEDURE — 93306 TTE W/DOPPLER COMPLETE: CPT | Mod: 26

## 2019-09-09 PROCEDURE — 99232 SBSQ HOSP IP/OBS MODERATE 35: CPT | Mod: GC

## 2019-09-09 PROCEDURE — 99232 SBSQ HOSP IP/OBS MODERATE 35: CPT

## 2019-09-09 PROCEDURE — 99233 SBSQ HOSP IP/OBS HIGH 50: CPT

## 2019-09-09 RX ORDER — POTASSIUM CHLORIDE 20 MEQ
40 PACKET (EA) ORAL ONCE
Refills: 0 | Status: COMPLETED | OUTPATIENT
Start: 2019-09-09 | End: 2019-09-09

## 2019-09-09 RX ORDER — POTASSIUM CHLORIDE 20 MEQ
10 PACKET (EA) ORAL
Refills: 0 | Status: COMPLETED | OUTPATIENT
Start: 2019-09-09 | End: 2019-09-09

## 2019-09-09 RX ADMIN — LIDOCAINE 1 PATCH: 4 CREAM TOPICAL at 19:33

## 2019-09-09 RX ADMIN — GABAPENTIN 300 MILLIGRAM(S): 400 CAPSULE ORAL at 22:44

## 2019-09-09 RX ADMIN — ALBUTEROL 2.5 MILLIGRAM(S): 90 AEROSOL, METERED ORAL at 17:46

## 2019-09-09 RX ADMIN — ENOXAPARIN SODIUM 50 MILLIGRAM(S): 100 INJECTION SUBCUTANEOUS at 06:12

## 2019-09-09 RX ADMIN — ALBUTEROL 2.5 MILLIGRAM(S): 90 AEROSOL, METERED ORAL at 06:29

## 2019-09-09 RX ADMIN — LIDOCAINE 1 PATCH: 4 CREAM TOPICAL at 11:55

## 2019-09-09 RX ADMIN — Medication 0.25 MILLIGRAM(S): at 17:46

## 2019-09-09 RX ADMIN — Medication 100 MILLIGRAM(S): at 17:18

## 2019-09-09 RX ADMIN — ALBUTEROL 2.5 MILLIGRAM(S): 90 AEROSOL, METERED ORAL at 12:24

## 2019-09-09 RX ADMIN — Medication 0.25 MILLIGRAM(S): at 06:29

## 2019-09-09 RX ADMIN — Medication 40 MILLIEQUIVALENT(S): at 19:01

## 2019-09-09 RX ADMIN — SODIUM CHLORIDE 40 MILLILITER(S): 9 INJECTION, SOLUTION INTRAVENOUS at 09:04

## 2019-09-09 RX ADMIN — LIDOCAINE 1 PATCH: 4 CREAM TOPICAL at 22:45

## 2019-09-09 RX ADMIN — SODIUM CHLORIDE 3 MILLILITER(S): 9 INJECTION INTRAMUSCULAR; INTRAVENOUS; SUBCUTANEOUS at 17:46

## 2019-09-09 RX ADMIN — SODIUM CHLORIDE 3 MILLILITER(S): 9 INJECTION INTRAMUSCULAR; INTRAVENOUS; SUBCUTANEOUS at 06:29

## 2019-09-09 RX ADMIN — Medication 5 MILLIGRAM(S): at 06:12

## 2019-09-09 RX ADMIN — Medication 5 MILLIGRAM(S): at 17:32

## 2019-09-09 RX ADMIN — PANTOPRAZOLE SODIUM 40 MILLIGRAM(S): 20 TABLET, DELAYED RELEASE ORAL at 11:55

## 2019-09-09 RX ADMIN — Medication 100 MILLIGRAM(S): at 06:13

## 2019-09-09 RX ADMIN — CARBIDOPA AND LEVODOPA 2 TABLET(S): 25; 100 TABLET ORAL at 22:42

## 2019-09-09 NOTE — PROGRESS NOTE ADULT - SUBJECTIVE AND OBJECTIVE BOX
CHIEF COMPLAINT:    Interval Events:    REVIEW OF SYSTEMS:  [x] All other systems negative  [ ] Unable to assess ROS because ________    OBJECTIVE:  ICU Vital Signs Last 24 Hrs  T(C): 36.6 (09 Sep 2019 06:24), Max: 36.6 (08 Sep 2019 17:37)  T(F): 97.9 (09 Sep 2019 06:24), Max: 97.9 (08 Sep 2019 17:37)  HR: 75 (09 Sep 2019 12:29) (56 - 86)  BP: 136/50 (09 Sep 2019 06:24) (124/69 - 166/79)  BP(mean): --  ABP: --  ABP(mean): --  RR: 18 (09 Sep 2019 04:15) (16 - 18)  SpO2: 99% (09 Sep 2019 12:29) (97% - 100%)        09-08 @ 07:01  -  09-09 @ 07:00  --------------------------------------------------------  IN: 530 mL / OUT: 300 mL / NET: 230 mL      CAPILLARY BLOOD GLUCOSE      POCT Blood Glucose.: 111 mg/dL (09 Sep 2019 11:55)      PHYSICAL EXAM: CHIEF COMPLAINT: PeriOp optimization    Interval Events: Pulmonary called back to evaluate patient perioperatively for PEG for recurrent aspiration. She reports intermittent SOB. SpO2 100% on RA    REVIEW OF SYSTEMS:  [x] All other systems negative  [ ] Unable to assess ROS because ________    OBJECTIVE:  ICU Vital Signs Last 24 Hrs  T(C): 36.6 (09 Sep 2019 06:24), Max: 36.6 (08 Sep 2019 17:37)  T(F): 97.9 (09 Sep 2019 06:24), Max: 97.9 (08 Sep 2019 17:37)  HR: 75 (09 Sep 2019 12:29) (56 - 86)  BP: 136/50 (09 Sep 2019 06:24) (124/69 - 166/79)  BP(mean): --  ABP: --  ABP(mean): --  RR: 18 (09 Sep 2019 04:15) (16 - 18)  SpO2: 99% (09 Sep 2019 12:29) (97% - 100%)        09-08 @ 07:01  -  09-09 @ 07:00  --------------------------------------------------------  IN: 530 mL / OUT: 300 mL / NET: 230 mL      CAPILLARY BLOOD GLUCOSE      POCT Blood Glucose.: 111 mg/dL (09 Sep 2019 11:55)      PHYSICAL EXAM:  GENERAL: NAD, well-developed  HEENT: NC/AT, hoarseness  NECK: Supple, No JVD  CHEST/LUNG: Clear to auscultation bilaterally; No wheeze, no stridor  HEART: Regular rate and rhythm;   ABDOMEN: Soft, Nontender, Nondistended; Bowel sounds present  EXTREMITIES:  2+ Peripheral Pulses, No clubbing, cyanosis, or edema    HOSPITAL MEDICATIONS:  MEDICATIONS  (STANDING):  acetaminophen  IVPB .. 1000 milliGRAM(s) IV Intermittent once  ALBUTerol    0.083% 2.5 milliGRAM(s) Nebulizer every 6 hours  atorvastatin 40 milliGRAM(s) Oral <User Schedule>  buDESOnide    Inhalation Suspension 0.25 milliGRAM(s) Inhalation two times a day  carbidopa/levodopa  25/100 2 Tablet(s) Oral three times a day  ceFAZolin   IVPB 2000 milliGRAM(s) IV Intermittent every 12 hours  dextrose 5% + sodium chloride 0.45%. 1000 milliLiter(s) (40 mL/Hr) IV Continuous <Continuous>  dextrose 5%. 1000 milliLiter(s) (50 mL/Hr) IV Continuous <Continuous>  dextrose 50% Injectable 12.5 Gram(s) IV Push once  dextrose 50% Injectable 25 Gram(s) IV Push once  dextrose 50% Injectable 25 Gram(s) IV Push once  gabapentin   Solution 300 milliGRAM(s) Oral at bedtime  guaiFENesin    Syrup 200 milliGRAM(s) Oral every 6 hours  insulin lispro (HumaLOG) corrective regimen sliding scale   SubCutaneous every 6 hours  lidocaine   Patch 1 Patch Transdermal daily  metoprolol tartrate Injectable 5 milliGRAM(s) IV Push every 12 hours  pantoprazole  Injectable 40 milliGRAM(s) IV Push daily  predniSONE   Tablet 10 milliGRAM(s) Oral daily  sodium chloride 3%  Inhalation 3 milliLiter(s) Inhalation every 12 hours    MEDICATIONS  (PRN):  acetaminophen    Suspension .. 650 milliGRAM(s) Oral every 6 hours PRN Moderate Pain (4 - 6), Severe Pain (7 - 10)  ALPRAZolam 0.5 milliGRAM(s) Oral two times a day PRN anxiety  dextrose 40% Gel 15 Gram(s) Oral once PRN Blood Glucose LESS THAN 70 milliGRAM(s)/deciliter  glucagon  Injectable 1 milliGRAM(s) IntraMuscular once PRN Glucose LESS THAN 70 milligrams/deciliter      LABS:                        9.9    9.7   )-----------( 261      ( 08 Sep 2019 06:30 )             32.2     09-09    137  |  100  |  9   ----------------------------<  115<H>  3.2<L>   |  24  |  0.61    Ca    9.8      09 Sep 2019 06:10                MICROBIOLOGY:     Culture - Blood (collected 07 Sep 2019 19:12)  Source: .Blood  Preliminary Report (08 Sep 2019 20:01):    No growth to date.    Culture - Blood (collected 06 Sep 2019 16:33)  Source: .Blood  Preliminary Report (07 Sep 2019 17:01):    No growth to date.      =============================================================================================  RADIOLOGY:  [x ] Reviewed and interpreted by me

## 2019-09-09 NOTE — CHART NOTE - NSCHARTNOTEFT_GEN_A_CORE
ENT called to 'clear' pt for PEG tube. Pt OK for PEG placement as pt will be undergoing PEG tube under sedation which does not require intubation.

## 2019-09-09 NOTE — CHART NOTE - NSCHARTNOTEFT_GEN_A_CORE
Cardiology asked to risk stratify pt for PEG tube placement. Pt w/ known afib w/ recent TTE showing mod MS and preserved LV function. Pt not grossly volume overloaded and HR controlled. Pt optimized from cardiology for low risk procedure, no further testing indicated.    Jomar Salvador PGY4  [phone]  All Cardiology service information can be found 24/7 on [email or web address], password: [email or web address]

## 2019-09-09 NOTE — PROGRESS NOTE ADULT - ATTENDING COMMENTS
I have examined pt and agree with above exam and plan. Pt initially treated for pneumonia. Pt with h/o vocal cord paresis. Pt without respiratory distress and  acute pulmonary issues.  No pulmonary contraindication for PEG.

## 2019-09-09 NOTE — PROGRESS NOTE ADULT - PROBLEM SELECTOR PLAN 1
S/p vocal cord injection for vocal cord paralysis  - failed speech and swallow study x3 (last 09/03)  -PEG tube placement planned for Tuesday, 9/10/19 as per GI NP Gordon and Dr Vivar (for Dr. Cordoba)   -pulled NG tube, refusing for reinsertion, understands of dehydration, etc keep NPO except meds, c/w maintenance fluids  -Per GI hold eliquis for at least 72 hours, can continue with lovenox, will hold lovenox 24 hours prior to planned procedure (tomorrow morning)  -Aspiration precautions and Elevate head of bed  -Cardiology c/s for pre-surgical clearance pending S/p vocal cord injection for vocal cord paralysis  - failed speech and swallow study x3 (last 09/03)  -PEG tube placement planned for Tuesday, 9/10/19 as per GI NP Gordon and Dr Vivar (for Dr. Cordoba)   -pulled NG tube, refusing for reinsertion, understands of dehydration, etc keep NPO except meds, c/w maintenance fluids  -Per GI hold eliquis for at least 72 hours, can continue with lovenox, will hold lovenox 24 hours prior to planned procedure (tomorrow morning)  -Aspiration precautions and Elevate head of bed  -All clearances obtained; patient ready for PEG

## 2019-09-09 NOTE — PROGRESS NOTE ADULT - ASSESSMENT
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    Sputum Culture 8/31 - MSSA  Blood Culture 8/30 - MSSA  Blood Culture 9/1 - MSSA  Blood Culture 9/5 - NGTD  Blood Culture 9/6 - NGTD  Blood Culture 9/7 - NGTD    TTE was a poor study but no obvious signs of IE    MSSA Bacteremia appears to be secondary to her pneumonia. I would treat for at least 6 weeks of antibiotics given persistent bacteremia (72 hours) and history of TAVR.     If continued encephalopathy recommend MRI brain to evaluate for septic emboli    Overall, MSSA Bacteremia, MSSA Pneumonia, Pre-Procedural Evaluation, s/p Bioprosthetic TAVR, Encephalopathy    Recommendations    --No ID contraindications for PEG tube placement  --Continue Cefazolin 2 g q 12 hour (will require at least 6 weeks of antibiotics given persistent bacteremia)   --Continue to monitor mental status - if any decline recommend CNS imaging (given 72H of Bacteremia to evaluate for embolic disease)  --Continue to follow CBC with diff  --Continue to follow temperature curve  --F/U Prelim BCx    I will continue to follow. Please feel free to contact me with any further questions.    Ancelmo Mendoza M.D.  [facility] Division of Infectious Disease  8AM-5PM: Pager Number [phone]  After Hours (or if no response): Please contact the Infectious Diseases Office at [phone]

## 2019-09-09 NOTE — PROGRESS NOTE ADULT - ASSESSMENT
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    MSSA Bacteremia, MSSA Pneumonia, Leukocytosis, s/p Bioprosthetic TAVR  we area asked to assume GI care and consult for PEG tube placement    Discussed with pt and extensive discussion with son over phone at pt request. Explained indications/ risks/ benefits of PEG and explained that PEG placement would not eliminate possible risk of aspiration (persistent aspiration risk from secretions and well as reflux/regurgitation). At this time, he wishes to proceed with PEG next week    RECS:  -Abx as per ID  -Infectious work-up r/o endocarditis as per ID recs  -await Pulmonary follow up  -Cardiology input noted/appreciated  -Plan for PEG tomorrow  -Lovenox now on hold (received dose at 6am  -current Abx is sufficient for rosa-op PEG prophylaxis   -NPO  -steroids per primary team  -Continue IV PPI as pt without NGT    Discussed with Medicine team/resident      Gordon Shaw PA-C    [facility]  [phone]  After hours and weekend coverage [phone]

## 2019-09-09 NOTE — PROGRESS NOTE ADULT - ATTENDING COMMENTS
aspiration pna/bacteremia, s/p failed s/s eval/mbs  family/patient opted in for peg placement after extensive discussion  appreciate cardiology, ENT clearance for the peg placement  plan for PEG tmrw by KOBE Coleman MD  Division of Hospital Medicine  Pager: [phone]  Office: [phone]

## 2019-09-09 NOTE — PROGRESS NOTE ADULT - ASSESSMENT
84yo F with GERD, HTN, HLD, DM, Parkinson-like symptoms, anxiety/depression, AS s/p TAVR on plavix, overactive bladder, HFpEF with severe Mitral Stenosis, PMR (on chronic steroids), ? diagnosis of COPD presented 8/11/19 with SOB and abdominal pain, admitted for acute hypoxic and hypercarbic respiratory failure in setting of pneumonia related to aspiration now clinically improved after MICU stay now pending PEG tube for chronic aspiration. 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

## 2019-09-09 NOTE — PROGRESS NOTE ADULT - SUBJECTIVE AND OBJECTIVE BOX
Follow Up:  MSSA Bacteremia    Interval History: Remains lethargic this AM. Denies any pain.     REVIEW OF SYSTEMS  [x  ] ROS unobtainable because:  encephalopathy  [  ] All other systems negative except as noted below    Constitutional:  [ ] fever [ ] chills  [ ] weight loss  [ ] weakness  Skin:  [ ] rash [ ] phlebitis	  Eyes: [ ] icterus [ ] pain  [ ] discharge	  ENMT: [ ] sore throat  [ ] thrush [ ] ulcers [ ] exudates  Respiratory: [ ] dyspnea [ ] hemoptysis [ ] cough [ ] sputum	  Cardiovascular:  [ ] chest pain [ ] palpitations [ ] edema	  Gastrointestinal:  [ ] nausea [ ] vomiting [ ] diarrhea [ ] constipation [ ] pain	  Genitourinary:  [ ] dysuria [ ] frequency [ ] hematuria [ ] discharge [ ] flank pain  [ ] incontinence  Musculoskeletal:  [ ] myalgias [ ] arthralgias [ ] arthritis  [ ] back pain  Neurological:  [ ] headache [ ] seizures  [ ] confusion/altered mental status    Allergies  penicillin (Unknown)        ANTIMICROBIALS:  ceFAZolin   IVPB 2000 every 12 hours      OTHER MEDS:  MEDICATIONS  (STANDING):  acetaminophen    Suspension .. 650 every 6 hours PRN  acetaminophen  IVPB .. 1000 once  ALBUTerol    0.083% 2.5 every 6 hours  ALPRAZolam 0.5 two times a day PRN  atorvastatin 40 <User Schedule>  buDESOnide    Inhalation Suspension 0.25 two times a day  carbidopa/levodopa  25/100 2 three times a day  dextrose 40% Gel 15 once PRN  dextrose 50% Injectable 12.5 once  dextrose 50% Injectable 25 once  dextrose 50% Injectable 25 once  gabapentin   Solution 300 at bedtime  glucagon  Injectable 1 once PRN  guaiFENesin    Syrup 200 every 6 hours  insulin lispro (HumaLOG) corrective regimen sliding scale  every 6 hours  metoprolol tartrate Injectable 5 every 12 hours  pantoprazole  Injectable 40 daily  predniSONE   Tablet 10 daily  sodium chloride 3%  Inhalation 3 every 12 hours      Vital Signs Last 24 Hrs  T(C): 36.7 (09 Sep 2019 15:35), Max: 36.7 (09 Sep 2019 15:35)  T(F): 98 (09 Sep 2019 15:35), Max: 98 (09 Sep 2019 15:35)  HR: 69 (09 Sep 2019 18:39) (56 - 86)  BP: 121/64 (09 Sep 2019 15:35) (121/64 - 166/79)  BP(mean): --  RR: 18 (09 Sep 2019 15:35) (18 - 18)  SpO2: 100% (09 Sep 2019 18:39) (96% - 100%)    PHYSICAL EXAMINATION:  General: Alert and Awake but lethargic, NAD  HEENT: PERRL, EOMI,  Neck: Supple  Cardiac: RRR, No M/R/G  Resp: CTAB, No Wh/Rh/Ra  Abdomen: NBS, NT/ND, No HSM, No rigidity or guarding  MSK: No LE edema. No Calf tenderness  Skin: No rashes or lesions. Skin is warm and dry to the touch.   Neuro: Alert and Awake but lethargic. CN 2-12 Grossly intact. Moves all four extremities spontaneously.  Psych: Encephalopathic - unable to assess                          9.9    9.7   )-----------( 261      ( 08 Sep 2019 06:30 )             32.2       09-09    137  |  100  |  9   ----------------------------<  115<H>  3.2<L>   |  24  |  0.61    Ca    9.8      09 Sep 2019 06:10            MICROBIOLOGY:  v  .Blood  09-07-19   No growth to date.  --  --      .Blood  09-06-19   No growth to date.  --  --      .Blood  09-05-19   No growth to date.  --  --      .Blood  09-05-19   No growth to date.  --  --      .Blood  09-01-19   No growth at 5 days.  --  --      .Blood  09-01-19   Growth in anaerobic bottle: Staphylococcus aureus  See previous culture [id number]  --    Growth in anaerobic bottle: Gram Positive Cocci in Clusters      .Sputum  08-31-19   Numerous Staphylococcus aureus  Normal Respiratory Martha present  --  Staphylococcus aureus      .Urine  08-30-19   >=3 organisms. Probable collection contamination.  --  --      .Blood  08-30-19   Growth in aerobic bottle: Staphylococcus aureus  "Due to technical problems, Proteus sp. will Not be reported as part of  the BCID panel until further notice"  ***Blood Panel PCR results on this specimen are available  approximately 3 hours after the Gram stain result.***  Gram stain, PCR, and/or culture results may not always  correspond due to difference in methodologies.  ************************************************************  This PCR assay was performed using [employer].  The following targets are tested for: Enterococcus,  vancomycin resistant enterococci, Listeria monocytogenes,  coagulase negative staphylococci, S. aureus,  methicillin resistant S. aureus, Streptococcus agalactiae  (Group B), S. pneumoniae, S. pyogenes (Group A),  Acinetobacter baumannii, Enterobacter cloacae, E. coli,  Klebsiella oxytoca, K. pneumoniae, Proteus sp.,  Serratia marcescens, Haemophilus influenzae,  Neisseria meningitidis, Pseudomonas aeruginosa, Candida  albicans, C. glabrata, C krusei, C parapsilosis,  C. tropicalis and the KPC resistance gene.  --  Blood Culture PCR  Staphylococcus aureus      .Blood  08-11-19   No growth at 5 days.  --  --      .Urine  08-10-19   >100,000 CFU/ml Klebsiella pneumoniae  --  Klebsiella pneumoniae      .Blood  08-10-19   No growth at 5 days.  --  --    RADIOLOGY:    TTE  Study Date: 9/9/2019  1. Dense mitral annular calcification and calcified mitral  leaflets. Minimal mitral regurgitation. Mean transmitral  valve gradient equals 5 mm Hg, consistent with moderate  mitral stenosis. (HRabout 70 bpm)  2. Aortic valve not well visualized; appears calcified. No  aortic valve regurgitation seen.  3. Endocardium not well visualized; grossly preserved left  ventricular systolic function.  4. The right ventricle is not well visualized; grossly  normal right ventricular systolic function.  Unable to exclude the presence of endocarditis. Study was  technically limited. Consider JEANNETTE to better evaluate for  the presence of endociarditis if clinically indicated.

## 2019-09-09 NOTE — PROGRESS NOTE ADULT - PROBLEM SELECTOR PLAN 7
-c/w lovenox 50mg bid, hold dose after monday morning, GI wants off for 24 hours for PEG tuedsay  - after PEG tube placement can transition to eliquis. Refusing heparin ggt,   --on IV metoprolol 5mg bid with good rate and BP control

## 2019-09-09 NOTE — PROGRESS NOTE ADULT - SUBJECTIVE AND OBJECTIVE BOX
Patient is a 85y old  Female who presents with a chief complaint of Abdominal pain (09 Sep 2019 15:00)      SUBJECTIVE / OVERNIGHT EVENTS: No acute events overnight. No acute complaints    ROS:  14 point ROS negative in detail except stated as above    MEDICATIONS  (STANDING):  acetaminophen  IVPB .. 1000 milliGRAM(s) IV Intermittent once  ALBUTerol    0.083% 2.5 milliGRAM(s) Nebulizer every 6 hours  atorvastatin 40 milliGRAM(s) Oral <User Schedule>  buDESOnide    Inhalation Suspension 0.25 milliGRAM(s) Inhalation two times a day  carbidopa/levodopa  25/100 2 Tablet(s) Oral three times a day  ceFAZolin   IVPB 2000 milliGRAM(s) IV Intermittent every 12 hours  dextrose 5% + sodium chloride 0.45%. 1000 milliLiter(s) (40 mL/Hr) IV Continuous <Continuous>  dextrose 5%. 1000 milliLiter(s) (50 mL/Hr) IV Continuous <Continuous>  dextrose 50% Injectable 12.5 Gram(s) IV Push once  dextrose 50% Injectable 25 Gram(s) IV Push once  dextrose 50% Injectable 25 Gram(s) IV Push once  gabapentin   Solution 300 milliGRAM(s) Oral at bedtime  guaiFENesin    Syrup 200 milliGRAM(s) Oral every 6 hours  insulin lispro (HumaLOG) corrective regimen sliding scale   SubCutaneous every 6 hours  lidocaine   Patch 1 Patch Transdermal daily  metoprolol tartrate Injectable 5 milliGRAM(s) IV Push every 12 hours  pantoprazole  Injectable 40 milliGRAM(s) IV Push daily  potassium chloride   Powder 40 milliEquivalent(s) Oral once  predniSONE   Tablet 10 milliGRAM(s) Oral daily  sodium chloride 3%  Inhalation 3 milliLiter(s) Inhalation every 12 hours    MEDICATIONS  (PRN):  acetaminophen    Suspension .. 650 milliGRAM(s) Oral every 6 hours PRN Moderate Pain (4 - 6), Severe Pain (7 - 10)  ALPRAZolam 0.5 milliGRAM(s) Oral two times a day PRN anxiety  dextrose 40% Gel 15 Gram(s) Oral once PRN Blood Glucose LESS THAN 70 milliGRAM(s)/deciliter  glucagon  Injectable 1 milliGRAM(s) IntraMuscular once PRN Glucose LESS THAN 70 milligrams/deciliter      CAPILLARY BLOOD GLUCOSE      POCT Blood Glucose.: 112 mg/dL (09 Sep 2019 17:26)  POCT Blood Glucose.: 111 mg/dL (09 Sep 2019 11:55)  POCT Blood Glucose.: 117 mg/dL (09 Sep 2019 06:08)  POCT Blood Glucose.: 112 mg/dL (09 Sep 2019 00:08)    I&O's Summary    08 Sep 2019 07:01  -  09 Sep 2019 07:00  --------------------------------------------------------  IN: 530 mL / OUT: 300 mL / NET: 230 mL        PHYSICAL EXAM:  Vital Signs Last 24 Hrs  T(C): 36.7 (09 Sep 2019 15:35), Max: 36.7 (09 Sep 2019 15:35)  T(F): 98 (09 Sep 2019 15:35), Max: 98 (09 Sep 2019 15:35)  HR: 83 (09 Sep 2019 15:35) (56 - 86)  BP: 121/64 (09 Sep 2019 15:35) (121/64 - 166/79)  BP(mean): --  RR: 18 (09 Sep 2019 15:35) (18 - 18)  SpO2: 96% (09 Sep 2019 15:35) (96% - 100%)  GENERAL: NAD, well-developed  HEAD:  Atraumatic, Normocephalic  EYES: EOMI, PERRLA, conjunctiva and sclera clear  NECK: Supple, No JVD  CHEST/LUNG: Clear to auscultation bilaterally; No wheeze  HEART: Regular rate and rhythm; No murmurs, rubs, or gallops  ABDOMEN: Soft, Nontender, Nondistended; Bowel sounds present  EXTREMITIES:  2+ Peripheral Pulses, No clubbing, cyanosis, or edema  NEUROLOGY: AAOx3; non-focal  SKIN: No rashes or lesions    LABS:                        9.9    9.7   )-----------( 261      ( 08 Sep 2019 06:30 )             32.2     09-09    137  |  100  |  9   ----------------------------<  115<H>  3.2<L>   |  24  |  0.61    Ca    9.8      09 Sep 2019 06:10                RADIOLOGY & ADDITIONAL TESTS:    Imaging Personally Reviewed:    Consultant(s) Notes Reviewed:      Care Discussed with Consultants/Other Providers:

## 2019-09-09 NOTE — PROGRESS NOTE ADULT - SUBJECTIVE AND OBJECTIVE BOX
Patient is a 85y old  Female who presented with a chief complaint of Abdominal pain (08 Sep 2019 14:36)      INTERVAL HPI/OVERNIGHT EVENTS:  prior events noted  pt pulled out NGT refused to have it replaced  breathing overall better  no CP     MEDICATIONS  (STANDING):  acetaminophen  IVPB .. 1000 milliGRAM(s) IV Intermittent once  ALBUTerol    0.083% 2.5 milliGRAM(s) Nebulizer every 6 hours  atorvastatin 40 milliGRAM(s) Oral <User Schedule>  buDESOnide    Inhalation Suspension 0.25 milliGRAM(s) Inhalation two times a day  carbidopa/levodopa  25/100 2 Tablet(s) Oral three times a day  ceFAZolin   IVPB 2000 milliGRAM(s) IV Intermittent every 12 hours  dextrose 5% + sodium chloride 0.45%. 1000 milliLiter(s) (40 mL/Hr) IV Continuous <Continuous>  dextrose 5%. 1000 milliLiter(s) (50 mL/Hr) IV Continuous <Continuous>  dextrose 50% Injectable 12.5 Gram(s) IV Push once  dextrose 50% Injectable 25 Gram(s) IV Push once  dextrose 50% Injectable 25 Gram(s) IV Push once  gabapentin   Solution 300 milliGRAM(s) Oral at bedtime  guaiFENesin    Syrup 200 milliGRAM(s) Oral every 6 hours  insulin lispro (HumaLOG) corrective regimen sliding scale   SubCutaneous every 6 hours  lidocaine   Patch 1 Patch Transdermal daily  metoprolol tartrate Injectable 5 milliGRAM(s) IV Push every 12 hours  pantoprazole  Injectable 40 milliGRAM(s) IV Push daily  predniSONE   Tablet 10 milliGRAM(s) Oral daily  sodium chloride 3%  Inhalation 3 milliLiter(s) Inhalation every 12 hours    MEDICATIONS  (PRN):  acetaminophen    Suspension .. 650 milliGRAM(s) Oral every 6 hours PRN Moderate Pain (4 - 6), Severe Pain (7 - 10)  ALPRAZolam 0.5 milliGRAM(s) Oral two times a day PRN anxiety  dextrose 40% Gel 15 Gram(s) Oral once PRN Blood Glucose LESS THAN 70 milliGRAM(s)/deciliter  glucagon  Injectable 1 milliGRAM(s) IntraMuscular once PRN Glucose LESS THAN 70 milligrams/deciliter      Allergies  penicillin (Unknown)      Review of Systems:  General:  No wt loss, fevers, chills, night sweats,fatigue,   CV:  No pain, palpitatioins, +HTN HLD +AF s/p TAVR, severe MS  Resp:  see HPI +cough +secretions +COPD  GI:  see HPI  : +incontinence  No pain, bleeding, nocturia  Muscle:  +PMR  Neuro:  parkinsonian symptoms  Psych:  No fatigue, insomnia, mood problems +anxiety/depression  Endocrine:  No polyuria, polydypsia, cold/heat intolerance  Heme:  No petechiae, ecchymosis, +easy bruisability +AC  Skin:  No rash, tattoos, scars, edema    Vital Signs Last 24 Hrs  T(C): 36.6 (09 Sep 2019 06:24), Max: 36.8 (08 Sep 2019 14:17)  T(F): 97.9 (09 Sep 2019 06:24), Max: 98.2 (08 Sep 2019 14:17)  HR: 75 (09 Sep 2019 12:29) (56 - 86)  BP: 136/50 (09 Sep 2019 06:24) (124/69 - 166/79)  BP(mean): --  RR: 18 (09 Sep 2019 04:15) (16 - 18)  SpO2: 99% (09 Sep 2019 12:29) (97% - 100%)    PHYSICAL EXAM:  Constitutional: edentulous elderly female, alert and responsive off NC SpO2 %,  no retractions +occ. cough and wet vocal quality  Neck: No LAD, supple, no retractions, no JVD  Respiratory: diffuse rhonchi, no wheeze  Cardiovascular: S1 and S2, +Murmur  Gastrointestinal: BS+, soft, obese NT/ND  Extremities: No peripheral edema, neg clubbing, cyanosis  Vascular: 2+ peripheral pulses  Neurological: A/O x 3, no focal  asymmetry  Psychiatric: Normal mood, normal affect  Skin: No rashes, +multiple areas of healing bruises on arms    LABS:                        9.9    9.7   )-----------( 261      ( 08 Sep 2019 06:30 )             32.2     09-09    137  |  100  |  9   ----------------------------<  115<H>  3.2<L>   |  24  |  0.61    Ca    9.8      09 Sep 2019 06:10        RADIOLOGY & ADDITIONAL TESTS:  < from: Transthoracic Echocardiogram (09.09.19 @ 07:40) >  ------------------------------------------------------------------------  Conclusions:  Technically difficult study.  1. Dense mitral annular calcification and calcified mitral  leaflets. Minimal mitral regurgitation. Mean transmitral  valve gradient equals 5 mm Hg, consistent with moderate  mitral stenosis. (HRabout 70 bpm)  2. Aortic valve not well visualized; appears calcified. No  aortic valve regurgitation seen.  3. Endocardium not well visualized; grossly preserved left  ventricular systolic function.  4. The right ventricle is not well visualized; grossly  normal right ventricular systolic function.  Unable to exclude the presence of endocarditis. Study was  technically limited. Consider JEANNETTE to better evaluate for  the presence of endociarditis if clinically indicated.  ------------------------------------------------------------------------  Confirmed on  9/9/2019 - 10:00:48 by Ruma Diaz M.D.

## 2019-09-09 NOTE — PROGRESS NOTE ADULT - PROBLEM SELECTOR PLAN 2
K 3.2 this AM. Patient declining IV PO due to burning sensation, family understands risk of aspiration with PO meds and wants to proceed anyway  -Repleting PO potassium with powder  - Rpt BMP at 11pm; further repletion as necessary for surgery tomorrow

## 2019-09-09 NOTE — PROGRESS NOTE ADULT - PROBLEM SELECTOR PLAN 8
-Repeat TTE scheduled to r/o endocarditis  -Outpatient CTS eval if in line with overall GOC. -TTE: moderate MS  -Outpatient CTS eval if in line with overall GOC.

## 2019-09-09 NOTE — PROGRESS NOTE ADULT - PROBLEM SELECTOR PLAN 3
Blood culture positive for MSSA; 2/2 aspiration PNA vs unlikely endocarditis however s/p TAVR  -ID consult following patient, recommended to continue with cefazolin 2gram q12 hours x 6 weeks  - BCx on 09/05 and 09/06 NGTD  -Continue to monitor for signs of infection (temp curve, WBC trend)  -TTE unable to r/o presence of vegetations; ID aware, still no contraindication to PEG placement

## 2019-09-09 NOTE — PROGRESS NOTE ADULT - PROBLEM SELECTOR PLAN 10
HgBa1c 6  DVT ppx: on lovenox, last dose monday AM for PEG tuesday, per GI off for 24 hhours  Dispo: poor prognosis, DNR/DNI, palliative following, plan for PEG Tuesday AM

## 2019-09-09 NOTE — CHART NOTE - NSCHARTNOTEFT_GEN_A_CORE
Nutrition Follow Up Note    Patient seen for malnutrition follow up     Spoke to pt at bedside briefly, she wanted to sleep and did not want to speak. Spoke to team who reported pt pulled out NG tube 9/7 and is refusing reinsertion, on IVF. PEG scheduled for tomorrow 9/10.     Diet: NPO with TF, Glucerna 1.5 @ 60ml x hrs       Daily Weight: 55.6 kg (09-04), 57 kg (08-30)  Weight Change: 2% wt loss x 4 days, ? accuracy of bed scale weight     Pertinent Medications: MEDICATIONS  (STANDING):  acetaminophen  IVPB .. 1000 milliGRAM(s) IV Intermittent once  ALBUTerol    0.083% 2.5 milliGRAM(s) Nebulizer every 6 hours  atorvastatin 40 milliGRAM(s) Oral <User Schedule>  buDESOnide    Inhalation Suspension 0.25 milliGRAM(s) Inhalation two times a day  carbidopa/levodopa  25/100 2 Tablet(s) Oral three times a day  ceFAZolin   IVPB 2000 milliGRAM(s) IV Intermittent every 12 hours  dextrose 5% + sodium chloride 0.45%. 1000 milliLiter(s) (40 mL/Hr) IV Continuous <Continuous>  dextrose 5%. 1000 milliLiter(s) (50 mL/Hr) IV Continuous <Continuous>  dextrose 50% Injectable 12.5 Gram(s) IV Push once  dextrose 50% Injectable 25 Gram(s) IV Push once  dextrose 50% Injectable 25 Gram(s) IV Push once  gabapentin   Solution 300 milliGRAM(s) Oral at bedtime  guaiFENesin    Syrup 200 milliGRAM(s) Oral every 6 hours  insulin lispro (HumaLOG) corrective regimen sliding scale   SubCutaneous every 6 hours  lidocaine   Patch 1 Patch Transdermal daily  metoprolol tartrate Injectable 5 milliGRAM(s) IV Push every 12 hours  pantoprazole  Injectable 40 milliGRAM(s) IV Push daily  predniSONE   Tablet 10 milliGRAM(s) Oral daily  sodium chloride 3%  Inhalation 3 milliLiter(s) Inhalation every 12 hours    MEDICATIONS  (PRN):  acetaminophen    Suspension .. 650 milliGRAM(s) Oral every 6 hours PRN Moderate Pain (4 - 6), Severe Pain (7 - 10)  ALPRAZolam 0.5 milliGRAM(s) Oral two times a day PRN anxiety  dextrose 40% Gel 15 Gram(s) Oral once PRN Blood Glucose LESS THAN 70 milliGRAM(s)/deciliter  glucagon  Injectable 1 milliGRAM(s) IntraMuscular once PRN Glucose LESS THAN 70 milligrams/deciliter    Pertinent Labs: 09-09 @ 06:10: Na 137, BUN 9, Cr 0.61, <H>, K+ 3.2<L>, Phos --, Mg --, Alk Phos --, ALT/SGPT --, AST/SGOT --, HbA1c --    Finger Sticks:  POCT Blood Glucose.: 111 mg/dL (09-09 @ 11:55)  POCT Blood Glucose.: 117 mg/dL (09-09 @ 06:08)  POCT Blood Glucose.: 112 mg/dL (09-09 @ 00:08)  POCT Blood Glucose.: 99 mg/dL (09-08 @ 18:05)      Skin: no pressure ulcers noted     Edema: none noted     Estimated Needs:   [X] no change since previous assessment  [ ] recalculated:     Previous Nutrition Diagnosis: mild malnutrition  Nutrition Diagnosis is: on-going, pt pulled out NG tube and refusing reinsertion    New Nutrition Diagnosis:      Recommend  1) Resume TF after PEG placement or if pt amenable prior to PEG placement     Monitoring and Evaluation:     Continue to monitor Nutritional intake, Tolerance to diet prescription, weights, labs, skin integrity    RD remains available upon request and will follow up per protocol

## 2019-09-09 NOTE — PROGRESS NOTE ADULT - ASSESSMENT
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

## 2019-09-10 LAB
ANION GAP SERPL CALC-SCNC: 10 MMOL/L — SIGNIFICANT CHANGE UP (ref 5–17)
APTT BLD: 34.7 SEC — SIGNIFICANT CHANGE UP (ref 27.5–36.3)
BUN SERPL-MCNC: 8 MG/DL — SIGNIFICANT CHANGE UP (ref 7–23)
CALCIUM SERPL-MCNC: 9.3 MG/DL — SIGNIFICANT CHANGE UP (ref 8.4–10.5)
CHLORIDE SERPL-SCNC: 104 MMOL/L — SIGNIFICANT CHANGE UP (ref 96–108)
CO2 SERPL-SCNC: 24 MMOL/L — SIGNIFICANT CHANGE UP (ref 22–31)
CREAT SERPL-MCNC: 0.65 MG/DL — SIGNIFICANT CHANGE UP (ref 0.5–1.3)
CULTURE RESULTS: SIGNIFICANT CHANGE UP
CULTURE RESULTS: SIGNIFICANT CHANGE UP
GLUCOSE BLDC GLUCOMTR-MCNC: 109 MG/DL — HIGH (ref 70–99)
GLUCOSE BLDC GLUCOMTR-MCNC: 118 MG/DL — HIGH (ref 70–99)
GLUCOSE BLDC GLUCOMTR-MCNC: 88 MG/DL — SIGNIFICANT CHANGE UP (ref 70–99)
GLUCOSE BLDC GLUCOMTR-MCNC: 98 MG/DL — SIGNIFICANT CHANGE UP (ref 70–99)
GLUCOSE BLDC GLUCOMTR-MCNC: 99 MG/DL — SIGNIFICANT CHANGE UP (ref 70–99)
GLUCOSE SERPL-MCNC: 121 MG/DL — HIGH (ref 70–99)
HCT VFR BLD CALC: 29.6 % — LOW (ref 34.5–45)
HGB BLD-MCNC: 9.8 G/DL — LOW (ref 11.5–15.5)
INR BLD: 1.17 RATIO — HIGH (ref 0.88–1.16)
MCHC RBC-ENTMCNC: 30.7 PG — SIGNIFICANT CHANGE UP (ref 27–34)
MCHC RBC-ENTMCNC: 33.2 GM/DL — SIGNIFICANT CHANGE UP (ref 32–36)
MCV RBC AUTO: 92.4 FL — SIGNIFICANT CHANGE UP (ref 80–100)
PLATELET # BLD AUTO: 246 K/UL — SIGNIFICANT CHANGE UP (ref 150–400)
POTASSIUM SERPL-MCNC: 4.5 MMOL/L — SIGNIFICANT CHANGE UP (ref 3.5–5.3)
POTASSIUM SERPL-SCNC: 4.5 MMOL/L — SIGNIFICANT CHANGE UP (ref 3.5–5.3)
PROTHROM AB SERPL-ACNC: 13.4 SEC — HIGH (ref 10–12.9)
RBC # BLD: 3.21 M/UL — LOW (ref 3.8–5.2)
RBC # FLD: 15.8 % — HIGH (ref 10.3–14.5)
SODIUM SERPL-SCNC: 138 MMOL/L — SIGNIFICANT CHANGE UP (ref 135–145)
SPECIMEN SOURCE: SIGNIFICANT CHANGE UP
SPECIMEN SOURCE: SIGNIFICANT CHANGE UP
WBC # BLD: 7.5 K/UL — SIGNIFICANT CHANGE UP (ref 3.8–10.5)
WBC # FLD AUTO: 7.5 K/UL — SIGNIFICANT CHANGE UP (ref 3.8–10.5)

## 2019-09-10 PROCEDURE — 99233 SBSQ HOSP IP/OBS HIGH 50: CPT | Mod: GC

## 2019-09-10 PROCEDURE — 49465 FLUORO EXAM OF G/COLON TUBE: CPT

## 2019-09-10 RX ORDER — PANTOPRAZOLE SODIUM 20 MG/1
40 TABLET, DELAYED RELEASE ORAL
Refills: 0 | Status: DISCONTINUED | OUTPATIENT
Start: 2019-09-10 | End: 2019-09-12

## 2019-09-10 RX ORDER — GABAPENTIN 400 MG/1
300 CAPSULE ORAL AT BEDTIME
Refills: 0 | Status: DISCONTINUED | OUTPATIENT
Start: 2019-09-10 | End: 2019-09-13

## 2019-09-10 RX ORDER — METOPROLOL TARTRATE 50 MG
25 TABLET ORAL
Refills: 0 | Status: DISCONTINUED | OUTPATIENT
Start: 2019-09-10 | End: 2019-09-13

## 2019-09-10 RX ORDER — ACETAMINOPHEN 500 MG
650 TABLET ORAL EVERY 6 HOURS
Refills: 0 | Status: DISCONTINUED | OUTPATIENT
Start: 2019-09-10 | End: 2019-09-13

## 2019-09-10 RX ORDER — ATORVASTATIN CALCIUM 80 MG/1
40 TABLET, FILM COATED ORAL
Refills: 0 | Status: DISCONTINUED | OUTPATIENT
Start: 2019-09-10 | End: 2019-09-13

## 2019-09-10 RX ORDER — CALAMINE AND ZINC OXIDE AND PHENOL 160; 10 MG/ML; MG/ML
1 LOTION TOPICAL DAILY
Refills: 0 | Status: DISCONTINUED | OUTPATIENT
Start: 2019-09-10 | End: 2019-09-13

## 2019-09-10 RX ORDER — ALPRAZOLAM 0.25 MG
0.5 TABLET ORAL
Refills: 0 | Status: DISCONTINUED | OUTPATIENT
Start: 2019-09-10 | End: 2019-09-13

## 2019-09-10 RX ORDER — CARBIDOPA AND LEVODOPA 25; 100 MG/1; MG/1
2 TABLET ORAL THREE TIMES A DAY
Refills: 0 | Status: DISCONTINUED | OUTPATIENT
Start: 2019-09-10 | End: 2019-09-13

## 2019-09-10 RX ORDER — ALPRAZOLAM 0.25 MG
0.5 TABLET ORAL
Refills: 0 | Status: DISCONTINUED | OUTPATIENT
Start: 2019-09-10 | End: 2019-09-10

## 2019-09-10 RX ADMIN — PANTOPRAZOLE SODIUM 40 MILLIGRAM(S): 20 TABLET, DELAYED RELEASE ORAL at 14:08

## 2019-09-10 RX ADMIN — Medication 0.25 MILLIGRAM(S): at 05:37

## 2019-09-10 RX ADMIN — Medication 5 MILLIGRAM(S): at 05:43

## 2019-09-10 RX ADMIN — Medication 100 MILLIGRAM(S): at 05:43

## 2019-09-10 RX ADMIN — LIDOCAINE 1 PATCH: 4 CREAM TOPICAL at 14:10

## 2019-09-10 RX ADMIN — SODIUM CHLORIDE 3 MILLILITER(S): 9 INJECTION INTRAMUSCULAR; INTRAVENOUS; SUBCUTANEOUS at 17:59

## 2019-09-10 RX ADMIN — Medication 200 MILLIGRAM(S): at 17:49

## 2019-09-10 RX ADMIN — ALBUTEROL 2.5 MILLIGRAM(S): 90 AEROSOL, METERED ORAL at 17:58

## 2019-09-10 RX ADMIN — SODIUM CHLORIDE 3 MILLILITER(S): 9 INJECTION INTRAMUSCULAR; INTRAVENOUS; SUBCUTANEOUS at 05:38

## 2019-09-10 RX ADMIN — CARBIDOPA AND LEVODOPA 2 TABLET(S): 25; 100 TABLET ORAL at 14:09

## 2019-09-10 RX ADMIN — ALBUTEROL 2.5 MILLIGRAM(S): 90 AEROSOL, METERED ORAL at 23:47

## 2019-09-10 RX ADMIN — CARBIDOPA AND LEVODOPA 2 TABLET(S): 25; 100 TABLET ORAL at 21:52

## 2019-09-10 RX ADMIN — SODIUM CHLORIDE 40 MILLILITER(S): 9 INJECTION, SOLUTION INTRAVENOUS at 14:09

## 2019-09-10 RX ADMIN — Medication 0.25 MILLIGRAM(S): at 17:58

## 2019-09-10 RX ADMIN — Medication 25 MILLIGRAM(S): at 18:12

## 2019-09-10 RX ADMIN — ALBUTEROL 2.5 MILLIGRAM(S): 90 AEROSOL, METERED ORAL at 05:38

## 2019-09-10 RX ADMIN — Medication 100 MILLIGRAM(S): at 17:48

## 2019-09-10 RX ADMIN — GABAPENTIN 300 MILLIGRAM(S): 400 CAPSULE ORAL at 21:52

## 2019-09-10 RX ADMIN — LIDOCAINE 1 PATCH: 4 CREAM TOPICAL at 18:17

## 2019-09-10 NOTE — PROGRESS NOTE ADULT - PROBLEM SELECTOR PLAN 1
S/p vocal cord injection for vocal cord paralysis  - failed speech and swallow study x3 (last 09/03)  - PEG tube placed today; AXR confirmed proper location  - NPO except meds today, feeding tomorrow  - Dietitian recs appreciated

## 2019-09-10 NOTE — PROGRESS NOTE ADULT - PROBLEM SELECTOR PLAN 5
Likely due to aspiration event. S/p MICU; extubated 8/31  -Continue with oxygen supplementation PRN  -Now comfortable on RA

## 2019-09-10 NOTE — PROGRESS NOTE ADULT - ATTENDING COMMENTS
Rita Vivar MD, FACP, FACG, AGAF  [facility]  [phone]     After hours and weekend coverage GI service : [phone]

## 2019-09-10 NOTE — PROGRESS NOTE ADULT - ATTENDING COMMENTS
s/p PEG placement without complication  start tube feed tmrw  pending picc line placement for prolonged iv abx for bacteremia  updated son/daughter at the bedside    Radha Coleman MD  Division of Hospital Medicine  Pager: [phone]  Office: [phone]

## 2019-09-10 NOTE — PROGRESS NOTE ADULT - SUBJECTIVE AND OBJECTIVE BOX
Patient is a 85y old  Female who presents with a chief complaint of Abdominal pain (10 Sep 2019 09:30)      SUBJECTIVE / OVERNIGHT EVENTS: Repleted K last PM, otherwise NAEON. No acute complaints this AM, reports.    ROS:  14 point ROS negative in detail except stated as above    MEDICATIONS  (STANDING):  acetaminophen  IVPB .. 1000 milliGRAM(s) IV Intermittent once  ALBUTerol    0.083% 2.5 milliGRAM(s) Nebulizer every 6 hours  atorvastatin 40 milliGRAM(s) Oral <User Schedule>  buDESOnide    Inhalation Suspension 0.25 milliGRAM(s) Inhalation two times a day  carbidopa/levodopa  25/100 2 Tablet(s) Oral three times a day  ceFAZolin   IVPB 2000 milliGRAM(s) IV Intermittent every 12 hours  dextrose 5% + sodium chloride 0.45%. 1000 milliLiter(s) (40 mL/Hr) IV Continuous <Continuous>  dextrose 5%. 1000 milliLiter(s) (50 mL/Hr) IV Continuous <Continuous>  dextrose 50% Injectable 12.5 Gram(s) IV Push once  dextrose 50% Injectable 25 Gram(s) IV Push once  dextrose 50% Injectable 25 Gram(s) IV Push once  gabapentin   Solution 300 milliGRAM(s) Oral at bedtime  guaiFENesin    Syrup 200 milliGRAM(s) Oral every 6 hours  insulin lispro (HumaLOG) corrective regimen sliding scale   SubCutaneous every 6 hours  lidocaine   Patch 1 Patch Transdermal daily  metoprolol tartrate Injectable 5 milliGRAM(s) IV Push every 12 hours  pantoprazole  Injectable 40 milliGRAM(s) IV Push daily  predniSONE   Tablet 10 milliGRAM(s) Oral daily  sodium chloride 3%  Inhalation 3 milliLiter(s) Inhalation every 12 hours    MEDICATIONS  (PRN):  acetaminophen    Suspension .. 650 milliGRAM(s) Oral every 6 hours PRN Moderate Pain (4 - 6), Severe Pain (7 - 10)  ALPRAZolam 0.5 milliGRAM(s) Oral two times a day PRN anxiety  dextrose 40% Gel 15 Gram(s) Oral once PRN Blood Glucose LESS THAN 70 milliGRAM(s)/deciliter  glucagon  Injectable 1 milliGRAM(s) IntraMuscular once PRN Glucose LESS THAN 70 milligrams/deciliter      CAPILLARY BLOOD GLUCOSE      POCT Blood Glucose.: 118 mg/dL (10 Sep 2019 06:46)  POCT Blood Glucose.: 109 mg/dL (10 Sep 2019 00:45)  POCT Blood Glucose.: 112 mg/dL (09 Sep 2019 17:26)    I&O's Summary    09 Sep 2019 07:01  -  10 Sep 2019 07:00  --------------------------------------------------------  IN: 530 mL / OUT: 450 mL / NET: 80 mL        PHYSICAL EXAM:  Vital Signs Last 24 Hrs  T(C): 36.9 (10 Sep 2019 08:29), Max: 36.9 (10 Sep 2019 08:29)  T(F): 98.4 (10 Sep 2019 08:29), Max: 98.4 (10 Sep 2019 08:29)  HR: 75 (10 Sep 2019 08:29) (65 - 96)  BP: 146/71 (10 Sep 2019 08:29) (103/57 - 146/71)  BP(mean): --  RR: 18 (10 Sep 2019 05:40) (18 - 18)  SpO2: 99% (10 Sep 2019 08:29) (96% - 100%)  GENERAL: NAD, well-developed  HEAD:  Atraumatic, Normocephalic  EYES: EOMI, PERRLA, conjunctiva and sclera clear  NECK: Supple, No JVD  CHEST/LUNG: Clear to auscultation bilaterally; No wheeze  HEART: Regular rate and rhythm; No murmurs, rubs, or gallops  ABDOMEN: Soft, Nontender, Nondistended; Bowel sounds present  EXTREMITIES:  2+ Peripheral Pulses, No clubbing, cyanosis, or edema  NEUROLOGY: AAOx3; non-focal  SKIN: No rashes or lesions    LABS:                        9.8    7.5   )-----------( 246      ( 10 Sep 2019 07:09 )             29.6     09-10    138  |  104  |  8   ----------------------------<  121<H>  4.5   |  24  |  0.65    Ca    9.3      10 Sep 2019 07:10      PT/INR - ( 10 Sep 2019 07:15 )   PT: 13.4 sec;   INR: 1.17 ratio         PTT - ( 10 Sep 2019 07:15 )  PTT:34.7 sec          RADIOLOGY & ADDITIONAL TESTS:    Imaging Personally Reviewed:    Consultant(s) Notes Reviewed:      Care Discussed with Consultants/Other Providers:

## 2019-09-10 NOTE — CHART NOTE - NSCHARTNOTEFT_GEN_A_CORE
Nutrition Note    Patient seen for Consult regarding TF    Saw pt and family at bedside, PEG placed today, pt was resting. Recommend Glucerna 1.5 @60 ml/hr x 18 hrs. Initiate at 40 ml, increase 10 ml q2hrs to goal rate. This will total 1080 ml formula, 1620 kcal, 89 gm prot, 820 ml; providing 29.5 kcal/kg, 1.5 gm prot/kg, 15 ml/kg based on IBW 55 kg      Diet: NPO except medication      Daily Weight in k.6 ()      Pertinent Medications: MEDICATIONS  (STANDING):  acetaminophen  IVPB .. 1000 milliGRAM(s) IV Intermittent once  ALBUTerol    0.083% 2.5 milliGRAM(s) Nebulizer every 6 hours  atorvastatin 40 milliGRAM(s) Oral <User Schedule>  buDESOnide    Inhalation Suspension 0.25 milliGRAM(s) Inhalation two times a day  carbidopa/levodopa  25/100 2 Tablet(s) Oral three times a day  ceFAZolin   IVPB 2000 milliGRAM(s) IV Intermittent every 12 hours  dextrose 5% + sodium chloride 0.45%. 1000 milliLiter(s) (40 mL/Hr) IV Continuous <Continuous>  dextrose 5%. 1000 milliLiter(s) (50 mL/Hr) IV Continuous <Continuous>  dextrose 50% Injectable 12.5 Gram(s) IV Push once  dextrose 50% Injectable 25 Gram(s) IV Push once  dextrose 50% Injectable 25 Gram(s) IV Push once  gabapentin   Solution 300 milliGRAM(s) Oral at bedtime  guaiFENesin    Syrup 200 milliGRAM(s) Oral every 6 hours  insulin lispro (HumaLOG) corrective regimen sliding scale   SubCutaneous every 6 hours  lidocaine   Patch 1 Patch Transdermal daily  metoprolol tartrate Injectable 5 milliGRAM(s) IV Push every 12 hours  pantoprazole  Injectable 40 milliGRAM(s) IV Push daily  predniSONE   Tablet 10 milliGRAM(s) Oral daily  sodium chloride 3%  Inhalation 3 milliLiter(s) Inhalation every 12 hours    MEDICATIONS  (PRN):  acetaminophen    Suspension .. 650 milliGRAM(s) Oral every 6 hours PRN Moderate Pain (4 - 6), Severe Pain (7 - 10)  ALPRAZolam 0.5 milliGRAM(s) Oral two times a day PRN anxiety  calamine Lotion 1 Application(s) Topical daily PRN Rash and/or Itching  dextrose 40% Gel 15 Gram(s) Oral once PRN Blood Glucose LESS THAN 70 milliGRAM(s)/deciliter  glucagon  Injectable 1 milliGRAM(s) IntraMuscular once PRN Glucose LESS THAN 70 milligrams/deciliter    Pertinent Labs: 09-10 @ 07:10: Na 138, BUN 8, Cr 0.65, <H>, K+ 4.5, Phos --, Mg --, Alk Phos --, ALT/SGPT --, AST/SGOT --, HbA1c --   @ 21:25: Na 136, BUN 9, Cr 0.64, <H>, K+ 4.3, Phos --, Mg --, Alk Phos --, ALT/SGPT --, AST/SGOT --, HbA1c --    Finger Sticks:  POCT Blood Glucose.: 98 mg/dL (09-10 @ 13:10)  POCT Blood Glucose.: 118 mg/dL (09-10 @ 06:46)  POCT Blood Glucose.: 109 mg/dL (09-10 @ 00:45)  POCT Blood Glucose.: 112 mg/dL ( @ 17:26)      Skin: no pressure ulcers noted    Edema: none noted     Estimated Needs:   [X] no change since previous assessment  [ ] recalculated:     Previous Nutrition Diagnosis: mild malnutrition  Nutrition Diagnosis is: on-going, should improve with PEG placement and EN     New Nutrition Diagnosis: n/a       Recommend  1) Resume TF as able     Monitoring and Evaluation:     Continue to monitor nutritional intake, tolerance to EN, weights, labs, skin integrity    RD remains available upon request and will follow up per protocol

## 2019-09-10 NOTE — PROGRESS NOTE ADULT - PROBLEM SELECTOR PLAN 3
Blood culture positive for MSSA; 2/2 aspiration PNA vs unlikely endocarditis however s/p TAVR  -ID consult following patient, recommended to continue with cefazolin 2gram q12 hours x 6 weeks; PICC team consulted  - BCx on 09/05 and 09/06 NGTD  -Continue to monitor for signs of infection (temp curve, WBC trend)  -TTE unable to r/o presence of vegetations Blood culture positive for MSSA; 2/2 aspiration PNA vs unlikely endocarditis however s/p TAVR  -ID consult following patient, recommended to continue with cefazolin 2gram q12 hours x 6 weeks; PICC team consulted, placement tomorrow with consent already in  - BCx on 09/05 and 09/06 NGTD  -Continue to monitor for signs of infection (temp curve, WBC trend)  -TTE unable to r/o presence of vegetations

## 2019-09-11 LAB
ANION GAP SERPL CALC-SCNC: 14 MMOL/L — SIGNIFICANT CHANGE UP (ref 5–17)
BUN SERPL-MCNC: 8 MG/DL — SIGNIFICANT CHANGE UP (ref 7–23)
CALCIUM SERPL-MCNC: 9.2 MG/DL — SIGNIFICANT CHANGE UP (ref 8.4–10.5)
CHLORIDE SERPL-SCNC: 101 MMOL/L — SIGNIFICANT CHANGE UP (ref 96–108)
CO2 SERPL-SCNC: 22 MMOL/L — SIGNIFICANT CHANGE UP (ref 22–31)
CREAT SERPL-MCNC: 0.54 MG/DL — SIGNIFICANT CHANGE UP (ref 0.5–1.3)
CULTURE RESULTS: SIGNIFICANT CHANGE UP
GLUCOSE BLDC GLUCOMTR-MCNC: 110 MG/DL — HIGH (ref 70–99)
GLUCOSE BLDC GLUCOMTR-MCNC: 113 MG/DL — HIGH (ref 70–99)
GLUCOSE BLDC GLUCOMTR-MCNC: 114 MG/DL — HIGH (ref 70–99)
GLUCOSE BLDC GLUCOMTR-MCNC: 136 MG/DL — HIGH (ref 70–99)
GLUCOSE SERPL-MCNC: 119 MG/DL — HIGH (ref 70–99)
HCT VFR BLD CALC: 31.6 % — LOW (ref 34.5–45)
HGB BLD-MCNC: 10.2 G/DL — LOW (ref 11.5–15.5)
MCHC RBC-ENTMCNC: 30 PG — SIGNIFICANT CHANGE UP (ref 27–34)
MCHC RBC-ENTMCNC: 32.4 GM/DL — SIGNIFICANT CHANGE UP (ref 32–36)
MCV RBC AUTO: 92.4 FL — SIGNIFICANT CHANGE UP (ref 80–100)
PLATELET # BLD AUTO: 241 K/UL — SIGNIFICANT CHANGE UP (ref 150–400)
POTASSIUM SERPL-MCNC: 4.1 MMOL/L — SIGNIFICANT CHANGE UP (ref 3.5–5.3)
POTASSIUM SERPL-SCNC: 4.1 MMOL/L — SIGNIFICANT CHANGE UP (ref 3.5–5.3)
RBC # BLD: 3.42 M/UL — LOW (ref 3.8–5.2)
RBC # FLD: 15.7 % — HIGH (ref 10.3–14.5)
SODIUM SERPL-SCNC: 137 MMOL/L — SIGNIFICANT CHANGE UP (ref 135–145)
SPECIMEN SOURCE: SIGNIFICANT CHANGE UP
WBC # BLD: 8.4 K/UL — SIGNIFICANT CHANGE UP (ref 3.8–10.5)
WBC # FLD AUTO: 8.4 K/UL — SIGNIFICANT CHANGE UP (ref 3.8–10.5)

## 2019-09-11 PROCEDURE — 99231 SBSQ HOSP IP/OBS SF/LOW 25: CPT

## 2019-09-11 PROCEDURE — 99232 SBSQ HOSP IP/OBS MODERATE 35: CPT

## 2019-09-11 PROCEDURE — 71045 X-RAY EXAM CHEST 1 VIEW: CPT | Mod: 26

## 2019-09-11 PROCEDURE — 99233 SBSQ HOSP IP/OBS HIGH 50: CPT | Mod: GC

## 2019-09-11 RX ORDER — APIXABAN 2.5 MG/1
2.5 TABLET, FILM COATED ORAL EVERY 12 HOURS
Refills: 0 | Status: CANCELLED | OUTPATIENT
Start: 2019-09-18 | End: 2019-09-13

## 2019-09-11 RX ORDER — MORPHINE SULFATE 50 MG/1
7.5 CAPSULE, EXTENDED RELEASE ORAL ONCE
Refills: 0 | Status: DISCONTINUED | OUTPATIENT
Start: 2019-09-11 | End: 2019-09-11

## 2019-09-11 RX ADMIN — Medication 200 MILLIGRAM(S): at 13:19

## 2019-09-11 RX ADMIN — Medication 100 MILLIGRAM(S): at 18:12

## 2019-09-11 RX ADMIN — Medication 650 MILLIGRAM(S): at 15:10

## 2019-09-11 RX ADMIN — Medication 200 MILLIGRAM(S): at 18:12

## 2019-09-11 RX ADMIN — MORPHINE SULFATE 7.5 MILLIGRAM(S): 50 CAPSULE, EXTENDED RELEASE ORAL at 21:04

## 2019-09-11 RX ADMIN — CARBIDOPA AND LEVODOPA 2 TABLET(S): 25; 100 TABLET ORAL at 06:26

## 2019-09-11 RX ADMIN — Medication 100 MILLIGRAM(S): at 06:26

## 2019-09-11 RX ADMIN — Medication 25 MILLIGRAM(S): at 18:14

## 2019-09-11 RX ADMIN — GABAPENTIN 300 MILLIGRAM(S): 400 CAPSULE ORAL at 21:08

## 2019-09-11 RX ADMIN — Medication 0.25 MILLIGRAM(S): at 05:47

## 2019-09-11 RX ADMIN — Medication 10 MILLIGRAM(S): at 06:25

## 2019-09-11 RX ADMIN — LIDOCAINE 1 PATCH: 4 CREAM TOPICAL at 13:21

## 2019-09-11 RX ADMIN — CARBIDOPA AND LEVODOPA 2 TABLET(S): 25; 100 TABLET ORAL at 21:05

## 2019-09-11 RX ADMIN — Medication 200 MILLIGRAM(S): at 06:25

## 2019-09-11 RX ADMIN — Medication 25 MILLIGRAM(S): at 06:25

## 2019-09-11 RX ADMIN — LIDOCAINE 1 PATCH: 4 CREAM TOPICAL at 18:36

## 2019-09-11 RX ADMIN — ALBUTEROL 2.5 MILLIGRAM(S): 90 AEROSOL, METERED ORAL at 11:57

## 2019-09-11 RX ADMIN — ALBUTEROL 2.5 MILLIGRAM(S): 90 AEROSOL, METERED ORAL at 05:46

## 2019-09-11 RX ADMIN — SODIUM CHLORIDE 3 MILLILITER(S): 9 INJECTION INTRAMUSCULAR; INTRAVENOUS; SUBCUTANEOUS at 17:23

## 2019-09-11 RX ADMIN — ALBUTEROL 2.5 MILLIGRAM(S): 90 AEROSOL, METERED ORAL at 17:23

## 2019-09-11 RX ADMIN — CARBIDOPA AND LEVODOPA 2 TABLET(S): 25; 100 TABLET ORAL at 13:20

## 2019-09-11 RX ADMIN — Medication 200 MILLIGRAM(S): at 00:25

## 2019-09-11 RX ADMIN — SODIUM CHLORIDE 3 MILLILITER(S): 9 INJECTION INTRAMUSCULAR; INTRAVENOUS; SUBCUTANEOUS at 05:48

## 2019-09-11 RX ADMIN — Medication 0.25 MILLIGRAM(S): at 17:23

## 2019-09-11 RX ADMIN — ATORVASTATIN CALCIUM 40 MILLIGRAM(S): 80 TABLET, FILM COATED ORAL at 21:06

## 2019-09-11 RX ADMIN — Medication 0.5 MILLIGRAM(S): at 14:33

## 2019-09-11 RX ADMIN — PANTOPRAZOLE SODIUM 40 MILLIGRAM(S): 20 TABLET, DELAYED RELEASE ORAL at 06:25

## 2019-09-11 RX ADMIN — Medication 650 MILLIGRAM(S): at 14:33

## 2019-09-11 NOTE — PROGRESS NOTE ADULT - PROBLEM SELECTOR PLAN 3
Blood culture positive for MSSA; 2/2 aspiration PNA vs unlikely endocarditis however s/p TAVR  -ID consult following patient, recommended to continue with cefazolin 2gram q12 hours x 6 weeks; PICC team consulted, placement tomorrow with consent already in  - BCx on 09/05 and 09/06 NGTD  -Continue to monitor for signs of infection (temp curve, WBC trend)  -TTE unable to r/o presence of vegetations

## 2019-09-11 NOTE — PROGRESS NOTE ADULT - SUBJECTIVE AND OBJECTIVE BOX
Follow Up:  MSSA Bacteremia    Interval History: No chills or fevers. No N/V/D.    REVIEW OF SYSTEMS  [  ] ROS unobtainable because:    [ x ] All other systems negative except as noted below    Constitutional:  [ ] fever [ ] chills  [ ] weight loss  [ ] weakness  Skin:  [ ] rash [ ] phlebitis	  Eyes: [ ] icterus [ ] pain  [ ] discharge	  ENMT: [ ] sore throat  [ ] thrush [ ] ulcers [ ] exudates  Respiratory: [ ] dyspnea [ ] hemoptysis [ ] cough [ ] sputum	  Cardiovascular:  [ ] chest pain [ ] palpitations [ ] edema	  Gastrointestinal:  [ ] nausea [ ] vomiting [ ] diarrhea [ ] constipation [ ] pain	  Genitourinary:  [ ] dysuria [ ] frequency [ ] hematuria [ ] discharge [ ] flank pain  [ ] incontinence  Musculoskeletal:  [ ] myalgias [ ] arthralgias [ ] arthritis  [ ] back pain  Neurological:  [ ] headache [ ] seizures  [ ] confusion/altered mental status    Allergies  penicillin (Unknown)        ANTIMICROBIALS:  ceFAZolin   IVPB 2000 every 12 hours      OTHER MEDS:  MEDICATIONS  (STANDING):  acetaminophen    Suspension .. 650 every 6 hours PRN  ALBUTerol    0.083% 2.5 every 6 hours  ALPRAZolam 0.5 two times a day PRN  atorvastatin 40 <User Schedule>  buDESOnide    Inhalation Suspension 0.25 two times a day  carbidopa/levodopa  25/100 2 three times a day  dextrose 40% Gel 15 once PRN  dextrose 50% Injectable 12.5 once  dextrose 50% Injectable 25 once  dextrose 50% Injectable 25 once  gabapentin   Solution 300 at bedtime  glucagon  Injectable 1 once PRN  guaiFENesin    Syrup 200 every 6 hours  insulin lispro (HumaLOG) corrective regimen sliding scale  every 6 hours  metoprolol tartrate 25 two times a day  pantoprazole    Tablet 40 before breakfast  predniSONE   Tablet 10 daily  sodium chloride 3%  Inhalation 3 every 12 hours      Vital Signs Last 24 Hrs  T(C): 36.7 (11 Sep 2019 18:00), Max: 36.8 (11 Sep 2019 01:26)  T(F): 98.1 (11 Sep 2019 18:00), Max: 98.2 (11 Sep 2019 01:26)  HR: 84 (11 Sep 2019 18:00) (71 - 84)  BP: 110/58 (11 Sep 2019 18:00) (110/58 - 145/76)  BP(mean): --  RR: 16 (11 Sep 2019 18:00) (16 - 18)  SpO2: 97% (11 Sep 2019 18:00) (97% - 100%)    PHYSICAL EXAMINATION:  General: Alert and Awake, NAD  HEENT: PERRL, EOMI  Neck: Supple  Cardiac: RRR, No M/R/G  Resp: CTAB, No Wh/Rh/Ra  Abdomen: +PEG Tube, NBS, NT/ND, No HSM, No rigidity or guarding  MSK: No LE edema. No Calf tenderness  : No barba  Vascular: RUE PICC line (No surrounding erythema, drainage or tenderness to palpation)  Skin: No rashes or lesions. Skin is warm and dry to the touch.   Neuro: Alert and Awake. CN 2-12 Grossly intact. Moves all four extremities spontaneously.  Psych: Calm, Pleasant, Cooperative                          10.2   8.4   )-----------( 241      ( 11 Sep 2019 07:20 )             31.6       09-11    137  |  101  |  8   ----------------------------<  119<H>  4.1   |  22  |  0.54    Ca    9.2      11 Sep 2019 07:20            MICROBIOLOGY:  v  .Blood  09-07-19   No growth to date.  --  --      .Blood  09-06-19   No growth at 5 days.  --  --      .Blood  09-05-19   No growth at 5 days.  --  --      .Blood  09-05-19   No growth at 5 days.  --  --      .Blood  09-01-19   No growth at 5 days.  --  --      .Blood  09-01-19   Growth in anaerobic bottle: Staphylococcus aureus  See previous culture [id number]  --    Growth in anaerobic bottle: Gram Positive Cocci in Clusters      .Sputum  08-31-19   Numerous Staphylococcus aureus  Normal Respiratory Martha present  --  Staphylococcus aureus      .Urine  08-30-19   >=3 organisms. Probable collection contamination.  --  --      .Blood  08-30-19   Growth in aerobic bottle: Staphylococcus aureus  "Due to technical problems, Proteus sp. will Not be reported as part of  the BCID panel until further notice"  ***Blood Panel PCR results on this specimen are available  approximately 3 hours after the Gram stain result.***  Gram stain, PCR, and/or culture results may not always  correspond due to difference in methodologies.  ************************************************************  This PCR assay was performed using [employer].  The following targets are tested for: Enterococcus,  vancomycin resistant enterococci, Listeria monocytogenes,  coagulase negative staphylococci, S. aureus,  methicillin resistant S. aureus, Streptococcus agalactiae  (Group B), S. pneumoniae, S. pyogenes (Group A),  Acinetobacter baumannii, Enterobacter cloacae, E. coli,  Klebsiella oxytoca, K. pneumoniae, Proteus sp.,  Serratia marcescens, Haemophilus influenzae,  Neisseria meningitidis, Pseudomonas aeruginosa, Candida  albicans, C. glabrata, C krusei, C parapsilosis,  C. tropicalis and the KPC resistance gene.  --  Blood Culture PCR  Staphylococcus aureus      RADIOLOGY:    EXAM:  XR CHEST PORTABLE URGENT 1V                        PROCEDURE DATE:  09/11/2019    New right PICC line with tip projecting over SVC. No pneumothorax

## 2019-09-11 NOTE — PROGRESS NOTE ADULT - SUBJECTIVE AND OBJECTIVE BOX
Patient is a 85y old  Female who presented with a chief complaint of Abdominal pain (11 Sep 2019 08:02)      INTERVAL HPI/OVERNIGHT EVENTS:  no overnight events  started PEG feeds, tolerating well  no abdominal pain    MEDICATIONS  (STANDING):  ALBUTerol    0.083% 2.5 milliGRAM(s) Nebulizer every 6 hours  atorvastatin 40 milliGRAM(s) Oral <User Schedule>  buDESOnide    Inhalation Suspension 0.25 milliGRAM(s) Inhalation two times a day  carbidopa/levodopa  25/100 2 Tablet(s) Oral three times a day  ceFAZolin   IVPB 2000 milliGRAM(s) IV Intermittent every 12 hours  dextrose 5%. 1000 milliLiter(s) (50 mL/Hr) IV Continuous <Continuous>  dextrose 50% Injectable 12.5 Gram(s) IV Push once  dextrose 50% Injectable 25 Gram(s) IV Push once  dextrose 50% Injectable 25 Gram(s) IV Push once  gabapentin   Solution 300 milliGRAM(s) Oral at bedtime  guaiFENesin    Syrup 200 milliGRAM(s) Oral every 6 hours  insulin lispro (HumaLOG) corrective regimen sliding scale   SubCutaneous every 6 hours  lidocaine   Patch 1 Patch Transdermal daily  metoprolol tartrate 25 milliGRAM(s) Oral two times a day  pantoprazole    Tablet 40 milliGRAM(s) Oral before breakfast  predniSONE   Tablet 10 milliGRAM(s) Oral daily  sodium chloride 3%  Inhalation 3 milliLiter(s) Inhalation every 12 hours    MEDICATIONS  (PRN):  acetaminophen    Suspension .. 650 milliGRAM(s) Oral every 6 hours PRN Moderate Pain (4 - 6), Severe Pain (7 - 10)  ALPRAZolam 0.5 milliGRAM(s) Oral two times a day PRN Anxiety  calamine Lotion 1 Application(s) Topical daily PRN Rash and/or Itching  dextrose 40% Gel 15 Gram(s) Oral once PRN Blood Glucose LESS THAN 70 milliGRAM(s)/deciliter  glucagon  Injectable 1 milliGRAM(s) IntraMuscular once PRN Glucose LESS THAN 70 milligrams/deciliter      Allergies  penicillin (Unknown)      Review of Systems:  General:  No wt loss, fevers, chills, night sweats,fatigue,   CV:  No pain, palpitatioins, +HTN HLD +AF s/p TAVR, severe MS  Resp:  see HPI +cough +secretions +COPD  GI:  see HPI  : +incontinence  No pain, bleeding, nocturia  Muscle:  +PMR  Neuro:  parkinsonian symptoms  Psych:  No fatigue, insomnia, mood problems +anxiety/depression  Endocrine:  No polyuria, polydypsia, cold/heat intolerance  Heme:  No petechiae, ecchymosis, +easy bruisability +AC  Skin:  No rash, tattoos, scars, edema    Vital Signs Last 24 Hrs  T(C): 36.7 (11 Sep 2019 06:09), Max: 36.8 (11 Sep 2019 01:26)  T(F): 98 (11 Sep 2019 06:09), Max: 98.2 (11 Sep 2019 01:26)  HR: 74 (11 Sep 2019 11:59) (69 - 77)  BP: 137/76 (11 Sep 2019 06:09) (137/76 - 145/98)  BP(mean): --  RR: 18 (11 Sep 2019 06:09) (18 - 18)  SpO2: 98% (11 Sep 2019 11:59) (98% - 100%)    PHYSICAL EXAM:  Constitutional: edentulous elderly female, alert and responsive,  no retractions +occ. cough and wet vocal quality  Neck: No LAD, supple, no retractions, no JVD  Respiratory: diffuse rhonchi, no wheeze  Cardiovascular: S1 and S2, +Murmur  Gastrointestinal: BS+, soft, obese NT/ND PEG site clean and dry no bleeding bumper at 2.5 cm, spins freely 360 degrees  Extremities: No peripheral edema, neg clubbing, cyanosis  Vascular: 2+ peripheral pulses  Neurological: A/O x 3, no focal  asymmetry  Psychiatric: Normal mood, normal affect  Skin: No rashes, +multiple areas of healing bruises on arms    LABS:                        10.2   8.4   )-----------( 241      ( 11 Sep 2019 07:20 )             31.6     09-11    137  |  101  |  8   ----------------------------<  119<H>  4.1   |  22  |  0.54    Ca    9.2      11 Sep 2019 07:20      PT/INR - ( 10 Sep 2019 07:15 )   PT: 13.4 sec;   INR: 1.17 ratio         PTT - ( 10 Sep 2019 07:15 )  PTT:34.7 sec        RADIOLOGY & ADDITIONAL TESTS:

## 2019-09-11 NOTE — PROGRESS NOTE ADULT - ASSESSMENT
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    MSSA Bacteremia, MSSA Pneumonia, Leukocytosis, s/p Bioprosthetic TAVR  we are asked to assume GI care and consult for PEG tube placement    Discussed with pt and extensive discussion with son over phone at pt request. Explained indications/ risks/ benefits of PEG and explained that PEG placement would not eliminate possible risk of aspiration (persistent aspiration risk from secretions and well as reflux/regurgitation). At this time, he wishes to proceed with PEG next week  s/p PEG 9/10/19    RECS:  -Abx as per ID  -Infectious work-up r/o endocarditis as per ID recs  -PEG care, titrate feeds to goal. 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    MSSA Bacteremia, MSSA Pneumonia, Leukocytosis, s/p Bioprosthetic TAVR  we are asked to assume GI care and consult for PEG tube placement    s/p PEG 9/10/19    RECS:  -Abx as per ID  -Infectious work-up r/o endocarditis as per ID recs  -PEG care, titrate feeds to goal. Monitor tolerance  -no GI objection to AC (AC as per Medicine)  -steroids per primary team  -PPI (via PEG)      Gordon Shaw PA-C    [facility]  [phone]  After hours and weekend coverage [phone]

## 2019-09-11 NOTE — PROGRESS NOTE ADULT - ATTENDING COMMENTS
s/p PEG, picc placement without complication  pt has been tolerating tubefeed well  dc planning to [facility] to complete IV abx course  updated son/daughter at bedside    Radha Coleman MD  Division of Hospital Medicine  Pager: [phone]  Office: [phone]

## 2019-09-11 NOTE — PROGRESS NOTE ADULT - ASSESSMENT
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    Sputum Culture 8/31 - MSSA  Blood Culture 8/30 - MSSA  Blood Culture 9/1 - MSSA  Blood Culture 9/5 - NGTD  Blood Culture 9/6 - NGTD  Blood Culture 9/7 - NGTD    TTE was a poor study but no obvious signs of IE    MSSA Bacteremia appears to be secondary to her pneumonia. I would treat for at least 6 weeks of antibiotics given persistent bacteremia (72 hours) and history of TAVR.     If continued encephalopathy recommend MRI brain to evaluate for septic emboli    Overall, MSSA Bacteremia, MSSA Pneumonia, Pre-Procedural Evaluation, s/p Bioprosthetic TAVR, Encephalopathy    Recommendations    --Continue Cefazolin 2 g q 12 hour (will require at least 6 weeks of antibiotics given persistent bacteremia)   --Continue to monitor mental status - if any decline recommend CNS imaging (given 72H of Bacteremia to evaluate for embolic disease)  --Continue to follow CBC with diff  --Continue to follow temperature curve    I will continue to follow. Please feel free to contact me with any further questions.    Ancelmo Mendoza M.D.  [facility] Division of Infectious Disease  8AM-5PM: Pager Number [phone]  After Hours (or if no response): Please contact the Infectious Diseases Office at [phone]

## 2019-09-11 NOTE — PROGRESS NOTE ADULT - SUBJECTIVE AND OBJECTIVE BOX
Patient is a 85y old  Female who presents with a chief complaint of Abdominal pain (10 Sep 2019 12:06)      SUBJECTIVE / OVERNIGHT EVENTS: NAEON. No acute complaints.     ROS:  14 point ROS negative in detail except stated as above    MEDICATIONS  (STANDING):  ALBUTerol    0.083% 2.5 milliGRAM(s) Nebulizer every 6 hours  atorvastatin 40 milliGRAM(s) Oral <User Schedule>  buDESOnide    Inhalation Suspension 0.25 milliGRAM(s) Inhalation two times a day  carbidopa/levodopa  25/100 2 Tablet(s) Oral three times a day  ceFAZolin   IVPB 2000 milliGRAM(s) IV Intermittent every 12 hours  dextrose 5% + sodium chloride 0.45%. 1000 milliLiter(s) (40 mL/Hr) IV Continuous <Continuous>  dextrose 5%. 1000 milliLiter(s) (50 mL/Hr) IV Continuous <Continuous>  dextrose 50% Injectable 12.5 Gram(s) IV Push once  dextrose 50% Injectable 25 Gram(s) IV Push once  dextrose 50% Injectable 25 Gram(s) IV Push once  gabapentin   Solution 300 milliGRAM(s) Oral at bedtime  guaiFENesin    Syrup 200 milliGRAM(s) Oral every 6 hours  insulin lispro (HumaLOG) corrective regimen sliding scale   SubCutaneous every 6 hours  lidocaine   Patch 1 Patch Transdermal daily  metoprolol tartrate 25 milliGRAM(s) Oral two times a day  pantoprazole    Tablet 40 milliGRAM(s) Oral before breakfast  predniSONE   Tablet 10 milliGRAM(s) Oral daily  sodium chloride 3%  Inhalation 3 milliLiter(s) Inhalation every 12 hours    MEDICATIONS  (PRN):  acetaminophen    Suspension .. 650 milliGRAM(s) Oral every 6 hours PRN Moderate Pain (4 - 6), Severe Pain (7 - 10)  ALPRAZolam 0.5 milliGRAM(s) Oral two times a day PRN Anxiety  calamine Lotion 1 Application(s) Topical daily PRN Rash and/or Itching  dextrose 40% Gel 15 Gram(s) Oral once PRN Blood Glucose LESS THAN 70 milliGRAM(s)/deciliter  glucagon  Injectable 1 milliGRAM(s) IntraMuscular once PRN Glucose LESS THAN 70 milligrams/deciliter      CAPILLARY BLOOD GLUCOSE      POCT Blood Glucose.: 113 mg/dL (11 Sep 2019 06:57)  POCT Blood Glucose.: 99 mg/dL (10 Sep 2019 23:54)  POCT Blood Glucose.: 88 mg/dL (10 Sep 2019 18:16)  POCT Blood Glucose.: 98 mg/dL (10 Sep 2019 13:10)    I&O's Summary    10 Sep 2019 07:01  -  11 Sep 2019 07:00  --------------------------------------------------------  IN: 530 mL / OUT: 550 mL / NET: -20 mL        PHYSICAL EXAM:  Vital Signs Last 24 Hrs  T(C): 36.7 (11 Sep 2019 06:09), Max: 36.9 (10 Sep 2019 08:29)  T(F): 98 (11 Sep 2019 06:09), Max: 98.4 (10 Sep 2019 08:29)  HR: 74 (11 Sep 2019 06:09) (69 - 77)  BP: 137/76 (11 Sep 2019 06:09) (137/76 - 146/71)  BP(mean): --  RR: 18 (11 Sep 2019 06:09) (18 - 18)  SpO2: 99% (11 Sep 2019 06:09) (98% - 100%)  GENERAL: NAD, well-developed  HEAD:  Atraumatic, Normocephalic  EYES: EOMI, PERRLA, conjunctiva and sclera clear  NECK: Supple, No JVD  CHEST/LUNG: Clear to auscultation bilaterally; No wheeze  HEART: Regular rate and rhythm; No murmurs, rubs, or gallops  ABDOMEN: Soft, Nontender, Nondistended; Bowel sounds present  EXTREMITIES:  2+ Peripheral Pulses, No clubbing, cyanosis, or edema  NEUROLOGY: AAOx3; non-focal  SKIN: No rashes or lesions    LABS:                        10.2   8.4   )-----------( 241      ( 11 Sep 2019 07:20 )             31.6     09-10    138  |  104  |  8   ----------------------------<  121<H>  4.5   |  24  |  0.65    Ca    9.3      10 Sep 2019 07:10      PT/INR - ( 10 Sep 2019 07:15 )   PT: 13.4 sec;   INR: 1.17 ratio         PTT - ( 10 Sep 2019 07:15 )  PTT:34.7 sec          RADIOLOGY & ADDITIONAL TESTS:    Imaging Personally Reviewed:    Consultant(s) Notes Reviewed:      Care Discussed with Consultants/Other Providers:

## 2019-09-12 LAB
ANION GAP SERPL CALC-SCNC: 15 MMOL/L — SIGNIFICANT CHANGE UP (ref 5–17)
BUN SERPL-MCNC: 21 MG/DL — SIGNIFICANT CHANGE UP (ref 7–23)
CALCIUM SERPL-MCNC: 8.9 MG/DL — SIGNIFICANT CHANGE UP (ref 8.4–10.5)
CHLORIDE SERPL-SCNC: 100 MMOL/L — SIGNIFICANT CHANGE UP (ref 96–108)
CO2 SERPL-SCNC: 24 MMOL/L — SIGNIFICANT CHANGE UP (ref 22–31)
CREAT SERPL-MCNC: 0.88 MG/DL — SIGNIFICANT CHANGE UP (ref 0.5–1.3)
CULTURE RESULTS: SIGNIFICANT CHANGE UP
GLUCOSE BLDC GLUCOMTR-MCNC: 116 MG/DL — HIGH (ref 70–99)
GLUCOSE BLDC GLUCOMTR-MCNC: 122 MG/DL — HIGH (ref 70–99)
GLUCOSE BLDC GLUCOMTR-MCNC: 122 MG/DL — HIGH (ref 70–99)
GLUCOSE BLDC GLUCOMTR-MCNC: 131 MG/DL — HIGH (ref 70–99)
GLUCOSE SERPL-MCNC: 119 MG/DL — HIGH (ref 70–99)
HCT VFR BLD CALC: 30 % — LOW (ref 34.5–45)
HGB BLD-MCNC: 9.7 G/DL — LOW (ref 11.5–15.5)
MCHC RBC-ENTMCNC: 29.9 PG — SIGNIFICANT CHANGE UP (ref 27–34)
MCHC RBC-ENTMCNC: 32.2 GM/DL — SIGNIFICANT CHANGE UP (ref 32–36)
MCV RBC AUTO: 92.7 FL — SIGNIFICANT CHANGE UP (ref 80–100)
PLATELET # BLD AUTO: 251 K/UL — SIGNIFICANT CHANGE UP (ref 150–400)
POTASSIUM SERPL-MCNC: 3.4 MMOL/L — LOW (ref 3.5–5.3)
POTASSIUM SERPL-SCNC: 3.4 MMOL/L — LOW (ref 3.5–5.3)
RBC # BLD: 3.24 M/UL — LOW (ref 3.8–5.2)
RBC # FLD: 15.7 % — HIGH (ref 10.3–14.5)
SODIUM SERPL-SCNC: 139 MMOL/L — SIGNIFICANT CHANGE UP (ref 135–145)
SPECIMEN SOURCE: SIGNIFICANT CHANGE UP
WBC # BLD: 11.8 K/UL — HIGH (ref 3.8–10.5)
WBC # FLD AUTO: 11.8 K/UL — HIGH (ref 3.8–10.5)

## 2019-09-12 PROCEDURE — 99233 SBSQ HOSP IP/OBS HIGH 50: CPT | Mod: GC

## 2019-09-12 PROCEDURE — 99232 SBSQ HOSP IP/OBS MODERATE 35: CPT

## 2019-09-12 PROCEDURE — 93010 ELECTROCARDIOGRAM REPORT: CPT

## 2019-09-12 PROCEDURE — 74177 CT ABD & PELVIS W/CONTRAST: CPT | Mod: 26

## 2019-09-12 RX ORDER — CHLORHEXIDINE GLUCONATE 213 G/1000ML
1 SOLUTION TOPICAL DAILY
Refills: 0 | Status: DISCONTINUED | OUTPATIENT
Start: 2019-09-12 | End: 2019-09-13

## 2019-09-12 RX ORDER — PANTOPRAZOLE SODIUM 20 MG/1
40 TABLET, DELAYED RELEASE ORAL
Refills: 0 | Status: DISCONTINUED | OUTPATIENT
Start: 2019-09-12 | End: 2019-09-13

## 2019-09-12 RX ORDER — POTASSIUM CHLORIDE 20 MEQ
40 PACKET (EA) ORAL ONCE
Refills: 0 | Status: COMPLETED | OUTPATIENT
Start: 2019-09-12 | End: 2019-09-12

## 2019-09-12 RX ORDER — ONDANSETRON 8 MG/1
4 TABLET, FILM COATED ORAL ONCE
Refills: 0 | Status: COMPLETED | OUTPATIENT
Start: 2019-09-12 | End: 2019-09-12

## 2019-09-12 RX ORDER — MORPHINE SULFATE 50 MG/1
7.5 CAPSULE, EXTENDED RELEASE ORAL ONCE
Refills: 0 | Status: DISCONTINUED | OUTPATIENT
Start: 2019-09-12 | End: 2019-09-13

## 2019-09-12 RX ORDER — MORPHINE SULFATE 50 MG/1
7.5 CAPSULE, EXTENDED RELEASE ORAL EVERY 4 HOURS
Refills: 0 | Status: DISCONTINUED | OUTPATIENT
Start: 2019-09-12 | End: 2019-09-13

## 2019-09-12 RX ADMIN — Medication 200 MILLIGRAM(S): at 00:14

## 2019-09-12 RX ADMIN — Medication 650 MILLIGRAM(S): at 11:18

## 2019-09-12 RX ADMIN — CARBIDOPA AND LEVODOPA 2 TABLET(S): 25; 100 TABLET ORAL at 21:09

## 2019-09-12 RX ADMIN — Medication 25 MILLIGRAM(S): at 07:01

## 2019-09-12 RX ADMIN — SODIUM CHLORIDE 3 MILLILITER(S): 9 INJECTION INTRAMUSCULAR; INTRAVENOUS; SUBCUTANEOUS at 05:44

## 2019-09-12 RX ADMIN — CARBIDOPA AND LEVODOPA 2 TABLET(S): 25; 100 TABLET ORAL at 13:17

## 2019-09-12 RX ADMIN — LIDOCAINE 1 PATCH: 4 CREAM TOPICAL at 19:00

## 2019-09-12 RX ADMIN — Medication 650 MILLIGRAM(S): at 12:00

## 2019-09-12 RX ADMIN — MORPHINE SULFATE 7.5 MILLIGRAM(S): 50 CAPSULE, EXTENDED RELEASE ORAL at 17:37

## 2019-09-12 RX ADMIN — Medication 0.25 MILLIGRAM(S): at 06:24

## 2019-09-12 RX ADMIN — Medication 100 MILLIGRAM(S): at 07:06

## 2019-09-12 RX ADMIN — MORPHINE SULFATE 7.5 MILLIGRAM(S): 50 CAPSULE, EXTENDED RELEASE ORAL at 21:06

## 2019-09-12 RX ADMIN — Medication 0.25 MILLIGRAM(S): at 18:11

## 2019-09-12 RX ADMIN — LIDOCAINE 1 PATCH: 4 CREAM TOPICAL at 23:00

## 2019-09-12 RX ADMIN — ONDANSETRON 4 MILLIGRAM(S): 8 TABLET, FILM COATED ORAL at 17:37

## 2019-09-12 RX ADMIN — GABAPENTIN 300 MILLIGRAM(S): 400 CAPSULE ORAL at 21:10

## 2019-09-12 RX ADMIN — ALBUTEROL 2.5 MILLIGRAM(S): 90 AEROSOL, METERED ORAL at 00:04

## 2019-09-12 RX ADMIN — Medication 200 MILLIGRAM(S): at 07:01

## 2019-09-12 RX ADMIN — ALBUTEROL 2.5 MILLIGRAM(S): 90 AEROSOL, METERED ORAL at 18:06

## 2019-09-12 RX ADMIN — CARBIDOPA AND LEVODOPA 2 TABLET(S): 25; 100 TABLET ORAL at 07:01

## 2019-09-12 RX ADMIN — LIDOCAINE 1 PATCH: 4 CREAM TOPICAL at 11:18

## 2019-09-12 RX ADMIN — ALBUTEROL 2.5 MILLIGRAM(S): 90 AEROSOL, METERED ORAL at 05:36

## 2019-09-12 RX ADMIN — ALBUTEROL 2.5 MILLIGRAM(S): 90 AEROSOL, METERED ORAL at 23:59

## 2019-09-12 RX ADMIN — SODIUM CHLORIDE 3 MILLILITER(S): 9 INJECTION INTRAMUSCULAR; INTRAVENOUS; SUBCUTANEOUS at 18:12

## 2019-09-12 RX ADMIN — Medication 10 MILLIGRAM(S): at 07:01

## 2019-09-12 RX ADMIN — CHLORHEXIDINE GLUCONATE 1 APPLICATION(S): 213 SOLUTION TOPICAL at 13:16

## 2019-09-12 RX ADMIN — Medication 40 MILLIEQUIVALENT(S): at 11:19

## 2019-09-12 RX ADMIN — MORPHINE SULFATE 7.5 MILLIGRAM(S): 50 CAPSULE, EXTENDED RELEASE ORAL at 21:30

## 2019-09-12 RX ADMIN — Medication 100 MILLIGRAM(S): at 17:41

## 2019-09-12 RX ADMIN — Medication 25 MILLIGRAM(S): at 17:42

## 2019-09-12 RX ADMIN — Medication 200 MILLIGRAM(S): at 11:18

## 2019-09-12 RX ADMIN — ALBUTEROL 2.5 MILLIGRAM(S): 90 AEROSOL, METERED ORAL at 12:17

## 2019-09-12 RX ADMIN — Medication 200 MILLIGRAM(S): at 17:42

## 2019-09-12 NOTE — PROGRESS NOTE ADULT - PROBLEM SELECTOR PLAN 3
Blood culture positive for MSSA; 2/2 aspiration PNA vs unlikely endocarditis however s/p TAVR  -ID consult following patient, recommended to continue with cefazolin 2gram q12 hours x 6 weeks; PICC team consulted, placement tomorrow with consent already in  - BCx on 09/05 and 09/06 NGTD  - New rising leukocytosis, CTAP to evaluate  -TTE unable to r/o presence of vegetations

## 2019-09-12 NOTE — PROGRESS NOTE ADULT - SUBJECTIVE AND OBJECTIVE BOX
Patient is a 85y old  Female who presented with a chief complaint of Abdominal pain (12 Sep 2019 13:46)      INTERVAL HPI/OVERNIGHT EVENTS:  tolerating PEG feeds at goal rate  no diarrhea, 1 BM yesterday      MEDICATIONS  (STANDING):  ALBUTerol    0.083% 2.5 milliGRAM(s) Nebulizer every 6 hours  atorvastatin 40 milliGRAM(s) Oral <User Schedule>  buDESOnide    Inhalation Suspension 0.25 milliGRAM(s) Inhalation two times a day  carbidopa/levodopa  25/100 2 Tablet(s) Oral three times a day  ceFAZolin   IVPB 2000 milliGRAM(s) IV Intermittent every 12 hours  chlorhexidine 2% Cloths 1 Application(s) Topical daily  dextrose 5%. 1000 milliLiter(s) (50 mL/Hr) IV Continuous <Continuous>  dextrose 50% Injectable 12.5 Gram(s) IV Push once  dextrose 50% Injectable 25 Gram(s) IV Push once  dextrose 50% Injectable 25 Gram(s) IV Push once  gabapentin   Solution 300 milliGRAM(s) Oral at bedtime  guaiFENesin    Syrup 200 milliGRAM(s) Oral every 6 hours  insulin lispro (HumaLOG) corrective regimen sliding scale   SubCutaneous every 6 hours  lidocaine   Patch 1 Patch Transdermal daily  metoprolol tartrate 25 milliGRAM(s) Oral two times a day  pantoprazole   Suspension 40 milliGRAM(s) Oral before breakfast  predniSONE   Tablet 10 milliGRAM(s) Oral daily  sodium chloride 3%  Inhalation 3 milliLiter(s) Inhalation every 12 hours    MEDICATIONS  (PRN):  acetaminophen    Suspension .. 650 milliGRAM(s) Oral every 6 hours PRN Moderate Pain (4 - 6), Severe Pain (7 - 10)  ALPRAZolam 0.5 milliGRAM(s) Oral two times a day PRN Anxiety  calamine Lotion 1 Application(s) Topical daily PRN Rash and/or Itching  dextrose 40% Gel 15 Gram(s) Oral once PRN Blood Glucose LESS THAN 70 milliGRAM(s)/deciliter  glucagon  Injectable 1 milliGRAM(s) IntraMuscular once PRN Glucose LESS THAN 70 milligrams/deciliter      Allergies  penicillin (Unknown)      Review of Systems:  General:  No wt loss, fevers, chills, night sweats,fatigue,   CV:  No pain, palpitatioins, +HTN HLD +AF s/p TAVR, severe MS  Resp:  see HPI +cough +secretions +COPD  GI:  see HPI  : +incontinence  No pain, bleeding, nocturia  Muscle:  +PMR  Neuro:  parkinsonian symptoms  Psych:  No fatigue, insomnia, mood problems +anxiety/depression  Endocrine:  No polyuria, polydypsia, cold/heat intolerance  Heme:  No petechiae, ecchymosis, +easy bruisability +AC  Skin:  No rash, tattoos, scars, edema    Vital Signs Last 24 Hrs  T(C): 36.6 (12 Sep 2019 14:00), Max: 37.1 (11 Sep 2019 22:00)  T(F): 97.8 (12 Sep 2019 14:00), Max: 98.7 (11 Sep 2019 22:00)  HR: 81 (12 Sep 2019 14:00) (66 - 99)  BP: 103/60 (12 Sep 2019 14:00) (103/60 - 137/80)  BP(mean): --  RR: 18 (12 Sep 2019 14:00) (16 - 18)  SpO2: 97% (12 Sep 2019 14:00) (94% - 100%)    PHYSICAL EXAM:  Constitutional: edentulous elderly female, alert and responsive,  no retractions +occ. cough and wet vocal quality. OOB to chair  Neck: No LAD, supple, no retractions, no JVD  Respiratory: occ rhonchi, no wheeze  Cardiovascular: S1 and S2, +Murmur  Gastrointestinal: BS+, soft, obese NT/ND PEG site clean and dry no bleeding bumper at 2.5 cm, spins freely 360 degrees  Extremities: No peripheral edema, neg clubbing, cyanosis  Vascular: 2+ peripheral pulses  Neurological: A/O x 3, no focal  asymmetry  Psychiatric: Normal mood, normal affect  Skin: No rashes, +multiple areas of healing bruises on arms. good turgor    LABS:                        9.7    11.8  )-----------( 251      ( 12 Sep 2019 07:47 )             30.0     09-12    139  |  100  |  21  ----------------------------<  119<H>  3.4<L>   |  24  |  0.88    Ca    8.9      12 Sep 2019 07:47      RADIOLOGY & ADDITIONAL TESTS:

## 2019-09-12 NOTE — PROGRESS NOTE ADULT - ASSESSMENT
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 Now s/p PEG placement and PICC placement for antibiotics, now reporting abdominal pain.

## 2019-09-12 NOTE — PROGRESS NOTE ADULT - SUBJECTIVE AND OBJECTIVE BOX
Patient is a 85y old  Female who presents with a chief complaint of Abdominal pain (12 Sep 2019 13:46)      SUBJECTIVE / OVERNIGHT EVENTS: Patient reporting worsening abdominal pain, especially around PEG insertion site.    ROS:  14 point ROS negative in detail except stated as above    MEDICATIONS  (STANDING):  ALBUTerol    0.083% 2.5 milliGRAM(s) Nebulizer every 6 hours  atorvastatin 40 milliGRAM(s) Oral <User Schedule>  buDESOnide    Inhalation Suspension 0.25 milliGRAM(s) Inhalation two times a day  carbidopa/levodopa  25/100 2 Tablet(s) Oral three times a day  ceFAZolin   IVPB 2000 milliGRAM(s) IV Intermittent every 12 hours  chlorhexidine 2% Cloths 1 Application(s) Topical daily  dextrose 5%. 1000 milliLiter(s) (50 mL/Hr) IV Continuous <Continuous>  dextrose 50% Injectable 12.5 Gram(s) IV Push once  dextrose 50% Injectable 25 Gram(s) IV Push once  dextrose 50% Injectable 25 Gram(s) IV Push once  gabapentin   Solution 300 milliGRAM(s) Oral at bedtime  guaiFENesin    Syrup 200 milliGRAM(s) Oral every 6 hours  insulin lispro (HumaLOG) corrective regimen sliding scale   SubCutaneous every 6 hours  lidocaine   Patch 1 Patch Transdermal daily  metoprolol tartrate 25 milliGRAM(s) Oral two times a day  pantoprazole   Suspension 40 milliGRAM(s) Oral before breakfast  predniSONE   Tablet 10 milliGRAM(s) Oral daily  sodium chloride 3%  Inhalation 3 milliLiter(s) Inhalation every 12 hours    MEDICATIONS  (PRN):  acetaminophen    Suspension .. 650 milliGRAM(s) Oral every 6 hours PRN Moderate Pain (4 - 6), Severe Pain (7 - 10)  ALPRAZolam 0.5 milliGRAM(s) Oral two times a day PRN Anxiety  calamine Lotion 1 Application(s) Topical daily PRN Rash and/or Itching  dextrose 40% Gel 15 Gram(s) Oral once PRN Blood Glucose LESS THAN 70 milliGRAM(s)/deciliter  glucagon  Injectable 1 milliGRAM(s) IntraMuscular once PRN Glucose LESS THAN 70 milligrams/deciliter      CAPILLARY BLOOD GLUCOSE      POCT Blood Glucose.: 131 mg/dL (12 Sep 2019 11:49)  POCT Blood Glucose.: 122 mg/dL (12 Sep 2019 06:41)  POCT Blood Glucose.: 114 mg/dL (11 Sep 2019 23:42)  POCT Blood Glucose.: 136 mg/dL (11 Sep 2019 17:39)    I&O's Summary    11 Sep 2019 07:01  -  12 Sep 2019 07:00  --------------------------------------------------------  IN: 710 mL / OUT: 1000 mL / NET: -290 mL    12 Sep 2019 07:01  -  12 Sep 2019 14:44  --------------------------------------------------------  IN: 420 mL / OUT: 0 mL / NET: 420 mL        PHYSICAL EXAM:  Vital Signs Last 24 Hrs  T(C): 36.6 (12 Sep 2019 14:00), Max: 37.1 (11 Sep 2019 22:00)  T(F): 97.8 (12 Sep 2019 14:00), Max: 98.7 (11 Sep 2019 22:00)  HR: 81 (12 Sep 2019 14:00) (66 - 99)  BP: 103/60 (12 Sep 2019 14:00) (103/60 - 137/80)  BP(mean): --  RR: 18 (12 Sep 2019 14:00) (16 - 18)  SpO2: 97% (12 Sep 2019 14:00) (94% - 100%)  GENERAL: NAD, well-developed  HEAD:  Atraumatic, Normocephalic  EYES: EOMI, PERRLA, conjunctiva and sclera clear  NECK: Supple, No JVD  CHEST/LUNG: Clear to auscultation bilaterally; No wheeze  HEART: Regular rate and rhythm; No murmurs, rubs, or gallops  ABDOMEN: Soft, Nontender, Nondistended; Bowel sounds present  EXTREMITIES:  2+ Peripheral Pulses, No clubbing, cyanosis, or edema  NEUROLOGY: AAOx3; non-focal  SKIN: No rashes or lesions    LABS:                        9.7    11.8  )-----------( 251      ( 12 Sep 2019 07:47 )             30.0     09-12    139  |  100  |  21  ----------------------------<  119<H>  3.4<L>   |  24  |  0.88    Ca    8.9      12 Sep 2019 07:47                RADIOLOGY & ADDITIONAL TESTS:    Imaging Personally Reviewed:    Consultant(s) Notes Reviewed:      Care Discussed with Consultants/Other Providers: Patient is a 85y old  Female who presents with a chief complaint of Abdominal pain (12 Sep 2019 13:46)      SUBJECTIVE / OVERNIGHT EVENTS: Patient reporting worsening abdominal pain, especially around PEG insertion site.    ROS:  14 point ROS negative in detail except stated as above    MEDICATIONS  (STANDING):  ALBUTerol    0.083% 2.5 milliGRAM(s) Nebulizer every 6 hours  atorvastatin 40 milliGRAM(s) Oral <User Schedule>  buDESOnide    Inhalation Suspension 0.25 milliGRAM(s) Inhalation two times a day  carbidopa/levodopa  25/100 2 Tablet(s) Oral three times a day  ceFAZolin   IVPB 2000 milliGRAM(s) IV Intermittent every 12 hours  chlorhexidine 2% Cloths 1 Application(s) Topical daily  dextrose 5%. 1000 milliLiter(s) (50 mL/Hr) IV Continuous <Continuous>  dextrose 50% Injectable 12.5 Gram(s) IV Push once  dextrose 50% Injectable 25 Gram(s) IV Push once  dextrose 50% Injectable 25 Gram(s) IV Push once  gabapentin   Solution 300 milliGRAM(s) Oral at bedtime  guaiFENesin    Syrup 200 milliGRAM(s) Oral every 6 hours  insulin lispro (HumaLOG) corrective regimen sliding scale   SubCutaneous every 6 hours  lidocaine   Patch 1 Patch Transdermal daily  metoprolol tartrate 25 milliGRAM(s) Oral two times a day  pantoprazole   Suspension 40 milliGRAM(s) Oral before breakfast  predniSONE   Tablet 10 milliGRAM(s) Oral daily  sodium chloride 3%  Inhalation 3 milliLiter(s) Inhalation every 12 hours    MEDICATIONS  (PRN):  acetaminophen    Suspension .. 650 milliGRAM(s) Oral every 6 hours PRN Moderate Pain (4 - 6), Severe Pain (7 - 10)  ALPRAZolam 0.5 milliGRAM(s) Oral two times a day PRN Anxiety  calamine Lotion 1 Application(s) Topical daily PRN Rash and/or Itching  dextrose 40% Gel 15 Gram(s) Oral once PRN Blood Glucose LESS THAN 70 milliGRAM(s)/deciliter  glucagon  Injectable 1 milliGRAM(s) IntraMuscular once PRN Glucose LESS THAN 70 milligrams/deciliter      CAPILLARY BLOOD GLUCOSE      POCT Blood Glucose.: 131 mg/dL (12 Sep 2019 11:49)  POCT Blood Glucose.: 122 mg/dL (12 Sep 2019 06:41)  POCT Blood Glucose.: 114 mg/dL (11 Sep 2019 23:42)  POCT Blood Glucose.: 136 mg/dL (11 Sep 2019 17:39)    I&O's Summary    11 Sep 2019 07:01  -  12 Sep 2019 07:00  --------------------------------------------------------  IN: 710 mL / OUT: 1000 mL / NET: -290 mL    12 Sep 2019 07:01  -  12 Sep 2019 14:44  --------------------------------------------------------  IN: 420 mL / OUT: 0 mL / NET: 420 mL        PHYSICAL EXAM:  Vital Signs Last 24 Hrs  T(C): 36.6 (12 Sep 2019 14:00), Max: 37.1 (11 Sep 2019 22:00)  T(F): 97.8 (12 Sep 2019 14:00), Max: 98.7 (11 Sep 2019 22:00)  HR: 81 (12 Sep 2019 14:00) (66 - 99)  BP: 103/60 (12 Sep 2019 14:00) (103/60 - 137/80)  BP(mean): --  RR: 18 (12 Sep 2019 14:00) (16 - 18)  SpO2: 97% (12 Sep 2019 14:00) (94% - 100%)    GENERAL: NAD, but appears in mild discomfort and increased lethargy  HEAD:  Atraumatic, Normocephalic  EYES: EOMI, PERRLA, conjunctiva and sclera clear  NECK: Supple, No JVD. Persistent cough  CHEST/LUNG: Clear to auscultation bilaterally; No wheeze  HEART: Regular rate and rhythm; No murmurs, rubs, or gallops  ABDOMEN: TTP in all quadrants, especially around PEG site. Soft, Nondistended; Bowel sounds present. No rebound or guarding  EXTREMITIES:  2+ Peripheral Pulses, No clubbing, cyanosis, or edema  NEUROLOGY: AAOx3; non-focal  SKIN: No rashes or lesions    LABS:                        9.7    11.8  )-----------( 251      ( 12 Sep 2019 07:47 )             30.0     09-12    139  |  100  |  21  ----------------------------<  119<H>  3.4<L>   |  24  |  0.88    Ca    8.9      12 Sep 2019 07:47                RADIOLOGY & ADDITIONAL TESTS:    Imaging Personally Reviewed:    Consultant(s) Notes Reviewed:      Care Discussed with Consultants/Other Providers:

## 2019-09-12 NOTE — PROGRESS NOTE ADULT - PROBLEM SELECTOR PLAN 1
S/p vocal cord injection for vocal cord paralysis  - failed speech and swallow study x3 (last 09/03)  - PEG tube placed; pt with new onset abdominal pain centered around PEG site, will obtain CTAP  - Now on feeds, currently at max rate  - Dietitian ovi robert

## 2019-09-12 NOTE — PROGRESS NOTE ADULT - ATTENDING COMMENTS
Pt with worsening abdominal pain and risking leukocytosis  obtain CTAP to rule out any etiology   updated son Shon over the phone    Radha Coleman MD  Division of Hospital Medicine  Pager: [phone]  Office: [phone]

## 2019-09-12 NOTE — PROGRESS NOTE ADULT - ASSESSMENT
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    Sputum Culture 8/31 - MSSA  Blood Culture 8/30 - MSSA  Blood Culture 9/1 - MSSA  Blood Culture 9/5 - NGTD  Blood Culture 9/6 - NGTD  Blood Culture 9/7 - NGTD    TTE was a poor study but no obvious signs of IE    MSSA Bacteremia appears to be secondary to her pneumonia. I would treat for at least 6 weeks of antibiotics given persistent bacteremia (72 hours) and history of TAVR.     If continued encephalopathy recommend MRI brain to evaluate for septic emboli. Remains lethargic this AM (9/12) but per RN patient waxes and wanes.     Overall, MSSA Bacteremia, MSSA Pneumonia, Pre-Procedural Evaluation, s/p Bioprosthetic TAVR, Encephalopathy    Recommendations    --Continue Cefazolin 2 g q 12 hour (will require at least 6 weeks of antibiotics given persistent bacteremia)   --Continue to monitor mental status - if any decline recommend CNS imaging (given 72H of Bacteremia to evaluate for embolic disease)  --Continue to follow CBC with diff  --Continue to follow temperature curve    I will continue to follow. Please feel free to contact me with any further questions.    Ancelmo Mendoza M.D.  [facility] Division of Infectious Disease  8AM-5PM: Pager Number [phone]  After Hours (or if no response): Please contact the Infectious Diseases Office at [phone]

## 2019-09-12 NOTE — CHART NOTE - NSCHARTNOTEFT_GEN_A_CORE
Outreach made to patients son  they (patients son and daughter) had been inquiring about rescinding DNR/DNI but feel their mother has not been awake/alert enough to participate in conversation  discussed code status via phone and how at present, code status is DNR/DNI based on patients previously stated wishes. Son Shon verbalized understanding.  family meeting offered to be had with palliative care but family declines at this time.  They will reach out to palliative if needed.  discussed with resident, Dr. Duckworth

## 2019-09-12 NOTE — PROGRESS NOTE ADULT - ASSESSMENT
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    MSSA Bacteremia, MSSA Pneumonia, Leukocytosis, s/p Bioprosthetic TAVR  we are asked to assume GI care and consult for PEG tube placement    s/p PEG 9/10/19    RECS:  -Abx as per ID  -Infectious work-up r/o endocarditis as per ID recs  -PEG care, titrate feeds to goal. Monitor tolerance  -no GI objection to AC (AC as per Medicine)  -steroids per primary team  -PPI (via PEG)      Gordon Shaw PA-C    [facility]  [phone]  After hours and weekend coverage [phone]

## 2019-09-12 NOTE — PROGRESS NOTE ADULT - SUBJECTIVE AND OBJECTIVE BOX
Follow Up:  MSSA Bacteremia    Interval History:No chills or fevers. No N/V/D.    REVIEW OF SYSTEMS  [ x ] ROS unobtainable because:  lethargic  [  ] All other systems negative except as noted below    Constitutional:  [ ] fever [ ] chills  [ ] weight loss  [ ] weakness  Skin:  [ ] rash [ ] phlebitis	  Eyes: [ ] icterus [ ] pain  [ ] discharge	  ENMT: [ ] sore throat  [ ] thrush [ ] ulcers [ ] exudates  Respiratory: [ ] dyspnea [ ] hemoptysis [ ] cough [ ] sputum	  Cardiovascular:  [ ] chest pain [ ] palpitations [ ] edema	  Gastrointestinal:  [ ] nausea [ ] vomiting [ ] diarrhea [ ] constipation [ ] pain	  Genitourinary:  [ ] dysuria [ ] frequency [ ] hematuria [ ] discharge [ ] flank pain  [ ] incontinence  Musculoskeletal:  [ ] myalgias [ ] arthralgias [ ] arthritis  [ ] back pain  Neurological:  [ ] headache [ ] seizures  [ ] confusion/altered mental status    Allergies  penicillin (Unknown)        ANTIMICROBIALS:  ceFAZolin   IVPB 2000 every 12 hours      OTHER MEDS:  MEDICATIONS  (STANDING):  acetaminophen    Suspension .. 650 every 6 hours PRN  ALBUTerol    0.083% 2.5 every 6 hours  ALPRAZolam 0.5 two times a day PRN  atorvastatin 40 <User Schedule>  buDESOnide    Inhalation Suspension 0.25 two times a day  carbidopa/levodopa  25/100 2 three times a day  dextrose 40% Gel 15 once PRN  dextrose 50% Injectable 12.5 once  dextrose 50% Injectable 25 once  dextrose 50% Injectable 25 once  gabapentin   Solution 300 at bedtime  glucagon  Injectable 1 once PRN  guaiFENesin    Syrup 200 every 6 hours  insulin lispro (HumaLOG) corrective regimen sliding scale  every 6 hours  metoprolol tartrate 25 two times a day  morphine   Solution 7.5 every 4 hours PRN  morphine  IR 7.5 once  pantoprazole   Suspension 40 before breakfast  predniSONE   Tablet 10 daily  sodium chloride 3%  Inhalation 3 every 12 hours      Vital Signs Last 24 Hrs  T(C): 36.6 (12 Sep 2019 14:00), Max: 37.1 (11 Sep 2019 22:00)  T(F): 97.8 (12 Sep 2019 14:00), Max: 98.7 (11 Sep 2019 22:00)  HR: 76 (12 Sep 2019 18:07) (66 - 99)  BP: 100/60 (12 Sep 2019 17:23) (100/60 - 137/80)  BP(mean): --  RR: 18 (12 Sep 2019 14:00) (18 - 18)  SpO2: 97% (12 Sep 2019 18:07) (94% - 100%)    PHYSICAL EXAMINATION:  General: Alert and Awake, NAD  HEENT: PERRL, EOMI  Neck: Supple  Cardiac: RRR, No M/R/G  Resp: CTAB, No Wh/Rh/Ra  Abdomen: +PEG Tube, NBS, NT/ND, No HSM, No rigidity or guarding  MSK: No LE edema. No Calf tenderness  : No barba  Vascular: RUE PICC line (No surrounding erythema, drainage or tenderness to palpation)  Skin: No rashes or lesions. Skin is warm and dry to the touch.   Neuro: Alert and Awake. CN 2-12 Grossly intact. Moves all four extremities spontaneously.  Psych: Calm, Pleasant, Cooperative                            9.7    11.8  )-----------( 251      ( 12 Sep 2019 07:47 )             30.0       09-12    139  |  100  |  21  ----------------------------<  119<H>  3.4<L>   |  24  |  0.88    Ca    8.9      12 Sep 2019 07:47            MICROBIOLOGY:  v  .Blood  09-07-19   No growth to date.  --  --      .Blood  09-06-19   No growth at 5 days.  --  --      .Blood  09-05-19   No growth at 5 days.  --  --      .Blood  09-05-19   No growth at 5 days.  --  --      .Blood  09-01-19   No growth at 5 days.  --  --      .Blood  09-01-19   Growth in anaerobic bottle: Staphylococcus aureus  See previous culture [id number]  --    Growth in anaerobic bottle: Gram Positive Cocci in Clusters      .Sputum  08-31-19   Numerous Staphylococcus aureus  Normal Respiratory Martha present  --  Staphylococcus aureus      .Urine  08-30-19   >=3 organisms. Probable collection contamination.  --  --      .Blood  08-30-19   Growth in aerobic bottle: Staphylococcus aureus  "Due to technical problems, Proteus sp. will Not be reported as part of  the BCID panel until further notice"  ***Blood Panel PCR results on this specimen are available  approximately 3 hours after the Gram stain result.***  Gram stain, PCR, and/or culture results may not always  correspond due to difference in methodologies.  ************************************************************  This PCR assay was performed using [employer].  The following targets are tested for: Enterococcus,  vancomycin resistant enterococci, Listeria monocytogenes,  coagulase negative staphylococci, S. aureus,  methicillin resistant S. aureus, Streptococcus agalactiae  (Group B), S. pneumoniae, S. pyogenes (Group A),  Acinetobacter baumannii, Enterobacter cloacae, E. coli,  Klebsiella oxytoca, K. pneumoniae, Proteus sp.,  Serratia marcescens, Haemophilus influenzae,  Neisseria meningitidis, Pseudomonas aeruginosa, Candida  albicans, C. glabrata, C krusei, C parapsilosis,  C. tropicalis and the KPC resistance gene.  --  Blood Culture PCR  Staphylococcus aureus    RADIOLOGY:    EXAM:  XR CHEST PORTABLE URGENT 1V                        PROCEDURE DATE:  09/11/2019    New right PICC line with tip projecting over SVC. No pneumothorax

## 2019-09-13 VITALS — OXYGEN SATURATION: 96 %

## 2019-09-13 LAB
ALBUMIN SERPL ELPH-MCNC: 3.5 G/DL — SIGNIFICANT CHANGE UP (ref 3.3–5)
ALP SERPL-CCNC: 69 U/L — SIGNIFICANT CHANGE UP (ref 40–120)
ALT FLD-CCNC: <5 U/L — LOW (ref 10–45)
ANION GAP SERPL CALC-SCNC: 13 MMOL/L — SIGNIFICANT CHANGE UP (ref 5–17)
ANION GAP SERPL CALC-SCNC: 14 MMOL/L — SIGNIFICANT CHANGE UP (ref 5–17)
APTT BLD: 35.1 SEC — SIGNIFICANT CHANGE UP (ref 27.5–36.3)
AST SERPL-CCNC: 15 U/L — SIGNIFICANT CHANGE UP (ref 10–40)
BILIRUB SERPL-MCNC: 0.4 MG/DL — SIGNIFICANT CHANGE UP (ref 0.2–1.2)
BUN SERPL-MCNC: 23 MG/DL — SIGNIFICANT CHANGE UP (ref 7–23)
BUN SERPL-MCNC: 25 MG/DL — HIGH (ref 7–23)
CALCIUM SERPL-MCNC: 9.2 MG/DL — SIGNIFICANT CHANGE UP (ref 8.4–10.5)
CALCIUM SERPL-MCNC: 9.6 MG/DL — SIGNIFICANT CHANGE UP (ref 8.4–10.5)
CHLORIDE SERPL-SCNC: 100 MMOL/L — SIGNIFICANT CHANGE UP (ref 96–108)
CHLORIDE SERPL-SCNC: 100 MMOL/L — SIGNIFICANT CHANGE UP (ref 96–108)
CO2 SERPL-SCNC: 24 MMOL/L — SIGNIFICANT CHANGE UP (ref 22–31)
CO2 SERPL-SCNC: 25 MMOL/L — SIGNIFICANT CHANGE UP (ref 22–31)
CREAT SERPL-MCNC: 0.66 MG/DL — SIGNIFICANT CHANGE UP (ref 0.5–1.3)
CREAT SERPL-MCNC: 0.77 MG/DL — SIGNIFICANT CHANGE UP (ref 0.5–1.3)
GGT SERPL-CCNC: 39 U/L — SIGNIFICANT CHANGE UP (ref 8–40)
GLUCOSE BLDC GLUCOMTR-MCNC: 108 MG/DL — HIGH (ref 70–99)
GLUCOSE BLDC GLUCOMTR-MCNC: 122 MG/DL — HIGH (ref 70–99)
GLUCOSE BLDC GLUCOMTR-MCNC: 150 MG/DL — HIGH (ref 70–99)
GLUCOSE SERPL-MCNC: 112 MG/DL — HIGH (ref 70–99)
GLUCOSE SERPL-MCNC: 131 MG/DL — HIGH (ref 70–99)
HCT VFR BLD CALC: 30.5 % — LOW (ref 34.5–45)
HGB BLD-MCNC: 9.9 G/DL — LOW (ref 11.5–15.5)
LIDOCAIN IGE QN: 44 U/L — SIGNIFICANT CHANGE UP (ref 7–60)
MCHC RBC-ENTMCNC: 30.3 PG — SIGNIFICANT CHANGE UP (ref 27–34)
MCHC RBC-ENTMCNC: 32.5 GM/DL — SIGNIFICANT CHANGE UP (ref 32–36)
MCV RBC AUTO: 93.3 FL — SIGNIFICANT CHANGE UP (ref 80–100)
PLATELET # BLD AUTO: 239 K/UL — SIGNIFICANT CHANGE UP (ref 150–400)
POTASSIUM SERPL-MCNC: 4 MMOL/L — SIGNIFICANT CHANGE UP (ref 3.5–5.3)
POTASSIUM SERPL-MCNC: 4.6 MMOL/L — SIGNIFICANT CHANGE UP (ref 3.5–5.3)
POTASSIUM SERPL-SCNC: 4 MMOL/L — SIGNIFICANT CHANGE UP (ref 3.5–5.3)
POTASSIUM SERPL-SCNC: 4.6 MMOL/L — SIGNIFICANT CHANGE UP (ref 3.5–5.3)
PROT SERPL-MCNC: 6.8 G/DL — SIGNIFICANT CHANGE UP (ref 6–8.3)
RBC # BLD: 3.27 M/UL — LOW (ref 3.8–5.2)
RBC # FLD: 16.3 % — HIGH (ref 10.3–14.5)
SODIUM SERPL-SCNC: 138 MMOL/L — SIGNIFICANT CHANGE UP (ref 135–145)
SODIUM SERPL-SCNC: 138 MMOL/L — SIGNIFICANT CHANGE UP (ref 135–145)
WBC # BLD: 13.2 K/UL — HIGH (ref 3.8–10.5)
WBC # FLD AUTO: 13.2 K/UL — HIGH (ref 3.8–10.5)

## 2019-09-13 PROCEDURE — 99233 SBSQ HOSP IP/OBS HIGH 50: CPT

## 2019-09-13 PROCEDURE — 99232 SBSQ HOSP IP/OBS MODERATE 35: CPT

## 2019-09-13 PROCEDURE — 99239 HOSP IP/OBS DSCHRG MGMT >30: CPT

## 2019-09-13 RX ORDER — GABAPENTIN 400 MG/1
2 CAPSULE ORAL
Qty: 0 | Refills: 0 | DISCHARGE

## 2019-09-13 RX ORDER — CARBIDOPA AND LEVODOPA 25; 100 MG/1; MG/1
2 TABLET ORAL
Qty: 0 | Refills: 0 | DISCHARGE

## 2019-09-13 RX ORDER — APIXABAN 2.5 MG/1
2 TABLET, FILM COATED ORAL
Qty: 60 | Refills: 0
Start: 2019-09-13 | End: 2019-10-12

## 2019-09-13 RX ORDER — ALPRAZOLAM 0.25 MG
1 TABLET ORAL
Qty: 0 | Refills: 0 | DISCHARGE

## 2019-09-13 RX ORDER — DULOXETINE HYDROCHLORIDE 30 MG/1
1 CAPSULE, DELAYED RELEASE ORAL
Qty: 0 | Refills: 0 | DISCHARGE

## 2019-09-13 RX ORDER — BALSALAZIDE DISODIUM 750 MG/1
1 CAPSULE ORAL
Qty: 0 | Refills: 0 | DISCHARGE

## 2019-09-13 RX ORDER — FUROSEMIDE 40 MG
1 TABLET ORAL
Qty: 0 | Refills: 0 | DISCHARGE

## 2019-09-13 RX ORDER — CLOPIDOGREL BISULFATE 75 MG/1
1 TABLET, FILM COATED ORAL
Qty: 0 | Refills: 0 | DISCHARGE

## 2019-09-13 RX ORDER — CALAMINE AND ZINC OXIDE AND PHENOL 160; 10 MG/ML; MG/ML
1 LOTION TOPICAL
Qty: 0 | Refills: 0 | DISCHARGE
Start: 2019-09-13

## 2019-09-13 RX ORDER — METOPROLOL TARTRATE 50 MG
25 TABLET ORAL
Qty: 0 | Refills: 0 | DISCHARGE

## 2019-09-13 RX ORDER — CEFAZOLIN SODIUM 1 G
2 VIAL (EA) INJECTION
Qty: 2 | Refills: 0
Start: 2019-09-13 | End: 2019-10-16

## 2019-09-13 RX ORDER — LOSARTAN POTASSIUM 100 MG/1
1 TABLET, FILM COATED ORAL
Qty: 0 | Refills: 0 | DISCHARGE

## 2019-09-13 RX ORDER — PANTOPRAZOLE SODIUM 20 MG/1
1 TABLET, DELAYED RELEASE ORAL
Qty: 0 | Refills: 0 | DISCHARGE

## 2019-09-13 RX ORDER — ATORVASTATIN CALCIUM 80 MG/1
1 TABLET, FILM COATED ORAL
Qty: 0 | Refills: 0 | DISCHARGE

## 2019-09-13 RX ORDER — MORPHINE SULFATE 50 MG/1
3.75 CAPSULE, EXTENDED RELEASE ORAL
Qty: 0 | Refills: 0 | DISCHARGE
Start: 2019-09-13

## 2019-09-13 RX ORDER — SIMETHICONE 80 MG/1
1.2 TABLET, CHEWABLE ORAL
Qty: 0 | Refills: 0 | DISCHARGE
Start: 2019-09-13

## 2019-09-13 RX ORDER — SIMETHICONE 80 MG/1
80 TABLET, CHEWABLE ORAL THREE TIMES A DAY
Refills: 0 | Status: DISCONTINUED | OUTPATIENT
Start: 2019-09-13 | End: 2019-09-13

## 2019-09-13 RX ADMIN — ALBUTEROL 2.5 MILLIGRAM(S): 90 AEROSOL, METERED ORAL at 11:33

## 2019-09-13 RX ADMIN — SODIUM CHLORIDE 3 MILLILITER(S): 9 INJECTION INTRAMUSCULAR; INTRAVENOUS; SUBCUTANEOUS at 05:50

## 2019-09-13 RX ADMIN — Medication 0.25 MILLIGRAM(S): at 17:31

## 2019-09-13 RX ADMIN — CARBIDOPA AND LEVODOPA 2 TABLET(S): 25; 100 TABLET ORAL at 13:47

## 2019-09-13 RX ADMIN — Medication 100 MILLIGRAM(S): at 05:47

## 2019-09-13 RX ADMIN — Medication 25 MILLIGRAM(S): at 17:56

## 2019-09-13 RX ADMIN — Medication 200 MILLIGRAM(S): at 13:43

## 2019-09-13 RX ADMIN — SIMETHICONE 80 MILLIGRAM(S): 80 TABLET, CHEWABLE ORAL at 17:55

## 2019-09-13 RX ADMIN — Medication 10 MILLIGRAM(S): at 05:45

## 2019-09-13 RX ADMIN — Medication 200 MILLIGRAM(S): at 00:56

## 2019-09-13 RX ADMIN — Medication 25 MILLIGRAM(S): at 05:45

## 2019-09-13 RX ADMIN — CARBIDOPA AND LEVODOPA 2 TABLET(S): 25; 100 TABLET ORAL at 05:44

## 2019-09-13 RX ADMIN — CHLORHEXIDINE GLUCONATE 1 APPLICATION(S): 213 SOLUTION TOPICAL at 13:57

## 2019-09-13 RX ADMIN — Medication 0.25 MILLIGRAM(S): at 05:46

## 2019-09-13 RX ADMIN — ALBUTEROL 2.5 MILLIGRAM(S): 90 AEROSOL, METERED ORAL at 17:31

## 2019-09-13 RX ADMIN — Medication 100 MILLIGRAM(S): at 18:00

## 2019-09-13 RX ADMIN — MORPHINE SULFATE 7.5 MILLIGRAM(S): 50 CAPSULE, EXTENDED RELEASE ORAL at 11:08

## 2019-09-13 RX ADMIN — SODIUM CHLORIDE 3 MILLILITER(S): 9 INJECTION INTRAMUSCULAR; INTRAVENOUS; SUBCUTANEOUS at 17:32

## 2019-09-13 RX ADMIN — ALBUTEROL 2.5 MILLIGRAM(S): 90 AEROSOL, METERED ORAL at 05:46

## 2019-09-13 RX ADMIN — Medication 200 MILLIGRAM(S): at 05:43

## 2019-09-13 RX ADMIN — Medication 200 MILLIGRAM(S): at 17:55

## 2019-09-13 NOTE — PROGRESS NOTE ADULT - SUBJECTIVE AND OBJECTIVE BOX
Patient is a 85y old  Female who presented with a chief complaint of Abdominal pain (12 Sep 2019 19:51)      INTERVAL HPI/OVERNIGHT EVENTS:  no diarrhea  prior events noted  underwent CT scan for c/o abdominal pain at PEG site  states pain x3 days, increased with coughing  no reported increased residuals, TF held prior to CT scan  c/o dry itchy skin  no fever or chills  wants to drink liquids    MEDICATIONS  (STANDING):  ALBUTerol    0.083% 2.5 milliGRAM(s) Nebulizer every 6 hours  atorvastatin 40 milliGRAM(s) Oral <User Schedule>  buDESOnide    Inhalation Suspension 0.25 milliGRAM(s) Inhalation two times a day  carbidopa/levodopa  25/100 2 Tablet(s) Oral three times a day  ceFAZolin   IVPB 2000 milliGRAM(s) IV Intermittent every 12 hours  chlorhexidine 2% Cloths 1 Application(s) Topical daily  dextrose 5%. 1000 milliLiter(s) (50 mL/Hr) IV Continuous <Continuous>  dextrose 50% Injectable 12.5 Gram(s) IV Push once  dextrose 50% Injectable 25 Gram(s) IV Push once  dextrose 50% Injectable 25 Gram(s) IV Push once  gabapentin   Solution 300 milliGRAM(s) Oral at bedtime  guaiFENesin    Syrup 200 milliGRAM(s) Oral every 6 hours  insulin lispro (HumaLOG) corrective regimen sliding scale   SubCutaneous every 6 hours  lidocaine   Patch 1 Patch Transdermal daily  metoprolol tartrate 25 milliGRAM(s) Oral two times a day  pantoprazole   Suspension 40 milliGRAM(s) Oral before breakfast  predniSONE   Tablet 10 milliGRAM(s) Oral daily  simethicone drops 80 milliGRAM(s) Enteral Tube three times a day  sodium chloride 3%  Inhalation 3 milliLiter(s) Inhalation every 12 hours    MEDICATIONS  (PRN):  acetaminophen    Suspension .. 650 milliGRAM(s) Oral every 6 hours PRN Moderate Pain (4 - 6), Severe Pain (7 - 10)  ALPRAZolam 0.5 milliGRAM(s) Oral two times a day PRN Anxiety  calamine Lotion 1 Application(s) Topical daily PRN Rash and/or Itching  dextrose 40% Gel 15 Gram(s) Oral once PRN Blood Glucose LESS THAN 70 milliGRAM(s)/deciliter  glucagon  Injectable 1 milliGRAM(s) IntraMuscular once PRN Glucose LESS THAN 70 milligrams/deciliter  morphine   Solution 7.5 milliGRAM(s) Oral every 4 hours PRN Severe Pain (7 - 10)      Allergies  penicillin (Unknown)      Review of Systems:  General:  No wt loss, fevers, chills, night sweats,fatigue,   CV:  No pain, palpitatioins, +HTN HLD +AF s/p TAVR, severe MS  Resp:  see HPI +cough +secretions +COPD  GI:  see HPI  : +incontinence  No pain, bleeding, nocturia  Muscle:  +PMR  Neuro:  parkinsonian symptoms  Psych:  No fatigue, insomnia, mood problems +anxiety/depression  Endocrine:  No polyuria, polydypsia, cold/heat intolerance  Heme:  No petechiae, ecchymosis, +easy bruisability +AC  Skin:  No rash, tattoos, scars, edema    Vital Signs Last 24 Hrs  T(C): 36.8 (13 Sep 2019 05:00), Max: 36.8 (13 Sep 2019 05:00)  T(F): 98.2 (13 Sep 2019 05:00), Max: 98.2 (13 Sep 2019 05:00)  HR: 77 (13 Sep 2019 11:35) (75 - 88)  BP: 119/78 (13 Sep 2019 05:00) (100/60 - 121/67)  BP(mean): --  RR: 18 (13 Sep 2019 05:00) (18 - 18)  SpO2: 97% (13 Sep 2019 11:35) (96% - 98%)    PHYSICAL EXAM:  Constitutional: edentulous elderly female, alert and responsive,  no retractions +occ. cough and wet vocal quality. OOB to chair  Neck: No LAD, supple, no retractions, no JVD  Respiratory: occ rhonchi, no wheeze  Cardiovascular: S1 and S2, +Murmur  Gastrointestinal: BS+, soft, obese ND PEG site clean and dry no bleeding bumper at 2.5 cm, spins freely 360 degrees.  tender at PEG site, no induration, bleeding, leakage, erythema or fluctuance  Extremities: No peripheral edema, neg clubbing, cyanosis  Vascular: 2+ peripheral pulses  Neurological: A/O x 3, no focal  asymmetry  Psychiatric: Normal mood, normal affect  Skin: No rashes, +multiple areas of healing bruises on arms. decreased turgor    LABS:                        9.9    13.2  )-----------( 239      ( 13 Sep 2019 07:34 )             30.5     09-13    138  |  100  |  23  ----------------------------<  131<H>  4.6   |  25  |  0.66    Ca    9.6      13 Sep 2019 11:50    PTT - ( 13 Sep 2019 07:34 )  PTT:35.1 sec    LIVER FUNCTIONS - ( 13 Sep 2019 11:50 )  Alb: 3.5 g/dL / Pro: 6.8 g/dL / ALK PHOS: 69 U/L / ALT: <5 U/L / AST: 15 U/L / GGT: x         Comprehensive Metabolic Panel (09.13.19 @ 11:50)    Bilirubin Total, Serum: 0.4 mg/dL      RADIOLOGY & ADDITIONAL TESTS:  < from: CT Abdomen and Pelvis w/ IV Cont (09.12.19 @ 19:44) >  FINDINGS:    LOWER CHEST: Aortic valvular repair, coarse mitral annular   calcifications. Tip of a central venous catheter at the atriocaval   junction.    LIVER: Within normal limits.  BILE DUCTS: New enlargement of the intrahepatic and extrahepatic biliary   tree.  GALLBLADDER: Within normal limits.  SPLEEN: Within normal limits.  PANCREAS: Within normal limits.  ADRENALS: Within normal limits.  KIDNEYS/URETERS: Bilateral low-attenuation lesions may represent cysts.    BLADDER: Minimally distended with minimal mural thickening of the dome,   unchanged in appearance.  REPRODUCTIVE ORGANS: Uterus and adnexa within normal limits.    BOWEL: No bowel obstruction. Appendix is normal. The stomach and the   proximal duodenum or distended and fluid-filled. There is reflux into the   distal esophagus which also demonstrate mural thickening. Percutaneous   gastrostomy catheter is in place in satisfactory location. PERITONEUM: No   ascites.  VESSELS: Atherosclerotic changes.  RETROPERITONEUM/LYMPH NODES: No lymphadenopathy.    ABDOMINAL WALL: Within normal limits.  BONES: Marked scoliosis and postsurgical changes in the lower lumbar   spine.    IMPRESSION:     Fluid-filled and distended stomach and  proximal duodenum with a   gastrostomy catheter in place in satisfactory location. No bowel   obstruction.  New mild biliary ductal dilatation. Correlation with lab markers is   advised. An MRCP can also be considered for further evaluation.

## 2019-09-13 NOTE — PROGRESS NOTE ADULT - SUBJECTIVE AND OBJECTIVE BOX
Follow Up:  MSSA Bacteremia    Interval History: More alert this AM. No chills or fevers. Notes some abdominal discomfort. No diarrhea     REVIEW OF SYSTEMS  [ x ] ROS unobtainable because:  lethargic  [  ] All other systems negative except as noted below    Constitutional:  [ ] fever [ ] chills  [ ] weight loss  [ ] weakness  Skin:  [ ] rash [ ] phlebitis	  Eyes: [ ] icterus [ ] pain  [ ] discharge	  ENMT: [ ] sore throat  [ ] thrush [ ] ulcers [ ] exudates  Respiratory: [ ] dyspnea [ ] hemoptysis [ ] cough [ ] sputum	  Cardiovascular:  [ ] chest pain [ ] palpitations [ ] edema	  Gastrointestinal:  [ ] nausea [ ] vomiting [ ] diarrhea [ ] constipation [ x] pain	  Genitourinary:  [ ] dysuria [ ] frequency [ ] hematuria [ ] discharge [ ] flank pain  [ ] incontinence  Musculoskeletal:  [ ] myalgias [ ] arthralgias [ ] arthritis  [ ] back pain  Neurological:  [ ] headache [ ] seizures  [ ] confusion/altered mental status    Allergies  penicillin (Unknown)        ANTIMICROBIALS:  ceFAZolin   IVPB 2000 every 12 hours      OTHER MEDS:  MEDICATIONS  (STANDING):  acetaminophen    Suspension .. 650 every 6 hours PRN  ALBUTerol    0.083% 2.5 every 6 hours  ALPRAZolam 0.5 two times a day PRN  atorvastatin 40 <User Schedule>  buDESOnide    Inhalation Suspension 0.25 two times a day  carbidopa/levodopa  25/100 2 three times a day  dextrose 40% Gel 15 once PRN  dextrose 50% Injectable 12.5 once  dextrose 50% Injectable 25 once  dextrose 50% Injectable 25 once  gabapentin   Solution 300 at bedtime  glucagon  Injectable 1 once PRN  guaiFENesin    Syrup 200 every 6 hours  insulin lispro (HumaLOG) corrective regimen sliding scale  every 6 hours  metoprolol tartrate 25 two times a day  morphine   Solution 7.5 every 4 hours PRN  pantoprazole   Suspension 40 before breakfast  predniSONE   Tablet 10 daily  simethicone drops 80 three times a day  sodium chloride 3%  Inhalation 3 every 12 hours      Vital Signs Last 24 Hrs  T(C): 36.6 (13 Sep 2019 13:44), Max: 36.8 (13 Sep 2019 05:00)  T(F): 97.9 (13 Sep 2019 13:44), Max: 98.2 (13 Sep 2019 05:00)  HR: 74 (13 Sep 2019 17:35) (73 - 88)  BP: 126/79 (13 Sep 2019 13:44) (119/78 - 126/79)  BP(mean): --  RR: 18 (13 Sep 2019 13:44) (18 - 18)  SpO2: 96% (13 Sep 2019 17:35) (96% - 98%)    PHYSICAL EXAMINATION:  General: Alert and Awake, NAD  HEENT: PERRL, EOMI  Neck: Supple  Cardiac: RRR, No M/R/G  Resp: CTAB, No Wh/Rh/Ra  Abdomen: +PEG Tube, NBS, Mild abdominal distention, mild diffuse tenderness to palpation, No HSM, No rigidity or guarding  MSK: No LE edema. No Calf tenderness  : No barba  Vascular: RUE PICC line (No surrounding erythema, drainage or tenderness to palpation)  Skin: No rashes or lesions. Skin is warm and dry to the touch.   Neuro: Alert and Awake. CN 2-12 Grossly intact. Moves all four extremities spontaneously.  Psych: Calm, Pleasant, Cooperative                            9.9    13.2  )-----------( 239      ( 13 Sep 2019 07:34 )             30.5       09-13    138  |  100  |  23  ----------------------------<  131<H>  4.6   |  25  |  0.66    Ca    9.6      13 Sep 2019 11:50    TPro  6.8  /  Alb  3.5  /  TBili  0.4  /  DBili  x   /  AST  15  /  ALT  <5<L>  /  AlkPhos  69  09-13          MICROBIOLOGY:  v  .Blood  09-07-19   No growth at 5 days.  --  --      .Blood  09-06-19   No growth at 5 days.  --  --      .Blood  09-05-19   No growth at 5 days.  --  --      .Blood  09-05-19   No growth at 5 days.  --  --      .Blood  09-01-19   No growth at 5 days.  --  --      .Blood  09-01-19   Growth in anaerobic bottle: Staphylococcus aureus  See previous culture [id number]  --    Growth in anaerobic bottle: Gram Positive Cocci in Clusters      .Sputum  08-31-19   Numerous Staphylococcus aureus  Normal Respiratory Martha present  --  Staphylococcus aureus      .Urine  08-30-19   >=3 organisms. Probable collection contamination.  --  --      .Blood  08-30-19   Growth in aerobic bottle: Staphylococcus aureus  "Due to technical problems, Proteus sp. will Not be reported as part of  the BCID panel until further notice"  ***Blood Panel PCR results on this specimen are available  approximately 3 hours after the Gram stain result.***  Gram stain, PCR, and/or culture results may not always  correspond due to difference in methodologies.  ************************************************************  This PCR assay was performed using [email or web address].  The following targets are tested for: Enterococcus,  vancomycin resistant enterococci, Listeria monocytogenes,  coagulase negative staphylococci, S. aureus,  methicillin resistant S. aureus, Streptococcus agalactiae  (Group B), S. pneumoniae, S. pyogenes (Group A),  Acinetobacter baumannii, Enterobacter cloacae, E. coli,  Klebsiella oxytoca, K. pneumoniae, Proteus sp.,  Serratia marcescens, Haemophilus influenzae,  Neisseria meningitidis, Pseudomonas aeruginosa, Candida  albicans, C. glabrata, C krusei, C parapsilosis,  C. tropicalis and the KPC resistance gene.  --  Blood Culture PCR  Staphylococcus aureus    RADIOLOGY:    EXAM:  CT ABDOMEN AND PELVIS IC                        PROCEDURE DATE:  09/12/2019    Fluid-filled and distended stomach and  proximal duodenum with a   gastrostomy catheter in place in satisfactory location. No bowel   obstruction.  New mild biliary ductal dilatation. Correlation with lab markers is   advised. An MRCP can also be considered for further evaluation.

## 2019-09-13 NOTE — PROGRESS NOTE ADULT - NSHPATTENDINGPLANDISCUSS_GEN_ALL_CORE
Medicine Team
Dr. Duckworth
Medicine Team
PADMINI Shaw
PADMINI Shaw
To reach Cardiology Attending call during weekdays Spectra [phone] or Fellow .
To reach Cardiology Attending call during weekdays Spectra [phone] or Fellow .
To reach Cardiology Attending call during weekdays Spectra [phone] or Fellow .
To reach Cardiology Attending call during weekdays Spectra [phone] or Fellow .
Pulmonary team
team
To reach Cardiology Attending call during weekdays Spectra [phone] or Fellow .
Medicine NP Chadd
TORO Chandler
Medicine TORO Lopez
Medicine TORO Segundo
TORO Chandler
Dr. Duckworth
family
Dr. Duckworth
son bedside
Ceasar Thompson MD
NINO
Michaela Mock MD
travis Menendez

## 2019-09-13 NOTE — PROGRESS NOTE ADULT - PROBLEM SELECTOR PLAN 1
S/p vocal cord injection for vocal cord paralysis  - failed speech and swallow study x3 (last 09/03)  - PEG tube placed; pt with new onset abdominal pain centered around PEG site, will obtain CTAP  - Now on feeds, currently at max rate--> continue at this rate  - start simethicone given aerophagia    - Dietitian recs appreciated

## 2019-09-13 NOTE — PROGRESS NOTE ADULT - PROVIDER SPECIALTY LIST ADULT
Cardiology
ENT
ENT
Gastroenterology
Hospitalist
Infectious Disease
Internal Medicine
MICU
Neurology
Pulmonology
Cardiology
Gastroenterology
Palliative Care
Gastroenterology
Infectious Disease
MICU
Infectious Disease
Internal Medicine
ENT
Internal Medicine
Hospitalist
Internal Medicine

## 2019-09-13 NOTE — PROGRESS NOTE ADULT - ASSESSMENT
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    MSSA Bacteremia, MSSA Pneumonia, Leukocytosis, s/p Bioprosthetic TAVR  we are asked to assume GI care and consult for PEG tube placement    s/p PEG 9/10/19- large hiatal hernia on EGD  PEG site pain, likely incisional.   PEG in good position on CT.  Gastric and duodenal distension, but pt tolerated feeds at goal rate without increased residuals. likely component of aerophagia (seen with lip smacking/air swallowing on examinations)  Clinically not cholangitic and with normal LFTs    RECS:  -Abx as per ID  -ok to resume PEG feeds (at goal rate, was tolerating without increased residuals). Suspect likely component of aerophagia, therefore Simethicone TID added via G tube  -no GI objection to AC (AC as per Medicine)  -steroids per primary team  -PPI (via PEG)    No GI objection to discharge plans as per primary team  If pt remains hospitalized over weekend, Please call prn with GI concerns   GI service : [phone]    Discussed with Housestaarian Shaw PA-C    [facility]  [phone]  After hours and weekend coverage [phone]

## 2019-09-13 NOTE — PROGRESS NOTE ADULT - ASSESSMENT
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 Now s/p PEG placement and PICC placement for antibiotics, now reporting abdominal pain which is 2' aerophagia.

## 2019-09-13 NOTE — PROGRESS NOTE ADULT - ASSESSMENT
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    Sputum Culture 8/31 - MSSA  Blood Culture 8/30 - MSSA  Blood Culture 9/1 - MSSA  Blood Culture 9/5 - NGTD  Blood Culture 9/6 - NGTD  Blood Culture 9/7 - NGTD    TTE was a poor study but no obvious signs of IE    MSSA Bacteremia appears to be secondary to her pneumonia. I would treat for at least 6 weeks of antibiotics given persistent bacteremia (72 hours) and history of TAVR.     Patient with worsening leukocytosis and ?ileus on CT A/P. Suspect this is the underlying etiology. If worsening leukocytosis would recommend obtaining a repeat 2 sets of blood cultures.     Overall, MSSA Bacteremia, MSSA Pneumonia, Pre-Procedural Evaluation, s/p Bioprosthetic TAVR, leukocytosis    Recommendations    --Continue Cefazolin 2 g q 12 hour (will require at least 6 weeks of antibiotics given persistent bacteremia)   --If continued rise in WBC recommend 2 sets of blood cultures.   --Continue to follow CBC with diff  --Continue to follow temperature curve    I will be away over this upcoming weekend. Please contact the Infectious Diseases Office with any further questions or concerns.     Ancelmo Mendoza M.D.  [facility] Division of Infectious Disease  8AM-5PM: Pager Number [phone]  After Hours (or if no response): Please contact the Infectious Diseases Office at [phone]

## 2019-09-13 NOTE — PROGRESS NOTE ADULT - PROBLEM SELECTOR PLAN 10
HgBa1c 6  DVT ppx: Elquis as above  Dispo: DNR/DNI; discharge to rehab today     45 minutes spent in preparation of discharge

## 2019-09-13 NOTE — PROGRESS NOTE ADULT - REASON FOR ADMISSION
Abdominal pain
Abdominal pain.
Abdominal pain

## 2019-09-13 NOTE — PROGRESS NOTE ADULT - PROBLEM SELECTOR PLAN 3
Blood culture positive for MSSA; 2/2 aspiration PNA vs unlikely endocarditis however s/p TAVR  -ID consult following patient, recommended to continue with cefazolin 2gram q12 hours x 6 weeks; s/p picc placement   - BCx on 09/05 and 09/06 NGTD  -TTE unable to r/o presence of vegetations

## 2019-10-01 PROCEDURE — 84132 ASSAY OF SERUM POTASSIUM: CPT

## 2019-10-01 PROCEDURE — 70544 MR ANGIOGRAPHY HEAD W/O DYE: CPT

## 2019-10-01 PROCEDURE — 83690 ASSAY OF LIPASE: CPT

## 2019-10-01 PROCEDURE — 96374 THER/PROPH/DIAG INJ IV PUSH: CPT | Mod: XU

## 2019-10-01 PROCEDURE — 70549 MR ANGIOGRAPH NECK W/O&W/DYE: CPT

## 2019-10-01 PROCEDURE — 93005 ELECTROCARDIOGRAM TRACING: CPT

## 2019-10-01 PROCEDURE — 82330 ASSAY OF CALCIUM: CPT

## 2019-10-01 PROCEDURE — 83880 ASSAY OF NATRIURETIC PEPTIDE: CPT

## 2019-10-01 PROCEDURE — 92610 EVALUATE SWALLOWING FUNCTION: CPT

## 2019-10-01 PROCEDURE — 87581 M.PNEUMON DNA AMP PROBE: CPT

## 2019-10-01 PROCEDURE — 83605 ASSAY OF LACTIC ACID: CPT

## 2019-10-01 PROCEDURE — 82947 ASSAY GLUCOSE BLOOD QUANT: CPT

## 2019-10-01 PROCEDURE — C1878: CPT

## 2019-10-01 PROCEDURE — 85610 PROTHROMBIN TIME: CPT

## 2019-10-01 PROCEDURE — L8699: CPT

## 2019-10-01 PROCEDURE — 85027 COMPLETE CBC AUTOMATED: CPT

## 2019-10-01 PROCEDURE — 71045 X-RAY EXAM CHEST 1 VIEW: CPT

## 2019-10-01 PROCEDURE — 87486 CHLMYD PNEUM DNA AMP PROBE: CPT

## 2019-10-01 PROCEDURE — 82550 ASSAY OF CK (CPK): CPT

## 2019-10-01 PROCEDURE — 49465 FLUORO EXAM OF G/COLON TUBE: CPT

## 2019-10-01 PROCEDURE — 74177 CT ABD & PELVIS W/CONTRAST: CPT

## 2019-10-01 PROCEDURE — 97535 SELF CARE MNGMENT TRAINING: CPT

## 2019-10-01 PROCEDURE — 80202 ASSAY OF VANCOMYCIN: CPT

## 2019-10-01 PROCEDURE — 82272 OCCULT BLD FECES 1-3 TESTS: CPT

## 2019-10-01 PROCEDURE — 87070 CULTURE OTHR SPECIMN AEROBIC: CPT

## 2019-10-01 PROCEDURE — 94660 CPAP INITIATION&MGMT: CPT

## 2019-10-01 PROCEDURE — 82977 ASSAY OF GGT: CPT

## 2019-10-01 PROCEDURE — 84145 PROCALCITONIN (PCT): CPT

## 2019-10-01 PROCEDURE — 70450 CT HEAD/BRAIN W/O DYE: CPT

## 2019-10-01 PROCEDURE — 97530 THERAPEUTIC ACTIVITIES: CPT

## 2019-10-01 PROCEDURE — 84484 ASSAY OF TROPONIN QUANT: CPT

## 2019-10-01 PROCEDURE — 97116 GAIT TRAINING THERAPY: CPT

## 2019-10-01 PROCEDURE — 93306 TTE W/DOPPLER COMPLETE: CPT

## 2019-10-01 PROCEDURE — 82565 ASSAY OF CREATININE: CPT

## 2019-10-01 PROCEDURE — 86900 BLOOD TYPING SEROLOGIC ABO: CPT

## 2019-10-01 PROCEDURE — 81001 URINALYSIS AUTO W/SCOPE: CPT

## 2019-10-01 PROCEDURE — 70491 CT SOFT TISSUE NECK W/DYE: CPT

## 2019-10-01 PROCEDURE — 86140 C-REACTIVE PROTEIN: CPT

## 2019-10-01 PROCEDURE — 97165 OT EVAL LOW COMPLEX 30 MIN: CPT

## 2019-10-01 PROCEDURE — 84100 ASSAY OF PHOSPHORUS: CPT

## 2019-10-01 PROCEDURE — 87040 BLOOD CULTURE FOR BACTERIA: CPT

## 2019-10-01 PROCEDURE — 97110 THERAPEUTIC EXERCISES: CPT

## 2019-10-01 PROCEDURE — 96375 TX/PRO/DX INJ NEW DRUG ADDON: CPT

## 2019-10-01 PROCEDURE — 85014 HEMATOCRIT: CPT

## 2019-10-01 PROCEDURE — 94002 VENT MGMT INPAT INIT DAY: CPT

## 2019-10-01 PROCEDURE — 85730 THROMBOPLASTIN TIME PARTIAL: CPT

## 2019-10-01 PROCEDURE — 87798 DETECT AGENT NOS DNA AMP: CPT

## 2019-10-01 PROCEDURE — 97161 PT EVAL LOW COMPLEX 20 MIN: CPT

## 2019-10-01 PROCEDURE — 83735 ASSAY OF MAGNESIUM: CPT

## 2019-10-01 PROCEDURE — 85652 RBC SED RATE AUTOMATED: CPT

## 2019-10-01 PROCEDURE — 83036 HEMOGLOBIN GLYCOSYLATED A1C: CPT

## 2019-10-01 PROCEDURE — 93308 TTE F-UP OR LMTD: CPT

## 2019-10-01 PROCEDURE — 86850 RBC ANTIBODY SCREEN: CPT

## 2019-10-01 PROCEDURE — 94640 AIRWAY INHALATION TREATMENT: CPT

## 2019-10-01 PROCEDURE — C1751: CPT

## 2019-10-01 PROCEDURE — 87086 URINE CULTURE/COLONY COUNT: CPT

## 2019-10-01 PROCEDURE — 80053 COMPREHEN METABOLIC PANEL: CPT

## 2019-10-01 PROCEDURE — 87186 SC STD MICRODIL/AGAR DIL: CPT

## 2019-10-01 PROCEDURE — 87633 RESP VIRUS 12-25 TARGETS: CPT

## 2019-10-01 PROCEDURE — 80061 LIPID PANEL: CPT

## 2019-10-01 PROCEDURE — 80048 BASIC METABOLIC PNL TOTAL CA: CPT

## 2019-10-01 PROCEDURE — 92611 MOTION FLUOROSCOPY/SWALLOW: CPT

## 2019-10-01 PROCEDURE — 82803 BLOOD GASES ANY COMBINATION: CPT

## 2019-10-01 PROCEDURE — G0515: CPT

## 2019-10-01 PROCEDURE — 94003 VENT MGMT INPAT SUBQ DAY: CPT

## 2019-10-01 PROCEDURE — C8929: CPT

## 2019-10-01 PROCEDURE — 99285 EMERGENCY DEPT VISIT HI MDM: CPT | Mod: 25

## 2019-10-01 PROCEDURE — 82962 GLUCOSE BLOOD TEST: CPT

## 2019-10-01 PROCEDURE — 86901 BLOOD TYPING SEROLOGIC RH(D): CPT

## 2019-10-01 PROCEDURE — 82553 CREATINE MB FRACTION: CPT

## 2019-10-01 PROCEDURE — 84295 ASSAY OF SERUM SODIUM: CPT

## 2019-10-01 PROCEDURE — 71250 CT THORAX DX C-: CPT

## 2019-10-01 PROCEDURE — 74230 X-RAY XM SWLNG FUNCJ C+: CPT

## 2019-10-01 PROCEDURE — 36600 WITHDRAWAL OF ARTERIAL BLOOD: CPT

## 2019-10-01 PROCEDURE — 70551 MRI BRAIN STEM W/O DYE: CPT

## 2019-10-01 PROCEDURE — 87150 DNA/RNA AMPLIFIED PROBE: CPT

## 2019-10-01 PROCEDURE — 36569 INSJ PICC 5 YR+ W/O IMAGING: CPT

## 2019-10-01 PROCEDURE — 84443 ASSAY THYROID STIM HORMONE: CPT

## 2019-10-01 PROCEDURE — 97112 NEUROMUSCULAR REEDUCATION: CPT

## 2019-10-01 PROCEDURE — 82435 ASSAY OF BLOOD CHLORIDE: CPT

## 2020-02-19 NOTE — PROGRESS NOTE ADULT - PROBLEM SELECTOR PLAN 5
minimal Likely due to aspiration event. S/p MICU; extubated 8/31  -Continue with oxygen supplementation PRN  -Now comfortable on RA

## 2020-09-06 NOTE — PROGRESS NOTE ADULT - PROBLEM SELECTOR PLAN 7
yes - breathing more comfortably and cough is better now  - Per son, no formal diagnosis of COPD  - c/w home dose prednisone 10mg daily chronically for PMR/?GCA. Right temporal artery biopsy reportedly negative, but she gets right sided headaches.  -Trigeminal neuralgia and migraine on the differential given right HA associated with maxillary/jaw pain and light sensitivity--c/w tylenol 1g q6h PRN given her comorbidities and planned procedure. Consider giving additional prednisone 10mg if HA is severe and not improved with tylenol  - Maintain O2 sats between 88-92%

## 2020-11-04 NOTE — PROGRESS NOTE ADULT - PROBLEM SELECTOR PROBLEM 2
Patient presents for a pre-op visit today. Surgery scheduled on  12/03/20.    Patient's current [facility] status: Active.                  Afib

## 2021-01-21 NOTE — PHYSICAL THERAPY INITIAL EVALUATION ADULT - GAIT TRAINING, PT EVAL
Detail Level: Detailed Was A Bandage Applied: Yes Punch Size In Mm: 4 Biopsy Type: H and E Anesthesia Type: 1% lidocaine with epinephrine Anesthesia Volume In Cc (Will Not Render If 0): 1 Additional Anesthesia Volume In Cc (Will Not Render If 0): 0 Hemostasis: Electrocautery Epidermal Sutures: none, closed by secondary intention Wound Care: Petrolatum Dressing: bandage Patient Will Remove Sutures At Home?: No Lab: [id number] Lab Facility: [id number] Consent: Written consent was obtained and risks were reviewed including but not limited to scarring, infection, bleeding, scabbing, incomplete removal, nerve damage and allergy to anesthesia. Post-Care Instructions: I reviewed with the patient in detail post-care instructions. Patient is to keep the biopsy site dry overnight, and then apply bacitracin twice daily until healed. Patient may apply hydrogen peroxide soaks to remove any crusting. Home Suture Removal Text: Patient was provided a home suture removal kit and will remove their sutures at home.  If they have any questions or difficulties they will call the office. Notification Instructions: Patient will be notified of biopsy results. However, patient instructed to call the office if not contacted within 2 weeks. Billing Type: Third-Party Bill Information: Selecting Yes will display possible errors in your note based on the variables you have selected. This validation is only offered as a suggestion for you. PLEASE NOTE THAT THE VALIDATION TEXT WILL BE REMOVED WHEN YOU FINALIZE YOUR NOTE. IF YOU WANT TO FAX A PRELIMINARY NOTE YOU WILL NEED TO TOGGLE THIS TO 'NO' IF YOU DO NOT WANT IT IN YOUR FAXED NOTE. GOAL: Pt will amb 50 ft with RW and CGA in 2 weeks.

## 2021-02-11 NOTE — PATIENT PROFILE ADULT - TOBACCO USE
Unknown if ever smoked Mercedes Flap Text: The defect edges were debeveled with a #15 scalpel blade.  Given the location of the defect, shape of the defect and the proximity to free margins a Mercedes flap was deemed most appropriate.  Using a sterile surgical marker, an appropriate advancement flap was drawn incorporating the defect and placing the expected incisions within the relaxed skin tension lines where possible. The area thus outlined was incised deep to adipose tissue with a #15 scalpel blade.  The skin margins were undermined to an appropriate distance in all directions utilizing iris scissors.

## 2021-09-22 NOTE — SWALLOW BEDSIDE ASSESSMENT ADULT - POSITIONING
Render Note In Bullet Format When Appropriate: No
Duration Of Freeze Thaw-Cycle (Seconds): 10
Post-Care Instructions: I reviewed with the patient in detail post-care instructions. Patient is to wear sunprotection, and avoid picking at any of the treated lesions. Pt may apply Vaseline to crusted or scabbing areas.
Number Of Freeze-Thaw Cycles: 2 freeze-thaw cycles
Detail Level: Detailed
Show Aperture Variable?: Yes
Consent: The patient's consent was obtained including but not limited to risks of crusting, scabbing, blistering, scarring, darker or lighter pigmentary change, recurrence, incomplete removal and infection.
upright (90 degrees)
upright (90 degrees)

## 2022-04-17 NOTE — CONSULT NOTE ADULT - CONSULT REASON
Patient returned to bed from chair.  Complaining of dizziness and \"predators floating across my eyes\".  Patient clarified that he was seeing dark things floating across his field of vision.  Admits to intermittent blurry vision.  Stroke team notified.    GOC

## 2022-12-06 NOTE — CHART NOTE - NSCHARTNOTEFT_GEN_A_CORE
Carolyn Pillai (: 2018) is a 3 y.o. male, established patient, here for evaluation of the following chief complaint(s):   Sore Throat (Sore throat and is fussy his head hurts as well not eating like normal )       ASSESSMENT/PLAN:  Below is the assessment and plan developed based on review of pertinent history, labs, studies, and medications. 1. URI with cough and congestion  -     AMB POC COVID-19 COV  2. Sore throat  -     AMB POC RAPID STREP A  3. Acute otalgia, left  -     amoxicillin (AMOXIL) 400 mg/5 mL suspension; Take 8.5 mL by mouth two (2) times a day for 10 days. , Normal, Disp-170 mL, R-0  4. Pharyngitis, unspecified etiology  -     amoxicillin (AMOXIL) 400 mg/5 mL suspension; Take 8.5 mL by mouth two (2) times a day for 10 days. , Normal, Disp-170 mL, R-0  5. Nausea  -     ondansetron (ZOFRAN ODT) 4 mg disintegrating tablet; Take 1 Tablet by mouth every eight to twelve (8-12) hours as needed for Nausea or Vomiting., Normal, Disp-5 Tablet, R-0    Return if symptoms worsen or fail to improve. SUBJECTIVE/OBJECTIVE:  Eleazar Buckley presents today for sore throat, nose pain, headache, abdominal pain (epigastric)x 2 days. He has had rhinorrhea for over a month and cannot get it to resolve. Last night, he became flushed in the face but did not have fevers. Mom reports that Wesley's throat is red and his uvula is swollen on one side. He is complaining of left otalgia. His cough is loose, productive. He is not eating or playing per usual; mom states he is clingy, whiny. Brother is also not playing per usual but has no other symptoms. Mom is giving ibuprofen and tylenol to help with symptoms. She states flu, COVID and RSV are in [employer]. Review of Systems   Constitutional:  Positive for activity change, appetite change and irritability. HENT:  Positive for congestion, ear pain, rhinorrhea, sore throat and voice change. Negative for sneezing and trouble swallowing. Eyes: Negative. Nutrition Follow Up Note  Patient seen for: Malnutrition follow up. Chart reviewed and events noted. Pt is s/p right vocal cord injection (), s/p MBS () recommended to stay NPO, off the floor for PEG placement today.    Source: EMR, RN    Diet : NPO after midnight    Patient reports: Per RN, pt had large BM today     PO intake : n/a     Enteral /Parenteral Nutrition: Noted pt's enteral feed changed to Glucerna 1.5 @ 20cc/hr x 24hrs yesterday, RN unsure why enteral feed order was changed.       Daily Weight in k.5 (-), Weight in k.7 (-), Weight in k.5 (-)  % Weight Change    Pertinent Medications: MEDICATIONS  (STANDING):  amLODIPine   Tablet 5 milliGRAM(s) Oral daily  atorvastatin 40 milliGRAM(s) Oral <User Schedule>  buDESOnide    Inhalation Suspension 0.25 milliGRAM(s) Inhalation two times a day  carbidopa/levodopa  25/100 2 Tablet(s) Oral three times a day  dextrose 5%. 1000 milliLiter(s) (50 mL/Hr) IV Continuous <Continuous>  dextrose 50% Injectable 12.5 Gram(s) IV Push once  dextrose 50% Injectable 25 Gram(s) IV Push once  dextrose 50% Injectable 25 Gram(s) IV Push once  docusate sodium Liquid 100 milliGRAM(s) Oral two times a day  fluconAZOLE IVPB      fluconAZOLE IVPB 100 milliGRAM(s) IV Intermittent every 24 hours  gabapentin   Solution 300 milliGRAM(s) Oral at bedtime  guaiFENesin    Syrup 200 milliGRAM(s) Oral every 6 hours  heparin  Infusion.  Unit(s)/Hr (11 mL/Hr) IV Continuous <Continuous>  insulin lispro (HumaLOG) corrective regimen sliding scale   SubCutaneous every 6 hours  losartan 50 milliGRAM(s) Oral daily  melatonin 5 milliGRAM(s) Oral at bedtime  metoprolol tartrate 25 milliGRAM(s) Oral two times a day  nystatin    Suspension 741661 Unit(s) Oral four times a day  pantoprazole  Injectable 40 milliGRAM(s) IV Push daily  polyethylene glycol 3350 17 Gram(s) Oral daily  predniSONE   Tablet 10 milliGRAM(s) Oral daily  senna 2 Tablet(s) Oral at bedtime  sodium chloride 0.9%. 1000 milliLiter(s) (60 mL/Hr) IV Continuous <Continuous>  venlafaxine 37.5 milliGRAM(s) Oral every 12 hours    MEDICATIONS  (PRN):  acetaminophen    Suspension .. 1000 milliGRAM(s) Oral every 6 hours PRN Mild Pain (1 - 3), Moderate Pain (4 - 6)  ALPRAZolam 0.5 milliGRAM(s) Oral two times a day PRN anxiety  dextrose 40% Gel 15 Gram(s) Oral once PRN Blood Glucose LESS THAN 70 milliGRAM(s)/deciliter  glucagon  Injectable 1 milliGRAM(s) IntraMuscular once PRN Glucose LESS THAN 70 milligrams/deciliter  heparin  Injectable 5000 Unit(s) IV Push every 6 hours PRN For aPTT less than 40  heparin  Injectable 2500 Unit(s) IV Push every 6 hours PRN For aPTT between 40 - 57     @ 06:07: Na 149<H>, BUN 63<H>, Cr 1.31<H>, <H>, K+ 4.0, Phos 4.5, Mg 3.4<H>, Alk Phos 50, ALT/SGPT 30, AST/SGOT 30, HbA1c --    Finger Sticks:  POCT Blood Glucose.: 155 mg/dL ( @ 12:02)  POCT Blood Glucose.: 167 mg/dL ( @ 06:07)  POCT Blood Glucose.: 145 mg/dL ( @ 00:14)  POCT Blood Glucose.: 137 mg/dL ( @ 17:25)      Skin per nursing documentation: no pressure injuries  Edema: +1 generalized edema    Estimated Needs:   [x ] no change since previous assessment  [ ] recalculated:     Previous Nutrition Diagnosis: mild malnutrition   Nutrition Diagnosis is: being addressed with enteral feed    New Nutrition Diagnosis: n/a       Interventions:     Recommend  1) Continue to monitor weight, lab values, and enteral feed tolerance.   2) Continue Glucerna 1.2 @ goal of 50 ml/hr x 24 hours to provide 1200 ml, 1440 kcal, 72 g protein, and 966 ml water (26 kcal/kg and 1.3 g/kg protein based on upper IBW 55 kg).    Monitoring and Evaluation:     Continue to monitor Nutritional intake, Tolerance to diet prescription, weights, labs, skin integrity    RD remains available upon request and will follow up per protocol Respiratory:  Positive for cough. Negative for wheezing. Cardiovascular: Negative. Gastrointestinal:  Positive for abdominal pain and nausea. Negative for constipation and diarrhea. Musculoskeletal: Negative. Negative for joint swelling. Skin: Negative. Negative for rash. Allergic/Immunologic: Negative. Neurological:  Positive for headaches. Hematological: Negative. Psychiatric/Behavioral: Negative.         No data recorded     Physical Exam    [INSTRUCTIONS:  \"[x]\" Indicates a positive item  \"[]\" Indicates a negative item  -- DELETE ALL ITEMS NOT EXAMINED]    Constitutional: [x] Appears well-developed and well-nourished [x] No apparent distress      [] Abnormal -     Mental status: [x] Alert and awake  [x] Oriented to person/place/time [x] Able to follow commands    [] Abnormal -     Eyes:   EOM    [x]  Normal    [] Abnormal -   Sclera  [x]  Normal    [] Abnormal -          Discharge [x]  None visible   [] Abnormal -     HENT: [x] Normocephalic, atraumatic  [] Abnormal -   [x] Mouth/Throat: Mucous membranes are moist    External Ears [x] Normal  [] Abnormal -    Neck: [x] No visualized mass [] Abnormal -     Pulmonary/Chest: [x] Respiratory effort normal   [x] No visualized signs of difficulty breathing or respiratory distress        [] Abnormal -      Musculoskeletal:   [x] Normal gait with no signs of ataxia         [x] Normal range of motion of neck        [] Abnormal -     Neurological:        [x] No Facial Asymmetry (Cranial nerve 7 motor function) (limited exam due to video visit)          [x] No gaze palsy        [] Abnormal -          Skin:        [x] No significant exanthematous lesions or discoloration noted on facial skin         [] Abnormal -            Psychiatric:       [x] Normal Affect [] Abnormal -        [] No Hallucinations    Other pertinent observable physical exam findings:-none    Results for orders placed or performed in visit on 12/06/22   AMB POC RAPID STREP A Result Value Ref Range    VALID INTERNAL CONTROL POC Yes     Group A Strep Ag Negative Negative   AMB POC COVID-19 COV   Result Value Ref Range    SARS-COV-2 RNA POC Negative Negative    LOT NUMBER SWAB [id number]     EXP DATE SWAB 04/30/2025     LOT NUMBER SOLUTION [id number]     EXP DATE SOLUTION 05/17/2023        ICD-10-CM ICD-9-CM    1. URI with cough and congestion  J06.9 465.9 AMB POC COVID-19 COV      2. Sore throat  J02.9 462 AMB POC RAPID STREP A      3. Acute otalgia, left  H92.02 388.70 amoxicillin (AMOXIL) 400 mg/5 mL suspension      4. Pharyngitis, unspecified etiology  J02.9 462 amoxicillin (AMOXIL) 400 mg/5 mL suspension      5. Nausea  R11.0 787.02 ondansetron (ZOFRAN ODT) 4 mg disintegrating tablet        Orders Placed This Encounter    AMB POC RAPID STREP A    AMB POC COVID-19 COV     Order Specific Question:   Is this test for diagnosis or screening? Answer:   Diagnosis of ill patient     Order Specific Question:   Symptomatic for COVID-19 as defined by CDC? Answer:   Yes     Order Specific Question:   Date of Symptom Onset     Answer:   12/4/2022     Order Specific Question:   Hospitalized for COVID-19? Answer:   No     Order Specific Question:   Admitted to ICU for COVID-19? Answer:   No     Order Specific Question:   Employed in healthcare setting? Answer:   No     Order Specific Question:   Resident in a congregate (group) care setting? Answer:   No     Order Specific Question:   Previously tested for COVID-19? Answer:   Yes    amoxicillin (AMOXIL) 400 mg/5 mL suspension     Sig: Take 8.5 mL by mouth two (2) times a day for 10 days. Dispense:  170 mL     Refill:  0    ondansetron (ZOFRAN ODT) 4 mg disintegrating tablet     Sig: Take 1 Tablet by mouth every eight to twelve (8-12) hours as needed for Nausea or Vomiting.      Dispense:  5 Tablet     Refill:  0     Recommend supportive care; rest, fluids, ibuprofen, tylenol and OTC cold/flu medication as needed. Mother verbalizes understanding of POC and is in agreement with current POC. Follow-up and Dispositions    Return if symptoms worsen or fail to improve. Kedar Basilio, was evaluated through a synchronous (real-time) audio-video encounter. The patient (or guardian if applicable) is aware that this is a billable service, which includes applicable co-pays. This Virtual Visit was conducted with patient's (and/or legal guardian's) consent. The visit was conducted pursuant to the emergency declaration under the [address] [place] [address] authority and the [employer] General Act. Patient identification was verified, and a caregiver was present when appropriate. The patient was located at: Home: [address] [place] [phone]  The provider was located at: Home: [unfilled]       An electronic signature was used to authenticate this note.   -- Tommas Spurling, NP

## 2023-01-03 NOTE — OCCUPATIONAL THERAPY INITIAL EVALUATION ADULT - RANGE OF MOTION EXAMINATION, LOWER EXTREMITY
Patient: Gallo Philip    Procedure Summary     Date: 01/03/23 Room / Location:  [facility] OR 16 /  [facility] OR    Anesthesia Start: 1422 Anesthesia Stop: 1747    Procedure: CORONARY ARTERY BYPASS GRAFTING X 1, LEFT INTERNAL MAMMARY ARTERY GRAFT, OFF-PUMP (Chest) Diagnosis:       Coronary artery disease involving native coronary artery of native heart without angina pectoris      (Coronary artery disease involving native coronary artery of native heart without angina pectoris [I25.10])    Surgeons: Keith Griffin MD Provider: Armando Subramanian CRNA    Anesthesia Type: general, PAC, CVL, [place] ASA Status: 4          Anesthesia Type: general, PAC, CVL, [place]    Vitals  Vitals Value Taken Time   /68 01/03/23 1747   Temp     Pulse 83 01/03/23 1759   Resp     SpO2 98 % 01/03/23 1759   Vitals shown include unvalidated device data.        Post Anesthesia Care and Evaluation    Patient location during evaluation: ICU  Patient participation: complete - patient cannot participate  Level of consciousness: obtunded/minimal responses  Pain management: adequate    Airway patency: patent  Anesthetic complications: No anesthetic complications    Cardiovascular status: acceptable  Respiratory status: acceptable  Hydration status: acceptable    Comments: Blood pressure 105/68, pulse 80, temperature 97.6 °F (36.4 °C), temperature source Temporal, resp. rate 14, height 177 cm (69.69\"), weight 91.9 kg (202 lb 9.6 oz), SpO2 97 %.          
bilateral LE Active ROM was WFL  (within functional limits)

## 2023-02-23 NOTE — PROGRESS NOTE ADULT - PROBLEM SELECTOR PROBLEM 10
Writer made aware from telemetry that patient went back into Afib. EKG done.      02/23/23 0332   Heart Rate   Heart Rate 62   Heart Rate Source Monitor   Blood Pressure   /71   Calculated MAP (mmHg) 82 mm Hg   BP Location LUE - Left upper extremity   BP Method Automatic   Patient Position Semi-Corral's   Respirations/Oxygenation   SpO2 94 %      Diabetes mellitus type 2 with complications

## 2023-08-29 NOTE — PROGRESS NOTE ADULT - PROBLEM SELECTOR PLAN 9
MEDICARE WELLNESS VISIT NOTE    HISTORY OF PRESENT ILLNESS:   Aishwarya Damon presents for her   annual Medicare wellness visit  She has swelling on the eyelid     Patient Care Team:  Alberto Olguin MD as PCP - General (Infectious Diseases)    Other providers Dr. Perkins endocrinology    Patient Active Problem List   Diagnosis   • Fibrocystic breast disease (FCBD)   • Essential hypertension   • Hypercalcemia   • Hyperlipidemia   • Osteopenia of the elderly   • Primary hyperparathyroidism (CMD)   • Postoperative hypothyroidism   • Thoracic aortic atherosclerosis (CMD)   • Vitamin D deficiency   • Chronic hoarseness   • Encounter for Medicare annual wellness exam   • Breast cancer screening   • Need for pneumococcal vaccination   • Papillary carcinoma of thyroid (CMD)         Past Medical History:   Diagnosis Date   • Hypertension          Past Surgical History:   Procedure Laterality Date   • Thyroidectomy      from thyroid CA         Social History     Tobacco Use   • Smoking status: Never   • Smokeless tobacco: Never   Vaping Use   • Vaping Use: never used   Substance Use Topics   • Alcohol use: No   • Drug use: No     Drug use:    Drug Use:    No              Family History   Problem Relation Age of Onset   • Hypertension Mother    • Heart disease Father        Current Outpatient Medications   Medication Sig Dispense Refill   • olmesartan (BENICAR) 40 MG tablet TAKE ONE TABLET BY MOUTH ONE TIME DAILY 90 tablet 0   • levothyroxine 88 MCG tablet Take 1 tablet by mouth daily. 90 tablet 3   • alendronate (FOSAMAX) 70 MG tablet Take 1 tablet by mouth every 7 days. 4 tablet 11   • amLODIPine (NORVASC) 5 MG tablet Take 1 tablet by mouth daily. 90 tablet 3   • triamcinolone (ARISTOCORT) 0.1 % ointment Apply 1 application topically 2 times daily. Skin Under  The  breast 80 g 1   • diclofenac (VOLTAREN) 1 % gel Apply 2 g topically in the morning and 2 g at noon and 2 g in the evening. Rt  ankle 150 g 1   • VITAMIN D,  CHOLECALCIFEROL, PO Take 2,000 Units by mouth daily.     • Cyanocobalamin (vitamin B-12) 1000 MCG sublingual tablet Place 1,000 mcg under the tongue daily.     • levothyroxine (SYNTHROID, LEVOTHROID) 88 MCG tablet take one tablet by mouth every day       No current facility-administered medications for this visit.        The following items on the Medicare Health Risk Assessment were found to be positive  1.) Do you have an Advance directive, living will, or power of  for health care document that contains your wishes for end of life care?: No     2.) Would you like additional information on advance directives?: Yes     4.) During the past 4 weeks, what was the hardest physical activity you could do for at least 2 minutes?: Light     6 a.) How many servings of Fruits and Vegetables do you have each day ( 1 serving = 1 piece of fruit, 1/2 cup fruits or vegetables): 1 per day     7b.) Do you feel unsteady when standing or walking?: Yes     7c.) Do you worry about falling?: Yes         Vision and Hearing screens: No results found.    Advance care planning documents on file - yes     Cognitive/Functional Status: no evidence of cognitive dysfunction by direct observation    Opioid Review: Aishwarya is not taking opioid medications.    Recent PHQ 2/9 Score:    PHQ 2:  PHQ 2 Score Adult PHQ 2 Score Adult PHQ 2 Interpretation Little interest or pleasure in activity?   8/28/2023  10:34 AM 0 No further screening needed 0       PHQ 9:       DEPRESSION ASSESSMENT/PLAN:  Depression screening is negative no further plan needed.     Body mass index is 28.44 kg/m².    BMI ASSESSMENT/PLAN:  Patient is overweight.    Join health club          Needed follow up:      See orders.   See Patient Instructions section.   No follow-ups on file.       PRIMARY CARE VISIT    Patient name and address: Aishwarya Damon [address] [facility] [address]  MRN number: [id number]   YOB: 1947   Patient Home phone: [phone]  (home) [phone] (work)     Reason for visit:   Chief Complaint   Patient presents with   • Medicare Wellness Visit   • Referral     opthamology   • Eye Problem     Bump on right eye           Quality         History of Present Illness  HPI  is a 76-year-old female with hypertension primary hyperparathyroidism hypercalcemia history of papillary carcinoma of the thyroid and  Postoperative hypothyroidism thoracic aortic atherosclerosis hyperlipidemia vitamin D deficiency and osteopenia here for a checkup also complaining of this bump on the upper eyelid has been there for about 6 months on and off sometimes it gets bigger sometimes smaller.  No visual disturbance sometimes the swelling episodes discharge and then it gets smaller  Osteopenia and hyperparathyroidism hypercalcemia she follows with the endocrinology  Biphosphonate's discussed  for the osteopenia she declined  She denies any headache dizziness chest pain shortness of breath abdominal pain constipation nausea vomiting polyuria no falls  Review of Systems       Review of Systems   All other systems reviewed and are negative.       Allergies  ALLERGIES:   Allergen Reactions   • Sulfa Antibiotics Other (See Comments)     Unknown       Current Meds  Current Outpatient Medications   Medication Sig Dispense Refill   • olmesartan (BENICAR) 40 MG tablet TAKE ONE TABLET BY MOUTH ONE TIME DAILY 90 tablet 0   • levothyroxine 88 MCG tablet Take 1 tablet by mouth daily. 90 tablet 3   • alendronate (FOSAMAX) 70 MG tablet Take 1 tablet by mouth every 7 days. 4 tablet 11   • amLODIPine (NORVASC) 5 MG tablet Take 1 tablet by mouth daily. 90 tablet 3   • triamcinolone (ARISTOCORT) 0.1 % ointment Apply 1 application topically 2 times daily. Skin Under  The  breast 80 g 1   • diclofenac (VOLTAREN) 1 % gel Apply 2 g topically in the morning and 2 g at noon and 2 g in the evening. Rt  ankle 150 g 1   • VITAMIN D, CHOLECALCIFEROL, PO Take 2,000 Units by mouth daily.      • Cyanocobalamin (vitamin B-12) 1000 MCG sublingual tablet Place 1,000 mcg under the tongue daily.     • levothyroxine (SYNTHROID, LEVOTHROID) 88 MCG tablet take one tablet by mouth every day       No current facility-administered medications for this visit.       Active Problems  Patient Active Problem List   Diagnosis   • Fibrocystic breast disease (FCBD)   • Essential hypertension   • Hypercalcemia   • Hyperlipidemia   • Osteopenia of the elderly   • Primary hyperparathyroidism (CMD)   • Postoperative hypothyroidism   • Thoracic aortic atherosclerosis (CMD)   • Vitamin D deficiency   • Chronic hoarseness   • Encounter for Medicare annual wellness exam   • Breast cancer screening   • Need for pneumococcal vaccination   • Papillary carcinoma of thyroid (CMD)       Past Medical History  Past Medical History:   Diagnosis Date   • Hypertension        Surgical History  Past Surgical History:   Procedure Laterality Date   • Thyroidectomy      from thyroid CA       Family History  Family History   Problem Relation Age of Onset   • Hypertension Mother    • Heart disease Father        Social History  Social History     Socioeconomic History   • Marital status: /Civil Union     Spouse name: Not on file   • Number of children: Not on file   • Years of education: Not on file   • Highest education level: Not on file   Occupational History   • Not on file   Tobacco Use   • Smoking status: Never   • Smokeless tobacco: Never   Vaping Use   • Vaping Use: never used   Substance and Sexual Activity   • Alcohol use: No   • Drug use: No   • Sexual activity: Not on file   Other Topics Concern   • Not on file   Social History Narrative   • Not on file     Social Determinants of Health     Financial Resource Strain: Low Risk  (6/29/2022)    Financial Resource Strain    • Social Determinants: Financial Resource Strain: None   Food Insecurity: No Food Insecurity (6/29/2022)    Food Insecurity    • Social Determinants: Food  Insecurity: Never   Transportation Needs: No Transportation Needs (6/29/2022)    PRAPARE - Transportation    • Lack of Transportation (Medical): No    • Lack of Transportation (Non-Medical): No   Physical Activity: Inactive (6/29/2022)    Exercise Vital Sign    • Days of Exercise per Week: 0 days    • Minutes of Exercise per Session: 0 min   Stress: Low Risk  (6/29/2022)    Stress    • Social Determinants: Stress: A little bit   Social Connections: Socially Integrated (6/29/2022)    Social Connections    • Social Determinants: Social Connections: 5 or more times a week   Intimate Partner Violence: Not At Risk (6/29/2022)    Intimate Partner Violence    • Social Determinants: Intimate Partner Violence Past Fear: No    • Social Determinants: Intimate Partner Violence Current Fear: No        Review  Patient's medications, allergies, past medical, surgical, social and family histories were reviewed and updated as appropriate.     Vitals  Visit Vitals  BP (!) 144/84   Pulse 72   Temp 98 °F (36.7 °C)   Resp 18   Ht 5' 4.02\" (1.626 m)   Wt 75.2 kg (165 lb 12.6 oz)   SpO2 99%   BMI 28.44 kg/m²       PHYSICAL EXAM  Physical Exam  Constitutional:       General: She is not in acute distress.     Appearance: She is well-developed. She is not diaphoretic.   HENT:      Head: Normocephalic and atraumatic.      Right Ear: External ear normal.      Left Ear: External ear normal.      Nose: Nose normal.      Mouth/Throat:      Pharynx: No oropharyngeal exudate.      Neck: Normal range of motion and neck supple.   Eyes:      General: No scleral icterus.        Right eye: No discharge.         Left eye: No discharge.      Conjunctiva/sclera: Conjunctivae normal.      Pupils: Pupils are equal, round, and reactive to light.      Comments: Small cystic lesion upper eyelid right eye   Neck:      Thyroid: No thyromegaly.      Vascular: No JVD.      Trachea: No tracheal deviation.   Cardiovascular:      Rate and Rhythm: Normal rate and  regular rhythm.      Heart sounds: No murmur heard.     No friction rub. No gallop.   Pulmonary:      Effort: Pulmonary effort is normal. No respiratory distress.      Breath sounds: Normal breath sounds. No stridor. No wheezing or rales.   Chest:      Chest wall: No tenderness.   Abdominal:      General: Bowel sounds are normal. There is no distension.      Palpations: Abdomen is soft. There is no mass.      Tenderness: There is no abdominal tenderness. There is no guarding or rebound.   Musculoskeletal:         General: No tenderness or deformity. Normal range of motion.   Lymphadenopathy:      Cervical: No cervical adenopathy.   Skin:     General: Skin is dry.      Coloration: Skin is not pale.      Findings: No erythema or rash.   Neurological:      Mental Status: She is alert and oriented to person, place, and time.      Cranial Nerves: No cranial nerve deficit.      Motor: No abnormal muscle tone.      Coordination: Coordination normal.      Deep Tendon Reflexes: Reflexes are normal and symmetric. Reflexes normal.   Psychiatric:         Behavior: Behavior normal.         Thought Content: Thought content normal.         Judgment: Judgment normal.           Result    Lab Services on 03/14/2023   Component Date Value Ref Range Status   • TSH 03/14/2023 1.060  0.350 - 5.000 mcUnits/mL Final   • T4, Free 03/14/2023 1.3  0.8 - 1.5 ng/dL Final   Walk In on 03/10/2023   Component Date Value Ref Range Status   • POCT Rapid SARS-COV-2 by PCR 03/10/2023 Not Detected  Not Detected / Detected / Presumptive Positive / Inhibitors present Final   • POCT Influenza A by PCR 03/10/2023 Not Detected  Not Detected Final   • POCT Influenza B By PCR 03/10/2023 Not Detected  Not Detected Final   • RSV By PCR 03/10/2023 Not Detected  Not Detected Final     Assessment/ Plan:    Diagnoses and all orders for this visit:  Cyst of skin and subcutaneous tissue  -     SERVICE TO OPHTHALMOLOGY  Essential hypertension  -     CBC with Automated  Differential; Future  -     Comprehensive Metabolic Panel; Future  -     Glycohemoglobin; Future  -     Lipid Panel With Reflex; Future  -     Thyroid Stimulating Hormone; Future  Hypercalcemia  -     Vitamin D -25 Hydroxy; Future  Primary hyperparathyroidism (CMD)  Encounter for Medicare annual wellness exam    Small cystic lesion upper eyelid right eye refer to ophthalmology for removal  Blood pressure not controlled she has not taken the medications advised to take medications daily  Hypercalcemia hyperparathyroidism check vitamin D D levels avoid calcium supplements follow-up with endocrinology exercises discussed  Follow-up 3 to 4 months earlier if needed   PREVENTIVE HEALTH MAINTENANCE:  Immunizations recommendations reviewed  Cancer screening recommendations reviewed --   Preventive Diet and exercise related life-style changes discussed and recommended     RETURN TO OFFICE  No follow-ups on file.    Alberto Olguin MD   -TTE --> Normal LF, Mitral stenosis, increased RV pressure and dilated Left atrium, functioning TAVR. Findings c/w valvular heart failure or heart failure with preserved EF  - c/w lasix 20mg qd  - Strict I's/O's and daily weights

## 2024-08-05 NOTE — PROGRESS NOTE ADULT - PROBLEM SELECTOR PLAN 5
When Should The Patient Follow-Up For Their Next Full-Body Skin Exam?: 6 Months Detail Level: Detailed Quality 137: Melanoma: Continuity Of Care - Recall System: Patient information entered into a recall system that includes: target date for the next exam specified AND a process to follow up with patients regarding missed or unscheduled appointments Detail Level: Generalized Detail Level: Zone - new onset Afib this admission  - c/w heparin gtt on while decision on PEG placement is still pending.  Eventual plan will be for DOAC per cardiology as patient's MS is not rheumatic in etiology   - metoprolol 75 mg BID for rate control  - c/w lasix 20 mg daily - new onset Afib this admission  - c/w heparin gtt on while decision on PEG placement is still pending.  Eventual plan will be for DOAC per cardiology as patient's MS is not rheumatic in etiology   - metoprolol 75 mg BID for rate control, consider IV if cannot get NG by kev or HR becomes elevated  - c/w lasix 20 mg daily

## 2024-10-30 NOTE — PATIENT PROFILE ADULT - NSPROMUTINFOINDIVIDFT_GEN_A_NUR
lives in a home where he is taken care of  patient does not have family in [place], however, has a friend who is HCP
I don't know

## 2025-04-29 NOTE — ED ADULT TRIAGE NOTE - MEANS OF ARRIVAL
4/29/2025    Ange Diallo MD  [address] [facility] [address]    RE: Kelly Fowler       Dear Colleague,     I had the pleasure of seeing Kelly Fowler in the [facility].    HEART CARE ENCOUNTER CONSULTATON NOTE      [facility]  [phone]      Assessment/Recommendations   Assessment:   Coronary artery disease: Remote CABG 2001. Angiogram 2019 with 60% PDA stenosis, LIMA and SVG grafts were patent.  Echo stress test 10/2023 showed no ischemia, LVEF 60-65% - no angina on Plavix daily  HLD: LDL 48 on ezetimibe-simvastatin 10-40 mg, intolerance to atorvastatin and rosuvastatin due to myalgias  HTN: Elevated 180/68 on losartan, metoprolol succinate, nifedipine. Asymptomatic. Similar on repeat. She took medications this morning. This appears to be an outlier per recent clinic visits.  She is not monitoring at home.  T2DM: A1c 6.4, yeast infection on Jardiance, Insulin/Metformin   Bilateral carotid artery disease: US carotid 11/2023 showed mild plaque bilaterally, right ICA indicating 50-69% stenosis, less than 50% stenosis of left ICA, progressed from <50% bilateral stenosis 2018 - continue statin and Plavix    Plan:   Will take evening nifedipine early when she gets home today.  She will monitor BP a few times a day for the next 3 days and report findings.  With more persistently elevated measurements consider increasing nifedipine, or transition to valsartan versus adjustment to timing of antihypertensives.  Repeat carotid US 11/2025  She is planning to try fish oil per PCP to raise HDL cholesterol.      Follow up in 6 months with Dr. Gifford     History of Present Illness/Subjective    HPI: Kelly Fowler is a 72 year old female with PMHx of CAD, HLD, HTN, T2DM presents for follow up.    Patient reports no concerns today.  Condition has been stable.  Her blood pressure is elevated, but she was at [facility] this morning and says it was controlled with  systolic in the 120s.  Cholesterol has been well-controlled.  PCP wants her to start fish oil supplement for low HDL.  Her A1c has improved and been stable over the last year.  Is concerned about 5 pound weight gain in the last month without changing anything.  Denies increased lower extremity swelling.    Antihypertensive regimen  AM: Metoprolol exudate  PM: Nifedipine and losartan    She denies lightheadedness, shortness of breath, dyspnea on exertion, palpitations, chest pain, and lower extremity edema.        Echocardiogram stress 10/2023 Results:  Interpretation Summary  1. Normal stress echocardiogram without evidence of stress induced ischemia.  2. Normal resting LV systolic performance with an ejection fraction of 60-65  %. There is normal improvement in left ventricular systolic performance with a  peak ejection fraction of 75%.  3. No ECG evidence of ischemia.  4. No anginal chest pain reported with exercise.  5. Average functional capacity for age.     Stress Summary: Patient exercised on the [place] protocol for a total of 6:30  minutes. Peak heart rate achieved was 143 b.p.m (96% max.) with a double-  product of 23,739. The patient stopped exercise due to generalized fatigue. No  chest pain symptoms were reported.     Electrocardiogram: Baseline ECG reveals normal sinus rhythm without evidence  of prior myocardial injury. With exercise, there are no significant ECG  changes to suggest ischemia. A solitary PVC was noted.     Baseline echocardiogram: Technically adequate images were obtained in the  standard quad-screen format. LV systolic performance is normal with a visually  estimated ejection fraction of 60-65 %. There is normal regional wall motion.  No significant valvular heart disease is identified on limited screening  Doppler.     Postexercise echocardiogram: Technically adequate images were obtained  immediately post exercise in the standard quad-screen format. LV systolic  performance normally  "improves with a peak ejection fraction of 75%. There are  no stress induced regional wall motion abnormalities.       Physical Examination  Review of Systems   Vitals: BP (!) 182/68 (BP Location: Left arm, Patient Position: Sitting, Cuff Size: Adult Regular)   Pulse 104   Resp 16   Ht 1.6 m (5' 3\")   Wt 64.6 kg (142 lb 8 oz)   LMP 06/16/2004 (Exact Date)   BMI 25.24 kg/m    BMI= Body mass index is 25.24 kg/m .  Wt Readings from Last 3 Encounters:   04/29/25 64.6 kg (142 lb 8 oz)   04/04/25 63.5 kg (140 lb)   04/04/25 63.6 kg (140 lb 4.8 oz)           ENT/Mouth: membranes moist, no oral lesions or bleeding gums.      EYES:  no scleral icterus, normal conjunctivae       Chest/Lungs:   lungs are clear to auscultation, no rales or wheezing,  equal chest wall expansion    Cardiovascular:   Regular. Normal first and second heart sounds with no murmurs, rubs, or gallops; the radial and posterior tibial pulses are intact, trace RLE edema bilaterally        Extremities: no cyanosis or clubbing   Skin: no xanthelasma, warm.    Neurologic:  no tremors     Psychiatric: alert and oriented x3, calm        Please refer above for cardiac ROS details.        Medical History  Surgical History Family History Social History   Past Medical History:   Diagnosis Date     Anemia due to blood loss, acute 11/18/2020     Arthritis      Coronary artery disease      Depression, unspecified depression type 6/18/2021     Diabetes mellitus, type II (H)      Diverticulitis      Essential (hemorrhagic) thrombocythemia (H) 1/5/2021     High cholesterol      Hypertension      Inverted nipple     no change, has had for many years     Myocardial infarction (H) 2001     Other specified congenital anomaly of spinal cord     2001 and 200  ct chest scans no biopsy could be bronchgenic or neural 2019: . The cystic mass that was seen posterior to the aortic arch and proximal descending aorta on the prior CT is no longer present.      Past Surgical " History:   Procedure Laterality Date     BYPASS GRAFT ARTERY CORONARY       CARDIAC CATHETERIZATION       CARDIAC SURGERY       CHOLECYSTECTOMY       CV CORONARY ANGIOGRAM N/A 2019    Procedure: Coronary Angiogram;  Surgeon: Joe Gifford MD;  Location: [facility];  Service: Cardiology     ENDOSCOPIC RETROGRADE CHOLANGIOPANCREATOGRAM       MI ERCP DX COLLECTION SPECIMEN BRUSHING/WASHING N/A 2020    Procedure: ENDOSCOPIC RETROGRADE CHOLANGIOPANCREATOGRAPHY, SPHINCTEROTOMY REMOVAL OF BILE DUCT STONES;  Surgeon: Vin Gonzalez MD;  Location: [facility];  Service: Gastroenterology     MI LAP,SURG,COLECTOMY, PARTIAL, W/ANAST N/A 2020    Procedure: LAPAROSCOPIC SIGMOID RESECTION, IMMOBILIZATION SPLENIC FLEXURE;  Surgeon: Nannette Lucero MD;  Location: [facility];  Service: General     PROCTOSCOPY N/A 2020    Procedure: PROCTOSCOPY;  Surgeon: Nannette Lucero MD;  Location: [facility];  Service: General     TONSILLECTOMY      as a child     XR ERCP BILIARY ONLY  2020     ZZC CABG, VEIN, SINGLE      Description: CABG (CABG);  Proc Date: 2001;  Comments: LIMA to Junction of Diagonal & LAD, SVG to PDA     ZZC LAP,CHOLECYSTECTOMY/EXPLORE N/A 2020    Procedure: CHOLECYSTECTOMY, LAPAROSCOPIC;  Surgeon: Candy Rani DO;  Location: [facility];  Service: General     Family History   Problem Relation Age of Onset     Osler-Weber-Rendu syndrome Father      Coronary Artery Disease Mother 60        both her parents  on Halloween of different years     Thyroid Disease Mother      Diabetes Maternal Grandmother      Coronary Artery Disease Paternal Grandmother      Diabetes Paternal Grandmother         Social History     Socioeconomic History     Marital status:      Spouse name: Not on file     Number of children: 1     Years of education: Not on file     Highest education level: Not on file   Occupational History      Not on file   Tobacco Use     Smoking status: Former     Current packs/day: 0.00     Types: Cigarettes     Quit date: 1972     Years since quittin.3     Passive exposure: Past     Smokeless tobacco: Never   Vaping Use     Vaping status: Never Used   Substance and Sexual Activity     Alcohol use: Yes     Alcohol/week: 1.0 standard drink of alcohol     Drug use: Never     Sexual activity: Yes     Partners: Male     Birth control/protection: Post-menopausal   Other Topics Concern     Not on file   Social History Narrative     after 47+ yrs of marriage to her late  Morgan ( 2020). Living alone in the house they built together in 2018.    1 daughter who visits regularly on .     In 2018 she retired from 30 years of home .     Enjoys cross stiching    Nonsmoker. No alcohol. No formal exercise.      Ange Diallo MD         Social Drivers of Health     Financial Resource Strain: Low Risk  (2024)    Financial Resource Strain      Within the past 12 months, have you or your family members you live with been unable to get utilities (heat, electricity) when it was really needed?: No   Food Insecurity: Low Risk  (2024)    Food Insecurity      Within the past 12 months, did you worry that your food would run out before you got money to buy more?: No      Within the past 12 months, did the food you bought just not last and you didn t have money to get more?: No   Transportation Needs: Low Risk  (2024)    Transportation Needs      Within the past 12 months, has lack of transportation kept you from medical appointments, getting your medicines, non-medical meetings or appointments, work, or from getting things that you need?: No   Physical Activity: Insufficiently Active (2024)    Exercise Vital Sign      Days of Exercise per Week: 2 days      Minutes of Exercise per Session: 20 min   Stress: No Stress Concern Present (2024)    [employer] [place] [employer]  Occupational Health - Occupational Stress Questionnaire      Feeling of Stress : Only a little   Social Connections: Unknown (11/8/2024)    Social Connection and Isolation Panel [NHANES]      Frequency of Communication with Friends and Family: Not on file      Frequency of Social Gatherings with Friends and Family: Twice a week      Attends Sikhism Services: Not on file      Active Member of Clubs or Organizations: Not on file      Attends Club or Organization Meetings: Not on file      Marital Status: Not on file   Interpersonal Safety: Low Risk  (11/13/2024)    Interpersonal Safety      Do you feel physically and emotionally safe where you currently live?: Yes      Within the past 12 months, have you been hit, slapped, kicked or otherwise physically hurt by someone?: No      Within the past 12 months, have you been humiliated or emotionally abused in other ways by your partner or ex-partner?: No   Housing Stability: Low Risk  (11/8/2024)    Housing Stability      Do you have housing? : Yes      Are you worried about losing your housing?: No           Medications  Allergies   Current Outpatient Medications   Medication Sig Dispense Refill     acetaminophen (TYLENOL) 650 MG CR tablet [ACETAMINOPHEN (TYLENOL) 650 MG CR TABLET] Take 1,300 mg by mouth at bedtime.              biotin 1 mg tablet Take 1,000 mcg by mouth daily       blood glucose (ACCU-CHEK JOSE EDUARDO PLUS) test strip Use to test blood sugar 1 times daily or as directed. For hypoglycemia 100 strip 3     calcium carbonate (OS-NANCY) 600 MG tablet Take 600 mg by mouth 2 times daily (with meals). Including Vitamin D       clobetasol (TEMOVATE) 0.05 % external ointment Apply topically 2 times daily.       clopidogrel (PLAVIX) 75 MG tablet Take 1 tablet (75 mg) by mouth daily. 90 tablet 1     Continuous Blood Gluc  (DEXCOM G7 ) DELIO 1 each daily Use to monitor glucose based on  instructions 1 each 0     Continuous Glucose Sensor (DEXCOM  G7 SENSOR) MISC Change every 10 days 9 each 1     Cranberry 500 MG CAPS Take 1 capsule by mouth daily.       cyanocobalamin 1000 MCG tablet [CYANOCOBALAMIN 1000 MCG TABLET] Take 1,000 mcg by mouth daily.       ezetimibe-simvastatin (VYTORIN) 10-40 MG tablet Take 1 tablet by mouth at bedtime 90 tablet 2     famotidine (PEPCID) 20 MG tablet [FAMOTIDINE (PEPCID) 20 MG TABLET] Take 1 tablet (20 mg total) by mouth 2 (two) times a day. 60 tablet 11     Ferrous Sulfate 324 (65 Fe) MG TBEC Take 1 tablet by mouth daily.       fluticasone (FLONASE) 50 MCG/ACT nasal spray Spray 1 spray into both nostrils daily 16 g 11     gabapentin (NEURONTIN) 600 MG tablet TAKE 1 TABLET BY MOUTH AT BEDTIME 90 tablet 2     insulin pen needle (B-D U/F) 31G X 5 MM miscellaneous USE 1 PEN NEEDLE DAILY OR AS DIRECTED. 100 each 0     loratadine (CLARITIN) 10 mg tablet [LORATADINE (CLARITIN) 10 MG TABLET] Take 10 mg by mouth daily.       losartan (COZAAR) 100 MG tablet Take 1 tablet (100 mg) by mouth daily. 90 tablet 11     magnesium glycinate 100 MG CAPS capsule Take 400 mg by mouth daily.       Melatonin 5 MG CHEW Take 5 mg by mouth nightly as needed       metFORMIN (GLUCOPHAGE XR) 500 MG 24 hr tablet Take 2 tablets (1,000 mg) by mouth every morning. (Patient taking differently: Take 1,000 mg by mouth every morning. 1000 mg in the am and 500 mg in the pm) 180 tablet 3     metoprolol succinate ER (TOPROL XL) 200 MG 24 hr tablet Take 1 tablet (200 mg) by mouth at bedtime. 90 tablet 11     NIFEdipine ER (ADALAT CC) 30 MG 24 hr tablet TAKE 1 TABLET BY MOUTH EVERY DAY 90 tablet 1     nitroGLYcerin (NITROSTAT) 0.4 MG sublingual tablet Place 1 tablet (0.4 mg) under the tongue every 5 minutes as needed for chest pain (x3 doses. If chest pain persists after second dose, call 911) 25 tablet 1     psyllium (METAMUCIL) 3.4 gram packet [PSYLLIUM (METAMUCIL) 3.4 GRAM PACKET] Take 1 packet by mouth daily.  0     SOLIQUA pen Inject 34 Units subcutaneously daily.  "INJECT 34 UNITS SUBCUTANEOUS EVERY MORNING (BEFORE BREAKFAST) 30 mL 3     tiZANidine (ZANAFLEX) 2 MG tablet Take 1-2 mg by mouth 3 times daily as needed for muscle spasms (back pain)         Allergies   Allergen Reactions     Jardiance [Empagliflozin] Other (See Comments)     Vaginal yeast infection after 1 weeks dose.     Adhesive Tape-Silicones [Adhesive Tape] Unknown     Blisters with paper tape     Atorvastatin Muscle Pain (Myalgia)     Weight gain     Blood-Group Specific Substance Unknown     Anti-[place]. RBC for transfusion may be delayed >3 hours. Draw 2 lavender tubes for Type and Screen requests.     Rosuvastatin Muscle Pain (Myalgia)          Lab Results    Chemistry/lipid CBC Cardiac Enzymes/BNP/TSH/INR   Recent Labs   Lab Test 04/04/25  1100   CHOL 105   HDL 45*   LDL 48   TRIG 60     Recent Labs   Lab Test 04/04/25  1100 03/15/24  1553 01/20/23  1221   LDL 48 60 54     Recent Labs   Lab Test 04/04/25  1100      POTASSIUM 4.6   CHLORIDE 102   CO2 27   *   BUN 26.3*   CR 0.96*   GFRESTIMATED 63   NANCY 9.9     Recent Labs   Lab Test 04/04/25  1100 11/13/24  1049 08/28/24  1859   CR 0.96* 1.10* 1.29*     Recent Labs   Lab Test 04/04/25  1100 09/25/24  1018 06/19/24  1320   A1C 6.4* 6.3* 6.4*          Recent Labs   Lab Test 11/13/24  1049   WBC 6.6   HGB 12.5   HCT 39.1   MCV 91        Recent Labs   Lab Test 11/13/24  1049 08/28/24  1859 11/06/23  1234   HGB 12.5 13.3 12.1    Recent Labs   Lab Test 12/16/20  2227 05/02/20  0210   TROPONINI <0.01 <0.01     No results for input(s): \"BNP\", \"NTBNPI\", \"NTBNP\" in the last 07582 hours.  Recent Labs   Lab Test 11/13/24  1049   TSH 2.65     Recent Labs   Lab Test 11/17/22  0849   INR 0.94          This note has been dictated using voice recognition software. Any grammatical, typographical, or context distortions are unintentional and inherent to the software    Nika Reza PA-C                                         Thank you for allowing me " to participate in the care of your patient.      Sincerely,     Nika Reza PA-C     [facility]  cc:   Joe Gifford MD  [address] [facility] [address]  [place] [address]       wheelchair

## 2025-06-10 NOTE — PROGRESS NOTE ADULT - PROBLEM SELECTOR PLAN 5
clubbing of fingers - pulmonary consult appreciated, rec adventitious lung sounds likely due to vocal cord paralysis, c/w with Pulmicort, albuterol and prednisone.   - c/w home dose prednisone 10mg daily chronically for PMR/?GCA. Right temporal artery biopsy reportedly negative, but she gets right sided headaches.  - Maintain O2 sats between 88-92% Likely due to aspiration event. S/p MICU; extubated 8/31  - Now on 2L NC, satting in mid-90's  - Chest PT, chest vest  - C/w continuous pulse ox  - DNR/DNI after MICU stay